# Patient Record
Sex: MALE | Race: BLACK OR AFRICAN AMERICAN | NOT HISPANIC OR LATINO | Employment: UNEMPLOYED | URBAN - METROPOLITAN AREA
[De-identification: names, ages, dates, MRNs, and addresses within clinical notes are randomized per-mention and may not be internally consistent; named-entity substitution may affect disease eponyms.]

---

## 2017-07-05 ENCOUNTER — TELEPHONE (OUTPATIENT)
Dept: BEHAVIORAL HEALTH | Facility: CLINIC | Age: 27
End: 2017-07-05

## 2017-07-05 ENCOUNTER — HOSPITAL ENCOUNTER (INPATIENT)
Facility: CLINIC | Age: 27
LOS: 7 days | Discharge: NURSING, REHAB OR LONG-TERM CARE | DRG: 885 | End: 2017-07-12
Attending: PHYSICIAN ASSISTANT | Admitting: HOSPITALIST
Payer: COMMERCIAL

## 2017-07-05 DIAGNOSIS — Z86.59 HISTORY OF DEPRESSION: ICD-10-CM

## 2017-07-05 DIAGNOSIS — F60.3 BORDERLINE PERSONALITY DISORDER (H): ICD-10-CM

## 2017-07-05 DIAGNOSIS — R45.851 SUICIDAL IDEATION: ICD-10-CM

## 2017-07-05 DIAGNOSIS — F25.1 SCHIZOAFFECTIVE DISORDER, DEPRESSIVE TYPE (H): Primary | ICD-10-CM

## 2017-07-05 PROBLEM — F32.A DEPRESSION: Status: ACTIVE | Noted: 2017-07-05

## 2017-07-05 LAB
ALBUMIN UR-MCNC: NEGATIVE MG/DL
AMPHETAMINES UR QL SCN: NORMAL
ANION GAP SERPL CALCULATED.3IONS-SCNC: 10 MMOL/L (ref 3–14)
APPEARANCE UR: CLEAR
BACTERIA #/AREA URNS HPF: ABNORMAL /HPF
BARBITURATES UR QL: NORMAL
BENZODIAZ UR QL: NORMAL
BILIRUB UR QL STRIP: NEGATIVE
BUN SERPL-MCNC: 6 MG/DL (ref 7–30)
CALCIUM SERPL-MCNC: 9.1 MG/DL (ref 8.5–10.1)
CANNABINOIDS UR QL SCN: NORMAL
CHLORIDE SERPL-SCNC: 114 MMOL/L (ref 94–109)
CO2 SERPL-SCNC: 20 MMOL/L (ref 20–32)
COCAINE UR QL: NORMAL
COLOR UR AUTO: YELLOW
CREAT SERPL-MCNC: 0.79 MG/DL (ref 0.66–1.25)
ERYTHROCYTE [DISTWIDTH] IN BLOOD BY AUTOMATED COUNT: 13.2 % (ref 10–15)
GFR SERPL CREATININE-BSD FRML MDRD: ABNORMAL ML/MIN/1.7M2
GLUCOSE SERPL-MCNC: 85 MG/DL (ref 70–99)
GLUCOSE UR STRIP-MCNC: NEGATIVE MG/DL
HCT VFR BLD AUTO: 40.6 % (ref 40–53)
HGB BLD-MCNC: 14.7 G/DL (ref 13.3–17.7)
HGB UR QL STRIP: NEGATIVE
KETONES UR STRIP-MCNC: NEGATIVE MG/DL
LEUKOCYTE ESTERASE UR QL STRIP: NEGATIVE
MCH RBC QN AUTO: 32.7 PG (ref 26.5–33)
MCHC RBC AUTO-ENTMCNC: 36.2 G/DL (ref 31.5–36.5)
MCV RBC AUTO: 90 FL (ref 78–100)
NITRATE UR QL: NEGATIVE
OPIATES UR QL SCN: NORMAL
PCP UR QL SCN: NORMAL
PH UR STRIP: 7 PH (ref 5–7)
PLATELET # BLD AUTO: 249 10E9/L (ref 150–450)
POTASSIUM SERPL-SCNC: 3.2 MMOL/L (ref 3.4–5.3)
RBC # BLD AUTO: 4.5 10E12/L (ref 4.4–5.9)
RBC #/AREA URNS AUTO: 0 /HPF (ref 0–2)
SODIUM SERPL-SCNC: 144 MMOL/L (ref 133–144)
SP GR UR STRIP: 1.01 (ref 1–1.03)
SQUAMOUS #/AREA URNS AUTO: <1 /HPF (ref 0–1)
URN SPEC COLLECT METH UR: ABNORMAL
UROBILINOGEN UR STRIP-MCNC: NORMAL MG/DL (ref 0–2)
WBC # BLD AUTO: 9.8 10E9/L (ref 4–11)
WBC #/AREA URNS AUTO: <1 /HPF (ref 0–2)

## 2017-07-05 PROCEDURE — 25000125 ZZHC RX 250: Performed by: PSYCHIATRY & NEUROLOGY

## 2017-07-05 PROCEDURE — 25000125 ZZHC RX 250: Performed by: INTERNAL MEDICINE

## 2017-07-05 PROCEDURE — 25000132 ZZH RX MED GY IP 250 OP 250 PS 637: Performed by: INTERNAL MEDICINE

## 2017-07-05 PROCEDURE — 84443 ASSAY THYROID STIM HORMONE: CPT | Performed by: PHYSICIAN ASSISTANT

## 2017-07-05 PROCEDURE — 99207 ZZC CDG-MDM COMPONENT: MEETS LOW - DOWN CODED: CPT | Performed by: PHYSICIAN ASSISTANT

## 2017-07-05 PROCEDURE — 99222 1ST HOSP IP/OBS MODERATE 55: CPT | Performed by: PHYSICIAN ASSISTANT

## 2017-07-05 PROCEDURE — 87591 N.GONORRHOEAE DNA AMP PROB: CPT | Performed by: PHYSICIAN ASSISTANT

## 2017-07-05 PROCEDURE — 90791 PSYCH DIAGNOSTIC EVALUATION: CPT

## 2017-07-05 PROCEDURE — 80307 DRUG TEST PRSMV CHEM ANLYZR: CPT | Performed by: PHYSICIAN ASSISTANT

## 2017-07-05 PROCEDURE — 36415 COLL VENOUS BLD VENIPUNCTURE: CPT | Performed by: PHYSICIAN ASSISTANT

## 2017-07-05 PROCEDURE — 12400006 ZZH R&B MH INTERMEDIATE

## 2017-07-05 PROCEDURE — 99285 EMERGENCY DEPT VISIT HI MDM: CPT | Mod: 25

## 2017-07-05 PROCEDURE — 25000132 ZZH RX MED GY IP 250 OP 250 PS 637: Performed by: PSYCHIATRY & NEUROLOGY

## 2017-07-05 PROCEDURE — 80048 BASIC METABOLIC PNL TOTAL CA: CPT | Performed by: PHYSICIAN ASSISTANT

## 2017-07-05 PROCEDURE — 81001 URINALYSIS AUTO W/SCOPE: CPT | Performed by: PHYSICIAN ASSISTANT

## 2017-07-05 PROCEDURE — 85027 COMPLETE CBC AUTOMATED: CPT | Performed by: PHYSICIAN ASSISTANT

## 2017-07-05 RX ORDER — VENLAFAXINE HYDROCHLORIDE 75 MG/1
225 CAPSULE, EXTENDED RELEASE ORAL
Status: DISCONTINUED | OUTPATIENT
Start: 2017-07-06 | End: 2017-07-12 | Stop reason: HOSPADM

## 2017-07-05 RX ORDER — TRIHEXYPHENIDYL HYDROCHLORIDE 5 MG/1
10 TABLET ORAL
Status: DISCONTINUED | OUTPATIENT
Start: 2017-07-05 | End: 2017-07-12 | Stop reason: HOSPADM

## 2017-07-05 RX ORDER — OLANZAPINE 2.5 MG/1
2.5-5 TABLET, FILM COATED ORAL DAILY PRN
Status: DISCONTINUED | OUTPATIENT
Start: 2017-07-05 | End: 2017-07-12 | Stop reason: HOSPADM

## 2017-07-05 RX ORDER — ACETAMINOPHEN 650 MG/1
650 SUPPOSITORY RECTAL EVERY 4 HOURS PRN
Status: DISCONTINUED | OUTPATIENT
Start: 2017-07-05 | End: 2017-07-12 | Stop reason: HOSPADM

## 2017-07-05 RX ORDER — IBUPROFEN 600 MG/1
600 TABLET, FILM COATED ORAL EVERY 8 HOURS PRN
Status: DISCONTINUED | OUTPATIENT
Start: 2017-07-05 | End: 2017-07-12 | Stop reason: HOSPADM

## 2017-07-05 RX ORDER — HYDROXYZINE HYDROCHLORIDE 25 MG/1
25-50 TABLET, FILM COATED ORAL EVERY 4 HOURS PRN
Status: DISCONTINUED | OUTPATIENT
Start: 2017-07-05 | End: 2017-07-12 | Stop reason: HOSPADM

## 2017-07-05 RX ORDER — QUETIAPINE FUMARATE 25 MG/1
25-50 TABLET, FILM COATED ORAL
Status: DISCONTINUED | OUTPATIENT
Start: 2017-07-05 | End: 2017-07-12 | Stop reason: HOSPADM

## 2017-07-05 RX ORDER — LEVOTHYROXINE SODIUM 112 UG/1
112 TABLET ORAL DAILY
Status: DISCONTINUED | OUTPATIENT
Start: 2017-07-06 | End: 2017-07-12 | Stop reason: HOSPADM

## 2017-07-05 RX ORDER — NICOTINE 21 MG/24HR
1 PATCH, TRANSDERMAL 24 HOURS TRANSDERMAL DAILY PRN
Status: DISCONTINUED | OUTPATIENT
Start: 2017-07-05 | End: 2017-07-06

## 2017-07-05 RX ORDER — VENLAFAXINE HYDROCHLORIDE 75 MG/1
225 TABLET, EXTENDED RELEASE ORAL
Status: ON HOLD | COMMUNITY
End: 2017-10-04

## 2017-07-05 RX ORDER — PRAZOSIN HYDROCHLORIDE 5 MG/1
5 CAPSULE ORAL AT BEDTIME
Status: DISCONTINUED | OUTPATIENT
Start: 2017-07-05 | End: 2017-07-12 | Stop reason: HOSPADM

## 2017-07-05 RX ORDER — TRIHEXYPHENIDYL HYDROCHLORIDE 5 MG/1
5 TABLET ORAL EVERY MORNING
Status: DISCONTINUED | OUTPATIENT
Start: 2017-07-06 | End: 2017-07-12 | Stop reason: HOSPADM

## 2017-07-05 RX ORDER — DOCUSATE SODIUM 100 MG/1
100 CAPSULE, LIQUID FILLED ORAL 2 TIMES DAILY
Status: DISCONTINUED | OUTPATIENT
Start: 2017-07-05 | End: 2017-07-06

## 2017-07-05 RX ORDER — ACETAMINOPHEN 325 MG/1
650 TABLET ORAL EVERY 4 HOURS PRN
Status: DISCONTINUED | OUTPATIENT
Start: 2017-07-05 | End: 2017-07-12 | Stop reason: HOSPADM

## 2017-07-05 RX ADMIN — LIDOCAINE HYDROCHLORIDE 10 ML: 20 JELLY TOPICAL at 23:43

## 2017-07-05 RX ADMIN — IBUPROFEN 600 MG: 600 TABLET ORAL at 23:32

## 2017-07-05 RX ADMIN — LIDOCAINE HYDROCHLORIDE 10 ML: 20 JELLY TOPICAL at 21:22

## 2017-07-05 RX ADMIN — PRAZOSIN HYDROCHLORIDE 5 MG: 5 CAPSULE ORAL at 21:00

## 2017-07-05 RX ADMIN — ACETAMINOPHEN 650 MG: 325 TABLET, FILM COATED ORAL at 23:32

## 2017-07-05 RX ADMIN — HYDROXYZINE HYDROCHLORIDE 50 MG: 25 TABLET ORAL at 23:32

## 2017-07-05 RX ADMIN — Medication 1 MG: at 21:00

## 2017-07-05 RX ADMIN — DOCUSATE SODIUM 100 MG: 100 CAPSULE, LIQUID FILLED ORAL at 21:00

## 2017-07-05 RX ADMIN — HYDROXYZINE HYDROCHLORIDE 50 MG: 25 TABLET ORAL at 19:50

## 2017-07-05 ASSESSMENT — ENCOUNTER SYMPTOMS: COUGH: 1

## 2017-07-05 NOTE — PROGRESS NOTES
"Reported feeling depressed on and off for the past few weeks.   Started having suicidal thoughts last night again, he thought about jumping over a bridge. He feels lonely, and hopeless.  Does not have any support system here, his family [place] the [place] not keep in touch with him.  He said \"  I feel like in death row, like someone can give an injection and just kill me\"  Flat affect, he contract for safety.  Admission completed.  "

## 2017-07-05 NOTE — ED PROVIDER NOTES
History     Chief Complaint:  Suicidal ideation    HPI   Kamini Neal is a 26 year old male with a history of bipolar disorder, schizoaffective disorder, and borderline personality disorder who presents for evaluation of suicidal ideation. The patient reports that he has suicidal thoughts off and on. Last night he started to feel suicidal again and the feeling did no pass through the night. The patient states that there is a bridge near his group home that he would jump off if he were to kill himself. The patient notes that he ran out of Abilify a week ago and has not take it since. He reported this to his psychiatrist who denied him a new prescription because they would prefer him to do an injection. The patient states that he did have some alcohol yesterday, maybe a half can of beer. The patient states that he has no thoughts of harming others, there was no event that started the suicidal thoughts this time, and denies drug use. He has not had any recent hallucinations. The patient states that he has had a cough the last 24 hours and subjective fevers, no other symptoms or concerns.     Per the group home, the patient has a difficult family life. He only keeps in contact with his mother who is still in [facility]. Any family he has in the [place] does not keep in contact with him. They also stated he was talking gibberish, and having flashbacks from [place].     Allergies:  No known drug allergies.      Medications:    Abilify  Zyprexa  Artane  Prazosin  Prilosec  Levothyroxine  Effexor    Past Medical History:    Anxiety  Depression  Bipolar disorder  Schizoaffective disorder  Hypothyroidism  PTSD    Past Surgical History:    History reviewed. No pertinent past surgical history.     Family History:    History reviewed. No pertinent family history.    Social History:  Marital Status: Single  Tobacco Use: Current daily smoker, 1.00 PPD.   Alcohol Use: Yes    Review of Systems   Constitutional: Fever: subjective.  "  Respiratory: Positive for cough. Negative for shortness of breath.    Cardiovascular: Negative for chest pain.   Psychiatric/Behavioral: Positive for dysphoric mood and suicidal ideas. Negative for hallucinations and self-injury.   All other systems reviewed and are negative.    Physical Exam   First Vitals:  BP: (!) 120/91  Pulse: 59  Temp: 99.4  F (37.4  C)  Resp: 16  Height: 179.8 cm (5' 10.8\")  Weight: 104.3 kg (230 lb)  SpO2: 100 %      Physical Exam  General: Sitting in the mental health area, playing with his wrist band.     Head:  The scalp, head and face appear normal.  ENT:  Pupils are equal, round and reactive to light.    Oropharynx is moist.   Resp:  Non-labored breathing. No tachypnea.     Lung fields clear to auscultation without wheezes or rales.   CV:  Regular rate and rhythm. Normal S1 and S2, no S3 or S4.     No pathological murmur detected.   GI:  Abdomen is soft and non-distended.     Non-tender to palpation in all four quadrants.    MS:  Normal muscular tone.     Normal gait.  Neuro:  Awake and alert. Speech is clear.   Skin:  No rash or pallor.  Psych: Flat affect. Minimal eye contact. Talking into his hands.     Normal thought content and speech. No evidence of hallucinations.     Well groomed.       Emergency Department Course     Laboratory:  Drug abuse screen 77 urine: Negative.      Emergency Department Course:  Nursing notes and vitals reviewed.  I performed an exam of the patient as documented above.   Urine sample was obtained and sent for laboratory analysis, findings above.   ([id number]) I consulted with DEC worker, John, after their consultation of the patient.   Findings and plan explained to the patient who consents to admission.   DEC discussed the patient with Dr. Zhu, who will admit the patient to a behavioral health bed for further monitoring, evaluation, and treatment.     Impression & Plan      Medical Decision Making:  Kamini Neal is a 26 year old male with a " history of borderline personality disorder, depression, anxiety, and paranoid schizophrenia who presents to the emergency department for evaluation of suicidal ideation. The patient reports that he has planned to jump off a bridge near his group home. Group home also noted concern that he may be having nicholas. Patient was evaluated by our DEC virtual  here in the emergency department, John, see note for details. We are in agreement that the patient requires inpatient admission for further management of his suicidal ideation. The patient is voluntary. U-tox was negative and patient reports only drinking half a beer yesterday evening. No other signs of other possible organic cause for his increase in suicidal ideation. He reports 24 hours fo cough and subjective fevers. He is afebrile here with clear lungs sounds and no cough through interview/ exam. He may be coming down with a viral illness, but I do not feel he requires CXR or further work-up for cough at this time.  No hallucinations or thoughts of harming others. I do feel that he is safe for admission at this time. He is accepted under the care of Dr. Zhu here at [facility] in station 77. The plan and any additional questions were discussed with the patient.     Diagnosis:    ICD-10-CM    1. Suicidal ideation R45.851    2. History of depression Z86.59    3. Borderline personality disorder F60.3        Disposition:  Admitted to behavior health bed    I, Alberto Arguello, am serving as a scribe on 7/5/2017 at 11:26 AM to personally document services performed by Yaa Fall PA-C based on my observations and the provider's statements to me.    EMERGENCY DEPARTMENT       Yaa Fall PA-C  07/06/17 0047

## 2017-07-05 NOTE — PROGRESS NOTES
.Welcome packet reviewed with patient. Information reviewed includes getting emergency help, preventing infections, understanding your care, using medication safely, reducing falls, preventing pressure ulcers, smoking cessation, powerful choices and Patients Bill of Rights. Pt. given tour of the unit and instruction on use of facility including emergency call light. Program schedule reviewed with patient. Questions regarding the unit addressed. Pt. Search completed and belongings inventoried.    .Nursing assessment complete including patient and medication profiles. Risk assessments completed addressing suicide,fall,skin,nutrition and safety issues. Care plan initiated. Assessments reviewed with physician and admit orders received.

## 2017-07-05 NOTE — TELEPHONE ENCOUNTER
S:  Pt seen in the [facility] ED due to SI.    B:  All info per URI Lopez  :  Pt seen in the ED reporting increased depression and S.I. With plans to jump off a specific bridge.  He reports he has had 3 attempts in his life.  He states he was recently at [facility] after cutting on his wrist.  MDD, PTSD, possible hx of a psychotic d/o.  Pt is a bit paranoid, but denies any hallucinations or delusions.  He states he has not been sleeping well, has appetite disturbance, poor concentration, low energy.  He reports crying a great deal and isolating.  He lives in a group home.  He states he was recently put on Abilify but ran out about 1 week ago.  He denies chemical abuse and his utox is negative. Medically cleared.  On a [place], will be voluntary.  Please see chart for further info.    A:  Needing hospitalization for safety and stabilization.    R:   77     accepts for himself        [place]    / Vol

## 2017-07-05 NOTE — IP AVS SNAPSHOT
[place] [address] [place] [address]    Phone:  [phone]                                       After Visit Summary   7/5/2017    Kamini Neal    MRN: [id number]           After Visit Summary Signature Page     I have received my discharge instructions, and my questions have been answered. I have discussed any challenges I see with this plan with the nurse or doctor.    ..........................................................................................................................................  Patient/Patient Representative Signature      ..........................................................................................................................................  Patient Representative Print Name and Relationship to Patient    ..................................................               ................................................  Date                                            Time    ..........................................................................................................................................  Reviewed by Signature/Title    ...................................................              ..............................................  Date                                                            Time

## 2017-07-05 NOTE — PROGRESS NOTES
07/05/17 1641   Patient Belongings   Did you bring any home meds/supplements to the hospital?  No   Patient Belongings other (see comments)   Disposition of Belongings Put in pt's locker   Belongings Search Yes   Clothing Search Yes   Second Staff Aniya boo w/bert Quinn  Boxers  Button up shirt  Belt  Empty box of cigerettes  Wallet  [place] instruction permit  School ID  $4 cash  Loose change    Security Envelope #[id number]  [employer] gift card   Go to card    Admission:  I am responsible for any personal items that are not sent to the safe or pharmacy. [facility] is not responsible for loss, theft or damage of any property in my possession.        Patient Signature: ___________________________________________      Date/Time:__________________________     Staff Signature: __________________________________      Date/Time:__________________________     [facility] Staff person, if patient is unable/unwilling to sign:      __________________________________________________________      Date/Time: __________________________        Discharge:  [facility] has returned all of my personal belongings:     Patient Signature: ________________________________________     Date/Time: ____________________________________     Staff Signature: ______________________________________     Date/Time:_____________________________________

## 2017-07-05 NOTE — ED NOTES
Bed: [facility]  Expected date:   Expected time:   Means of arrival:   Comments:  433 26M psych eval - on a hold

## 2017-07-05 NOTE — PROGRESS NOTES
Patient brought in on a  Hold by MITZY Bowens bdg # [id number] of the [place] [employer] [phone]. This department will need to be called when a discharge order is placed and before the discharge and then fully documented in Epic.

## 2017-07-05 NOTE — IP AVS SNAPSHOT
MRN:[id number]                      After Visit Summary   7/5/2017    Kamini Neal    MRN: [id number]           Thank you!     Thank you for choosing [facility] for your care. Our goal is always to provide you with excellent care.        Patient Information     Date Of Birth          1990        Designated Caregiver       Most Recent Value    Caregiver    Will someone help with your care after discharge? no      About your hospital stay     You were admitted on:  July 5, 2017 You last received care in the:  [facility]    You were discharged on:  July 12, 2017       Who to Call     For medical emergencies, please call 911.  For non-urgent questions about your medical care, please call your primary care provider or clinic, [phone]          Attending Provider     Provider Specialty    Yaa Fall PA-C Physician Assistant    John Zhu MD Psychiatry       Primary Care Provider Office Phone # Fax #    [facility] [phone]      Further instructions from your care team       Behavioral Discharge Planning and Instructions    Summary:   Admitted with suicidal ideation.     Main Diagnosis:  Schizoaffective Disorder, depressed type; Borderline Personality Disorder, by history.    Major Treatments, Procedures and Findings:  Psychiatric assessment. Medication adjustment.     Symptoms to Report: Feeling more aggressive, Losing more sleep, Mood getting worse or Thoughts of suicide    Lifestyle Adjustment:  Follow all treatment recommendations, including taking your medications as prescribed by the physician. Develop and follow safety plan. Abstain from the use of all mood-altering substances, including alcohol. Utilize positive coping skills to manage depressive symptoms.     Psychiatry Follow-up:     You have a follow up psychiatry appointment with Dr. Marco Campo at [facility] in [place] on  Thursday, July 13, 2017 at 9:40 am. It is important that you keep this appointment so that you can get your medications refilled.  You have another appointment scheduled with Dr. Marco Campo on Tuesday, October 17, 2017 at 10:00 am.     [facility]  [facility] [address], [address]oors  [place], [place] [id number] [phone] / [phone] fax    You have a therapy appointment scheduled with Krystyna PALACIOS at [facility] in [place] on Monday, July 17, 2017 at 9:00 am. It is important that you keep this appointment as you have missed the last couple of scheduled appointments with her.     [facility]  [facility] [address], [address] and [address] Floors  [place], [place] [id number] [phone] / [phone] fax    Please continue working with your mental health  through [facility], Peter Marr. He can be reached at [phone].     You are returning to your group home through [facility]. Your  at the group home is Neela. She can be reached at [phone] or [phone].     Resources:   Crisis Intervention: 393.369.2933 or 164-202-4522 (TTY: 771.135.6273).  Call anytime for help.  [employer] [place] on Mental Illness (www.[place].jorge a.org): 171.624.7283 or 204-139-1871.  Alcoholics Anonymous (www.alcoholics-anonymous.org): Check your phone book for your local chapter.  National Suicide Prevention Line (www.mentalhealthmn.org): 496-144-LYVS (9939)  COPE (Crisis Services for Adults) in [facility]: [phone] (Available 24/7)    General Medication Instructions:   See your medication sheet(s) for instructions.   Take all medicines as directed.  Make no changes unless your doctor suggests them.   Go to all your doctor visits.  Be sure to have all your required lab tests. This way, your medicines can be  "refilled on time.  Do not use any drugs not prescribed by your doctor.  Avoid alcohol.      Pending Results     No orders found from 7/3/2017 to 2017.            Statement of Approval     Ordered          17 1211  I have reviewed and agree with all the recommendations and orders detailed in this document.  EFFECTIVE NOW     Approved and electronically signed by:  John Zhu MD             Admission Information     Date & Time Provider Department Dept. Phone    2017 John Zhu MD [facility] [phone]      Your Vitals Were     Blood Pressure Pulse Temperature Respirations Height Weight    111/71 63 98.2  F (36.8  C) (Oral) 16 1.753 m (5' 9\") 94.8 kg (209 lb 1.6 oz)    Pulse Oximetry BMI (Body Mass Index)                100% 30.88 kg/m2          MyChart Information     [facility] lets you send messages to your doctor, view your test results, renew your prescriptions, schedule appointments and more. To sign up, go to [email or web address] . Click on \"Log in\" on the left side of the screen, which will take you to the Welcome page. Then click on \"Sign up Now\" on the right side of the page.     You will be asked to enter the access code listed below, as well as some personal information. Please follow the directions to create your username and password.     Your access code is: [email or web address]  Expires: 10/10/2017 12:48 PM     Your access code will  in 90 days. If you need help or a new code, please call your [facility] clinic or [phone].        Care EveryWhere ID     This is your Care EveryWhere ID. This could be used by other organizations to access your [facility] medical records  [id number]        Equal Access to Services     FERNANDA WILCOX AH: Michael Cedeño, kalpesh khalil, naeem kaalmada jacki, christiano anna. So [facility] [phone].    ATENCIÓN: Si habla español, tiene a reyes disposición servicios gratuitos de " asistencia lingüística. Sheila al [phone].    We comply with applicable federal civil rights laws and [place] laws. We do not discriminate on the basis of race, color, national origin, age, disability sex, sexual orientation or gender identity.               Review of your medicines      START taking        Dose / Directions    * ARIPiprazole 30 MG tablet   Commonly known as:  ABILIFY   Used for:  Schizoaffective disorder, depressive type (H)        Dose:  30 mg   Take 1 tablet (30 mg) by mouth every morning   Quantity:  30 tablet   Refills:  0       * ARIPiprazole  MG extended release injection   Commonly known as:  ABILIFY MAINTENA   Used for:  Schizoaffective disorder, depressive type (H)        Dose:  400 mg   Inject 2 mLs (400 mg) into the muscle every 30 days   Quantity:  2 mL   Refills:  0       QUEtiapine 50 MG tablet   Commonly known as:  SEROquel   Used for:  Schizoaffective disorder, depressive type (H)        Dose:  50 mg   Take 1 tablet (50 mg) by mouth 2 times daily as needed (agitation)   Quantity:  60 tablet   Refills:  0       * Notice:  This list has 2 medication(s) that are the same as other medications prescribed for you. Read the directions carefully, and ask your doctor or other care provider to review them with you.      CONTINUE these medicines which have NOT CHANGED        Dose / Directions    DOCUSATE SODIUM PO        Dose:  100 mg   Take 100 mg by mouth 2 times daily   Refills:  0       LEVOTHYROXINE SODIUM PO        Dose:  112 mcg   Take 112 mcg by mouth daily noon   Refills:  0       MELATONIN PO        Dose:  1 mg   Take 1 mg by mouth At Bedtime   Refills:  0       OMEPRAZOLE PO        Dose:  20 mg   Take 20 mg by mouth every morning   Refills:  0       PRAZOSIN HCL PO        Dose:  5 mg   Take 5 mg by mouth At Bedtime   Refills:  0       * TRIHEXYPHENIDYL HCL PO        Dose:  5 mg   Take 5 mg by mouth every morning   Refills:  0       * TRIHEXYPHENIDYL HCL PO        Dose:   10 mg   Take 10 mg by mouth daily (with dinner)   Refills:  0       venlafaxine 75 MG Tb24 24 hr tablet   Commonly known as:  EFFEXOR-ER        Dose:  225 mg   Take 225 mg by mouth daily (with breakfast)   Refills:  0       ZYPREXA PO        Dose:  2.5-5 mg   Take 2.5-5 mg by mouth daily as needed for agitation   Refills:  0       * Notice:  This list has 2 medication(s) that are the same as other medications prescribed for you. Read the directions carefully, and ask your doctor or other care provider to review them with you.         Where to get your medicines      [facility] [address] [facility]  [address], [facility] [address]     Phone:  [phone]     ARIPiprazole 30 MG tablet    ARIPiprazole  MG extended release injection    QUEtiapine 50 MG tablet                Protect others around you: Learn how to safely use, store and throw away your medicines at www.disposemymeds.org.             Medication List: This is a list of all your medications and when to take them. Check marks below indicate your daily home schedule. Keep this list as a reference.      Medications           Morning Afternoon Evening Bedtime As Needed    * ARIPiprazole 30 MG tablet   Commonly known as:  ABILIFY   Take 1 tablet (30 mg) by mouth every morning   Last time this was given:  30 mg on 7/12/2017  8:11 AM                                   * ARIPiprazole  MG extended release injection   Commonly known as:  ABILIFY MAINTENA   Inject 2 mLs (400 mg) into the muscle every 30 days   Last time this was given:  400 mg on 7/7/2017 10:48 AM                                DOCUSATE SODIUM PO   Take 100 mg by mouth 2 times daily   Last time this was given:  100 mg on 7/6/2017  8:52 AM                                      LEVOTHYROXINE SODIUM PO   Take 112 mcg by mouth daily noon   Last time this was given:  112 mcg on 7/12/2017  7:31 AM                                    MELATONIN PO   Take 1 mg by mouth At Bedtime   Last time this was given:  1 mg on 7/11/2017  9:22 PM                                   OMEPRAZOLE PO   Take 20 mg by mouth every morning   Last time this was given:  20 mg on 7/12/2017  8:11 AM                                   PRAZOSIN HCL PO   Take 5 mg by mouth At Bedtime   Last time this was given:  5 mg on 7/11/2017  9:22 PM                                   QUEtiapine 50 MG tablet   Commonly known as:  SEROquel   Take 1 tablet (50 mg) by mouth 2 times daily as needed (agitation)   Last time this was given:  50 mg on 7/12/2017  8:32 AM                                   * TRIHEXYPHENIDYL HCL PO   Take 5 mg by mouth every morning   Last time this was given:  5 mg on 7/12/2017  8:11 AM                                   * TRIHEXYPHENIDYL HCL PO   Take 10 mg by mouth daily (with dinner)   Last time this was given:  5 mg on 7/12/2017  8:11 AM                                   venlafaxine 75 MG Tb24 24 hr tablet   Commonly known as:  EFFEXOR-ER   Take 225 mg by mouth daily (with breakfast)                                   ZYPREXA PO   Take 2.5-5 mg by mouth daily as needed for agitation   Last time this was given:  5 mg on 7/12/2017 10:59 AM                                   * Notice:  This list has 4 medication(s) that are the same as other medications prescribed for you. Read the directions carefully, and ask your doctor or other care provider to review them with you.              More Information        Depression: Tips to Help Yourself  As your healthcare providers help treat your depression, you can also help yourself. Keep in mind that your illness affects you emotionally, physically, mentally, and socially. So full recovery will take time. Take care of your body and your soul, and be patient with yourself as you get better.    Self-care    Educate yourself. Read about treatment and medicine options. If you have the energy, attend  local conferences or support groups. Keep a list of useful websites and helpful books and use them as needed. This illness is not your fault. Don t blame yourself for your depression.    Manage early symptoms. If you notice symptoms returning, experience triggers, or identify other factors that may lead to a depressive episode, get help as soon as possible. Ask trusted friends and family to monitor your behavior and let you know if they see anything of concern.    Work with your provider. Find a provider you can trust. Communicate honestly with that person and share information on your treatment for depression and your reaction to medicines.    Be prepared for a crisis. Know what to do if you experience a crisis. Keep the phone number of a crisis hotline and know the location of your community's urgent care centers and the closest emergency department.    Hold off on big decisions. Depression can cloud your judgment. So wait until you feel better before making major life decisions, such as changing jobs, moving, or getting  or .    Be patient. Recovering from depression is a process. Don t be discouraged if it takes some time to feel better.    Keep it simple. Depression saps your energy and concentration. So you won t be able to do all the things you used to do. Set small goals and do what you can.    Be with others. Don t isolate yourself--you ll only feel worse. Try to be with other people. And take part in fun activities when you can. Go to a movie, ballgame, [employer] service, or social event. Talk openly with people you can trust. And accept help when it s offered.  Take care of your body  People with depression often lose the desire to take care of themselves. That only makes their problems worse. During treatment and afterward, make a point to:    Exercise. It s a great way to take care of your body. And studies have shown that exercise helps fight depression.    Avoid drugs and alcohol. These  may ease the pain in the short term. But they ll only make your problems worse in the long run.    Get relief from stress. Ask your healthcare provider for relaxation exercises and techniques to help relieve stress.    Eat right. A balanced and healthy diet helps keep your body healthy.  Date Last Reviewed: 1/1/2017 2000-2017 [employer]. [address] [place] [address], [place] [address]. All rights reserved. This information is not intended as a substitute for professional medical care. Always follow your healthcare professional's instructions.

## 2017-07-05 NOTE — PHARMACY-ADMISSION MEDICATION HISTORY
Admission medication history interview status for the 7/5/2017  admission is complete. See EPIC admission navigator for prior to admission medications     Medication history source reliability:Good    Actions taken by pharmacist (provider contacted, etc):  Med list provided by pt's group home , Neela Pollack ([phone]).  Called Ms. Pollack for last admin times.  Called [facility] in [place] to clarify on formulations and doses.     Additional medication history information not noted on PTA med list :None    Medication reconciliation/reorder completed by provider prior to medication history? No    Time spent in this activity: 20 minutes    Prior to Admission medications    Medication Sig Last Dose Taking? Auth Provider   OMEPRAZOLE PO Take 20 mg by mouth every morning  7/5/2017 at am Yes Reported, Patient   TRIHEXYPHENIDYL HCL PO Take 5 mg by mouth every morning  7/5/2017 at am Yes Reported, Patient   DOCUSATE SODIUM PO Take 100 mg by mouth 2 times daily  7/5/2017 at am Yes Reported, Patient   MELATONIN PO Take 1 mg by mouth At Bedtime  7/4/2017 Yes Reported, Patient   PRAZOSIN HCL PO Take 5 mg by mouth At Bedtime  7/4/2017 Yes Reported, Patient   LEVOTHYROXINE SODIUM PO Take 112 mcg by mouth daily noon 7/4/2017 at noon Yes Unknown, Entered By History   venlafaxine (EFFEXOR-ER) 75 MG TB24 24 hr tablet Take 225 mg by mouth daily (with breakfast) 7/5/2017 at am Yes Unknown, Entered By History   TRIHEXYPHENIDYL HCL PO Take 10 mg by mouth daily (with dinner) 7/4/2017 Yes Unknown, Entered By History   OLANZapine (ZYPREXA PO) Take 2.5-5 mg by mouth daily as needed for agitation prn Yes Unknown, Entered By History

## 2017-07-06 LAB — POTASSIUM SERPL-SCNC: 3.3 MMOL/L (ref 3.4–5.3)

## 2017-07-06 PROCEDURE — 36415 COLL VENOUS BLD VENIPUNCTURE: CPT | Performed by: PSYCHIATRY & NEUROLOGY

## 2017-07-06 PROCEDURE — 99232 SBSQ HOSP IP/OBS MODERATE 35: CPT | Performed by: INTERNAL MEDICINE

## 2017-07-06 PROCEDURE — 25000132 ZZH RX MED GY IP 250 OP 250 PS 637: Performed by: INTERNAL MEDICINE

## 2017-07-06 PROCEDURE — 25000132 ZZH RX MED GY IP 250 OP 250 PS 637: Performed by: PSYCHIATRY & NEUROLOGY

## 2017-07-06 PROCEDURE — 84132 ASSAY OF SERUM POTASSIUM: CPT | Performed by: PSYCHIATRY & NEUROLOGY

## 2017-07-06 PROCEDURE — 25000132 ZZH RX MED GY IP 250 OP 250 PS 637: Performed by: PHYSICIAN ASSISTANT

## 2017-07-06 PROCEDURE — 12400006 ZZH R&B MH INTERMEDIATE

## 2017-07-06 PROCEDURE — 99207 ZZC CDG-CHARGE REQUIRED MANUAL ENTRY: CPT | Performed by: INTERNAL MEDICINE

## 2017-07-06 RX ORDER — POLYETHYLENE GLYCOL 3350 17 G/17G
17 POWDER, FOR SOLUTION ORAL 2 TIMES DAILY
Status: DISCONTINUED | OUTPATIENT
Start: 2017-07-06 | End: 2017-07-12 | Stop reason: HOSPADM

## 2017-07-06 RX ORDER — ARIPIPRAZOLE 10 MG/1
20 TABLET ORAL EVERY MORNING
Status: DISCONTINUED | OUTPATIENT
Start: 2017-07-06 | End: 2017-07-06

## 2017-07-06 RX ORDER — POTASSIUM CHLORIDE 1500 MG/1
40 TABLET, EXTENDED RELEASE ORAL ONCE
Status: COMPLETED | OUTPATIENT
Start: 2017-07-06 | End: 2017-07-06

## 2017-07-06 RX ORDER — AMOXICILLIN 250 MG
1 CAPSULE ORAL 2 TIMES DAILY
Status: DISCONTINUED | OUTPATIENT
Start: 2017-07-06 | End: 2017-07-12 | Stop reason: HOSPADM

## 2017-07-06 RX ORDER — ARIPIPRAZOLE 10 MG/1
20 TABLET ORAL EVERY MORNING
Status: DISCONTINUED | OUTPATIENT
Start: 2017-07-06 | End: 2017-07-11

## 2017-07-06 RX ADMIN — LEVOTHYROXINE SODIUM 112 MCG: 112 TABLET ORAL at 08:51

## 2017-07-06 RX ADMIN — SENNOSIDES AND DOCUSATE SODIUM 1 TABLET: 8.6; 5 TABLET ORAL at 21:49

## 2017-07-06 RX ADMIN — NICOTINE POLACRILEX 2 MG: 2 GUM, CHEWING ORAL at 21:48

## 2017-07-06 RX ADMIN — VENLAFAXINE HYDROCHLORIDE 225 MG: 75 CAPSULE, EXTENDED RELEASE ORAL at 08:51

## 2017-07-06 RX ADMIN — TRIHEXYPHENIDYL HYDROCHLORIDE 10 MG: 5 TABLET ORAL at 16:58

## 2017-07-06 RX ADMIN — OMEPRAZOLE 20 MG: 20 CAPSULE, DELAYED RELEASE ORAL at 08:50

## 2017-07-06 RX ADMIN — PRAZOSIN HYDROCHLORIDE 5 MG: 5 CAPSULE ORAL at 21:48

## 2017-07-06 RX ADMIN — Medication 1 MG: at 21:49

## 2017-07-06 RX ADMIN — ARIPIPRAZOLE 20 MG: 10 TABLET ORAL at 12:41

## 2017-07-06 RX ADMIN — POLYETHYLENE GLYCOL 3350 17 G: 17 POWDER, FOR SOLUTION ORAL at 21:48

## 2017-07-06 RX ADMIN — NICOTINE 1 PATCH: 21 PATCH, EXTENDED RELEASE TRANSDERMAL at 08:54

## 2017-07-06 RX ADMIN — TRIHEXYPHENIDYL HYDROCHLORIDE 5 MG: 5 TABLET ORAL at 08:51

## 2017-07-06 RX ADMIN — DOCUSATE SODIUM 100 MG: 100 CAPSULE, LIQUID FILLED ORAL at 08:52

## 2017-07-06 RX ADMIN — POTASSIUM CHLORIDE 40 MEQ: 1500 TABLET, EXTENDED RELEASE ORAL at 06:49

## 2017-07-06 ASSESSMENT — ENCOUNTER SYMPTOMS
SHORTNESS OF BREATH: 0
HALLUCINATIONS: 0
DYSPHORIC MOOD: 1

## 2017-07-06 NOTE — H&P
"[facility] H&P Note       Initial History   The patient's care was discussed with the treatment team and chart notes were reviewed.     Patient examined for psychiatric admission.     IDENTIFICATION    Patient is a 26 year old single  male currently living in a group home in [place]. Pt sees PCP at [facility]. He has a county  in [place]. He sees Dr. Marco Campo for psychiatric care.    HISTORY OF PRESENT ILLNESS    Abdallah \"Elder\" Val is a 26 year old male with a PMHx significant for depression, bipolar disorder, borderline personality disorder, and PTSD. Pt states he was in his group home yesterday when his manager contacted the paramedics as pt apparently had suicidal ideations that he will jump off a bridge near his group home. He states that he has not completed this for the sake of his mother. Pt denies auditory and visual hallucinations. He endorses that \"people can read me sometimes,\" but otherwise does not have any other paranoid thoughts. Pt has severely depressed mood, motivation poor, concentration poor, low energy, hopeless, helpless, and worthless with SI, no plan, able to contract for safety. He states that his sleep architecture is fair. He noted in the ED that he ran out of his Abilify from his hospitalization at [facility]. He was recently at [facility] from 6/18/17-6/22/17 where Clozaril was discontinued and was started on Abilify with the intent to move to long acting injectable medication. He did not follow up with his provider to complete this.     CHEMICAL DEPENDENCY HISTORY    None reported. Utox negative on admission.    PAST  PSYCHIATRIC HISTORY    He has previously taken Zyprexa, Effexor, Abilify, Clozaril (too sedating on 600mg). He has never taken Seroquel, Latuda, Depakote, Lithium, Lamictal. Multiple hospitalizations at [facility].    FAMILY HISTORY    His mother is congentially blind. His maternal uncle completed suicide. No history of " chemical dependency in his family.     SOCIAL HISTORY    Pt grew up in [place] with one older sister and his mother. His sister suffered from a seizure disorder and has since passed away. Pt moved to the [place] seven years ago. He reports that he has a green card and will be eligible for permanent residency in two years. He completed high school and quit attending community college in 2012. He is presently employed as a .     Medications     Prescriptions Prior to Admission   Medication Sig Dispense Refill Last Dose     OMEPRAZOLE PO Take 20 mg by mouth every morning    7/5/2017 at am     TRIHEXYPHENIDYL HCL PO Take 5 mg by mouth every morning    7/5/2017 at am     DOCUSATE SODIUM PO Take 100 mg by mouth 2 times daily    7/5/2017 at am     MELATONIN PO Take 1 mg by mouth At Bedtime    7/4/2017 at Unknown time     PRAZOSIN HCL PO Take 5 mg by mouth At Bedtime    7/4/2017 at Unknown time     LEVOTHYROXINE SODIUM PO Take 112 mcg by mouth daily noon   7/5/2017 at noon     venlafaxine (EFFEXOR-ER) 75 MG TB24 24 hr tablet Take 225 mg by mouth daily (with breakfast)   7/5/2017 at am     TRIHEXYPHENIDYL HCL PO Take 10 mg by mouth daily (with dinner)   7/4/2017 at Unknown time     OLANZapine (ZYPREXA PO) Take 2.5-5 mg by mouth daily as needed for agitation   7/4/2017 at Unknown time       Scheduled Medications:    docusate sodium (COLACE) capsule 100 mg  100 mg Oral BID     levothyroxine (SYNTHROID/LEVOTHROID) tablet 112 mcg  112 mcg Oral Daily     melatonin tablet 1 mg  1 mg Oral At Bedtime     omeprazole (priLOSEC) CR capsule 20 mg  20 mg Oral QAM     prazosin (MINIPRESS) capsule 5 mg  5 mg Oral At Bedtime     trihexyphenidyl (ARTANE) tablet 5 mg  5 mg Oral QAM     trihexyphenidyl (ARTANE) tablet 10 mg  10 mg Oral Daily with supper     venlafaxine  225 mg Oral Daily with breakfast     nicotine   Transdermal Q8H     nicotine   Transdermal Daily     PRNs:  hydrOXYzine, OLANZapine (zyPREXA) tablet 2.5-5 mg, QUEtiapine,  "nicotine, acetaminophen **OR** acetaminophen, ibuprofen      Allergies    No Known Allergies     Previous Medical History     Past Medical History:   Diagnosis Date     Anxiety      Depressive disorder         Medical Review of Systems   /48  Pulse 50  Temp 97.6  F (36.4  C) (Oral)  Resp 14  Ht 1.753 m (5' 9\")  Wt 94.8 kg (209 lb 1.6 oz)  SpO2 100%  BMI 30.88 kg/m2  Body mass index is 30.88 kg/(m^2).  Previous 10-point ROS completed by Joanna Barthell, PA-C on 7/5/17 reviewed by John Zhu MD on July 6, 2017 and is unchanged except for those problems mentioned within the HPI.     Mental Status Examination     Appearance Sitting in bed, dressed in scrubs. Appears stated age.   Attitude Cooperative   Orientation Oriented to person, place, time   Eye Contact Poor   Speech Regular rate, rhythm, volume and tone   Language Normal   Psychomotor Behavior Normal   Mood Depressed   Affect Flat   Thought Process Goal-Oriented, Intact   Associations Intact   Thought Content Patient is currently negative for suicide ideation, negative for plan or intent, able to contract no self harm and identify barriers to suicide.  Negative for obsessions, compulsions or psychosis.   Positive for paranoia.   Fund of Knowledge Average   Insight Impaired   Judgement Impaired   Attention Span & Concentration Poor   Recent & Remote Memory Poor   Gait Normal      Labs   Labs reviewed.  Recent Results (from the past 24 hour(s))   Drug abuse screen 77 urine ([place],[facility])    Collection Time: 07/05/17 11:57 AM   Result Value Ref Range    Amphetamine Qual Urine  NEG     Negative   Cutoff for a negative amphetamine is 500 ng/mL or less.      Barbiturates Qual Urine  NEG     Negative   Cutoff for a negative barbiturate is 200 ng/mL or less.      Benzodiazepine Qual Urine  NEG     Negative   Cutoff for a negative benzodiazepine is 200 ng/mL or less.      Cannabinoids Qual Urine  NEG     Negative   Cutoff for a negative cannabinoid is " 50 ng/mL or less.      Cocaine Qual Urine  NEG     Negative   Cutoff for a negative cocaine is 300 ng/mL or less.      Opiates Qualitative Urine  NEG     Negative   Cutoff for a negative opiate is 300 ng/mL or less.      PCP Qual Urine  NEG     Negative   Cutoff for a negative PCP is 25 ng/mL or less.     Basic metabolic panel    Collection Time: 07/05/17  9:10 PM   Result Value Ref Range    Sodium 144 133 - 144 mmol/L    Potassium 3.2 (L) 3.4 - 5.3 mmol/L    Chloride 114 (H) 94 - 109 mmol/L    Carbon Dioxide 20 20 - 32 mmol/L    Anion Gap 10 3 - 14 mmol/L    Glucose 85 70 - 99 mg/dL    Urea Nitrogen 6 (L) 7 - 30 mg/dL    Creatinine 0.79 0.66 - 1.25 mg/dL    GFR Estimate >90  Non  GFR Calc   >60 mL/min/1.7m2    GFR Estimate If Black >90   GFR Calc   >60 mL/min/1.7m2    Calcium 9.1 8.5 - 10.1 mg/dL   CBC with platelets    Collection Time: 07/05/17  9:10 PM   Result Value Ref Range    WBC 9.8 4.0 - 11.0 10e9/L    RBC Count 4.50 4.4 - 5.9 10e12/L    Hemoglobin 14.7 13.3 - 17.7 g/dL    Hematocrit 40.6 40.0 - 53.0 %    MCV 90 78 - 100 fl    MCH 32.7 26.5 - 33.0 pg    MCHC 36.2 31.5 - 36.5 g/dL    RDW 13.2 10.0 - 15.0 %    Platelet Count 249 150 - 450 10e9/L   UA with Microscopic reflex to Culture    Collection Time: 07/05/17  9:20 PM   Result Value Ref Range    Color Urine Yellow     Appearance Urine Clear     Glucose Urine Negative NEG mg/dL    Bilirubin Urine Negative NEG    Ketones Urine Negative NEG mg/dL    Specific Gravity Urine 1.006 1.003 - 1.035    Blood Urine Negative NEG    pH Urine 7.0 5.0 - 7.0 pH    Protein Albumin Urine Negative NEG mg/dL    Urobilinogen mg/dL Normal 0.0 - 2.0 mg/dL    Nitrite Urine Negative NEG    Leukocyte Esterase Urine Negative NEG    Source Catheterized Urine     WBC Urine <1 0 - 2 /HPF    RBC Urine 0 0 - 2 /HPF    Bacteria Urine Few (A) NEG /HPF    Squamous Epithelial /HPF Urine <1 0 - 1 /HPF        Impression   This is a 26 year old male with  Schizoaffective Disorder, bipolar type. Plan to restart Abilify at 20mg qam. Agree with plan at previous hospitalization to move to long acting injectable depot medication for medication compliance.    Diagnoses   1. Schizoaffective Disorder, depressed type.  2. Borderline personality disorder, by history.     Plan     1. Explained side effects, benefits, and complications of medications to the patient, Pt gave verbal consent.  2. Medication changes: Begin Abilify 20mg qam with intent to start Abilify Maintena.  3. Discussed treatment plan with patient and team.  4. Projected length of stay: Until pt has been stabilized with aftercare in place.        Attestation:   Patient has been seen and evaluated by me, John Zhu MD.    Patient ID:  Name: Kamini Neal   MRN: [id number]  Admission: 7/5/2017    YOB: 1990
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
## 2017-07-06 NOTE — PROGRESS NOTES
Attempted to place a straight catheter, unable to do so, patient complaining of a lot of pain on abdominal area.  Unable to void.  Will attempt again.

## 2017-07-06 NOTE — PROGRESS NOTES
Cross cover regarding: dysuria and urinary retention.    26-year-old male admitted to inpatient psychiatry with suicidal ideation.  Nursing staff bladder scanned the patient for 440 mL.  He complains of dysuria and suprapubic pressure.    Plan:  -Obtain both clean and dirty catch for GC test seen and urine analysis.  -Bladder scan order with parameters for PRN straight cathetering placed.  -Formal medical consult in the AM.    JoAnna Barthell, PA-C  7/5/2017   7:54 PM

## 2017-07-06 NOTE — PROGRESS NOTES
called and spoke with Neela ([phone] / [phone] cell),  at [facility] group home. She stated that patient has either been cheeking his medications or throwing them back up right after he takes them. She stated that this may have been going on for a couple of months prior to hospitalization. He apparently showed his  a pocketful of pills that he had cheeked. His  then contacted the group home. She stated that prior to hospitalization he had trashed his room. When they went in to clean his room, they found bottles of alcohol in his room. They suspect that he has been drinking alcohol for awhile as well. She stated that this is a problem as no alcohol or drugs are allowed in the group home. She stated that he was hospitalized recently for cutting himself. Prior to this hospitalization he had expressed that he wanted to jump off of a bridge. She has concerns about patient's safety at this time based on his actions. She feels that placing patient on an injectable medication would be a good idea. She stated that the group home will take patient back, but they would like him to remain hospitalized for a few days following administration of the injectable medication before accepting him back to make sure that he is stable.  will contact group [facility] the beginning of next week to plan discharge.      called and spoke with patient's county , [place] ([phone] / [phone] fax) to notify him of patient's current hospitalization.  will fax discharge instructions as well as history and physical when patient discharges.      met briefly with patient this afternoon to have him sign release of information forms for his providers. At that time patient stated that he does not want to return to his group home and wants a new mental health . He was requesting the number  for [facility]. Since patient's county  is through [facility], he would have to contact his current  about any changes to his care plan.

## 2017-07-06 NOTE — PROGRESS NOTES
Patient reported having problems voiding for about a week.  Pain on lower abdominal area, bladder scanned, patient is retaining 440 ml/urine in bladder.Unable to void , denies frequency.  Called Hospitalist service.

## 2017-07-06 NOTE — PROVIDER NOTIFICATION
Brief update:    Paged re: low potassium (3.2)    40 meq Kdur x 1    Jordon Villegas MD  6:09 AM

## 2017-07-06 NOTE — PROGRESS NOTES
[facility]    Hospitalist Progress Note    Date of Service (when I saw the patient): 07/06/2017    Assessment & Plan   Kamini Neal is a 26 year old male who was admitted on 7/5/2017 for suicidal ideation and other medical issues.  Current problems include:    1. Suicidal ideation with history of severe MDD with psychotic features, PTSD, borderline personality disorder; Sx improved today.  Has begun writing down all of his thoughts re. Self-harm, hatred, etc.  Per Admit H & P: (Plans to jump [place] [address] [place].  Recently discharged from [facility] (6/18-6/22/2017) where his cholesterol medication was discontinued and he was started on Abilify with the plan to proceed with Abilify injection on 7/13/2017.  He ran out of his medication approximately one week ago.  Reports he has otherwise been compliant with medications.                         -Lives in a [facility] group home, contact info:  Chema Thomas [phone]                          -Mental health , contact info: [place]: [phone])  -Appreciate eval. By Dr. Zhu of Psychiatry today - note med. Changes made     2. Urinary retention and dysuria; Duarte removed and Sx resolving.  May be due to recent med. Changes, ongoing constipation, etc.   -If Sx recur, may need further evaluation by Urology.  -UA and G/C PCR - no + info. Yet; review results 7/7    3. Constipation; likely chronic.  Needs more regular exercise and improved fluid intake, in general.  - change docusate to senna-docusate, BID; add Miralax BID w/ holding parameters    4. Dry eyes; no evidence for infection on exam.  Due in part to not sleeping adequately/long enough.  - artificial tears PRN    5. Insomnia; multifactorial  - PRN meds per Psychiatry     6. Hypothyroidism; chronic and stable.  TSH 3.65 (6/19/2017).  -Continue prior to admission levothyroxine 112mcg daily.     7. Tobacco dependence.  Current one pack per day  "smoker.  -Nicotine patches available.    8. Weight loss; intentional?  Is at a more healthy weight at present, but will need ongoing encouragement.  - RD to eval. further     DVT Prophylaxis: Low Risk/Ambulatory with no VTE prophylaxis indicated  Code Status: Full Code    Disposition: Expected discharge in next 3-5 days, based on progress with Psychiatric and medical concerns.      ANANYA Farley MD, FACP    Internal Medicine Hospitalist     Interval History   Doing somewhat better today; I had Duarte removed, and since then Kamini has been able to void without difficulty and without discomfort.  Still frustrated - and showed me a long list of the Sx/thoughts he has been having (I urged him to review with his Psychiatrist in a.m.).  No f/c/sob/chest or abdominal pain. + dry eyes - has not been sleeping much or well.  Has lost ? 60#; he attributes this to having a new job as , getting out of his group home more, and eating more regular meals. \"I'm always constipated\" - wants help w/ this.    -Data reviewed today: I reviewed all new labs and imaging results over the last 24 hours. I personally reviewed no images or EKG's today.    Physical Exam   Temp: 98  F (36.7  C) Temp src: Oral BP: 108/82 Pulse: 56   Resp: 16        Vitals:    07/05/17 1058 07/05/17 1632 07/05/17 1730   Weight: 104.3 kg (230 lb) 94.8 kg (209 lb 1.6 oz) 94.8 kg (209 lb 1.6 oz)     Vital Signs with Ranges  Temp:  [97.6  F (36.4  C)-98  F (36.7  C)] 98  F (36.7  C)  Pulse:  [50-56] 56  Resp:  [14-16] 16  BP: (103-108)/(48-82) 108/82  I/O last 3 completed shifts:  In: 500 [P.O.:500]  Out: 850 [Urine:850]    Constitutional: No acute distress  Respiratory: Good air entry bilaterally, no crackles or wheezing  Cardiovascular: S1, S2 present, regular rate and rhythm, without murmur, rubs or gallops  GI: soft, positive bowel sounds, non-tender, non-distended  Skin/Integumen: no edema, no cyanosis, no rashes  Other:      Medications  (PTA)       " ARIPiprazole  20 mg Oral QAM     docusate sodium (COLACE) capsule 100 mg  100 mg Oral BID     levothyroxine (SYNTHROID/LEVOTHROID) tablet 112 mcg  112 mcg Oral Daily     melatonin tablet 1 mg  1 mg Oral At Bedtime     omeprazole (priLOSEC) CR capsule 20 mg  20 mg Oral QAM     prazosin (MINIPRESS) capsule 5 mg  5 mg Oral At Bedtime     trihexyphenidyl (ARTANE) tablet 5 mg  5 mg Oral QAM     trihexyphenidyl (ARTANE) tablet 10 mg  10 mg Oral Daily with supper     venlafaxine  225 mg Oral Daily with breakfast     nicotine   Transdermal Q8H     nicotine   Transdermal Daily       Data     Recent Labs  Lab 07/06/17  1255 07/05/17  2110   WBC  --  9.8   HGB  --  14.7   MCV  --  90   PLT  --  249   NA  --  144   POTASSIUM 3.3* 3.2*   CHLORIDE  --  114*   CO2  --  20   BUN  --  6*   CR  --  0.79   ANIONGAP  --  10   RICKY  --  9.1   GLC  --  85       No results found for this or any previous visit (from the past 24 hour(s)).
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
## 2017-07-07 LAB
N GONORRHOEA DNA SPEC QL NAA+PROBE: NORMAL
SPECIMEN SOURCE: NORMAL

## 2017-07-07 PROCEDURE — 25000132 ZZH RX MED GY IP 250 OP 250 PS 637: Performed by: INTERNAL MEDICINE

## 2017-07-07 PROCEDURE — 25000132 ZZH RX MED GY IP 250 OP 250 PS 637: Performed by: PSYCHIATRY & NEUROLOGY

## 2017-07-07 PROCEDURE — 96125 COGNITIVE TEST BY HC PRO: CPT | Mod: GO

## 2017-07-07 PROCEDURE — 12400006 ZZH R&B MH INTERMEDIATE

## 2017-07-07 PROCEDURE — 99232 SBSQ HOSP IP/OBS MODERATE 35: CPT | Performed by: INTERNAL MEDICINE

## 2017-07-07 PROCEDURE — 25000128 H RX IP 250 OP 636: Performed by: PSYCHIATRY & NEUROLOGY

## 2017-07-07 RX ORDER — MULTIPLE VITAMINS W/ MINERALS TAB 9MG-400MCG
1 TAB ORAL DAILY
Status: DISCONTINUED | OUTPATIENT
Start: 2017-07-07 | End: 2017-07-12 | Stop reason: HOSPADM

## 2017-07-07 RX ADMIN — TRIHEXYPHENIDYL HYDROCHLORIDE 5 MG: 5 TABLET ORAL at 08:17

## 2017-07-07 RX ADMIN — POLYETHYLENE GLYCOL 3350 17 G: 17 POWDER, FOR SOLUTION ORAL at 08:18

## 2017-07-07 RX ADMIN — ARIPIPRAZOLE 400 MG: KIT at 10:48

## 2017-07-07 RX ADMIN — NICOTINE POLACRILEX 2 MG: 2 GUM, CHEWING ORAL at 10:03

## 2017-07-07 RX ADMIN — OMEPRAZOLE 20 MG: 20 CAPSULE, DELAYED RELEASE ORAL at 08:17

## 2017-07-07 RX ADMIN — QUETIAPINE FUMARATE 50 MG: 25 TABLET, FILM COATED ORAL at 17:05

## 2017-07-07 RX ADMIN — MULTIPLE VITAMINS W/ MINERALS TAB 1 TABLET: TAB at 10:02

## 2017-07-07 RX ADMIN — Medication 1 MG: at 21:26

## 2017-07-07 RX ADMIN — NICOTINE POLACRILEX 2 MG: 2 GUM, CHEWING ORAL at 06:02

## 2017-07-07 RX ADMIN — NICOTINE POLACRILEX 2 MG: 2 GUM, CHEWING ORAL at 08:18

## 2017-07-07 RX ADMIN — ARIPIPRAZOLE 20 MG: 10 TABLET ORAL at 08:18

## 2017-07-07 RX ADMIN — VENLAFAXINE HYDROCHLORIDE 225 MG: 75 CAPSULE, EXTENDED RELEASE ORAL at 08:17

## 2017-07-07 RX ADMIN — TRIHEXYPHENIDYL HYDROCHLORIDE 10 MG: 5 TABLET ORAL at 17:05

## 2017-07-07 RX ADMIN — HYDROXYZINE HYDROCHLORIDE 50 MG: 25 TABLET ORAL at 00:34

## 2017-07-07 RX ADMIN — PRAZOSIN HYDROCHLORIDE 5 MG: 5 CAPSULE ORAL at 21:26

## 2017-07-07 RX ADMIN — NICOTINE POLACRILEX 2 MG: 2 GUM, CHEWING ORAL at 17:05

## 2017-07-07 RX ADMIN — LEVOTHYROXINE SODIUM 112 MCG: 112 TABLET ORAL at 08:18

## 2017-07-07 RX ADMIN — NICOTINE POLACRILEX 2 MG: 2 GUM, CHEWING ORAL at 13:54

## 2017-07-07 RX ADMIN — POLYETHYLENE GLYCOL 3350 17 G: 17 POWDER, FOR SOLUTION ORAL at 21:26

## 2017-07-07 RX ADMIN — SENNOSIDES AND DOCUSATE SODIUM 1 TABLET: 8.6; 5 TABLET ORAL at 08:17

## 2017-07-07 RX ADMIN — SENNOSIDES AND DOCUSATE SODIUM 1 TABLET: 8.6; 5 TABLET ORAL at 21:26

## 2017-07-07 NOTE — PROGRESS NOTES
Patient pleasant and blunt/affect. Pt attended OT but not for too long and during 1:1 patient told staff that he can't stand crowd. Pt stated he fills so much better when the wood cath was discontinue and denies nay anxiety nor any hurtful voices. Wood cath discontinued with no discomfort from patient. Pt feels very excited that he was able to void on his own.  Patient told writer that his condition got worse because he wasn't getting his medication as the nursing home kept telling him they have to order.

## 2017-07-07 NOTE — CONSULTS
"CLINICAL NUTRITION SERVICES  -  ASSESSMENT NOTE      Recommendations Ordered by Registered Dietitian (RD):   Ensure Plus BID between meals; daily Thera-Vit-M   Malnutrition:   Severe malnutrition  In Context of:  Chronic illness or disease        REASON FOR ASSESSMENT  Kamini Neal is a 26 year old male seen by Registered Dietitian for Provider Order - Patient reports 60# weight loss over past 6 months; please review, document, provide healthy eating options/weight loss strategy      NUTRITION HISTORY  - Information obtained from the pt.  Pt lives in a group home where all meals are provided. He says the meals are very nice and the cooks does a great job.  However pt's intake has been poor since the beginning of the year, states he's eating 25% of his normal.  He states he gets very hungry but after a couple of bites he's full.   Pt also reports occasional emesis after eating only 1 bite, this happens about once every 2 weeks.   He takes Vit D but no multivits, he has not tried any nutrition drinks.  See wt loss below, pt says he doesn' want to regain his wt, he actually feels better and is able to move around better at work.    As a side note, pt states he \"sweats a lot\" when doing any activity.    CURRENT NUTRITION ORDERS  Diet Order:     Regular     Current Intake/Tolerance:  Poor intake here, says he gave away most of his breakfast, he ate only a few bites of a muffin and a few bites of cereal.      PHYSICAL FINDINGS  Observed  No nutrition-related physical findings observed  Obtained from Chart/Interdisciplinary Team  None noted    ANTHROPOMETRICS  Height: 5' 9\"  Weight: 209 lbs 1.6 oz (94.8 kg)  Body mass index is 30.88 kg/(m^2).  Weight Status:  Obesity Grade I BMI 30-34.9  IBW: 72.7 kg +/- 10%  % IBW: 130%  Weight History:   Pt has lost a total of 60# (22%) over the past 6 months.   Pt states his usual wt is 269#. On admit he reported his wt as 230#, he was very surprised to learn it is really only " 209#.      LABS  Labs reviewed    MEDICATIONS  Medications reviewed    Dosing Weight 78 kg - adjusted for obesity    ASSESSED NUTRITION NEEDS PER APPROVED PRACTICE GUIDELINES:  Estimated Energy Needs: 2869-1713 kcals (25-30 Kcal/Kg)  Justification: overweight  Estimated Protein Needs:  grams protein (1.2-1.5 g pro/Kg)  Justification: Repletion      MALNUTRITION:  % Weight Loss:  > 10% in 6 months (severe malnutrition)  % Intake:  </= 50% for >/= 1 month (severe malnutrition)  Subcutaneous Fat Loss:  None observed  Muscle Loss:  None observed  Fluid Retention:  None noted    Malnutrition Diagnosis: Severe malnutrition  In Context of:  Chronic illness or disease    NUTRITION DIAGNOSIS:  Inadequate oral intake related to early satiety and altered GI function, as evidenced by 22# (60% wt loss x 6 months      NUTRITION INTERVENTIONS  Recommendations / Nutrition Prescription  Continue regular diet  Nutritional supplements.      Implementation  Nutrition education: Provided education on healthy eating, nutritional supplements  Medical Food Supplement - will send Ensure BID between meals  Multivitamin/Mineral - ordered daily Thera-Vit-M.      Nutrition Goals  Pt to consume at least 50% of meals and supplements  No more than 2#/week wt loss - long term goal  .      MONITORING AND EVALUATION:  Progress towards goals will be monitored and evaluated per protocol and Practice Guidelines    Cris Taylor RD  Pager [phone] (M-F)            [phone] (W/E & Hol)
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
## 2017-07-08 LAB — POTASSIUM SERPL-SCNC: 3.7 MMOL/L (ref 3.4–5.3)

## 2017-07-08 PROCEDURE — 12400006 ZZH R&B MH INTERMEDIATE

## 2017-07-08 PROCEDURE — 99207 ZZC CDG-MDM COMPONENT: MEETS MODERATE - UP CODED: CPT | Performed by: INTERNAL MEDICINE

## 2017-07-08 PROCEDURE — 25000132 ZZH RX MED GY IP 250 OP 250 PS 637: Performed by: PSYCHIATRY & NEUROLOGY

## 2017-07-08 PROCEDURE — 25000132 ZZH RX MED GY IP 250 OP 250 PS 637: Performed by: INTERNAL MEDICINE

## 2017-07-08 PROCEDURE — 84132 ASSAY OF SERUM POTASSIUM: CPT | Performed by: INTERNAL MEDICINE

## 2017-07-08 PROCEDURE — 36415 COLL VENOUS BLD VENIPUNCTURE: CPT | Performed by: INTERNAL MEDICINE

## 2017-07-08 PROCEDURE — 99232 SBSQ HOSP IP/OBS MODERATE 35: CPT | Performed by: INTERNAL MEDICINE

## 2017-07-08 RX ORDER — POTASSIUM CL/LIDO/0.9 % NACL 10MEQ/0.1L
10 INTRAVENOUS SOLUTION, PIGGYBACK (ML) INTRAVENOUS
Status: DISCONTINUED | OUTPATIENT
Start: 2017-07-08 | End: 2017-07-12 | Stop reason: HOSPADM

## 2017-07-08 RX ORDER — POTASSIUM CHLORIDE 1500 MG/1
20-40 TABLET, EXTENDED RELEASE ORAL
Status: DISCONTINUED | OUTPATIENT
Start: 2017-07-08 | End: 2017-07-12 | Stop reason: HOSPADM

## 2017-07-08 RX ORDER — POTASSIUM CHLORIDE 7.45 MG/ML
10 INJECTION INTRAVENOUS
Status: DISCONTINUED | OUTPATIENT
Start: 2017-07-08 | End: 2017-07-12 | Stop reason: HOSPADM

## 2017-07-08 RX ORDER — POTASSIUM CHLORIDE 1.5 G/1.58G
20-40 POWDER, FOR SOLUTION ORAL
Status: DISCONTINUED | OUTPATIENT
Start: 2017-07-08 | End: 2017-07-12 | Stop reason: HOSPADM

## 2017-07-08 RX ORDER — POTASSIUM CHLORIDE 29.8 MG/ML
20 INJECTION INTRAVENOUS
Status: DISCONTINUED | OUTPATIENT
Start: 2017-07-08 | End: 2017-07-12 | Stop reason: HOSPADM

## 2017-07-08 RX ADMIN — DEXTRAN 70, AND HYPROMELLOSE 2910 1 DROP: 1; 3 SOLUTION/ DROPS OPHTHALMIC at 22:37

## 2017-07-08 RX ADMIN — POLYETHYLENE GLYCOL 3350 17 G: 17 POWDER, FOR SOLUTION ORAL at 07:37

## 2017-07-08 RX ADMIN — POLYETHYLENE GLYCOL 3350 17 G: 17 POWDER, FOR SOLUTION ORAL at 22:36

## 2017-07-08 RX ADMIN — QUETIAPINE FUMARATE 50 MG: 25 TABLET, FILM COATED ORAL at 10:12

## 2017-07-08 RX ADMIN — TRIHEXYPHENIDYL HYDROCHLORIDE 5 MG: 5 TABLET ORAL at 07:37

## 2017-07-08 RX ADMIN — Medication 1 MG: at 22:35

## 2017-07-08 RX ADMIN — MULTIPLE VITAMINS W/ MINERALS TAB 1 TABLET: TAB at 07:36

## 2017-07-08 RX ADMIN — ARIPIPRAZOLE 20 MG: 10 TABLET ORAL at 07:37

## 2017-07-08 RX ADMIN — SENNOSIDES AND DOCUSATE SODIUM 1 TABLET: 8.6; 5 TABLET ORAL at 22:36

## 2017-07-08 RX ADMIN — PRAZOSIN HYDROCHLORIDE 5 MG: 5 CAPSULE ORAL at 22:36

## 2017-07-08 RX ADMIN — NICOTINE POLACRILEX 2 MG: 2 GUM, CHEWING ORAL at 07:00

## 2017-07-08 RX ADMIN — TRIHEXYPHENIDYL HYDROCHLORIDE 10 MG: 5 TABLET ORAL at 17:32

## 2017-07-08 RX ADMIN — QUETIAPINE FUMARATE 50 MG: 25 TABLET, FILM COATED ORAL at 17:35

## 2017-07-08 RX ADMIN — LEVOTHYROXINE SODIUM 112 MCG: 112 TABLET ORAL at 07:36

## 2017-07-08 RX ADMIN — VENLAFAXINE HYDROCHLORIDE 225 MG: 75 CAPSULE, EXTENDED RELEASE ORAL at 07:36

## 2017-07-08 RX ADMIN — OMEPRAZOLE 20 MG: 20 CAPSULE, DELAYED RELEASE ORAL at 07:36

## 2017-07-08 RX ADMIN — SENNOSIDES AND DOCUSATE SODIUM 1 TABLET: 8.6; 5 TABLET ORAL at 07:36

## 2017-07-08 ASSESSMENT — ACTIVITIES OF DAILY LIVING (ADL)
GROOMING: INDEPENDENT
ORAL_HYGIENE: INDEPENDENT
LAUNDRY: WITH SUPERVISION
DRESS: SCRUBS (BEHAVIORAL HEALTH)

## 2017-07-08 NOTE — PLAN OF CARE
"Problem: Depressive Symptoms  Goal: Depressive Symptoms  Signs and symptoms of listed problems will be absent or manageable.   1. Mood stability   2. Absence of suicidal ideation/contracts for safety   3. Depressive s/sx resolved  4. Safety plan in place  5. Positive coping skills identified/utilized  6. Medication regiment established/compliance  7. Adequate sleep  8. Housing community support  9. F/u plan in place   Outcome: No Change  Blunt/flat affect, tense and irritable most of the morning. Pt stated that \"staff was lying about him\". Very angry after speaking with Dr. Zhu, pt stated \"he's not a fucking psychiatrist, he looks like a hobo. \"Did you see how he is dressed? He looks like a fucking hobo.\" Had trouble swallowing morning meds, spit them out initially, stated that he \"could only take them one at a time\", which was different behavior than yesterday, when he had no issue swallowing pills. Eventually, did take meds and asked for additional anti-pyschiotic later in morning. Was present in the lounge, watching TV but minimally social, except to complain about things      "

## 2017-07-08 NOTE — PROGRESS NOTES
"At 0600 pt was banging his head on the wall in his room. Writer asked him to stop, he ignored writer and he continued to bang his head. After writer kept on asking him to please stop and asking what was going on, he proceeded to say that he was going to kill himself once he leaves the hospital. Writer encouraged pt to make a list of all the good things in his life. Pt said \"you have no idea of what I have been through, my family has disowned me\". He also expressed frustration with his psychiatrist that he doesn't like his approach and the doctor mainly talks about himself. Pt would like to switch psychiatrists. Support and reassurance was given. Will continue to monitor.   "

## 2017-07-08 NOTE — PROGRESS NOTES
[facility]  Hospitalist Progress Note  Louisa Albert MD    Assessment & Plan      Mr. Kamini Neal is a 26 year old male who was admitted on 7/5/2017 for suicidal ideation and other medical issues.       Suicidal ideation with history of severe MDD with psychotic features, PTSD, borderline personality disorder  Stable.  -Cont management per psychiatry service      Urinary retention and dysuria  Nursing staff bladder scanned the patient for 440 mL on 07/05.  Duarte removed and Sx resolving.  May be due to recent med changes, ongoing constipation, etc.   U/A normal. N. gonorrhea -ve by PCR.  -If Sx recur, may need further evaluation by Urology.     Constipation  Likely chronic. Improved.  -Change docusate to senna-docusate, BID; add Miralax BID w/ holding parameters     Dry eyes  No evidence for infection on exam.    -Artificial tears PRN     Insomnia  Multifactorial  - PRN meds per Psychiatry      Hypothyroidism  Chronic and stable.  TSH 3.65 (6/19/2017).  -Continue prior to admission levothyroxine 112mcg daily.      Tobacco dependence  Currently one pack per day smoker.  -Nicotine patches available.      Hypokalemia  Likely d/t chronic EtOh use/poor PO intake.  -Replace per protocol     Dental concerns  Elder says he has never been seen by a dentist in this country.  -Needs new patient evaluation with a dentist after discharge; will ask LSW to help facilitate.     DVT Prophylaxis: Low Risk/Ambulatory with no VTE prophylaxis indicated  Code Status: Full Code     Disposition: Per primary service.    D/W RN.    Hospitalist service will sign off. Please do not hesitate to contact our service for questions or concerns.     Interval History   Patient feels well. Continues to complain of dysuria. Reports BM today. No other complaints.    -Data reviewed today: I reviewed all new labs and imaging over the last 24 hours.     Physical Exam   Temp: 98.4  F (36.9  C) Temp src: Oral BP: 104/66 Pulse: 70    Resp: 16        Vitals:    07/05/17 1058 07/05/17 1632 07/05/17 1730   Weight: 104.3 kg (230 lb) 94.8 kg (209 lb 1.6 oz) 94.8 kg (209 lb 1.6 oz)     Vital Signs with Ranges  Temp:  [97.8  F (36.6  C)-98.4  F (36.9  C)] 98.4  F (36.9  C)  Pulse:  [47-70] 70  Resp:  [16] 16  BP: ()/(52-66) 104/66  I/O's Last 24 hours       Constitutional: Comfortable, no acute distress  HEENT: No conjunctival pallor.  Neurologic: Awake, alert, fully oriented  Neck: Supple, no elevated JVP  Respiratory: Clear to auscultation  Cardiovascular: Normal S1 and S2. Regular rhythm and rate, no murmur  GI: Abdomen soft, not tender, not distended  Extremities: No calf tenderness, no edema  Skin/integument: No acute rash, no cyanosis    Medications   All medications were reviewed.       multivitamin, therapeutic with minerals  1 tablet Oral Daily     ARIPiprazole ER  400 mg Intramuscular Q30 Days     ARIPiprazole  20 mg Oral QAM     senna-docusate  1 tablet Oral BID     polyethylene glycol  17 g Oral BID     levothyroxine (SYNTHROID/LEVOTHROID) tablet 112 mcg  112 mcg Oral Daily     melatonin tablet 1 mg  1 mg Oral At Bedtime     omeprazole (priLOSEC) CR capsule 20 mg  20 mg Oral QAM     prazosin (MINIPRESS) capsule 5 mg  5 mg Oral At Bedtime     trihexyphenidyl (ARTANE) tablet 5 mg  5 mg Oral QAM     trihexyphenidyl (ARTANE) tablet 10 mg  10 mg Oral Daily with supper     venlafaxine  225 mg Oral Daily with breakfast        Data     Recent Labs  Lab 07/06/17  1255 07/05/17  2110   WBC  --  9.8   HGB  --  14.7   MCV  --  90   PLT  --  249   NA  --  144   POTASSIUM 3.3* 3.2*   CHLORIDE  --  114*   CO2  --  20   BUN  --  6*   CR  --  0.79   ANIONGAP  --  10   RICKY  --  9.1   GLC  --  85       No results found for this or any previous visit (from the past 24 hour(s)).

## 2017-07-08 NOTE — PROGRESS NOTES
[facility]    Hospitalist Progress Note    Date of Service (when I saw the patient): 07/07/2017    Assessment & Plan   Kamini Neal is a 26 year old male who was admitted on 7/5/2017 for suicidal ideation and other medical issues.  Current problems include:    1. Suicidal ideation with history of severe MDD with psychotic features, PTSD, borderline personality disorder; Sx improved today.  Has begun writing down all of his thoughts re. Self-harm, hatred, etc.  Per Admit H & P: ([place] [address] [place].  Recently discharged from [facility] (6/18-6/22/2017) where his cholesterol medication was discontinued and he was started on Abilify with the plan to proceed with Abilify injection on 7/13/2017.  He ran out of his medication approximately one week ago.  Reports he has otherwise been compliant with medications.                         -Lives in a [facility] group home, contact info:  Chema Thomas [phone]                          -Mental health , contact info: Peter Marr: [phone])  -Appreciate eval. By Dr. Zhu of Psychiatry today - note med. Changes made     2. Urinary retention and dysuria; Duarte removed and Sx resolving.  May be due to recent med. Changes, ongoing constipation, etc.   -If Sx recur, may need further evaluation by Urology.  -UA and G/C PCR - no + info. Yet; review results 7/7    3. Constipation; likely chronic.  Needs more regular exercise and improved fluid intake, in general.  - change docusate to senna-docusate, BID; add Miralax BID w/ holding parameters; continue these today. If no BM tomorrow, may need to escalate meds further.    4. Dry eyes; no evidence for infection on exam.  Due in part to not sleeping adequately/long enough.  - artificial tears PRN    5. Insomnia; multifactorial  - PRN meds per Psychiatry     6. Hypothyroidism; chronic and stable.  TSH 3.65 (6/19/2017).  -Continue prior to admission  levothyroxine 112mcg daily.     7. Tobacco dependence.  Current one pack per day smoker.  -Nicotine patches available.    8. Weight loss; intentional?  Is at a more healthy weight at present, but will need ongoing encouragement.  - RD to eval. Further    9. Hypokalemia; likely d/t chronic EtOh use/poor PO intake.  - recheck in a.m.    10. Dental concerns; Elder says he has never been seen by a dentist in this country.  - needs new patient eval. W/ a dentist after discharge; will ask LSW to help facilitate.    DVT Prophylaxis: Low Risk/Ambulatory with no VTE prophylaxis indicated  Code Status: Full Code    Disposition: Expected discharge in next 3-5 days, based on progress with Psychiatric and medical concerns.      ANANYA Farley MD, FACP    Internal Medicine Hospitalist     Interval History   Doing OK today; frustrated about his recent treatment at his group home and about other things.  Mild discomfort w/ voiding, but has been able to void without difficulty.   No f/c/sob/chest or abdominal pain.  Eyes less dry today. Slept better last night. No BM yet today.  Asks me to look at his teeth - is concerned about their appearance.    -Data reviewed today: I reviewed all new labs and imaging results over the last 24 hours. I personally reviewed no images or EKG's today.    Physical Exam   Temp: 98.4  F (36.9  C) Temp src: Oral BP: 104/66 Pulse: 70   Resp: 16        Vitals:    07/05/17 1058 07/05/17 1632 07/05/17 1730   Weight: 104.3 kg (230 lb) 94.8 kg (209 lb 1.6 oz) 94.8 kg (209 lb 1.6 oz)     Vital Signs with Ranges  Temp:  [97.8  F (36.6  C)-98.4  F (36.9  C)] 98.4  F (36.9  C)  Pulse:  [47-70] 70  Resp:  [16] 16  BP: ()/(52-66) 104/66       Constitutional: No acute distress  HEENT: sclerae clear; oropharynx moist.  Teeth are discolored; gums appear healthy.  Respiratory: Good air entry bilaterally, no crackles or wheezing  Cardiovascular: S1, S2 present, regular rate and rhythm, without murmur, rubs or  gallops  GI: soft, positive bowel sounds, non-tender, non-distended  Skin/Integumen: no edema, no cyanosis, no rashes  Neurologic: awake, conversant; follows directions well.     Medications  (PTA)       multivitamin, therapeutic with minerals  1 tablet Oral Daily     ARIPiprazole ER  400 mg Intramuscular Q30 Days     ARIPiprazole  20 mg Oral QAM     senna-docusate  1 tablet Oral BID     polyethylene glycol  17 g Oral BID     levothyroxine (SYNTHROID/LEVOTHROID) tablet 112 mcg  112 mcg Oral Daily     melatonin tablet 1 mg  1 mg Oral At Bedtime     omeprazole (priLOSEC) CR capsule 20 mg  20 mg Oral QAM     prazosin (MINIPRESS) capsule 5 mg  5 mg Oral At Bedtime     trihexyphenidyl (ARTANE) tablet 5 mg  5 mg Oral QAM     trihexyphenidyl (ARTANE) tablet 10 mg  10 mg Oral Daily with supper     venlafaxine  225 mg Oral Daily with breakfast       Data     Recent Labs  Lab 07/06/17  1255 07/05/17  2110   WBC  --  9.8   HGB  --  14.7   MCV  --  90   PLT  --  249   NA  --  144   POTASSIUM 3.3* 3.2*   CHLORIDE  --  114*   CO2  --  20   BUN  --  6*   CR  --  0.79   ANIONGAP  --  10   RICKY  --  9.1   GLC  --  85       No results found for this or any previous visit (from the past 24 hour(s)).

## 2017-07-08 NOTE — PROGRESS NOTES
"Pt stated to staff \"Some day I'm going to kill myself; I just don't know when, where, or how. And no one will miss me.\"  "

## 2017-07-08 NOTE — PLAN OF CARE
"Problem: Depressive Symptoms  Goal: Depressive Symptoms  Signs and symptoms of listed problems will be absent or manageable.   1. Mood stability   2. Absence of suicidal ideation/contracts for safety   3. Depressive s/sx resolved  4. Safety plan in place  5. Positive coping skills identified/utilized  6. Medication regiment established/compliance  7. Adequate sleep  8. Housing community support  9. F/u plan in place   Outcome: No Change  Pt presents as withdrawn and has a flat affect. Visible around unit; spent time watching tv in the lounge. After dinner pt spent more time in room bed resting. Pt appears to be delusional; stated to staff \"why would police come and lock me out?\" Staff told pt police were not here and the door was not locked. Pt also stated people pointing fingers and being mean to him, but staff did not see that take place.       "

## 2017-07-09 PROCEDURE — 25000132 ZZH RX MED GY IP 250 OP 250 PS 637: Performed by: PSYCHIATRY & NEUROLOGY

## 2017-07-09 PROCEDURE — 25000128 H RX IP 250 OP 636

## 2017-07-09 PROCEDURE — 12400006 ZZH R&B MH INTERMEDIATE

## 2017-07-09 PROCEDURE — 25000132 ZZH RX MED GY IP 250 OP 250 PS 637: Performed by: INTERNAL MEDICINE

## 2017-07-09 RX ORDER — HALOPERIDOL 5 MG/ML
INJECTION INTRAMUSCULAR
Status: COMPLETED
Start: 2017-07-09 | End: 2017-07-09

## 2017-07-09 RX ORDER — HALOPERIDOL 5 MG/ML
10 INJECTION INTRAMUSCULAR EVERY 4 HOURS PRN
Status: DISCONTINUED | OUTPATIENT
Start: 2017-07-09 | End: 2017-07-12 | Stop reason: HOSPADM

## 2017-07-09 RX ORDER — HALOPERIDOL 2 MG/ML
10 SOLUTION ORAL EVERY 4 HOURS PRN
Status: DISCONTINUED | OUTPATIENT
Start: 2017-07-09 | End: 2017-07-09 | Stop reason: CLARIF

## 2017-07-09 RX ADMIN — OMEPRAZOLE 20 MG: 20 CAPSULE, DELAYED RELEASE ORAL at 09:05

## 2017-07-09 RX ADMIN — HALOPERIDOL LACTATE 10 MG: 5 INJECTION, SOLUTION INTRAMUSCULAR at 17:44

## 2017-07-09 RX ADMIN — POLYETHYLENE GLYCOL 3350 17 G: 17 POWDER, FOR SOLUTION ORAL at 22:16

## 2017-07-09 RX ADMIN — POLYETHYLENE GLYCOL 3350 17 G: 17 POWDER, FOR SOLUTION ORAL at 09:05

## 2017-07-09 RX ADMIN — SENNOSIDES AND DOCUSATE SODIUM 1 TABLET: 8.6; 5 TABLET ORAL at 09:05

## 2017-07-09 RX ADMIN — TRIHEXYPHENIDYL HYDROCHLORIDE 10 MG: 5 TABLET ORAL at 17:49

## 2017-07-09 RX ADMIN — NICOTINE POLACRILEX 2 MG: 2 GUM, CHEWING ORAL at 09:48

## 2017-07-09 RX ADMIN — Medication 1 MG: at 22:16

## 2017-07-09 RX ADMIN — PRAZOSIN HYDROCHLORIDE 5 MG: 5 CAPSULE ORAL at 22:16

## 2017-07-09 RX ADMIN — VENLAFAXINE HYDROCHLORIDE 225 MG: 75 CAPSULE, EXTENDED RELEASE ORAL at 09:05

## 2017-07-09 RX ADMIN — SENNOSIDES AND DOCUSATE SODIUM 1 TABLET: 8.6; 5 TABLET ORAL at 22:16

## 2017-07-09 RX ADMIN — LEVOTHYROXINE SODIUM 112 MCG: 112 TABLET ORAL at 09:05

## 2017-07-09 RX ADMIN — ARIPIPRAZOLE 20 MG: 10 TABLET ORAL at 09:05

## 2017-07-09 RX ADMIN — MULTIPLE VITAMINS W/ MINERALS TAB 1 TABLET: TAB at 09:05

## 2017-07-09 RX ADMIN — NICOTINE POLACRILEX 2 MG: 2 GUM, CHEWING ORAL at 08:09

## 2017-07-09 RX ADMIN — TRIHEXYPHENIDYL HYDROCHLORIDE 5 MG: 5 TABLET ORAL at 09:05

## 2017-07-09 ASSESSMENT — ACTIVITIES OF DAILY LIVING (ADL)
DRESS: STREET CLOTHES;INDEPENDENT;SCRUBS (BEHAVIORAL HEALTH)
LAUNDRY: WITH SUPERVISION
GROOMING: INDEPENDENT;SHOWER
ORAL_HYGIENE: INDEPENDENT
GROOMING: INDEPENDENT
DRESS: STREET CLOTHES;SCRUBS (BEHAVIORAL HEALTH)
ORAL_HYGIENE: INDEPENDENT

## 2017-07-09 NOTE — PLAN OF CARE
Problem: Depressive Symptoms  Goal: Depressive Symptoms  Signs and symptoms of listed problems will be absent or manageable.   1. Mood stability   2. Absence of suicidal ideation/contracts for safety   3. Depressive s/sx resolved  4. Safety plan in place  5. Positive coping skills identified/utilized  6. Medication regiment established/compliance  7. Adequate sleep  8. Housing community support  9. F/u plan in place   Outcome: Improving  Patient presents with a more full range affect this shift. He was visible and social on the unit. Patient was frustrated early in the shift as he will not be able to make his [place] 0900 [place] [facility] Patient has some insight. Patient was understanding about reducing requests while staff had to focus on another patient in crisis situation. While filling out his menu patient wrote in that he would like one hundred bud lights for lunch, staff told patient that this was not appropriate, Patient laughed stating that it was just a joke. Patient has been cooperative and pleasant with staff and peers.

## 2017-07-09 NOTE — PLAN OF CARE
"Problem: Depressive Symptoms  Goal: Depressive Symptoms  Signs and symptoms of listed problems will be absent or manageable.   1. Mood stability   2. Absence of suicidal ideation/contracts for safety   3. Depressive s/sx resolved  4. Safety plan in place  5. Positive coping skills identified/utilized  6. Medication regiment established/compliance  7. Adequate sleep  8. Housing community support  9. F/u plan in place   Outcome: No Change  Pt presents flat and blunted but brightens on approach.  Pt reports a 3 (0-10 scale) for anxiety and 1 for depression while denying SI.  Pt stated that he feels better now that he is being prescribed Abilify and is looking forward to being discharged.  Pt stated that he is also looking forward to returning to his job but not to his group home where \"no one cares about me.\"        "

## 2017-07-09 NOTE — PLAN OF CARE
Problem: Depressive Symptoms  Goal: Depressive Symptoms  Signs and symptoms of listed problems will be absent or manageable.   1. Mood stability   2. Absence of suicidal ideation/contracts for safety   3. Depressive s/sx resolved  4. Safety plan in place  5. Positive coping skills identified/utilized  6. Medication regiment established/compliance  7. Adequate sleep  8. Housing community support  9. F/u plan in place   Outcome: Improving  Patient pacing, swearing at staff. Patient declined PRN for anxiety. Patient was swearing at fellow patients and refused to go to room. MD called and 10 mg Haldol given per prn order. Will continue to monitor. Patient lacks insight, is paranoid that people are talking about him.   Addendum: patient continues to talk negatively under his breath. States he has taken Haldol in the past and that it doesn't do anything for him. Is resting in room.   After dinner, patient spent rest of the evening sleeping in room. At 10 pm, patient awoke and took night time meds and also took a shower.

## 2017-07-09 NOTE — PROGRESS NOTES
"Pt began coming up to the nurse's station door stating \"I am going to report that Dr. Zhu to patient services\" and \"fuck you\" to staff.  "

## 2017-07-10 PROCEDURE — 25000132 ZZH RX MED GY IP 250 OP 250 PS 637: Performed by: INTERNAL MEDICINE

## 2017-07-10 PROCEDURE — 25000132 ZZH RX MED GY IP 250 OP 250 PS 637: Performed by: PSYCHIATRY & NEUROLOGY

## 2017-07-10 PROCEDURE — 25000128 H RX IP 250 OP 636: Performed by: PSYCHIATRY & NEUROLOGY

## 2017-07-10 PROCEDURE — 12400006 ZZH R&B MH INTERMEDIATE

## 2017-07-10 PROCEDURE — 25000128 H RX IP 250 OP 636

## 2017-07-10 RX ORDER — LORAZEPAM 2 MG/ML
INJECTION INTRAMUSCULAR
Status: COMPLETED
Start: 2017-07-10 | End: 2017-07-10

## 2017-07-10 RX ORDER — LORAZEPAM 2 MG/ML
2 INJECTION INTRAMUSCULAR EVERY 4 HOURS PRN
Status: DISCONTINUED | OUTPATIENT
Start: 2017-07-10 | End: 2017-07-10

## 2017-07-10 RX ORDER — LORAZEPAM 2 MG/ML
2 INJECTION INTRAMUSCULAR EVERY 4 HOURS PRN
Status: DISCONTINUED | OUTPATIENT
Start: 2017-07-10 | End: 2017-07-12 | Stop reason: HOSPADM

## 2017-07-10 RX ADMIN — PRAZOSIN HYDROCHLORIDE 5 MG: 5 CAPSULE ORAL at 21:34

## 2017-07-10 RX ADMIN — ARIPIPRAZOLE 20 MG: 10 TABLET ORAL at 08:28

## 2017-07-10 RX ADMIN — NICOTINE POLACRILEX 2 MG: 2 GUM, CHEWING ORAL at 13:21

## 2017-07-10 RX ADMIN — SENNOSIDES AND DOCUSATE SODIUM 1 TABLET: 8.6; 5 TABLET ORAL at 08:29

## 2017-07-10 RX ADMIN — TRIHEXYPHENIDYL HYDROCHLORIDE 5 MG: 5 TABLET ORAL at 08:29

## 2017-07-10 RX ADMIN — NICOTINE POLACRILEX 2 MG: 2 GUM, CHEWING ORAL at 09:08

## 2017-07-10 RX ADMIN — QUETIAPINE FUMARATE 50 MG: 25 TABLET, FILM COATED ORAL at 19:39

## 2017-07-10 RX ADMIN — VENLAFAXINE HYDROCHLORIDE 225 MG: 75 CAPSULE, EXTENDED RELEASE ORAL at 08:29

## 2017-07-10 RX ADMIN — SENNOSIDES AND DOCUSATE SODIUM 1 TABLET: 8.6; 5 TABLET ORAL at 21:34

## 2017-07-10 RX ADMIN — POLYETHYLENE GLYCOL 3350 17 G: 17 POWDER, FOR SOLUTION ORAL at 21:34

## 2017-07-10 RX ADMIN — LEVOTHYROXINE SODIUM 112 MCG: 112 TABLET ORAL at 08:28

## 2017-07-10 RX ADMIN — NICOTINE POLACRILEX 2 MG: 2 GUM, CHEWING ORAL at 17:57

## 2017-07-10 RX ADMIN — NICOTINE POLACRILEX 2 MG: 2 GUM, CHEWING ORAL at 18:58

## 2017-07-10 RX ADMIN — NICOTINE POLACRILEX 2 MG: 2 GUM, CHEWING ORAL at 20:04

## 2017-07-10 RX ADMIN — OMEPRAZOLE 20 MG: 20 CAPSULE, DELAYED RELEASE ORAL at 08:29

## 2017-07-10 RX ADMIN — LORAZEPAM 2 MG: 2 INJECTION INTRAMUSCULAR; INTRAVENOUS at 10:57

## 2017-07-10 RX ADMIN — NICOTINE POLACRILEX 2 MG: 2 GUM, CHEWING ORAL at 16:16

## 2017-07-10 RX ADMIN — OLANZAPINE 5 MG: 2.5 TABLET, FILM COATED ORAL at 08:41

## 2017-07-10 RX ADMIN — HALOPERIDOL LACTATE 10 MG: 5 INJECTION, SOLUTION INTRAMUSCULAR at 10:56

## 2017-07-10 RX ADMIN — LORAZEPAM 2 MG: 2 INJECTION INTRAMUSCULAR at 10:57

## 2017-07-10 RX ADMIN — MULTIPLE VITAMINS W/ MINERALS TAB 1 TABLET: TAB at 08:29

## 2017-07-10 RX ADMIN — TRIHEXYPHENIDYL HYDROCHLORIDE 10 MG: 5 TABLET ORAL at 17:33

## 2017-07-10 RX ADMIN — NICOTINE POLACRILEX 2 MG: 2 GUM, CHEWING ORAL at 08:08

## 2017-07-10 RX ADMIN — Medication 1 MG: at 21:34
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
## 2017-07-10 NOTE — SIGNIFICANT EVENT
I came to evaluate this patient at the bedside as a code 21 was called for aggressive behavior. By the time I came to floor, there were multiple staff in the room holding the patient down to bed. The patient had already received IM ability and haldol.  staff present and will continue to manage medications; however, I verified that the patient's restraints were safely placed. Behavioral restraints ordered. Patient has circulation to all distal extremities.     5 minutes spent with patient    [facility] Physician

## 2017-07-10 NOTE — PROVIDER NOTIFICATION
Prescriber Notification Note    The pharmacist has communicated with this patient's provider regarding a concern or therapy recommendation.    Notified Person: Marko  Date/Time of Notification: 7/10/17 1040  Interaction: phone  Concern/Recommendation:received verification that he wants ativan to be IM, not IV.

## 2017-07-10 NOTE — PROVIDER NOTIFICATION
"   07/10/17 1055   Seclusion or Restraint Order   In Person Face to Face Assessment Conducted Yes-Eval of pt's immediate situation, reaction to intervention, complete review of systems assessment, behavioral assessment & review/assessment of hx, drugs & meds, recent labs, etc, behavioral condition, need to continue/terminate restraint/seclusion   patient had become increasingly agitated throughout morning, verbal deescalation, redirection and prn medication given to help calm him down. Continued to escalate which included telling staff to \"fuck off\", banging on ITC door window, when staff tried to redirect him his gave them the finger and continued to tell everyone to \"fuck off\". When told that he was going to get a shot to help him calm down and to go to his room he refused. Code 21 called, when staff again asked for him to go to room, pt stated \"I'm not going to my fucking room\" and I'm not getting a fucking shot\". Security attempted to redirect him to his room, pt started kicking, punching and swearing at staff, continued to resist staff when restraints where being put on. 10 mg haldol & 2mg of ativan IM given.  "

## 2017-07-10 NOTE — PLAN OF CARE
Problem: Restraint/Seclusion for Violent Self-Destructive Behavior  Goal: Prevent/manage potential problems during restraint/seclusion  Maintain safety of patient and others during period of restraint/seclusion.  Promote psychological and physical wellbeing.  Prevent injury to skin and involved body parts.  Promote nutrition, hydration, and elimination.   Outcome: Completed Date Met:  07/10/17  Pt verbalized understanding of behaviors needed to avoid being in restraints.

## 2017-07-10 NOTE — PROGRESS NOTES
"Patient's restraints where removed at 1245. He verbally contracted for safety. Writer asked patient what he could have done and what he can do in the future to better handle these types of situations. Patient responded with,\"I know what I did was wrong, and I need to be able to cope better next time. Maybe I could request to talk calmly in my room with staff, or request a PRN and go to my room to rest.\" Patient agrees to stay maintain appropriate behaviors while he on the unit and understands that aggressive behaviors will not be tolerated on the unit.  "

## 2017-07-10 NOTE — PLAN OF CARE
Problem: Restraint/Seclusion for Violent Self-Destructive Behavior  Goal: Prevent future episodes of restraint or seclusion  Identify nonphysical alternatives to restraint or seclusion.  Identify additional de-escalation supportive measures to use as alternatives to restraint or seclusion.   Outcome: Completed Date Met:  07/10/17  Pt agreed to work on positive coping skills, when angry or frustrated.

## 2017-07-10 NOTE — PROGRESS NOTES
[facility] Psychiatric Progress Note       Interim History   I was asked to see this patient for second opinion.  He is a 26-year-old man who was admitted via the ED on account of suicidal ideations with plans to jump off a bridge close to his group home.  He reportedly had not been taking his prescribed medications prior to admission.  Patient reports that he had been quite paranoid and did not like taking clozapine which he claims was previously prescribed at 650 mg daily.  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  Patient accuses his attending psychiatrist of disrespecting him and not listening to his concerns.  He states that his psychiatrist does not spend time with him and claims he has already filed a complaint with the state.  He was advised that he needs to take his medications as prescribed.  Patient states that he is only interested in continuing his care as an outpatient and requested to be discharged today.  Review of his records suggest that he didn't take his prescribed medication this morning and was given Abilify Maintena 400 mg on 07/07/2017.  In the context of this evaluation, the patient requested to be discharged by the undersigned but he was again advised that he was being seen for a second opinion but that his psychiatrist remains Dr. Zhu.  He was irritated but agreed to comply with his prescribed medication.  She presented future orientation and did not endorse any self-harm thoughts or plans.  However in the context of the interview he displayed active paranoia as he stated that everyone on the ITC was talking about him.  He then went on to describe how he feels uncomfortable around people.  He claims he drank too much prior to his admission and believes that his alcohol use may have interacted with haloperidol that he took at the group home.  He states he  did not want to come to the hospital was forced to come because of his suicidality.   Medications     Current Facility-Administered Medications Ordered in Epic   Medication Dose Route Frequency Last Rate Last Dose     LORazepam (ATIVAN) injection 2 mg  2 mg Intramuscular Q4H PRN   2 mg at 07/10/17 1057     haloperidol lactate (HALDOL) injection 10 mg  10 mg Intramuscular Q4H PRN   10 mg at 07/10/17 1056     potassium chloride SA (K-DUR/KLOR-CON M) CR tablet 20-40 mEq  20-40 mEq Oral Q2H PRN         potassium chloride (KLOR-CON) Packet 20-40 mEq  20-40 mEq Oral or Feeding Tube Q2H PRN         potassium chloride 10 mEq in 100 mL intermittent infusion  10 mEq Intravenous Q1H PRN         potassium chloride 10 mEq in 100 mL intermittent infusion with 10 mg lidocaine  10 mEq Intravenous Q1H PRN         potassium chloride 20 mEq in 50 mL intermittent infusion  20 mEq Intravenous Q1H PRN         hypromellose-dextran (ARTIFICAL TEARS) ophthalmic solution 1 drop  1 drop Both Eyes Q1H PRN   1 drop at 07/08/17 2237     multivitamin, therapeutic with minerals (THERA-VIT-M) tablet 1 tablet  1 tablet Oral Daily   1 tablet at 07/10/17 0829     ARIPiprazole ER (ABILIFY MAINTENA) extended release injection 400 mg  400 mg Intramuscular Q30 Days   400 mg at 07/07/17 1048     ARIPiprazole (ABILIFY) tablet 20 mg  20 mg Oral QAM   20 mg at 07/10/17 0828     nicotine polacrilex (NICORETTE) gum 2 mg  2 mg Buccal Q1H PRN   2 mg at 07/10/17 0908     senna-docusate (SENOKOT-S;PERICOLACE) 8.6-50 MG per tablet 1 tablet  1 tablet Oral BID   1 tablet at 07/10/17 0829     polyethylene glycol (MIRALAX/GLYCOLAX) Packet 17 g  17 g Oral BID   17 g at 07/09/17 2216     hydrOXYzine (ATARAX) tablet 25-50 mg  25-50 mg Oral Q4H PRN   50 mg at 07/07/17 0034     levothyroxine (SYNTHROID/LEVOTHROID) tablet 112 mcg  112 mcg Oral Daily   112 mcg at 07/10/17 0828     melatonin tablet 1 mg  1 mg Oral At Bedtime   1 mg at 07/09/17 2216     OLANZapine (zyPREXA)  "tablet 2.5-5 mg  2.5-5 mg Oral Daily PRN   5 mg at 07/10/17 0841     omeprazole (priLOSEC) CR capsule 20 mg  20 mg Oral QAM   20 mg at 07/10/17 0829     prazosin (MINIPRESS) capsule 5 mg  5 mg Oral At Bedtime   5 mg at 07/09/17 2216     trihexyphenidyl (ARTANE) tablet 5 mg  5 mg Oral QAM   5 mg at 07/10/17 0829     trihexyphenidyl (ARTANE) tablet 10 mg  10 mg Oral Daily with supper   10 mg at 07/09/17 1749     venlafaxine (EFFEXOR-XR) 24 hr capsule 225 mg  225 mg Oral Daily with breakfast   225 mg at 07/10/17 0829     QUEtiapine (SEROquel) tablet 25-50 mg  25-50 mg Oral Q2H PRN   50 mg at 07/08/17 1735     acetaminophen (TYLENOL) tablet 650 mg  650 mg Oral Q4H PRN   650 mg at 07/05/17 2332    Or     acetaminophen (TYLENOL) Suppository 650 mg  650 mg Rectal Q4H PRN         ibuprofen (ADVIL/MOTRIN) tablet 600 mg  600 mg Oral Q8H PRN   600 mg at 07/05/17 2332     No current [facility]-ordered outpatient prescriptions on file.         Allergies    No Known Allergies     Medical Review of Systems   /64  Pulse 54  Temp 98.2  F (36.8  C) (Oral)  Resp 18  Ht 1.753 m (5' 9\")  Wt 94.8 kg (209 lb 1.6 oz)  SpO2 100%  BMI 30.88 kg/m2  Body mass index is 30.88 kg/(m^2).  A 10-point review of systems was performed by Ole Haddad MD and is negative, no new findings.      Psychiatric Examination     Appearance Sitting on the side of his bed, dressed in scrubs. Appears stated age.   Attitude Cooperative   Orientation Oriented to person, place, time   Eye Contact Intense   Speech Regular rate, rhythm, volume and tone   Language Normal   Psychomotor Behavior Normal   Mood Irritable   Affect Flat   Thought Process Goal-Oriented, Intact   Associations Intact   Thought Content Patient is currently negative for suicide ideation, negative for plan or intent, able to contract no self harm and identify barriers to suicide. Positive for paranoia   Fund of Knowledge Average   Insight Poor   Judgement Poor   Attention Span " & Concentration Fair   Recent & Remote Memory Impaired   Gait Normal        Labs   Labs reviewed.  No results found for this or any previous visit (from the past 24 hour(s)).       Impression   This is a 26 year old male with Schizoaffective Disorder, bipolar type. Plan to restart Abilify at 20mg qam. Agree with plan at previous hospitalization to move to long acting injectable depot medication for medication compliance.    Diagnoses   1. Schizoaffective Disorder, depressed type.  2. Borderline personality disorder, by history.      Plan      1. Explained side effects, benefits, and complications of medications to the patient, Pt gave verbal consent.  2. Medication changes: Recommend compliance with currently prescribed medications. Discussed with Dr. Zhu.  3. Discussed treatment plan with patient and team.  4. Projected length of stay: Until pt has been stabilized with aftercare in place.    Attestation:   Patient has been seen and evaluated by Ole sesay MD.    Patient ID:  Name: Kamini Neal  MRN: [id number]  Admission: 7/5/2017   YOB: 1990

## 2017-07-10 NOTE — PLAN OF CARE
"Problem: Depressive Symptoms  Goal: Depressive Symptoms  Signs and symptoms of listed problems will be absent or manageable.   1. Mood stability   2. Absence of suicidal ideation/contracts for safety   3. Depressive s/sx resolved  4. Safety plan in place  5. Positive coping skills identified/utilized  6. Medication regiment established/compliance  7. Adequate sleep  8. Housing community support  9. F/u plan in place   Outcome: No Change  Patient restless at shift start, wanting to leave and wanting different doctor. Writer sat with patient and talked to him, gave prn Zyprexa. Patient continued to vent and complain under his breath about Dr. Zhu. In [facility], he offered another patient box of Fruit Loops, when peer declined patient became upset stating \"Does that wilbert think there is something wrong with it\" \"Fuck him\"  Patient continued to escalate throughout morning, banging on door, giving the finger to Dr. Zhu when he saw him through the window. When asked to stop behavours, patient told staff to \"fuck off\" and was not redirectable back to room, became increasingly agitated, code 21 called and 10 mg IM haldol plus 2mg IM ativan given and patient put in 5 point restraints. Continued to resist, kick and punch during restraint process. Pt put on 72 hour hold, read rights and when asked why he was in restraints, pt acknowledged that he had been physically and verbally abusive with staff, stated he will not do it again.  Pt out of restraints at 12:45, verbalized understanding of appropriate behaviors going forward. Pt showered and exhibited appropriate behavior for rest of shift.      "

## 2017-07-11 PROCEDURE — 25000132 ZZH RX MED GY IP 250 OP 250 PS 637: Performed by: PSYCHIATRY & NEUROLOGY

## 2017-07-11 PROCEDURE — 90853 GROUP PSYCHOTHERAPY: CPT

## 2017-07-11 PROCEDURE — 12400006 ZZH R&B MH INTERMEDIATE

## 2017-07-11 PROCEDURE — 25000132 ZZH RX MED GY IP 250 OP 250 PS 637: Performed by: INTERNAL MEDICINE

## 2017-07-11 RX ORDER — ARIPIPRAZOLE 10 MG/1
10 TABLET ORAL ONCE
Status: COMPLETED | OUTPATIENT
Start: 2017-07-11 | End: 2017-07-11

## 2017-07-11 RX ORDER — ARIPIPRAZOLE 15 MG/1
30 TABLET ORAL EVERY MORNING
Status: DISCONTINUED | OUTPATIENT
Start: 2017-07-12 | End: 2017-07-12 | Stop reason: HOSPADM

## 2017-07-11 RX ADMIN — NICOTINE POLACRILEX 2 MG: 2 GUM, CHEWING ORAL at 11:27

## 2017-07-11 RX ADMIN — OLANZAPINE 5 MG: 2.5 TABLET, FILM COATED ORAL at 18:27

## 2017-07-11 RX ADMIN — POLYETHYLENE GLYCOL 3350 17 G: 17 POWDER, FOR SOLUTION ORAL at 21:23

## 2017-07-11 RX ADMIN — NICOTINE POLACRILEX 2 MG: 2 GUM, CHEWING ORAL at 13:51

## 2017-07-11 RX ADMIN — SENNOSIDES AND DOCUSATE SODIUM 1 TABLET: 8.6; 5 TABLET ORAL at 21:22

## 2017-07-11 RX ADMIN — PRAZOSIN HYDROCHLORIDE 5 MG: 5 CAPSULE ORAL at 21:22

## 2017-07-11 RX ADMIN — QUETIAPINE FUMARATE 50 MG: 25 TABLET, FILM COATED ORAL at 13:53

## 2017-07-11 RX ADMIN — MULTIPLE VITAMINS W/ MINERALS TAB 1 TABLET: TAB at 07:38

## 2017-07-11 RX ADMIN — Medication 1 MG: at 21:22

## 2017-07-11 RX ADMIN — NICOTINE POLACRILEX 2 MG: 2 GUM, CHEWING ORAL at 06:23

## 2017-07-11 RX ADMIN — NICOTINE POLACRILEX 2 MG: 2 GUM, CHEWING ORAL at 08:06

## 2017-07-11 RX ADMIN — OMEPRAZOLE 20 MG: 20 CAPSULE, DELAYED RELEASE ORAL at 07:39

## 2017-07-11 RX ADMIN — QUETIAPINE FUMARATE 50 MG: 25 TABLET, FILM COATED ORAL at 18:27

## 2017-07-11 RX ADMIN — LEVOTHYROXINE SODIUM 112 MCG: 112 TABLET ORAL at 07:38

## 2017-07-11 RX ADMIN — TRIHEXYPHENIDYL HYDROCHLORIDE 5 MG: 5 TABLET ORAL at 07:39

## 2017-07-11 RX ADMIN — SENNOSIDES AND DOCUSATE SODIUM 1 TABLET: 8.6; 5 TABLET ORAL at 07:38

## 2017-07-11 RX ADMIN — ARIPIPRAZOLE 20 MG: 10 TABLET ORAL at 07:38

## 2017-07-11 RX ADMIN — ARIPIPRAZOLE 10 MG: 10 TABLET ORAL at 11:14

## 2017-07-11 RX ADMIN — TRIHEXYPHENIDYL HYDROCHLORIDE 10 MG: 5 TABLET ORAL at 17:34

## 2017-07-11 RX ADMIN — VENLAFAXINE HYDROCHLORIDE 225 MG: 75 CAPSULE, EXTENDED RELEASE ORAL at 07:39

## 2017-07-11 RX ADMIN — POLYETHYLENE GLYCOL 3350 17 G: 17 POWDER, FOR SOLUTION ORAL at 07:39

## 2017-07-11 RX ADMIN — NICOTINE POLACRILEX 2 MG: 2 GUM, CHEWING ORAL at 09:49

## 2017-07-11 ASSESSMENT — ACTIVITIES OF DAILY LIVING (ADL)
DRESS: SCRUBS (BEHAVIORAL HEALTH)
GROOMING: PROMPTS
LAUNDRY: WITH SUPERVISION

## 2017-07-11 NOTE — PLAN OF CARE
"Problem: Goal Outcome Summary  Goal: Goal Outcome Summary  Outcome: No Change  Pt with a blunt affect, is minimally bright with conversation. Mood was calm this shift. Spent most of shift in the lounge, and kept to himself. He played card games with his peers/staff and engaged appropriately with them. Pt attended wrap up group and behavior was appropriate. Reported to staff, \"the hardest thing to being a [place] is to be told they have a mental illness.\"      "

## 2017-07-11 NOTE — PROGRESS NOTES
"[facility] Psychiatric Progress Note       Interim History   Pt seen on ITC. Tolerating medications without side effects. Side effects, risks, and benefits of medications reviewed with patient. Pt was placed in restraints and Code 21 was called yesterday due paranoid thinking, he believed that \"everyone was talking about me.\" 72 hour hold was initiated as pt demanded to discharge and highly agitated. On interview this morning, he is much more cooperative, he states he will take medications voluntarily. Plan to drop 72 hour hold. He states he has a therapist through [facility], he will need to contact his therapist and inform them that he is presently in the hospital. He states that Abilify has been beneficial and not sedating. Increase Abilify to 30mg qam. He may need Aristada injection in the future. Plan to contact Marco Campo.   Medications     Current Facility-Administered Medications Ordered in Epic   Medication Dose Route Frequency Last Rate Last Dose     LORazepam (ATIVAN) injection 2 mg  2 mg Intramuscular Q4H PRN   2 mg at 07/10/17 1057     haloperidol lactate (HALDOL) injection 10 mg  10 mg Intramuscular Q4H PRN   10 mg at 07/10/17 1056     potassium chloride SA (K-DUR/KLOR-CON M) CR tablet 20-40 mEq  20-40 mEq Oral Q2H PRN         potassium chloride (KLOR-CON) Packet 20-40 mEq  20-40 mEq Oral or Feeding Tube Q2H PRN         potassium chloride 10 mEq in 100 mL intermittent infusion  10 mEq Intravenous Q1H PRN         potassium chloride 10 mEq in 100 mL intermittent infusion with 10 mg lidocaine  10 mEq Intravenous Q1H PRN         potassium chloride 20 mEq in 50 mL intermittent infusion  20 mEq Intravenous Q1H PRN         hypromellose-dextran (ARTIFICAL TEARS) ophthalmic solution 1 drop  1 drop Both Eyes Q1H PRN   1 drop at 07/08/17 1091     multivitamin, therapeutic with minerals (THERA-VIT-M) tablet 1 tablet  1 tablet Oral Daily   1 tablet at 07/11/17 0738     " "ARIPiprazole ER (ABILIFY MAINTENA) extended release injection 400 mg  400 mg Intramuscular Q30 Days   400 mg at 07/07/17 1048     ARIPiprazole (ABILIFY) tablet 20 mg  20 mg Oral QAM   20 mg at 07/11/17 0738     nicotine polacrilex (NICORETTE) gum 2 mg  2 mg Buccal Q1H PRN   2 mg at 07/11/17 0949     senna-docusate (SENOKOT-S;PERICOLACE) 8.6-50 MG per tablet 1 tablet  1 tablet Oral BID   1 tablet at 07/11/17 0738     polyethylene glycol (MIRALAX/GLYCOLAX) Packet 17 g  17 g Oral BID   17 g at 07/11/17 0739     hydrOXYzine (ATARAX) tablet 25-50 mg  25-50 mg Oral Q4H PRN   50 mg at 07/07/17 0034     levothyroxine (SYNTHROID/LEVOTHROID) tablet 112 mcg  112 mcg Oral Daily   112 mcg at 07/11/17 0738     melatonin tablet 1 mg  1 mg Oral At Bedtime   1 mg at 07/10/17 2134     OLANZapine (zyPREXA) tablet 2.5-5 mg  2.5-5 mg Oral Daily PRN   5 mg at 07/10/17 0841     omeprazole (priLOSEC) CR capsule 20 mg  20 mg Oral QAM   20 mg at 07/11/17 0739     prazosin (MINIPRESS) capsule 5 mg  5 mg Oral At Bedtime   5 mg at 07/10/17 2134     trihexyphenidyl (ARTANE) tablet 5 mg  5 mg Oral QAM   5 mg at 07/11/17 0739     trihexyphenidyl (ARTANE) tablet 10 mg  10 mg Oral Daily with supper   10 mg at 07/10/17 1733     venlafaxine (EFFEXOR-XR) 24 hr capsule 225 mg  225 mg Oral Daily with breakfast   225 mg at 07/11/17 0739     QUEtiapine (SEROquel) tablet 25-50 mg  25-50 mg Oral Q2H PRN   50 mg at 07/10/17 1939     acetaminophen (TYLENOL) tablet 650 mg  650 mg Oral Q4H PRN   650 mg at 07/05/17 2332    Or     acetaminophen (TYLENOL) Suppository 650 mg  650 mg Rectal Q4H PRN         ibuprofen (ADVIL/MOTRIN) tablet 600 mg  600 mg Oral Q8H PRN   600 mg at 07/05/17 5642     No current Epic-ordered outpatient prescriptions on file.         Allergies    No Known Allergies     Medical Review of Systems   /82  Pulse 87  Temp 98.3  F (36.8  C) (Oral)  Resp 16  Ht 1.753 m (5' 9\")  Wt 94.8 kg (209 lb 1.6 oz)  SpO2 100%  BMI 30.88 " kg/m2  Body mass index is 30.88 kg/(m^2).  A 10-point review of systems was performed by John Zhu MD and is negative, no new findings.      Psychiatric Examination     Appearance Sitting on the side of his bed, dressed in scrubs. Appears stated age.   Attitude Cooperative   Orientation Oriented to person, place, time   Eye Contact More appropriate   Speech Regular rate, rhythm, volume and tone   Language Normal   Psychomotor Behavior Normal   Mood Less irritable and hostile   Affect Flat   Thought Process Goal-Oriented, Intact   Associations Intact   Thought Content Patient is currently negative for suicide ideation, negative for plan or intent, able to contract no self harm and identify barriers to suicide. Positive for paranoia.   Fund of Knowledge Average   Insight Poor   Judgement Poor   Attention Span & Concentration Fair   Recent & Remote Memory Impaired   Gait Normal        Labs   Labs reviewed.  No results found for this or any previous visit (from the past 24 hour(s)).       Impression   This is a 26 year old male with Schizoaffective Disorder, bipolar type. Plan to restart Abilify at 20mg qam. Agree with plan at previous hospitalization to move to long acting injectable depot medication for medication compliance.    Diagnoses   1. Schizoaffective Disorder, depressed type.  2. Borderline personality disorder, by history.      Plan      1. Explained side effects, benefits, and complications of medications to the patient, Pt gave verbal consent.  2. Medication changes: Increase Abilify to 30mg qam.  3. Discussed treatment plan with patient and team.  4. Projected length of stay: Until pt has been stabilized with aftercare in place.  5. Discontinue 72 hour hold, pt to sign in voluntarily.    Attestation:   Patient has been seen and evaluated by me, John Zhu MD.    Patient ID:  Name: Kamini Neal  MRN: [id number]  Admission: 7/5/2017   YOB: 1990

## 2017-07-11 NOTE — PLAN OF CARE
Problem: Goal Outcome Summary  Goal: Goal Outcome Summary  Outcome: No Change  Patient pacing loud ,many med requests. Signed in voluntary.

## 2017-07-12 VITALS
WEIGHT: 209.1 LBS | HEART RATE: 63 BPM | HEIGHT: 69 IN | BODY MASS INDEX: 30.97 KG/M2 | SYSTOLIC BLOOD PRESSURE: 111 MMHG | RESPIRATION RATE: 16 BRPM | OXYGEN SATURATION: 100 % | DIASTOLIC BLOOD PRESSURE: 71 MMHG | TEMPERATURE: 98.2 F

## 2017-07-12 PROCEDURE — 25000132 ZZH RX MED GY IP 250 OP 250 PS 637: Performed by: PSYCHIATRY & NEUROLOGY

## 2017-07-12 PROCEDURE — 97150 GROUP THERAPEUTIC PROCEDURES: CPT | Mod: GO

## 2017-07-12 PROCEDURE — 25000132 ZZH RX MED GY IP 250 OP 250 PS 637: Performed by: INTERNAL MEDICINE

## 2017-07-12 RX ORDER — ARIPIPRAZOLE 30 MG/1
30 TABLET ORAL EVERY MORNING
Qty: 30 TABLET | Refills: 0 | Status: SHIPPED | OUTPATIENT
Start: 2017-07-12 | End: 2017-09-14

## 2017-07-12 RX ORDER — QUETIAPINE FUMARATE 50 MG/1
50 TABLET, FILM COATED ORAL 2 TIMES DAILY PRN
Qty: 60 TABLET | Refills: 0 | Status: ON HOLD | OUTPATIENT
Start: 2017-07-12 | End: 2017-10-03

## 2017-07-12 RX ADMIN — TRIHEXYPHENIDYL HYDROCHLORIDE 5 MG: 5 TABLET ORAL at 08:11

## 2017-07-12 RX ADMIN — MULTIPLE VITAMINS W/ MINERALS TAB 1 TABLET: TAB at 08:11

## 2017-07-12 RX ADMIN — LEVOTHYROXINE SODIUM 112 MCG: 112 TABLET ORAL at 07:31

## 2017-07-12 RX ADMIN — NICOTINE POLACRILEX 2 MG: 2 GUM, CHEWING ORAL at 06:17

## 2017-07-12 RX ADMIN — VENLAFAXINE HYDROCHLORIDE 225 MG: 75 CAPSULE, EXTENDED RELEASE ORAL at 08:11

## 2017-07-12 RX ADMIN — QUETIAPINE FUMARATE 50 MG: 25 TABLET, FILM COATED ORAL at 08:32

## 2017-07-12 RX ADMIN — NICOTINE POLACRILEX 2 MG: 2 GUM, CHEWING ORAL at 08:48

## 2017-07-12 RX ADMIN — OMEPRAZOLE 20 MG: 20 CAPSULE, DELAYED RELEASE ORAL at 08:11

## 2017-07-12 RX ADMIN — ARIPIPRAZOLE 30 MG: 15 TABLET ORAL at 08:11

## 2017-07-12 RX ADMIN — NICOTINE POLACRILEX 2 MG: 2 GUM, CHEWING ORAL at 15:09

## 2017-07-12 RX ADMIN — NICOTINE POLACRILEX 2 MG: 2 GUM, CHEWING ORAL at 11:12

## 2017-07-12 RX ADMIN — OLANZAPINE 5 MG: 2.5 TABLET, FILM COATED ORAL at 10:59

## 2017-07-12 RX ADMIN — NICOTINE POLACRILEX 2 MG: 2 GUM, CHEWING ORAL at 07:31

## 2017-07-12 RX ADMIN — SENNOSIDES AND DOCUSATE SODIUM 1 TABLET: 8.6; 5 TABLET ORAL at 08:11

## 2017-07-12 RX ADMIN — POLYETHYLENE GLYCOL 3350 17 G: 17 POWDER, FOR SOLUTION ORAL at 08:10

## 2017-07-12 NOTE — PROGRESS NOTES
Medication education complete and AVS reviewed. Patient denies SI. It was explained to him that is was paramount that he be compliant with his medication if he is to continue living in the group home. Patient expressed understanding and states that he will take his medication. Says that he feels better on this medication change and not like a zombie.

## 2017-07-12 NOTE — DISCHARGE INSTRUCTIONS
Behavioral Discharge Planning and Instructions    Summary:   Admitted with suicidal ideation.     Main Diagnosis:  Schizoaffective Disorder, depressed type; Borderline Personality Disorder, by history.    Major Treatments, Procedures and Findings:  Psychiatric assessment. Medication adjustment.     Symptoms to Report: Feeling more aggressive, Losing more sleep, Mood getting worse or Thoughts of suicide    Lifestyle Adjustment:  Follow all treatment recommendations, including taking your medications as prescribed by the physician. Develop and follow safety plan. Abstain from the use of all mood-altering substances, including alcohol. Utilize positive coping skills to manage depressive symptoms.     Psychiatry Follow-up:     You have a follow up psychiatry appointment with Dr. Marco Campo at [facility] in [place] on Thursday, July 13, 2017 at 9:40 am. It is important that you keep this appointment so that you can get your medications refilled.  You have another appointment scheduled with Dr. Marco Campo on Tuesday, October 17, 2017 at 10:00 am.     [facility] ([facility])  [facility] [address], [address]oors  [place], [place] [id number] [phone] / [phone] fax    You have a therapy appointment scheduled with Krystyna PALACIOS at [facility] in [place] on Monday, July 17, 2017 at 9:00 am. It is important that you keep this appointment as you have missed the last couple of scheduled appointments with her.     [facility] ([facility])  [facility] [address], [address] Floors  [place], [place] [id number] [phone] / [phone] fax    Please continue working with your mental health  through [facility], Peter Marr. He can be reached at [phone].     You are returning to your group home through [facility]. Your  at the group home is Neela. She can be reached at [phone] or [phone].     Resources:   Crisis Intervention: 366.747.5366 or 962-000-4085 (TTY: 910.848.7039).  Call anytime for help.  [place] [employer] [place] (www.[place] a.org): 725.232.5008 or 328-418-2014.  Alcoholics Anonymous (www.alcoholics-anonymous.org): Check your phone book for your local chapter.  National Suicide Prevention Line (www.mentalhealthmn.org): 696-616-YCQC (0301)  COPE (Crisis Services for Adults) in [facility]: [phone] (Available 24/7)    General Medication Instructions:   See your medication sheet(s) for instructions.   Take all medicines as directed.  Make no changes unless your doctor suggests them.   Go to all your doctor visits.  Be sure to have all your required lab tests. This way, your medicines can be refilled on time.  Do not use any drugs not prescribed by your doctor.  Avoid alcohol.

## 2017-07-12 NOTE — PLAN OF CARE
"Problem: Depressive Symptoms  Goal: Depressive Symptoms  Signs and symptoms of listed problems will be absent or manageable.   1. Mood stability   2. Absence of suicidal ideation/contracts for safety   3. Depressive s/sx resolved  4. Safety plan in place  5. Positive coping skills identified/utilized  6. Medication regiment established/compliance  7. Adequate sleep  8. Housing community support  9. F/u plan in place   Outcome: No Change  Pt has been very present and irritable throughout the whole shift. Pt got agitated around 0815 AM; delusional about one staff member and another peer; stated that they were talking about the zit on his nose; pt took Seroquel after to calm down. A little bit later, pt flicked off the staff member he thought was talking about him and said, \"fuck you... I am going to beat him up.\" Pt played cards with another peer after that situation and appeared tired and drowsy with his head on the table. Pt ate all of his breakfast. Continued paranoia around 0915 AM. PT signed 12 hour intent to leave at 1045 AM. Around 1100 AM, pt started hitting his head lightly, but repeatedly against the window in his room; was redirected successfully. Pt spoke with  immediately after that and calmed down; eventually went to some groups and participated appropriately. Pt has been calm and cooperative since speaking with . Pt took a shower around 1230 PM. Pt is discharging between 3135-0614 PM.      "

## 2017-07-12 NOTE — PLAN OF CARE
Problem: Depressive Symptoms  Goal: Depressive Symptoms  Signs and symptoms of listed problems will be absent or manageable.   1. Mood stability   2. Absence of suicidal ideation/contracts for safety   3. Depressive s/sx resolved  4. Safety plan in place  5. Positive coping skills identified/utilized  6. Medication regiment established/compliance  7. Adequate sleep  8. Housing community support  9. F/u plan in place   Outcome: No Change  Pt with a flat affect, but brightens with communication. Mood was labile this shift, though was able to be redirected and remain calm thereafter. He attended OT. Per staff pt worked on a craft, then tried taking a nap when using the computer. Staff told pt he couldn't sleep at the computer, pt got upset and left OT. He also got slightly irritable at start of shift due to overhearing at the nursing station staff speaking about another pt's meds; however, since there are two pt's with the same name pt assumed staff was talking about him. He slightly pushed the door shut with an attitude, he calmed down after staff explained they did not talk about him rather they were speaking about another pt. He was able to watch some TV in the [facility]. Otherwise was pleasant and respectful with his peers.

## 2017-07-12 NOTE — PROGRESS NOTES
called and left a message with Neela ([phone]),  at patient's group home through [facility], to discuss discharge plans.  awaiting call back.

## 2017-07-12 NOTE — PROGRESS NOTES
[facility] Psychiatric Progress Note       Interim History   Pt seen on ITC. Tolerating medications without side effects. Side effects, risks, and benefits of medications reviewed with patient. Pt is stabilizing on current medications.  Pt signed a 12 hour intent to leave. He is voluntary. Informed pt that staff is attempting to contact his group home to certify the conditions for pt's discharge back to the group home. Plan to contact Louisa Tee [phone] regarding this. Strongly advised pt to rescind his 12 hour intent otherwise he will be discharged AMA without medications. Pt verbalized understanding. Staff has contacted group home staff and they are willing to take to take pt back. Denies suicidal or homicidal ideation. 30 day supply of medication provided at time of discharge. He is looking forward to discharging today.   Medications     Current Facility-Administered Medications Ordered in Epic   Medication Dose Route Frequency Last Rate Last Dose     ARIPiprazole (ABILIFY) tablet 30 mg  30 mg Oral QAM   30 mg at 07/12/17 0811     LORazepam (ATIVAN) injection 2 mg  2 mg Intramuscular Q4H PRN   2 mg at 07/10/17 1057     haloperidol lactate (HALDOL) injection 10 mg  10 mg Intramuscular Q4H PRN   10 mg at 07/10/17 1056     potassium chloride SA (K-DUR/KLOR-CON M) CR tablet 20-40 mEq  20-40 mEq Oral Q2H PRN         potassium chloride (KLOR-CON) Packet 20-40 mEq  20-40 mEq Oral or Feeding Tube Q2H PRN         potassium chloride 10 mEq in 100 mL intermittent infusion  10 mEq Intravenous Q1H PRN         potassium chloride 10 mEq in 100 mL intermittent infusion with 10 mg lidocaine  10 mEq Intravenous Q1H PRN         potassium chloride 20 mEq in 50 mL intermittent infusion  20 mEq Intravenous Q1H PRN         hypromellose-dextran (ARTIFICAL TEARS) ophthalmic solution 1 drop  1 drop Both Eyes Q1H PRN   1 drop at 07/08/17 9667     multivitamin, therapeutic with minerals (THERA-VIT-M) tablet 1  tablet  1 tablet Oral Daily   1 tablet at 07/12/17 0811     ARIPiprazole ER (ABILIFY MAINTENA) extended release injection 400 mg  400 mg Intramuscular Q30 Days   400 mg at 07/07/17 1048     nicotine polacrilex (NICORETTE) gum 2 mg  2 mg Buccal Q1H PRN   2 mg at 07/12/17 1112     senna-docusate (SENOKOT-S;PERICOLACE) 8.6-50 MG per tablet 1 tablet  1 tablet Oral BID   1 tablet at 07/12/17 0811     polyethylene glycol (MIRALAX/GLYCOLAX) Packet 17 g  17 g Oral BID   17 g at 07/12/17 0810     hydrOXYzine (ATARAX) tablet 25-50 mg  25-50 mg Oral Q4H PRN   50 mg at 07/07/17 0034     levothyroxine (SYNTHROID/LEVOTHROID) tablet 112 mcg  112 mcg Oral Daily   112 mcg at 07/12/17 0731     melatonin tablet 1 mg  1 mg Oral At Bedtime   1 mg at 07/11/17 2122     OLANZapine (zyPREXA) tablet 2.5-5 mg  2.5-5 mg Oral Daily PRN   5 mg at 07/12/17 1059     omeprazole (priLOSEC) CR capsule 20 mg  20 mg Oral QAM   20 mg at 07/12/17 0811     prazosin (MINIPRESS) capsule 5 mg  5 mg Oral At Bedtime   5 mg at 07/11/17 2122     trihexyphenidyl (ARTANE) tablet 5 mg  5 mg Oral QAM   5 mg at 07/12/17 0811     trihexyphenidyl (ARTANE) tablet 10 mg  10 mg Oral Daily with supper   10 mg at 07/11/17 1734     venlafaxine (EFFEXOR-XR) 24 hr capsule 225 mg  225 mg Oral Daily with breakfast   225 mg at 07/12/17 0811     QUEtiapine (SEROquel) tablet 25-50 mg  25-50 mg Oral Q2H PRN   50 mg at 07/12/17 0832     acetaminophen (TYLENOL) tablet 650 mg  650 mg Oral Q4H PRN   650 mg at 07/05/17 2332    Or     acetaminophen (TYLENOL) Suppository 650 mg  650 mg Rectal Q4H PRN         ibuprofen (ADVIL/MOTRIN) tablet 600 mg  600 mg Oral Q8H PRN   600 mg at 07/05/17 4852     Current Outpatient Prescriptions Ordered in Epic   Medication     QUEtiapine (SEROQUEL) 50 MG tablet     ARIPiprazole (ABILIFY) 30 MG tablet     ARIPiprazole ER (ABILIFY MAINTENA) 400 MG extended release injection         Allergies    No Known Allergies     Medical Review of Systems   /71   "Pulse 63  Temp 98.2  F (36.8  C) (Oral)  Resp 16  Ht 1.753 m (5' 9\")  Wt 94.8 kg (209 lb 1.6 oz)  SpO2 100%  BMI 30.88 kg/m2  Body mass index is 30.88 kg/(m^2).  A 10-point review of systems was performed by John Zhu MD and is negative, no new findings.      Psychiatric Examination     Appearance Sitting on the side of his bed, dressed in scrubs. Appears stated age.   Attitude Cooperative   Orientation Oriented to person, place, time   Eye Contact Fair   Speech Regular rate, rhythm, volume and tone   Language Normal   Psychomotor Behavior Normal   Mood Much less irritable and hostile   Affect Flat   Thought Process Goal-Oriented, Intact   Associations Intact   Thought Content Patient is currently negative for suicide ideation, negative for plan or intent, able to contract no self harm and identify barriers to suicide. Positive for paranoia.   Fund of Knowledge Average   Insight Improving   Judgement Improving   Attention Span & Concentration Fair   Recent & Remote Memory Intact   Gait Normal        Labs   Labs reviewed.  No results found for this or any previous visit (from the past 24 hour(s)).       Impression   This is a 26 year old male with Schizoaffective Disorder, bipolar type. Plan to restart Abilify at 20mg qam. Agree with plan at previous hospitalization to move to long acting injectable depot medication for medication compliance.    Diagnoses   1. Schizoaffective Disorder, depressed type.  2. Borderline personality disorder, by history.      Plan      1. Explained side effects, benefits, and complications of medications to the patient, Pt gave verbal consent.  2. Medication changes: None.  3. Discussed treatment plan with patient and team.  4. Projected length of stay: Pt to discharge today.    Attestation:   Patient has been seen and evaluated by me, John Zhu MD.    Patient ID:  Name: Kamini Neal  MRN: [id number]  Admission: 7/5/2017   YOB: 1990   "

## 2017-07-12 NOTE — PROGRESS NOTES
Pt discharged to group home at 1625. DC instructions reviewed with patient, including meds. Questions answered. Denies SI. Coping skills reviewed. Belongings given to pt.

## 2017-07-12 NOTE — PROGRESS NOTES
CLINICAL NUTRITION SERVICES - REASSESSMENT NOTE      EVALUATION OF PROGRESS TOWARD GOALS   Diet:  Regular. Sending Ensure BID between meals  Intake:  Pt and staff both state that he's eating well. Pt states he's taking the Ensure and wants to continue.        NEW FINDINGS:   Admit wt: 209#. No recent wt so staff weighed pt during my visit: 219#.      Previous Goals:   Pt to consume at least 50% of meals and supplements  No more than 2#/week wt loss - long term goal  Evaluation: Met    Previous Nutrition Diagnosis:   Inadequate oral intake related to early satiety and altered GI function, as evidenced by 22# (60% wt loss x 6 months  Evaluation: Resolved      CURRENT NUTRITION DIAGNOSIS  No nutrition diagnosis identified at this time      INTERVENTIONS  Recommendations / Nutrition Prescription  Continue current diet and supplements    Implementation  General/healthful diet - encouraged good intake    Goals  Pt will consume at least 75% of meals and supplements      MONITORING AND EVALUATION:  Will sign off at this time, please reconsult if further services needed.    Cris Taylor RD  Pager [phone] (M-F)            [phone] (W/E & Hol)

## 2017-07-20 NOTE — DISCHARGE SUMMARY
"[facility] Psychiatric Discharge Summary      DATE OF ADMISSION: 7/5/2017     DATE OF DISCHARGE: 7/12/2017    PRIMARY CARE PHYSICIAN: Clinic, [facility]    IDENTIFICATION:  Patient is a 26 year old single  male currently living in a group home in [place]. Pt sees PCP at [facility]. He has a county  in [place]. He sees Dr. Marco Campo for psychiatric care. For history, see dictation by Dr. Zhu on 7/6/17. For physical summary, see dictation by Joanna Barthell, PA-C on 7/5/17.     HOSPITAL COURSE:   Abdallah \"Elder\" Val is a 26 year old male with a PMHx significant for depression, bipolar disorder, borderline personality disorder, and PTSD. Pt states he was in his group home yesterday when his manager contacted the paramedics as pt apparently had suicidal ideations that he will jump off a bridge near his group home. He states that he has not completed this for the sake of his mother. Pt denies auditory and visual hallucinations. He endorses that \"people can read me sometimes,\" but otherwise does not have any other paranoid thoughts. Pt has severely depressed mood, motivation poor, concentration poor, low energy, hopeless, helpless, and worthless with SI, no plan, able to contract for safety. He states that his sleep architecture is fair. He noted in the ED that he ran out of his Abilify from his hospitalization at [facility]. He was recently at [facility] from 6/18/17-6/22/17 where Clozaril was discontinued and was started on Abilify with the intent to move to long acting injectable medication. He did not follow up with his provider to complete this. Tolerating medications without side effects. Side effects, risks, and benefits of medications reviewed with patient. Patient is currently negative for suicide ideation, negative for plan or intent, able to contract no self harm and identify barriers to suicide.  Negative for obsessions, compulsions or psychosis.  " "     DISCHARGE MENTAL STATUS EXAMINATION:     Appearance Sitting on the side of his bed, dressed in scrubs. Appears stated age.   Attitude Cooperative   Orientation Oriented to person, place, time   Eye Contact Fair   Speech Regular rate, rhythm, volume and tone   Language Normal   Psychomotor Behavior Normal   Mood Much less irritable and hostile   Affect Flat   Thought Process Goal-Oriented, Intact   Associations Intact   Thought Content Patient is currently negative for suicide ideation, negative for plan or intent, able to contract no self harm and identify barriers to suicide. Positive for paranoia.   Fund of Knowledge Average   Insight Improving   Judgement Improving   Attention Span & Concentration Fair   Recent & Remote Memory Intact   Gait Normal        LABORATORY DATA:    Refer to hospitalist admission dictation.  No results found for this or any previous visit (from the past 24 hour(s)).     /71  Pulse 63  Temp 98.2  F (36.8  C) (Oral)  Resp 16  Ht 1.753 m (5' 9\")  Wt 94.8 kg (209 lb 1.6 oz)  SpO2 100%  BMI 30.88 kg/m2     DISCHARGE MEDICATIONS:      Review of your medicines      START taking       Dose / Directions    * ARIPiprazole 30 MG tablet   Commonly known as:  ABILIFY   Used for:  Schizoaffective disorder, depressive type (H)        Dose:  30 mg   Take 1 tablet (30 mg) by mouth every morning   Quantity:  30 tablet   Refills:  0       * ARIPiprazole  MG extended release injection   Commonly known as:  ABILIFY MAINTENA   Used for:  Schizoaffective disorder, depressive type (H)        Dose:  400 mg   Inject 2 mLs (400 mg) into the muscle every 30 days   Quantity:  2 mL   Refills:  0       QUEtiapine 50 MG tablet   Commonly known as:  SEROquel   Used for:  Schizoaffective disorder, depressive type (H)        Dose:  50 mg   Take 1 tablet (50 mg) by mouth 2 times daily as needed (agitation)   Quantity:  60 tablet   Refills:  0       * Notice:  This list has 2 medication(s) that are " the same as other medications prescribed for you. Read the directions carefully, and ask your doctor or other care provider to review them with you.      CONTINUE these medicines which have NOT CHANGED       Dose / Directions    DOCUSATE SODIUM PO        Dose:  100 mg   Take 100 mg by mouth 2 times daily   Refills:  0       LEVOTHYROXINE SODIUM PO        Dose:  112 mcg   Take 112 mcg by mouth daily noon   Refills:  0       MELATONIN PO        Dose:  1 mg   Take 1 mg by mouth At Bedtime   Refills:  0       OMEPRAZOLE PO        Dose:  20 mg   Take 20 mg by mouth every morning   Refills:  0       PRAZOSIN HCL PO        Dose:  5 mg   Take 5 mg by mouth At Bedtime   Refills:  0       * TRIHEXYPHENIDYL HCL PO        Dose:  5 mg   Take 5 mg by mouth every morning   Refills:  0       * TRIHEXYPHENIDYL HCL PO        Dose:  10 mg   Take 10 mg by mouth daily (with dinner)   Refills:  0       venlafaxine 75 MG Tb24 24 hr tablet   Commonly known as:  EFFEXOR-ER        Dose:  225 mg   Take 225 mg by mouth daily (with breakfast)   Refills:  0       ZYPREXA PO        Dose:  2.5-5 mg   Take 2.5-5 mg by mouth daily as needed for agitation   Refills:  0       * Notice:  This list has 2 medication(s) that are the same as other medications prescribed for you. Read the directions carefully, and ask your doctor or other care provider to review them with you.         Where to get your medicines      These medications were sent to [address]  [address] [address], [facility] [address]     Phone:  [phone]      ARIPiprazole 30 MG tablet     ARIPiprazole  MG extended release injection     QUEtiapine 50 MG tablet             DISCHARGE DIAGNOSES:    1. Schizoaffective Disorder, depressed type.  2. Borderline personality disorder, by history.    DISCHARGE FOLLOW-UP:    Patient has a follow up psychiatry appointment with Dr. Marco Campo at [facility] in [place] on Thursday, July 13, 2017 at 9:40 am Patient has another appointment scheduled with Dr. Marco Campo on Tuesday, October 17, 2017 at 10:00 am.      [facility]  [facility] [address], [address] [place] [address]  [phone] / [phone] fax     Patient has a therapy appointment scheduled with Krystyna PALACIOS at [facility] in [place] on Monday, July 17, 2017 at 9:00 am. It is important that you keep this appointment as you have missed the last couple of scheduled appointments with her.      [facility] ([facility])  [facility] [address] and [address]rd [place] [address]  [phone] / [phone] fax     Patient encouraged to continue working with his mental health  through [facility], [facility]. He can be reached at [phone].      Patient is returning to his group home through [facility].  Patient's  at the group home is Neela. She can be reached at [phone] or [phone].        Attestation:   Patient has been seen and evaluated by me, John Zhu MD.    Patient ID:  Name: Kamini Neal  MRN: [id number]  Admission: 7/5/2017   YOB: 1990

## 2017-08-17 ENCOUNTER — TELEPHONE (OUTPATIENT)
Dept: BEHAVIORAL HEALTH | Facility: CLINIC | Age: 27
End: 2017-08-17

## 2017-08-25 ENCOUNTER — HOSPITAL ENCOUNTER (EMERGENCY)
Facility: CLINIC | Age: 27
Discharge: HOME | End: 2017-08-25
Attending: EMERGENCY MEDICINE | Admitting: EMERGENCY MEDICINE
Payer: COMMERCIAL

## 2017-08-25 VITALS
HEIGHT: 70 IN | WEIGHT: 210 LBS | DIASTOLIC BLOOD PRESSURE: 94 MMHG | BODY MASS INDEX: 30.06 KG/M2 | SYSTOLIC BLOOD PRESSURE: 123 MMHG | TEMPERATURE: 98.8 F | OXYGEN SATURATION: 98 % | RESPIRATION RATE: 16 BRPM | HEART RATE: 64 BPM

## 2017-08-25 DIAGNOSIS — R45.851 SUICIDAL IDEATION: ICD-10-CM

## 2017-08-25 LAB
AMPHETAMINES UR QL SCN: NEGATIVE
BARBITURATES UR QL: NEGATIVE
BENZODIAZ UR QL: NEGATIVE
CANNABINOIDS UR QL SCN: NEGATIVE
COCAINE UR QL: NEGATIVE
OPIATES UR QL SCN: NEGATIVE
PCP UR QL SCN: NEGATIVE

## 2017-08-25 PROCEDURE — 99285 EMERGENCY DEPT VISIT HI MDM: CPT | Mod: 25

## 2017-08-25 PROCEDURE — 80307 DRUG TEST PRSMV CHEM ANLYZR: CPT | Performed by: EMERGENCY MEDICINE

## 2017-08-25 PROCEDURE — 90791 PSYCH DIAGNOSTIC EVALUATION: CPT

## 2017-08-25 ASSESSMENT — ENCOUNTER SYMPTOMS: AGITATION: 0

## 2017-08-25 NOTE — ED AVS SNAPSHOT
Emergency Department    [address] [place] [address]    Phone:  [phone]    Fax:  [phone]                                       Kamini Neal   MRN: [id number]    Department:   Emergency Department   Date of Visit:  8/25/2017           After Visit Summary Signature Page     I have received my discharge instructions, and my questions have been answered. I have discussed any challenges I see with this plan with the nurse or doctor.    ..........................................................................................................................................  Patient/Patient Representative Signature      ..........................................................................................................................................  Patient Representative Print Name and Relationship to Patient    ..................................................               ................................................  Date                                            Time    ..........................................................................................................................................  Reviewed by Signature/Title    ...................................................              ..............................................  Date                                                            Time

## 2017-08-25 NOTE — ED NOTES
Staff from [facility], N[facility] Pan, has arrived to pick pt up.  Pt still is cooperative and denies any feelings of SI or HI.  MD has ok'd pt for d/c.
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
## 2017-08-25 NOTE — ED AVS SNAPSHOT
Emergency Department    [address] [facility] [address]    Phone:  [phone]    Fax:  [phone]                                       Kamini Neal   MRN: [id number]    Department:   Emergency Department   Date of Visit:  8/25/2017           Patient Information     Date Of Birth          1990        Your diagnoses for this visit were:     Suicidal ideation suicidal threat, but no longer suicidal.       You were seen by Margaret Grey MD.      Follow-up Information     Follow up with Clinic, [facility].    Why:  next week    Contact information:    [address] [facility] [address] [phone]          Follow up with  Emergency Department.    Specialty:  EMERGENCY MEDICINE    Why:  As needed for any thoughts of harming yourself or anyone else.    Contact information:    [address] [facility] [address] [phone]      Discharge References/Attachments     SUICIDE, RECOGNIZING WARNING SIGNS IN YOURSELF (ENGLISH)      24 Hour Appointment Hotline       To make an appointment at any [facility], call [phone] ([phone]). If you don't have a family doctor or clinic, we will help you find one. [place] clinics are conveniently located to serve the needs of you and your family.             Review of your medicines      Our records show that you are taking the medicines listed below. If these are incorrect, please call your family doctor or clinic.        Dose / Directions Last dose taken    * ARIPiprazole 30 MG tablet   Commonly known as:  ABILIFY   Dose:  30 mg   Quantity:  30 tablet        Take 1 tablet (30 mg) by mouth every morning   Refills:  0        * ARIPiprazole  MG extended release injection   Commonly known as:  ABILIFY MAINTENA   Dose:  400 mg   Quantity:  2 mL        Inject 2 mLs (400 mg) into the muscle every 30 days   Refills:  0        DOCUSATE SODIUM PO   Dose:  100 mg        Take 100 mg by mouth 2 times  daily   Refills:  0        LEVOTHYROXINE SODIUM PO   Dose:  112 mcg        Take 112 mcg by mouth daily noon   Refills:  0        MELATONIN PO   Dose:  1 mg        Take 1 mg by mouth At Bedtime   Refills:  0        OMEPRAZOLE PO   Dose:  20 mg        Take 20 mg by mouth every morning   Refills:  0        PRAZOSIN HCL PO   Dose:  5 mg        Take 5 mg by mouth At Bedtime   Refills:  0        QUEtiapine 50 MG tablet   Commonly known as:  SEROquel   Dose:  50 mg   Quantity:  60 tablet        Take 1 tablet (50 mg) by mouth 2 times daily as needed (agitation)   Refills:  0        * TRIHEXYPHENIDYL HCL PO   Dose:  5 mg        Take 5 mg by mouth every morning   Refills:  0        * TRIHEXYPHENIDYL HCL PO   Dose:  10 mg        Take 10 mg by mouth daily (with dinner)   Refills:  0        venlafaxine 75 MG Tb24 24 hr tablet   Commonly known as:  EFFEXOR-ER   Dose:  225 mg        Take 225 mg by mouth daily (with breakfast)   Refills:  0        ZYPREXA PO   Dose:  2.5-5 mg        Take 2.5-5 mg by mouth daily as needed for agitation   Refills:  0        * Notice:  This list has 4 medication(s) that are the same as other medications prescribed for you. Read the directions carefully, and ask your doctor or other care provider to review them with you.            Procedures and tests performed during your visit     Drug abuse screen 77 urine ([place],RH,SH)      Orders Needing Specimen Collection     None      Pending Results     No orders found from 8/23/2017 to 8/26/2017.            Pending Culture Results     No orders found from 8/23/2017 to 8/26/2017.            Pending Results Instructions     If you had any lab results that were not finalized at the time of your Discharge, you can call the ED Lab Result RN at [phone]. You will be contacted by this team for any positive Lab results or changes in treatment. The nurses are available 7 days a week from 10A to 6:30P.  You can leave a message 24 hours per day and they will return  your call.        Test Results From Your Hospital Stay        8/25/2017  4:25 PM      Component Results     Component Value Ref Range & Units Status    Amphetamine Qual Urine Negative NEG^Negative Final    Cutoff for a negative amphetamine is 500 ng/mL or less.    Barbiturates Qual Urine Negative NEG^Negative Final    Cutoff for a negative barbiturate is 200 ng/mL or less.    Benzodiazepine Qual Urine Negative NEG^Negative Final    Cutoff for a negative benzodiazepine is 200 ng/mL or less.    Cannabinoids Qual Urine Negative NEG^Negative Final    Cutoff for a negative cannabinoid is 50 ng/mL or less.    Cocaine Qual Urine Negative NEG^Negative Final    Cutoff for a negative cocaine is 300 ng/mL or less.    Opiates Qualitative Urine Negative NEG^Negative Final    Cutoff for a negative opiate is 300 ng/mL or less.    PCP Qual Urine Negative NEG^Negative Final    Cutoff for a negative PCP is 25 ng/mL or less.                Clinical Quality Measure: Blood Pressure Screening     Your blood pressure was checked while you were in the emergency department today. The last reading we obtained was  BP: (!) 123/94 . Please read the guidelines below about what these numbers mean and what you should do about them.  If your systolic blood pressure (the top number) is less than 120 and your diastolic blood pressure (the bottom number) is less than 80, then your blood pressure is normal. There is nothing more that you need to do about it.  If your systolic blood pressure (the top number) is 120-139 or your diastolic blood pressure (the bottom number) is 80-89, your blood pressure may be higher than it should be. You should have your blood pressure rechecked within a year by a primary care provider.  If your systolic blood pressure (the top number) is 140 or greater or your diastolic blood pressure (the bottom number) is 90 or greater, you may have high blood pressure. High blood pressure is treatable, but if left untreated over  "time it can put you at risk for heart attack, stroke, or kidney failure. You should have your blood pressure rechecked by a primary care provider within the next 4 weeks.  If your provider in the emergency department today gave you specific instructions to follow-up with your doctor or provider even sooner than that, you should follow that instruction and not wait for up to 4 weeks for your follow-up visit.        Thank you for choosing [facility]       Thank you for choosing [facility] for your care. Our goal is always to provide you with excellent care. Hearing back from our patients is one way we can continue to improve our services. Please take a few minutes to complete the written survey that you may receive in the mail after you visit with us. Thank you!        [employer] Private Information     [employer] lets you send messages to your doctor, view your test results, renew your prescriptions, schedule appointments and more. To sign up, go to [email or web address] . Click on \"Log in\" on the left side of the screen, which will take you to the Welcome page. Then click on \"Sign up Now\" on the right side of the page.     You will be asked to enter the access code listed below, as well as some personal information. Please follow the directions to create your username and password.     Your access code is: [email or web address]  Expires: 10/10/2017 12:48 PM     Your access code will  in 90 days. If you need help or a new code, please call your [facility] clinic or [phone].        Care EveryWhere ID     This is your Care EveryWhere ID. This could be used by other organizations to access your [facility] medical records  [id number]        Equal Access to Services     [facility]: Hadii melonie Cedeño, waaxda simran, qaybta christiano rodriguez. So [facility] [phone].    ATENCIÓN: Si habla español, tiene a reyes disposición servicios gratuitos de asistencia lingüística. Llame al " [phone].    We comply with applicable federal civil rights laws and [place] laws. We do not discriminate on the basis of race, color, national origin, age, disability sex, sexual orientation or gender identity.            After Visit Summary       This is your record. Keep this with you and show to your community pharmacist(s) and doctor(s) at your next visit.

## 2017-08-25 NOTE — ED NOTES
Bed: [facility]  Expected date: 8/25/17  Expected time: 3:18 PM  Means of arrival: Ambulance  Comments:  418 27M group home patient suicidal

## 2017-09-14 ENCOUNTER — HOSPITAL ENCOUNTER (INPATIENT)
Facility: CLINIC | Age: 27
LOS: 20 days | Discharge: NURSING, REHAB OR LONG-TERM CARE | DRG: 885 | End: 2017-10-04
Attending: PSYCHIATRY & NEUROLOGY | Admitting: PSYCHIATRY & NEUROLOGY
Payer: COMMERCIAL

## 2017-09-14 DIAGNOSIS — E55.9 VITAMIN D DEFICIENCY: ICD-10-CM

## 2017-09-14 DIAGNOSIS — F51.01 PRIMARY INSOMNIA: ICD-10-CM

## 2017-09-14 DIAGNOSIS — E03.9 ACQUIRED HYPOTHYROIDISM: ICD-10-CM

## 2017-09-14 DIAGNOSIS — K59.03 DRUG-INDUCED CONSTIPATION: ICD-10-CM

## 2017-09-14 DIAGNOSIS — F43.10 PTSD (POST-TRAUMATIC STRESS DISORDER): ICD-10-CM

## 2017-09-14 DIAGNOSIS — F10.10 ALCOHOL ABUSE, EPISODIC DRINKING BEHAVIOR: ICD-10-CM

## 2017-09-14 DIAGNOSIS — K21.9 GASTROESOPHAGEAL REFLUX DISEASE, ESOPHAGITIS PRESENCE NOT SPECIFIED: ICD-10-CM

## 2017-09-14 DIAGNOSIS — R45.851 SUICIDAL IDEATION: ICD-10-CM

## 2017-09-14 DIAGNOSIS — F60.3 BORDERLINE PERSONALITY DISORDER (H): ICD-10-CM

## 2017-09-14 DIAGNOSIS — F25.0 SCHIZOAFFECTIVE DISORDER, BIPOLAR TYPE (H): Primary | ICD-10-CM

## 2017-09-14 DIAGNOSIS — F25.9 SCHIZOAFFECTIVE DISORDER, UNSPECIFIED TYPE (H): ICD-10-CM

## 2017-09-14 PROCEDURE — 99285 EMERGENCY DEPT VISIT HI MDM: CPT | Performed by: PSYCHIATRY & NEUROLOGY

## 2017-09-14 PROCEDURE — 99285 EMERGENCY DEPT VISIT HI MDM: CPT | Mod: Z6 | Performed by: PSYCHIATRY & NEUROLOGY

## 2017-09-14 PROCEDURE — 90791 PSYCH DIAGNOSTIC EVALUATION: CPT

## 2017-09-14 PROCEDURE — 25000132 ZZH RX MED GY IP 250 OP 250 PS 637: Performed by: PSYCHIATRY & NEUROLOGY

## 2017-09-14 PROCEDURE — 12400001 ZZH R&B MH UMMC

## 2017-09-14 PROCEDURE — 25000132 ZZH RX MED GY IP 250 OP 250 PS 637: Performed by: STUDENT IN AN ORGANIZED HEALTH CARE EDUCATION/TRAINING PROGRAM

## 2017-09-14 RX ORDER — LANOLIN ALCOHOL/MO/W.PET/CERES
6 CREAM (GRAM) TOPICAL AT BEDTIME
Status: DISCONTINUED | OUTPATIENT
Start: 2017-09-14 | End: 2017-10-04 | Stop reason: HOSPADM

## 2017-09-14 RX ORDER — DIAZEPAM 5 MG
5-20 TABLET ORAL EVERY 30 MIN PRN
Status: DISCONTINUED | OUTPATIENT
Start: 2017-09-14 | End: 2017-09-18

## 2017-09-14 RX ORDER — IBUPROFEN 600 MG/1
600 TABLET, FILM COATED ORAL ONCE
Status: COMPLETED | OUTPATIENT
Start: 2017-09-14 | End: 2017-09-14

## 2017-09-14 RX ORDER — LEVOTHYROXINE SODIUM 112 UG/1
112 TABLET ORAL DAILY
Status: DISCONTINUED | OUTPATIENT
Start: 2017-09-15 | End: 2017-10-04 | Stop reason: HOSPADM

## 2017-09-14 RX ORDER — OLANZAPINE 10 MG/2ML
10 INJECTION, POWDER, FOR SOLUTION INTRAMUSCULAR
Status: DISCONTINUED | OUTPATIENT
Start: 2017-09-14 | End: 2017-09-15

## 2017-09-14 RX ORDER — VENLAFAXINE HYDROCHLORIDE 225 MG/1
225 TABLET, EXTENDED RELEASE ORAL
Status: DISCONTINUED | OUTPATIENT
Start: 2017-09-15 | End: 2017-10-04 | Stop reason: HOSPADM

## 2017-09-14 RX ORDER — PANTOPRAZOLE SODIUM 20 MG/1
20 TABLET, DELAYED RELEASE ORAL DAILY
Status: DISCONTINUED | OUTPATIENT
Start: 2017-09-15 | End: 2017-10-04 | Stop reason: HOSPADM

## 2017-09-14 RX ORDER — OLANZAPINE 5 MG/1
10 TABLET, ORALLY DISINTEGRATING ORAL ONCE
Status: COMPLETED | OUTPATIENT
Start: 2017-09-14 | End: 2017-09-14

## 2017-09-14 RX ORDER — ACETAMINOPHEN 325 MG/1
650 TABLET ORAL EVERY 4 HOURS PRN
Status: DISCONTINUED | OUTPATIENT
Start: 2017-09-14 | End: 2017-10-04 | Stop reason: HOSPADM

## 2017-09-14 RX ORDER — OLANZAPINE 10 MG/1
10 TABLET ORAL
Status: DISCONTINUED | OUTPATIENT
Start: 2017-09-14 | End: 2017-09-15

## 2017-09-14 RX ORDER — CLONAZEPAM 1 MG/1
1 TABLET ORAL EVERY 6 HOURS PRN
Status: CANCELLED | OUTPATIENT
Start: 2017-09-14

## 2017-09-14 RX ORDER — HYDROXYZINE HYDROCHLORIDE 25 MG/1
25-50 TABLET, FILM COATED ORAL EVERY 4 HOURS PRN
Status: DISCONTINUED | OUTPATIENT
Start: 2017-09-14 | End: 2017-09-29

## 2017-09-14 RX ORDER — PRAZOSIN HYDROCHLORIDE 2 MG/1
2 CAPSULE ORAL AT BEDTIME
Status: DISCONTINUED | OUTPATIENT
Start: 2017-09-14 | End: 2017-09-16 | Stop reason: DRUGHIGH

## 2017-09-14 RX ADMIN — OLANZAPINE 10 MG: 5 TABLET, ORALLY DISINTEGRATING ORAL at 16:49

## 2017-09-14 RX ADMIN — HYDROXYZINE HYDROCHLORIDE 50 MG: 25 TABLET ORAL at 22:52

## 2017-09-14 RX ADMIN — PRAZOSIN HYDROCHLORIDE 2 MG: 2 CAPSULE ORAL at 22:52

## 2017-09-14 RX ADMIN — IBUPROFEN 600 MG: 600 TABLET ORAL at 17:38

## 2017-09-14 RX ADMIN — MELATONIN TAB 3 MG 6 MG: 3 TAB at 22:52

## 2017-09-14 RX ADMIN — OLANZAPINE 10 MG: 10 TABLET, FILM COATED ORAL at 22:53

## 2017-09-14 ASSESSMENT — ENCOUNTER SYMPTOMS
SHORTNESS OF BREATH: 0
HALLUCINATIONS: 1
NERVOUS/ANXIOUS: 0
ABDOMINAL PAIN: 0
FEVER: 0
DYSPHORIC MOOD: 1
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
## 2017-09-14 NOTE — IP AVS SNAPSHOT
MRN:[id number]                      After Visit Summary   9/14/2017    Kamini Neal    MRN: [id number]           Thank you!     Thank you for choosing [facility] for your care. Our goal is always to provide you with excellent care.        Patient Information     Date Of Birth          1990        About your hospital stay     You were admitted on:  September 14, 2017 You last received care in the:  [facility] 20[facility]    You were discharged on:  October 4, 2017       Who to Call     For medical emergencies, please call 911.  For non-urgent questions about your medical care, please call your primary care provider or clinic, [phone]          Attending Provider     Provider Specialty    Cornelius Rodriguez MD Emergency Medicine    Wiggins, Lars Pandya MD Psychiatry    Mendez, Gisell JACKSON MD Psychiatry    Anne, Antoni TABOR MD Psychiatry       Primary Care Provider Office Phone # Fax #    [facility] [phone]      Your next 10 appointments already scheduled     Oct 05, 2017 11:00 AM CDT   Return Visit with [facility]   [facility] ([facility])    [address] [place] [address]   [phone]            Oct 06, 2017 11:00 AM CDT   Return Visit with [facility]   [facility] ([facility])    [address] [place] [address]   [phone]            Oct 09, 2017 11:00 AM CDT   Return Visit with [facility]   [facility] ([facility])    [address] [place] [address]   [phone]            Oct 10, 2017 11:00 AM CDT   Return Visit with [facility]   [facility] ([facility])    [facility] [address] [address]   [phone]            Oct 11, 2017  11:00 AM CDT   Return Visit with [facility]   [facility] ([facility])    [address] [place] [address]   [phone]            Oct 12, 2017 11:00 AM CDT   Return Visit with [facility]   [facility] ([facility])    [address] [place] [address]   [phone]            Oct 13, 2017 11:00 AM CDT   Return Visit with [facility]   [facility] ([facility])    [address] [place] [address]   [phone]            Oct 16, 2017 11:00 AM CDT   Return Visit with [facility]   [facility] ([facility])    [address] [place] [address]   [phone]            Oct 17, 2017 11:00 AM CDT   Return Visit with [facility]   [facility] ([facility])    [address] [place] [address]   [phone]            Oct 18, 2017 11:00 AM CDT   Return Visit with [facility]   [facility] ([facility])    [address] [place] [address]   [phone]              Further instructions from your care team       Behavioral Discharge Planning and Instructions      Summary:  You were admitted to Station 20 on 9-14-17 under the care of Dr Rodríguez.  You were experiencing voices telling you to harm yourself.   You transferred to Station 22 on 9-19-17 and then to Station 12 on 9-21-17, where you were under the care of Dr Wiggins.   On 9-26-17, you were transferred back to Station 20 because you were doing well.   You met with Dr. Rodríguez and his team daily for ongoing psychiatric assessment and medication management.  You had opportunities to  participate in therapeutic groups on the unit.   At this time you report your mood has stabilized and you report you are not having thoughts or intent to harm yourself or others.   You will be discharged home and will resume care with your outpatient providers.    Disposition: [facility].    Diagnoses:   Schizoaffective Disorder, Bipolar Type.     PTSD.       Outpatient Mental Health Follow-Up:   Appointment: [facility] Blood [facility]: They will contact group home with date of next blood draw)  [phone]    Appointment: MI/CD Day Treatment Program: 10/6/17 at 10:45amam ( schedule is Mon- Fri 11:00-2:00pm)  [address] [place] [address]  [phone]    Appointment: Psychiatry: Dr. Marco Campo: 11/2/17 at 1:40pm  [facility] [address], \[facility]\""  Phone:  [phone]      Fax:   [phone]  If you need to be seen prior to this appointment, walk in times are every Wednesday between 8:30am and 10am.          Your [facility]  is Peter Marr at [phone].  [facility] at [phone]     Major Treatments, Procedures and Findings:   Medications were  managed throughout your stay. An internal medicine consult was completed during your stay. You had the opportunity to participate in treatment programming while on the unit including occupational therapy, mental health support and education and spiritual services.     Symptoms to Report:   Please report if you are experiencing increased aggression and/or confusion, problematic loss of sleep, worsening mood, or thoughts of suicide to your treatment team or notify your primary provider.   IF THE SYMPTOMS YOU ARE EXPERIENCING ARE A MEDICAL EMERGENCY, CALL 911 IMMEDIATELY    Early warning signs can include:   Increased depression or anxiety sleep disturbances increased thoughts or behaviors of suicide or self-harm   "increased unusual thinking, such as paranoia or hearing voices    Safety and Wellness:    Take all medicines as directed.  Make no changes unless your doctor suggests them.      Follow treatment recommendations.  Refrain from alcohol and non-prescribed drugs.  If there is a concern for safety, call 911.    Resources:   *[facility] Crisis: COPE: (262.584.1975) 24 hour mobile crisis support for people having a mental health crisis in [facility].   *Acute Psychiatric Services ([phone]). 24-hour walk-in crisis psychiatric support at [facility]; Emergency Medications Clinic available 7:30am - 2:00pm  *Crisis Connection: ([phone])  24-hour confidential telephone counseling   *[facility] Emergency Room: [phone]  *[place] [facility] ([facility]) : [facility] connects people seeking recovery to resources that help foster and sustain long-term recovery. Whether you are seeking resources for treatment, transportation, housing, job training, education, health or other pathways to recovery, [facility] is a great place to start.    [phone]      [email or web address]     The treatment team has appreciated the opportunity to work with you.  We wish you the best in the future.    If you have any questions or concerns our unit number is [phone].               Pending Results     Date and Time Order Name Status Description    10/4/2017 0030 WBC and differential In process             Admission Information     Date & Time Provider Department Dept. Phone    9/14/2017 Antoni Rodríguez MD  20NB [phone]      Your Vitals Were     Blood Pressure Pulse Temperature Respirations Height Weight    127/91 84 98.3  F (36.8  C) (Oral) 16 1.778 m (5' 10\") 99.8 kg (220 lb)    Pulse Oximetry BMI (Body Mass Index)                96% 31.57 kg/m2          MyChart Information     [employer] lets you send messages to your doctor, view your test results, renew your " "prescriptions, schedule appointments and more. To sign up, go to [email or web address] . Click on \"Log in\" on the left side of the screen, which will take you to the Welcome page. Then click on \"Sign up Now\" on the right side of the page.     You will be asked to enter the access code listed below, as well as some personal information. Please follow the directions to create your username and password.     Your access code is: [email or web address]  Expires: 10/10/2017 12:48 PM     Your access code will  in 90 days. If you need help or a new code, please call your [facility] clinic or [phone].        Care EveryWhere ID     This is your Care EveryWhere ID. This could be used by other organizations to access your [facility] medical records  [id number]        Equal Access to Services     [facility] : Michael Cedeño, kalpesh khalil, naeem echeverria, christiano edwards . So [facility] [phone].    ATENCIÓN: Si habla español, tiene a reyes disposición servicios gratuitos de asistencia lingüística. Sheila al [phone].    We comply with applicable federal civil rights laws and [place] laws. We do not discriminate on the basis of race, color, national origin, age, disability, sex, sexual orientation, or gender identity.               Review of your medicines      START taking        Dose / Directions    benztropine 1 MG tablet   Commonly known as:  COGENTIN        Dose:  1 mg   Take 1 tablet (1 mg) by mouth 2 times daily   Quantity:  60 tablet   Refills:  0       cloZAPine 100 MG tablet   Commonly known as:  CLOZARIL   Used for:  Schizoaffective disorder, unspecified type (H)        Dose:  300 mg   Take 3 tablets (300 mg) by mouth At Bedtime   Quantity:  21 tablet   Refills:  0       paliperidone 117 MG/0.75ML Susp   Commonly known as:  INVEGA SUSTENNA   Used for:  Schizoaffective disorder, unspecified type (H)        Dose:  117 mg   Inject 0.75 mLs (117 mg) into the muscle " once for 1 dose   Quantity:  0.75 mL   Refills:  0       * polyethylene glycol Packet   Commonly known as:  MIRALAX/GLYCOLAX   Used for:  Drug-induced constipation        Dose:  34 g   Take 34 g by mouth daily   Quantity:  50 packet   Refills:  0       * polyethylene glycol Packet   Commonly known as:  MIRALAX/GLYCOLAX   Used for:  Drug-induced constipation        Dose:  17 g   Take 17 g by mouth daily as needed for constipation   Quantity:  7 packet   Refills:  0       * Notice:  This list has 2 medication(s) that are the same as other medications prescribed for you. Read the directions carefully, and ask your doctor or other care provider to review them with you.      CONTINUE these medicines which may have CHANGED, or have new prescriptions. If we are uncertain of the size of tablets/capsules you have at home, strength may be listed as something that might have changed.        Dose / Directions    prazosin 1 MG capsule   Commonly known as:  MINIPRESS   This may have changed:    - how much to take  - additional instructions   Used for:  PTSD (post-traumatic stress disorder)        Dose:  3 mg   Take 3 capsules (3 mg) by mouth At Bedtime This product only available from a Compounding Pharmacy.   Quantity:  90 capsule   Refills:  0       * ZYPREXA PO   This may have changed:  Another medication with the same name was added. Make sure you understand how and when to take each.        Dose:  2.5-5 mg   Take 2.5-5 mg by mouth daily as needed for agitation   Refills:  0       * OLANZapine 10 MG tablet   Commonly known as:  zyPREXA   This may have changed:  You were already taking a medication with the same name, and this prescription was added. Make sure you understand how and when to take each.   Used for:  Schizoaffective disorder, unspecified type (H)        Dose:  10 mg   Take 1 tablet (10 mg) by mouth 3 times daily   Quantity:  90 tablet   Refills:  0       * Notice:  This list has 2 medication(s) that are the same  as other medications prescribed for you. Read the directions carefully, and ask your doctor or other care provider to review them with you.      CONTINUE these medicines which have NOT CHANGED        Dose / Directions    LEVOTHYROXINE SODIUM PO        Dose:  112 mcg   Take 112 mcg by mouth daily noon   Refills:  0       MELATONIN PO        Dose:  6 mg   Take 6 mg by mouth At Bedtime   Refills:  0       PANTOPRAZOLE SODIUM PO        Dose:  20 mg   Take 20 mg by mouth   Refills:  0       venlafaxine 75 MG Tb24 24 hr tablet   Commonly known as:  EFFEXOR-ER        Dose:  225 mg   Take 225 mg by mouth daily (with breakfast)   Refills:  0       VITAMIN D (CHOLECALCIFEROL) PO        Dose:  1000 Units   Take 1,000 Units by mouth daily   Refills:  0         STOP taking     ARIPiprazole  MG extended release injection   Commonly known as:  ABILIFY MAINTENA           CLONAZEPAM PO           DOCUSATE SODIUM PO           INVEGA PO           OMEPRAZOLE PO           QUEtiapine 50 MG tablet   Commonly known as:  SEROquel           TRIHEXYPHENIDYL HCL PO                Where to get your medicines      These medications were sent to [address]  [address] [facility] [address], [facility] [address]     Phone:  [phone]     benztropine 1 MG tablet    cloZAPine 100 MG tablet    OLANZapine 10 MG tablet    polyethylene glycol Packet    prazosin 1 MG capsule         Some of these will need a paper prescription and others can be bought over the counter. Ask your nurse if you have questions.     You don't need a prescription for these medications     paliperidone 117 MG/0.75ML Susp    polyethylene glycol Packet                Protect others around you: Learn how to safely use, store and throw away your medicines at www.disposemymeds.org.             Medication List: This is a list of all your medications and when to take them. Check marks below indicate your daily home  schedule. Keep this list as a reference.      Medications           Morning Afternoon Evening Bedtime As Needed    benztropine 1 MG tablet   Commonly known as:  COGENTIN   Take 1 tablet (1 mg) by mouth 2 times daily   Last time this was given:  1 mg on 10/4/2017  8:14 AM                                cloZAPine 100 MG tablet   Commonly known as:  CLOZARIL   Take 3 tablets (300 mg) by mouth At Bedtime   Last time this was given:  300 mg on 10/3/2017  8:26 PM                                LEVOTHYROXINE SODIUM PO   Take 112 mcg by mouth daily noon   Last time this was given:  112 mcg on 10/3/2017  1:19 PM                                MELATONIN PO   Take 6 mg by mouth At Bedtime   Last time this was given:  6 mg on 10/3/2017  8:26 PM                                paliperidone 117 MG/0.75ML Susp   Commonly known as:  INVEGA SUSTENNA   Inject 0.75 mLs (117 mg) into the muscle once for 1 dose                                PANTOPRAZOLE SODIUM PO   Take 20 mg by mouth   Last time this was given:  20 mg on 10/4/2017  8:14 AM                                * polyethylene glycol Packet   Commonly known as:  MIRALAX/GLYCOLAX   Take 34 g by mouth daily   Last time this was given:  34 g on 10/4/2017  8:14 AM                                * polyethylene glycol Packet   Commonly known as:  MIRALAX/GLYCOLAX   Take 17 g by mouth daily as needed for constipation   Last time this was given:  34 g on 10/4/2017  8:14 AM                                prazosin 1 MG capsule   Commonly known as:  MINIPRESS   Take 3 capsules (3 mg) by mouth At Bedtime This product only available from a Compounding Pharmacy.   Last time this was given:  3 mg on 10/3/2017  8:26 PM                                venlafaxine 75 MG Tb24 24 hr tablet   Commonly known as:  EFFEXOR-ER   Take 225 mg by mouth daily (with breakfast)   Last time this was given:  225 mg on 10/4/2017  8:14 AM                                VITAMIN D (CHOLECALCIFEROL) PO   Take 1,000  Units by mouth daily   Last time this was given:  1,000 Units on 10/3/2017  3:52 PM                                * ZYPREXA PO   Take 2.5-5 mg by mouth daily as needed for agitation   Last time this was given:  10 mg on 10/4/2017  8:14 AM                                * OLANZapine 10 MG tablet   Commonly known as:  zyPREXA   Take 1 tablet (10 mg) by mouth 3 times daily   Last time this was given:  10 mg on 10/4/2017  8:14 AM                                * Notice:  This list has 4 medication(s) that are the same as other medications prescribed for you. Read the directions carefully, and ask your doctor or other care provider to review them with you.

## 2017-09-14 NOTE — ED NOTES
Bed: ED16  Expected date:   Expected time:   Means of arrival:   Comments:  Daniel 414    26 yo M  SI

## 2017-09-14 NOTE — ED NOTES
"Pt up in room banging forehead lightly on wall. Asked pt what he needed, he said, \"I need to get out of here.\" Able to redirect pt back to chair. Will continue to monitor.  "

## 2017-09-14 NOTE — IP AVS SNAPSHOT
[address] [address]    Phone:  [phone]                                       After Visit Summary   9/14/2017    Kamini Neal    MRN: [id number]           After Visit Summary Signature Page     I have received my discharge instructions, and my questions have been answered. I have discussed any challenges I see with this plan with the nurse or doctor.    ..........................................................................................................................................  Patient/Patient Representative Signature      ..........................................................................................................................................  Patient Representative Print Name and Relationship to Patient    ..................................................               ................................................  Date                                            Time    ..........................................................................................................................................  Reviewed by Signature/Title    ...................................................              ..............................................  Date                                                            Time

## 2017-09-14 NOTE — ED NOTES
"Patient uses Khat about once per month. Last use last evening. Patient states \"I need it, it helps me. I have no family. I have nothing to live for\". Patient lives in a Group Home.   "

## 2017-09-15 ENCOUNTER — APPOINTMENT (OUTPATIENT)
Dept: GENERAL RADIOLOGY | Facility: CLINIC | Age: 27
DRG: 885 | End: 2017-09-15
Attending: PHYSICIAN ASSISTANT
Payer: COMMERCIAL

## 2017-09-15 LAB
ANION GAP SERPL CALCULATED.3IONS-SCNC: 7 MMOL/L (ref 3–14)
BASOPHILS # BLD AUTO: 0 10E9/L (ref 0–0.2)
BASOPHILS NFR BLD AUTO: 0.1 %
BUN SERPL-MCNC: 14 MG/DL (ref 7–30)
CALCIUM SERPL-MCNC: 8.5 MG/DL (ref 8.5–10.1)
CHLORIDE SERPL-SCNC: 111 MMOL/L (ref 94–109)
CHOLEST SERPL-MCNC: 128 MG/DL
CO2 SERPL-SCNC: 25 MMOL/L (ref 20–32)
CREAT SERPL-MCNC: 0.76 MG/DL (ref 0.66–1.25)
DIFFERENTIAL METHOD BLD: NORMAL
EOSINOPHIL # BLD AUTO: 0 10E9/L (ref 0–0.7)
EOSINOPHIL NFR BLD AUTO: 0.1 %
ERYTHROCYTE [DISTWIDTH] IN BLOOD BY AUTOMATED COUNT: 13 % (ref 10–15)
FOLATE SERPL-MCNC: 10.6 NG/ML
GFR SERPL CREATININE-BSD FRML MDRD: >90 ML/MIN/1.7M2
GLUCOSE SERPL-MCNC: 80 MG/DL (ref 70–99)
HCT VFR BLD AUTO: 41.9 % (ref 40–53)
HDLC SERPL-MCNC: 38 MG/DL
HGB BLD-MCNC: 14.2 G/DL (ref 13.3–17.7)
IMM GRANULOCYTES # BLD: 0.1 10E9/L (ref 0–0.4)
IMM GRANULOCYTES NFR BLD: 0.6 %
LDLC SERPL CALC-MCNC: 69 MG/DL
LYMPHOCYTES # BLD AUTO: 2.2 10E9/L (ref 0.8–5.3)
LYMPHOCYTES NFR BLD AUTO: 26.7 %
MCH RBC QN AUTO: 32.1 PG (ref 26.5–33)
MCHC RBC AUTO-ENTMCNC: 33.9 G/DL (ref 31.5–36.5)
MCV RBC AUTO: 95 FL (ref 78–100)
MONOCYTES # BLD AUTO: 0.7 10E9/L (ref 0–1.3)
MONOCYTES NFR BLD AUTO: 8.4 %
NEUTROPHILS # BLD AUTO: 5.2 10E9/L (ref 1.6–8.3)
NEUTROPHILS NFR BLD AUTO: 64.1 %
NONHDLC SERPL-MCNC: 90 MG/DL
NRBC # BLD AUTO: 0 10*3/UL
NRBC BLD AUTO-RTO: 0 /100
PLATELET # BLD AUTO: 230 10E9/L (ref 150–450)
POTASSIUM SERPL-SCNC: 3.8 MMOL/L (ref 3.4–5.3)
RBC # BLD AUTO: 4.43 10E12/L (ref 4.4–5.9)
SODIUM SERPL-SCNC: 143 MMOL/L (ref 133–144)
TRIGL SERPL-MCNC: 103 MG/DL
TSH SERPL DL<=0.005 MIU/L-ACNC: 1.2 MU/L (ref 0.4–4)
VIT B12 SERPL-MCNC: 273 PG/ML (ref 193–986)
WBC # BLD AUTO: 8.1 10E9/L (ref 4–11)

## 2017-09-15 PROCEDURE — 85025 COMPLETE CBC W/AUTO DIFF WBC: CPT | Performed by: STUDENT IN AN ORGANIZED HEALTH CARE EDUCATION/TRAINING PROGRAM

## 2017-09-15 PROCEDURE — 73030 X-RAY EXAM OF SHOULDER: CPT | Mod: RT

## 2017-09-15 PROCEDURE — 80061 LIPID PANEL: CPT | Performed by: STUDENT IN AN ORGANIZED HEALTH CARE EDUCATION/TRAINING PROGRAM

## 2017-09-15 PROCEDURE — 25000128 H RX IP 250 OP 636: Performed by: STUDENT IN AN ORGANIZED HEALTH CARE EDUCATION/TRAINING PROGRAM

## 2017-09-15 PROCEDURE — 99223 1ST HOSP IP/OBS HIGH 75: CPT | Mod: GC | Performed by: PSYCHIATRY & NEUROLOGY

## 2017-09-15 PROCEDURE — 84443 ASSAY THYROID STIM HORMONE: CPT | Performed by: STUDENT IN AN ORGANIZED HEALTH CARE EDUCATION/TRAINING PROGRAM

## 2017-09-15 PROCEDURE — 25000132 ZZH RX MED GY IP 250 OP 250 PS 637: Performed by: STUDENT IN AN ORGANIZED HEALTH CARE EDUCATION/TRAINING PROGRAM

## 2017-09-15 PROCEDURE — 80048 BASIC METABOLIC PNL TOTAL CA: CPT | Performed by: STUDENT IN AN ORGANIZED HEALTH CARE EDUCATION/TRAINING PROGRAM

## 2017-09-15 PROCEDURE — 12400007 ZZH R&B MH INTERMEDIATE UMMC

## 2017-09-15 PROCEDURE — 36415 COLL VENOUS BLD VENIPUNCTURE: CPT | Performed by: STUDENT IN AN ORGANIZED HEALTH CARE EDUCATION/TRAINING PROGRAM

## 2017-09-15 PROCEDURE — 82746 ASSAY OF FOLIC ACID SERUM: CPT | Performed by: STUDENT IN AN ORGANIZED HEALTH CARE EDUCATION/TRAINING PROGRAM

## 2017-09-15 PROCEDURE — 82607 VITAMIN B-12: CPT | Performed by: STUDENT IN AN ORGANIZED HEALTH CARE EDUCATION/TRAINING PROGRAM

## 2017-09-15 RX ORDER — OLANZAPINE 10 MG/2ML
5 INJECTION, POWDER, FOR SOLUTION INTRAMUSCULAR
Status: DISCONTINUED | OUTPATIENT
Start: 2017-09-15 | End: 2017-09-15

## 2017-09-15 RX ORDER — DIPHENHYDRAMINE HCL 25 MG
25-50 CAPSULE ORAL ONCE
Status: DISCONTINUED | OUTPATIENT
Start: 2017-09-15 | End: 2017-09-15

## 2017-09-15 RX ORDER — HALOPERIDOL 5 MG/1
5 TABLET ORAL
Status: DISPENSED | OUTPATIENT
Start: 2017-09-15 | End: 2017-09-15

## 2017-09-15 RX ORDER — HALOPERIDOL 5 MG/1
5 TABLET ORAL ONCE
Status: DISCONTINUED | OUTPATIENT
Start: 2017-09-15 | End: 2017-09-15

## 2017-09-15 RX ORDER — PALIPERIDONE 3 MG/1
3 TABLET, EXTENDED RELEASE ORAL EVERY 12 HOURS PRN
Status: DISCONTINUED | OUTPATIENT
Start: 2017-09-15 | End: 2017-09-15

## 2017-09-15 RX ORDER — HALOPERIDOL 5 MG/1
5 TABLET ORAL EVERY 4 HOURS PRN
Status: DISCONTINUED | OUTPATIENT
Start: 2017-09-15 | End: 2017-09-16

## 2017-09-15 RX ORDER — HALOPERIDOL 5 MG/ML
5 INJECTION INTRAMUSCULAR
Status: COMPLETED | OUTPATIENT
Start: 2017-09-16 | End: 2017-09-15

## 2017-09-15 RX ORDER — HALOPERIDOL 5 MG/ML
INJECTION INTRAMUSCULAR
Status: DISCONTINUED
Start: 2017-09-15 | End: 2017-09-15 | Stop reason: HOSPADM

## 2017-09-15 RX ORDER — PALIPERIDONE 3 MG/1
3 TABLET, EXTENDED RELEASE ORAL EVERY 6 HOURS PRN
Status: DISCONTINUED | OUTPATIENT
Start: 2017-09-15 | End: 2017-09-15

## 2017-09-15 RX ORDER — DIPHENHYDRAMINE HCL 25 MG
25-50 CAPSULE ORAL
Status: COMPLETED | OUTPATIENT
Start: 2017-09-15 | End: 2017-09-15

## 2017-09-15 RX ORDER — OLANZAPINE 10 MG/2ML
5 INJECTION, POWDER, FOR SOLUTION INTRAMUSCULAR
Status: DISCONTINUED | OUTPATIENT
Start: 2017-09-15 | End: 2017-09-16

## 2017-09-15 RX ORDER — HALOPERIDOL 5 MG/ML
5 INJECTION INTRAMUSCULAR ONCE
Status: DISCONTINUED | OUTPATIENT
Start: 2017-09-15 | End: 2017-09-15

## 2017-09-15 RX ORDER — DIPHENHYDRAMINE HYDROCHLORIDE 50 MG/ML
25-50 INJECTION INTRAMUSCULAR; INTRAVENOUS
Status: COMPLETED | OUTPATIENT
Start: 2017-09-15 | End: 2017-09-15

## 2017-09-15 RX ORDER — LORAZEPAM 2 MG/ML
1-2 INJECTION INTRAMUSCULAR ONCE
Status: DISCONTINUED | OUTPATIENT
Start: 2017-09-15 | End: 2017-09-18

## 2017-09-15 RX ORDER — DIPHENHYDRAMINE HYDROCHLORIDE 50 MG/ML
25-50 INJECTION INTRAMUSCULAR; INTRAVENOUS ONCE
Status: DISCONTINUED | OUTPATIENT
Start: 2017-09-15 | End: 2017-09-15

## 2017-09-15 RX ORDER — HALOPERIDOL 5 MG/1
5 TABLET ORAL 2 TIMES DAILY
Status: DISCONTINUED | OUTPATIENT
Start: 2017-09-15 | End: 2017-09-19

## 2017-09-15 RX ORDER — LORAZEPAM 1 MG/1
1-2 TABLET ORAL ONCE
Status: DISCONTINUED | OUTPATIENT
Start: 2017-09-15 | End: 2017-09-18

## 2017-09-15 RX ADMIN — HALOPERIDOL LACTATE 5 MG: 5 INJECTION, SOLUTION INTRAMUSCULAR at 11:20

## 2017-09-15 RX ADMIN — OLANZAPINE 10 MG: 10 TABLET, FILM COATED ORAL at 10:06

## 2017-09-15 RX ADMIN — VENLAFAXINE HYDROCHLORIDE 225 MG: 225 TABLET, FILM COATED, EXTENDED RELEASE ORAL at 10:06

## 2017-09-15 RX ADMIN — PANTOPRAZOLE SODIUM 20 MG: 20 TABLET, DELAYED RELEASE ORAL at 10:06

## 2017-09-15 RX ADMIN — HALOPERIDOL 5 MG: 5 TABLET ORAL at 11:20

## 2017-09-15 RX ADMIN — HALOPERIDOL 5 MG: 5 TABLET ORAL at 18:00

## 2017-09-15 RX ADMIN — DIPHENHYDRAMINE HYDROCHLORIDE 50 MG: 25 CAPSULE ORAL at 11:25

## 2017-09-15 ASSESSMENT — ACTIVITIES OF DAILY LIVING (ADL)
ORAL_HYGIENE: INDEPENDENT
DRESS: SCRUBS (BEHAVIORAL HEALTH)
LAUNDRY: WITH SUPERVISION
ORAL_HYGIENE: INDEPENDENT;PROMPTS
GROOMING: INDEPENDENT
GROOMING: INDEPENDENT
DRESS: INDEPENDENT
LAUNDRY: WITH SUPERVISION

## 2017-09-15 NOTE — PLAN OF CARE
Problem: Restraint/Seclusion for Violent Self-Destructive Behavior  Goal: Prevent/manage potential problems during restraint/seclusion  Maintain safety of patient and others during period of restraint/seclusion.  Promote psychological and physical wellbeing.  Prevent injury to skin and involved body parts.  Promote nutrition, hydration, and elimination.   PT will inform staff he is hearing voices to harm self and request prn medication before he has urge to bang his head.
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
## 2017-09-15 NOTE — PROGRESS NOTES
"Pt given prn zyprexa for c/o hearing voices at 10am, pt told staff he is feeling like \"killing himself\". Pt redirected out to [facility] and returned to his room and proceeded to bang his head on the wall, pt stated voices told him to do it. MD notified, pt placed on SIO for safety.   "

## 2017-09-15 NOTE — PROGRESS NOTES
In face to face, Kamini says his right shoulder hurts.  Dr. Nuñez was notified and he will ask internal medicine to see.

## 2017-09-15 NOTE — PROGRESS NOTES
Occupational Therapy Non Attendance: Pt has not been seen since admission in scheduled OT sessions. Encourage group attendance as able to identify treatment goals, identify MI sx and their impact on occupational performance, implement positive coping skills.   Pt will be given Self Assessment form, explain the purpose of including them in their treatment plan and offer options for meeting their needs.

## 2017-09-15 NOTE — PROGRESS NOTES
"Pt reports voices telling him to bang his head and \"kill himself\". Pt given prn medicine and redirection became more aggressive with two staff trying to stop him from banging his head.  "

## 2017-09-15 NOTE — PROGRESS NOTES
Pt psychotic reports voices telling him to bang his head became more aggressive toward staff and continued to bang his head on wall. Code 21 called and pt placed in 5 point restraints for his safety. IM medication given at this time. (see medsheet).

## 2017-09-15 NOTE — PROGRESS NOTES
PATIENT BELONGINGS:  Belt, cell phone, shoes, headphones, cigarettes, denia lotion, old spice deodorant and body wash, treseme shampoo, 3 white t-shirts, 1 black t-shirt, 1 gray t-shirt, 1 blue polo shirt, 1 pair black pants, 1 pair tan pants, 1 pair blue stripped socks, 1 green quran.      BELONGINGS SENT TO SECURITY:  None      ADMISSION:  I am responsible for any personal items that are not sent to the safe or pharmacy. [facility] is not responsible for loss, theft or damage of any property in my possession.    Patient Signature _____________________ Date/Time _____________________    Staff Signature _______________________ Date/Time _____________________    2nd Staff person, if patient is unable/unwilling to sign  ___________________________________ Date/Time _____________________  DISCHARGE:  All personal items have been returned to me.    Patient Signature _____________________ Date/Time _____________________    Staff Signature _______________________ Date/Time _____________________        Addendum:  Staff from patients group home dropped off a bag of misc clothes and toiletries for the patient.  These items were searched and put into his locker.

## 2017-09-15 NOTE — PROGRESS NOTES
----------------------------------------------------------------------------------------------------------  [facility], [place]   Psychiatric Progress Note  Hospital Day #1     Assessment    Presentation: Kamini Neal, a 27 year old male, with history of schizoaffective disorder, PTSD, and borderline personality disorder, presented to [facility] ED via EMS due to auditory hallucinations and SI.     Diagnostic Impression: Patient presents following recent discharge from [facility] in [place] on 9/11 with auditory hallucinations and SI. He is experiencing ongoing command auditory hallucinations, telling him to harm himself, leading to SIB (consisting of banging his head against the wall) and active SI with plan. MSE is notable for flat affect, mumbling speech, and low mood. This is most consistent with a historical diagnosis of schizoaffective disorder, depressed type.     Hospital course: Kamini Neal was admitted to station 20 as a voluntary patient. He was continued on 225 mg venlafaxine, 2 mg prazosin qhs, and 6 mg melatonin qhs, and was started on PRN zyprexa 10 mg po/IM and PRN atarax 25-50 mg q4h. On morning of 9/15, 1:1 precautions and restraints were ordered as patient continued to bang head against wall in common area and was non-redirectable. He became more aggressive with staff trying to prevent him from banging his head, and a behavioral code was called. Patient then received 50 mg diphenhydramine, 2 mg lorazepam, and 5 mg haloperidol IM, and was placed in 5 point restraints. 5 mg haloperidol BID was added to augment his LA injectable.     Medical course: He was continued on his PTA home meds including levothyroxine for hypothyroidism, vit. D supplement, and pantoprazole for GERD.    Plan     Principal Diagnosis:   # Schizoaffective disorder, depressed type    Secondary psychiatric diagnoses of concern this admission:   # PTSD  #Insomnia  #Tobacco use  disorder  #r/o Alcohol use disorder    Psychotropic Medications:  Modify:  D/c PRN olanzapine 10 mg IV/PO   Start Haldol 5 mg PO q4hr PRN or Olanzapine 5 mg IM q2h PRN for aggression  Started Haldol 5 mg BID    Continue:  Prazosin 2 mg qhs  Melatonin 6 mg qhs  Venlafaxine 225 mg qday  PRNs- Hydroxyzine 25-50 mg q4h, diazepam 5-20 mg q30min (per [facility] protocol)    One Time:  Haldol 5 mg PO/IM once for agititation/aggression  Lorazepam 1-2 mg PO/IM once for agitation/aggression  Benadryl 25-50 mg PO/IM once for agitation/agression    Laboratory/Imaging:   CMP-wnl  CBC- wnl  TSH- normal  Vit. B12-normal   UTOX- negative  Lipid panel- HDL- 38, LDL-69, Triglycerides-103  Consults: None  Patient will be treated in therapeutic milieu with appropriate individual and group therapies as described.      Medical diagnoses to be addressed this admission:    #Shoulder Pain  Reported shoulder pain s/p behavioral code  - IM consult placed, appreciate recs    # Hypothyroidism  -Levothyroxine 112 mg qday  -Tylenol prn    #Vit. D deficiency  -Vit. D 1000 U tab qday    #GERD  -Pantoprazole 20 mg qday    Consults: IM consult placed     Relevant psychosocial stressors: possible dynamics in group home, history of trauma     Legal Status: 72 hour hold placed 9/15/17 14:00    Safety Assessment:   Checks: Status 15  Precautions: Suicide  Self-harm  Elopement  Substance Withdrawal  Pt has required locked seclusion or restraints in the past 24 hours to maintain safety, please refer to RN documentation for further details.     The risks, benefits, alternatives and side effects have been discussed and are understood by the patient and other caregivers.    Anticipated Disposition/Discharge Date: pending improvement in SI/safety concerns, improvement in auditory hallucinations, and further mood stabilization via medication adjustment.      Attestation:  Blanca Raman, MS3, served as a scribe for Dr. Nuñez, PGY-1.    I have reviewed and edited the  "documentation recorded by the scribe.  This documentation accurately reflects the services I personally performed and treatment decisions made by me in consultation with the attending physician Antoni Rodríguez MD.    Alton Nuñez MD  Psychiatry PGY-1  [phone]    ATTENDING:  I have reviewed the resident admit note and confirmed by my exam today the HPI, past psych, PMH, ROS, meds, allergies, family and social histories.  I, Antoni Rodríguez, saw and evaluated the patient with the resident physician.  I agree with the findings and plan of care as documented in the resident note.  I have reviewed all labs and vital signs.       Interim History:   Sleep- 7 hrs  PRNs- Hydroxyzine 50 mg x1, Olanzapine 10 mg x2, Haloperidol 5 mg IM, Diphenhydramine 50 mg.  Staff report- During initial interview on admission to unit last night, patient started banging his head against wall as staff attempted to redirect; prn olanzapine was given and he was able to sleep. In the morning, he reported to staff hearing voices telling him to bang his head and \"kill himself\"-- soon after interview, he started banging his head in the common area. Pt. given prn olanzapine and became more aggressive with staff who tried to stop him. Behavioral code was called and haldol/lorazepam/benadryl were administered. Patient was placed in 5 point restraints.     The patient's care was discussed with the treatment team and chart notes were reviewed.     Interview-Patient was interviewed in the conference room. He reports that he is feeling \"bad,\" hearing voices, and looking for ways to harm himself. Since his last hospitalization, patient explains that he is \"scared of people\" and \"scared they might hurt me\" to the point that he is afraid to go outside. He trusts his group home and feels safe there, but does not feel safe in the hospital now. Patient reassured that this is a locked unit with staff monitoring the unit. He said that the voices tell him " "\"hurt yourself, kill yourself, you're worthless.\" He says that he hears the voices \"30% of the day\" and they are coming from inside his head. When asked if he has a plan for suicide, he described that there is a gas station near his home where he plans to buy gasoline, pour it over himself, and then light a match. When asked if any medications had helped him in the past, he said that Clozaril was helpful.      Review of systems:     ROS was negative unless noted above.          Allergies:   No Known Allergies         Psychiatric Examination:   BP 99/64  Pulse 78  Temp 97  F (36.1  C) (Oral)  Resp 16  Ht 1.778 m (5' 10\")  Wt 98.9 kg (218 lb)  SpO2 96%  BMI 31.28 kg/m2  Weight is 218 lbs 0 oz  Body mass index is 31.28 kg/(m^2).    Appearance:  awake, alert and dressed in hospital scrubs  Attitude:  cooperative  Eye Contact:  poor   Mood:  anxious and depressed, \"bad\"  Affect:  mood incongruent, intensity is markedly flat  Speech:  normal prosody and mumbling, monotone  Psychomotor Behavior:  no evidence of tardive dyskinesia, dystonia, or tics  Thought Process:  Illogical, linear  Associations:  no loose associations  Thought Content:  active suicidal ideation present, plan for suicide present, thoughts of self-harm, which are increased and auditory hallucinations present commanding him to SI/SIB  Insight:  poor  Judgment:  poor  Oriented to:  time, person, and place  Attention Span and Concentration:  limited  Recent and Remote Memory:  fair  Language: english with appropriate syntax and vocabulary  Fund of Knowledge: appropriate  Muscle Strength and Tone: not assessed  Gait and Station: Normal         Labs:     Recent Results (from the past 24 hour(s))   CBC with platelets differential    Collection Time: 09/15/17  7:27 AM   Result Value Ref Range    WBC 8.1 4.0 - 11.0 10e9/L    RBC Count 4.43 4.4 - 5.9 10e12/L    Hemoglobin 14.2 13.3 - 17.7 g/dL    Hematocrit 41.9 40.0 - 53.0 %    MCV 95 78 - 100 fl    MCH " 32.1 26.5 - 33.0 pg    MCHC 33.9 31.5 - 36.5 g/dL    RDW 13.0 10.0 - 15.0 %    Platelet Count 230 150 - 450 10e9/L    Diff Method Automated Method     % Neutrophils 64.1 %    % Lymphocytes 26.7 %    % Monocytes 8.4 %    % Eosinophils 0.1 %    % Basophils 0.1 %    % Immature Granulocytes 0.6 %    Nucleated RBCs 0 0 /100    Absolute Neutrophil 5.2 1.6 - 8.3 10e9/L    Absolute Lymphocytes 2.2 0.8 - 5.3 10e9/L    Absolute Monocytes 0.7 0.0 - 1.3 10e9/L    Absolute Eosinophils 0.0 0.0 - 0.7 10e9/L    Absolute Basophils 0.0 0.0 - 0.2 10e9/L    Abs Immature Granulocytes 0.1 0 - 0.4 10e9/L    Absolute Nucleated RBC 0.0    Basic metabolic panel    Collection Time: 09/15/17  7:27 AM   Result Value Ref Range    Sodium 143 133 - 144 mmol/L    Potassium 3.8 3.4 - 5.3 mmol/L    Chloride 111 (H) 94 - 109 mmol/L    Carbon Dioxide 25 20 - 32 mmol/L    Anion Gap 7 3 - 14 mmol/L    Glucose 80 70 - 99 mg/dL    Urea Nitrogen 14 7 - 30 mg/dL    Creatinine 0.76 0.66 - 1.25 mg/dL    GFR Estimate >90 >60 mL/min/1.7m2    GFR Estimate If Black >90 >60 mL/min/1.7m2    Calcium 8.5 8.5 - 10.1 mg/dL   Lipid panel    Collection Time: 09/15/17  7:27 AM   Result Value Ref Range    Cholesterol 128 <200 mg/dL    Triglycerides 103 <150 mg/dL    HDL Cholesterol 38 (L) >39 mg/dL    LDL Cholesterol Calculated 69 <100 mg/dL    Non HDL Cholesterol 90 <130 mg/dL   TSH with free T4 reflex and/or T3 as indicated    Collection Time: 09/15/17  7:27 AM   Result Value Ref Range    TSH 1.20 0.40 - 4.00 mU/L   Folate    Collection Time: 09/15/17  7:27 AM   Result Value Ref Range    Folate 10.6 >5.4 ng/mL   Vitamin B12    Collection Time: 09/15/17  7:27 AM   Result Value Ref Range    Vitamin B12 273 193 - 986 pg/mL

## 2017-09-15 NOTE — PROGRESS NOTES
Patient came to the unit from [facility] as a voluntary admission for voices and suicidal ideations. He has a Hx of Schizoaffective disorder. Lives in a  in the last 4 yrs, has CM. Patient said that he does not like his GH.   He is originally from [place] and occasionally uses [place]. Patient said that he has been taking medications as prescribed by his doctor.
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
## 2017-09-16 PROCEDURE — 25000132 ZZH RX MED GY IP 250 OP 250 PS 637: Performed by: STUDENT IN AN ORGANIZED HEALTH CARE EDUCATION/TRAINING PROGRAM

## 2017-09-16 PROCEDURE — 12400003 ZZH R&B MH CRITICAL UMMC

## 2017-09-16 PROCEDURE — 99207 ZZC NON-BILLABLE SERV PER CHARTING: CPT | Performed by: PHYSICIAN ASSISTANT

## 2017-09-16 PROCEDURE — S0166 INJ OLANZAPINE 2.5MG: HCPCS

## 2017-09-16 PROCEDURE — 25000125 ZZHC RX 250

## 2017-09-16 RX ORDER — OLANZAPINE 10 MG/2ML
INJECTION, POWDER, FOR SOLUTION INTRAMUSCULAR
Status: COMPLETED
Start: 2017-09-16 | End: 2017-09-16

## 2017-09-16 RX ORDER — HALOPERIDOL 5 MG/1
5 TABLET ORAL EVERY 4 HOURS PRN
Status: DISCONTINUED | OUTPATIENT
Start: 2017-09-16 | End: 2017-09-19

## 2017-09-16 RX ORDER — OLANZAPINE 10 MG/2ML
10 INJECTION, POWDER, FOR SOLUTION INTRAMUSCULAR
Status: DISCONTINUED | OUTPATIENT
Start: 2017-09-16 | End: 2017-09-19

## 2017-09-16 RX ADMIN — VENLAFAXINE HYDROCHLORIDE 225 MG: 225 TABLET, FILM COATED, EXTENDED RELEASE ORAL at 08:45

## 2017-09-16 RX ADMIN — HYDROXYZINE HYDROCHLORIDE 50 MG: 25 TABLET ORAL at 13:06

## 2017-09-16 RX ADMIN — OLANZAPINE 10 MG: 10 INJECTION, POWDER, LYOPHILIZED, FOR SOLUTION INTRAMUSCULAR at 20:28

## 2017-09-16 RX ADMIN — HALOPERIDOL 5 MG: 5 TABLET ORAL at 16:29

## 2017-09-16 RX ADMIN — HYDROXYZINE HYDROCHLORIDE 50 MG: 25 TABLET ORAL at 03:18

## 2017-09-16 RX ADMIN — NICOTINE POLACRILEX 4 MG: 2 GUM, CHEWING ORAL at 14:19

## 2017-09-16 RX ADMIN — PANTOPRAZOLE SODIUM 20 MG: 20 TABLET, DELAYED RELEASE ORAL at 08:45

## 2017-09-16 RX ADMIN — HALOPERIDOL 5 MG: 5 TABLET ORAL at 03:13

## 2017-09-16 RX ADMIN — HYDROXYZINE HYDROCHLORIDE 50 MG: 25 TABLET ORAL at 08:45

## 2017-09-16 RX ADMIN — LEVOTHYROXINE SODIUM 112 MCG: 112 TABLET ORAL at 11:46

## 2017-09-16 RX ADMIN — HALOPERIDOL 5 MG: 5 TABLET ORAL at 08:45

## 2017-09-16 RX ADMIN — HALOPERIDOL 5 MG: 5 TABLET ORAL at 13:06

## 2017-09-16 RX ADMIN — OLANZAPINE 10 MG: 10 INJECTION, POWDER, FOR SOLUTION INTRAMUSCULAR at 20:28

## 2017-09-16 RX ADMIN — HALOPERIDOL 5 MG: 5 TABLET ORAL at 19:21

## 2017-09-16 RX ADMIN — HALOPERIDOL 5 MG: 5 TABLET ORAL at 09:00

## 2017-09-16 ASSESSMENT — ACTIVITIES OF DAILY LIVING (ADL)
DRESS: SCRUBS (BEHAVIORAL HEALTH);INDEPENDENT
ORAL_HYGIENE: INDEPENDENT
GROOMING: INDEPENDENT

## 2017-09-16 NOTE — PROGRESS NOTES
Pt had anxious mood and flat affect. Pt continued to be hostile and unpredictable. Pt told this writer that he was hearing voice and wanted to be medicated. He wanted to hurt himself and stop the voice by banging his head against the wall. Haldol was given at 6 pm. Pt stated that he was calmer. At 8 pm pt was standing at the nursing station. 8 pm medications were offered to pt. Pt put the medications in his mouth and then spit them out on the floor. Pt then walker back to his room and came out for snack. Pt told his assigned psy associate that the  voice is very bad . Pt refused any medication and went to bed at approximately 9 pm. Pt continued to be on 1:1 for safety. Pt appeared responding to auditory hallucination. Pt was transferred to a private room for safety. Pt s MSSA was 2 and 2.

## 2017-09-16 NOTE — PROGRESS NOTES
Pt continues to be agitated and unpredictable after prn haldol given , needing much redirection, threw water at the TV.

## 2017-09-16 NOTE — CONSULTS
HISTORY OF PRESENT ILLNESS:  Kamini Neal is a 27-year-old male with history of schizoaffective disorder and history of borderline personality disorder admitted to station 20 for suicidal ideation.  An Internal Medicine consultation was ordered by Dr. Rodríguez's team to assess medical problems including right shoulder pain.  Kamini apparently was disruptive yesterday and a Code 21 had to be called and he was subsequently restrained and taken down.  Psychiatry placed a consult for right shoulder pain of which a followup x-ray was obtained prior to assessment which was read as negative.  Today patient denies shoulder pain and states it was actually his left shoulder instead.  Denies other acute physical concerns at this time.      PAST MEDICAL HISTORY:   1.  Psychiatric history per Dr. Rodríguez's team.   2.  Gastroesophageal reflux disease.   3.  Hypothyroidism.   4.  Denies history of other chronic medical problems including cardiopulmonary disease, hypertension and diabetes.      PAST SURGICAL HISTORY:  Tonsillectomy.      ADMISSION MEDICATIONS:  Reviewed and listed in the medication reconciliation list.      ALLERGIES:  No known drug allergies.      SOCIAL HISTORY:  Single.  No children.  Lives in a group home in [place].  Smokes 1 pack of cigarettes daily.  Denies alcohol use.  Admits to using khat.        FAMILY HISTORY:  Reviewed and noncontributory.      REVIEW OF SYSTEMS:  Ten-point review of systems negative except as stated above in history of present illness.      PHYSICAL EXAMINATION:   GENERAL:  Nontoxic appearing young man in no acute distress.   VITAL SIGNS:  Essentially normal.  Temperature afebrile, pulse in the 60s, blood pressure 120s/70s.   HEENT:  Negative.   NECK:  Supple.  No cervical lymphadenopathy or thyromegaly.   LUNGS:  Clear.   CARDIOVASCULAR:  Regular rhythm, no murmurs appreciated.   ABDOMEN:  Soft, nontender.   EXTREMITIES:  Bilateral shoulders show no edema.   SKIN:  No  erythema, warmth, no open lesions.   NEUROLOGIC:  He is awake, alert and oriented x3.  Cranial nerves grossly intact.  Motor strength is symmetric.  He is not tremulous.      LABORATORY DATA:  All labs unremarkable.      IMPRESSION:   1.  Depression and psychosis per Dr. Rodríguez's team.   2.  Acute left shoulder pain, resolved and likely was due to mild muscle strain.   3.  Gastroesophageal reflux disease, stable.   4.  Hypothyroidism, euthyroid on replacement.      PLAN:  No medical intervention indicated at this time.  Medicine will sign off.  Please feel free to call with questions.      Thank you for this consultation.         TRISH GREENE PA-C             D: 2017 11:13   T: 2017 12:24   MT: LEV      Name:     JACKIE OROZCO   MRN:      [id number]        Account:       [id number]   :      1990           Consult Date:  2017      Document: [id number]

## 2017-09-16 NOTE — PROGRESS NOTES
D/W nurse Rosalia. Patient is taking Prazosin 2 mg HS for nightmares. Nightmares continue and patient is afraid to sleep. His BP has been stable, no symptoms of low BP or orthostasis. Will increase prazosin to 3mg HS.    Bull Veras MD
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
## 2017-09-16 NOTE — PROGRESS NOTES
Pt is on elopement precautions. Pt has not tried to leave the unit this shift.  Will continue to monitor.

## 2017-09-16 NOTE — PROGRESS NOTES
"Pt having auditory hallucinations, attempts to distract self from them via head banging, which he did multiple times throughout the shift. Pt was redirectable. Voices got worse as night went on, wasn't having SI until end of shift where he stated \"I want to kill myself.\" SIB in the form of head banging. Very high depression/anxiety, paranoid of others and believes other pt's are talking about him. Pt believes his depression/anxiety etc. Are from life going poorly for him for the past 3 years.     09/15/17 2218   Behavioral Health   Hallucinations appears responding;auditory   Thinking poor concentration;paranoid;distractable   Orientation person: oriented;place: oriented   Memory baseline memory   Insight admits / accepts   Judgement impaired   Eye Contact at examiner   Affect blunted, flat   Mood depressed;anxious   Physical Appearance/Attire attire appropriate to age and situation   Hygiene well groomed   Suicidality thoughts only   Self Injury active;other (see comment)  (head banging )   Elopement (N/A)   Activity withdrawn   Speech clear;coherent   Medication Sensitivity no stated side effects;no observed side effects   Psychomotor / Gait balanced;steady   Psycho Education   Type of Intervention 1:1 intervention   Response participates, initiates socially appropriate   Hours 0.5   Treatment Detail check in   Activities of Daily Living   Hygiene/Grooming independent   Oral Hygiene independent   Dress independent   Laundry with supervision   Room Organization independent   Behavioral Health Interventions   Psychotic Symptoms maintain safety precautions;monitor need to revise level of observation;maintain safe secure environment;reality orientation;simple, clear language;decrease environmental stimulation;redirection of intrusive behaviors;redirection of aggressive behaviors;encourage nutrition and hydration;encourage participation / independence with adls;provide emotional support;establish therapeutic " relationship;build upon strengths;monitor need for prn medication;monitor confusion, memory loss, decision making ability and reorient / intervent as needed   Social and Therapeutic Interventions (Psychotic Symptoms) encourage socialization with peers;encourage effective boundaries with peers;encourage participation in therapeutic groups and milieu activities

## 2017-09-17 PROCEDURE — 25000132 ZZH RX MED GY IP 250 OP 250 PS 637: Performed by: STUDENT IN AN ORGANIZED HEALTH CARE EDUCATION/TRAINING PROGRAM

## 2017-09-17 PROCEDURE — 12400003 ZZH R&B MH CRITICAL UMMC

## 2017-09-17 PROCEDURE — 25000132 ZZH RX MED GY IP 250 OP 250 PS 637: Performed by: PSYCHIATRY & NEUROLOGY

## 2017-09-17 RX ADMIN — PANTOPRAZOLE SODIUM 20 MG: 20 TABLET, DELAYED RELEASE ORAL at 10:08

## 2017-09-17 RX ADMIN — VENLAFAXINE HYDROCHLORIDE 225 MG: 225 TABLET, FILM COATED, EXTENDED RELEASE ORAL at 10:08

## 2017-09-17 RX ADMIN — NICOTINE POLACRILEX 4 MG: 2 GUM, CHEWING ORAL at 11:23

## 2017-09-17 RX ADMIN — VITAMIN D, TAB 1000IU (100/BT) 1000 UNITS: 25 TAB at 20:26

## 2017-09-17 RX ADMIN — NICOTINE POLACRILEX 4 MG: 2 GUM, CHEWING ORAL at 20:41

## 2017-09-17 RX ADMIN — MELATONIN TAB 3 MG 6 MG: 3 TAB at 20:26

## 2017-09-17 RX ADMIN — HALOPERIDOL 5 MG: 5 TABLET ORAL at 20:27

## 2017-09-17 RX ADMIN — HYDROXYZINE HYDROCHLORIDE 50 MG: 25 TABLET ORAL at 10:08

## 2017-09-17 RX ADMIN — HALOPERIDOL 5 MG: 5 TABLET ORAL at 17:55

## 2017-09-17 RX ADMIN — HALOPERIDOL 5 MG: 5 TABLET ORAL at 11:35

## 2017-09-17 RX ADMIN — PRAZOSIN HYDROCHLORIDE 3 MG: 2 CAPSULE ORAL at 20:26

## 2017-09-17 RX ADMIN — HALOPERIDOL 5 MG: 5 TABLET ORAL at 10:08

## 2017-09-17 RX ADMIN — LEVOTHYROXINE SODIUM 112 MCG: 112 TABLET ORAL at 13:00

## 2017-09-17 ASSESSMENT — ACTIVITIES OF DAILY LIVING (ADL)
ORAL_HYGIENE: INDEPENDENT
ORAL_HYGIENE: INDEPENDENT
DRESS: INDEPENDENT
HYGIENE/GROOMING: INDEPENDENT
GROOMING: INDEPENDENT
LAUNDRY: WITH SUPERVISION
DRESS: INDEPENDENT
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
## 2017-09-17 NOTE — PROGRESS NOTES
"Pt asked to see his chart. Pt stated wanted to know what his dx's is.  Pt also stated that he wanted to transfer to [facility].  Pt stated he wanted to go to [facility] people people know him there.  Pt told to ask team tomorrow.  Pt went on to say that he wants 30 day CD treatment.  Pt stated that he uses Khat. Pt states that he like the way it makes him feel, but it also make him paranoid.  He use has been increasing.  Pt stated he sold his video game system and used money for Khat.  Pt states now he has nothing to do.  Pt stated his roommates use khat.   Pt also stated that he does not like his [facility] .  Pt stated he sees him once a year when he has him signs paperwork.  But otherwise he does not hear from him.  Pt stated that he has tried to get  a different  but to no avail.  Pt also stated that at his group home the owner only hires family and 2 staff  don't know how to read english.  Pt states when he has asked for PRN's he has had to look up med on his phone.  Pt states the owner owns other [facility] group homes only hires family.  Asked pt if he has thought about looking into living in a non [facility].  Pt said \"yes.\"  Pt stated this morning he was very frustrated and didn't care re refusing his am medications.   "

## 2017-09-17 NOTE — PLAN OF CARE
Problem: Psychotic Symptoms  Goal: Psychotic Symptoms  Signs and symptoms of listed problems will be absent or manageable.   Outcome: No Change  Please see nursing note for detail.
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
## 2017-09-17 NOTE — PROGRESS NOTES
"   09/16/17 1730   Visit Information   Visit Made By Staff    Type of Visit On-call   Visited Patient   Interventions   Basic Spiritual Interventions    introduction/orientation to Spiritual Health Services;Assessment of spiritual needs/resources;Reflective conversation   Advanced Assessments/Interventions   Presenting Concerns/Issues Spiritual/Mosque/emotional support   SPIRITUAL HEALTH SERVICES  [facility] ([facility]  ON-CALL VISIT     REFERRAL SOURCE: Consult, Pt request for emotional support.  Brought pt prayer rug and prayer book in support of his nadine. Pt wanted to know if I believed in Demons, adding \"I have a demon inside of me,\" and what he should do about it as it was a source of confusion and pain for him.   Affirmed that it was good that he was here at the hospital where he would be cared for through mental health interventions. Supported belief that God's love is most powerful and for pt to focus his nadine practices in God's love in support of his healing.     PLAN: Will refer to Imam Chaplain Khalil for further follow-up.     Xena Ojeda M.Div.  Staff    Pager [phone]    "

## 2017-09-18 PROCEDURE — 25000132 ZZH RX MED GY IP 250 OP 250 PS 637: Performed by: STUDENT IN AN ORGANIZED HEALTH CARE EDUCATION/TRAINING PROGRAM

## 2017-09-18 PROCEDURE — 25000132 ZZH RX MED GY IP 250 OP 250 PS 637: Performed by: PSYCHIATRY & NEUROLOGY

## 2017-09-18 PROCEDURE — 25000128 H RX IP 250 OP 636: Performed by: STUDENT IN AN ORGANIZED HEALTH CARE EDUCATION/TRAINING PROGRAM

## 2017-09-18 PROCEDURE — 12400003 ZZH R&B MH CRITICAL UMMC

## 2017-09-18 PROCEDURE — 99232 SBSQ HOSP IP/OBS MODERATE 35: CPT | Mod: GC | Performed by: PSYCHIATRY & NEUROLOGY

## 2017-09-18 RX ORDER — POLYETHYLENE GLYCOL 3350 17 G/17G
17 POWDER, FOR SOLUTION ORAL DAILY PRN
Status: DISCONTINUED | OUTPATIENT
Start: 2017-09-18 | End: 2017-10-04 | Stop reason: HOSPADM

## 2017-09-18 RX ORDER — BENZTROPINE MESYLATE 1 MG/ML
1 INJECTION, SOLUTION INTRAMUSCULAR; INTRAVENOUS
Status: COMPLETED | OUTPATIENT
Start: 2017-09-18 | End: 2017-09-18

## 2017-09-18 RX ORDER — BENZTROPINE MESYLATE 1 MG/1
1 TABLET ORAL 2 TIMES DAILY
Status: DISCONTINUED | OUTPATIENT
Start: 2017-09-18 | End: 2017-10-04 | Stop reason: HOSPADM

## 2017-09-18 RX ORDER — BENZTROPINE MESYLATE 1 MG/1
1 TABLET ORAL
Status: COMPLETED | OUTPATIENT
Start: 2017-09-18 | End: 2017-09-18

## 2017-09-18 RX ORDER — SENNOSIDES 8.6 MG
8.6 TABLET ORAL 2 TIMES DAILY PRN
Status: DISCONTINUED | OUTPATIENT
Start: 2017-09-18 | End: 2017-10-04 | Stop reason: HOSPADM

## 2017-09-18 RX ORDER — POLYETHYLENE GLYCOL 3350 17 G/17G
17 POWDER, FOR SOLUTION ORAL DAILY
Status: DISCONTINUED | OUTPATIENT
Start: 2017-09-18 | End: 2017-09-18

## 2017-09-18 RX ORDER — BENZTROPINE MESYLATE 1 MG/ML
1 INJECTION, SOLUTION INTRAMUSCULAR; INTRAVENOUS
Status: DISCONTINUED | OUTPATIENT
Start: 2017-09-18 | End: 2017-09-18

## 2017-09-18 RX ORDER — BENZTROPINE MESYLATE 1 MG/ML
INJECTION, SOLUTION INTRAMUSCULAR; INTRAVENOUS
Status: DISCONTINUED
Start: 2017-09-18 | End: 2017-09-19 | Stop reason: HOSPADM

## 2017-09-18 RX ADMIN — HALOPERIDOL 5 MG: 5 TABLET ORAL at 20:49

## 2017-09-18 RX ADMIN — VENLAFAXINE HYDROCHLORIDE 225 MG: 225 TABLET, FILM COATED, EXTENDED RELEASE ORAL at 08:45

## 2017-09-18 RX ADMIN — PRAZOSIN HYDROCHLORIDE 3 MG: 2 CAPSULE ORAL at 20:48

## 2017-09-18 RX ADMIN — HYDROXYZINE HYDROCHLORIDE 50 MG: 25 TABLET ORAL at 03:01

## 2017-09-18 RX ADMIN — PANTOPRAZOLE SODIUM 20 MG: 20 TABLET, DELAYED RELEASE ORAL at 08:45

## 2017-09-18 RX ADMIN — BENZOCAINE, MENTHOL 1 LOZENGE: 15; 3.6 LOZENGE ORAL at 17:43

## 2017-09-18 RX ADMIN — LEVOTHYROXINE SODIUM 112 MCG: 112 TABLET ORAL at 12:06

## 2017-09-18 RX ADMIN — HYDROXYZINE HYDROCHLORIDE 50 MG: 25 TABLET ORAL at 14:52

## 2017-09-18 RX ADMIN — POLYETHYLENE GLYCOL 3350 17 G: 17 POWDER, FOR SOLUTION ORAL at 17:43

## 2017-09-18 RX ADMIN — MELATONIN TAB 3 MG 6 MG: 3 TAB at 20:49

## 2017-09-18 RX ADMIN — BENZTROPINE MESYLATE 1 MG: 1 INJECTION INTRAMUSCULAR; INTRAVENOUS at 11:46

## 2017-09-18 RX ADMIN — ACETAMINOPHEN 650 MG: 325 TABLET, FILM COATED ORAL at 12:05

## 2017-09-18 RX ADMIN — VITAMIN D, TAB 1000IU (100/BT) 1000 UNITS: 25 TAB at 17:43

## 2017-09-18 RX ADMIN — SENNOSIDES 8.6 MG: 8.6 TABLET, FILM COATED ORAL at 20:50

## 2017-09-18 RX ADMIN — HALOPERIDOL 5 MG: 5 TABLET ORAL at 08:45

## 2017-09-18 RX ADMIN — BENZTROPINE MESYLATE 1 MG: 1 TABLET ORAL at 20:49

## 2017-09-18 RX ADMIN — HALOPERIDOL 5 MG: 5 TABLET ORAL at 09:35

## 2017-09-18 RX ADMIN — BENZTROPINE MESYLATE 1 MG: 1 TABLET ORAL at 15:21

## 2017-09-18 ASSESSMENT — ACTIVITIES OF DAILY LIVING (ADL)
DRESS: INDEPENDENT
ORAL_HYGIENE: INDEPENDENT
GROOMING: INDEPENDENT

## 2017-09-18 NOTE — PROGRESS NOTES
Kamini complained about a stiff jaw/ jaw locked into place.  Obtained an order for benztropine IM which was given.

## 2017-09-18 NOTE — PROGRESS NOTES
----------------------------------------------------------------------------------------------------------  [facility], [place]   Psychiatric Progress Note     Assessment    Presentation: Kamini Neal, a 27 year old male, with history of schizoaffective disorder BPT and PTSD, presented to [facility] ED due to SI, command AH to kill self and harm self, and paranoid delusions that people at his group home want to harm him and kill him, in the context of recent discharge from [facility] on 9/11/17.     Diagnostic Impression: Patient presents with worsening command auditory hallucinations to kill himself and to harm himself by banging his head on the wall. He endorses fear that people in his group home want to harm him and kill him. On admission, patient was demonstrating little to no insight into these paranoid delusions. Throughout the first three days of admission, patient repeatedly demonstrated behavioral dysregulation including violent head banging requiring chemical and physical restraints. His examination demonstrates positive symptoms including paranoid delusions and command AH to kill self and others (he has at times had difficulty not listening to commands to SIB) as well as negative symptoms including social withdrawal on milieu, and a dramatically flattened and mood incongruent affect. This presentation is most consistent with an exacerbation of patient's historic diagnosis of schizoaffective disorder, possibly with current depressive episode. Recent medication noncompliance is likely an exacerbating factor. Patient continues to require and benefit from ongoing hospitalization for augmentation of his medications, as well as to ensure patient safety given his ongoing SI and SIB.     Hospital course: Kamini Neal was admitted to station 20 as a voluntary patient. On admission, labwork including CBC, CMP, Lipids, TSH, Folate, and B12 were wnl. A Utox has yet to be  obtained. Per chart review, patient on Invega Sustenna 117 mg qmonth, and received his last dose on 9/13/17. PTA effexor, melatonin, prazosin were continued. Shortly after after arrival, patient began exhibiting head banging behaviors, responsive for a time to prn zyprexa. Shortly after being staffed, patient began violently headbanging and eventually required chemical restraints with combination haldol/ativan/benadryl, and physical restraints. At this point, a 72 hour hold was placed. Scheduled haldol 5 mg BID was started to supplement IM Invega Sustenna. Over the next two days, patient continued to intermittently exhibit head banging/SIB, which on one additional instance, required sedation with IM olanzapine as well as physical restraints.  On 9/18, patient developed torticollis which was responsive to 1 mg IM cogentin with additional 1 mg PO given later in afternoon. BID scheduled cogentin 1 mg was also started at this time. MSSA was discontinued, as patient had not required BZDs after 72h into hospitalization.     Medical course: Patient admitted without issue. Following first episode of physical restraints being applied, patient complained of R shoulder pain. A 3 view XR was obtained which was negative for any pathophysiology. Treated withTylenol PRN.     Plan     Principal Diagnosis:   # Acute exacerbation of chronic Schizoaffective Disorder, Bipolar Type    Secondary psychiatric diagnoses of concern this admission:   # PTSD  # Insomnia  # Tobacco Use Disorder  # r/o EtOH Use Disorder    Medications:   Changes:  Start Benztropine 1 mg BID for EPS  One time Cogentin 1 mg IM for Torticollis  One time Cogentin 1 mg PO for Torticollis  D/c MSSA given scoring, no BZDs for 72h    Continue:  Continue Haldol 5 mg BID  Continue Venlafaxine  mg PO daily with breakfast  Continue Prazosin 3 mg PO Qhs  Continue Melatonin 6 mg PO qhs  Hydroxyzine 25-50 mg PO q4h prn  Haldol 5 mg PO prn / Olanzapine 10 mg IM prn for  agitation  Invega Sustenna 117 mg IM qMonth (last dose 9/13/17)  Nicotine supplementation prn     Hold:  Klonopin 1 mg q6hrs PRN    Laboratory/Imaging:   Utox needs to be drawn  CBC wnl  BMP wnl  Lipids wnl, HDL low at 38  TSH 1.20  Folate 10.6  VB12 273    Consults: IM, see below    Patient will be treated in therapeutic milieu with appropriate individual and group therapies as described.    Medical diagnoses to be addressed this admission:   # Acute Dystonic Reaction to haloperidol--resolved with IM benztropine, started benztropine 1 mg BID x 1 week for prophylaxis     #Acute L shoulder pain: XR negative for injury, likely muscle strain per IM    #GERD: continue PPI per IM    #Hypothyroidism: continue PTA Synthroid 112 mcg per IM    #Vitamin D def: VD 1000 unit tab daily    Consults: Assessed by IM, signed off 9/16/17    Relevant psychosocial stressors: group home dynamics, h/o trauma, SPMI, questionable EtOH abuse    Legal Status: 72-h Hold signed on 9/15 . Patient agrees to sign in voluntarily this evening.     Safety Assessment:   Checks: Status 15  Precautions: Suicide  Self-harm  Assault  Elopement  Substance Withdrawal  Pt has not required locked seclusion or restraints in the past 24 hours to maintain safety, please refer to RN documentation for further details.     The risks, benefits, alternatives and side effects have been discussed and are understood by the patient and other caregivers.    Anticipated Disposition/Discharge Date: Patient requires further inpatient management for treatment and stabilization of paranoid thoughts, AH, SI, like 7-10d      Attestation:  Pt seen and discussed with my attending, MD Alton Campbell MD  PGY-1 Resident  Pager: [phone]    Psychiatry Attending Attestation:  This patient has been seen and evaluated by me, Wolf Miller M.D.  The patient's condition and treatment plan were discussed with the resident, and care coordinated with the CTC and RN. I  "reviewed, edited and agree with the findings and plan in this note.     Wolf Miller M.D.   of Psychiatry     Interim History:   The patient's care was discussed with the treatment team and chart notes were reviewed.     Sleep: 4.75  PRN: 5 mg Haldol PO x2, Hydroxyzine x2    Staff Report: Over weekend, patient continued to experience AH. Behaviorally dysregulated, threw water bottle and coffee at TV. On Saturday karyna, became violent, throwing objects at staff, destroying room. Calmed after 10 mg IM olanzapine, required 5 point restraints. SPitting at staff during incident. The following morning, pt refused meds. Pt frustrated, requested transfer to [facility], made attempts to leave unit. This am, patient with head turned to side, complained of head stuck in position and jaw stiff. Improved with IM Cogentin.     Interview: Patient seen today following 1 mg IM administration of Cogentin for Torticollis this morning. This writer witnessed pt in mild distress with head turned far to left, per patient, head stuck in this position. Denied any stiffness in arms, legs, hands. Much improved following administration of IM congentin. Patient stated he had stiffening sensation again later in afternoon, but did not feel stuck, and this improved after 1 mg PO congentin. Patient reports ongoing AH to bang head. He believes these voices are coming from \"my head, my mental illness, my brain\". He notes a history of frequent Khat use PTA. Patient is open to CD treatment for his Khat use, as he feels that his mental health is related. Patient states he is open and willing to remaining in hospital as voluntary patient to receive treatment and eventually obtain placement in CD facility.     Review of systems:     The Review of Systems is negative other than noted in the HPI         Medications:     Current Facility-Administered Medications   Medication     benztropine (COGENTIN) tablet 1 mg     benzocaine-menthol " "(CEPACOL) 15-3.6 MG lozenge 1 lozenge     sennosides (SENOKOT) tablet 8.6 mg     polyethylene glycol (MIRALAX/GLYCOLAX) Packet 17 g     prazosin (MINIPRESS) capsule 3 mg     OLANZapine (zyPREXA) injection 10 mg    Or     haloperidol (HALDOL) tablet 5 mg     haloperidol (HALDOL) tablet 5 mg     levothyroxine (SYNTHROID/LEVOTHROID) tablet 112 mcg     melatonin tablet 6 mg     pantoprazole (PROTONIX) EC tablet 20 mg     venlafaxine (EFFEXOR-ER) 24 hr tablet 225 mg     cholecalciferol (vitamin D) tablet 1,000 Units     hydrOXYzine (ATARAX) tablet 25-50 mg     acetaminophen (TYLENOL) tablet 650 mg     nicotine polacrilex (NICORETTE) gum 2-4 mg             Allergies:   No Known Allergies         Psychiatric Examination:   /74  Pulse 72  Temp 98.2  F (36.8  C) (Oral)  Resp 16  Ht 1.778 m (5' 10\")  Wt 99.3 kg (219 lb)  SpO2 96%  BMI 31.42 kg/m2  Weight is 219 lbs 0 oz  Body mass index is 31.42 kg/(m^2).    Appearance:  awake, alert and adequately groomed  Attitude:  cooperative  Eye Contact:  fair, somewhat blank stare  Mood:  \"I'm fine\"  Affect:  mood incongruent, intensity is flat, immobile and nonreactive  Speech:  decreased prosody and increased speech latency, speaks slowly in monotone voice  Psychomotor Behavior:  no evidence of tardive dyskinesia, dystonia, or tics, physical retardation and no tremor  Thought Process:  logical, linear and goal oriented  Associations:  no loose associations  Thought Content:  passive suicidal ideation present, thoughts of self-harm, which are to bang head against wall, auditory hallucinations present and patient appears to be responding to internal stimuli  Insight:  fair  Judgment:  limited  Oriented to:  time, person, and place  Attention Span and Concentration:  intact  Recent and Remote Memory:  intact  Language: Uses English language appropriately in conversational context  Fund of Knowledge: appropriate  Muscle Strength and Tone: did not assess  Gait and Station: " Normal         Labs:   No results found for this or any previous visit (from the past 24 hour(s)).  UTox waiting to be obtained

## 2017-09-18 NOTE — PROGRESS NOTES
Pt s mood was mostly clam this shift. Pt was in and out his room. Pt had good appetite. Pt refused his vital signs to be taken. Pt took his medications at 8 pm. Pt only banged his head once, but was redirected by staff immediately Pt stated that he had no SI or SIB. Pt took a shower this evening. Pt s insight had improved but still had paranoid thought (such as being harm if he fell to sleep). Pt told staff that when he is discharged, he is going to look for a job and making plans. Pt stated that he is sad because he does not have any family support. His father is not with him. Pt communicated appropriately most of the time. Pt had only mentioned hearing voice x 1 and easily redirected by staff. Overall, pt had a good shift.

## 2017-09-18 NOTE — PROGRESS NOTES
During my attempted visit, pt was sleeping. I will visit on Wednesday morning if he still in the hospital.

## 2017-09-18 NOTE — PROGRESS NOTES
Pt was withdrawn to own room for much of the day. Pt did not attended any groups. Pt left room to eat meals, to get coffee, and to make a phone call occasionally. This AM pt was very irritable and complained of hearing voices that were saying negative things and asking the pt to kill self. Pt complained of not being able to make these thoughts go away and would bang head on the wall a couple of times before stopping when asked. Pt did this a number of times. Pt was very distractable and seemed to not be able to pay attention to things for more than a few mimnutes. Pt slept for much of the afternoon.        09/18/17 1512   Behavioral Health   Hallucinations auditory   Thinking poor concentration;distractable   Orientation person: oriented;place: oriented;time: oriented   Memory baseline memory   Insight poor   Judgement impaired   Eye Contact at examiner   Affect blunted, flat;tense;irritable   Mood irritable   Physical Appearance/Attire attire appropriate to age and situation   Hygiene well groomed   Suicidality active   Self Injury active   Elopement Statements about wanting to leave   Activity restless;hyperactive (agitated, impulsive)   Speech clear;coherent   Medication Sensitivity no stated side effects   Psychomotor / Gait balanced;steady   Safety   Elopement status 15   Activities of Daily Living   Hygiene/Grooming independent   Oral Hygiene independent   Dress independent   Room Organization independent   Behavioral Health Interventions   Psychotic Symptoms maintain safety precautions;monitor need to revise level of observation;maintain safe secure environment;reality orientation;simple, clear language;decrease environmental stimulation;redirection of intrusive behaviors;redirection of aggressive behaviors;assist patient in developing safety plan;build upon strengths;assist patient in following safety plan;provide emotional support   Social and Therapeutic Interventions (Psychotic Symptoms) encourage  socialization with peers;encourage effective boundaries with peers;encourage participation in therapeutic groups and milieu activities

## 2017-09-18 NOTE — PLAN OF CARE
"Problem: Psychotic Symptoms  Goal: Psychotic Symptoms  Signs and symptoms of listed problems will be absent or manageable.   Outcome: Improving  Patient reported small improvement in his voices, but also said that he has not going well. Patient is monitored with 1:1 sitter for SIB/banging his head on the wall. Patient said that he needs \"social support\", that it has been difficult for him to be alone. He also reported sore throat and constipation. Patient's leisure activity is coloring, so he requested \"coloring markers\" as well.   Patient agreed to have a Amish im visit, so he could talk with him and possibly find the social support he needs. He also agreed to have Cepacol lozenges and medications for constipation. Patient was educated and encouraged to increase his fluid intake, and he was agreeable. MD was updated, see new orders. Will offer PRN Miralax, Senna, Cepacol, and prune juice.   Patient was able to contract for safety. Will continue to monitor with 1:1 sitter for safety.   Addendum:  Patient took PRN Miralax and Cepacol. A Amish  was here, however, patient was sleeping at the time and was not waken up. Patient still wants to see a  and discuss social support and community options for one, as patient does not have a family here.       "

## 2017-09-19 PROCEDURE — 12400007 ZZH R&B MH INTERMEDIATE UMMC

## 2017-09-19 PROCEDURE — 25000132 ZZH RX MED GY IP 250 OP 250 PS 637: Performed by: STUDENT IN AN ORGANIZED HEALTH CARE EDUCATION/TRAINING PROGRAM

## 2017-09-19 PROCEDURE — 25000132 ZZH RX MED GY IP 250 OP 250 PS 637: Performed by: PSYCHIATRY & NEUROLOGY

## 2017-09-19 PROCEDURE — 99232 SBSQ HOSP IP/OBS MODERATE 35: CPT | Mod: GC | Performed by: PSYCHIATRY & NEUROLOGY

## 2017-09-19 RX ORDER — OLANZAPINE 10 MG/2ML
10 INJECTION, POWDER, FOR SOLUTION INTRAMUSCULAR
Status: DISCONTINUED | OUTPATIENT
Start: 2017-09-19 | End: 2017-09-22

## 2017-09-19 RX ORDER — OLANZAPINE 10 MG/1
10 TABLET ORAL EVERY MORNING
Status: DISCONTINUED | OUTPATIENT
Start: 2017-09-20 | End: 2017-09-21

## 2017-09-19 RX ORDER — OLANZAPINE 20 MG/1
20 TABLET ORAL AT BEDTIME
Status: DISCONTINUED | OUTPATIENT
Start: 2017-09-19 | End: 2017-09-21

## 2017-09-19 RX ORDER — OLANZAPINE 10 MG/1
10 TABLET ORAL
Status: DISCONTINUED | OUTPATIENT
Start: 2017-09-19 | End: 2017-09-22

## 2017-09-19 RX ADMIN — SENNOSIDES 8.6 MG: 8.6 TABLET, FILM COATED ORAL at 13:50

## 2017-09-19 RX ADMIN — LEVOTHYROXINE SODIUM 112 MCG: 112 TABLET ORAL at 11:56

## 2017-09-19 RX ADMIN — OLANZAPINE 10 MG: 10 TABLET, FILM COATED ORAL at 14:04

## 2017-09-19 RX ADMIN — VENLAFAXINE HYDROCHLORIDE 225 MG: 225 TABLET, FILM COATED, EXTENDED RELEASE ORAL at 09:04

## 2017-09-19 RX ADMIN — PANTOPRAZOLE SODIUM 20 MG: 20 TABLET, DELAYED RELEASE ORAL at 09:04

## 2017-09-19 RX ADMIN — BENZTROPINE MESYLATE 1 MG: 1 TABLET ORAL at 09:04

## 2017-09-19 RX ADMIN — MELATONIN TAB 3 MG 6 MG: 3 TAB at 21:54

## 2017-09-19 RX ADMIN — VITAMIN D, TAB 1000IU (100/BT) 1000 UNITS: 25 TAB at 16:43

## 2017-09-19 RX ADMIN — OLANZAPINE 20 MG: 20 TABLET, FILM COATED ORAL at 21:54

## 2017-09-19 RX ADMIN — NICOTINE POLACRILEX 4 MG: 2 GUM, CHEWING ORAL at 20:50

## 2017-09-19 RX ADMIN — POLYETHYLENE GLYCOL 3350 17 G: 17 POWDER, FOR SOLUTION ORAL at 13:51

## 2017-09-19 RX ADMIN — BENZTROPINE MESYLATE 1 MG: 1 TABLET ORAL at 21:54

## 2017-09-19 RX ADMIN — PRAZOSIN HYDROCHLORIDE 3 MG: 2 CAPSULE ORAL at 21:54

## 2017-09-19 RX ADMIN — HALOPERIDOL 5 MG: 5 TABLET ORAL at 09:04

## 2017-09-19 ASSESSMENT — ACTIVITIES OF DAILY LIVING (ADL)
LAUNDRY: WITH SUPERVISION
GROOMING: INDEPENDENT
DRESS: SCRUBS (BEHAVIORAL HEALTH)
ORAL_HYGIENE: INDEPENDENT

## 2017-09-19 NOTE — PLAN OF CARE
"Problem: General Plan of Care (Inpatient Behavioral)  Goal: Individualization/Patient Specific Goal (IP Behavioral)  Illness Management Recovery model: Objectives    Patient will identify reason(s) for hospitalization from their perspective.  Patient will identify a minimum of three goals for discharge.  Patient will identify a minimum of three triggers that may increase their symptoms.  Patient will identify a minimum of three coping skills they can do to stay well.   Patient will identify their support system to demonstrate readiness for discharge.   Outcome: Improving  Illness Management Recovery model:  Wellness Strategies.     Patient identified the following actions/activities they can do to stay well.     1. Stay on medications, assure that he will not have side effects of Zyprexa prior to discharge.   2. No use of any kind of drugs.   3. Keep healthy diet, sleep, and exercise, and stay connected.      Patient verbalized his worries that he has had a difficult time socializing with other people, described self as \"intravert\". Patient is encouraged to seek and visit Somalian communities in the [facility].            Problem: Psychotic Symptoms  Goal: Psychotic Symptoms  Signs and symptoms of listed problems will be absent or manageable.   Outcome: Improving  Patient verbalized improvement of voices and paranoia, however, he said that they are not completely gone. Patient no longer engages in banging his head on the wall, or any other SIB. He reported improvement in depression and anxiety, rated depression at 3-4/10, anxiety 2/10. Patient reported improved sleep at night. He wants to follow with CD Tx after discharge. Patient takes medications as prescribed, he had a change of Haldol to Zyprexa today, due to side effects with Haldol. Patient communicates needs well, thoughts are logical and linear, speech is clear and organized. No behavioral issues.      Addendum:  Patient reported improved appetite and " resolved constipations with given PRN last evening and this morning. Denied sore throat today.

## 2017-09-19 NOTE — PROGRESS NOTES
09/19/17 1437   Behavioral Health   Hallucinations denies / not responding to hallucinations   Thinking distractable   Orientation time: oriented;date: oriented;place: oriented;person: oriented   Memory baseline memory   Insight insight appropriate to situation   Judgement impaired   Eye Contact at examiner   Affect full range affect   Mood mood is calm   Physical Appearance/Attire appears stated age   Hygiene well groomed   Suicidality other (see comments)  (Denies)   Self Injury other (see comment)  (Denies)   Activity other (see comment)  (Social with peers and staff.)   Speech coherent;clear   Medication Sensitivity no stated side effects   Psychomotor / Gait balanced;steady   Psycho Education   Type of Intervention 1:1 intervention   Response participates, initiates socially appropriate   Hours 0.5   Activities of Daily Living   Hygiene/Grooming independent   Oral Hygiene independent   Dress scrubs (behavioral health)   Laundry with supervision   Room Organization independent   Activity   Activity Level of Assistance independent   Pt denies hearing any voices or being depressed. He was observed listening to headphone in the room. He had had both meals with no problems. He did not bang his head this shift and denies suicidal ideations.

## 2017-09-19 NOTE — PROGRESS NOTES
Petition for commitment was discussed yesterday as patient has required restraints d/t head banging, agitation and aggressive behavior over the wkend. Team met with patient yesterday and he reported feeling better- wanted to sign in voluntarily again, and expressed interest in CD treatment.   I was able to speak to patient's [facility]  Peter Marr ([phone]) for care coordination  Patient is an [facility] resident (financial responsibility of) and receives case mgmt services through [facility],  I will attempt  to arrange a Rule 25 assessment
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
## 2017-09-20 PROCEDURE — 25000132 ZZH RX MED GY IP 250 OP 250 PS 637: Performed by: PSYCHIATRY & NEUROLOGY

## 2017-09-20 PROCEDURE — 99232 SBSQ HOSP IP/OBS MODERATE 35: CPT | Mod: GC | Performed by: PSYCHIATRY & NEUROLOGY

## 2017-09-20 PROCEDURE — 25000132 ZZH RX MED GY IP 250 OP 250 PS 637: Performed by: STUDENT IN AN ORGANIZED HEALTH CARE EDUCATION/TRAINING PROGRAM

## 2017-09-20 PROCEDURE — 12400007 ZZH R&B MH INTERMEDIATE UMMC

## 2017-09-20 RX ORDER — FLUORIDE TOOTHPASTE
5 TOOTHPASTE DENTAL 4 TIMES DAILY PRN
Status: DISCONTINUED | OUTPATIENT
Start: 2017-09-20 | End: 2017-10-04 | Stop reason: HOSPADM

## 2017-09-20 RX ADMIN — PRAZOSIN HYDROCHLORIDE 3 MG: 2 CAPSULE ORAL at 22:28

## 2017-09-20 RX ADMIN — BENZTROPINE MESYLATE 1 MG: 1 TABLET ORAL at 20:39

## 2017-09-20 RX ADMIN — OLANZAPINE 10 MG: 10 TABLET, FILM COATED ORAL at 09:09

## 2017-09-20 RX ADMIN — BENZTROPINE MESYLATE 1 MG: 1 TABLET ORAL at 09:09

## 2017-09-20 RX ADMIN — VENLAFAXINE HYDROCHLORIDE 225 MG: 225 TABLET, FILM COATED, EXTENDED RELEASE ORAL at 09:08

## 2017-09-20 RX ADMIN — HYDROXYZINE HYDROCHLORIDE 50 MG: 25 TABLET ORAL at 18:46

## 2017-09-20 RX ADMIN — LEVOTHYROXINE SODIUM 112 MCG: 112 TABLET ORAL at 13:21

## 2017-09-20 RX ADMIN — OLANZAPINE 10 MG: 10 TABLET, FILM COATED ORAL at 10:32

## 2017-09-20 RX ADMIN — PANTOPRAZOLE SODIUM 20 MG: 20 TABLET, DELAYED RELEASE ORAL at 09:09

## 2017-09-20 RX ADMIN — NICOTINE POLACRILEX 4 MG: 2 GUM, CHEWING ORAL at 22:28

## 2017-09-20 RX ADMIN — OLANZAPINE 20 MG: 20 TABLET, FILM COATED ORAL at 20:38

## 2017-09-20 RX ADMIN — VITAMIN D, TAB 1000IU (100/BT) 1000 UNITS: 25 TAB at 17:02

## 2017-09-20 RX ADMIN — OLANZAPINE 10 MG: 10 TABLET, FILM COATED ORAL at 18:26

## 2017-09-20 RX ADMIN — MELATONIN TAB 3 MG 6 MG: 3 TAB at 22:28

## 2017-09-20 ASSESSMENT — ACTIVITIES OF DAILY LIVING (ADL)
GROOMING: SHOWER;INDEPENDENT
DRESS: INDEPENDENT
ORAL_HYGIENE: INDEPENDENT
ORAL_HYGIENE: INDEPENDENT
GROOMING: INDEPENDENT
DRESS: INDEPENDENT
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
## 2017-09-20 NOTE — PROGRESS NOTES
"   09/20/17 1429   Behavioral Health   Hallucinations auditory   Thinking distractable;poor concentration   Orientation person: oriented;place: oriented   Insight admits / accepts   Judgement impaired   Eye Contact at examiner   Affect tense   Mood anxious   Physical Appearance/Attire attire appropriate to age and situation   Hygiene well groomed   Suicidality thoughts only   Self Injury thoughts only   Activity (visible at times.)   Speech clear;coherent   Psychomotor / Gait balanced;steady   Activities of Daily Living   Hygiene/Grooming shower;independent   Oral Hygiene independent   Dress independent   Room Organization independent   Pt approached desk this am, \"I want to bang my head.\" \"I want to commit suicide.\"  Staff talked with pt and encouraged him to use distracting techniques including coloring pictures and listening to music. This worked for a while. Pt came to desk again before lunch, \"I have nothing to live for, I want to die.\" Pt says he will \"do it\" when he gets out of the hospital one way or another.\" Pt endorsing aud halls and SI thoughts. Pt able to be redirected thus far. Cont to monitor and assess.    "

## 2017-09-20 NOTE — PROGRESS NOTES
Visited with pt on the basis of hospital  requested for spiritual support for pt.  Reflected with pt around his hospital experience, sources of spiritual and emotional support and current spiritual health needs. Pt talked about his current situation and what it means for him. Pt shared his story and his family story. During my conversation with him, I let him know that I could be support of him. Pt said  I use marijuana, cigarette and drink alcohol . He expressed his worries about his future.    Emotional support. Reflective conversation integrating elements of illness. Encouraged and help pt to focus on present. I encouraged self-care, coping with illness and building resilience. I also, encouraged advocating himself and follow the teams advised. Encouraged to reconnect his community.  I gave Islamic Prayer Booklet.  Pt received spiritual support and reflective conversation in the context of this hospitalization. Pt expressed appreciation for the visit and the encouragement that he felt. Pt said  I need someone who speaks my language and understands my culture .  He requested ongoing [employer]  support.

## 2017-09-20 NOTE — PROGRESS NOTES
Transfer to [address]:  Patient was transferred to [address] as per order. Patient was informed early in the shift and he was agreeable. No behavioral issues.

## 2017-09-21 PROCEDURE — 25000132 ZZH RX MED GY IP 250 OP 250 PS 637: Performed by: STUDENT IN AN ORGANIZED HEALTH CARE EDUCATION/TRAINING PROGRAM

## 2017-09-21 PROCEDURE — 25000132 ZZH RX MED GY IP 250 OP 250 PS 637: Performed by: PSYCHIATRY & NEUROLOGY

## 2017-09-21 PROCEDURE — 99232 SBSQ HOSP IP/OBS MODERATE 35: CPT | Mod: GC | Performed by: PSYCHIATRY & NEUROLOGY

## 2017-09-21 PROCEDURE — 12400003 ZZH R&B MH CRITICAL UMMC

## 2017-09-21 PROCEDURE — 97150 GROUP THERAPEUTIC PROCEDURES: CPT | Mod: GO

## 2017-09-21 PROCEDURE — 90853 GROUP PSYCHOTHERAPY: CPT

## 2017-09-21 RX ORDER — OLANZAPINE 20 MG/1
20 TABLET ORAL 2 TIMES DAILY
Status: DISCONTINUED | OUTPATIENT
Start: 2017-09-21 | End: 2017-09-25

## 2017-09-21 RX ORDER — OLANZAPINE 10 MG/1
10 TABLET ORAL ONCE
Status: COMPLETED | OUTPATIENT
Start: 2017-09-21 | End: 2017-09-21

## 2017-09-21 RX ORDER — OLANZAPINE 10 MG/1
10 TABLET ORAL DAILY
Status: DISCONTINUED | OUTPATIENT
Start: 2017-09-21 | End: 2017-09-25

## 2017-09-21 RX ADMIN — OLANZAPINE 20 MG: 20 TABLET, FILM COATED ORAL at 19:40

## 2017-09-21 RX ADMIN — OLANZAPINE 10 MG: 10 TABLET, FILM COATED ORAL at 16:38

## 2017-09-21 RX ADMIN — MELATONIN TAB 3 MG 6 MG: 3 TAB at 20:15

## 2017-09-21 RX ADMIN — NICOTINE POLACRILEX 4 MG: 2 GUM, CHEWING ORAL at 18:42

## 2017-09-21 RX ADMIN — PANTOPRAZOLE SODIUM 20 MG: 20 TABLET, DELAYED RELEASE ORAL at 09:48

## 2017-09-21 RX ADMIN — VENLAFAXINE HYDROCHLORIDE 225 MG: 225 TABLET, FILM COATED, EXTENDED RELEASE ORAL at 09:48

## 2017-09-21 RX ADMIN — VITAMIN D, TAB 1000IU (100/BT) 1000 UNITS: 25 TAB at 16:38

## 2017-09-21 RX ADMIN — LEVOTHYROXINE SODIUM 112 MCG: 112 TABLET ORAL at 11:55

## 2017-09-21 RX ADMIN — Medication 5 ML: at 09:58

## 2017-09-21 RX ADMIN — OLANZAPINE 10 MG: 10 TABLET, FILM COATED ORAL at 11:55

## 2017-09-21 RX ADMIN — PRAZOSIN HYDROCHLORIDE 3 MG: 2 CAPSULE ORAL at 20:15

## 2017-09-21 RX ADMIN — BENZTROPINE MESYLATE 1 MG: 1 TABLET ORAL at 19:40

## 2017-09-21 RX ADMIN — OLANZAPINE 10 MG: 10 TABLET, FILM COATED ORAL at 09:48

## 2017-09-21 RX ADMIN — BENZTROPINE MESYLATE 1 MG: 1 TABLET ORAL at 09:48

## 2017-09-21 ASSESSMENT — ACTIVITIES OF DAILY LIVING (ADL)
DRESS: INDEPENDENT
ORAL_HYGIENE: INDEPENDENT
GROOMING: INDEPENDENT

## 2017-09-21 NOTE — PROGRESS NOTES
"Pt in milieu for good part of shift, wathching tv or working independently on art. Selectively social with peers. Pt at one point began head banging in room. Pt was redirected and was brought out of room and room door was locked. Pt then began head banging in hallway. Staff again redirected pt, although pt became upset when writer touched him to prevent him from head banging and postured at writer and stated \"I'm going to slit your throat.\" Due to pt saying that they would not stop head banging and their threats to staff a code green was called preemptively. Pt was able to be redirected and took medications and no restraint or seclusion was necessary. Pt came to writer later in evening and told them again, \"I'm going to slit your throat.\"  When asked why, pt said because writer was talking about them. Pt continued to have paranoid thoughts throughout evening and appeared very tense and hypervigilant of staff and other patients at times. Pt also stated to staff that he wanted to break another patients jaw because he thought that pt was talking about them. Pt endorses AH, and suicidal thoughts without plan. Pt later in evening contracted for safety, and said that they did not want to harm anyone else. Pt was placed on SIO for night shift.      09/20/17 2200   Behavioral Health   Hallucinations auditory   Thinking paranoid;delusional   Orientation person: oriented;place: oriented   Insight admits / accepts   Judgement impaired   Eye Contact at examiner   Affect tense   Mood labile   Physical Appearance/Attire attire appropriate to age and situation   Hygiene well groomed   Suicidality thoughts only   Self Injury active   Elopement (asking to leave)   Activity withdrawn   Speech clear;coherent   Medication Sensitivity no stated side effects;no observed side effects   Psychomotor / Gait balanced;steady   Safety   Suicidality status 15;room close to team care station (desk)   Assault private room;minimal furniture in room "   Psycho Education   Type of Intervention 1:1 intervention   Response observes from a distance   Hours 1   Group Therapy Session   Group Attendance refused to attend group session   Activities of Daily Living   Hygiene/Grooming independent   Oral Hygiene independent   Dress independent   Room Organization independent   Activity   Activity Assistance Provided independent

## 2017-09-21 NOTE — PROGRESS NOTES
----------------------------------------------------------------------------------------------------------  [facility], [place]   Psychiatric Progress Note     Assessment    Presentation: Kamini Neal, a 27 year old male, with history of schizoaffective disorder BPT and PTSD, presented to [facility] ED due to SI, command AH to harm/kill self, and paranoid delusions that people at his group home want to harm/kill him, in the context of recent discharge from [facility] on 9/11/17.     Diagnostic Impression: Patient presented with worsening command auditory hallucinations to harm/kill himself, as well as paranoid delusions that people in his group home want to harm/kill him. On admission, he demonstrated little to no insight into these paranoid delusions, though his insight has improved since that time. Throughout admission, patient had continued to demonstrate behavioral dysregulation including violent head banging, which early on, required chemical and physical restraints. His examination is significant for positive symptoms including paranoid delusions and command AH to kill self and others, behavioral dysregulation in the context of these command AH, and negative symptoms including social withdrawal and flattened, mood incongruent affect. Presentation is most consistent with an exacerbation of patient's historic diagnosis of schizoaffective disorder, possibly with current depressive episode. Recent medication noncompliance and unknown recent EtOH/Khat history are also likely contributing. Patient continues to require ongoing hospitalization for augmentation of his medications, as well as to ensure patient safety given his ongoing SI and SIB.     Hospital course: Kamini Neal was admitted to station 20 as a voluntary patient. Admission labs were wnl, although Utox was ordered and never obtained.  Received his most recent dose Invega Sustenna PTA (9/13/17). PTA effexor,  melatonin, prazosin were continued. Shortly after after arrival, patient began exhibiting head banging behaviors, responsive for a time to prn zyprexa. The following am, patient began violently headbanging and eventually required chemical restraints with combination haldol/ativan/benadryl, and physical restraints. At that point, a 72 hour hold was placed. Scheduled haldol 5 mg BID was started to supplement IM Invega Sustenna. Over the next two days, patient continued to intermittently exhibit head banging/SIB, which on one additional instance, required sedation with IM olanzapine as well as physical restraints. Patient subsequently agreed to sign in as voluntary.      On 9/18, patient developed torticollis which was responsive to IM and PO cogentin. BID scheduled cogentin 1 mg was also started. Scheduled haldol was switched to Olanzapine 10 mg AM and 20 mg PM. Agitiation prns were switched to olanzapine as well. Patient transferred to Station 22 9/19 for a more therapeutic milieu. However, he soon resumed head banging and subsequently made threats to patients and staff. Given the acuity of these threats, patient was subsequently accepted by Dr. Wiggins for transfer to Station 12 on 9/21.    Medical course: Patient admitted without issue. Following first episode of physical restraints being applied, patient complained of R shoulder pain. A 3 view XR was obtained which was negative for any pathophysiology. Treated with Tylenol PRN.     Plan     Principal Diagnosis:   # Acute exacerbation of chronic Schizoaffective Disorder, Bipolar Type    Secondary psychiatric diagnoses of concern this admission:   # PTSD  # Insomnia  # Tobacco Use Disorder  # r/o EtOH Use Disorder    Medications:   Changes:   Increase Scheduled Olanzapine to 20 mg qAM, 10 mg midday, and 20 mg Qhs    Continue:  Benztropine 1 mg BID for EPS  Continue Venlafaxine  mg PO daily with breakfast  Continue Prazosin 3 mg PO Qhs  Continue Melatonin 6 mg  PO qhs  Olanzapine 10 mg PO/IM prn for agitation  Hydroxyzine 25-50 mg PO q4h prn  Artificial saliva 5 ml q4h PRN for dry mouth, dysphagia  Invega Sustenna 117 mg IM qMonth (last dose 9/13/17)  Nicotine supplementation prn     Hold:  PTA Klonopin 1 mg q6hrs PRN    Laboratory/Imaging:   Utox needs to be drawn  CBC wnl  BMP wnl  Lipids wnl, HDL low at 38  TSH 1.20  Folate 10.6  VB12 273    Consults: IM, see below    Patient will be treated in therapeutic milieu with appropriate individual and group therapies as described.    Medical diagnoses to be addressed this admission:    # Torticollis: neck and jaw stiffness in setting of scheduled and prn Haldol on 9/18, resolved with IM/PO cogentin. D/c'd haldol, replaced with Olanzapine. On scheduled cogentin. No further episodes.    #Acute L shoulder pain: s/p Code 21 9/15.  XR negative for injury, likely muscle strain per IM    #GERD: continue PPI per IM    #Hypothyroidism: continue PTA Synthroid 112 mcg per IM    #Vitamin D def: VD 1000 unit tab daily    Consults: Assessed by IM, signed off 9/16/17    Relevant psychosocial stressors: group home dynamics, h/o trauma, SPMI, questionable EtOH abuse    Legal Status: Voluntary    Safety Assessment:   Checks: Status 15  Precautions: Suicide  Self-harm  Assault  Elopement  Substance Withdrawal  Pt has not required locked seclusion or restraints in the past 24 hours to maintain safety, please refer to RN documentation for further details.     The risks, benefits, alternatives and side effects have been discussed and are understood by the patient and other caregivers.    Anticipated Disposition/Discharge Date: Patient requires further inpatient management for treatment and stabilization of paranoid thoughts, AH, SI, like 5-7d       Attestation:  Pt seen and discussed with my attending, MD Alton Weaver MD  PGY-1 Resident  Pager: [phone]    Attestation:  This patient has been seen and evaluated by me,  "Gisell Simms.  I have discussed this patient with the psychiatry resident and I agree with the findings and plan in this note.    I have reviewed today's vital signs, medications, labs and imaging. Gisell Simms MD.         Interim History:   The patient's care was discussed with the treatment team and chart notes were reviewed.     Sleep: 7 hours  PRN: Hydroxyzine x1, Nicorette x1, Olanzapine 10 mg PO x2.     Staff Report: Patient endorsing AH and SI to staff during day yesterday. In evening, increased agitation with episodes of head banging in room and rincon. After staff intervened, made threats to staff \"I'm going to slit your throat.\" Code green called. Patient did calm, but repeated threat to slit throat to staff during subsequent check in. He was paranoid and hypervigilant of staff and other patients. Informed staff that he wanted to break another patient's jaw because he thought that patient was talking about him. Placed on SIO for night shift.     Interview: Patient seen today in milieu. He admitted that last night was difficult for him. He endorsed worsening command AH to head bang and kill himself. He reported that in the last 24h, he has become more paranoid that other staff and patients on the unit are talking about him behind his back. He asked writer why a 1:1 was placed, and it was explained to him that staff were concerned about safety in the milieu given his threats to slit throats of staff and break jaw of patient.  He denies having made specific threats to staff or other patients.  He does however report almost getting into a fight with another patient because he thought the patient was talking about him and threatening him. Discussed plan to continue with dose increases of his scheduled olanzapine to target his psychotic symptoms, and patient is agreeable. Also discussed plan to transfer to Station 12 for a safer, more appropriate milieu, and although patient would prefer to remain " "in this unit, he agrees to this plan.       Review of systems:     The Review of Systems is negative other than noted in the HPI         Medications:     Current Facility-Administered Medications   Medication     OLANZapine (zyPREXA) tablet 20 mg     OLANZapine (zyPREXA) tablet 10 mg     OLANZapine (zyPREXA) tablet 10 mg     artificial saliva (BIOTENE DRY MOUTHWASH) liquid 5 mL     OLANZapine (zyPREXA) tablet 10 mg    Or     OLANZapine (zyPREXA) injection 10 mg     benztropine (COGENTIN) tablet 1 mg     benzocaine-menthol (CEPACOL) 15-3.6 MG lozenge 1 lozenge     sennosides (SENOKOT) tablet 8.6 mg     polyethylene glycol (MIRALAX/GLYCOLAX) Packet 17 g     prazosin (MINIPRESS) capsule 3 mg     levothyroxine (SYNTHROID/LEVOTHROID) tablet 112 mcg     melatonin tablet 6 mg     pantoprazole (PROTONIX) EC tablet 20 mg     venlafaxine (EFFEXOR-ER) 24 hr tablet 225 mg     cholecalciferol (vitamin D) tablet 1,000 Units     hydrOXYzine (ATARAX) tablet 25-50 mg     acetaminophen (TYLENOL) tablet 650 mg     nicotine polacrilex (NICORETTE) gum 2-4 mg             Allergies:     Allergies   Allergen Reactions     Haldol [Haloperidol]      Torticollis            Psychiatric Examination:   /72  Pulse 76  Temp 98.3  F (36.8  C) (Oral)  Resp 17  Ht 1.778 m (5' 10\")  Wt 97.1 kg (214 lb)  SpO2 96%  BMI 30.71 kg/m2  Weight is 214 lbs 0 oz  Body mass index is 30.71 kg/(m^2).    Appearance:  awake, alert and adequately groomed  Attitude:  cooperative  Eye Contact:  Good  Mood:  \"I'm okay\"  Affect:  mood incongruent, intensity is flat (somewhat less so than yesterday), immobile and nonreactive  Speech:  more spontaneous speech today, minimal speech latency, speaks slowly in monotone voice  Psychomotor Behavior:  no evidence of tardive dyskinesia, dystonia, or tics, physical retardation and no tremor  Thought Process:  logical, linear and goal oriented  Associations:  no loose associations  Thought Content:  Endorses command AH " to bang head and to kill self, increased paranoid thoughts that staff and patients on unit are talking about him and want to harm him.   Insight:  fair  Judgment:  poor  Oriented to:  time, person, and place  Attention Span and Concentration:  intact  Recent and Remote Memory:  intact  Language: Uses English language appropriately in conversational context  Fund of Knowledge: appropriate  Muscle Strength and Tone: did not assess  Gait and Station: Normal         Labs:   No results found for this or any previous visit (from the past 24 hour(s)).  UTox not obtained

## 2017-09-21 NOTE — PROGRESS NOTES
09/21/17 1419   Behavioral Health   Hallucinations auditory   Thinking poor concentration;distractable   Orientation person: oriented;place: oriented   Memory baseline memory   Insight poor   Judgement impaired   Eye Contact at examiner   Affect blunted, flat;tense  (mixed throughout the day)   Mood depressed   Physical Appearance/Attire attire appropriate to age and situation   Hygiene well groomed   Suicidality thoughts only   Self Injury thoughts only   Elopement (nothing to report)   Activity withdrawn   Speech clear;coherent   Psychomotor / Gait balanced;steady   Psycho Education   Type of Intervention 1:1 intervention   Response participates, initiates socially appropriate   Hours 1   Treatment Detail (check in)   Activities of Daily Living   Hygiene/Grooming independent   Oral Hygiene independent   Dress independent   Room Organization independent   Behavioral Health Interventions   Psychotic Symptoms establish therapeutic relationship;build upon strengths   Social and Therapeutic Interventions (Psychotic Symptoms) encourage participation in therapeutic groups and milieu activities   Patient was out of his room for the majority of the shift, but was also isolated and napping for a few hours also.  The patient was not social with peers while in the milieu.  The patient did take a shower in the morning and that was his stated goal.  The patient reported his mood as being a 5.5 out of 10 and reported feeling better today compared to other days.  The patient was going to all groups this shift and was engaged in the group activities.  The patient met with his doctors in the morning and they informed him that he might be being transferred to a more secure unit in the near future and the client was anxious about this.  Once the patient was told the reasoning and that it was not a punishment, but only to keep him safe, he was more understanding and accepting about this.  The patient did still endorse being  nervious due to having to meet all new people and staff again.

## 2017-09-21 NOTE — PROGRESS NOTES
"Psychiatry Brief Cross Cover Note    S: Notified by staff that Kamini Neal is becoming increasingly agitated, is threatening to assault peers or staff.  Staff is concerned he may act out, feels strongly that pt needs additional supervision, requests SIO at least for overnight.    O: /73  Pulse 72  Temp 98.3  F (36.8  C) (Oral)  Resp 17  Ht 1.778 m (5' 10\")  Wt 97.1 kg (214 lb)  SpO2 96%  BMI 30.71 kg/m2    A/P:      Placed SIO order for night monitoring.    Primary team to re-examine whether SIO need be continued.    Bull Alfaro MD  Psychiatry Resident PGY-2        "

## 2017-09-22 LAB
BASOPHILS # BLD AUTO: 0 10E9/L (ref 0–0.2)
BASOPHILS NFR BLD AUTO: 0.3 %
DIFFERENTIAL METHOD BLD: NORMAL
EOSINOPHIL # BLD AUTO: 0 10E9/L (ref 0–0.7)
EOSINOPHIL NFR BLD AUTO: 0 %
IMM GRANULOCYTES # BLD: 0.1 10E9/L (ref 0–0.4)
IMM GRANULOCYTES NFR BLD: 0.6 %
LYMPHOCYTES # BLD AUTO: 1.9 10E9/L (ref 0.8–5.3)
LYMPHOCYTES NFR BLD AUTO: 21.3 %
MONOCYTES # BLD AUTO: 0.7 10E9/L (ref 0–1.3)
MONOCYTES NFR BLD AUTO: 7.6 %
NEUTROPHILS # BLD AUTO: 6.2 10E9/L (ref 1.6–8.3)
NEUTROPHILS NFR BLD AUTO: 70.2 %
NRBC # BLD AUTO: 0 10*3/UL
NRBC BLD AUTO-RTO: 0 /100
WBC # BLD AUTO: 8.8 10E9/L (ref 4–11)

## 2017-09-22 PROCEDURE — 25000132 ZZH RX MED GY IP 250 OP 250 PS 637: Performed by: PSYCHIATRY & NEUROLOGY

## 2017-09-22 PROCEDURE — 85004 AUTOMATED DIFF WBC COUNT: CPT | Performed by: PSYCHIATRY & NEUROLOGY

## 2017-09-22 PROCEDURE — 36415 COLL VENOUS BLD VENIPUNCTURE: CPT | Performed by: PSYCHIATRY & NEUROLOGY

## 2017-09-22 PROCEDURE — 90853 GROUP PSYCHOTHERAPY: CPT

## 2017-09-22 PROCEDURE — 25000132 ZZH RX MED GY IP 250 OP 250 PS 637: Performed by: STUDENT IN AN ORGANIZED HEALTH CARE EDUCATION/TRAINING PROGRAM

## 2017-09-22 PROCEDURE — 12400003 ZZH R&B MH CRITICAL UMMC

## 2017-09-22 PROCEDURE — 99232 SBSQ HOSP IP/OBS MODERATE 35: CPT | Performed by: PSYCHIATRY & NEUROLOGY

## 2017-09-22 PROCEDURE — 97150 GROUP THERAPEUTIC PROCEDURES: CPT | Mod: GO

## 2017-09-22 PROCEDURE — 85048 AUTOMATED LEUKOCYTE COUNT: CPT | Performed by: PSYCHIATRY & NEUROLOGY

## 2017-09-22 PROCEDURE — S0136 CLOZAPINE, 25 MG: HCPCS | Performed by: PSYCHIATRY & NEUROLOGY

## 2017-09-22 RX ORDER — CLOZAPINE 100 MG/1
100 TABLET ORAL AT BEDTIME
Status: DISCONTINUED | OUTPATIENT
Start: 2017-09-25 | End: 2017-09-26

## 2017-09-22 RX ORDER — OLANZAPINE 10 MG/1
10 TABLET ORAL DAILY PRN
Status: DISCONTINUED | OUTPATIENT
Start: 2017-09-22 | End: 2017-09-29

## 2017-09-22 RX ORDER — OLANZAPINE 10 MG/2ML
10 INJECTION, POWDER, FOR SOLUTION INTRAMUSCULAR DAILY PRN
Status: DISCONTINUED | OUTPATIENT
Start: 2017-09-22 | End: 2017-09-29

## 2017-09-22 RX ORDER — CLOZAPINE 25 MG/1
25 TABLET ORAL AT BEDTIME
Status: COMPLETED | OUTPATIENT
Start: 2017-09-22 | End: 2017-09-22

## 2017-09-22 RX ORDER — CLOZAPINE 25 MG/1
50 TABLET ORAL AT BEDTIME
Status: COMPLETED | OUTPATIENT
Start: 2017-09-23 | End: 2017-09-23

## 2017-09-22 RX ORDER — CLOZAPINE 25 MG/1
75 TABLET ORAL AT BEDTIME
Status: COMPLETED | OUTPATIENT
Start: 2017-09-24 | End: 2017-09-24

## 2017-09-22 RX ADMIN — NICOTINE POLACRILEX 4 MG: 2 GUM, CHEWING ORAL at 18:10

## 2017-09-22 RX ADMIN — LEVOTHYROXINE SODIUM 112 MCG: 112 TABLET ORAL at 13:30

## 2017-09-22 RX ADMIN — PRAZOSIN HYDROCHLORIDE 3 MG: 2 CAPSULE ORAL at 21:12

## 2017-09-22 RX ADMIN — OLANZAPINE 20 MG: 20 TABLET, FILM COATED ORAL at 08:09

## 2017-09-22 RX ADMIN — PANTOPRAZOLE SODIUM 20 MG: 20 TABLET, DELAYED RELEASE ORAL at 08:09

## 2017-09-22 RX ADMIN — BENZTROPINE MESYLATE 1 MG: 1 TABLET ORAL at 08:09

## 2017-09-22 RX ADMIN — VENLAFAXINE HYDROCHLORIDE 225 MG: 225 TABLET, FILM COATED, EXTENDED RELEASE ORAL at 08:09

## 2017-09-22 RX ADMIN — CLOZAPINE 25 MG: 25 TABLET ORAL at 21:12

## 2017-09-22 RX ADMIN — OLANZAPINE 20 MG: 20 TABLET, FILM COATED ORAL at 21:12

## 2017-09-22 RX ADMIN — OLANZAPINE 10 MG: 10 TABLET, FILM COATED ORAL at 13:30

## 2017-09-22 RX ADMIN — VITAMIN D, TAB 1000IU (100/BT) 1000 UNITS: 25 TAB at 17:39

## 2017-09-22 RX ADMIN — HYDROXYZINE HYDROCHLORIDE 50 MG: 25 TABLET ORAL at 10:15

## 2017-09-22 RX ADMIN — BENZTROPINE MESYLATE 1 MG: 1 TABLET ORAL at 21:12

## 2017-09-22 RX ADMIN — MELATONIN TAB 3 MG 6 MG: 3 TAB at 21:12

## 2017-09-22 ASSESSMENT — ACTIVITIES OF DAILY LIVING (ADL)
GROOMING: INDEPENDENT
ORAL_HYGIENE: INDEPENDENT
DRESS: SCRUBS (BEHAVIORAL HEALTH)
LAUNDRY: UNABLE TO COMPLETE

## 2017-09-22 NOTE — PLAN OF CARE
"Problem: Psychotic Symptoms  Goal: Psychotic Symptoms  Signs and symptoms of listed problems will be absent or manageable.   Outcome: Improving  Pt has been calm and cooperative this evening , remains on sio , he has not made any threatening statements to anyone tonight. He  states he is not hearing voices, \" but is afraid that the spirits will return tonight,\"  Pt med compliant. Aware of transfer  to station 12, agreeable , feels he may feel safer there . Report given to Rn      "

## 2017-09-22 NOTE — PROGRESS NOTES
Kamini is a 28 y/o male with a history of schizoaffective disorder, and PTSD admitted on 9/14 due to auditory hallucinations and suicidal ideation.  He hears voices telling him to kill himself, and he had been engaging in head banging behaviors to hurt himself. Due to his intentional head banging, he has been in and out of 5 points restraints and on status individual observation. Yesterday Kamini had been threatening to kill staff at station 22, and this warrants his transfer to this unit.   He was calm and pleasant on arrival to this unit. No agitating behaviors observed. He was able to follow staff redirection. He took his scheduled medication. He denies any discomfort at this time. This patient will continue on status individual observation at this time due to his head banging behaviors.

## 2017-09-22 NOTE — PROGRESS NOTES
Pt had a good shift. While briefly in the lounge, he kept to himself, but was respectful to staff and peers. He had a meeting with his , which went well. He reported to staff that if he felt like hurting himself, he would let us know. There are no behavioral issues to report.

## 2017-09-22 NOTE — PLAN OF CARE
"Problem: Psychotic Symptoms  Intervention: Social and Therapeutic Interv (Psychotic Symptoms)     Pt attended 2 of 2 OT groups today.  Didn't share a lot, but had appropriate participation in discussion group.  States he is working to better himself, \"I'm taking time to find myself\" and wants to make sure he is in a positive environment.  Chose to paint a wood box during OT clinic.  Worked slowly to do a good job, minimal interaction with writer or peer - other than stating he likes to play video games.        "

## 2017-09-23 PROCEDURE — 12400003 ZZH R&B MH CRITICAL UMMC

## 2017-09-23 PROCEDURE — S0136 CLOZAPINE, 25 MG: HCPCS | Performed by: PSYCHIATRY & NEUROLOGY

## 2017-09-23 PROCEDURE — 25000132 ZZH RX MED GY IP 250 OP 250 PS 637: Performed by: PSYCHIATRY & NEUROLOGY

## 2017-09-23 PROCEDURE — 25000132 ZZH RX MED GY IP 250 OP 250 PS 637: Performed by: STUDENT IN AN ORGANIZED HEALTH CARE EDUCATION/TRAINING PROGRAM

## 2017-09-23 RX ADMIN — BENZTROPINE MESYLATE 1 MG: 1 TABLET ORAL at 20:31

## 2017-09-23 RX ADMIN — OLANZAPINE 10 MG: 10 TABLET, FILM COATED ORAL at 13:10

## 2017-09-23 RX ADMIN — VENLAFAXINE HYDROCHLORIDE 225 MG: 225 TABLET, FILM COATED, EXTENDED RELEASE ORAL at 09:49

## 2017-09-23 RX ADMIN — MELATONIN TAB 3 MG 6 MG: 3 TAB at 20:31

## 2017-09-23 RX ADMIN — LEVOTHYROXINE SODIUM 112 MCG: 112 TABLET ORAL at 13:10

## 2017-09-23 RX ADMIN — OLANZAPINE 20 MG: 20 TABLET, FILM COATED ORAL at 09:49

## 2017-09-23 RX ADMIN — VITAMIN D, TAB 1000IU (100/BT) 1000 UNITS: 25 TAB at 17:12

## 2017-09-23 RX ADMIN — PANTOPRAZOLE SODIUM 20 MG: 20 TABLET, DELAYED RELEASE ORAL at 09:50

## 2017-09-23 RX ADMIN — OLANZAPINE 20 MG: 20 TABLET, FILM COATED ORAL at 20:31

## 2017-09-23 RX ADMIN — CLOZAPINE 50 MG: 25 TABLET ORAL at 20:35

## 2017-09-23 RX ADMIN — NICOTINE POLACRILEX 4 MG: 2 GUM, CHEWING ORAL at 18:02

## 2017-09-23 RX ADMIN — PRAZOSIN HYDROCHLORIDE 3 MG: 2 CAPSULE ORAL at 20:31

## 2017-09-23 RX ADMIN — BENZTROPINE MESYLATE 1 MG: 1 TABLET ORAL at 09:49

## 2017-09-23 ASSESSMENT — ACTIVITIES OF DAILY LIVING (ADL)
DRESS: SCRUBS (BEHAVIORAL HEALTH)
LAUNDRY: UNABLE TO COMPLETE
ORAL_HYGIENE: INDEPENDENT
HYGIENE/GROOMING: INDEPENDENT

## 2017-09-23 NOTE — PROGRESS NOTES
Pt started the shift sleeping in his room. He awoke for dinner and was pleasant with staff and peers. He was withdrawn, but social upon approach. There are no behavioral issues to report.

## 2017-09-23 NOTE — PROGRESS NOTES
"Pt requested if it would be possible for him to see a doctor regarding getting a new pair of glasses as his were broken \"several days ago.\" States he is near sighted, and has difficulty reading signs about 6 feet away.   "

## 2017-09-23 NOTE — PROGRESS NOTES
called unit to inform team that they do not have an extra pair of glasses for pt, nor does he wear contacts. Pt agreed to call his  this evening to ask if he has access to pt's prescription and could help him get a pair of glasses sent to the hospital.

## 2017-09-23 NOTE — PROGRESS NOTES
"[facility], [place]   Psychiatric Progress Note        Interim History:   The patient's care was discussed with the treatment team during the daily team meeting and/or staff's chart notes were reviewed.  Staff report patient has not engaged in any head banging or threatening behavior since transfer to station 12.  He slept well last night and has been med compliant.  He asked for a prn this AM and took Vistaril.  He says today his mood is \"not good\" and he related this to a phone conversation with his mother.  He says he hadn't talked to her since admission and she was mad about the fact that he hadn't called earlier.  He says he doesn't see her much but they talk on the phone fairly frequently.  He says he has lived at his current group home for 3 or 4 years and he likes it there.  He says they manage his meds and he says he has been compliant recently.  He says he got his last Invega shot about 1 week ago.  He denies having any SE's.  He says he slept well last night.  He continues to have AH with derogatory content.  He thinks it's his mind playing tricks on him but it is bothersome and at times he thinks it may be something else that causes the voices.  He says his mood today is \"depressed\" and he has been thinking about banging his head, but says he feels he can control these thoughts and not do it.  He asks to be taken off of 1:1, says \"there's plenty of staff here and I'll tell someone if I'm feeling bad.\"  I ask about past meds and he says Clozaril was the most effective.  He took it for years but stopped d/t oversedation that interfered with his job.  He no longer has a job so he is less worried about this now.  He would like to start it again since it was the only med that took away his AH completely.  After discussion of the indications, risks, benefits, alternatives and consequences of no treatment, the patient elects to start Clozaril for psychosis and mood.  The patient had " "no further complaints or requests.          Medications:       cloZAPine  25 mg Oral At Bedtime     [START ON 9/23/2017] cloZAPine  50 mg Oral At Bedtime     [START ON 9/24/2017] cloZAPine  75 mg Oral At Bedtime     [START ON 9/25/2017] cloZAPine  100 mg Oral At Bedtime     OLANZapine  20 mg Oral BID     OLANZapine  10 mg Oral Daily     benztropine  1 mg Oral BID     prazosin  3 mg Oral At Bedtime     levothyroxine (SYNTHROID/LEVOTHROID) tablet 112 mcg  112 mcg Oral Daily     melatonin tablet 6 mg  6 mg Oral At Bedtime     pantoprazole (PROTONIX) EC tablet 20 mg  20 mg Oral Daily     venlafaxine  225 mg Oral Daily with breakfast     cholecalciferol  1,000 Units Oral Daily          Allergies:     Allergies   Allergen Reactions     Haldol [Haloperidol]      Torticollis          Labs:     Recent Results (from the past 24 hour(s))   WBC and differential    Collection Time: 09/22/17  1:27 PM   Result Value Ref Range    WBC 8.8 4.0 - 11.0 10e9/L    Diff Method Automated Method     % Neutrophils 70.2 %    % Lymphocytes 21.3 %    % Monocytes 7.6 %    % Eosinophils 0.0 %    % Basophils 0.3 %    % Immature Granulocytes 0.6 %    Nucleated RBCs 0 0 /100    Absolute Neutrophil 6.2 1.6 - 8.3 10e9/L    Absolute Lymphocytes 1.9 0.8 - 5.3 10e9/L    Absolute Monocytes 0.7 0.0 - 1.3 10e9/L    Absolute Eosinophils 0.0 0.0 - 0.7 10e9/L    Absolute Basophils 0.0 0.0 - 0.2 10e9/L    Abs Immature Granulocytes 0.1 0 - 0.4 10e9/L    Absolute Nucleated RBC 0.0           Psychiatric Examination:     /74  Pulse 81  Temp 96  F (35.6  C) (Oral)  Resp 17  Ht 1.778 m (5' 10\")  Wt 97.1 kg (214 lb)  SpO2 96%  BMI 30.71 kg/m2  Weight is 214 lbs 0 oz  Body mass index is 30.71 kg/(m^2).  Orthostatic Vitals       Most Recent      Sitting Orthostatic /74 09/22 1900    Sitting Orthostatic Pulse (bpm) 81 09/22 1900          Appearance: alert  Attitude:  cooperative  Eye Contact:  fair  Mood:  Depressed, anxious, distressed by derogatory " AH  Affect:  constricted  Speech:  Somewhat delayed and slow  Psychomotor Behavior:  no evidence of tardive dyskinesia, dystonia, or tics and intact station, gait and muscle tone  Throught Process:  Logical, a bit tangential at times  Associations:  no loose associations  Thought Content:  Bothersome AH with derogatory content, has thoughts of banging his head when mood is especially low, no SI/HI  Insight:  Good, especially considering the severity of his symptoms  Judgement:  Intact  Language: Appropriate  Fund of Knowledge: Average  Oriented to:  time, person, and place  Attention Span and Concentration:  intact  Recent and Remote Memory:  intact                 Precautions:     Behavioral Orders   Procedures     Assault precautions     Code 1 - Restrict to Unit     Routine Programming     As clinically indicated     Self Injury Precaution     Status 15     Every 15 minutes.     Suicide precautions     Withdrawal precautions          DIagnoses:     Schizoaffective Disorder, Bipolar Type  # PTSD  # Insomnia  # Tobacco Use Disorder  # r/o EtOH Use Disorder         Plan:     Start Clozaril and titrate per recommended schedule  Will continue Zyprexa for now but hope to switch to oral monotherapy with Clozaril  Will need to get further collateral hx before deciding what to do about Malika Carter, but pt is not due for this for another 3 weeks roughly      Pt will need to show reduction in AH, improvement in mood and reduction in urges to bang his head before discharge can safely occur.  He is here voluntarily and he is his own guardian.  He will return to his group home when ready for discharge, and he is in agreement with this disposition.

## 2017-09-24 PROCEDURE — 90853 GROUP PSYCHOTHERAPY: CPT

## 2017-09-24 PROCEDURE — S0136 CLOZAPINE, 25 MG: HCPCS | Performed by: PSYCHIATRY & NEUROLOGY

## 2017-09-24 PROCEDURE — 25000132 ZZH RX MED GY IP 250 OP 250 PS 637: Performed by: STUDENT IN AN ORGANIZED HEALTH CARE EDUCATION/TRAINING PROGRAM

## 2017-09-24 PROCEDURE — 25000132 ZZH RX MED GY IP 250 OP 250 PS 637: Performed by: PSYCHIATRY & NEUROLOGY

## 2017-09-24 PROCEDURE — 12400003 ZZH R&B MH CRITICAL UMMC

## 2017-09-24 RX ADMIN — VENLAFAXINE HYDROCHLORIDE 225 MG: 225 TABLET, FILM COATED, EXTENDED RELEASE ORAL at 09:03

## 2017-09-24 RX ADMIN — PANTOPRAZOLE SODIUM 20 MG: 20 TABLET, DELAYED RELEASE ORAL at 09:03

## 2017-09-24 RX ADMIN — CLOZAPINE 75 MG: 25 TABLET ORAL at 21:02

## 2017-09-24 RX ADMIN — NICOTINE POLACRILEX 4 MG: 2 GUM, CHEWING ORAL at 19:43

## 2017-09-24 RX ADMIN — OLANZAPINE 20 MG: 20 TABLET, FILM COATED ORAL at 21:02

## 2017-09-24 RX ADMIN — VITAMIN D, TAB 1000IU (100/BT) 1000 UNITS: 25 TAB at 17:16

## 2017-09-24 RX ADMIN — BENZTROPINE MESYLATE 1 MG: 1 TABLET ORAL at 21:01

## 2017-09-24 RX ADMIN — Medication 5 ML: at 21:58

## 2017-09-24 RX ADMIN — BENZTROPINE MESYLATE 1 MG: 1 TABLET ORAL at 09:03

## 2017-09-24 RX ADMIN — NICOTINE POLACRILEX 4 MG: 2 GUM, CHEWING ORAL at 16:29

## 2017-09-24 RX ADMIN — OLANZAPINE 20 MG: 20 TABLET, FILM COATED ORAL at 09:03

## 2017-09-24 RX ADMIN — PRAZOSIN HYDROCHLORIDE 3 MG: 2 CAPSULE ORAL at 21:01

## 2017-09-24 RX ADMIN — OLANZAPINE 10 MG: 10 TABLET, FILM COATED ORAL at 13:24

## 2017-09-24 RX ADMIN — ACETAMINOPHEN 650 MG: 325 TABLET, FILM COATED ORAL at 09:17

## 2017-09-24 RX ADMIN — LEVOTHYROXINE SODIUM 112 MCG: 112 TABLET ORAL at 13:24

## 2017-09-24 RX ADMIN — MELATONIN TAB 3 MG 6 MG: 3 TAB at 21:01

## 2017-09-24 RX ADMIN — Medication 5 ML: at 13:24

## 2017-09-24 RX ADMIN — OLANZAPINE 10 MG: 10 TABLET, FILM COATED ORAL at 17:26

## 2017-09-24 ASSESSMENT — ACTIVITIES OF DAILY LIVING (ADL)
ORAL_HYGIENE: INDEPENDENT
GROOMING: INDEPENDENT
DRESS: SCRUBS (BEHAVIORAL HEALTH)
LAUNDRY: UNABLE TO COMPLETE

## 2017-09-24 NOTE — PLAN OF CARE
Problem: Psychotic Symptoms  Goal: Psychotic Symptoms  Signs and symptoms of listed problems will be absent or manageable.      Attended OT group. He learned activity requiring problem solving using visouspatial concepts. He was successful in learning steps with directions explained nly 1X. He took time to plan more complex moves in activity. He appeared calm, needed no reminders it was his turn, appearing alert and involved. He was pleasant and supportive with peer, offered encouragement in a kind manner. He initiated conversation and occasional gentle jokes. Offered direct eye contact.

## 2017-09-24 NOTE — PROGRESS NOTES
"1730 Pt requested prn Zyprexa \"The voices are getting really bad. They're telling me I'm worthless, I'm a piece of shit. I keep have images, like flashbacks. It's making me have urges to hurt myself.\" Pt's expression was flat, but pt was more talkative than typical and tone seemed anxious. Pt given 10 mg Zyprexa, asked if there was any kind of distraction he wanted to try, stated, \"I want to try watching the game in my room, and then after dinner I think I should try a shower, when the meds kick in.\" Pt contracted for safety and agreed to keep room door open and to check in frequently with writer.    1750 Pt states voices are still present, but less intense. Was able to eat dinner, states, \"I don't think I'm going to hurt myself.\" Agrees to continue to check in.    1815 Pt states \"I'm good. All of the voices are gone, 100%.\"  "

## 2017-09-25 PROCEDURE — 99232 SBSQ HOSP IP/OBS MODERATE 35: CPT | Performed by: PSYCHIATRY & NEUROLOGY

## 2017-09-25 PROCEDURE — 25000132 ZZH RX MED GY IP 250 OP 250 PS 637: Performed by: PSYCHIATRY & NEUROLOGY

## 2017-09-25 PROCEDURE — 25000132 ZZH RX MED GY IP 250 OP 250 PS 637: Performed by: STUDENT IN AN ORGANIZED HEALTH CARE EDUCATION/TRAINING PROGRAM

## 2017-09-25 PROCEDURE — S0136 CLOZAPINE, 25 MG: HCPCS | Performed by: PSYCHIATRY & NEUROLOGY

## 2017-09-25 PROCEDURE — 12400003 ZZH R&B MH CRITICAL UMMC

## 2017-09-25 RX ORDER — OLANZAPINE 10 MG/1
10 TABLET ORAL 3 TIMES DAILY
Status: DISCONTINUED | OUTPATIENT
Start: 2017-09-25 | End: 2017-09-28

## 2017-09-25 RX ADMIN — PANTOPRAZOLE SODIUM 20 MG: 20 TABLET, DELAYED RELEASE ORAL at 08:55

## 2017-09-25 RX ADMIN — PRAZOSIN HYDROCHLORIDE 3 MG: 2 CAPSULE ORAL at 20:45

## 2017-09-25 RX ADMIN — OLANZAPINE 10 MG: 10 TABLET, FILM COATED ORAL at 14:26

## 2017-09-25 RX ADMIN — VENLAFAXINE HYDROCHLORIDE 225 MG: 225 TABLET, FILM COATED, EXTENDED RELEASE ORAL at 08:55

## 2017-09-25 RX ADMIN — LEVOTHYROXINE SODIUM 112 MCG: 112 TABLET ORAL at 14:26

## 2017-09-25 RX ADMIN — CLOZAPINE 100 MG: 100 TABLET ORAL at 20:45

## 2017-09-25 RX ADMIN — BENZTROPINE MESYLATE 1 MG: 1 TABLET ORAL at 08:55

## 2017-09-25 RX ADMIN — MELATONIN TAB 3 MG 6 MG: 3 TAB at 20:45

## 2017-09-25 RX ADMIN — VITAMIN D, TAB 1000IU (100/BT) 1000 UNITS: 25 TAB at 17:53

## 2017-09-25 RX ADMIN — OLANZAPINE 10 MG: 10 TABLET, FILM COATED ORAL at 20:45

## 2017-09-25 RX ADMIN — BENZTROPINE MESYLATE 1 MG: 1 TABLET ORAL at 20:45

## 2017-09-25 RX ADMIN — OLANZAPINE 20 MG: 20 TABLET, FILM COATED ORAL at 08:55

## 2017-09-25 ASSESSMENT — ACTIVITIES OF DAILY LIVING (ADL)
DRESS: INDEPENDENT
LAUNDRY: WITH SUPERVISION
HYGIENE/GROOMING: INDEPENDENT
ORAL_HYGIENE: INDEPENDENT

## 2017-09-25 NOTE — PLAN OF CARE
Problem: General Plan of Care (Inpatient Behavioral)  Goal: Team Discussion  Team Plan:   BEHAVIORAL TEAM DISCUSSION     Participants: AL Carolina, Lars Wiggins MD, Ba Acuña RN, Cecilio (Resident), MD  Progress: Minimal, continues on precautions  Continued Stay Criteria/Rationale: Is not stable  Medical/Physical: Monitored by internal medicine  Precautions:   Behavioral Orders   Procedures     Assault precautions     Code 1 - Restrict to Unit     Routine Programming       As clinically indicated     Self Injury Precaution     Status 15       Every 15 minutes.     Suicide precautions     Withdrawal precautions     Plan: continue plan, monitor behavioral precautions  Rationale for change in precautions or plan: no changes.

## 2017-09-25 NOTE — PROGRESS NOTES
"[facility], [place]   Psychiatric Progress Note        Interim History:   The patient's care was discussed with the treatment team during the daily team meeting and/or staff's chart notes were reviewed.  Staff report patient had a good weekend, had no SIB or agitation, denied having command hallucinations but still reported having some bothersome AH with derogatory content.  He took a prn of Zyrpexa for this and got relief.  He has been calm and pleasant.  He says today his mood is \"pretty good\" and he says he has slept well.  He denies having any SE's.  He says his AH and depression are improved compared to last week.  He is willing to continue increasing the Clozaril and begin decreasing the Zyprexa.  He says his group home wants him on a long-acting injectable medication so he is okay with staying on the Invega Sustenna and combining it with a low-moderate dose of Clozaril.  He says he was on 600 mg of Clozaril in the past and found this to be too sedating.  The patient had no further complaints or requests.          Medications:       OLANZapine  10 mg Oral TID     cloZAPine  100 mg Oral At Bedtime     benztropine  1 mg Oral BID     prazosin  3 mg Oral At Bedtime     levothyroxine (SYNTHROID/LEVOTHROID) tablet 112 mcg  112 mcg Oral Daily     melatonin tablet 6 mg  6 mg Oral At Bedtime     pantoprazole (PROTONIX) EC tablet 20 mg  20 mg Oral Daily     venlafaxine  225 mg Oral Daily with breakfast     cholecalciferol  1,000 Units Oral Daily          Allergies:     Allergies   Allergen Reactions     Haldol [Haloperidol]      Torticollis          Labs:     No results found for this or any previous visit (from the past 24 hour(s)).       Psychiatric Examination:     /84  Pulse 85  Temp 98.3  F (36.8  C) (Tympanic)  Resp 16  Ht 1.778 m (5' 10\")  Wt 97.1 kg (214 lb)  SpO2 96%  BMI 30.71 kg/m2  Weight is 214 lbs 0 oz  Body mass index is 30.71 kg/(m^2).  Orthostatic Vitals       " Most Recent      Sitting Orthostatic /74 09/22 1900    Sitting Orthostatic Pulse (bpm) 81 09/22 1900          Appearance: alert  Attitude:  cooperative  Eye Contact:  fair  Mood:  Depressed, anxious, distressed by derogatory AH  Affect:  constricted  Speech:  Somewhat delayed and slow  Psychomotor Behavior:  no evidence of tardive dyskinesia, dystonia, or tics and intact station, gait and muscle tone  Throught Process:  Logical, a bit tangential at times  Associations:  no loose associations  Thought Content:  Bothersome AH with derogatory content, has thoughts of banging his head when mood is especially low, no SI/HI  Insight:  Good, especially considering the severity of his symptoms  Judgement:  Intact  Language: Appropriate  Fund of Knowledge: Average  Oriented to:  time, person, and place  Attention Span and Concentration:  intact  Recent and Remote Memory:  intact                 Precautions:     Behavioral Orders   Procedures     Assault precautions     Code 1 - Restrict to Unit     Routine Programming     As clinically indicated     Self Injury Precaution     Status 15     Every 15 minutes.     Suicide precautions     Withdrawal precautions          DIagnoses:     Schizoaffective Disorder, Bipolar Type  # PTSD  # Insomnia  # Tobacco Use Disorder  # r/o EtOH Use Disorder         Plan:     Increase Clozaril per recommended schedule; pt prefers to have the full dose at HS  Decrease Zyprexa with plan to switch to oral monotherapy with Clozaril  Will continue Invega Sustenna as this is preferred by pt's group home and pt is agreeable; he is next due in 3 weeks roughly    Pt will need to show stable reduction in AH, improved mood and reduced urges to bang his head before discharge can safely occur.  He is here voluntarily and he is his own guardian.  He will return to his group home when ready for discharge, and he is in agreement with this disposition.
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
## 2017-09-25 NOTE — PROGRESS NOTES
P/C with , [facility]- [phone].  They are willing to take him back, but they have some concerns with his compliance- he refuses his medicine and refuses to go to the  Appointments.  He has not been to see his therapist for along time, before his current  has been there.

## 2017-09-25 NOTE — PROGRESS NOTES
09/25/17 1427   Behavioral Health   Hallucinations denies / not responding to hallucinations;other (see comment)  (Pt states no hallucinations this shift.)   Thinking intact   Orientation person: oriented;place: oriented;date: oriented;time: oriented   Memory baseline memory   Insight admits / accepts   Judgement intact   Eye Contact at examiner   Affect blunted, flat;full range affect   Mood mood is calm   Physical Appearance/Attire appears stated age;attire appropriate to age and situation   Hygiene well groomed   Suicidality other (see comments)  (Pt denies.)   Self Injury other (see comment)  (Pt denies.)   Elopement (Pt didn't exhibit these behaviors this shift.)   Activity isolative;withdrawn;other (see comment)  (sleeping most of the shift)   Speech coherent;clear   Psychomotor / Gait steady;balanced   Coping/Psychosocial   Verbalized Emotional State other (see comments)  (pt states he's feeling fine)   Activities of Daily Living   Hygiene/Grooming independent   Oral Hygiene independent   Dress independent   Laundry with supervision   Room Organization independent   Significant Event   Significant Event Other (see comments)  (Shift Summary)   Behavioral Health Interventions   Psychotic Symptoms maintain safety precautions;maintain safe secure environment;simple, clear language;decrease environmental stimulation;provide emotional support;establish therapeutic relationship;assist with developing & utilizing healthy coping strategies;build upon strengths   Social and Therapeutic Interventions (Psychotic Symptoms) encourage socialization with peers;encourage effective boundaries with peers;encourage participation in therapeutic groups and milieu activities   Pt denies SI and SIB thoughts. Pt states that he's not feeling depressed or anxious. Pt states that he hasn't had any hallucinations this shift. Pt was isolative as he rested and slept most of the shift. Pt was calm, cooperative and pleasant.

## 2017-09-26 PROCEDURE — 25000132 ZZH RX MED GY IP 250 OP 250 PS 637: Performed by: STUDENT IN AN ORGANIZED HEALTH CARE EDUCATION/TRAINING PROGRAM

## 2017-09-26 PROCEDURE — 90853 GROUP PSYCHOTHERAPY: CPT

## 2017-09-26 PROCEDURE — S0136 CLOZAPINE, 25 MG: HCPCS | Performed by: STUDENT IN AN ORGANIZED HEALTH CARE EDUCATION/TRAINING PROGRAM

## 2017-09-26 PROCEDURE — 25000128 H RX IP 250 OP 636: Performed by: PSYCHIATRY & NEUROLOGY

## 2017-09-26 PROCEDURE — 25000132 ZZH RX MED GY IP 250 OP 250 PS 637: Performed by: PSYCHIATRY & NEUROLOGY

## 2017-09-26 PROCEDURE — 90686 IIV4 VACC NO PRSV 0.5 ML IM: CPT | Performed by: PSYCHIATRY & NEUROLOGY

## 2017-09-26 PROCEDURE — 12400007 ZZH R&B MH INTERMEDIATE UMMC

## 2017-09-26 PROCEDURE — 99232 SBSQ HOSP IP/OBS MODERATE 35: CPT | Mod: GC | Performed by: PSYCHIATRY & NEUROLOGY

## 2017-09-26 RX ADMIN — OLANZAPINE 10 MG: 10 TABLET, FILM COATED ORAL at 19:25

## 2017-09-26 RX ADMIN — PANTOPRAZOLE SODIUM 20 MG: 20 TABLET, DELAYED RELEASE ORAL at 08:44

## 2017-09-26 RX ADMIN — OLANZAPINE 10 MG: 10 TABLET, FILM COATED ORAL at 08:44

## 2017-09-26 RX ADMIN — CLOZAPINE 150 MG: 100 TABLET ORAL at 20:02

## 2017-09-26 RX ADMIN — VENLAFAXINE HYDROCHLORIDE 225 MG: 225 TABLET, FILM COATED, EXTENDED RELEASE ORAL at 08:43

## 2017-09-26 RX ADMIN — MELATONIN TAB 3 MG 6 MG: 3 TAB at 20:02

## 2017-09-26 RX ADMIN — BENZTROPINE MESYLATE 1 MG: 1 TABLET ORAL at 08:44

## 2017-09-26 RX ADMIN — LEVOTHYROXINE SODIUM 112 MCG: 112 TABLET ORAL at 13:08

## 2017-09-26 RX ADMIN — VITAMIN D, TAB 1000IU (100/BT) 1000 UNITS: 25 TAB at 17:09

## 2017-09-26 RX ADMIN — OLANZAPINE 10 MG: 10 TABLET, FILM COATED ORAL at 13:08

## 2017-09-26 RX ADMIN — BENZTROPINE MESYLATE 1 MG: 1 TABLET ORAL at 19:25

## 2017-09-26 RX ADMIN — INFLUENZA A VIRUS A/MICHIGAN/45/2015 X-275 (H1N1) ANTIGEN (FORMALDEHYDE INACTIVATED), INFLUENZA A VIRUS A/HONG KONG/4801/2014 X-263B (H3N2) ANTIGEN (FORMALDEHYDE INACTIVATED), INFLUENZA B VIRUS B/PHUKET/3073/2013 ANTIGEN (FORMALDEHYDE INACTIVATED), AND INFLUENZA B VIRUS B/BRISBANE/60/2008 ANTIGEN (FORMALDEHYDE INACTIVATED) 0.5 ML: 15; 15; 15; 15 INJECTION, SUSPENSION INTRAMUSCULAR at 14:53

## 2017-09-26 RX ADMIN — PRAZOSIN HYDROCHLORIDE 3 MG: 2 CAPSULE ORAL at 20:02

## 2017-09-26 ASSESSMENT — ACTIVITIES OF DAILY LIVING (ADL)
ORAL_HYGIENE: INDEPENDENT
GROOMING: INDEPENDENT
LAUNDRY: WITH SUPERVISION
HYGIENE/GROOMING: INDEPENDENT
DRESS: SCRUBS (BEHAVIORAL HEALTH)
LAUNDRY: UNABLE TO COMPLETE
ORAL_HYGIENE: INDEPENDENT
DRESS: INDEPENDENT

## 2017-09-26 NOTE — PLAN OF CARE
Problem: Psychotic Symptoms  Goal: Psychotic Symptoms  Signs and symptoms of listed problems will be absent or manageable.   Outcome: Improving  Patient has been calm and cooperative. He has been using PRN medications appropriately. Denies any SI/SIB. He is looking forward to transferring to [facility]. He has has no behavioral issues on this unit.
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
## 2017-09-26 NOTE — PROGRESS NOTES
09/25/17 2241   Behavioral Health   Hallucinations denies / not responding to hallucinations   Thinking poor concentration   Orientation person: oriented;place: oriented   Memory baseline memory   Insight admits / accepts     Pt was isolated most of the shift except for dinner and snacks. Pt seem depressed and withdrawn with poor social  Interaction with peers. Pt denies SI and SIB. Pt overall had a smooth shift without events demanding redirections.

## 2017-09-26 NOTE — PLAN OF CARE
Problem: Psychotic Symptoms  Intervention: Social and Therapeutic Interv (Psychotic Symptoms)     Pt initially declined group thinking a particular peer would be there, though when told the peer was on room restriction, he happily attended.  Pt was interested in playing 'Panaya', and demonstrated high level thinking with his problem solving and problem solving ability.     Friendly, engaging discussion.  Insightful to his needs, states he's here until early next week as medications are increased.  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  Pt reports he was hospitalized too many times, and lost his job, but that he is a good worker, and likes working independently in such a job, as it's generally the same routine and he clearly knows what's expected of him.

## 2017-09-27 PROCEDURE — 25000132 ZZH RX MED GY IP 250 OP 250 PS 637: Performed by: PSYCHIATRY & NEUROLOGY

## 2017-09-27 PROCEDURE — S0136 CLOZAPINE, 25 MG: HCPCS | Performed by: STUDENT IN AN ORGANIZED HEALTH CARE EDUCATION/TRAINING PROGRAM

## 2017-09-27 PROCEDURE — 25000132 ZZH RX MED GY IP 250 OP 250 PS 637: Performed by: STUDENT IN AN ORGANIZED HEALTH CARE EDUCATION/TRAINING PROGRAM

## 2017-09-27 PROCEDURE — 99231 SBSQ HOSP IP/OBS SF/LOW 25: CPT | Mod: GC | Performed by: PSYCHIATRY & NEUROLOGY

## 2017-09-27 PROCEDURE — 12400007 ZZH R&B MH INTERMEDIATE UMMC

## 2017-09-27 RX ORDER — POLYETHYLENE GLYCOL 3350 17 G/17G
17 POWDER, FOR SOLUTION ORAL DAILY
Status: DISCONTINUED | OUTPATIENT
Start: 2017-09-27 | End: 2017-10-02

## 2017-09-27 RX ADMIN — PANTOPRAZOLE SODIUM 20 MG: 20 TABLET, DELAYED RELEASE ORAL at 08:55

## 2017-09-27 RX ADMIN — NICOTINE POLACRILEX 4 MG: 2 GUM, CHEWING ORAL at 14:34

## 2017-09-27 RX ADMIN — MELATONIN TAB 3 MG 6 MG: 3 TAB at 20:42

## 2017-09-27 RX ADMIN — VENLAFAXINE HYDROCHLORIDE 225 MG: 225 TABLET, FILM COATED, EXTENDED RELEASE ORAL at 08:55

## 2017-09-27 RX ADMIN — OLANZAPINE 10 MG: 10 TABLET, FILM COATED ORAL at 08:55

## 2017-09-27 RX ADMIN — BENZTROPINE MESYLATE 1 MG: 1 TABLET ORAL at 08:55

## 2017-09-27 RX ADMIN — NICOTINE POLACRILEX 4 MG: 2 GUM, CHEWING ORAL at 16:25

## 2017-09-27 RX ADMIN — CLOZAPINE 150 MG: 100 TABLET ORAL at 20:42

## 2017-09-27 RX ADMIN — NICOTINE POLACRILEX 4 MG: 2 GUM, CHEWING ORAL at 18:18

## 2017-09-27 RX ADMIN — OLANZAPINE 10 MG: 10 TABLET, FILM COATED ORAL at 20:42

## 2017-09-27 RX ADMIN — PRAZOSIN HYDROCHLORIDE 3 MG: 2 CAPSULE ORAL at 20:42

## 2017-09-27 RX ADMIN — LEVOTHYROXINE SODIUM 112 MCG: 112 TABLET ORAL at 15:12

## 2017-09-27 RX ADMIN — OLANZAPINE 10 MG: 10 TABLET, FILM COATED ORAL at 14:34

## 2017-09-27 RX ADMIN — VITAMIN D, TAB 1000IU (100/BT) 1000 UNITS: 25 TAB at 16:25

## 2017-09-27 RX ADMIN — BENZTROPINE MESYLATE 1 MG: 1 TABLET ORAL at 20:42

## 2017-09-27 ASSESSMENT — ACTIVITIES OF DAILY LIVING (ADL)
GROOMING: INDEPENDENT
ORAL_HYGIENE: INDEPENDENT
LAUNDRY: WITH SUPERVISION
LAUNDRY: WITH SUPERVISION
ORAL_HYGIENE: INDEPENDENT
DRESS: INDEPENDENT
GROOMING: INDEPENDENT
DRESS: SCRUBS (BEHAVIORAL HEALTH)
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
## 2017-09-27 NOTE — PROGRESS NOTES
09/27/17 1417   Behavioral Health   Hallucinations denies / not responding to hallucinations   Thinking distractable   Orientation place: oriented;person: oriented;date: oriented;time: oriented   Memory baseline memory   Insight poor   Judgement impaired   Eye Contact at examiner   Affect full range affect   Mood mood is calm   Physical Appearance/Attire appears stated age   Hygiene well groomed   Suicidality other (see comments)  (Denies)   Self Injury other (see comment)  (Denies)   Activity isolative   Speech clear;coherent   Medication Sensitivity no stated side effects   Psychomotor / Gait balanced;steady   Psycho Education   Type of Intervention 1:1 intervention   Response participates, initiates socially appropriate   Hours 0.5   Activities of Daily Living   Hygiene/Grooming independent   Oral Hygiene independent   Dress scrubs (behavioral health)   Laundry with supervision   Room Organization independent   Behavioral Health Interventions   Psychotic Symptoms maintain safety precautions;assist patient in developing safety plan;assist patient in following safety plan;build upon strengths;establish therapeutic relationship   Social and Therapeutic Interventions (Psychotic Symptoms) encourage socialization with peers;encourage participation in therapeutic groups and milieu activities   Pt spent time in room and out for meals or making requests. He has isolative but calm with listening headphone. He appears cooperative and calm on approach.

## 2017-09-27 NOTE — PROGRESS NOTES
Transferred from [facility]. Pt told staff immediately that he  wants to go to [facility] .  I want a private room . Staff reminded pt to talk to his attending physician tomorrow morning. Pt agreed. Pt had anxious mood and flat affect. Pt denied having SI or SIB. Pt kept to himself but talk to another pt briefly.

## 2017-09-28 PROCEDURE — 25000132 ZZH RX MED GY IP 250 OP 250 PS 637: Performed by: STUDENT IN AN ORGANIZED HEALTH CARE EDUCATION/TRAINING PROGRAM

## 2017-09-28 PROCEDURE — 12400007 ZZH R&B MH INTERMEDIATE UMMC

## 2017-09-28 PROCEDURE — 25000132 ZZH RX MED GY IP 250 OP 250 PS 637

## 2017-09-28 PROCEDURE — 97150 GROUP THERAPEUTIC PROCEDURES: CPT | Mod: GO

## 2017-09-28 PROCEDURE — S0136 CLOZAPINE, 25 MG: HCPCS

## 2017-09-28 PROCEDURE — 25000132 ZZH RX MED GY IP 250 OP 250 PS 637: Performed by: PSYCHIATRY & NEUROLOGY

## 2017-09-28 PROCEDURE — 99231 SBSQ HOSP IP/OBS SF/LOW 25: CPT | Mod: GC | Performed by: PSYCHIATRY & NEUROLOGY

## 2017-09-28 RX ORDER — CLOZAPINE 100 MG/1
200 TABLET ORAL AT BEDTIME
Status: COMPLETED | OUTPATIENT
Start: 2017-09-28 | End: 2017-09-29

## 2017-09-28 RX ORDER — OLANZAPINE 5 MG/1
5 TABLET ORAL DAILY
Status: DISCONTINUED | OUTPATIENT
Start: 2017-09-28 | End: 2017-09-29

## 2017-09-28 RX ORDER — OLANZAPINE 10 MG/1
10 TABLET ORAL 2 TIMES DAILY
Status: DISCONTINUED | OUTPATIENT
Start: 2017-09-28 | End: 2017-09-29

## 2017-09-28 RX ADMIN — MELATONIN TAB 3 MG 6 MG: 3 TAB at 20:24

## 2017-09-28 RX ADMIN — PRAZOSIN HYDROCHLORIDE 3 MG: 2 CAPSULE ORAL at 20:24

## 2017-09-28 RX ADMIN — OLANZAPINE 10 MG: 10 TABLET, FILM COATED ORAL at 09:01

## 2017-09-28 RX ADMIN — VENLAFAXINE HYDROCHLORIDE 225 MG: 225 TABLET, FILM COATED, EXTENDED RELEASE ORAL at 09:01

## 2017-09-28 RX ADMIN — BENZTROPINE MESYLATE 1 MG: 1 TABLET ORAL at 20:22

## 2017-09-28 RX ADMIN — OLANZAPINE 10 MG: 10 TABLET, FILM COATED ORAL at 16:58

## 2017-09-28 RX ADMIN — NICOTINE POLACRILEX 4 MG: 2 GUM, CHEWING ORAL at 15:01

## 2017-09-28 RX ADMIN — OLANZAPINE 5 MG: 5 TABLET, FILM COATED ORAL at 13:19

## 2017-09-28 RX ADMIN — CLOZAPINE 200 MG: 100 TABLET ORAL at 20:23

## 2017-09-28 RX ADMIN — NICOTINE POLACRILEX 4 MG: 2 GUM, CHEWING ORAL at 20:44

## 2017-09-28 RX ADMIN — PANTOPRAZOLE SODIUM 20 MG: 20 TABLET, DELAYED RELEASE ORAL at 09:01

## 2017-09-28 RX ADMIN — OLANZAPINE 10 MG: 10 TABLET, FILM COATED ORAL at 20:24

## 2017-09-28 RX ADMIN — LEVOTHYROXINE SODIUM 112 MCG: 112 TABLET ORAL at 11:53

## 2017-09-28 RX ADMIN — NICOTINE POLACRILEX 4 MG: 2 GUM, CHEWING ORAL at 13:19

## 2017-09-28 RX ADMIN — VITAMIN D, TAB 1000IU (100/BT) 1000 UNITS: 25 TAB at 16:58

## 2017-09-28 RX ADMIN — HYDROXYZINE HYDROCHLORIDE 50 MG: 25 TABLET ORAL at 20:18

## 2017-09-28 RX ADMIN — NICOTINE POLACRILEX 4 MG: 2 GUM, CHEWING ORAL at 11:53

## 2017-09-28 RX ADMIN — POLYETHYLENE GLYCOL 3350 17 G: 17 POWDER, FOR SOLUTION ORAL at 09:01

## 2017-09-28 RX ADMIN — BENZTROPINE MESYLATE 1 MG: 1 TABLET ORAL at 09:01

## 2017-09-28 ASSESSMENT — ACTIVITIES OF DAILY LIVING (ADL)
GROOMING: INDEPENDENT
LAUNDRY: WITH SUPERVISION
ORAL_HYGIENE: INDEPENDENT
DRESS: INDEPENDENT

## 2017-09-28 NOTE — PROGRESS NOTES
----------------------------------------------------------------------------------------------------------  [facility], [place]   Psychiatric Progress Note  Hospital Day #14     Assessment    Presentation: Kamini Neal, a 27 year old male, with history of schizoaffective disorder BPT and PTSD, presented to [facility] ED due to SI, command AH to harm/kill self, and paranoid delusions that people at his group home want to harm/kill him, in the context of recent discharge from [facility] on 9/11/17.      Diagnostic Impression: Patient presented with worsening command auditory hallucinations to harm/kill himself, as well as paranoid delusions that people in his group home want to harm/kill him. On admission, he demonstrated little to no insight into these paranoid delusions, though his insight has improved since that time. Throughout admission, patient had continued to demonstrate behavioral dysregulation including violent head banging, which early on, required chemical and physical restraints. His examination is significant for positive symptoms including paranoid delusions and command AH to kill self and others, behavioral dysregulation in the context of these command AH, and negative symptoms including social withdrawal and flattened, mood incongruent affect. Presentation is most consistent with an exacerbation of patient's historic diagnosis of schizoaffective disorder, possibly with current depressive episode. Recent medication noncompliance and unknown recent EtOH/Khat history are also likely contributing. He could benefit from MICD treatment upon discharge. Patient continues to require ongoing hospitalization for augmentation of his medications, as well as to ensure patient safety given his ongoing SI and SIB, although this is improving.      Hospital course: Kamini Neal was admitted to station 20 as a voluntary patient. Admission labs were wnl, although Utox was  ordered and never obtained.  Received his most recent dose Invega Sustenna PTA (9/13/17). PTA effexor, melatonin, prazosin were continued. Klonopin was held. Shortly after after arrival, patient began exhibiting head banging behaviors, responsive for a time to prn zyprexa. The following am, patient began violently headbanging and eventually required chemical restraints with combination haldol/ativan/benadryl, and physical restraints. At that point, a 72 hour hold was placed. Scheduled haldol 5 mg BID was started to supplement IM Invega Sustenna. Over the next two days, patient continued to intermittently exhibit head banging/SIB, which on one additional instance, required sedation with IM olanzapine as well as physical restraints. Patient subsequently agreed to sign in as voluntary.       On 9/18, patient developed torticollis which was responsive to IM and PO cogentin. BID scheduled cogentin 1 mg was also started. Scheduled haldol was switched to Olanzapine 10 mg AM and 20 mg PM. Agitiation prns were switched to olanzapine as well. Patient transferred to Station 22 9/19 for a more therapeutic milieu. However, he soon resumed head banging and subsequently made threats to patients and staff. Given the acuity of these threats, patient was subsequently accepted by Dr. Wiggins for transfer to Station 12 on 9/21. After arriving on Station 12, the patient did not have any more head banging behavior. He was started on Clozapine, and his command AH resolved. Zyprexa was tapered down, with the goal of discharging on Invega Sustenna and Clozapine. On 9/26, he was transferred back to station 20.      Medical course: Patient admitted without issue. Following first episode of physical restraints being applied, patient complained of R shoulder pain. A 3 view XR was obtained which was negative for any pathophysiology. Treated with Tylenol PRN.     Plan     Principal Diagnosis:   # Schizoaffective disorder, bipolar type    Secondary  psychiatric diagnoses of concern this admission:   # PTSD  # Insomnia  # Tobacco use disorder  # r/o EtOH use disorder    Psychotropic Medications:  - Increase Clozapine to 200 mg HS  - Decrease Zyprexa to 25 mg daily total (10mg am, 5mg afternoon, 10mg HS)  - Invega Sustenna 117 mg q30 days (last dose given 9/13/2017)  - Prazosin 3 mg HS  - Venlafaxine 225 mg daily  - Melatonin 6 mg HS  - Benztropine 1 mg bid  - Hydroxyzine 25-50 mg q4hrs prn for anxiety  - Zyprexa 10 mg prn for psychiatric emergencies    Laboratory/Imaging: None  Consults: None  Patient will be treated in therapeutic milieu with appropriate individual and group therapies as described.    Medical diagnoses to be addressed this admission:    # Torticollis: neck and jaw stiffness in setting of scheduled and prn Haldol on 9/18, resolved with IM/PO cogentin. D/c'd haldol, replaced with Olanzapine. On scheduled cogentin. No further episodes.     #Acute L shoulder pain: s/p Code 21 9/15.  XR negative for injury, likely muscle strain per IM     #GERD: continue pantoprazole 20 mg daily     #Hypothyroidism: continue PTA Synthroid 112 mcg per IM     #Vitamin D def: VD 1000 unit tab daily    Consults: None    Relevant psychosocial stressors: group home dynamics, h/o trauma, SPMI, questionable EtOH abuse    Legal Status: Voluntary    Safety Assessment:   Checks: Status 15  Precautions: Suicide  Self-harm  Substance Withdrawal  Pt has not required locked seclusion or restraints in the past 24 hours to maintain safety, please refer to RN documentation for further details.     The risks, benefits, alternatives and side effects have been discussed and are understood by the patient and other caregivers.    Anticipated Disposition/Discharge Date: Pending titration of Clozapine. Dispo group home with MICD.     -------------------------------------------------------------------  Scribed by Coco Castro, MS3, for Dr Alton Barrios, Resident  I have reviewed and  "edited the documentation recorded by the scribe.  This documentation accurately reflects the services I personally performed and treatment decisions made by me in consultation with the attending physician Gisell Simms MD.    Alton Barrios MD  Psychiatry PGY-1  [phone]      Attestation:  This patient has been seen and evaluated by me, Gisell Simms.  I have discussed this patient with the psychiatry resident and I agree with the findings and plan in this note.    I have reviewed today's vital signs, medications, labs and imaging. Gisell Simms MD.                 Interim History:   The patient's care was discussed with the treatment team and chart notes were reviewed. VSS. Slept 7 hours.  PRNs: None     Staff report: Patient isolative, but calm and spent much of the day listening to his headphones. Cooperative and calm when interacting with staff and others. Spent much of the day in his room, but out in the milieu for meals. Denied SI and SIB. Told staff he felt \"little bit of sadness nothing major\". Goal after discharge is to \"change where I live so I can stop doing drugs\".      Patient interview: The patient relates that things are going well. He is tolerating Clozapine, and he notes that he no longer has AH or depression. He continues to experience period anxiety. He was bothered when other patients on the unit were overheard talking about him. He shared that this behavior was discussed in community meeting, and we discussed that he could work with staff if it occurs again. He is anxious to finish his Clozapine titration, and the only side effect he has noticed is nighttime drooling. He had no other concerns for the team today. Patient will receive MICD evaluation this upcoming Tuesday.       Review of systems:     ROS was negative unless noted above.          Allergies:     Allergies   Allergen Reactions     Haldol [Haloperidol]      Torticollis            Psychiatric Examination:   BP " "(!) 134/91  Pulse 103  Temp 98.1  F (36.7  C) (Oral)  Resp 16  Ht 1.778 m (5' 10\")  Wt 97.1 kg (214 lb)  SpO2 96%  BMI 30.71 kg/m2  Weight is 214 lbs 0 oz  Body mass index is 30.71 kg/(m^2).    Appearance:  awake, alert and adequately groomed  Attitude:  cooperative  Eye Contact:  fair  Mood:  good  Affect:  mood congruent, constricted  Speech:  clear, coherent  Psychomotor Behavior:  no evidence of tardive dyskinesia, dystonia, or tics  Thought Process:  logical and linear  Associations:  no loose associations  Thought Content:  Denies current SI, but recent command AH with SI.   Insight:  limited  Judgment:  limited  Oriented to:  time, person, and place  Attention Span and Concentration:  fair  Recent and Remote Memory:  intact  Language: fluent in english  Fund of Knowledge: appropriate  Muscle Strength and Tone: Grossly normal, did not formally assess  Gait and Station: Normal         Labs:   Utox needs to be drawn  CBC wnl  BMP wnl  Lipids wnl, HDL low at 38  TSH 1.20  Folate 10.6  VB12 273  9/22: WBC 8.8, ANC 6.2    "

## 2017-09-28 NOTE — PROGRESS NOTES
"   09/27/17 1900   Behavioral Health   Hallucinations denies / not responding to hallucinations   Thinking intact   Orientation person: oriented;place: oriented   Memory baseline memory   Insight insight appropriate to situation   Judgement impaired   Eye Contact at examiner   Affect full range affect   Mood mood is calm   Physical Appearance/Attire neat   Hygiene well groomed   Suicidality other (see comments)  (denies )   Self Injury other (see comment)  (denies )   Activity other (see comment)  (visible in the milieu and socialized with others )   Speech clear;coherent   Activities of Daily Living   Hygiene/Grooming independent   Oral Hygiene independent   Dress independent   Laundry with supervision   Room Organization independent       Pt denied SI and SIB.  Pt reported feeling sad (3) \" little bit of sadness nothing major.\"  Pt reported overall feeling \" ok.\"  Pt seems calm, visible in the milieu, ate supper and socialized with others.  Pt daily goal \" just make it through the day.\"  Pt goal after discharge \" change where I live so I can stop doing drugs.\"  Pt reported no nutritional concerns.  Pt is independent with ADL's.  Pt wants visitors.   "

## 2017-09-28 NOTE — PLAN OF CARE
Problem: Psychotic Symptoms  Goal: Psychotic Symptoms  Signs and symptoms of listed problems will be absent or manageable.   Outcome: Improving  48 hour assessment- Pt denies any hallucinations. Pt states he has done a 180.  Pt states has some hope for the future now. Pt has visitors who brought him clothes, pop, and book about history.  Pt was reading the book.  States he likes reading a book about history.  Pt has not done any self injurious behavior.

## 2017-09-28 NOTE — PROGRESS NOTES
"Attended 1.0  Of 3 OT groups today. Pt quickly self selected creative project and was clear with requests from OTR regarding what he wanted to work on during session.  Pt has been given a Self Assessment form, explained the purpose of including them in their treatment plan and offered options for meeting their needs. Pt identified reason for hospitalization (\"drug use\") & minimal coping skills (talking to someone),         Pt identified experiencing the following symptoms, selected from checklist provided:     Feelings -  anxiety / fear,   abandoned,    Thoughts -   hearing voices,   Behaviors - no issues identified     Pt identifies as need area:    Improve self-esteem/confidence  Improve skills to prevent relapse       Over the last 2 weeks, pt identified severity of insomnia as follows:     MODERATE   difficulty falling asleep,  staying asleep  & waking up too early     Pt considers sleep problems as   SOMEWHAT INTERFERING  with daily function.      OT PLAN:  Encourage ongoing attendance to support pt self-stated goals. Provide opportunities for education, reinforcement and practice of positive coping skills, engage in graded opportunities for success, and provide ongoing assessment as needed.       "

## 2017-09-28 NOTE — PLAN OF CARE
"Problem: Psychotic Symptoms  Goal: Psychotic Symptoms  Signs and symptoms of listed problems will be absent or manageable.   Outcome: Improving  Patient denied voices. However, he requested PRN Zyprexa for \"bad thoughts\". On clarification, he said that he is having thoughts of hurting self as he was thinking about the bad things that happened to him in the past. Patient also reported depression 3/10 and no anxiety, hallucinations, or paranoia. Patient took PRN Zyprexa 10 mg PO and later reported it as effective. Minimal social interaction. Denied pain and all other physical issues. Patient takes medications as prescribed, denied SE/none assessed.   Patient is pleasant on approach, no behavioral issues.       "

## 2017-09-29 LAB
BASOPHILS # BLD AUTO: 0 10E9/L (ref 0–0.2)
BASOPHILS NFR BLD AUTO: 0.5 %
DIFFERENTIAL METHOD BLD: NORMAL
EOSINOPHIL # BLD AUTO: 0 10E9/L (ref 0–0.7)
EOSINOPHIL NFR BLD AUTO: 0 %
IMM GRANULOCYTES # BLD: 0 10E9/L (ref 0–0.4)
IMM GRANULOCYTES NFR BLD: 0.3 %
LYMPHOCYTES # BLD AUTO: 2 10E9/L (ref 0.8–5.3)
LYMPHOCYTES NFR BLD AUTO: 26.4 %
MONOCYTES # BLD AUTO: 0.7 10E9/L (ref 0–1.3)
MONOCYTES NFR BLD AUTO: 8.8 %
NEUTROPHILS # BLD AUTO: 4.8 10E9/L (ref 1.6–8.3)
NEUTROPHILS NFR BLD AUTO: 64 %
NRBC # BLD AUTO: 0 10*3/UL
NRBC BLD AUTO-RTO: 0 /100
WBC # BLD AUTO: 7.5 10E9/L (ref 4–11)

## 2017-09-29 PROCEDURE — 85004 AUTOMATED DIFF WBC COUNT: CPT | Performed by: STUDENT IN AN ORGANIZED HEALTH CARE EDUCATION/TRAINING PROGRAM

## 2017-09-29 PROCEDURE — 25000132 ZZH RX MED GY IP 250 OP 250 PS 637: Performed by: STUDENT IN AN ORGANIZED HEALTH CARE EDUCATION/TRAINING PROGRAM

## 2017-09-29 PROCEDURE — 99232 SBSQ HOSP IP/OBS MODERATE 35: CPT | Mod: GC | Performed by: PSYCHIATRY & NEUROLOGY

## 2017-09-29 PROCEDURE — S0136 CLOZAPINE, 25 MG: HCPCS

## 2017-09-29 PROCEDURE — 25000132 ZZH RX MED GY IP 250 OP 250 PS 637

## 2017-09-29 PROCEDURE — 25000132 ZZH RX MED GY IP 250 OP 250 PS 637: Performed by: PSYCHIATRY & NEUROLOGY

## 2017-09-29 PROCEDURE — 85048 AUTOMATED LEUKOCYTE COUNT: CPT | Performed by: STUDENT IN AN ORGANIZED HEALTH CARE EDUCATION/TRAINING PROGRAM

## 2017-09-29 PROCEDURE — 36415 COLL VENOUS BLD VENIPUNCTURE: CPT | Performed by: STUDENT IN AN ORGANIZED HEALTH CARE EDUCATION/TRAINING PROGRAM

## 2017-09-29 PROCEDURE — 12400007 ZZH R&B MH INTERMEDIATE UMMC

## 2017-09-29 PROCEDURE — 97150 GROUP THERAPEUTIC PROCEDURES: CPT | Mod: GO

## 2017-09-29 RX ORDER — OLANZAPINE 10 MG/2ML
10 INJECTION, POWDER, FOR SOLUTION INTRAMUSCULAR DAILY PRN
Status: DISCONTINUED | OUTPATIENT
Start: 2017-09-29 | End: 2017-10-04 | Stop reason: HOSPADM

## 2017-09-29 RX ORDER — OLANZAPINE 10 MG/1
10 TABLET ORAL 3 TIMES DAILY
Status: DISCONTINUED | OUTPATIENT
Start: 2017-09-29 | End: 2017-10-04 | Stop reason: HOSPADM

## 2017-09-29 RX ORDER — OLANZAPINE 5 MG/1
5-10 TABLET ORAL DAILY PRN
Status: DISCONTINUED | OUTPATIENT
Start: 2017-09-29 | End: 2017-10-04 | Stop reason: HOSPADM

## 2017-09-29 RX ADMIN — PRAZOSIN HYDROCHLORIDE 3 MG: 2 CAPSULE ORAL at 21:16

## 2017-09-29 RX ADMIN — CLOZAPINE 200 MG: 100 TABLET ORAL at 21:16

## 2017-09-29 RX ADMIN — PANTOPRAZOLE SODIUM 20 MG: 20 TABLET, DELAYED RELEASE ORAL at 08:59

## 2017-09-29 RX ADMIN — OLANZAPINE 10 MG: 10 TABLET, FILM COATED ORAL at 13:08

## 2017-09-29 RX ADMIN — NICOTINE POLACRILEX 4 MG: 2 GUM, CHEWING ORAL at 13:09

## 2017-09-29 RX ADMIN — BENZTROPINE MESYLATE 1 MG: 1 TABLET ORAL at 08:59

## 2017-09-29 RX ADMIN — Medication 5 ML: at 16:59

## 2017-09-29 RX ADMIN — LEVOTHYROXINE SODIUM 112 MCG: 112 TABLET ORAL at 13:08

## 2017-09-29 RX ADMIN — NICOTINE POLACRILEX 4 MG: 2 GUM, CHEWING ORAL at 20:45

## 2017-09-29 RX ADMIN — MELATONIN TAB 3 MG 6 MG: 3 TAB at 21:15

## 2017-09-29 RX ADMIN — VITAMIN D, TAB 1000IU (100/BT) 1000 UNITS: 25 TAB at 16:59

## 2017-09-29 RX ADMIN — VENLAFAXINE HYDROCHLORIDE 225 MG: 225 TABLET, FILM COATED, EXTENDED RELEASE ORAL at 08:58

## 2017-09-29 RX ADMIN — NICOTINE POLACRILEX 4 MG: 2 GUM, CHEWING ORAL at 16:06

## 2017-09-29 RX ADMIN — NICOTINE POLACRILEX 4 MG: 2 GUM, CHEWING ORAL at 18:23

## 2017-09-29 RX ADMIN — BENZTROPINE MESYLATE 1 MG: 1 TABLET ORAL at 21:16

## 2017-09-29 RX ADMIN — OLANZAPINE 10 MG: 10 TABLET, FILM COATED ORAL at 08:59

## 2017-09-29 RX ADMIN — OLANZAPINE 10 MG: 10 TABLET, FILM COATED ORAL at 21:16

## 2017-09-29 RX ADMIN — POLYETHYLENE GLYCOL 3350 17 G: 17 POWDER, FOR SOLUTION ORAL at 08:59

## 2017-09-29 ASSESSMENT — ACTIVITIES OF DAILY LIVING (ADL)
ORAL_HYGIENE: INDEPENDENT
LAUNDRY: WITH SUPERVISION
GROOMING: INDEPENDENT
DRESS: SCRUBS (BEHAVIORAL HEALTH)

## 2017-09-29 NOTE — PROGRESS NOTES
09/29/17 1700   Behavioral Health   Hallucinations denies / not responding to hallucinations   Thinking intact   Orientation person: oriented;place: oriented;date: oriented;time: oriented   Memory baseline memory   Insight insight appropriate to situation   Judgement intact   Eye Contact at examiner   Affect full range affect   Mood mood is calm   Physical Appearance/Attire appears stated age   Hygiene well groomed   Suicidality other (see comments)  (denies)   Self Injury other (see comment)  (denies)   Activity other (see comment)  (In and out of room)   Speech clear;coherent   Medication Sensitivity no stated side effects   Psychomotor / Gait balanced;steady       Pt stated he had a misunderstanding earlier in shift with RN which he stated he managed to apologize for and clear up with a conversation.  Pt stated he felt bad that it occurred, but better now that he talked to the RN.  Denies SI/SIB, denies AH/VH.  Pt stated mood at 6 of 10.

## 2017-09-29 NOTE — PROGRESS NOTES
----------------------------------------------------------------------------------------------------------  [facility], [place]   Psychiatric Progress Note  Hospital Day #15     Assessment    Presentation: Kamini Neal, a 27 year old male, with history of schizoaffective disorder BPT and PTSD, presented to [facility] ED due to SI, command AH to harm/kill self, and paranoid delusions that people at his group home want to harm/kill him, in the context of recent discharge from [facility] on 9/11/17.      Diagnostic Impression: Patient presented with worsening command auditory hallucinations to harm/kill himself, as well as paranoid delusions that people in his group home want to harm/kill him. On admission, he demonstrated little to no insight into these paranoid delusions, though his insight has improved since that time. Throughout admission, patient had continued to demonstrate behavioral dysregulation including violent head banging, which early on, required chemical and physical restraints. His examination is significant for positive symptoms including paranoid delusions and command AH to kill self and others, behavioral dysregulation in the context of these command AH, and negative symptoms including social withdrawal and flattened, mood incongruent affect. Presentation is most consistent with an exacerbation of patient's historic diagnosis of schizoaffective disorder, possibly with current depressive episode. Recent medication noncompliance and unknown recent EtOH/Khat history are also likely contributing. He could benefit from MICD treatment upon discharge. Patient continues to require ongoing hospitalization for augmentation of his medications, as well as to ensure patient safety given his ongoing SI and SIB, although this is improving.      Hospital course: Kamini Neal was admitted to station 20 as a voluntary patient. Admission labs were wnl, although Utox was  ordered and never obtained.  Received his most recent dose Invega Sustenna PTA (9/13/17). PTA effexor, melatonin, prazosin were continued. Klonopin was held. Shortly after after arrival, patient began exhibiting head banging behaviors, responsive for a time to prn zyprexa. The following am, patient began violently headbanging and eventually required chemical restraints with combination haldol/ativan/benadryl, and physical restraints. At that point, a 72 hour hold was placed. Scheduled haldol 5 mg BID was started to supplement IM Invega Sustenna. Over the next two days, patient continued to intermittently exhibit head banging/SIB, which on one additional instance, required sedation with IM olanzapine as well as physical restraints. Patient subsequently agreed to sign in as voluntary.       On 9/18, patient developed torticollis which was responsive to IM and PO cogentin. BID scheduled cogentin 1 mg was also started. Scheduled haldol was switched to Olanzapine 10 mg AM and 20 mg PM. Agitiation prns were switched to olanzapine as well. Patient transferred to Station 22 9/19 for a more therapeutic milieu. However, he soon resumed head banging and subsequently made threats to patients and staff. Given the acuity of these threats, patient was subsequently accepted by Dr. Wiggins for transfer to Station 12 on 9/21. After arriving on Station 12, the patient did not have any more head banging behavior. He was started on Clozapine, and his command AH resolved. Zyprexa was tapered down, with the goal of discharging on Invega Sustenna and Clozapine. On 9/26, he was transferred back to station 20. Patient noted no recurrence of AH, but has had intermittent SI.      Medical course: Patient admitted without issue. Following first episode of physical restraints being applied, patient complained of R shoulder pain. A 3 view XR was obtained which was negative for any pathophysiology. Treated with Tylenol PRN.     Plan     Principal  Diagnosis:   # Schizoaffective disorder, bipolar type    Secondary psychiatric diagnoses of concern this admission:   # PTSD  # Insomnia  # Tobacco use disorder  # r/o EtOH use disorder    Psychotropic Medications:  - Clozapine 200 mg HS   -Will plan on increasing Clozapine on 9/30 to 250 mg HS  - Zyprexa 10 mg TID  - Invega Sustenna 117 mg q30 days (last dose given 9/13/2017)  - Prazosin 3 mg HS  - Venlafaxine 225 mg daily  - Melatonin 6 mg HS  - Benztropine 1 mg bid  - Discontinue PRN Hydroxyzine  - Zyprexa 5-10 mg prn for psychiatric emergencies    Laboratory/Imaging: Weekly WBC with dif. (On Fridays)  Consults: None  Patient will be treated in therapeutic milieu with appropriate individual and group therapies as described.    Medical diagnoses to be addressed this admission:    # Torticollis: neck and jaw stiffness in setting of scheduled and prn Haldol on 9/18, resolved with IM/PO cogentin. D/c'd haldol, replaced with Olanzapine. On scheduled cogentin. No further episodes.     #Acute L shoulder pain: s/p Code 21 9/15.  XR negative for injury, likely muscle strain per IM     #GERD: continue pantoprazole 20 mg daily     #Hypothyroidism: continue PTA Synthroid 112 mcg per IM     #Vitamin D def: VD 1000 unit tab daily    Consults: None    Relevant psychosocial stressors: group home dynamics, h/o trauma, SPMI, questionable EtOH abuse    Legal Status: Voluntary    Safety Assessment:   Checks: Status 15  Precautions: Suicide  Self-harm  Substance Withdrawal  Pt has not required locked seclusion or restraints in the past 24 hours to maintain safety, please refer to RN documentation for further details.     The risks, benefits, alternatives and side effects have been discussed and are understood by the patient and other caregivers.    Anticipated Disposition/Discharge Date: Pending titration of Clozapine. Dispo group home with MICD.      -------------------------------------------------------------------  Patient seen  "and evaluated with attending, Dr. Gisell Simms.  Alton Barrios MD  Psychiatry PGY-1  [phone]      Attestation:  This patient has been seen and evaluated by me, Gisell Simms.  I have discussed this patient with the psychiatry resident and I agree with the findings and plan in this note.    I have reviewed today's vital signs, medications, labs and imaging. Gisell Simms MD.                 Interim History:   The patient's care was discussed with the treatment team and chart notes were reviewed. VSS. Slept 7 hours.  PRNs: Zyprexa 10 mg, Hydroxyzine 50 mg     Staff report: Reports he has done a 180 since admission. Denies AH. Had intrusive thoughts related to previous trauma, which caused him to have SIB. Resolved with prn Zyprexa.     Patient interview:  The patient relates that he started having suicidal thoughts yesterday \"out of nowhere\". This was highly distressing for him, but the thoughts relieved with prn Zyprexa. We emphasized using TIPPs skills (ice packs, paced breathing) if these thoughts recur. He was curious about his Clozapine titration and anticipated discharge date.       Review of systems:     ROS was negative unless noted above.          Allergies:     Allergies   Allergen Reactions     Haldol [Haloperidol]      Torticollis            Psychiatric Examination:   /65  Pulse 87  Temp 98.2  F (36.8  C) (Oral)  Resp 16  Ht 1.778 m (5' 10\")  Wt 95.7 kg (211 lb)  SpO2 96%  BMI 30.28 kg/m2  Weight is 211 lbs 0 oz  Body mass index is 30.28 kg/(m^2).    Appearance:  awake, alert and adequately groomed, wearing hospital scrubs  Attitude:  cooperative  Eye Contact:  fair  Mood:  anxious  Affect:  mood congruent, constricted  Speech:  clear, coherent  Psychomotor Behavior:  no evidence of tardive dyskinesia, dystonia, or tics  Thought Process:  logical and linear  Associations:  no loose associations  Thought Content:  Reports SI and thoughts of SIB  Insight:  " limited  Judgment:  limited  Oriented to:  time, person, and place  Attention Span and Concentration:  fair  Recent and Remote Memory:  intact  Language: fluent in english  Fund of Knowledge: appropriate  Muscle Strength and Tone: Grossly normal, did not formally assess  Gait and Station: Normal         Labs:   Utox needs to be drawn  CBC wnl  BMP wnl  Lipids wnl, HDL low at 38  TSH 1.20  Folate 10.6  VB12 273  9/22: WBC 8.8, ANC 6.2  9/29: WBC 7.5, ANC 4.8

## 2017-09-29 NOTE — PROGRESS NOTES
"Pleasant, cooperative and social during participation in OT clinic group today.  He initiated conversation with staff and peers.   He discussed wanting to learn to cook and brainstormed possibilities with OTR for early next week.    He said cooking is a \"useful lifeskill\" that he needs to learn to increase independence.  He continued work on coloring project he began yesterday.   "

## 2017-09-29 NOTE — PROGRESS NOTES
09/29/17 1347   Behavioral Health   Hallucinations denies / not responding to hallucinations   Thinking intact   Orientation time: oriented;date: oriented;place: oriented;person: oriented   Memory baseline memory   Insight insight appropriate to situation   Judgement intact   Eye Contact at examiner   Affect full range affect   Mood mood is calm   Physical Appearance/Attire appears stated age   Hygiene well groomed   Suicidality other (see comments)  (Denies)   Self Injury other (see comment)  (Denies)   Activity other (see comment)  (Social with others)   Speech coherent;clear   Medication Sensitivity no stated side effects   Psychomotor / Gait balanced;steady   Psycho Education   Type of Intervention 1:1 intervention   Response participates, initiates socially appropriate   Hours 0.5   Activities of Daily Living   Hygiene/Grooming independent   Oral Hygiene independent   Dress scrubs (behavioral health)   Laundry with supervision   Room Organization independent   Activity   Activity Assistance Provided independent   Pt attended groups this shift and he was observed pacing the rincon. He was listening to headphone. He denies suicidal ideations, hearing voices or depressed.

## 2017-09-29 NOTE — PROGRESS NOTES
09/29/17 1300   General Information   Date Initially Attended OT 09/22/17   Clinical Impression   Affect Restricted   Orientation Oriented to person, place and time   Appearance and ADLs General cleanliness observed in most areas   Attention to Internal Stimuli No observed signs;Needs further assessment   Interaction Skills Interacts appropriately with staff;Initiates appropriately with staff;Interacts appropriately with peers;Initiates appropriately with peers;Interacts with prompts, minimal response   Ability to Communicate Needs Independent;Does so with prompts   Verbal Content Appropriate to topic;[place]   Ability to Maintain Boundaries Maintains appropriate physical boundaries;Maintains appropriate verbal boundaries;Accepts and maintains boundaries with one cue   Participation Initiates participation;Independently participates   Concentration Concentrates 20-30 minutes   Ability to Concentrate With structure   Follows and Comprehends Directions Independently follows 2 step verbal directions   Memory Delayed and immediate recall intact   Organization Independently organizes simple tasks;Needs occasional assistance    Decision Making Independent;Needs further assessment   Planning and Problem Solving Independently plans ahead;Occasionally needs assist/feedback;Indentifies problems but not alternatives   Ability to Apply and Learn Concepts Applies within group structure;Needs further assessment   Frustrations / Stress Tolerance Independently identifies sources of frustration/stress;Needs further assessment   Level of Insight Identifies needs with structure/support;Needs further assessment   Self Esteem Accepts positive feedback   Social Supports Has knowledge of support systems

## 2017-09-30 PROCEDURE — 25000132 ZZH RX MED GY IP 250 OP 250 PS 637: Performed by: STUDENT IN AN ORGANIZED HEALTH CARE EDUCATION/TRAINING PROGRAM

## 2017-09-30 PROCEDURE — S0136 CLOZAPINE, 25 MG: HCPCS

## 2017-09-30 PROCEDURE — 25000132 ZZH RX MED GY IP 250 OP 250 PS 637: Performed by: PSYCHIATRY & NEUROLOGY

## 2017-09-30 PROCEDURE — 12400007 ZZH R&B MH INTERMEDIATE UMMC

## 2017-09-30 PROCEDURE — 25000132 ZZH RX MED GY IP 250 OP 250 PS 637

## 2017-09-30 RX ADMIN — CLOZAPINE 250 MG: 100 TABLET ORAL at 21:42

## 2017-09-30 RX ADMIN — NICOTINE POLACRILEX 4 MG: 2 GUM, CHEWING ORAL at 16:40

## 2017-09-30 RX ADMIN — PRAZOSIN HYDROCHLORIDE 3 MG: 2 CAPSULE ORAL at 21:42

## 2017-09-30 RX ADMIN — OLANZAPINE 10 MG: 10 TABLET, FILM COATED ORAL at 09:46

## 2017-09-30 RX ADMIN — VITAMIN D, TAB 1000IU (100/BT) 1000 UNITS: 25 TAB at 16:41

## 2017-09-30 RX ADMIN — NICOTINE POLACRILEX 4 MG: 2 GUM, CHEWING ORAL at 09:46

## 2017-09-30 RX ADMIN — OLANZAPINE 10 MG: 10 TABLET, FILM COATED ORAL at 21:43

## 2017-09-30 RX ADMIN — BENZTROPINE MESYLATE 1 MG: 1 TABLET ORAL at 21:42

## 2017-09-30 RX ADMIN — VENLAFAXINE HYDROCHLORIDE 225 MG: 225 TABLET, FILM COATED, EXTENDED RELEASE ORAL at 09:46

## 2017-09-30 RX ADMIN — PANTOPRAZOLE SODIUM 20 MG: 20 TABLET, DELAYED RELEASE ORAL at 09:46

## 2017-09-30 RX ADMIN — MELATONIN TAB 3 MG 6 MG: 3 TAB at 21:42

## 2017-09-30 RX ADMIN — NICOTINE POLACRILEX 4 MG: 2 GUM, CHEWING ORAL at 14:24

## 2017-09-30 RX ADMIN — NICOTINE POLACRILEX 4 MG: 2 GUM, CHEWING ORAL at 18:43

## 2017-09-30 RX ADMIN — OLANZAPINE 10 MG: 10 TABLET, FILM COATED ORAL at 14:24

## 2017-09-30 RX ADMIN — POLYETHYLENE GLYCOL 3350 17 G: 17 POWDER, FOR SOLUTION ORAL at 09:46

## 2017-09-30 RX ADMIN — NICOTINE POLACRILEX 4 MG: 2 GUM, CHEWING ORAL at 21:03

## 2017-09-30 RX ADMIN — BENZTROPINE MESYLATE 1 MG: 1 TABLET ORAL at 09:46

## 2017-09-30 RX ADMIN — LEVOTHYROXINE SODIUM 112 MCG: 112 TABLET ORAL at 14:24

## 2017-09-30 ASSESSMENT — ACTIVITIES OF DAILY LIVING (ADL)
ORAL_HYGIENE: INDEPENDENT
GROOMING: INDEPENDENT
DRESS: INDEPENDENT

## 2017-09-30 NOTE — PROGRESS NOTES
Pt s mood mostly calm and social with others. At change of shift, pt requested Nicotine gum. This writer told him that  I have to log into my computer, I bring it to you . Pt was not happy and gave a middle finger. Later pt apologized and stated he did not know why he acted out. This writer assured him that staff did not hold and ill feeling against him. He was happy. No further incident. Pt was pleasant and stated that he had no SI or SIB.

## 2017-09-30 NOTE — PROGRESS NOTES
09/30/17 1300   Behavioral Health   Hallucinations denies / not responding to hallucinations   Thinking intact   Orientation person: oriented;place: oriented   Memory baseline memory   Insight insight appropriate to situation   Judgement intact   Eye Contact at examiner   Affect full range affect   Mood mood is calm   Physical Appearance/Attire attire appropriate to age and situation   Hygiene well groomed   Suicidality other (see comments)  (denies)   Self Injury other (see comment)  (denies)   Activity other (see comment)  (visible in he milieu, social with peers.)   Speech clear;coherent   Medication Sensitivity no stated side effects;no observed side effects   Psychomotor / Gait balanced;steady   Psycho Education   Type of Intervention 1:1 intervention   Response participates, initiates socially appropriate   Hours 0.5   Treatment Detail checkin   Activities of Daily Living   Hygiene/Grooming independent   Oral Hygiene independent   Dress independent   Room Organization independent   Behavioral Health Interventions   Psychotic Symptoms maintain safety precautions;monitor need to revise level of observation;maintain safe secure environment;reality orientation;simple, clear language;decrease environmental stimulation;redirection of intrusive behaviors;redirection of aggressive behaviors;assist patient in developing safety plan;assist patient in following safety plan;encourage nutrition and hydration;encourage participation / independence with adls;provide emotional support;establish therapeutic relationship;assist with developing & utilizing healthy coping strategies;build upon strengths   Social and Therapeutic Interventions (Psychotic Symptoms) encourage socialization with peers;encourage participation in therapeutic groups and milieu activities   Pt was visible in the milieu, social with staff and peers. Pt mood was calm today and reports feeling better. Pt watched soccer most of the day shift. Pt reports  feeling anxious when around people; he also reports that positive self-talk has helped him cope with his anxiety. Pt denies SI & SIB.

## 2017-10-01 PROCEDURE — 12400007 ZZH R&B MH INTERMEDIATE UMMC

## 2017-10-01 PROCEDURE — 25000132 ZZH RX MED GY IP 250 OP 250 PS 637: Performed by: PSYCHIATRY & NEUROLOGY

## 2017-10-01 PROCEDURE — S0136 CLOZAPINE, 25 MG: HCPCS

## 2017-10-01 PROCEDURE — 25000132 ZZH RX MED GY IP 250 OP 250 PS 637: Performed by: STUDENT IN AN ORGANIZED HEALTH CARE EDUCATION/TRAINING PROGRAM

## 2017-10-01 PROCEDURE — 25000132 ZZH RX MED GY IP 250 OP 250 PS 637

## 2017-10-01 RX ADMIN — BENZTROPINE MESYLATE 1 MG: 1 TABLET ORAL at 09:59

## 2017-10-01 RX ADMIN — LEVOTHYROXINE SODIUM 112 MCG: 112 TABLET ORAL at 11:48

## 2017-10-01 RX ADMIN — POLYETHYLENE GLYCOL 3350 17 G: 17 POWDER, FOR SOLUTION ORAL at 09:59

## 2017-10-01 RX ADMIN — VITAMIN D, TAB 1000IU (100/BT) 1000 UNITS: 25 TAB at 16:00

## 2017-10-01 RX ADMIN — BENZTROPINE MESYLATE 1 MG: 1 TABLET ORAL at 19:53

## 2017-10-01 RX ADMIN — PANTOPRAZOLE SODIUM 20 MG: 20 TABLET, DELAYED RELEASE ORAL at 09:59

## 2017-10-01 RX ADMIN — NICOTINE POLACRILEX 4 MG: 2 GUM, CHEWING ORAL at 09:59

## 2017-10-01 RX ADMIN — OLANZAPINE 10 MG: 10 TABLET, FILM COATED ORAL at 13:59

## 2017-10-01 RX ADMIN — NICOTINE POLACRILEX 4 MG: 2 GUM, CHEWING ORAL at 11:47

## 2017-10-01 RX ADMIN — NICOTINE POLACRILEX 4 MG: 2 GUM, CHEWING ORAL at 18:19

## 2017-10-01 RX ADMIN — NICOTINE POLACRILEX 4 MG: 2 GUM, CHEWING ORAL at 13:59

## 2017-10-01 RX ADMIN — NICOTINE POLACRILEX 4 MG: 2 GUM, CHEWING ORAL at 15:58

## 2017-10-01 RX ADMIN — OLANZAPINE 10 MG: 10 TABLET, FILM COATED ORAL at 19:54

## 2017-10-01 RX ADMIN — CLOZAPINE 250 MG: 100 TABLET ORAL at 20:00

## 2017-10-01 RX ADMIN — VENLAFAXINE HYDROCHLORIDE 225 MG: 225 TABLET, FILM COATED, EXTENDED RELEASE ORAL at 09:59

## 2017-10-01 RX ADMIN — PRAZOSIN HYDROCHLORIDE 3 MG: 2 CAPSULE ORAL at 20:00

## 2017-10-01 RX ADMIN — OLANZAPINE 10 MG: 10 TABLET, FILM COATED ORAL at 09:58

## 2017-10-01 ASSESSMENT — ACTIVITIES OF DAILY LIVING (ADL)
GROOMING: INDEPENDENT
GROOMING: INDEPENDENT
ORAL_HYGIENE: INDEPENDENT
LAUNDRY: WITH SUPERVISION
ORAL_HYGIENE: INDEPENDENT
DRESS: STREET CLOTHES
DRESS: INDEPENDENT

## 2017-10-01 NOTE — PLAN OF CARE
Problem: Psychotic Symptoms  Goal: Psychotic Symptoms  Signs and symptoms of listed problems will be absent or manageable.   Outcome: Improving  Pt s mood was calm and had full range of affect. Pt was social with others. Pt stated that he had no SI or SIB. Pt's mood had improved and no out burst lately. Pt also spent time coloring.

## 2017-10-01 NOTE — PROGRESS NOTES
10/01/17 1425   Behavioral Health   Hallucinations denies / not responding to hallucinations   Thinking intact   Orientation time: oriented;date: oriented;place: oriented;person: oriented   Memory baseline memory   Insight insight appropriate to situation   Judgement impaired   Eye Contact at examiner   Affect full range affect   Mood mood is calm   Hygiene well groomed   Suicidality other (see comments)  (Denies)   Self Injury other (see comment)  (Denies)   Speech clear;coherent   Psychomotor / Gait balanced;steady   Psycho Education   Type of Intervention 1:1 intervention   Response participates, initiates socially appropriate   Hours 0.5   Activities of Daily Living   Hygiene/Grooming independent   Oral Hygiene independent   Dress street clothes   Laundry with supervision   Room Organization independent   Activity   Activity Assistance Provided independent   Pt spent time in the lounge watching sports and reports feeling much better. He denies suicidal ideation or hearing voices. He appears cooperative and calm on approach. He was observed on the phone talking. He was observed sleeping in this morning.

## 2017-10-02 PROCEDURE — H2032 ACTIVITY THERAPY, PER 15 MIN: HCPCS

## 2017-10-02 PROCEDURE — 25000132 ZZH RX MED GY IP 250 OP 250 PS 637: Performed by: STUDENT IN AN ORGANIZED HEALTH CARE EDUCATION/TRAINING PROGRAM

## 2017-10-02 PROCEDURE — 99232 SBSQ HOSP IP/OBS MODERATE 35: CPT | Mod: GC | Performed by: PSYCHIATRY & NEUROLOGY

## 2017-10-02 PROCEDURE — 25000132 ZZH RX MED GY IP 250 OP 250 PS 637: Performed by: PSYCHIATRY & NEUROLOGY

## 2017-10-02 PROCEDURE — 25000132 ZZH RX MED GY IP 250 OP 250 PS 637

## 2017-10-02 PROCEDURE — 12400007 ZZH R&B MH INTERMEDIATE UMMC

## 2017-10-02 PROCEDURE — S0136 CLOZAPINE, 25 MG: HCPCS

## 2017-10-02 RX ORDER — POLYETHYLENE GLYCOL 3350 17 G/17G
17 POWDER, FOR SOLUTION ORAL ONCE
Status: COMPLETED | OUTPATIENT
Start: 2017-10-02 | End: 2017-10-02

## 2017-10-02 RX ORDER — CLOZAPINE 100 MG/1
300 TABLET ORAL AT BEDTIME
Status: DISCONTINUED | OUTPATIENT
Start: 2017-10-02 | End: 2017-10-04 | Stop reason: HOSPADM

## 2017-10-02 RX ORDER — POLYETHYLENE GLYCOL 3350 17 G/17G
34 POWDER, FOR SOLUTION ORAL DAILY
Status: DISCONTINUED | OUTPATIENT
Start: 2017-10-03 | End: 2017-10-04 | Stop reason: HOSPADM

## 2017-10-02 RX ADMIN — NICOTINE POLACRILEX 4 MG: 2 GUM, CHEWING ORAL at 15:32

## 2017-10-02 RX ADMIN — OLANZAPINE 10 MG: 10 TABLET, FILM COATED ORAL at 09:49

## 2017-10-02 RX ADMIN — NICOTINE POLACRILEX 4 MG: 2 GUM, CHEWING ORAL at 20:55

## 2017-10-02 RX ADMIN — PANTOPRAZOLE SODIUM 20 MG: 20 TABLET, DELAYED RELEASE ORAL at 09:49

## 2017-10-02 RX ADMIN — VENLAFAXINE HYDROCHLORIDE 225 MG: 225 TABLET, FILM COATED, EXTENDED RELEASE ORAL at 09:51

## 2017-10-02 RX ADMIN — BENZTROPINE MESYLATE 1 MG: 1 TABLET ORAL at 09:49

## 2017-10-02 RX ADMIN — MELATONIN TAB 3 MG 6 MG: 3 TAB at 20:55

## 2017-10-02 RX ADMIN — OLANZAPINE 10 MG: 10 TABLET, FILM COATED ORAL at 13:06

## 2017-10-02 RX ADMIN — PRAZOSIN HYDROCHLORIDE 3 MG: 2 CAPSULE ORAL at 20:55

## 2017-10-02 RX ADMIN — VITAMIN D, TAB 1000IU (100/BT) 1000 UNITS: 25 TAB at 16:02

## 2017-10-02 RX ADMIN — CLOZAPINE 300 MG: 100 TABLET ORAL at 20:55

## 2017-10-02 RX ADMIN — POLYETHYLENE GLYCOL 3350 17 G: 17 POWDER, FOR SOLUTION ORAL at 13:06

## 2017-10-02 RX ADMIN — NICOTINE POLACRILEX 4 MG: 2 GUM, CHEWING ORAL at 18:32

## 2017-10-02 RX ADMIN — LEVOTHYROXINE SODIUM 112 MCG: 112 TABLET ORAL at 13:06

## 2017-10-02 RX ADMIN — BENZTROPINE MESYLATE 1 MG: 1 TABLET ORAL at 20:55

## 2017-10-02 RX ADMIN — NICOTINE POLACRILEX 4 MG: 2 GUM, CHEWING ORAL at 19:20

## 2017-10-02 RX ADMIN — POLYETHYLENE GLYCOL 3350 17 G: 17 POWDER, FOR SOLUTION ORAL at 09:49

## 2017-10-02 RX ADMIN — OLANZAPINE 10 MG: 10 TABLET, FILM COATED ORAL at 20:55

## 2017-10-02 ASSESSMENT — ACTIVITIES OF DAILY LIVING (ADL)
DRESS: STREET CLOTHES;SCRUBS (BEHAVIORAL HEALTH);INDEPENDENT
HYGIENE/GROOMING: INDEPENDENT
ORAL_HYGIENE: INDEPENDENT
DRESS: SCRUBS (BEHAVIORAL HEALTH)
LAUNDRY: WITH SUPERVISION
ORAL_HYGIENE: INDEPENDENT
GROOMING: INDEPENDENT

## 2017-10-02 NOTE — PROGRESS NOTES
10/02/17 1341   Behavioral Health   Hallucinations denies / not responding to hallucinations   Thinking intact   Orientation time: oriented;date: oriented;place: oriented;person: oriented   Memory baseline memory   Insight insight appropriate to situation   Judgement impaired   Eye Contact at examiner   Affect full range affect   Mood mood is calm   Physical Appearance/Attire appears stated age   Hygiene well groomed   Suicidality other (see comments)  (Denies)   Self Injury other (see comment)  (Denies)   Activity other (see comment)  (Social with others)   Speech clear;coherent   Medication Sensitivity no stated side effects   Psychomotor / Gait balanced;steady   Psycho Education   Type of Intervention 1:1 intervention   Response participates, initiates socially appropriate   Hours 0.5   Activities of Daily Living   Hygiene/Grooming independent   Oral Hygiene independent   Dress scrubs (behavioral health)   Laundry with supervision   Room Organization independent   Behavioral Health Interventions   Psychotic Symptoms maintain safety precautions;assist patient in developing safety plan;assist patient in following safety plan;encourage participation / independence with adls;provide emotional support   Social and Therapeutic Interventions (Psychotic Symptoms) encourage socialization with peers;encourage participation in therapeutic groups and milieu activities   Pt attended group this shift and he denies suicidal ideation. He was observed listening headphone.

## 2017-10-02 NOTE — PROGRESS NOTES
Pt s mood was calm most of the shift. Pt denied having SI or SIB. No auditory or visual hallucination. This evening pt wanted to call his friend in [facility] from the group home. Nursing staffs from [facility] did not think it was appropriate for him to talk to his friend. It was not deem to be therapeutic. Then pt requested to move to a private room just vacated. Pt was informed that the room already taken for an incoming pt. Pt became upset at 8 pm and verbally aggressive at staffs.  F--k you . Pt was redirected to his room. No further incident.

## 2017-10-02 NOTE — PROGRESS NOTES
Jeffrey participated in the last half of MT groups this afternoon.      Goals were: validation and grounding.      Interventions used were therapeutic music listening.      He remained in good spirits throughout his time in group.

## 2017-10-03 ENCOUNTER — BEH TREATMENT PLAN (OUTPATIENT)
Dept: BEHAVIORAL HEALTH | Facility: CLINIC | Age: 27
End: 2017-10-03
Attending: PSYCHIATRY & NEUROLOGY

## 2017-10-03 PROCEDURE — 25000132 ZZH RX MED GY IP 250 OP 250 PS 637: Performed by: STUDENT IN AN ORGANIZED HEALTH CARE EDUCATION/TRAINING PROGRAM

## 2017-10-03 PROCEDURE — 25000132 ZZH RX MED GY IP 250 OP 250 PS 637: Performed by: PSYCHIATRY & NEUROLOGY

## 2017-10-03 PROCEDURE — 12400007 ZZH R&B MH INTERMEDIATE UMMC

## 2017-10-03 PROCEDURE — 99231 SBSQ HOSP IP/OBS SF/LOW 25: CPT | Mod: GC | Performed by: PSYCHIATRY & NEUROLOGY

## 2017-10-03 PROCEDURE — H2032 ACTIVITY THERAPY, PER 15 MIN: HCPCS

## 2017-10-03 PROCEDURE — 25000132 ZZH RX MED GY IP 250 OP 250 PS 637

## 2017-10-03 PROCEDURE — S0136 CLOZAPINE, 25 MG: HCPCS

## 2017-10-03 RX ORDER — PRAZOSIN HYDROCHLORIDE 1 MG/1
3 CAPSULE ORAL AT BEDTIME
Qty: 90 CAPSULE | Refills: 0 | Status: ON HOLD | OUTPATIENT
Start: 2017-10-03 | End: 2021-08-16

## 2017-10-03 RX ORDER — OLANZAPINE 10 MG/1
10 TABLET ORAL 3 TIMES DAILY
Qty: 90 TABLET | Refills: 0 | Status: ON HOLD | OUTPATIENT
Start: 2017-10-03 | End: 2018-04-17

## 2017-10-03 RX ORDER — POLYETHYLENE GLYCOL 3350 17 G/17G
17 POWDER, FOR SOLUTION ORAL DAILY PRN
Qty: 7 PACKET | Refills: 0 | Status: ON HOLD
Start: 2017-10-03 | End: 2018-05-01

## 2017-10-03 RX ORDER — BENZTROPINE MESYLATE 1 MG/1
1 TABLET ORAL 2 TIMES DAILY
Qty: 60 TABLET | Refills: 0 | Status: ON HOLD | OUTPATIENT
Start: 2017-10-03 | End: 2021-12-09

## 2017-10-03 RX ORDER — CLOZAPINE 100 MG/1
300 TABLET ORAL AT BEDTIME
Qty: 21 TABLET | Refills: 0 | Status: SHIPPED | OUTPATIENT
Start: 2017-10-03 | End: 2017-10-04

## 2017-10-03 RX ORDER — POLYETHYLENE GLYCOL 3350 17 G/17G
34 POWDER, FOR SOLUTION ORAL DAILY
Qty: 50 PACKET | Refills: 0 | Status: ON HOLD | OUTPATIENT
Start: 2017-10-03 | End: 2019-08-29

## 2017-10-03 RX ADMIN — VITAMIN D, TAB 1000IU (100/BT) 1000 UNITS: 25 TAB at 15:52

## 2017-10-03 RX ADMIN — NICOTINE POLACRILEX 4 MG: 2 GUM, CHEWING ORAL at 18:18

## 2017-10-03 RX ADMIN — NICOTINE POLACRILEX 4 MG: 2 GUM, CHEWING ORAL at 15:52

## 2017-10-03 RX ADMIN — PRAZOSIN HYDROCHLORIDE 3 MG: 2 CAPSULE ORAL at 20:26

## 2017-10-03 RX ADMIN — CLOZAPINE 300 MG: 100 TABLET ORAL at 20:26

## 2017-10-03 RX ADMIN — LEVOTHYROXINE SODIUM 112 MCG: 112 TABLET ORAL at 13:19

## 2017-10-03 RX ADMIN — BENZTROPINE MESYLATE 1 MG: 1 TABLET ORAL at 09:16

## 2017-10-03 RX ADMIN — NICOTINE POLACRILEX 4 MG: 2 GUM, CHEWING ORAL at 20:26

## 2017-10-03 RX ADMIN — OLANZAPINE 10 MG: 10 TABLET, FILM COATED ORAL at 13:19

## 2017-10-03 RX ADMIN — NICOTINE POLACRILEX 4 MG: 2 GUM, CHEWING ORAL at 11:15

## 2017-10-03 RX ADMIN — OLANZAPINE 10 MG: 10 TABLET, FILM COATED ORAL at 09:15

## 2017-10-03 RX ADMIN — NICOTINE POLACRILEX 4 MG: 2 GUM, CHEWING ORAL at 13:18

## 2017-10-03 RX ADMIN — VENLAFAXINE HYDROCHLORIDE 225 MG: 225 TABLET, FILM COATED, EXTENDED RELEASE ORAL at 09:15

## 2017-10-03 RX ADMIN — NICOTINE POLACRILEX 4 MG: 2 GUM, CHEWING ORAL at 19:07

## 2017-10-03 RX ADMIN — NICOTINE POLACRILEX 4 MG: 2 GUM, CHEWING ORAL at 16:52

## 2017-10-03 RX ADMIN — NICOTINE POLACRILEX 4 MG: 2 GUM, CHEWING ORAL at 09:15

## 2017-10-03 RX ADMIN — BENZTROPINE MESYLATE 1 MG: 1 TABLET ORAL at 20:26

## 2017-10-03 RX ADMIN — OLANZAPINE 10 MG: 10 TABLET, FILM COATED ORAL at 20:26

## 2017-10-03 RX ADMIN — PANTOPRAZOLE SODIUM 20 MG: 20 TABLET, DELAYED RELEASE ORAL at 09:16

## 2017-10-03 RX ADMIN — MELATONIN TAB 3 MG 6 MG: 3 TAB at 20:26

## 2017-10-03 RX ADMIN — POLYETHYLENE GLYCOL 3350 34 G: 17 POWDER, FOR SOLUTION ORAL at 09:15

## 2017-10-03 ASSESSMENT — ACTIVITIES OF DAILY LIVING (ADL)
DRESS: SCRUBS (BEHAVIORAL HEALTH);INDEPENDENT
GROOMING: INDEPENDENT
ORAL_HYGIENE: INDEPENDENT

## 2017-10-03 NOTE — PROGRESS NOTES
Pt mostly kept to self in room. Isolative and withdrawn to room reading but coming out intermittently just to pace hallways. Otherwise, says he's felt great these last few couple of days. Says yesterday's evening frustration was only because he wasn't cared for right away. Otherwise, apologized to writer for his irritated and angry behavior and cussing. Otherwise, none of those behaviors this evening. Still appearing a bit blunted and flat. Denies SI/SIB/AH. Says at earliest, his d/c might be later this week or next week. Calm and pleasant this shift.        10/02/17 2232   Behavioral Health   Hallucinations denies / not responding to hallucinations   Thinking distractable   Orientation person: oriented;place: oriented;date: oriented;time: oriented   Memory baseline memory   Insight insight appropriate to situation;insight appropriate to events   Judgement impaired   Eye Contact at examiner   Affect blunted, flat   Mood mood is calm   Physical Appearance/Attire appears stated age;attire appropriate to age and situation;neat   Hygiene well groomed   Suicidality other (see comments)  (denies)   Self Injury other (see comment)  (denies)   Elopement (none)   Activity withdrawn;isolative   Speech coherent;clear   Medication Sensitivity no observed side effects;no stated side effects   Psychomotor / Gait balanced;steady   Activities of Daily Living   Hygiene/Grooming independent   Oral Hygiene independent   Dress street clothes;scrubs (behavioral health);independent   Room Organization independent

## 2017-10-03 NOTE — PLAN OF CARE
Problem: Psychotic Symptoms  Goal: Psychotic Symptoms  Signs and symptoms of listed problems will be absent or manageable.   Outcome: Improving  Pt denies hallucinations, denies SI. Pt affect bright.  Pt stated looking forward to going back to group home tomorrow. Pt denies any SIB.  Pt had intake with day treatment. Pt states he is not going to use drugs after discharge. Pt plans on buying a X box,.  His last X box he sold for drugs.

## 2017-10-03 NOTE — PROGRESS NOTES
"Initial Individual Treatment Plan     Patient: Kamini Neal   MRN: [id number]  : 1990  Age: 27 year old  Sex: male    Diagnostic Assessment Date / Date of Initial Individual Treatment Plan: 10/03/17      Immediate Health Concerns:  No     Immediate Safety Concerns:  No. Per history, see safety plan.    Identify the issues to be addressed in treatment:  Symptom Management, Personal Safety, Community Resources/Discharge Planning, Abstinence/Relapse Prevention, Develop / Improve Independent Living Skills and Develop Socialization / Interpersonal Relationship Skills    Client Initial Individualized Goals for Treatment:  \"skills for me not to have a relapse,  or come back to the hospital.\"    Initial Treatment suggestions for the client during the time between Diagnostic Assessment and completion of the Individualized Treatment Plan:  Follow Safety Plan  Abstain from Substance Use   Ask for more information, support and/or assistance as needed.  Follow up with providers/community supports as needed:   Report increases or changes in symptoms to staff.  Report any personal safety concerns to staff.   Take medications as prescribed.  Report medication changes and/or side effects to staff.  Attend and participate in groups as scheduled or notify staff if unable to do so.  Report any use of substances to staff as this may impact your symptoms and/or  personal safety.  Notify staff if you have any other issues that need to be addressed. This may include  any current abuse / neglect / exploitation or other vulnerability.  Follow recommendations of your treatment team and discuss concerns if not in  agreement.     Treatment Team Responsible: MI/CD Treatment (MICD)      Therapeutic Interventions/Treatment Strategies may include:  Support, Redirection, Feedback, Limit/Boundaries, Safety Assessments, Structured Activity, Problem Solving, Clarification, Education, Motivational Enhancement and Relapse Prevention as " "needed.    Eber Edward              Admission Date: 10/9/2017    The Initial Individual Treatment Plan was reviewed with Kamini Neal upon admission.      Kamini reported his last use of substances as: \"one month ago\" and last use was \"weed.\"     Identify any current concerns with potential to impact admission:     withdrawal/intoxication: none      medication/medical concerns: none of current concern, takes medication for thyroid     immediate safety concerns: none     Other: none    DIMENSION  ADMIT RATING   1 Acute Intoxication / Withdrawal Potential 0   2 Biomedical Conditions & Complications 0   3 Emotional / Behavioral / Cognitive Conditions & Complications 3   4 Treatment Acceptance / Resistance: 1   5 Continued Use / Relapse Prevention 3   6 Recovery Environment 2         Completed by: Radha Perdue MA, LPCC, LADC        "

## 2017-10-03 NOTE — PROGRESS NOTES
Acknowledgement of Current Treatment Plan       I have reviewed my treatment plan with my therapist / counselor on 10/3/2017. I agree with the plan as it is written in the electronic health record.    Name Signature   Kamini Neal    Name of Therapist / Counselor    Eber Edward MA [facility]

## 2017-10-03 NOTE — PLAN OF CARE
Problem: General Plan of Care (Inpatient Behavioral)  Goal: Individualization/Patient Specific Goal (IP Behavioral)  Illness Management Recovery model: Objectives    Patient will identify reason(s) for hospitalization from their perspective.  Patient will identify a minimum of three goals for discharge.  Patient will identify a minimum of three triggers that may increase their symptoms.  Patient will identify a minimum of three coping skills they can do to stay well.   Patient will identify their support system to demonstrate readiness for discharge.   Illness Management Recovery model:  Wellness Strategies.     Patient identified the following actions/activities they can do to stay well.     1. Don't use drugs  2. Go to day treatment  3. Take my meds     10/3/17  Rosalia koch

## 2017-10-03 NOTE — PROGRESS NOTES
----------------------------------------------------------------------------------------------------------  [facility], [place]   Psychiatric Progress Note  Hospital Day #19     Assessment    Presentation: Kamini Neal, a 27 year old male, with history of schizoaffective disorder BPT and PTSD, presented to [facility] ED due to SI, command AH to harm/kill self, and paranoid delusions that people at his group home want to harm/kill him, in the context of recent discharge from [facility] on 9/11/17.      Diagnostic Impression: Patient presented with worsening command auditory hallucinations to harm/kill himself, as well as paranoid delusions that people in his group home want to harm/kill him. On admission, he demonstrated little to no insight into these paranoid delusions, though his insight has improved since that time. Throughout admission, patient had continued to demonstrate behavioral dysregulation including violent head banging, which early on, required chemical and physical restraints. His examination is significant for positive symptoms including paranoid delusions and command AH to kill self and others, behavioral dysregulation in the context of these command AH, and negative symptoms including social withdrawal and flattened, mood incongruent affect. Presentation is most consistent with an exacerbation of patient's historic diagnosis of schizoaffective disorder, possibly with current depressive episode. Recent medication noncompliance and unknown recent EtOH/Khat history are also likely contributing. He could benefit from MICD treatment upon discharge.      Hospital course: Kamini Neal was admitted to [facility] as a voluntary patient. Admission labs were wnl, although Utox was ordered and never obtained.  Received his most recent dose Invega Sustenna PTA (9/13/17). PTA effexor, melatonin, prazosin were continued. Klonopin was held. Shortly after after arrival,  patient began exhibiting head banging behaviors, responsive for a time to prn zyprexa. The following am, patient began violently headbanging and eventually required chemical restraints with combination haldol/ativan/benadryl, and physical restraints. At that point, a 72 hour hold was placed. Scheduled haldol 5 mg BID was started to supplement IM Invega Sustenna. Over the next two days, patient continued to intermittently exhibit head banging/SIB, which on one additional instance, required sedation with IM olanzapine as well as physical restraints. Patient subsequently agreed to sign in as voluntary.       On 9/18, patient developed torticollis which was responsive to IM and PO cogentin. BID scheduled cogentin 1 mg was also started. Scheduled haldol was switched to Olanzapine 10 mg AM and 20 mg PM. Agitiation prns were switched to olanzapine as well. Patient transferred to [facility] 9/19 for a more therapeutic milieu. However, he soon resumed head banging and subsequently made threats to patients and staff. Given the acuity of these threats, patient was subsequently accepted by Dr. Wiggins for transfer to [facility] on 9/21. After arriving on [facility], the patient did not have any more head banging behavior. He was started on Clozapine, and his command AH resolved. Zyprexa was tapered down, with the goal of discharging on Invega Sustenna and Clozapine. On 9/26, he was transferred back to [facility]. Patient noted no recurrence of AH, but has had intermittent SI. Also noted some intermittent impulsive behaviors (cursing at staff), although markedly improved from admission.      Medical course: Patient admitted without issue. Following first episode of physical restraints being applied, patient complained of R shoulder pain. A 3 view XR was obtained which was negative for any pathophysiology. Treated with Tylenol PRN.     Plan     Principal Diagnosis:   # Schizoaffective disorder, bipolar type    Secondary  psychiatric diagnoses of concern this admission:   # PTSD  # Insomnia  # Tobacco use disorder  # r/o EtOH use disorder    Psychotropic Medications:  - Clozapine 300 mg HS  - Zyprexa 10 mg TID  - Invega Sustenna 117 mg q30 days (last dose given 9/13/2017)  - Prazosin 3 mg HS  - Venlafaxine 225 mg daily  - Melatonin 6 mg HS  - Benztropine 1 mg bid  - Zyprexa 5-10 mg prn for psychiatric emergencies    Laboratory/Imaging: Weekly WBC with dif. (On Fridays). WBC and Diff. Tomorrow in anticipation of discharge.   Consults: None  Patient will be treated in therapeutic milieu with appropriate individual and group therapies as described.    Medical diagnoses to be addressed this admission:    # Torticollis: neck and jaw stiffness in setting of scheduled and prn Haldol on 9/18, resolved with IM/PO cogentin. D/c'd haldol, replaced with Olanzapine. On scheduled cogentin. No further episodes.     #Acute L shoulder pain: s/p Code 21 9/15.  XR negative for injury, likely muscle strain per IM     #GERD: continue pantoprazole 20 mg daily     #Hypothyroidism: continue PTA Synthroid 112 mcg per IM     #Vitamin D def: VD 1000 unit tab daily    Consults: None    Relevant psychosocial stressors: group home dynamics, h/o trauma, SPMI, questionable EtOH abuse    Legal Status: Voluntary    Safety Assessment:   Checks: Status 15  Precautions: Suicide  Self-harm  Substance Withdrawal  Pt has not required locked seclusion or restraints in the past 24 hours to maintain safety, please refer to RN documentation for further details.     The risks, benefits, alternatives and side effects have been discussed and are understood by the patient and other caregivers.    Anticipated Disposition/Discharge Date: Likely tomorrow. Dispo group home with MICD.      -------------------------------------------------------------------  Scribed by Yvette Lamar, MS3, for Dr. Alton Barrios, Resident.    I have reviewed and edited the documentation recorded by the  "scribe.  This documentation accurately reflects the services I personally performed and treatment decisions made by me in consultation with the attending physician Antoin Rodríguez MD.     Alton Barrios MD  Psychiatry PGY-1  [phone]    Attestation:  I, Antoni Rodríguez, saw and evaluated the patient with the resident physician.  I agree with the findings and plan of care as documented in the resident note.  I have reviewed all labs and vital signs.           Interim History:   The patient's care was discussed with the treatment team and chart notes were reviewed. VSS. Slept 7 hours.  PRNs: Zyprexa 10 mg     Staff report: Calm and pleasant to staff in the evening. Remained mostly in his room reading, but occasionally came out to walk the hallways. Apologized about angry behavior the evening prior. Appeared blunted and flat.    Patient interview:  Patient states that his intake interview with MICD went well. He says that he feels a little anxious still, but has had not more feelings of depression and no more hallucinations. He also reports that his constipation has resolved. He denies increased fatigue, but does endorse an increased appetite saying that he ate two lunches yesterday. We discussed discharge, and the patient is hoping to be discharged as soon as possible.e       Review of systems:     ROS was negative unless noted above.          Allergies:     Allergies   Allergen Reactions     Haldol [Haloperidol]      Torticollis            Psychiatric Examination:   /82  Pulse 115  Temp 98.4  F (36.9  C) (Oral)  Resp 16  Ht 1.778 m (5' 10\")  Wt 99.8 kg (220 lb)  SpO2 96%  BMI 31.57 kg/m2  Weight is 220 lbs 0 oz  Body mass index is 31.57 kg/(m^2).    Appearance:  awake, alert and adequately groomed, wearing hospital scrubs  Attitude:  cooperative  Eye Contact:  fair  Mood:  anxious  Affect:  mood congruent, constricted.  Speech:  clear, coherent  Psychomotor Behavior:  no evidence of tardive dyskinesia, " dystonia, or tics  Thought Process:  logical and linear  Associations:  no loose associations  Thought Content:  Denies current SI and SIB  Insight:  limited  Judgment:  limited  Oriented to:  time, person, and place  Attention Span and Concentration:  fair  Recent and Remote Memory:  intact  Language: fluent in english  Fund of Knowledge: appropriate  Muscle Strength and Tone: Grossly normal, did not formally assess  Gait and Station: Normal         Labs:   Utox needs to be drawn  CBC wnl  BMP wnl  Lipids wnl, HDL low at 38  TSH 1.20  Folate 10.6  VB12 273  9/22: WBC 8.8, ANC 6.2  9/29: WBC 7.5, ANC 4.8

## 2017-10-03 NOTE — PROGRESS NOTES
MY COPING PLAN FOR SAFETY          Things that are most important to me & reasons for living: Mom              My relapse Warning Signs: isolate, do not talk much, staring down, crying.   I will make my environment safer by: nothing identified  When in crisis, I will use the following coping skills: (relaxation/self-soothing/distraction/activity): Keeping busy, distraction, music helps, video games,   I will use My Support System:   Personal Supports: I can ask for reminders, support, or for them to stay with me  Trusted Friend/s:     Family Member/s :                   Professional Supports: I can ask for med changes, emergency appointments, help  Psychiatrist:    Therapist:      Other: group home staff.     Crisis Lines I can call to discuss options and to access support:  By [facility]:    [place] ([facility] Crisis Services) [phone]   [place] (Mental Health Crisis Program) [phone]   [place]  [phone]   [place] (COPE) [phone]   [place] [phone]   [place] ([facility]  Health Crisis Line) [phone]   [place] ([place], [place], [place], [place], [facility]) [phone]   Other Crisis Lines:   Crisis Connection (Counseling) [phone]   National Suicide Prevention [phone]   Suicide Prevention [phone]        yes   I will attend my Treatment Program and talk to my one of my therapists:       yes   I agree that I will report any current / recent thoughts, impulses or plans of suicide,           homicide, self injurious behavior or substance use .   yes   I agree that I will not act on the above symptoms, but will follow the above plan         instead.  I can also go to the Emergency Department at [facility]: [facility] [phone] or call: [phone]

## 2017-10-04 VITALS
HEART RATE: 84 BPM | BODY MASS INDEX: 31.5 KG/M2 | SYSTOLIC BLOOD PRESSURE: 127 MMHG | WEIGHT: 220 LBS | HEIGHT: 70 IN | DIASTOLIC BLOOD PRESSURE: 91 MMHG | OXYGEN SATURATION: 96 % | TEMPERATURE: 98.3 F | RESPIRATION RATE: 16 BRPM

## 2017-10-04 LAB
BASOPHILS # BLD AUTO: 0 10E9/L (ref 0–0.2)
BASOPHILS NFR BLD AUTO: 0 %
DIFFERENTIAL METHOD BLD: NORMAL
EOSINOPHIL # BLD AUTO: 0 10E9/L (ref 0–0.7)
EOSINOPHIL NFR BLD AUTO: 0 %
LYMPHOCYTES # BLD AUTO: 2 10E9/L (ref 0.8–5.3)
LYMPHOCYTES NFR BLD AUTO: 27 %
MONOCYTES # BLD AUTO: 1 10E9/L (ref 0–1.3)
MONOCYTES NFR BLD AUTO: 13 %
NEUTROPHILS # BLD AUTO: 4.4 10E9/L (ref 1.6–8.3)
NEUTROPHILS NFR BLD AUTO: 60 %
PLATELET # BLD EST: NORMAL 10*3/UL
RBC MORPH BLD: NORMAL
WBC # BLD AUTO: 7.4 10E9/L (ref 4–11)

## 2017-10-04 PROCEDURE — 25000132 ZZH RX MED GY IP 250 OP 250 PS 637: Performed by: STUDENT IN AN ORGANIZED HEALTH CARE EDUCATION/TRAINING PROGRAM

## 2017-10-04 PROCEDURE — 36415 COLL VENOUS BLD VENIPUNCTURE: CPT

## 2017-10-04 PROCEDURE — 85004 AUTOMATED DIFF WBC COUNT: CPT

## 2017-10-04 PROCEDURE — 85048 AUTOMATED LEUKOCYTE COUNT: CPT

## 2017-10-04 PROCEDURE — 25000132 ZZH RX MED GY IP 250 OP 250 PS 637

## 2017-10-04 RX ORDER — PANTOPRAZOLE SODIUM 20 MG/1
20 TABLET, DELAYED RELEASE ORAL DAILY
Qty: 30 TABLET | Refills: 0 | Status: ON HOLD | OUTPATIENT
Start: 2017-10-04 | End: 2019-08-29

## 2017-10-04 RX ORDER — VENLAFAXINE HYDROCHLORIDE 75 MG/1
225 TABLET, EXTENDED RELEASE ORAL
Qty: 90 EACH | Refills: 0 | Status: ON HOLD | OUTPATIENT
Start: 2017-10-04 | End: 2021-08-16

## 2017-10-04 RX ORDER — LEVOTHYROXINE SODIUM 25 UG/1
112 TABLET ORAL DAILY
Qty: 135 TABLET | Refills: 0 | Status: ON HOLD | OUTPATIENT
Start: 2017-10-04 | End: 2019-08-29

## 2017-10-04 RX ORDER — CLOZAPINE 100 MG/1
300 TABLET ORAL AT BEDTIME
Qty: 21 TABLET | Refills: 0 | Status: ON HOLD | OUTPATIENT
Start: 2017-10-04 | End: 2017-11-20

## 2017-10-04 RX ORDER — MULTIVIT-MIN/IRON/FOLIC ACID/K 18-600-40
1000 CAPSULE ORAL DAILY
Qty: 30 TABLET | Refills: 0 | Status: ON HOLD | OUTPATIENT
Start: 2017-10-04 | End: 2020-12-06

## 2017-10-04 RX ORDER — CLOZAPINE 100 MG/1
300 TABLET ORAL AT BEDTIME
Qty: 21 TABLET | Refills: 0 | Status: SHIPPED | OUTPATIENT
Start: 2017-10-04 | End: 2017-10-04

## 2017-10-04 RX ADMIN — NICOTINE POLACRILEX 4 MG: 2 GUM, CHEWING ORAL at 13:02

## 2017-10-04 RX ADMIN — BENZTROPINE MESYLATE 1 MG: 1 TABLET ORAL at 08:14

## 2017-10-04 RX ADMIN — OLANZAPINE 10 MG: 10 TABLET, FILM COATED ORAL at 13:02

## 2017-10-04 RX ADMIN — PANTOPRAZOLE SODIUM 20 MG: 20 TABLET, DELAYED RELEASE ORAL at 08:14

## 2017-10-04 RX ADMIN — POLYETHYLENE GLYCOL 3350 34 G: 17 POWDER, FOR SOLUTION ORAL at 08:14

## 2017-10-04 RX ADMIN — NICOTINE POLACRILEX 4 MG: 2 GUM, CHEWING ORAL at 09:33

## 2017-10-04 RX ADMIN — OLANZAPINE 10 MG: 10 TABLET, FILM COATED ORAL at 08:14

## 2017-10-04 RX ADMIN — VENLAFAXINE HYDROCHLORIDE 225 MG: 225 TABLET, FILM COATED, EXTENDED RELEASE ORAL at 08:14

## 2017-10-04 RX ADMIN — LEVOTHYROXINE SODIUM 112 MCG: 112 TABLET ORAL at 11:51

## 2017-10-04 RX ADMIN — NICOTINE POLACRILEX 4 MG: 2 GUM, CHEWING ORAL at 08:14

## 2017-10-04 RX ADMIN — NICOTINE POLACRILEX 4 MG: 2 GUM, CHEWING ORAL at 10:40

## 2017-10-04 ASSESSMENT — ACTIVITIES OF DAILY LIVING (ADL)
ORAL_HYGIENE: INDEPENDENT
GROOMING: INDEPENDENT
DRESS: STREET CLOTHES;INDEPENDENT

## 2017-10-04 NOTE — DISCHARGE SUMMARY
----------------------------------------------------------------------------------------------------------  [facility], [place]   Discharge Summary  Hospital Day #20      Kamini Neal MRN# [id number]   Age: 27 year old YOB: 1990     Date of Admission:  9/14/2017  Date of Discharge:  10/5/2017  Admitting Physician:  Antoni Rodríguez MD  Discharge Physician:  Antoni Rodríguez MD         Event Leading to Hospitalization:     Presentation: Kamini Neal, a 27 year old male, with history of schizoaffective disorder BPT and PTSD, presented to [facility] ED due to SI, command AH to harm/kill self, and paranoid delusions that people at his group home want to harm/kill him, in the context of recent discharge from [facility] on 9/11/17.       Diagnostic Impression: Patient presented with worsening command auditory hallucinations to harm/kill himself, as well as paranoid delusions that people in his group home want to harm/kill him. On admission, he demonstrated little to no insight into these paranoid delusions, though his insight has improved since that time. Throughout admission, patient had continued to demonstrate behavioral dysregulation including violent head banging, which early on, required chemical and physical restraints. His examination is significant for positive symptoms including paranoid delusions and command AH to kill self and others, behavioral dysregulation in the context of these command AH, and negative symptoms including social withdrawal and flattened, mood incongruent affect. Presentation is most consistent with an exacerbation of patient's historic diagnosis of schizoaffective disorder, possibly with current depressive episode. Recent medication noncompliance and unknown recent EtOH/Khat history are also likely contributing. He could benefit from MICD treatment upon discharge.        See Admission note by Antoni Rodríguez MD, found on 9/14/2017  for additional details.          Diagnoses:     # Schizoaffective disorder, bipolar type  # PTSD  # Insomnia  # Tobacco use disorder  # r/o EtOH use disorder         Labs:     CBC wnl  BMP wnl  Lipids wnl, HDL low at 38  TSH 1.20  Folate 10.6  VB12 273  9/22: WBC 8.8, ANC 6.2  9/29: WBC 7.5, ANC 4.8  10/4: WBC 7.4, ANC 4.4         Consults:     Internal Medicine         Hospital Course:   Psychiatric Course: Kamini Neal was admitted to station 20 as a voluntary patient. Admission labs were wnl, although Utox was ordered and never obtained.  PTA effexor, melatonin, prazosin were continued. Klonopin was discontinued. Shortly after after arrival, patient began exhibiting head banging behaviors, which required chemical and physical restraints. Due to the frequent head banging and command AH to hurt others, he was transferred to Station 12 under the care of Dr. Wiggins for a more secure environment. His head-banging behaviors shortly after resolved, and he was transferred back to Station 20. He was placed on a 72 hour hold during this period, but he later agreed to sign in voluntary.      He was started on haldol 5 mg BID was started to supplement IM Invega Sustenna. Unfortunately, he developed torticollis while on haldol which was responsive to IM and PO cogentin. BID scheduled cogentin 1 mg was also started. Scheduled haldol was switched to Olanzapine. He was started on Clozapine and titrated to a final dose of 300 mg HS. Shortly after starting Clozapine, his command AH resolved. Zyprexa was tapered down,with the goal of discharging on Invega Sustenna and Clozapine. He was discharged on a final dose of Olanzapine 10 mg TID, with the plan to continue tapering down as an outpatient. After the above medication changes, the patient noted his AH and SI had completely resolved, and his impulsive behaviors had markedly improved.       Medical course: Patient admitted without issue. Following first episode of physical  restraints being applied, patient complained of R shoulder pain. A 3 view XR was obtained which was negative for any pathophysiology. Treated with Tylenol PRN.     Risk Assessment:  Kamini Neal has notable risk factors for self-harm, including age, psychosis and previous suicide attempts. However, risk is mitigated by history of seeking help when needed.Additional steps taken to minimize risk include: Close follow up with outpatient providers and providing crisis resources. Therefore, based on all available evidence including the factors cited above, Kamini Neal does not appear to be at imminent risk for self-harm, and is appropriate for outpatient level of care.     This document serves as a transfer of care to Kamini Neal's outpatient providers.         Discharge Medications:     Clozapine 300 mg HS  Venlafaxine 225 mg daily  Paliperidone (invega sustenna) 117 mg monthly (last given 9/13/2017)  Zyprexa 10 mg tid  Levothyroxine 112 mcg daily  Pantoprazole 20 mg daily  Vitamin D 1,000 units daily  Prazosin 3 mg HS  Cogentin 1 mg bid  Miralax 34 g daily           Psychiatric Examination:   Appearance:  awake, alert and adequately groomed  Attitude:  cooperative  Eye Contact:  good  Mood:  good  Affect:  mood congruent  Speech:  clear, coherent  Psychomotor Behavior:  no evidence of tardive dyskinesia, dystonia, or tics  Thought Process:  logical, linear and goal oriented  Associations:  no loose associations  Thought Content:  Denies SI and AH  Insight:  fair  Judgment:  fair  Oriented to:  time, person, and place  Attention Span and Concentration:  intact  Recent and Remote Memory:  intact  Language: Conversant in english  Fund of Knowledge: appropriate  Muscle Strength and Tone: grossly normal, did not formally assess   Gait and Station: Normal         Discharge Plan:   Appointment: [facility] Blood Draw: They will contact group home with date of next blood draw)  [phone]     Appointment:  MI/CD Day Treatment Program: 10/6/17 at 10:45amam ( schedule is Mon- Fri 11:00-2:00pm)  [address] [address]  [phone]     Appointment: Psychiatry: Dr. Marco Campo: 11/2/17 at 1:40pm  [facility] [address], [facility]  Phone:  [phone]      Fax:   [phone]  If you need to be seen prior to this appointment, walk in times are every Wednesday between 8:30am and 10am.            Your [facility]  is Peter Marr at [phone].  Transportation [facility] Member Services at [phone]     Pt seen and discussed with my attending, MD Alotn Murphy MD  PGY-1 Resident      Attestation:  I, Antoni Rodríguez, saw and evaluated the patient with the resident physician.  I agree with the findings and plan of care as documented in the resident note.  I have reviewed all labs and vital signs.  I, Antoni Rodríguez, have reviewed this summary and agree with the findings and discharge plan as written.

## 2017-10-04 NOTE — PROGRESS NOTES
Pt discharged as per order.  Pt denies SI, denies hallucinations. Reviewed discharge paperwork with pt.  Pt affect bright. Pt reported excited to go back to group home. Pt stated the roommate who used Chiat got kicked out. Pt states is not going to use drugs and will be medications complainant.  Pt discharged back to group home via cab. Pt discharged with all belongings, discharge paperwork,and ordered dc supply of Clozaril.

## 2017-10-04 NOTE — PLAN OF CARE
"Problem: Discharge Planning  Goal: Discharge Planning (Adult, OB, Behavioral, Peds)  Outcome: Improving  Patient expects to be discharged back to his  tomorrow at about 10:30, as he wants to be there for lunch. Patient verbalized feeling safe and liking the  and the staff. He is excited that he will start Day Tx on Thursday. He is planning to get a job after the Day Tx is over. Patient is motivated to stay on his prescribed medications and not use drugs.     Problem: General Plan of Care (Inpatient Behavioral)  Goal: Individualization/Patient Specific Goal (IP Behavioral)  Illness Management Recovery model: Objectives    Patient will identify reason(s) for hospitalization from their perspective.  Patient will identify a minimum of three goals for discharge.  Patient will identify a minimum of three triggers that may increase their symptoms.  Patient will identify a minimum of three coping skills they can do to stay well.   Patient will identify their support system to demonstrate readiness for discharge.   Outcome: Improving  Illness Management Recovery model:  Wellness Strategies.     Patient identified the following actions/activities they can do to stay well.     1. Get good sleep  2. Take medications and follow with scheduled appointments  3. Keep positive attitude, participate in Day program and employment after that.         Problem: Psychotic Symptoms  Goal: Psychotic Symptoms  Signs and symptoms of listed problems will be absent or manageable.   Outcome: Improving  Patient denied having \"bad thoughts\", denied hallucinations, delusions, paranoia, or SI/SIB. Patient reported feeling well, and somewhat excited and anxious about his discharge tomorrow. Patient said that the medications he is taking were working, denied side effects, other than drooling on the pillow at night only. Patient recognized that his use of PÉREZ and stopping medications was a bad idea and that he has learned his lesson and will not do " it again. No behavioral issues.

## 2017-10-04 NOTE — DISCHARGE INSTRUCTIONS
Behavioral Discharge Planning and Instructions      Summary:  You were admitted to Station 20 on 9-14-17 under the care of Dr Rodríguez.  You were experiencing voices telling you to harm yourself.   You transferred to Station 22 on 9-19-17 and then to Station 12 on 9-21-17, where you were under the care of Dr Wiggins.   On 9-26-17, you were transferred back to Station 20 because you were doing well.   You met with Dr. Rodríguez and his team daily for ongoing psychiatric assessment and medication management.  You had opportunities to participate in therapeutic groups on the unit.   At this time you report your mood has stabilized and you report you are not having thoughts or intent to harm yourself or others.   You will be discharged home and will resume care with your outpatient providers.    Disposition: [facility] [place].    Diagnoses:   Schizoaffective Disorder, Bipolar Type.     PTSD.       Outpatient Mental Health Follow-Up:   Appointment: [facility] Blood Draw: They will contact group home with date of next blood draw)  [phone]    Appointment: MI/CD Day Treatment Program: 10/6/17 at 10:45amam ( schedule is Mon- Fri 11:00-2:00pm)  [address] [address]  [phone]    Appointment: Psychiatry: Dr. Marco Campo: 11/2/17 at 1:40pm  [facility]  Phone:  [phone]      Fax:   [phone]  If you need to be seen prior to this appointment, walk in times are every Wednesday between 8:30am and 10am.          Your [facility]  is Peter Marr at [phone].  Transportation [facility] Member Services at [phone]     Major Treatments, Procedures and Findings:   Medications were  managed throughout your stay. An internal medicine consult was completed during your stay. You had the opportunity to participate in treatment programming while on the unit including  occupational therapy, mental health support and education and spiritual services.     Symptoms to Report:   Please report if you are experiencing increased aggression and/or confusion, problematic loss of sleep, worsening mood, or thoughts of suicide to your treatment team or notify your primary provider.   IF THE SYMPTOMS YOU ARE EXPERIENCING ARE A MEDICAL EMERGENCY, CALL 911 IMMEDIATELY    Early warning signs can include:   Increased depression or anxiety sleep disturbances increased thoughts or behaviors of suicide or self-harm  increased unusual thinking, such as paranoia or hearing voices    Safety and Wellness:    Take all medicines as directed.  Make no changes unless your doctor suggests them.      Follow treatment recommendations.  Refrain from alcohol and non-prescribed drugs.  If there is a concern for safety, call 911.    Resources:   *[facility] Crisis: [facility]: ([phone]) 24 hour mobile crisis support for people having a mental health crisis in [facility].   *Acute Psychiatric Services ([phone]). 24-hour walk-in crisis psychiatric support at [facility]; Emergency Medications Clinic available 7:30am - 2:00pm  *Crisis Connection: ([phone])  24-hour confidential telephone counseling   *[facility] Emergency Room: [phone]  *[place] Recovery [facility] ([facility]) : [facility] connects people seeking recovery to resources that help foster and sustain long-term recovery. Whether you are seeking resources for treatment, transportation, housing, job training, education, health or other pathways to recovery, [facility] is a great place to start.    [phone]      [email or web address]     The treatment team has appreciated the opportunity to work with you.  We wish you the best in the future.    If you have any questions or concerns our unit number is [phone].

## 2017-10-06 ENCOUNTER — TELEPHONE (OUTPATIENT)
Dept: BEHAVIORAL HEALTH | Facility: CLINIC | Age: 27
End: 2017-10-06

## 2017-10-06 ENCOUNTER — TELEPHONE (OUTPATIENT)
Dept: PSYCHIATRY | Facility: CLINIC | Age: 27
End: 2017-10-06

## 2017-10-06 NOTE — TELEPHONE ENCOUNTER
Phoned pts group home. Group home staff report no one told them pt was going to be admitted to [facility]. The staff reports they will have him here Monday 10/9/17.

## 2017-10-09 ENCOUNTER — HOSPITAL ENCOUNTER (OUTPATIENT)
Dept: BEHAVIORAL HEALTH | Facility: CLINIC | Age: 27
End: 2017-10-09
Attending: PSYCHIATRY & NEUROLOGY
Payer: COMMERCIAL

## 2017-10-09 LAB
AMPHETAMINES UR QL SCN: NEGATIVE
BARBITURATES UR QL: NEGATIVE
BENZODIAZ UR QL: NEGATIVE
CANNABINOIDS UR QL SCN: NEGATIVE
COCAINE UR QL: NEGATIVE
ETHANOL UR QL SCN: NEGATIVE
OPIATES UR QL SCN: NEGATIVE

## 2017-10-09 PROCEDURE — H2012 BEHAV HLTH DAY TREAT, PER HR: HCPCS

## 2017-10-09 ASSESSMENT — ANXIETY QUESTIONNAIRES
7. FEELING AFRAID AS IF SOMETHING AWFUL MIGHT HAPPEN: SEVERAL DAYS
2. NOT BEING ABLE TO STOP OR CONTROL WORRYING: SEVERAL DAYS
6. BECOMING EASILY ANNOYED OR IRRITABLE: SEVERAL DAYS
3. WORRYING TOO MUCH ABOUT DIFFERENT THINGS: MORE THAN HALF THE DAYS
1. FEELING NERVOUS, ANXIOUS, OR ON EDGE: MORE THAN HALF THE DAYS
IF YOU CHECKED OFF ANY PROBLEMS ON THIS QUESTIONNAIRE, HOW DIFFICULT HAVE THESE PROBLEMS MADE IT FOR YOU TO DO YOUR WORK, TAKE CARE OF THINGS AT HOME, OR GET ALONG WITH OTHER PEOPLE: SOMEWHAT DIFFICULT
GAD7 TOTAL SCORE: 7
5. BEING SO RESTLESS THAT IT IS HARD TO SIT STILL: NOT AT ALL

## 2017-10-09 ASSESSMENT — PATIENT HEALTH QUESTIONNAIRE - PHQ9
SUM OF ALL RESPONSES TO PHQ QUESTIONS 1-9: 14
5. POOR APPETITE OR OVEREATING: NOT AT ALL

## 2017-10-09 NOTE — PROGRESS NOTES
"  Adult Mental Health Outpatient Group Therapy Progress Note     Client Initial Individualized Goals for Treatment: \"skills for me not to have a relapse,  or come back to the hospital.\"      See Initial Treatment suggestions for the client during the time between Diagnostic Assessment and completion of the Master Individualized Treatment Plan.    Treatment Goals:     see above     Area of Treatment Focus:  Symptom Management, Community Resources/Discharge Planning and Abstinence/Relapse Prevention    Therapeutic Interventions/Treatment Strategies:  Support, Feedback, Structured Activity, Problem Solving, Clarification, Education and Motivational Enhancement Therapy    Response to Treatment Strategies:  Accepted Feedback, Listened, Focused on Goals, Accepted Support and Alert    Name of Group:  Goal Setting     Description and Outcome:  Kamini participated in group that focused on learning about and practicing setting realistic goals for treatment and mental and chemical health management, along with self reflection on accomplishments and problem solving obstacles for follow through. He was quiet, but listened to others during his first group in the program. He shared when asked and noted that his accomplishment from the previous week was getting out of the hospital. He set a few goals of getting to groups daily, taking his medications, and staying away from people who use chemicals.        Is this a Weekly Review of the Progress on the Treatment Plan?  No          "

## 2017-10-09 NOTE — PROGRESS NOTES
"MICD Progress Note / Treatment Plan Review       Patient: Kamini Neal   MRN: [id number]  : 1990  Age: 27 year old  Sex: male    Week of: 10/9/2017-10/13/2017    Client Initial Individualized Goals for Treatment:  \"skills for me not to have a relapse,  or come back to the hospital.    See Initial Treatment suggestions for the client during the time between Diagnostic Assessment and completion of the Master Individualized Treatment Plan.    Treatment Goals:     See Above    Active Psychiatric Symptoms Impairing:   Thought, Mood, Behavior and Perception    Area of Treatment Focus:  Symptom Management, Personal Safety, Community Resources/Discharge Planning, Abstinence/Relapse Prevention, Develop / Improve Independent Living Skills and Develop Socialization / Interpersonal Relationship Skills    Treatment Strategies:   Support, Redirection, Limit/Boundaries, Safety Assessments, Structured Activity and Education    Patient Response:  Listened and Disengaged    Dimension Risk Description and Outcome:    Dimension One: Acute Intoxication/Withdrawal Potential   Daily Note: 10/9/17: Kamini reported last use as one month ago. He endorsed no withdrawal symptoms. (DE)    Dimension Two: Biomedical Conditions and Complications  Daily Note: 10/9/17: Kamini reported no physical health concerns. (DE)    Dimension Three: Emotional/Behavioral / Cognitive Conditions & Complications  Daily Note: 10/9/17: Kamini endorsed absence of suicidal ideation and self-injurious behavior urges. He described feeling a \"little depressed\" and shared that he distracts himself with video games to cope. (DE)    Dimension Four: Treatment Acceptance / Resistance  Daily Note: 10/9/17: Kamini declined to take time in group. He quietly observed others and offered no verbal feedback. (DE)    Dimension Five: Continued Use/Relapse Prevention  Daily Note: 10/9/17: Kamini reported no thoughts or urges to use. Risk remains moderate to high " "due to severity of mental health symptoms. (DE) 10/09/17 - 8075-1116 - Urge [employer]fing. Kamini introduced self to writer on his first day, with prompting. He sat quietly without interacting with any of his peers. When prompted to share his knowledge of how an argument might escalate, he shared that he argues with his father at times. He was able to engage in the example. He did not verbalize understanding of the skill and he did not ask any questions. He will likely need more time to adjust to the group. Kamini did not offer any self-disclosure. (DD)    Dimension Six: Recovery Environment  Daily Note: 10/9/17: Kamini reported that he does not have \"much\" social support. (DE)      Weekly Progress / Treatment Plan Review      Are there additional progress notes for this week? Yes (see additional progress notes)    Are Treatment Plan Goals being addressed? Client discharged    Are treatment plan strategies to address goals effective? Client discharged    Are there any current contracts in place? No    Client: Client discharged     Client: Client discharged    Was client case reviewed with Dr Ferreira and/or Dr Park and the treatment team this week? Yes, discussed decision to withdraw from program and coordination with .     Staff: Radha Perdue MA, [facility], LADC (DE); Tami Reddy [facility], [facility] (DD)        "

## 2017-10-10 ENCOUNTER — TELEPHONE (OUTPATIENT)
Dept: BEHAVIORAL HEALTH | Facility: CLINIC | Age: 27
End: 2017-10-10

## 2017-10-10 ENCOUNTER — TELEPHONE (OUTPATIENT)
Dept: PSYCHIATRY | Facility: CLINIC | Age: 27
End: 2017-10-10

## 2017-10-10 ASSESSMENT — ANXIETY QUESTIONNAIRES: GAD7 TOTAL SCORE: 7

## 2017-10-10 NOTE — TELEPHONE ENCOUNTER
Contacted patient's housing staff, Neela Pollack, to inform him that patient has withdrawn from program.    Radha Perdue, [facility], LADC  10/10/2017

## 2017-10-10 NOTE — TELEPHONE ENCOUNTER
"Contacted patient to inquire about absence from group. He stated that he is unable to continue with the group due to it being \"too far\" away from him. He stated that he would like to withdraw from program.     Radha Perdue, [facility], [facility]  10/10/2017    "

## 2017-10-10 NOTE — TELEPHONE ENCOUNTER
Contacted patient's , Peter Marr, to inform him that patient has chosen to withdraw. He requested that we wait to discharge patient and that he would attempt to convince patient to stay in program. Requested that Peter inform writer of decision by 10/11/17 at 10:00AM.       Radha Perdue, [facility], [facility]  10/10/2017

## 2017-10-11 ENCOUNTER — TELEPHONE (OUTPATIENT)
Dept: BEHAVIORAL HEALTH | Facility: CLINIC | Age: 27
End: 2017-10-11

## 2017-10-11 NOTE — TELEPHONE ENCOUNTER
Contacted patient's , Peter Marr, to inquire about patient's participation in program. Peter stated that patient is not motivated for treatment at this time. He informed writer to discharge patient.     Radha Perdue, [facility], [facility]  10/11/2017

## 2017-10-11 NOTE — DISCHARGE SUMMARY
Adult MICD Discharge Summary / Instructions Adult MICD Discharge Summary / Instruction     Patient: Kamini Neal MRN: [id number]  : 1990 Age:  27 year old Sex:  male    Admission Date: 10/9/2017  Discharge Date: 10/11/2017    Reason for Discharge:       Patient/Client decision          Prognosis: Guarded      Client Progress Toward AchievingTreatment Plan Goals / Dimensions Risk Scale       Kamini attended 3 of 6 scheduled MICD groups. Absences were due to no call/no show. Kamini then decided to withdraw from program.       Diagnosis:   295.70  (F25.1) Schizoaffective Disorder Depressive Type  309.81 (F43.10) Posttraumatic Stress Disorder (includes Posttraumatic Stress Disorder for Children 6 Years and Younger)  Without dissociative symptoms  303.90 (F10.20) Alcohol Use Disorder, Severe  304.3 (F12.20) Cannabis Use Disorder, Severe  304.9 (F19.20) Other (Qhat) Substance Use Disorder, Severe    Individualized Treatment Plan Goals/Progress:   Individualized treatment plan was not yet developed due to Kamini only attending program for one day. Progress towards initial treatment plan goals is unclear as patient did not continue in program after one day.      Admit Discharge   PHQ-9 14 N/A   ISACC-7 7 N/A       DIMENSION  ADMIT RATING DISCHARGE RATING   1 Acute Intoxication / Withdrawal Potential 0 0   2 Biomedical Conditions & Complications 0 0   3 Emotional / Behavioral / Cognitive Conditions & Complications 3 3   4 Treatment Acceptance / Resistance: 1 1   5 Continued Use / Relapse Prevention 3 3   6 Recovery Environment 2 2       Additional Comments: It is recommended that Kamini participate in a dual disorder treatment program. If Kamini would like recommendations he may call Radha Perdue at [phone]. If he would like to reapply for the program he may call [phone].      Completed By: Radha Perdue MA, [facility], [facility]

## 2017-10-11 NOTE — DISCHARGE SUMMARY
"                     MICD Discharge Summary/Instructions     Patient: Kamini Neal  MRN: [id number]   : 1990 Age: 27 year old Sex: male   -  Focus of Treatment / Discharge Recommendations    Personal Safety/ Management of Symptoms    * Follow your safety plan.  Report increased symptoms to your care team  Marco Campo MD and /or go to the nearest Emergency Department.    * Call crisis lines as needed  [facility] [phone]                [facility] [phone]  [facility] [phone]              [facility] [phone]  [facility] [phone]              [facility]E [phone]  [facility] [phone]          National Suicide Prevention [phone]  [facility] [phone]            Suicide Prevention [phone]  [facility] [phone]    Abstinence/Relapse Prevention  * Take all medicines as directed.  Carry a current list of medicines with you.  * Use coping skills: structure/routine, mindfulness   * Do not use illicit (street) drugs, controlled substances (narcotics) or alcohol.    Develop/Improve Independent Living/Socialization Skills: Attend all scheduled appointments with treatment team    Community Resources/Supports and Discharge Planning:    Follow up with psychiatrist / main caregiver: Marco Campo MD    Next visit: Contact to schedule     Follow up with your therapist: \"[facility]   Next visit: Contact to schedule    Go to group therapy and / or support groups at: It is recommended that you participate in a dual disorder treatment program. If you would like recommendations you may call Radha Perdue at [phone]. To reapply for the program he may contact [phone].      See your medical doctor about: As recommended by your physician     Other:  It is recommended that you continue to attend scheduled appointments with , psychiatrist, and treatment team.     Client Signature:_unable to sign " ______________________   Date / Time:__________  Staff Signature:___Radha Perdue MA, LPCC, LADC _____________________   Date / Time:10/11/17 at 1:55PM____________

## 2017-11-11 ENCOUNTER — HOSPITAL ENCOUNTER (EMERGENCY)
Facility: CLINIC | Age: 27
Discharge: PSYCHIATRIC OR SUBSTANCE USE FACILITY | End: 2017-11-12
Attending: EMERGENCY MEDICINE | Admitting: EMERGENCY MEDICINE
Payer: COMMERCIAL

## 2017-11-11 DIAGNOSIS — F25.9 SCHIZOAFFECTIVE DISORDER, UNSPECIFIED TYPE (H): ICD-10-CM

## 2017-11-11 LAB
ALCOHOL BREATH TEST: 0 (ref 0–0.01)
AMPHETAMINES UR QL SCN: NEGATIVE
BARBITURATES UR QL: NEGATIVE
BENZODIAZ UR QL: NEGATIVE
CANNABINOIDS UR QL SCN: POSITIVE
COCAINE UR QL: NEGATIVE
OPIATES UR QL SCN: NEGATIVE
PCP UR QL SCN: NEGATIVE

## 2017-11-11 PROCEDURE — 82075 ASSAY OF BREATH ETHANOL: CPT

## 2017-11-11 PROCEDURE — 80307 DRUG TEST PRSMV CHEM ANLYZR: CPT | Performed by: EMERGENCY MEDICINE

## 2017-11-11 PROCEDURE — 99285 EMERGENCY DEPT VISIT HI MDM: CPT | Mod: 25

## 2017-11-11 PROCEDURE — 25000132 ZZH RX MED GY IP 250 OP 250 PS 637: Performed by: EMERGENCY MEDICINE

## 2017-11-11 PROCEDURE — 90791 PSYCH DIAGNOSTIC EVALUATION: CPT

## 2017-11-11 RX ORDER — BENZTROPINE MESYLATE 1 MG/1
1 TABLET ORAL ONCE
Status: COMPLETED | OUTPATIENT
Start: 2017-11-11 | End: 2017-11-11

## 2017-11-11 RX ORDER — OLANZAPINE 5 MG/1
10 TABLET ORAL ONCE
Status: COMPLETED | OUTPATIENT
Start: 2017-11-11 | End: 2017-11-11

## 2017-11-11 RX ADMIN — BENZTROPINE MESYLATE 1 MG: 1 TABLET ORAL at 22:54

## 2017-11-11 RX ADMIN — OLANZAPINE 10 MG: 5 TABLET, FILM COATED ORAL at 22:54

## 2017-11-11 ASSESSMENT — ENCOUNTER SYMPTOMS: HALLUCINATIONS: 1

## 2017-11-12 ENCOUNTER — HOSPITAL ENCOUNTER (INPATIENT)
Facility: CLINIC | Age: 27
LOS: 8 days | Discharge: NURSING, REHAB OR LONG-TERM CARE | DRG: 885 | End: 2017-11-20
Attending: PSYCHIATRY & NEUROLOGY | Admitting: PSYCHIATRY & NEUROLOGY
Payer: COMMERCIAL

## 2017-11-12 VITALS
DIASTOLIC BLOOD PRESSURE: 69 MMHG | OXYGEN SATURATION: 99 % | HEIGHT: 70 IN | SYSTOLIC BLOOD PRESSURE: 113 MMHG | TEMPERATURE: 98.8 F | RESPIRATION RATE: 18 BRPM | HEART RATE: 77 BPM

## 2017-11-12 DIAGNOSIS — F25.0 SCHIZOAFFECTIVE DISORDER, BIPOLAR TYPE (H): Primary | ICD-10-CM

## 2017-11-12 PROCEDURE — 99207 ZZC CDG-MDM COMPONENT: MEETS MODERATE - UP CODED: CPT | Performed by: CLINICAL NURSE SPECIALIST

## 2017-11-12 PROCEDURE — 25000132 ZZH RX MED GY IP 250 OP 250 PS 637: Performed by: CLINICAL NURSE SPECIALIST

## 2017-11-12 PROCEDURE — 12400003 ZZH R&B MH CRITICAL UMMC

## 2017-11-12 PROCEDURE — 99223 1ST HOSP IP/OBS HIGH 75: CPT | Mod: AI | Performed by: CLINICAL NURSE SPECIALIST

## 2017-11-12 PROCEDURE — 25000132 ZZH RX MED GY IP 250 OP 250 PS 637: Performed by: PSYCHIATRY & NEUROLOGY

## 2017-11-12 RX ORDER — BISACODYL 10 MG
10 SUPPOSITORY, RECTAL RECTAL DAILY PRN
Status: DISCONTINUED | OUTPATIENT
Start: 2017-11-12 | End: 2017-11-20 | Stop reason: HOSPADM

## 2017-11-12 RX ORDER — TRAZODONE HYDROCHLORIDE 50 MG/1
50 TABLET, FILM COATED ORAL
Status: DISCONTINUED | OUTPATIENT
Start: 2017-11-12 | End: 2017-11-20 | Stop reason: HOSPADM

## 2017-11-12 RX ORDER — VENLAFAXINE HYDROCHLORIDE 225 MG/1
225 TABLET, EXTENDED RELEASE ORAL
Status: DISCONTINUED | OUTPATIENT
Start: 2017-11-13 | End: 2017-11-20 | Stop reason: HOSPADM

## 2017-11-12 RX ORDER — OLANZAPINE 10 MG/2ML
10 INJECTION, POWDER, FOR SOLUTION INTRAMUSCULAR
Status: DISCONTINUED | OUTPATIENT
Start: 2017-11-12 | End: 2017-11-20 | Stop reason: HOSPADM

## 2017-11-12 RX ORDER — BENZTROPINE MESYLATE 1 MG/1
1 TABLET ORAL 2 TIMES DAILY
Status: DISCONTINUED | OUTPATIENT
Start: 2017-11-12 | End: 2017-11-12

## 2017-11-12 RX ORDER — PANTOPRAZOLE SODIUM 20 MG/1
20 TABLET, DELAYED RELEASE ORAL DAILY
Status: DISCONTINUED | OUTPATIENT
Start: 2017-11-12 | End: 2017-11-20 | Stop reason: HOSPADM

## 2017-11-12 RX ORDER — ACETAMINOPHEN 325 MG/1
650 TABLET ORAL EVERY 4 HOURS PRN
Status: DISCONTINUED | OUTPATIENT
Start: 2017-11-12 | End: 2017-11-20 | Stop reason: HOSPADM

## 2017-11-12 RX ORDER — LANOLIN ALCOHOL/MO/W.PET/CERES
6 CREAM (GRAM) TOPICAL AT BEDTIME
Status: DISCONTINUED | OUTPATIENT
Start: 2017-11-12 | End: 2017-11-20 | Stop reason: HOSPADM

## 2017-11-12 RX ORDER — CLOZAPINE 100 MG/1
300 TABLET ORAL AT BEDTIME
Status: DISCONTINUED | OUTPATIENT
Start: 2017-11-12 | End: 2017-11-13

## 2017-11-12 RX ORDER — BENZTROPINE MESYLATE 1 MG/1
1 TABLET ORAL 2 TIMES DAILY PRN
Status: DISCONTINUED | OUTPATIENT
Start: 2017-11-12 | End: 2017-11-20 | Stop reason: HOSPADM

## 2017-11-12 RX ORDER — OLANZAPINE 10 MG/1
10 TABLET ORAL 3 TIMES DAILY
Status: DISCONTINUED | OUTPATIENT
Start: 2017-11-12 | End: 2017-11-20 | Stop reason: HOSPADM

## 2017-11-12 RX ORDER — ALUMINA, MAGNESIA, AND SIMETHICONE 2400; 2400; 240 MG/30ML; MG/30ML; MG/30ML
30 SUSPENSION ORAL EVERY 4 HOURS PRN
Status: DISCONTINUED | OUTPATIENT
Start: 2017-11-12 | End: 2017-11-20 | Stop reason: HOSPADM

## 2017-11-12 RX ORDER — POLYETHYLENE GLYCOL 3350 17 G/17G
17 POWDER, FOR SOLUTION ORAL DAILY PRN
Status: DISCONTINUED | OUTPATIENT
Start: 2017-11-12 | End: 2017-11-20 | Stop reason: HOSPADM

## 2017-11-12 RX ORDER — LEVOTHYROXINE SODIUM 112 UG/1
112 TABLET ORAL DAILY
Status: DISCONTINUED | OUTPATIENT
Start: 2017-11-12 | End: 2017-11-20 | Stop reason: HOSPADM

## 2017-11-12 RX ORDER — OLANZAPINE 10 MG/1
10 TABLET ORAL
Status: DISCONTINUED | OUTPATIENT
Start: 2017-11-12 | End: 2017-11-20 | Stop reason: HOSPADM

## 2017-11-12 RX ORDER — HYDROXYZINE HYDROCHLORIDE 25 MG/1
25-50 TABLET, FILM COATED ORAL EVERY 4 HOURS PRN
Status: DISCONTINUED | OUTPATIENT
Start: 2017-11-12 | End: 2017-11-20 | Stop reason: HOSPADM

## 2017-11-12 RX ADMIN — NICOTINE POLACRILEX 4 MG: 2 GUM, CHEWING ORAL at 17:08

## 2017-11-12 RX ADMIN — HYDROXYZINE HYDROCHLORIDE 50 MG: 25 TABLET ORAL at 15:44

## 2017-11-12 RX ADMIN — OLANZAPINE 10 MG: 10 TABLET, FILM COATED ORAL at 14:09

## 2017-11-12 RX ADMIN — HYDROXYZINE HYDROCHLORIDE 50 MG: 25 TABLET ORAL at 10:53

## 2017-11-12 RX ADMIN — NICOTINE POLACRILEX 4 MG: 2 GUM, CHEWING ORAL at 15:45

## 2017-11-12 RX ADMIN — PRAZOSIN HYDROCHLORIDE 3 MG: 2 CAPSULE ORAL at 20:27

## 2017-11-12 RX ADMIN — OLANZAPINE 10 MG: 10 TABLET, FILM COATED ORAL at 20:28

## 2017-11-12 RX ADMIN — OLANZAPINE 10 MG: 10 TABLET, FILM COATED ORAL at 10:54

## 2017-11-12 RX ADMIN — PANTOPRAZOLE SODIUM 20 MG: 20 TABLET, DELAYED RELEASE ORAL at 12:28

## 2017-11-12 RX ADMIN — NICOTINE POLACRILEX 4 MG: 2 GUM, CHEWING ORAL at 14:09

## 2017-11-12 RX ADMIN — LEVOTHYROXINE SODIUM 112 MCG: 112 TABLET ORAL at 12:28

## 2017-11-12 RX ADMIN — VITAMIN D, TAB 1000IU (100/BT) 1000 UNITS: 25 TAB at 12:29

## 2017-11-12 RX ADMIN — MELATONIN TAB 3 MG 6 MG: 3 TAB at 20:28

## 2017-11-12 RX ADMIN — NICOTINE POLACRILEX 4 MG: 2 GUM, CHEWING ORAL at 18:05

## 2017-11-12 RX ADMIN — OLANZAPINE 10 MG: 10 TABLET, FILM COATED ORAL at 16:25

## 2017-11-12 ASSESSMENT — ACTIVITIES OF DAILY LIVING (ADL)
GROOMING: INDEPENDENT;PROMPTS
DRESS: SCRUBS (BEHAVIORAL HEALTH)
DRESS: INDEPENDENT;PROMPTS
ORAL_HYGIENE: INDEPENDENT
LAUNDRY: WITH SUPERVISION
ORAL_HYGIENE: INDEPENDENT;PROMPTS
GROOMING: INDEPENDENT
LAUNDRY: WITH SUPERVISION

## 2017-11-12 NOTE — PROGRESS NOTES
"1. What PRN did patient receive? Zyprexa 10mg    2. What was the patient doing that led to the PRN medication? Pt was hitting his head on the door to his room.     3. Did they require R/S? No.    4. Side effects to PRN medication? None observed.    5. After 1 Hour, patient appeared: Pt continues to reports hearing a voice commanding him to hit his head, Pt also said \"I want to kill myself, its not going to get better for me\".     Writer discussed with on-call provider, provider met with and evaluated patient. SIO staffing ordered for safety (command hallucinations to hit his head and Pt actually hitting his head)        "
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
## 2017-11-12 NOTE — IP AVS SNAPSHOT
MRN:[id number]                      After Visit Summary   11/12/2017    Kamini Neal    MRN: [id number]           Thank you!     Thank you for choosing [facility] for your care. Our goal is always to provide you with excellent care.        Patient Information     Date Of Birth          1990        About your hospital stay     You were admitted on:  November 12, 2017 You last received care in the:   [facility]    You were discharged on:  November 20, 2017        Reason for your hospital stay       Schizoaffective DO, Cannabis use                  Who to Call     For medical emergencies, please call 911.  For non-urgent questions about your medical care, please call your primary care provider or clinic, [phone]          Attending Provider     Provider Specialty    Constantino Lugo MD Psychiatry       Primary Care Provider Office Phone # Fax #    [facility] [phone]      After Care Instructions     Activity       Your activity upon discharge: activity as tolerated            Diet       Follow this diet upon discharge: Orders Placed This Encounter      Regular Diet Adult No Pork            Discharge Instructions       1. Please do not harm yourself or others.  2. Please continue to take your medications.  3. Please follow up with your outpatient care team.  4. Please do not take drugs or alcohol as this will worsen your condition.  5. Please do not take more than the prescribed doses of medications as this may make them dangerous.   6. Please follow your safety plan of action.  7. Please call crisis if having trouble.  8. If having thoughts of harming self or others please come in to the emergency department as soon as possible.                  Your next 10 appointments already scheduled     Nov 21, 2017  9:00 AM CST   Return Visit with [facility] ONE   [facility] ([facility])     [facility] [address] [address]   [phone]            Nov 22, 2017  9:00 AM CST   Return Visit with DDP GROUP ONE   [facility] ([facility])    [facility] [address] [address]   [phone]            Nov 24, 2017  9:00 AM CST   Return Visit with DDP GROUP ONE   [facility] ([facility])    [facility] [address] [address]   [phone]            Nov 27, 2017  9:00 AM CST   Return Visit with DDP GROUP ONE   [facility] ([facility])    [facility] [address] [address]   [phone]            Nov 28, 2017  9:00 AM CST   Return Visit with DDP GROUP ONE   [facility] ([facility])    [facility] [address] [address]   [phone]            Nov 29, 2017  9:00 AM CST   Return Visit with DDP GROUP ONE   [facility] ([facility])    [facility] [address] [address]   [phone]            Nov 30, 2017  9:00 AM CST   Return Visit with DDP GROUP ONE   [facility] ([facility])    [facility] [address] [address]   [phone]            Dec 01, 2017  9:00 AM CST   Return Visit with DDP GROUP ONE   [facility] ([facility])    [facility] [address] [address]   [phone]            Dec 04, 2017  9:00 AM CST   Return Visit with DDP GROUP ONE   [facility] ([facility])    [facility] [address] [address]   [phone]            Dec 05, 2017  9:00 AM CST   Return Visit with DDP GROUP  ONE   [facility] ([facility])    [address] [id number] [phone]              Further instructions from your care team        Behavioral Discharge Planning and Instructions      Summary:  You were admitted on 11/12/2017  due to Schizoaffective disorder.  You were treated by Dr. Constantino Lugo MD and discharged on 11/[facility]/17 [facility].    Principal Diagnosis: Schizoaffective disorder, bipolar type    Health Care Follow-up Appointments:   [facility]   [address] [address]  Phone: [phone]  *You will be starting the [facility] on Tuesday, November 21st at 9:00 am-noon.     [facility] [address] [place], [place]   Phone: [phone]  Fax: [phone] HUC TO FAX DISCHARGE INSTRUCTIONS   *If no appointments scheduled, explain: your group home will be making an earlier psychiatry appointment with Dr. Campo for you. Please work with your group home staff to schedule a psychiatry appointment   Attend all scheduled appointments with your outpatient providers. Call at least 24 hours in advance if you need to reschedule an appointment to ensure continued access to your outpatient providers.   Major Treatments, Procedures and Findings:  You were provided with: a psychiatric assessment, assessed for medical stability, medication evaluation and/or management, group therapy and milieu management    Symptoms to Report: feeling more aggressive, increased confusion, losing more sleep, mood getting worse or thoughts of suicide    Early warning signs can include: increased depression or anxiety sleep disturbances increased thoughts or behaviors of suicide or self-harm  increased unusual thinking, such as paranoia or hearing voices    Safety and  "Wellness:  Take all medicines as directed.  Make no changes unless your doctor suggests them.      Follow treatment recommendations.  Refrain from alcohol and non-prescribed drugs.  Ask your support system to help you reduce your access to items that could harm yourself or others. If there is a concern for safety, call 911.    Resources:   Crisis Intervention: 622.663.8714 or 048-513-1850 (TTY: 206.138.4756).  Call anytime for help.  National [place] on Mental Illness (www.[place].jorge a.org): 877.370.1976 or 125-373-9565.  Alcoholics Anonymous (www.alcoholics-anonymous.org): Check your phone book for your local chapter.  Suicide Awareness Voices of Education (SAVE) (www.save.org): 632-228-FRHG (8719)  National Suicide Prevention Line (www.mentalhealthmn.org): 162-382-NVVI (6835)  Mental Health Consumer/Survivor Network of [place] (www.mhcsn.net): 429.575.2825 or 820-741-3224  Mental Health Association of [place] (www.mentalhealth.org): 245.193.8283 or 380-833-5926  Self- Management and Recovery Training., [employer]-- Toll free: 560.726.8224  www.[employer].Weeleo  [facility] [place] Crisis (COPE) Response - Adult 489 769-9547  Text 4 Life: txt \"LIFE\" to 13483 for immediate support and crisis intervention  Crisis text line: Text \"START\" to 844-664. Free, confidential, 24/7.  Crisis Intervention: 584.718.9979 or 572-682-2326. Call anytime for help.     The treatment team has appreciated the opportunity to work with you.     Please take care and make your recovery a daily recovery.   If you have any questions or concerns our unit number is [phone].  Thank you.       Pending Results     No orders found from 11/10/2017 to 11/13/2017.            Statement of Approval     Ordered          11/20/17 1104  I have reviewed and agree with all the recommendations and orders detailed in this document.  EFFECTIVE NOW     Approved and electronically signed by:  Constantino Lugo MD             Admission Information     Date & Time " "Department Dept. Phone    2017 [facility] [phone]      Your Vitals Were     Blood Pressure Pulse Temperature Respirations Height Weight    105/69 82 98.5  F (36.9  C) (Oral) 16 1.778 m (5' 10\") 98.9 kg (218 lb)    Pulse Oximetry BMI (Body Mass Index)                98% 31.28 kg/m2          [employer] Information     [employer] lets you send messages to your doctor, view your test results, renew your prescriptions, schedule appointments and more. To sign up, go to [email or web address] . Click on \"Log in\" on the left side of the screen, which will take you to the Welcome page. Then click on \"Sign up Now\" on the right side of the page.     You will be asked to enter the access code listed below, as well as some personal information. Please follow the directions to create your username and password.     Your access code is: [email or web address]  Expires: 2018  2:45 PM     Your access code will  in 90 days. If you need help or a new code, please call your [facility] clinic or [phone].        Care EveryWhere ID     This is your Care EveryWhere ID. This could be used by other organizations to access your [facility] medical records  [id number]        Equal Access to Services     FERNANDA WILCOX AH: Michael Cedeño, waaxda luqadaha, qaybta kaalmada jacki, christiano edwards . So [facility] [phone].    ATENCIÓN: Si habla español, tiene a reyes disposición servicios gratuitos de asistencia lingüística. Llame al [phone].    We comply with applicable federal civil rights laws and [place] laws. We do not discriminate on the basis of race, color, national origin, age, disability, sex, sexual orientation, or gender identity.               Review of your medicines      START taking        Dose / Directions    hydrOXYzine 50 MG tablet   Commonly known as:  ATARAX   Used for:  Schizoaffective disorder, bipolar type (H)        Dose:  50 mg   Take 1 tablet (50 mg) by mouth every 4 hours " as needed for anxiety   Quantity:  60 tablet   Refills:  0       QUEtiapine 400 MG tablet   Commonly known as:  SEROquel   Used for:  Schizoaffective disorder, bipolar type (H)        Dose:  400 mg   Take 1 tablet (400 mg) by mouth At Bedtime   Quantity:  60 tablet   Refills:  0         CONTINUE these medicines which have NOT CHANGED        Dose / Directions    benztropine 1 MG tablet   Commonly known as:  COGENTIN   Used for:  Schizoaffective disorder, bipolar type (H)        Dose:  1 mg   Take 1 tablet (1 mg) by mouth 2 times daily   Quantity:  60 tablet   Refills:  0       levothyroxine 25 MCG tablet   Commonly known as:  SYNTHROID/LEVOTHROID   Used for:  Acquired hypothyroidism        Dose:  112 mcg   Take 4.5 tablets (112 mcg) by mouth daily noon   Quantity:  135 tablet   Refills:  0       melatonin 3 MG Caps   Used for:  Primary insomnia        Dose:  6 mg   Take 6 mg by mouth At Bedtime   Quantity:  60 capsule   Refills:  0       paliperidone 117 MG/0.75ML Susp   Commonly known as:  INVEGA SUSTENNA   Used for:  Schizoaffective disorder, unspecified type (H)        Dose:  117 mg   Inject 0.75 mLs (117 mg) into the muscle once for 1 dose   Quantity:  0.75 mL   Refills:  0       pantoprazole 20 MG EC tablet   Commonly known as:  PROTONIX   Used for:  Gastroesophageal reflux disease, esophagitis presence not specified        Dose:  20 mg   Take 1 tablet (20 mg) by mouth daily   Quantity:  30 tablet   Refills:  0       * polyethylene glycol Packet   Commonly known as:  MIRALAX/GLYCOLAX   Used for:  Drug-induced constipation        Dose:  34 g   Take 34 g by mouth daily   Quantity:  50 packet   Refills:  0       * polyethylene glycol Packet   Commonly known as:  MIRALAX/GLYCOLAX   Used for:  Drug-induced constipation        Dose:  17 g   Take 17 g by mouth daily as needed for constipation   Quantity:  7 packet   Refills:  0       prazosin 1 MG capsule   Commonly known as:  MINIPRESS   Used for:  PTSD  (post-traumatic stress disorder)        Dose:  3 mg   Take 3 capsules (3 mg) by mouth At Bedtime This product only available from a Compounding Pharmacy.   Quantity:  90 capsule   Refills:  0       venlafaxine 75 MG Tb24 24 hr tablet   Commonly known as:  EFFEXOR-ER   Used for:  Schizoaffective disorder, bipolar type (H)        Dose:  225 mg   Take 3 tablets (225 mg) by mouth daily (with breakfast)   Quantity:  90 each   Refills:  0       Vitamin D (Cholecalciferol) 1000 UNITS Tabs   Used for:  Vitamin D deficiency        Dose:  1000 Units   Take 1,000 Units by mouth daily   Quantity:  30 tablet   Refills:  0       * ZYPREXA PO        Dose:  2.5-5 mg   Take 2.5-5 mg by mouth daily as needed for agitation   Refills:  0       * OLANZapine 10 MG tablet   Commonly known as:  zyPREXA   Used for:  Schizoaffective disorder, unspecified type (H), Schizoaffective disorder, bipolar type (H)        Dose:  10 mg   Take 1 tablet (10 mg) by mouth 3 times daily   Quantity:  90 tablet   Refills:  0       * Notice:  This list has 4 medication(s) that are the same as other medications prescribed for you. Read the directions carefully, and ask your doctor or other care provider to review them with you.      STOP taking     cloZAPine 100 MG tablet   Commonly known as:  CLOZARIL                Where to get your medicines      These medications were sent to [address]  [address] [place] [address]     Phone:  [phone]     hydrOXYzine 50 MG tablet    QUEtiapine 400 MG tablet                Protect others around you: Learn how to safely use, store and throw away your medicines at www.disposemymeds.org.             Medication List: This is a list of all your medications and when to take them. Check marks below indicate your daily home schedule. Keep this list as a reference.      Medications           Morning Afternoon Evening Bedtime As Needed     benztropine 1 MG tablet   Commonly known as:  COGENTIN   Take 1 tablet (1 mg) by mouth 2 times daily   Last time this was given:  1 mg on 11/13/2017  4:38 PM                                      hydrOXYzine 50 MG tablet   Commonly known as:  ATARAX   Take 1 tablet (50 mg) by mouth every 4 hours as needed for anxiety   Last time this was given:  50 mg on 11/20/2017  2:58 PM                                   levothyroxine 25 MCG tablet   Commonly known as:  SYNTHROID/LEVOTHROID   Take 4.5 tablets (112 mcg) by mouth daily noon   Last time this was given:  112 mcg on 11/20/2017  8:23 AM                                   melatonin 3 MG Caps   Take 6 mg by mouth At Bedtime                                   paliperidone 117 MG/0.75ML Susp   Commonly known as:  INVEGA SUSTENNA   Inject 0.75 mLs (117 mg) into the muscle once for 1 dose                                pantoprazole 20 MG EC tablet   Commonly known as:  PROTONIX   Take 1 tablet (20 mg) by mouth daily   Last time this was given:  20 mg on 11/20/2017  8:23 AM                                   * polyethylene glycol Packet   Commonly known as:  MIRALAX/GLYCOLAX   Take 34 g by mouth daily                                   * polyethylene glycol Packet   Commonly known as:  MIRALAX/GLYCOLAX   Take 17 g by mouth daily as needed for constipation                                prazosin 1 MG capsule   Commonly known as:  MINIPRESS   Take 3 capsules (3 mg) by mouth At Bedtime This product only available from a Compounding Pharmacy.   Last time this was given:  3 mg on 11/19/2017  7:15 PM                                   QUEtiapine 400 MG tablet   Commonly known as:  SEROquel   Take 1 tablet (400 mg) by mouth At Bedtime   Last time this was given:  400 mg on 11/19/2017  7:15 PM                                   venlafaxine 75 MG Tb24 24 hr tablet   Commonly known as:  EFFEXOR-ER   Take 3 tablets (225 mg) by mouth daily (with breakfast)   Last time this was given:  225 mg on  11/20/2017  8:23 AM                                   Vitamin D (Cholecalciferol) 1000 UNITS Tabs   Take 1,000 Units by mouth daily   Last time this was given:  1,000 Units on 11/20/2017  8:23 AM                                   * ZYPREXA PO   Take 2.5-5 mg by mouth daily as needed for agitation   Last time this was given:  10 mg on 11/20/2017  2:58 PM                                   * OLANZapine 10 MG tablet   Commonly known as:  zyPREXA   Take 1 tablet (10 mg) by mouth 3 times daily   Last time this was given:  10 mg on 11/20/2017  2:58 PM                                         * Notice:  This list has 4 medication(s) that are the same as other medications prescribed for you. Read the directions carefully, and ask your doctor or other care provider to review them with you.
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
## 2017-11-12 NOTE — ED PROVIDER NOTES
"  History     Chief Complaint:  Suicidal ideation    HPI   Kamini Neal is a 27 year old male with a history of depression and self harm who presents from his group home with suicidal ideation. The patient reports that he has had chronic thoughts of killing himself his entire life. He also notes a history of hearing voices telling him that he is worthless and that he should harm himself. He has smoked marijuana earlier this evening which worsens these hallucinations, and states that he is currently experiencing this. He denies any other substance use tonight aside from marijuana. The patient has a history of phychiatric admission in the past, and is currently taking multiple medications for these concerns. He reports that he has not taken any of his medications yet this evening. While here in the ED he states that he is continuing to have these thoughts of harming himself, but denies a specific plan to do this or any thoughts of harming others. He believes that he will be able to go back to his group home tonight after taking his medications.     Allergies:  Haldol     Medications:    Venlaflaxine  Pantoprazole  Benztropine  Olanzapine  Levothyroxine  Prazosin  Melatonin  Invega sustenna    Past Medical History:    Anxiety  Depressive disorder  Schizo affective disorder    Past Surgical History:    Tonsillectomy    Family History:    The patient denies any relevant family medical history.    Social History:  Smoking Status: Yes  Smokeless Tobacco: No  Alcohol Use: No  Marital Status:  Single     Review of Systems   Psychiatric/Behavioral: Positive for hallucinations and suicidal ideas. Negative for self-injury.   All other systems reviewed and are negative.    Physical Exam   Vitals:  Patient Vitals for the past 24 hrs:   BP Temp Temp src Heart Rate Resp SpO2 Height   11/11/17 2147 - - - - - - 1.778 m (5' 10\")   11/11/17 2139 (!) 134/91 98.8  F (37.1  C) Oral 92 16 98 % 1.778 m (5' 10\")     Physical " Exam  Constitutional: Black male, supine.  HENT: No signs of trauma.   Eyes: EOM are normal. Pupils are equal, round, and reactive to light.   Neck: Normal range of motion. No JVD present. No cervical adenopathy.  Cardiovascular: Regular rhythm.  Exam reveals no gallop and no friction rub.    No murmur heard.  Pulmonary/Chest: Bilateral breath sounds normal. No wheezes, rhonchi or rales.  Abdominal: Soft. No tenderness. No rebound or guarding.   Musculoskeletal: No edema. No tenderness.   Lymphadenopathy: No lymphadenopathy.   Neurological: Alert and oriented to person, place, and time. Normal strength. Coordination normal.   Skin: Skin is warm and dry. No rash noted. No erythema. Old scars to the left volar wrist.  Psych: Admits to suicidal thought without a plan. Denies homicidal thinking. Admits to hearing voices. Calm affect    Emergency Department Course     Laboratory:  Laboratory findings were communicated with the patient who voiced understanding of the findings.  Drug abuse screen: Positive for cannabinoids  Alcohol breath test (2235): 000    Interventions:  2254 Zyprexa, 10 mg, PO  2254 Cogentin, 1 mg, PO     Emergency Department Course:  Nursing notes and vitals reviewed.  2207 I had my initial encounter with the patient.  I performed an exam of the patient as documented above.   I spoke with DEC who will see the patient here in the ED.  2250 The patient was placed on a 72 hour hold.  The patient provided a urine sample here in the emergency department. This was sent for laboratory testing, findings above.    The patient was transferred to [facility].    Impression & Plan      Medical Decision Making:  Kamini Neal is a 27 year old male with schizoaffective disorder from a group home who smoked some marijuana today. This caused a worsening of his psychosis where he was hearing voices telling him bad things. He states he feels suicidal, but has no plan. In addition, he has used alcohol in the  past and cot, but he denies using this today. When I talk to the patient he does have some insight into what is going on and why he is here, and has a calm affect. Utox was positive for cannabinoids. Alcohol level was zero. He was seen and evaluated by DEC, and the concern is that with the psychosis being worsened by his marijuana he could still be a danger to himself and others at the group home, and therefore I placed him on a 72 hour hold and we are obtaining him a bed at [facility] and will transfer him there on this hold.     Diagnosis:    ICD-10-CM    1. Schizoaffective disorder, unspecified type (H) F25.9 Drug abuse screen urine   2.      Psychosis exacerbated by marijuana    Disposition:   Transfer to [facility]  awaiting accepting MD    Scribe Disclosure:  I, Abundio Seay, am serving as a scribe at 9:47 PM on 11/11/2017 to document services personally performed by Rasheed Collazo MD, based on my observations and the provider's statements to me.    11/11/2017    EMERGENCY DEPARTMENT       Rasheed Collazo MD  11/12/17 0049

## 2017-11-12 NOTE — ED NOTES
"Patient ambulates to nursing station, states \"I have a question, if I kill myself, what does it matter anyways?  Who is it going to affect?  Why would you care?  Why would anyone care?\"  Patient repeats this a couple of times.  Emotional support given.  Patient cooperative & is back in room resting.  Warm blanket given.   "

## 2017-11-12 NOTE — PROGRESS NOTES
Pts med list from his  was included with paperwork received from ED last evening and data was entered into PTA med list.

## 2017-11-12 NOTE — PLAN OF CARE
Problem: Patient Care Overview  Goal: Team Discussion  Team Plan:   BEHAVIORAL TEAM DISCUSSION     Participants: AL Carolina Bill, RN  Progress: Minimal, just admitted  Continued Stay Criteria/Rationale: Pt needs stabilization  Medical/Physical: Monitored by Internal medicine if needed  Precautions:   Behavioral Orders   Procedures     Code 1 - Restrict to Unit     Elopement precautions     Routine Programming       As clinically indicated     Status 15       Every 15 minutes.     Status Individual Observation       Patient hits his head. Patient is reporting command hallucinations to hit head.       Order Specific Question:   WHILE AWAKE       Answer:   Distance and Exceptions       Order Specific Question:   Distance       Answer:   5 feet       Order Specific Question:   Exceptions       Answer:   none       Order Specific Question:   AT NIGHT       Answer:   Distance and Exceptions       Order Specific Question:   Distance       Answer:   at the door of the patient's room       Order Specific Question:   Exceptions       Answer:   none     Suicide precautions     Plan: Psychiatric evaluation, medication management, and milieu management  Rationale for change in precautions or plan: No changes.
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
## 2017-11-12 NOTE — IP AVS SNAPSHOT
[address] [address]    Phone:  [phone]                                       After Visit Summary   11/12/2017    Kamini Neal    MRN: [id number]           After Visit Summary Signature Page     I have received my discharge instructions, and my questions have been answered. I have discussed any challenges I see with this plan with the nurse or doctor.    ..........................................................................................................................................  Patient/Patient Representative Signature      ..........................................................................................................................................  Patient Representative Print Name and Relationship to Patient    ..................................................               ................................................  Date                                            Time    ..........................................................................................................................................  Reviewed by Signature/Title    ...................................................              ..............................................  Date                                                            Time

## 2017-11-12 NOTE — PROGRESS NOTES
11/12/17 0227   Patient Belongings   Did you bring any home meds/supplements to the hospital?  No   Patient Belongings clothing;shoes;wallet   Disposition of Belongings Other (see comment)  (kept in patient's locker#8)   Belongings Search Yes   Clothing Search Yes   Second Staff Laz HOPPER   General Info Comment Patient came in with a brown wallet, grey pant, grey shirt, brown belt, one cigarette lighter, grey winter coat, and blue boxer underwear.   A               Admission:  I am responsible for any personal items that are not sent to the safe or pharmacy.  [facility] is not responsible for loss, theft or damage of any property in my possession.    Signature:  _________________________________ Date: _______  Time: _____                                              Staff Signature:  ____________________________ Date: ________  Time: _____      2nd Staff person, if patient is unable/unwilling to sign:    Signature: ________________________________ Date: ________  Time: _____     Discharge:  [facility] has returned all of my personal belongings:    Signature: _________________________________ Date: ________  Time: _____                                          Staff Signature:  ____________________________ Date: ________  Time: _____

## 2017-11-13 LAB
ALBUMIN SERPL-MCNC: 3.5 G/DL (ref 3.4–5)
ALP SERPL-CCNC: 77 U/L (ref 40–150)
ALT SERPL W P-5'-P-CCNC: 14 U/L (ref 0–70)
ANION GAP SERPL CALCULATED.3IONS-SCNC: 6 MMOL/L (ref 3–14)
AST SERPL W P-5'-P-CCNC: 14 U/L (ref 0–45)
BASOPHILS # BLD AUTO: 0 10E9/L (ref 0–0.2)
BASOPHILS NFR BLD AUTO: 0.4 %
BILIRUB SERPL-MCNC: 0.4 MG/DL (ref 0.2–1.3)
BUN SERPL-MCNC: 13 MG/DL (ref 7–30)
CALCIUM SERPL-MCNC: 8.5 MG/DL (ref 8.5–10.1)
CHLORIDE SERPL-SCNC: 111 MMOL/L (ref 94–109)
CO2 SERPL-SCNC: 24 MMOL/L (ref 20–32)
CREAT SERPL-MCNC: 0.93 MG/DL (ref 0.66–1.25)
DIFFERENTIAL METHOD BLD: NORMAL
EOSINOPHIL # BLD AUTO: 0 10E9/L (ref 0–0.7)
EOSINOPHIL NFR BLD AUTO: 0 %
ERYTHROCYTE [DISTWIDTH] IN BLOOD BY AUTOMATED COUNT: 12.5 % (ref 10–15)
GFR SERPL CREATININE-BSD FRML MDRD: >90 ML/MIN/1.7M2
GLUCOSE SERPL-MCNC: 78 MG/DL (ref 70–99)
HCT VFR BLD AUTO: 43.9 % (ref 40–53)
HGB BLD-MCNC: 14.8 G/DL (ref 13.3–17.7)
IMM GRANULOCYTES # BLD: 0 10E9/L (ref 0–0.4)
IMM GRANULOCYTES NFR BLD: 0.4 %
LYMPHOCYTES # BLD AUTO: 2.1 10E9/L (ref 0.8–5.3)
LYMPHOCYTES NFR BLD AUTO: 29.3 %
MCH RBC QN AUTO: 32 PG (ref 26.5–33)
MCHC RBC AUTO-ENTMCNC: 33.7 G/DL (ref 31.5–36.5)
MCV RBC AUTO: 95 FL (ref 78–100)
MONOCYTES # BLD AUTO: 0.6 10E9/L (ref 0–1.3)
MONOCYTES NFR BLD AUTO: 8.4 %
NEUTROPHILS # BLD AUTO: 4.4 10E9/L (ref 1.6–8.3)
NEUTROPHILS NFR BLD AUTO: 61.5 %
NRBC # BLD AUTO: 0 10*3/UL
NRBC BLD AUTO-RTO: 0 /100
PLATELET # BLD AUTO: 195 10E9/L (ref 150–450)
POTASSIUM SERPL-SCNC: 3.8 MMOL/L (ref 3.4–5.3)
PROT SERPL-MCNC: 6.5 G/DL (ref 6.8–8.8)
RBC # BLD AUTO: 4.62 10E12/L (ref 4.4–5.9)
SODIUM SERPL-SCNC: 141 MMOL/L (ref 133–144)
TSH SERPL DL<=0.005 MIU/L-ACNC: 1.81 MU/L (ref 0.4–4)
WBC # BLD AUTO: 7.2 10E9/L (ref 4–11)

## 2017-11-13 PROCEDURE — 25000132 ZZH RX MED GY IP 250 OP 250 PS 637: Performed by: PSYCHIATRY & NEUROLOGY

## 2017-11-13 PROCEDURE — 84443 ASSAY THYROID STIM HORMONE: CPT | Performed by: PSYCHIATRY & NEUROLOGY

## 2017-11-13 PROCEDURE — 25000132 ZZH RX MED GY IP 250 OP 250 PS 637: Performed by: CLINICAL NURSE SPECIALIST

## 2017-11-13 PROCEDURE — 12400003 ZZH R&B MH CRITICAL UMMC

## 2017-11-13 PROCEDURE — 99233 SBSQ HOSP IP/OBS HIGH 50: CPT | Performed by: PSYCHIATRY & NEUROLOGY

## 2017-11-13 PROCEDURE — 80053 COMPREHEN METABOLIC PANEL: CPT | Performed by: PSYCHIATRY & NEUROLOGY

## 2017-11-13 PROCEDURE — 36415 COLL VENOUS BLD VENIPUNCTURE: CPT | Performed by: PSYCHIATRY & NEUROLOGY

## 2017-11-13 PROCEDURE — 85025 COMPLETE CBC W/AUTO DIFF WBC: CPT | Performed by: PSYCHIATRY & NEUROLOGY

## 2017-11-13 PROCEDURE — 80159 DRUG ASSAY CLOZAPINE: CPT | Performed by: PSYCHIATRY & NEUROLOGY

## 2017-11-13 RX ORDER — QUETIAPINE FUMARATE 100 MG/1
100 TABLET, FILM COATED ORAL AT BEDTIME
Status: DISCONTINUED | OUTPATIENT
Start: 2017-11-13 | End: 2017-11-14

## 2017-11-13 RX ADMIN — BENZTROPINE MESYLATE 1 MG: 1 TABLET ORAL at 16:38

## 2017-11-13 RX ADMIN — NICOTINE POLACRILEX 4 MG: 2 GUM, CHEWING ORAL at 13:56

## 2017-11-13 RX ADMIN — PRAZOSIN HYDROCHLORIDE 3 MG: 2 CAPSULE ORAL at 19:37

## 2017-11-13 RX ADMIN — NICOTINE POLACRILEX 4 MG: 2 GUM, CHEWING ORAL at 16:46

## 2017-11-13 RX ADMIN — OLANZAPINE 10 MG: 10 TABLET, FILM COATED ORAL at 11:15

## 2017-11-13 RX ADMIN — NICOTINE POLACRILEX 4 MG: 2 GUM, CHEWING ORAL at 17:45

## 2017-11-13 RX ADMIN — NICOTINE POLACRILEX 4 MG: 2 GUM, CHEWING ORAL at 11:40

## 2017-11-13 RX ADMIN — PANTOPRAZOLE SODIUM 20 MG: 20 TABLET, DELAYED RELEASE ORAL at 11:13

## 2017-11-13 RX ADMIN — VITAMIN D, TAB 1000IU (100/BT) 1000 UNITS: 25 TAB at 11:15

## 2017-11-13 RX ADMIN — NICOTINE POLACRILEX 4 MG: 2 GUM, CHEWING ORAL at 13:05

## 2017-11-13 RX ADMIN — HYDROXYZINE HYDROCHLORIDE 50 MG: 25 TABLET ORAL at 11:56

## 2017-11-13 RX ADMIN — VENLAFAXINE HYDROCHLORIDE 225 MG: 225 TABLET, FILM COATED, EXTENDED RELEASE ORAL at 11:15

## 2017-11-13 RX ADMIN — QUETIAPINE FUMARATE 100 MG: 100 TABLET ORAL at 19:38

## 2017-11-13 RX ADMIN — NICOTINE POLACRILEX 4 MG: 2 GUM, CHEWING ORAL at 19:37

## 2017-11-13 RX ADMIN — MELATONIN TAB 3 MG 6 MG: 3 TAB at 19:37

## 2017-11-13 RX ADMIN — OLANZAPINE 10 MG: 10 TABLET, FILM COATED ORAL at 19:37

## 2017-11-13 RX ADMIN — LEVOTHYROXINE SODIUM 112 MCG: 112 TABLET ORAL at 11:14

## 2017-11-13 RX ADMIN — HYDROXYZINE HYDROCHLORIDE 50 MG: 25 TABLET ORAL at 16:38

## 2017-11-13 RX ADMIN — OLANZAPINE 10 MG: 10 TABLET, FILM COATED ORAL at 15:00

## 2017-11-13 ASSESSMENT — ACTIVITIES OF DAILY LIVING (ADL)
ORAL_HYGIENE: INDEPENDENT;PROMPTS
LAUNDRY: WITH SUPERVISION
HYGIENE/GROOMING: INDEPENDENT;PROMPTS
DRESS: INDEPENDENT

## 2017-11-13 NOTE — PLAN OF CARE
"Problem: Depressive Symptoms  Goal: Depressive Symptoms  Signs and symptoms of listed problems will be absent or manageable.   Patient will participate in 50% of groups offered in Milieu  Patient will remain free from SIB on unit  Patient will verbalize absence of suicidal thoughts and self harm thoughts  Outcome: No Change  Patient has been making comments of wanting to leave and also cutting his throat and ending his life. He tells me that his voices are telling him to cut his throat. Patient looked at the mirror and said \" I hate the person I see in the mirror\". Patient walked down the hallway, came back to the mirror and started banging his head. Patient was redirected and requested to go to his room and was able to follow redirections. Writer had a discussion with patient about his coping skills and discharge plan and patient reported \" I don't have any coping skills, the medications helps me\". Patient is currently on SIO and is responding well to PRN medications.       "

## 2017-11-13 NOTE — PROGRESS NOTES
P/C with Pt's - Neela Pollack [phone]  [facility] [phone] .  She confirms Pt has his place at [facility] [place].  They had moved him to a home in [place] to get him away from the drugs downtown.

## 2017-11-13 NOTE — PROGRESS NOTES
11/13/17 1515   Justification   Clinical Justification Self   Patient increase voices, telling him to bang head.  Given dose of zyprexa but unable to keep self safe. Placed in 5 points due to not being able to keep self safe.

## 2017-11-13 NOTE — PROGRESS NOTES
11/13/17 1645   Restraint Type   Seclusion (BH) Discontinued   Wrist - R (BH) Discontinued   Wrist - L (BH) Discontinued   Ankle - R (BH) Discontinued   Ankle - L (BH) Discontinued   Chest (BH) Discontinued   Debriefing   Debriefing DO   Does patient understand why the event happened? Yes   Does patient agree to safe behaviors? Yes   What can we do differently so this doesn't happen again? Other (comment)  (medication )   Plan of care reviewed and modified Yes   Pt has been calm. Requested PRN medication and asked to come out of restraints. States he is not hearing voices and he will not hurt himself.

## 2017-11-13 NOTE — PROGRESS NOTES
11/13/17 1515   Seclusion or Restraint Order   In Person Face to Face Assessment Conducted Yes-Eval of pt's immediate situation, reaction to intervention, complete review of systems assessment, behavioral assessment & review/assessment of hx, drugs & meds, recent labs, etc, behavioral condition, need to continue/terminate restraint/seclusion   Completed face to face following initiation of restraints. Pt denies any adverse effects of being restraint, ROM intact to all extremities. Verbalizes understanding of  behavior leading up to restraints, and understand criteria for discontinuation.
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
## 2017-11-13 NOTE — PROGRESS NOTES
"   11/13/17 1525   Behavioral Health   Hallucinations auditory   Thinking paranoid;poor concentration;delusional   Orientation person: oriented;place: oriented   Memory baseline memory   Insight poor   Judgement impaired   Eye Contact at examiner   Affect blunted, flat;incongruent   Mood mood is calm   Physical Appearance/Attire untidy   Hygiene neglected grooming - unclean body, hair, teeth   Suicidality thoughts only   Self Injury urges   Elopement Statements about wanting to leave   Activity withdrawn   Speech coherent;clear   Medication Sensitivity no stated side effects   Psychomotor / Gait balanced;steady     Pt slept through the morning. In the early afternoon, Pt reported he was still experiencing command hallucinations telling him to hurt himself. Pt reported that the voices were less intense than yesterday and that he can ignore the thoughts. Pt also endorsed experiencing suicidal thoughts with no specific plan or intent to act on those thoughts. Pt noted that medications do help him manage these symptoms but he stops taking them because he does not like the adverse side effects (sedation). Pt also stated that he has engaged in illicit drug use that exacerbates his symptoms.     Around 1500, Pt began walking towards the wall in the hallway and deliberately hitting his head against the rincon. Two staff members intervened and physically restrained him from going towards walls. Pt then began hitting himself in the head with his own fists. Pt reported that the voices are telling him to do so and that he has to. Pt reported that the voices are \"spirits,\" and that \"the spirits are telling me to end my life. This life is temporary, everyone dies.\" Staff attempted de-escalation through conversation, reality orientation, and physical stimuli (ice pack to the head). All attempts were of no avail and staff agreed that 5 point restraints were necessary to stop SIB behaviors.  See RN charting for further details.   "
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
## 2017-11-13 NOTE — PROGRESS NOTES
Referred to Jaswinder OROPEZA for Rule-25.  Told her to have them check in before coming to assessment to make sure he is not in restraints.

## 2017-11-14 LAB
CLOZAPINE AND METABOLITES TOTAL: 312 NG/ML
CLOZAPINE SERPL-MCNC: 132 NG/ML
CLOZAPINE-N-OXIDE QUANT: NOT DETECTED NG/ML
NORCLOZAPINE SERPL-MCNC: 180 NG/ML

## 2017-11-14 PROCEDURE — 25000132 ZZH RX MED GY IP 250 OP 250 PS 637: Performed by: PSYCHIATRY & NEUROLOGY

## 2017-11-14 PROCEDURE — 25000132 ZZH RX MED GY IP 250 OP 250 PS 637: Performed by: CLINICAL NURSE SPECIALIST

## 2017-11-14 PROCEDURE — 99233 SBSQ HOSP IP/OBS HIGH 50: CPT | Performed by: PSYCHIATRY & NEUROLOGY

## 2017-11-14 PROCEDURE — 12400003 ZZH R&B MH CRITICAL UMMC

## 2017-11-14 PROCEDURE — 99207 ZZC CDG-MDM COMPONENT: MEETS MODERATE - UP CODED: CPT | Performed by: PSYCHIATRY & NEUROLOGY

## 2017-11-14 PROCEDURE — 90853 GROUP PSYCHOTHERAPY: CPT

## 2017-11-14 RX ORDER — QUETIAPINE FUMARATE 200 MG/1
200 TABLET, FILM COATED ORAL AT BEDTIME
Status: DISCONTINUED | OUTPATIENT
Start: 2017-11-14 | End: 2017-11-15

## 2017-11-14 RX ADMIN — OLANZAPINE 10 MG: 10 TABLET, FILM COATED ORAL at 14:35

## 2017-11-14 RX ADMIN — NICOTINE POLACRILEX 4 MG: 2 GUM, CHEWING ORAL at 18:16

## 2017-11-14 RX ADMIN — VITAMIN D, TAB 1000IU (100/BT) 1000 UNITS: 25 TAB at 11:14

## 2017-11-14 RX ADMIN — QUETIAPINE FUMARATE 200 MG: 200 TABLET, FILM COATED ORAL at 19:12

## 2017-11-14 RX ADMIN — OLANZAPINE 10 MG: 10 TABLET, FILM COATED ORAL at 19:12

## 2017-11-14 RX ADMIN — LEVOTHYROXINE SODIUM 112 MCG: 112 TABLET ORAL at 11:14

## 2017-11-14 RX ADMIN — PANTOPRAZOLE SODIUM 20 MG: 20 TABLET, DELAYED RELEASE ORAL at 11:14

## 2017-11-14 RX ADMIN — NICOTINE POLACRILEX 4 MG: 2 GUM, CHEWING ORAL at 12:04

## 2017-11-14 RX ADMIN — VENLAFAXINE HYDROCHLORIDE 225 MG: 225 TABLET, FILM COATED, EXTENDED RELEASE ORAL at 11:14

## 2017-11-14 RX ADMIN — NICOTINE POLACRILEX 4 MG: 2 GUM, CHEWING ORAL at 19:42

## 2017-11-14 RX ADMIN — PRAZOSIN HYDROCHLORIDE 3 MG: 2 CAPSULE ORAL at 19:12

## 2017-11-14 RX ADMIN — NICOTINE POLACRILEX 4 MG: 2 GUM, CHEWING ORAL at 17:09

## 2017-11-14 RX ADMIN — NICOTINE POLACRILEX 4 MG: 2 GUM, CHEWING ORAL at 12:59

## 2017-11-14 RX ADMIN — HYDROXYZINE HYDROCHLORIDE 50 MG: 25 TABLET ORAL at 16:10

## 2017-11-14 RX ADMIN — NICOTINE POLACRILEX 4 MG: 2 GUM, CHEWING ORAL at 14:35

## 2017-11-14 RX ADMIN — MELATONIN TAB 3 MG 6 MG: 3 TAB at 19:12

## 2017-11-14 RX ADMIN — OLANZAPINE 10 MG: 10 TABLET, FILM COATED ORAL at 11:14

## 2017-11-14 ASSESSMENT — ACTIVITIES OF DAILY LIVING (ADL)
GROOMING: INDEPENDENT
ORAL_HYGIENE: INDEPENDENT
DRESS: INDEPENDENT
LAUNDRY: WITH SUPERVISION

## 2017-11-14 NOTE — PROGRESS NOTES
"Pt has had a good day. Pt said that he has heard \"just a couple voices but more quiet\". Pt denies SIB urges; pt also denies SI. Pt has been visible and active, usually doing artsy activities. Pt took a shower.     11/14/17 1324   Behavioral Health   Hallucinations auditory  (\"just a few soft ones\")   Thinking distractable   Orientation time: oriented;date: oriented;place: oriented;person: oriented   Memory baseline memory   Insight insight appropriate to situation   Judgement impaired   Eye Contact at examiner   Affect (neutral)   Mood mood is calm   Physical Appearance/Attire appears stated age;attire appropriate to age and situation;neat   Hygiene well groomed   Suicidality (denies)   Self Injury (denies)   Elopement Statements about wanting to leave   Activity restless  (attended some groups)   Speech coherent;clear   Medication Sensitivity no stated side effects;no observed side effects   Psychomotor / Gait balanced;steady   Overt Aggression Scale   Verbal Aggression 0   Aggression against Property 0   Auto-Aggression 0   Physical Aggression 0   Overt Aggression Total Score 0   Psycho Education   Type of Intervention 1:1 intervention   Response (declined a discussion)   Hours 0.5   Treatment Detail (wellness strategies)   Behavioral Health Interventions   Depression maintain safety precautions;monitor need to revise level of observation;maintain safe secure environment;encourage nutrition and hydration;encourage participation / independence with adls;provide emotional support;establish therapeutic relationship;assess patient response to medication;assess medication adherance;monitor need for prn medication   Social and Therapeutic Interventions (Depression) encourage effective boundaries with peers;encourage participation in therapeutic groups and milieu activities   Psychotic Symptoms maintain safety precautions;monitor need to revise level of observation;maintain safe secure environment;simple, clear " language;assist with developing & utilizing healthy coping strategies;establish therapeutic relationship   Social and Therapeutic Interventions (Psychotic Symptoms) encourage socialization with peers;encourage effective boundaries with peers;encourage participation in therapeutic groups and milieu activities

## 2017-11-14 NOTE — PLAN OF CARE
Problem: Psychotic Symptoms  Intervention: Social and Therapeutic Interv (Psychotic Symptoms)     Pt participated in the OT leisure group.  Able to learn new game, demonstrated understanding and was able to use strategy and problem solving skills.  Appropriate social interactions with others.

## 2017-11-14 NOTE — PROGRESS NOTES
"[facility], [place]   Psychiatric Progress Note  Kamini Neal  [id number]  11/14/17    Chief Complaint: Continued medical care          Interim History:   The patient's care was discussed with the treatment team during the daily team meeting and/or staff's chart notes were reviewed.  Staff report patient was head banging and placed in restraints and slept about 6 hours overnight.    Kamini reports that he is feeling okay and that the auditory hallucinations have continued. They appear to be the same in terms of their strength and he has not noticed any side effects from the 100 mg of quetiapine. He does find it bothersome that someone is following him around the unit and describes as creepy. He does have suicidal thinking and again says that if his mom were to die that he would definitely kill himself. He denies any thoughts of harming others currently and does not have any paranoia about others. We discussed that if he is able to keep himself safe we could potentially discharge him once he has improvement in his auditory hallucinations. Also discussed increasing the quetiapine to 200 mg tonight. He would like to get rid of the one-to-one staffing and he will need to maintain his safety for at least 24 hours before we would even consider doing that.         Medications:       QUEtiapine  200 mg Oral At Bedtime     levothyroxine  112 mcg Oral Daily     melatonin  6 mg Oral At Bedtime     OLANZapine  10 mg Oral TID     pantoprazole  20 mg Oral Daily     prazosin  3 mg Oral At Bedtime     venlafaxine  225 mg Oral Daily with breakfast     cholecalciferol  1,000 Units Oral Daily          Allergies:     Allergies   Allergen Reactions     Haldol [Haloperidol]      Torticollis          Labs:   No results found for this or any previous visit (from the past 24 hour(s)).       Psychiatric Examination:     /83  Pulse 72  Temp 97.7  F (36.5  C) (Oral)  Resp 16  Ht 1.778 m (5' 10\")  " Wt 97.6 kg (215 lb 3.2 oz)  SpO2 98%  BMI 30.88 kg/m2  Weight is 215 lbs 3.2 oz  Body mass index is 30.88 kg/(m^2).                                             Weight over time:  Vitals:    11/12/17 0223   Weight: 97.6 kg (215 lb 3.2 oz)       Orthostatic Vitals     None            Kamini is a 27-year-old male wearing hospital scrubs. His speech is of an appropriate rate and tone in his language is intact. His behavior is appropriate and he does not have any abnormal movements and his gait is intact. His affect is subdued. His mood he describes as okay. His thought content consists of the above with having thoughts of harming himself but no thoughts of harming others and possible delusions. His thought process is ruminative and negative without looseness of association. He does have abnormal perceptions. He is alert and aware of his current location and circumstances. His attention and concentration appears limited. His cognition and fund of knowledge appears normal. His long-term/short-term/from memory appears intact. His insight and judgment are both limited to impaired.         Precautions:     Behavioral Orders   Procedures     Code 1 - Restrict to Unit     Elopement precautions     Routine Programming     As clinically indicated     Status 15     Every 15 minutes.     Status Individual Observation     Patient hits his head. Patient is reporting command hallucinations to hit head.     Order Specific Question:   WHILE AWAKE     Answer:   Distance and Exceptions     Order Specific Question:   Distance     Answer:   5 feet     Order Specific Question:   Exceptions     Answer:   none     Order Specific Question:   AT NIGHT     Answer:   Distance and Exceptions     Order Specific Question:   Distance     Answer:   at the door of the patient's room     Order Specific Question:   Exceptions     Answer:   none     Suicide precautions          DIagnoses:     Schizoaffective disorder bipolar type  Posttraumatic stress  disorder  Tobacco use  Cannabis use         Assessment & Plan:     Kamini had a recent incident where he was harming himself most likely related to command hallucinations. He has not yet had any improvement with the quetiapine medication and we will continue the titration of increasing this medication for him. He was in agreement with this plan and understands that he will need to maintain safety and improvement so that he could potentially be discharged back to his group home.    Continue voluntary hospitalization  Increase quetiapine 200 mg  Continue all other medications  Future medications: Include fluphenazine, perphenazine, trifluoperazine  Day Treatment    The risks, benefits, alternatives and side effects have been discussed and are understood by the patient and other caregivers.      Constantino Hoskins  [facility]      The following document has been created with voice recognition software and may contain unintentional word substitutions.

## 2017-11-14 NOTE — PROGRESS NOTES
11/14/17 1200   General Information   Date Initially Attended OT 11/14/17   Clinical Impression   Affect Appropriate to situation;Flat   Orientation Needs further assessment   Appearance and ADLs General cleanliness observed in most areas   Attention to Internal Stimuli No observed signs   Interaction Skills Interacts appropriately with staff;Interacts appropriately with peers   Ability to Communicate Needs Independent   Verbal Content Clear;Appropriate to topic   Ability to Maintain Boundaries Maintains appropriate physical boundaries;Maintains appropriate verbal boundaries   Participation Independently participates   Concentration Concentrates 30+ minutes   Ability to Concentrate With structure;Needs further assessment   Follows and Comprehends Directions Independently follows multi-step directions   Memory Needs further assessment   Organization Independently organizes all tasks;Needs further assessment   Decision Making Needs further assessment   Planning and Problem Solving Occasionally needs assist/feedback   Ability to Apply and Learn Concepts Applies within group structure   Frustrations / Stress Tolerance Needs further assessment   Level of Insight Needs further assessment   Self Esteem Needs further assessment   Social Supports Needs further assessment   General Observation/Plan   General Observations/Plan See Comments   Attended OT group for the first am session. He was quick to learn procedures of activity, problem solved and utilized visuospatial skills successfully. He was pleasant with others and on approach. He supported peers with assistance in a subtle kind manner. Pt will be given to complete a written self assessment. OT purpose was explained with the value of having involvement in treatment plan, and provided options to meet self identified goals.  Plan: Will Provide structure, support, and encouragement. Offer education on coping strategies and life management skills. Assist pt to increase  self awareness regarding mental health issues and expand network of stress management options.

## 2017-11-15 ENCOUNTER — TELEPHONE (OUTPATIENT)
Dept: BEHAVIORAL HEALTH | Facility: CLINIC | Age: 27
End: 2017-11-15

## 2017-11-15 PROCEDURE — 99232 SBSQ HOSP IP/OBS MODERATE 35: CPT | Performed by: PSYCHIATRY & NEUROLOGY

## 2017-11-15 PROCEDURE — 25000132 ZZH RX MED GY IP 250 OP 250 PS 637: Performed by: CLINICAL NURSE SPECIALIST

## 2017-11-15 PROCEDURE — 97150 GROUP THERAPEUTIC PROCEDURES: CPT | Mod: GO

## 2017-11-15 PROCEDURE — 12400001 ZZH R&B MH UMMC

## 2017-11-15 PROCEDURE — 25000132 ZZH RX MED GY IP 250 OP 250 PS 637: Performed by: PSYCHIATRY & NEUROLOGY

## 2017-11-15 PROCEDURE — 90853 GROUP PSYCHOTHERAPY: CPT

## 2017-11-15 RX ORDER — QUETIAPINE FUMARATE 300 MG/1
300 TABLET, FILM COATED ORAL AT BEDTIME
Status: DISCONTINUED | OUTPATIENT
Start: 2017-11-15 | End: 2017-11-17

## 2017-11-15 RX ADMIN — NICOTINE POLACRILEX 4 MG: 2 GUM, CHEWING ORAL at 16:02

## 2017-11-15 RX ADMIN — OLANZAPINE 10 MG: 10 TABLET, FILM COATED ORAL at 17:52

## 2017-11-15 RX ADMIN — PANTOPRAZOLE SODIUM 20 MG: 20 TABLET, DELAYED RELEASE ORAL at 09:05

## 2017-11-15 RX ADMIN — NICOTINE POLACRILEX 4 MG: 2 GUM, CHEWING ORAL at 09:07

## 2017-11-15 RX ADMIN — MELATONIN TAB 3 MG 6 MG: 3 TAB at 19:38

## 2017-11-15 RX ADMIN — OLANZAPINE 10 MG: 10 TABLET, FILM COATED ORAL at 09:05

## 2017-11-15 RX ADMIN — VENLAFAXINE HYDROCHLORIDE 225 MG: 225 TABLET, FILM COATED, EXTENDED RELEASE ORAL at 09:05

## 2017-11-15 RX ADMIN — OLANZAPINE 10 MG: 10 TABLET, FILM COATED ORAL at 13:05

## 2017-11-15 RX ADMIN — NICOTINE POLACRILEX 4 MG: 2 GUM, CHEWING ORAL at 11:15

## 2017-11-15 RX ADMIN — NICOTINE POLACRILEX 4 MG: 2 GUM, CHEWING ORAL at 18:09

## 2017-11-15 RX ADMIN — PRAZOSIN HYDROCHLORIDE 3 MG: 2 CAPSULE ORAL at 19:38

## 2017-11-15 RX ADMIN — NICOTINE POLACRILEX 4 MG: 2 GUM, CHEWING ORAL at 17:07

## 2017-11-15 RX ADMIN — NICOTINE POLACRILEX 4 MG: 2 GUM, CHEWING ORAL at 13:05

## 2017-11-15 RX ADMIN — OLANZAPINE 10 MG: 10 TABLET, FILM COATED ORAL at 19:45

## 2017-11-15 RX ADMIN — LEVOTHYROXINE SODIUM 112 MCG: 112 TABLET ORAL at 09:05

## 2017-11-15 RX ADMIN — QUETIAPINE FUMARATE 300 MG: 300 TABLET, FILM COATED ORAL at 19:38

## 2017-11-15 RX ADMIN — NICOTINE POLACRILEX 4 MG: 2 GUM, CHEWING ORAL at 19:43

## 2017-11-15 RX ADMIN — NICOTINE POLACRILEX 4 MG: 2 GUM, CHEWING ORAL at 14:31

## 2017-11-15 RX ADMIN — VITAMIN D, TAB 1000IU (100/BT) 1000 UNITS: 25 TAB at 09:05

## 2017-11-15 ASSESSMENT — ACTIVITIES OF DAILY LIVING (ADL)
ORAL_HYGIENE: INDEPENDENT
GROOMING: INDEPENDENT
DRESS: SCRUBS (BEHAVIORAL HEALTH);INDEPENDENT

## 2017-11-15 NOTE — PROGRESS NOTES
Writer met with Pt to discuss referral for ADT/MICD ADT, transportation may be an issue, but Pt reported he would be willing to take the city bus from his group home to ADT. Writer put internal referral for ADT programming for Pt with outpatient BEH intake.

## 2017-11-15 NOTE — PLAN OF CARE
"Problem: Psychotic Symptoms  Goal: Psychotic Symptoms  Signs and symptoms of listed problems will be absent or manageable.   Outcome: Improving  48 hour nursing assessment:  Patient evaluation continues.  Patient was present in milieu and attended some groups.  Calm and pleasant in interactions.  Patient reports that his mood is, \"5/10 and on the good side, I am feeling better.\"  He denies depression, anxiety, and suicide ideation.  He does endorse intermittent paranoia due to auditory hallucinations \"telling me that other people can read my thoughts.\"  Patient denies any command hallucinations.  He did request a PRN earlier in the evening for auditory hallucinations, and Atarax was the only medication available at the time.  He accepted the Atarax and reported that it \"was very helpful at quieting the voices.\"  Patient showed good insight into his CD issues and reported that he has an assessment tomorrow and is hopeful that it goes well.  Cooperative with medications.        "

## 2017-11-15 NOTE — PROGRESS NOTES
11/15/17 1346   Behavioral Health   Hallucinations auditory   Thinking distractable   Orientation place: oriented;person: oriented;date: oriented;time: oriented   Memory baseline memory   Insight insight appropriate to situation   Judgement impaired   Eye Contact at examiner   Affect blunted, flat   Mood mood is calm   Physical Appearance/Attire attire appropriate to age and situation   Hygiene well groomed   Suicidality thoughts only   Self Injury other (see comment)  (denies currently)   Elopement (no value)   Activity isolative;withdrawn   Speech clear;coherent     Pt had an unremarkable shift. Mood was calm with a blunted affect. No SI/SIB. Pt did not report any intrusive auditory hallucinations. Pt denied paranoia. No self-injurious behaviors today. Pt reported feeling relieved that his SIO was discontinued. No behavioral issues this shift.

## 2017-11-16 ENCOUNTER — TELEPHONE (OUTPATIENT)
Dept: BEHAVIORAL HEALTH | Facility: CLINIC | Age: 27
End: 2017-11-16

## 2017-11-16 PROCEDURE — 25000132 ZZH RX MED GY IP 250 OP 250 PS 637: Performed by: PSYCHIATRY & NEUROLOGY

## 2017-11-16 PROCEDURE — 25000132 ZZH RX MED GY IP 250 OP 250 PS 637: Performed by: CLINICAL NURSE SPECIALIST

## 2017-11-16 PROCEDURE — 12400001 ZZH R&B MH UMMC

## 2017-11-16 PROCEDURE — 97150 GROUP THERAPEUTIC PROCEDURES: CPT | Mod: GO

## 2017-11-16 PROCEDURE — 90853 GROUP PSYCHOTHERAPY: CPT

## 2017-11-16 RX ADMIN — NICOTINE POLACRILEX 4 MG: 2 GUM, CHEWING ORAL at 11:49

## 2017-11-16 RX ADMIN — NICOTINE POLACRILEX 4 MG: 2 GUM, CHEWING ORAL at 10:05

## 2017-11-16 RX ADMIN — NICOTINE POLACRILEX 4 MG: 2 GUM, CHEWING ORAL at 16:21

## 2017-11-16 RX ADMIN — NICOTINE POLACRILEX 4 MG: 2 GUM, CHEWING ORAL at 15:08

## 2017-11-16 RX ADMIN — OLANZAPINE 10 MG: 10 TABLET, FILM COATED ORAL at 17:25

## 2017-11-16 RX ADMIN — MELATONIN TAB 3 MG 6 MG: 3 TAB at 19:21

## 2017-11-16 RX ADMIN — HYDROXYZINE HYDROCHLORIDE 50 MG: 25 TABLET ORAL at 17:25

## 2017-11-16 RX ADMIN — QUETIAPINE FUMARATE 300 MG: 300 TABLET, FILM COATED ORAL at 19:21

## 2017-11-16 RX ADMIN — NICOTINE POLACRILEX 4 MG: 2 GUM, CHEWING ORAL at 17:25

## 2017-11-16 RX ADMIN — PANTOPRAZOLE SODIUM 20 MG: 20 TABLET, DELAYED RELEASE ORAL at 10:05

## 2017-11-16 RX ADMIN — OLANZAPINE 10 MG: 10 TABLET, FILM COATED ORAL at 10:05

## 2017-11-16 RX ADMIN — VENLAFAXINE HYDROCHLORIDE 225 MG: 225 TABLET, FILM COATED, EXTENDED RELEASE ORAL at 10:05

## 2017-11-16 RX ADMIN — OLANZAPINE 10 MG: 10 TABLET, FILM COATED ORAL at 18:39

## 2017-11-16 RX ADMIN — NICOTINE POLACRILEX 4 MG: 2 GUM, CHEWING ORAL at 14:07

## 2017-11-16 RX ADMIN — OLANZAPINE 10 MG: 10 TABLET, FILM COATED ORAL at 14:07

## 2017-11-16 RX ADMIN — LEVOTHYROXINE SODIUM 112 MCG: 112 TABLET ORAL at 10:05

## 2017-11-16 RX ADMIN — PRAZOSIN HYDROCHLORIDE 3 MG: 2 CAPSULE ORAL at 19:21

## 2017-11-16 RX ADMIN — VITAMIN D, TAB 1000IU (100/BT) 1000 UNITS: 25 TAB at 10:05

## 2017-11-16 ASSESSMENT — ACTIVITIES OF DAILY LIVING (ADL)
ORAL_HYGIENE: INDEPENDENT
DRESS: INDEPENDENT
GROOMING: INDEPENDENT

## 2017-11-16 NOTE — PROGRESS NOTES
Pt denies any SI or SIB. Pt affect was very flat and most responses were one worded or yes/no. Pt denied hearing any voices today. Pt's medication was increased and pt stated that it was making him sleepier than usual. Pt reported he attended groups earlier today and they seemed to benefit him and keep him busy. Pt rated depression as 2/10 and anxiety as 1/10. Pt was pacing the rincon listening to music for some of the evening shift. Pt stated that he had no daily goal.     11/15/17 1800   Behavioral Health   Hallucinations denies / not responding to hallucinations   Thinking distractable   Orientation person: oriented;place: oriented;time: oriented;date: oriented   Memory baseline memory   Insight poor   Judgement impaired   Eye Contact at examiner   Affect blunted, flat   Mood mood is calm   Physical Appearance/Attire attire appropriate to age and situation   Hygiene well groomed   1. Wish to be Dead Yes   Wish to be Dead Description (Has had thoughts in the past)   2. Non-Specific Active Suicidal Thoughts  No   Self Injury other (see comment)  (Denies SIB)   Elopement Statements about wanting to leave   Activity isolative;withdrawn   Speech clear;coherent;pressured  (one word response)   Medication Sensitivity sedation   Activities of Daily Living   Hygiene/Grooming independent   Oral Hygiene independent   Dress scrubs (behavioral health);independent   Room Organization independent

## 2017-11-16 NOTE — TELEPHONE ENCOUNTER
"D: Writer met with pt on stn 10. He was alert and verbalized motivation to follow through with Tx this time. We discussed barriers to his follow through last time and this included: difficulty opening up in a group that he does not know, transportation - specifically waiting for the bus in the cold, and ultimately he didn't know that he wanted to commit to a program at that time. Pt tells writer that his participation Tx is now a condition for his group home. Pt reports the groups home has specifically hired/assigned a staff to him each day to provide him transportation to and from [place]. Pt also reports that he prefers a group with \"crafts\" and does better with hands on vs verbal groups. Explained that OT clinic is offered in the AM and pt reports he would prefer this schedule and he believes the group home will be able to accommodate his rides. Program is 9-12, Mon-Fri.    I: Met with pt on stn 10. Consulted with AL Matias.    A: Pleasant, cooperative. Pt demonstrated similar motivation to attend program last month and didn't return after one day.    P: Polly will confirm transportation with the group home. Pt was scheduled to update his DA on Tuesday, 11/21/17 at 9 AM with Porfirio Edward. Pt reports he will likely be d/c'd tomorrow afternoon. If DA schedule changes, Porfirio was asked to meet with pt on the unit before he leaves.     SUZANNE Sigala, [facility]  11/16/2017        "

## 2017-11-16 NOTE — PLAN OF CARE
Problem: Psychotic Symptoms  Goal: Social and Therapeutic (Psychotic Symptoms)  Signs and symptoms of listed problems will be absent or manageable.   Attended 0.5 of 2.0 OT groups offered. Initiated group and completed a written self assessment. OT purpose was explained with a value of having involvement in tx plan and provided options to meet self identified goals. Will assess further in the areas of organization, problem solving, and concentration. Identified Sx's he has been experiencing and 2 goals he is hoping to achieve before d/c. Goals selected: make a safety plan and learn skills toprevent relapse. Decided to work on a simple coloring task but, was only able to focus x 15 minutes. Good attention to the details and planning of task. Minimal social interaction. Noted he was unable to focus and left group early. Will encourage consistent group attendance and participation.

## 2017-11-16 NOTE — PROGRESS NOTES
Writer called Neela at Pt's group home ( [phone]) informed her about [facility] that meets Monday-Friday from 9:00 am to noon. Neela reported that staff will accompany Pt to and from [facility], and participation in this program will be mandatory if he wants to continue living at the group home. Neela reported the importance of participating in [facility] ADT has been explained to Pt and he understands what is expected from him. Neela went on to state she does not feel Pt is ready for discharge tomorrow, and hopes we are able to stabilize him further before discharge.

## 2017-11-16 NOTE — PLAN OF CARE
"Problem: Overarching Goals (Adult)  Goal: Adheres to Safety Considerations for Self and Others  Outcome: Improving  Patient has not acted on command hallucinations to hurt self since 11/14. Denies suicidal, self injurious, homicidal thoughts.  Intervention: Develop/Maintain Individualized Safety Plan  Patient currently denies any suicidal, self injurious or homicidal thoughts.     Goal: Optimized Coping Skills in Response to Life Stressors  Patient has been actively participating in groups, listens to music, talks to staff about feelings, requests medication.   Goal: Develops/Participates in Therapeutic Oriental to Support Successful Transition  Establishing therapeutic relationships with staff.  Intervention: Mutually Develop Transition Plan  Discussed resources following discharge      Problem: Depressive Symptoms  Goal: Depressive Symptoms  Signs and symptoms of listed problems will be absent or manageable.   Patient will participate in 50% of groups offered in Milieu  Patient will remain free from SIB on unit  Patient will verbalize absence of suicidal thoughts and self harm thoughts   Outcome: Improving  Patient's affect is flat and he endorses anxiety. Patient still hears voices at times but they are subsiding. States his mood is good and he is hoping to discharge to his group home tomorrow. Plans to keep taking his medication regularly following discharge so his auditory hallucinations don't increase. Patient denies suicidal thoughts but when asked if he wished he was dead he replied \"not really.\" Patient elaborates that he regrets some decisions he's made in the past. States he does not have any intent to act on these thoughts and is comfortable speaking with staff regarding self harm urges while hospitalized and while outpatient. Patient is progressing toward goals.     Problem: Seclusion/Restraint, Behavioral (BH)  Goal: Absence of Injury/Harm  Outcome: Improving  Patient has not acted on any self harm " thoughts since 11/14

## 2017-11-16 NOTE — PROGRESS NOTES
Received VM from Eber Edward who reported he would be available to meet with Pt tomorrow (11/17/17) for an intake at noon on the unit for [facility]. Asked for a call back to confirm time.     Writer MATILDA with Eber Edward (*[id number]) confirmed Pt would be available to meet tomorrow at noon.     Writer shared information with Pt and put on unit brain board.

## 2017-11-16 NOTE — PROGRESS NOTES
"[facility], [place]   Psychiatric Progress Note  Kamini Neal  [id number]  11/15/17    Chief Complaint: Continued medical care          Interim History:   The patient's care was discussed with the treatment team during the daily team meeting and/or staff's chart notes were reviewed.  Staff report patient had some auditory hallucinations played game activities and slept about 6 hours. He has not had any violence towards himself.    Kamini reports that he is feeling good and the voices have improved and the longer bothering him as much he does still have some suicidal thinking but is not as severe. As previous and the auditory hallucinations are not as strong. He does not have any thoughts of harming other people and has not had any feelings of zombie from medication. He has also not had any other side effects and has been sleeping well and does not have anhedonia or hopelessness or helplessness. He is hopeful that we can discontinue the one-to-one.         Medications:       QUEtiapine  300 mg Oral At Bedtime     levothyroxine  112 mcg Oral Daily     melatonin  6 mg Oral At Bedtime     OLANZapine  10 mg Oral TID     pantoprazole  20 mg Oral Daily     prazosin  3 mg Oral At Bedtime     venlafaxine  225 mg Oral Daily with breakfast     cholecalciferol  1,000 Units Oral Daily          Allergies:     Allergies   Allergen Reactions     Haldol [Haloperidol]      Torticollis          Labs:   No results found for this or any previous visit (from the past 24 hour(s)).       Psychiatric Examination:     /70  Pulse 75  Temp 97.7  F (36.5  C) (Oral)  Resp 16  Ht 1.778 m (5' 10\")  Wt 97.6 kg (215 lb 3.2 oz)  SpO2 98%  BMI 30.88 kg/m2  Weight is 215 lbs 3.2 oz  Body mass index is 30.88 kg/(m^2).                                             Weight over time:  Vitals:    11/12/17 0223   Weight: 97.6 kg (215 lb 3.2 oz)       Orthostatic Vitals     None            Kamini is a " 27-year-old male wearing hospital scrubs. His speech is of an appropriate rate and tone in his language is intact. His behavior is appropriate and he does not have any abnormal movements and his gait is intact. His affect is subdued. His mood he describes as okay. His thought content consists of the above with having thoughts of harming himself but no thoughts of harming others and possible delusions. His thought process is ruminative and negative without looseness of association. He does have abnormal perceptions. He is alert and aware of his current location and circumstances. His attention and concentration appears limited. His cognition and fund of knowledge appears normal. His long-term/short-term/from memory appears intact. His insight and judgment are both limited to impaired.         Precautions:     Behavioral Orders   Procedures     Code 1 - Restrict to Unit     Elopement precautions     Routine Programming     As clinically indicated     Status 15     Every 15 minutes.     Suicide precautions          DIagnoses:     Schizoaffective disorder bipolar type  Posttraumatic stress disorder  Tobacco use  Cannabis use         Assessment & Plan:     Kamini has been doing well on the unit and has not had any other instances where he has harming himself related to command hallucinations. He believes the quetiapine has been helpful and would like to further increased to 300 mg. He is looking forward to potentially discharging on Friday and is hopeful for his improvement. He continues to have some auditory hallucinations and is not having any significant side effects from the quetiapine.    Continue voluntary hospitalization  Increase quetiapine 300 mg  Continue all other medications  Future medications: Include fluphenazine, perphenazine, trifluoperazine  Day Treatment    The risks, benefits, alternatives and side effects have been discussed and are understood by the patient and other caregivers.      Constantino LEW  Aidee  [facility]      The following document has been created with voice recognition software and may contain unintentional word substitutions.

## 2017-11-17 ENCOUNTER — BEH TREATMENT PLAN (OUTPATIENT)
Dept: BEHAVIORAL HEALTH | Facility: CLINIC | Age: 27
End: 2017-11-17
Attending: PSYCHIATRY & NEUROLOGY

## 2017-11-17 PROCEDURE — 97150 GROUP THERAPEUTIC PROCEDURES: CPT | Mod: GO

## 2017-11-17 PROCEDURE — 25000132 ZZH RX MED GY IP 250 OP 250 PS 637: Performed by: CLINICAL NURSE SPECIALIST

## 2017-11-17 PROCEDURE — 25000132 ZZH RX MED GY IP 250 OP 250 PS 637: Performed by: PSYCHIATRY & NEUROLOGY

## 2017-11-17 PROCEDURE — 99233 SBSQ HOSP IP/OBS HIGH 50: CPT | Performed by: PSYCHIATRY & NEUROLOGY

## 2017-11-17 PROCEDURE — 12400001 ZZH R&B MH UMMC

## 2017-11-17 PROCEDURE — 99207 ZZC CDG-MDM COMPONENT: MEETS MODERATE - UP CODED: CPT | Performed by: PSYCHIATRY & NEUROLOGY

## 2017-11-17 PROCEDURE — 90791 PSYCH DIAGNOSTIC EVALUATION: CPT | Performed by: PSYCHOLOGIST

## 2017-11-17 RX ORDER — QUETIAPINE FUMARATE 200 MG/1
400 TABLET, FILM COATED ORAL AT BEDTIME
Status: DISCONTINUED | OUTPATIENT
Start: 2017-11-17 | End: 2017-11-20 | Stop reason: HOSPADM

## 2017-11-17 RX ADMIN — QUETIAPINE FUMARATE 400 MG: 200 TABLET, FILM COATED ORAL at 19:06

## 2017-11-17 RX ADMIN — VITAMIN D, TAB 1000IU (100/BT) 1000 UNITS: 25 TAB at 09:41

## 2017-11-17 RX ADMIN — OLANZAPINE 10 MG: 10 TABLET, FILM COATED ORAL at 14:47

## 2017-11-17 RX ADMIN — MELATONIN TAB 3 MG 6 MG: 3 TAB at 19:06

## 2017-11-17 RX ADMIN — NICOTINE POLACRILEX 4 MG: 2 GUM, CHEWING ORAL at 17:09

## 2017-11-17 RX ADMIN — NICOTINE POLACRILEX 4 MG: 2 GUM, CHEWING ORAL at 11:16

## 2017-11-17 RX ADMIN — NICOTINE POLACRILEX 2 MG: 2 GUM, CHEWING ORAL at 18:22

## 2017-11-17 RX ADMIN — PRAZOSIN HYDROCHLORIDE 3 MG: 2 CAPSULE ORAL at 19:06

## 2017-11-17 RX ADMIN — NICOTINE POLACRILEX 2 MG: 2 GUM, CHEWING ORAL at 12:56

## 2017-11-17 RX ADMIN — OLANZAPINE 10 MG: 10 TABLET, FILM COATED ORAL at 19:06

## 2017-11-17 RX ADMIN — LEVOTHYROXINE SODIUM 112 MCG: 112 TABLET ORAL at 09:41

## 2017-11-17 RX ADMIN — VENLAFAXINE HYDROCHLORIDE 225 MG: 225 TABLET, FILM COATED, EXTENDED RELEASE ORAL at 09:41

## 2017-11-17 RX ADMIN — PANTOPRAZOLE SODIUM 20 MG: 20 TABLET, DELAYED RELEASE ORAL at 09:41

## 2017-11-17 RX ADMIN — HYDROXYZINE HYDROCHLORIDE 50 MG: 25 TABLET ORAL at 10:14

## 2017-11-17 RX ADMIN — NICOTINE POLACRILEX 4 MG: 2 GUM, CHEWING ORAL at 15:05

## 2017-11-17 RX ADMIN — OLANZAPINE 10 MG: 10 TABLET, FILM COATED ORAL at 15:11

## 2017-11-17 RX ADMIN — OLANZAPINE 10 MG: 10 TABLET, FILM COATED ORAL at 11:15

## 2017-11-17 RX ADMIN — OLANZAPINE 10 MG: 10 TABLET, FILM COATED ORAL at 09:41

## 2017-11-17 ASSESSMENT — ACTIVITIES OF DAILY LIVING (ADL)
GROOMING: HANDWASHING
ORAL_HYGIENE: INDEPENDENT
DRESS: SCRUBS (BEHAVIORAL HEALTH)
LAUNDRY: WITH SUPERVISION

## 2017-11-17 NOTE — PROGRESS NOTES
"Patient stated he wanted his 2000 medications because he felt like \"banging his head\". When this writer gave them to him he threw them at the wall and stated he was not going to take them.  "

## 2017-11-17 NOTE — PROGRESS NOTES
Acknowledgement of Current Treatment Plan       I have reviewed my treatment plan with my therapist / counselor on 11/17/2017. I agree with the plan as it is written in the electronic health record.    Name Signature   Kamini Neal    Name of Therapist / Counselor    Eber Edward MA [facility]

## 2017-11-17 NOTE — PROGRESS NOTES
Writer spoke with Eber Edward from the [facility] ADT who met with Pt earlier today, Porfirio reported Pt can start the day after he discharges, and Writer should call him once we know of a discharge date.     Writer MATILDA for Neela, Pt's  ([phone]), and informed her about news above. Asked for a call back if there are any questions or concerns.

## 2017-11-17 NOTE — PROGRESS NOTES
MY COPING PLAN FOR SAFETY      Things that are most important to me & reasons for living: Mom      My relapse Warning Signs: isolate, do not talk much, staring down, crying.   I will make my environment safer by: nothing identified  When in crisis, I will use the following coping skills: (relaxation/self-soothing/distraction/activity): Keeping busy, distraction, music helps, video games,   I will use My Support System:   Personal Supports: I can ask for reminders, support, or for them to stay with me  Trusted Friend/s:     Family Member/s :                   Professional Supports: I can ask for med changes, emergency appointments, help  Psychiatrist:    Therapist:      Other: group home staff.      Crisis Lines I can call to discuss options and to access support:  [facility]:     [place] ([facility]) [phone]   [place] (Mental Health Crisis Program) [phone]   [place]  [phone]   [place] ([facility]) [phone]   [place] [phone]   [place] ([facility]) [phone]   [place] ([place], [place], [place], [place], [facility]) [phone]   Other Crisis Lines:   Crisis Connection (Counseling) [phone]   National Suicide Prevention [phone]   Suicide Prevention [phone]          yes   I will attend my Treatment Program and talk to my one of my therapists:        yes   I agree that I will report any current / recent thoughts, impulses or plans of suicide,           homicide, self injurious behavior or substance use .   yes   I agree that I will not act on the above symptoms, but will follow the above plan         instead.  I can also go to the Emergency Department at [facility]: [facility] [phone] or call: [phone]

## 2017-11-17 NOTE — PLAN OF CARE
Problem: Psychotic Symptoms  Goal: Psychotic Symptoms  Signs and symptoms of listed problems will be absent or manageable.   48 hour nursing assessment:  Pt evaluation continues. Assessed mood, anxiety, thoughts, and behavior. Is progressing towards goals. Refused participation in groups and pacing in halls,  healthy coping skills. Pt acknowledges auditory  Hallucinations states he wants to hurt self and demonstrates this by softly banging head into the wall. Will stop with requests. Will then make repeated derogatory remarks about staff. Requests prn;s then engages in positive behavior for intermittent period until staff changes then will decide he might like challenge boundaries. Appears to appreciate engaging staff with negative behaviors.Refer to daily team meeting notes for individualized plan of care. Will continue to assess.

## 2017-11-17 NOTE — PROGRESS NOTES
1. What PRN did patient receive? Atarax 50 mg   2. What was the patient doing that led to the PRN medication?  Racing thoughts/ self harm. Had just received regular dose of zyprexa 10mg     3. Did they require R/S?no    4. Side effects to PRN medication?none    5. After 1 Hour, patient appeared: tbd

## 2017-11-17 NOTE — PLAN OF CARE
"Problem: Psychotic Symptoms  Goal: Psychotic Symptoms  Signs and symptoms of listed problems will be absent or manageable.   Outcome: Improving    Pt presents with subdued mood/blunted affect. Appears to be responding to internal stimuli. Reports voices that tell him to hurt himself. Requested and received prn Zyprexa 10 mg. Pt was observed head banging in the lounge briefly but stooped when writer asked him to. Pt contracts for safety saying, \" I'll come and talk to you when the voices start bothering me.\" No medication s/e reported or observed.   Plan: Status 15s; Build trust with pt. Continue to build on strengths. Encourage adequate hygiene and healthy coping.         "

## 2017-11-17 NOTE — PLAN OF CARE
Problem: Depressive Symptoms  Goal: Social and Therapeutic (Depression)  Signs and symptoms of listed problems will be absent or manageable.   Attended 1 of 2 OT groups offered. Required encouragement and support to remain focused and engaged in conversations and tasks. Noted low energy and motivation. Minimal social interaction. Initiated task but, appeared distracted and left group early.

## 2017-11-17 NOTE — PROGRESS NOTES
Initial Individual Treatment Plan     Patient: Kamini Neal   MRN: [id number]  : 1990  Age: 27 year old  Sex: male    Diagnostic Assessment Date / Date of Initial Individual Treatment Plan: 17      Immediate Health Concerns:  No     Immediate Safety Concerns:  No. Per history, see safety plan.    Identify the issues to be addressed in treatment:  Symptom Management, Personal Safety, Community Resources/Discharge Planning, Abstinence/Relapse Prevention and Develop / Improve Independent Living Skills    Client Initial Individualized Goals for Treatment: to avoid relapse    Initial Treatment suggestions for the client during the time between Diagnostic Assessment and completion of the Individualized Treatment Plan:  Follow Safety Plan  Abstain from Substance Use   Ask for more information, support and/or assistance as needed.  Follow up with providers/community supports as needed:   Report increases or changes in symptoms to staff.  Report any personal safety concerns to staff.   Take medications as prescribed.  Report medication changes and/or side effects to staff.  Attend and participate in groups as scheduled or notify staff if unable to do so.  Report any use of substances to staff as this may impact your symptoms and/or  personal safety.  Notify staff if you have any other issues that need to be addressed. This may include  any current abuse / neglect / exploitation or other vulnerability.  Follow recommendations of your treatment team and discuss concerns if not in  agreement.     Treatment Team Responsible: MI/CD Treatment (MICD)      Therapeutic Interventions/Treatment Strategies may include:  Support, Redirection, Feedback, Limit/Boundaries, Safety Assessments, Structured Activity, Problem Solving, Clarification, Education, Motivational Enhancement and Relapse Prevention as needed.    Eber Edward    Admission Date: 2017    The Initial Individual Treatment Plan was reviewed  with Kamini Neal upon admission.      Kamini reported his last use of substances as: 11/11/2017    Identify any current concerns with potential to impact admission:     withdrawal/intoxication: No concerns     medication/medical concerns: No concerns     immediate safety concerns: No concerns     Other: No other concerns    DIMENSION  ADMIT RATING   1 Acute Intoxication / Withdrawal Potential 0   2 Biomedical Conditions & Complications 0   3 Emotional / Behavioral / Cognitive Conditions & Complications 2   4 Treatment Acceptance / Resistance: 2   5 Continued Use / Relapse Prevention 3   6 Recovery Environment 1         Completed by: Eber Edward MA [facility]

## 2017-11-17 NOTE — PROGRESS NOTES
"[facility], [place]   Psychiatric Progress Note  Kamini Neal  [id number]  11/17/17    Chief Complaint: Continued medical care          Interim History:   The patient's care was discussed with the treatment team during the daily team meeting and/or staff's chart notes were reviewed.  Staff report patient has had low energy and low motivation and possibly auditory hallucinations. He is also having suicidal thoughts and attempted to hit his head a few times until staff told him to stop and slept about 7 hours.    Kamini reports to me that the voices are worse today and yesterday and that he is not feeling very good. He had a bad night last night and is still thinking about harming himself and attempting suicide. He has an urge and the voices are telling him to attempt suicide. He denies any issue sleeping or any side effects from his current medication including his zombie type feeling as previously complained about. No energy troubles and no paranoia and drinking about 8 cups of coffee per day. He is also hopeless and helpless and having suicidal thoughts as above.  His attention and concentration is fine and he does not have any abnormal issues with his body.            Medications:       QUEtiapine  400 mg Oral At Bedtime     levothyroxine  112 mcg Oral Daily     melatonin  6 mg Oral At Bedtime     OLANZapine  10 mg Oral TID     pantoprazole  20 mg Oral Daily     prazosin  3 mg Oral At Bedtime     venlafaxine  225 mg Oral Daily with breakfast     cholecalciferol  1,000 Units Oral Daily          Allergies:     Allergies   Allergen Reactions     Haldol [Haloperidol]      Torticollis          Labs:   No results found for this or any previous visit (from the past 24 hour(s)).       Psychiatric Examination:     /70  Pulse 77  Temp 98.1  F (36.7  C)  Resp 16  Ht 1.778 m (5' 10\")  Wt 97.6 kg (215 lb 3.2 oz)  SpO2 98%  BMI 30.88 kg/m2  Weight is 215 lbs 3.2 oz  Body mass " index is 30.88 kg/(m^2).                Sitting Orthostatic BP: 112/61      Sitting Orthostatic Pulse: 80 bpm                     Weight over time:  Vitals:    11/12/17 0223   Weight: 97.6 kg (215 lb 3.2 oz)       Orthostatic Vitals       Most Recent      Sitting Orthostatic /61 11/17 0900    Sitting Orthostatic Pulse (bpm) 80 11/17 0900            Kamini is a 27-year-old male wearing hospital scrubs. His speech is of an appropriate rate and tone in his language is intact. His behavior is appropriate and he does not have any abnormal movements and his gait is intact. His affect is subdued. His mood he describes as bad. His thought content consists of the above with having thoughts of harming himself but no thoughts of harming others and possible delusions. His thought process is ruminative and negative without looseness of association. He does have abnormal perceptions. He is alert and aware of his current location and circumstances. His attention and concentration appears limited. His cognition and fund of knowledge appears normal. His long-term/short-term/from memory appears intact. His insight and judgment are both limited to impaired.         Precautions:     Behavioral Orders   Procedures     Code 1 - Restrict to Unit     Elopement precautions     Routine Programming     As clinically indicated     Status 15     Every 15 minutes.     Status Individual Observation     Order Specific Question:   CONTINUOUS 24 hours / day     Answer:   Distance and Exceptions     Order Specific Question:   Distance     Answer:   5 feet     Order Specific Question:   Exceptions     Answer:   while in the milieu     Order Specific Question:   WHILE AWAKE     Answer:   Distance and Exceptions     Order Specific Question:   Distance     Answer:   5 feet     Order Specific Question:   Exceptions     Answer:   Other     Order Specific Question:   Other exception     Answer:   sleeping     Suicide precautions          DIagnoses:      Schizoaffective disorder bipolar type  Posttraumatic stress disorder  Tobacco use  Cannabis use         Assessment & Plan:     Kamini was able to communicate that he was not doing well and needed further assistance. We placed him on a one-to-one and also discussed increasing his quetiapine to 400 mg. He will need further monitoring and evaluation to assess improvement with quetiapine medication and possible further increases up to 1400 mg if needed.    Continue voluntary hospitalization  Increase quetiapine 400 mg  Continue all other medications  Future medications: Include fluphenazine, perphenazine, trifluoperazine  Day Treatment    The risks, benefits, alternatives and side effects have been discussed and are understood by the patient and other caregivers.      Constantino Hoskins  [facility]      The following document has been created with voice recognition software and may contain unintentional word substitutions.

## 2017-11-18 PROCEDURE — 25000132 ZZH RX MED GY IP 250 OP 250 PS 637: Performed by: PSYCHIATRY & NEUROLOGY

## 2017-11-18 PROCEDURE — 25000132 ZZH RX MED GY IP 250 OP 250 PS 637: Performed by: CLINICAL NURSE SPECIALIST

## 2017-11-18 PROCEDURE — 12400007 ZZH R&B MH INTERMEDIATE UMMC

## 2017-11-18 PROCEDURE — 90853 GROUP PSYCHOTHERAPY: CPT

## 2017-11-18 RX ADMIN — LEVOTHYROXINE SODIUM 112 MCG: 112 TABLET ORAL at 08:03

## 2017-11-18 RX ADMIN — OLANZAPINE 10 MG: 10 TABLET, FILM COATED ORAL at 08:03

## 2017-11-18 RX ADMIN — OLANZAPINE 10 MG: 10 TABLET, FILM COATED ORAL at 19:14

## 2017-11-18 RX ADMIN — NICOTINE POLACRILEX 4 MG: 2 GUM, CHEWING ORAL at 14:48

## 2017-11-18 RX ADMIN — PANTOPRAZOLE SODIUM 20 MG: 20 TABLET, DELAYED RELEASE ORAL at 08:03

## 2017-11-18 RX ADMIN — HYDROXYZINE HYDROCHLORIDE 50 MG: 25 TABLET ORAL at 10:31

## 2017-11-18 RX ADMIN — NICOTINE POLACRILEX 4 MG: 2 GUM, CHEWING ORAL at 17:11

## 2017-11-18 RX ADMIN — NICOTINE POLACRILEX 4 MG: 2 GUM, CHEWING ORAL at 18:31

## 2017-11-18 RX ADMIN — PRAZOSIN HYDROCHLORIDE 3 MG: 2 CAPSULE ORAL at 19:14

## 2017-11-18 RX ADMIN — NICOTINE POLACRILEX 4 MG: 2 GUM, CHEWING ORAL at 09:09

## 2017-11-18 RX ADMIN — VITAMIN D, TAB 1000IU (100/BT) 1000 UNITS: 25 TAB at 08:03

## 2017-11-18 RX ADMIN — MELATONIN TAB 3 MG 6 MG: 3 TAB at 19:14

## 2017-11-18 RX ADMIN — QUETIAPINE FUMARATE 400 MG: 200 TABLET, FILM COATED ORAL at 19:14

## 2017-11-18 RX ADMIN — VENLAFAXINE HYDROCHLORIDE 225 MG: 225 TABLET, FILM COATED, EXTENDED RELEASE ORAL at 08:03

## 2017-11-18 RX ADMIN — NICOTINE POLACRILEX 4 MG: 2 GUM, CHEWING ORAL at 11:11

## 2017-11-18 RX ADMIN — OLANZAPINE 10 MG: 10 TABLET, FILM COATED ORAL at 14:48

## 2017-11-18 NOTE — PROGRESS NOTES
"While sitting on SIO, pt told writer in reference to another staff that \"people like her, who got a rude ass mouth, need to get their jaw broken.  I don't like people like that, they piss me off\".    Writer explained to pt that this was unacceptable behavior/talk, and that it would not be tolerated on the milieu.  He was given the phone number to patient relations, and agreed to refrain from any derogatory or threatening behaviors.  No further incidences on evening shift.  "

## 2017-11-18 NOTE — PROGRESS NOTES
11/18/17 1429   Behavioral Health   Hallucinations auditory   Thinking poor concentration;distractable   Orientation person: oriented;place: oriented   Memory baseline memory   Insight poor   Judgement impaired   Eye Contact at examiner   Affect blunted, flat   Mood mood is calm   Physical Appearance/Attire attire appropriate to age and situation   Hygiene neglected grooming - unclean body, hair, teeth   Suicidality thoughts only   Self Injury thoughts only   Elopement (no value)   Activity other (see comment)  (visible in milieu, social with others)   Speech clear;coherent   Medication Sensitivity no stated side effects   Psychomotor / Gait balanced;steady     Pt was visible in milieu. Mood was calm with a blunted affect. Pt continues to endorse auditory hallucinations, however Pt noted that these have been minimal today and overall feeling better compared to past couple days. Passive thoughts of SI/SIB are still present. No plans or urges to act on these thoughts. No behavioral issues this shift. Pt was pleasant and social with staff and peers.

## 2017-11-18 NOTE — PROGRESS NOTES
"Pt has been polite, calm and cooperative this morning. Pt asked writer for a prn because \"I am feeling sad today.\" Pt denies SI/HI and says \"my AH are very minimal this morning, almost none.\" pt contracts for safety and denies urges for SIB.   "

## 2017-11-18 NOTE — PROGRESS NOTES
"   11/17/17 2220   Behavioral Health   Hallucinations auditory   Thinking paranoid;poor concentration;delusional   Orientation date: oriented;time: oriented;place: oriented   Memory baseline memory   Insight poor   Judgement impaired   Eye Contact at examiner   Affect angry   Mood irritable   Physical Appearance/Attire attire appropriate to age and situation   Hygiene neglected grooming - unclean body, hair, teeth   Suicidality (WDL) suicidality   Suicidality thoughts only   1. Wish to be Dead No   2. Non-Specific Active Suicidal Thoughts  No   3. Active Sucidal Ideation with any Methods (Not Plan) Without Intent to Act  No   4. Active Suicidal Ideation with Some Intent to Act, Without Specific Plan  No   5. Active Suicidal Ideation with Specific Plan and Intent  No   Duration (Lifetime) 1   Change in Protective Factors? No   Self Injury thoughts only   Elopement (denied)   Activity refusal   Speech clear   Medication Sensitivity no observed side effects   Psychomotor / Gait balanced   Psycho Education   Type of Intervention 1:1 intervention   Response participates, initiates socially appropriate   Hours 0.5   Activities of Daily Living   Hygiene/Grooming handwashing   Oral Hygiene independent   Dress scrubs (behavioral health)   Laundry with supervision   Room Organization independent   Activity   Activity Assistance Provided independent   Toward the beginning of the shift patient was aggressive and threatened one of the staff member. Pt. Said \"this individual disrespected me and yelled at me when asking for help\". However, as the shift progressed patient seems to be calm and more redirectable and respectful to others. Pt's concentrations was poor and  Pt. said he have being hearing voices.  Pt. Did not had daily goal, but he is hopping to discharge as soon as possible. Pt's appetite wa good and sleeping well throughout the night.    "

## 2017-11-19 PROCEDURE — 25000132 ZZH RX MED GY IP 250 OP 250 PS 637: Performed by: CLINICAL NURSE SPECIALIST

## 2017-11-19 PROCEDURE — 25000132 ZZH RX MED GY IP 250 OP 250 PS 637: Performed by: PSYCHIATRY & NEUROLOGY

## 2017-11-19 PROCEDURE — 90853 GROUP PSYCHOTHERAPY: CPT

## 2017-11-19 PROCEDURE — 12400007 ZZH R&B MH INTERMEDIATE UMMC

## 2017-11-19 RX ADMIN — HYDROXYZINE HYDROCHLORIDE 50 MG: 25 TABLET ORAL at 14:27

## 2017-11-19 RX ADMIN — OLANZAPINE 10 MG: 10 TABLET, FILM COATED ORAL at 09:00

## 2017-11-19 RX ADMIN — OLANZAPINE 10 MG: 10 TABLET, FILM COATED ORAL at 14:27

## 2017-11-19 RX ADMIN — OLANZAPINE 10 MG: 10 TABLET, FILM COATED ORAL at 19:15

## 2017-11-19 RX ADMIN — NICOTINE POLACRILEX 4 MG: 2 GUM, CHEWING ORAL at 12:47

## 2017-11-19 RX ADMIN — MELATONIN TAB 3 MG 6 MG: 3 TAB at 19:15

## 2017-11-19 RX ADMIN — PANTOPRAZOLE SODIUM 20 MG: 20 TABLET, DELAYED RELEASE ORAL at 09:00

## 2017-11-19 RX ADMIN — VITAMIN D, TAB 1000IU (100/BT) 1000 UNITS: 25 TAB at 09:00

## 2017-11-19 RX ADMIN — NICOTINE POLACRILEX 4 MG: 2 GUM, CHEWING ORAL at 16:17

## 2017-11-19 RX ADMIN — NICOTINE POLACRILEX 4 MG: 2 GUM, CHEWING ORAL at 18:25

## 2017-11-19 RX ADMIN — NICOTINE POLACRILEX 2 MG: 2 GUM, CHEWING ORAL at 09:01

## 2017-11-19 RX ADMIN — NICOTINE POLACRILEX 4 MG: 2 GUM, CHEWING ORAL at 10:09

## 2017-11-19 RX ADMIN — NICOTINE POLACRILEX 4 MG: 2 GUM, CHEWING ORAL at 17:20

## 2017-11-19 RX ADMIN — NICOTINE POLACRILEX 4 MG: 2 GUM, CHEWING ORAL at 11:08

## 2017-11-19 RX ADMIN — QUETIAPINE FUMARATE 400 MG: 200 TABLET, FILM COATED ORAL at 19:15

## 2017-11-19 RX ADMIN — VENLAFAXINE HYDROCHLORIDE 225 MG: 225 TABLET, FILM COATED, EXTENDED RELEASE ORAL at 09:00

## 2017-11-19 RX ADMIN — PRAZOSIN HYDROCHLORIDE 3 MG: 2 CAPSULE ORAL at 19:15

## 2017-11-19 RX ADMIN — LEVOTHYROXINE SODIUM 112 MCG: 112 TABLET ORAL at 09:00

## 2017-11-19 RX ADMIN — NICOTINE POLACRILEX 4 MG: 2 GUM, CHEWING ORAL at 14:15

## 2017-11-19 ASSESSMENT — ACTIVITIES OF DAILY LIVING (ADL)
LAUNDRY: WITH SUPERVISION
DRESS: INDEPENDENT
GROOMING: INDEPENDENT
ORAL_HYGIENE: INDEPENDENT

## 2017-11-19 NOTE — PROGRESS NOTES
11/18/17 2154   Behavioral Health   Hallucinations denies / not responding to hallucinations   Thinking intact   Orientation time: oriented;date: oriented;place: oriented   Memory baseline memory   Insight insight appropriate to events   Judgement impaired   Eye Contact at examiner   Affect blunted, flat   Mood mood is calm   Physical Appearance/Attire attire appropriate to age and situation   Hygiene neglected grooming - unclean body, hair, teeth   Suicidality (denied )   1. Wish to be Dead No   2. Non-Specific Active Suicidal Thoughts  No   3. Active Sucidal Ideation with any Methods (Not Plan) Without Intent to Act  No   4. Active Suicidal Ideation with Some Intent to Act, Without Specific Plan  No   5. Active Suicidal Ideation with Specific Plan and Intent  No   Change in Protective Factors? No   Enviromental Risk Factors None   Self Injury (denied )   Elopement (denied )   Speech coherent;clear   Medication Sensitivity no observed side effects   Psychomotor / Gait balanced   Psycho Education   Type of Intervention 1:1 intervention   Response participates, initiates socially appropriate   Hours 0.5     Pt. Had a great day. Pt. Denied SI/SIB. Pt. Was calm and cooperative throughout the shift. Pt denied any paranoid thoughts and psychotic symptoms. Pt's appetite was good and sleeping well at night. Pt. 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 Pt's daily goal was to be off of SIO and he met his goal. Pt. Is hopping to discharge next week.

## 2017-11-19 NOTE — PLAN OF CARE
Problem: Depressive Symptoms  Goal: Depressive Symptoms  Signs and symptoms of listed problems will be absent or manageable.   Patient will participate in 50% of groups offered in Milieu  Patient will remain free from SIB on unit  Patient will verbalize absence of suicidal thoughts and self harm thoughts       Pt attended 1/1 OT group. During the activity focused on creative, abstract thought, he offered several insightful comments. He was social, asking insightful questions and offering encouraging comments to peers. During the Albino-Ji activity focused on relaxation and concentration, he successfully followed multi step directions and appeared engaged. He expressed his appreciation of the activity. Affect was brightened with interactions.

## 2017-11-19 NOTE — PROGRESS NOTES
11/19/17 1500   Behavioral Health   Hallucinations denies / not responding to hallucinations   Thinking intact   Orientation person: oriented;date: oriented;place: oriented   Memory baseline memory   Insight other (see comment)  (insight improving)   Judgement impaired   Eye Contact at examiner   Affect blunted, flat   Mood mood is calm   Physical Appearance/Attire attire appropriate to age and situation   Hygiene well groomed   Suicidality other (see comments)  (denies currently)   Self Injury other (see comment)  (denies currently)   Elopement (no value)   Activity withdrawn;isolative   Speech coherent;clear   Medication Sensitivity no stated side effects   Psychomotor / Gait balanced;steady   Psycho Education   Type of Intervention structured groups   Response participates, initiates socially appropriate   Hours 0.5   Treatment Detail (self-reflection and planning)   Group Therapy Session   Group Attendance attended group session   Group Type community;other (see comments)  (focus group)     Pt had unremarkable shift. Mood was calm with a blunted affect. No SI/SIB indicated. AH present but minimal. Pt reported having a good mood today. No behavioral issues. No psychotic symptoms observed. Pt was visible in milieu for all of morning but rested in afternoon.

## 2017-11-20 VITALS
HEART RATE: 82 BPM | HEIGHT: 70 IN | OXYGEN SATURATION: 98 % | TEMPERATURE: 98.5 F | WEIGHT: 218 LBS | SYSTOLIC BLOOD PRESSURE: 105 MMHG | DIASTOLIC BLOOD PRESSURE: 69 MMHG | RESPIRATION RATE: 16 BRPM | BODY MASS INDEX: 31.21 KG/M2

## 2017-11-20 PROCEDURE — 25000132 ZZH RX MED GY IP 250 OP 250 PS 637: Performed by: PSYCHIATRY & NEUROLOGY

## 2017-11-20 PROCEDURE — 25000132 ZZH RX MED GY IP 250 OP 250 PS 637: Performed by: CLINICAL NURSE SPECIALIST

## 2017-11-20 PROCEDURE — 99238 HOSP IP/OBS DSCHRG MGMT 30/<: CPT | Performed by: PSYCHIATRY & NEUROLOGY

## 2017-11-20 RX ORDER — HYDROXYZINE HYDROCHLORIDE 50 MG/1
50 TABLET, FILM COATED ORAL EVERY 4 HOURS PRN
Qty: 60 TABLET | Refills: 0 | Status: ON HOLD | OUTPATIENT
Start: 2017-11-20 | End: 2019-08-29

## 2017-11-20 RX ORDER — QUETIAPINE FUMARATE 400 MG/1
400 TABLET, FILM COATED ORAL AT BEDTIME
Qty: 60 TABLET | Refills: 0 | Status: SHIPPED | OUTPATIENT
Start: 2017-11-20 | End: 2018-10-09

## 2017-11-20 RX ADMIN — LEVOTHYROXINE SODIUM 112 MCG: 112 TABLET ORAL at 08:23

## 2017-11-20 RX ADMIN — OLANZAPINE 10 MG: 10 TABLET, FILM COATED ORAL at 08:23

## 2017-11-20 RX ADMIN — OLANZAPINE 10 MG: 10 TABLET, FILM COATED ORAL at 14:58

## 2017-11-20 RX ADMIN — HYDROXYZINE HYDROCHLORIDE 50 MG: 25 TABLET ORAL at 14:58

## 2017-11-20 RX ADMIN — NICOTINE POLACRILEX 4 MG: 2 GUM, CHEWING ORAL at 14:58

## 2017-11-20 RX ADMIN — VITAMIN D, TAB 1000IU (100/BT) 1000 UNITS: 25 TAB at 08:23

## 2017-11-20 RX ADMIN — NICOTINE POLACRILEX 4 MG: 2 GUM, CHEWING ORAL at 13:15

## 2017-11-20 RX ADMIN — VENLAFAXINE HYDROCHLORIDE 225 MG: 225 TABLET, FILM COATED, EXTENDED RELEASE ORAL at 08:23

## 2017-11-20 RX ADMIN — PANTOPRAZOLE SODIUM 20 MG: 20 TABLET, DELAYED RELEASE ORAL at 08:23

## 2017-11-20 RX ADMIN — NICOTINE POLACRILEX 4 MG: 2 GUM, CHEWING ORAL at 10:35

## 2017-11-20 RX ADMIN — NICOTINE POLACRILEX 4 MG: 2 GUM, CHEWING ORAL at 09:31

## 2017-11-20 RX ADMIN — NICOTINE POLACRILEX 4 MG: 2 GUM, CHEWING ORAL at 11:36

## 2017-11-20 RX ADMIN — NICOTINE POLACRILEX 4 MG: 2 GUM, CHEWING ORAL at 08:23

## 2017-11-20 ASSESSMENT — ACTIVITIES OF DAILY LIVING (ADL)
LAUNDRY: WITH SUPERVISION
ORAL_HYGIENE: INDEPENDENT
GROOMING: INDEPENDENT
DRESS: INDEPENDENT

## 2017-11-20 NOTE — PROGRESS NOTES
Writer [facility] [place] who verified the following information:  [facility]   [address] [address]  Phone: [phone]  *You will be starting the [facility] on Tuesday, November 21st at 9:00 am-noon.     Writer spoke with Neela from Pt's group home ([phone]) discussed discharge instructions and how Pt has done over the weekend. Andrewr reported that Pt can start [facility] DT tomorrow, which Neela was pleased to hear. Medications are to be sent to [facility] in [place], and staff from group [place] will be able to pick Pt up at 4:00 pm today for discharge. Neela reported she will schedule an earlier appointment with Dr. Campo at [facility], because it would be another two months until an appointment with them. Neela verified she would prefer to do this, because then they can figure out transportation to and from psychiatric appointment.

## 2017-11-20 NOTE — DISCHARGE SUMMARY
[facility], [place]   Psychiatric Discharge Summary  Time: 28 minutes on encounter.    Kamini Neal MRN# [id number]   Age: 27 year old YOB: 1990     Date of Admission:  11/12/2017  Date of Discharge:  11/20/17  Admitting Physician:  Constantino Lugo  Discharge Physician:  Constantino Lugo         Event Leading to Hospitalization and Hospital Course:   Kamini Neal was admitted for worsening auditory hallucinations and thoughts of suicide.        See Admission note by Naegele on 11/12 for additional details.     Kamini Neal was initially hospitalized for worsening hallucinations and thoughts of suicide. He was initially placed on a 72 hr hold and then decided to stay voluntarily. He is not happy with his clozapine medication. He feels as though this medication with him to feel like a zombie and it did not have any benefit with the hallucinations. He was accepting of titrating quetiapine up to 400 mg quite quickly and had reduction in hallucinations and improvement in his suicidal thinking. We did discuss potentially going higher than 400 mg however he could potentially do that an outpatient setting. He was continued on his other outpatient medications including the olanzapine 3 times a day at 10 mg. On day of discharge she did not have any thoughts of harming himself or others or any side effects from the medication changes or any self-injurious behavior within the past 72 hours. He denies any hopelessness or helplessness and the auditory hallucinations are definitely tolerable now. Denies any paranoia and describes a future plan of talking with staff members when he is having difficulties. He is aware of global emergency services and what he can do to manage his symptoms.           DIagnoses:   Schizoaffective disorder bipolar type  Posttraumatic stress disorder  Tobacco use  Cannabis use         Labs:   No results found for this or any previous visit  (from the past 24 hour(s)).         Consults:   No consultations were requested during this admission           Discharge Medications:        Review of your medicines      START taking       Dose / Directions    hydrOXYzine 50 MG tablet   Commonly known as:  ATARAX   Used for:  Schizoaffective disorder, bipolar type (H)        Dose:  50 mg   Take 1 tablet (50 mg) by mouth every 4 hours as needed for anxiety   Quantity:  60 tablet   Refills:  0       QUEtiapine 400 MG tablet   Commonly known as:  SEROquel   Used for:  Schizoaffective disorder, bipolar type (H)        Dose:  400 mg   Take 1 tablet (400 mg) by mouth At Bedtime   Quantity:  60 tablet   Refills:  0         CONTINUE these medicines which have NOT CHANGED       Dose / Directions    benztropine 1 MG tablet   Commonly known as:  COGENTIN   Used for:  Schizoaffective disorder, bipolar type (H)        Dose:  1 mg   Take 1 tablet (1 mg) by mouth 2 times daily   Quantity:  60 tablet   Refills:  0       levothyroxine 25 MCG tablet   Commonly known as:  SYNTHROID/LEVOTHROID   Used for:  Acquired hypothyroidism        Dose:  112 mcg   Take 4.5 tablets (112 mcg) by mouth daily noon   Quantity:  135 tablet   Refills:  0       melatonin 3 MG Caps   Used for:  Primary insomnia        Dose:  6 mg   Take 6 mg by mouth At Bedtime   Quantity:  60 capsule   Refills:  0       paliperidone 117 MG/0.75ML Susp   Commonly known as:  INVEGA SUSTENNA   Used for:  Schizoaffective disorder, unspecified type (H)        Dose:  117 mg   Inject 0.75 mLs (117 mg) into the muscle once for 1 dose   Quantity:  0.75 mL   Refills:  0       pantoprazole 20 MG EC tablet   Commonly known as:  PROTONIX   Used for:  Gastroesophageal reflux disease, esophagitis presence not specified        Dose:  20 mg   Take 1 tablet (20 mg) by mouth daily   Quantity:  30 tablet   Refills:  0       * polyethylene glycol Packet   Commonly known as:  MIRALAX/GLYCOLAX   Used for:  Drug-induced constipation         Dose:  34 g   Take 34 g by mouth daily   Quantity:  50 packet   Refills:  0       * polyethylene glycol Packet   Commonly known as:  MIRALAX/GLYCOLAX   Used for:  Drug-induced constipation        Dose:  17 g   Take 17 g by mouth daily as needed for constipation   Quantity:  7 packet   Refills:  0       prazosin 1 MG capsule   Commonly known as:  MINIPRESS   Used for:  PTSD (post-traumatic stress disorder)        Dose:  3 mg   Take 3 capsules (3 mg) by mouth At Bedtime This product only available from a Compounding Pharmacy.   Quantity:  90 capsule   Refills:  0       venlafaxine 75 MG Tb24 24 hr tablet   Commonly known as:  EFFEXOR-ER   Used for:  Schizoaffective disorder, bipolar type (H)        Dose:  225 mg   Take 3 tablets (225 mg) by mouth daily (with breakfast)   Quantity:  90 each   Refills:  0       Vitamin D (Cholecalciferol) 1000 UNITS Tabs   Used for:  Vitamin D deficiency        Dose:  1000 Units   Take 1,000 Units by mouth daily   Quantity:  30 tablet   Refills:  0       * ZYPREXA PO        Dose:  2.5-5 mg   Take 2.5-5 mg by mouth daily as needed for agitation   Refills:  0       * OLANZapine 10 MG tablet   Commonly known as:  zyPREXA   Used for:  Schizoaffective disorder, unspecified type (H), Schizoaffective disorder, bipolar type (H)        Dose:  10 mg   Take 1 tablet (10 mg) by mouth 3 times daily   Quantity:  90 tablet   Refills:  0       * Notice:  This list has 4 medication(s) that are the same as other medications prescribed for you. Read the directions carefully, and ask your doctor or other care provider to review them with you.      STOP taking          cloZAPine 100 MG tablet   Commonly known as:  CLOZARIL                Where to get your medicines      These medications were sent to [facility] [address]  [address], [facility] [address]     Phone:  [phone]      hydrOXYzine 50 MG tablet     QUEtiapine 400 MG  tablet                  Mental Status Examination:   Kamini is a 27-year-old male wearing hospital scrubs. His speech is of an appropriate rate and tone in his language is intact. His behavior is appropriate and he does not have any abnormal movements and his gait is intact. His affect is smiling. His mood he describes as good. His thought content consists of the above without having thoughts of harming himself or others and no delusions. His thought process is more logical without looseness of association. He does have abnormal perceptions. He is alert and aware of his current location and circumstances. His attention and concentration appears limited. His cognition and fund of knowledge appears normal. His long-term/short-term/from memory appears intact. His insight and judgment are both limited.         Discharge Plan:     Reason for your hospital stay   Schizoaffective DO, Cannabis use     Activity   Your activity upon discharge: activity as tolerated     Discharge Instructions   1. Please do not harm yourself or others.  2. Please continue to take your medications.  3. Please follow up with your outpatient care team.  4. Please do not take drugs or alcohol as this will worsen your condition.  5. Please do not take more than the prescribed doses of medications as this may make them dangerous.   6. Please follow your safety plan of action.  7. Please call crisis if having trouble.  8. If having thoughts of harming self or others please come in to the emergency department as soon as possible.     Full Code     Diet   Follow this diet upon discharge: Orders Placed This Encounter     Regular Diet Adult No Pork             Further instructions from your care team        Behavioral Discharge Planning and Instructions      Summary:  You were admitted on 11/12/2017  due to Schizoaffective disorder.  You were treated by Dr. Constantino Lugo MD and discharged on 11/20/17 from [facility].    Principal Diagnosis: Schizoaffective disorder, bipolar type    Health Care Follow-up Appointments:   [facility]   [address] [address], [place] [address]  Phone: [phone]  *You will be starting the [facility] on Tuesday, November 21st at 9:00 am-noon.     [facility] [address]  [place], [place]   Phone: [phone]  Fax: [phone] HUC TO FAX DISCHARGE INSTRUCTIONS   *If no appointments scheduled, explain: your group home will be making an earlier psychiatry appointment with Dr. Campo for you. Please work with your group home staff to schedule a psychiatry appointment   Attend all scheduled appointments with your outpatient providers. Call at least 24 hours in advance if you need to reschedule an appointment to ensure continued access to your outpatient providers.   Major Treatments, Procedures and Findings:  You were provided with: a psychiatric assessment, assessed for medical stability, medication evaluation and/or management, group therapy and milieu management    Symptoms to Report: feeling more aggressive, increased confusion, losing more sleep, mood getting worse or thoughts of suicide    Early warning signs can include: increased depression or anxiety sleep disturbances increased thoughts or behaviors of suicide or self-harm  increased unusual thinking, such as paranoia or hearing voices    Safety and Wellness:  Take all medicines as directed.  Make no changes unless your doctor suggests them.      Follow treatment recommendations.  Refrain from alcohol and non-prescribed drugs.  Ask your support system to help you reduce your access to items that could harm yourself or others. If there is a concern for safety, call 911.    Resources:   Crisis Intervention: 670.161.4166 or 436-386-6945 (TTY: 695.652.7134).  Call anytime for help.  National [place] on  "Mental Illness (www.[place] a.org): 415.339.6053 or 466-611-1189.  Alcoholics Anonymous (www.alcoholics-anonymous.org): Check your phone book for your local chapter.  Suicide Awareness Voices of Education (SAVE) (www.save.org): 806-212-OACH (3658)  National Suicide Prevention Line (www.mentalhealthmn.org): 146-570-JFLL (6754)  Mental Health Consumer/Survivor Network of [place] (www.mhcsn.net): 423.662.8175 or 886-455-0947  Mental Health Association of [place] (www.mentalhealth.org): 697.712.6038 or 210-948-2171  Self- Management and Recovery Training., [employer]-- Toll free: 591.521.8226  www.[employer]  [facility] Crisis (COPE) Response - Adult 297 089-9108  Text 4 Life: txt \"LIFE\" to 22863 for immediate support and crisis intervention  Crisis text line: Text \"START\" to 029-459. Free, confidential, 24/7.  Crisis Intervention: 279.433.8291 or 475-100-8186. Call anytime for help.     The treatment team has appreciated the opportunity to work with you.     Please take care and make your recovery a daily recovery.   If you have any questions or concerns our unit number is [phone].  Thank you.             Please send copy to Dr. Campo    During hospitalizations patients have perpetuating, complicating, situational, acute, and chronic conditions that lead to risk for suicidality or homicidality. During hospitalizations we mitigate any modifiable risk factors that may have been identified in the history and physical examination and throughout the hospitalization. Chronic non-modifiable risk factors are not able to be changed with acute hospitalization. As a patient that is discharging these risk factors have been modified as much as possible and a supportive network and safety plan has been put in place. Further modifications and assistance will have to be obtained in the outpatient setting and the inpatient hospitalization team is no longer responsible for outcomes.    Constantino Hoskins  [facility] " Psychiatry      The following document has been created with voice recognition software and may contain unintentional word substitutions.

## 2017-11-20 NOTE — PLAN OF CARE
Problem: Psychotic Symptoms  Goal: Psychotic Symptoms  Signs and symptoms of listed problems will be absent or manageable.   Outcome: Improving  48 Hour Assessment    Social with peers. In milieu. Affect full range, mood is calm. States he feels good. Pt denies SI/SIB/AH. Medication compliant. States he is hopeful to be discharged soon.

## 2017-11-20 NOTE — DISCHARGE INSTRUCTIONS
Behavioral Discharge Planning and Instructions      Summary:  You were admitted on 11/12/2017  due to Schizoaffective disorder.  You were treated by Dr. Constantino Lugo MD and discharged on 11/20/17 from [facility].    Principal Diagnosis: Schizoaffective disorder, bipolar type    Health Care Follow-up Appointments:   [facility]   [address] [facility]  [address], [place] [address]  Phone: [phone]  *You will be starting the [facility] Program on Tuesday, November 21st at 9:00 am-noon.     [facility] [address] [place], [place]   Phone: [phone]  Fax: [phone] HUC TO FAX DISCHARGE INSTRUCTIONS   *If no appointments scheduled, explain: your group home will be making an earlier psychiatry appointment with Dr. Campo for you. Please work with your group home staff to schedule a psychiatry appointment   Attend all scheduled appointments with your outpatient providers. Call at least 24 hours in advance if you need to reschedule an appointment to ensure continued access to your outpatient providers.   Major Treatments, Procedures and Findings:  You were provided with: a psychiatric assessment, assessed for medical stability, medication evaluation and/or management, group therapy and milieu management    Symptoms to Report: feeling more aggressive, increased confusion, losing more sleep, mood getting worse or thoughts of suicide    Early warning signs can include: increased depression or anxiety sleep disturbances increased thoughts or behaviors of suicide or self-harm  increased unusual thinking, such as paranoia or hearing voices    Safety and Wellness:  Take all medicines as directed.  Make no changes unless your doctor suggests them.      Follow treatment recommendations.  Refrain from alcohol and non-prescribed drugs.  Ask your support system to help you reduce  "your access to items that could harm yourself or others. If there is a concern for safety, call 911.    Resources:   Crisis Intervention: 337.142.2760 or 040-035-7638 (TTY: 562.784.7984).  Call anytime for help.  National [place] on Mental Illness (www.[place].jorge a.org): 596.812.7622 or 099-220-4384.  Alcoholics Anonymous (www.alcoholics-anonymous.org): Check your phone book for your local chapter.  Suicide Awareness Voices of Education (SAVE) (www.save.org): 198-835-MYJK (0819)  National Suicide Prevention Line (www.mentalhealthmn.org): 601-583-XAPR (2967)  Mental Health Consumer/Survivor Network of [place] (www.mhcsn.net): 655.167.7315 or 220-948-6124  Mental Health Association of [place] (www.mentalhealth.org): 211.994.7064 or 490-559-7942  Self- Management and Recovery Training., [employer]-- Toll free: 202.811.7288  www.[employer]  [facility] Crisis (COPE) Response - Adult 306 180-3198  Text 4 Life: txt \"LIFE\" to 00679 for immediate support and crisis intervention  Crisis text line: Text \"START\" to 782-323. Free, confidential, 24/7.  Crisis Intervention: 506.347.8028 or 926-262-5362. Call anytime for help.     The treatment team has appreciated the opportunity to work with you.     Please take care and make your recovery a daily recovery.   If you have any questions or concerns our unit number is [phone].  Thank you.     "

## 2017-11-20 NOTE — PLAN OF CARE
Problem: Psychotic Symptoms  Goal: Psychotic Symptoms  Signs and symptoms of listed problems will be absent or manageable.   Outcome: No Change  Patient requesting discharge, discharge order placed by provider. Reviewed AVS with patient including discharge instructions and medications. Patient denies any homicidal or suicidal thoughts, plan or intent at this time. Has plan to contact group home staff, 911 or crisis line if thoughts arise. Patient will be picked up by staff from his group home. Medications sent to outpatient pharmacy. Patient reports no further questions at this time. Patient is discharged at this time.

## 2017-11-20 NOTE — PROGRESS NOTES
Pt verbalized satisfaction with plan to discharge this shift. Discharge teaching and medication education provided by day nurse.  Pt verbalizes understanding. AVS signed. Patient denies SI and SIB. Safety plan in place.  Plans to follow-up with scheduled outpatient providers after discharge.       Pt left unit with discharge medications and belongings. Transported by his mother

## 2017-11-21 ENCOUNTER — HOSPITAL ENCOUNTER (OUTPATIENT)
Dept: BEHAVIORAL HEALTH | Facility: CLINIC | Age: 27
End: 2017-11-21
Attending: PSYCHIATRY & NEUROLOGY
Payer: COMMERCIAL

## 2017-11-21 PROCEDURE — 97150 GROUP THERAPEUTIC PROCEDURES: CPT | Mod: GO

## 2017-11-21 PROCEDURE — H2012 BEHAV HLTH DAY TREAT, PER HR: HCPCS

## 2017-11-21 NOTE — PROGRESS NOTES
"  Adult Mental Health Outpatient Group Therapy Progress Note     Client Initial Individualized Goals for Treatment:  \"do not relapse\"      See Initial Treatment suggestions for the client during the time between Diagnostic Assessment and completion of the Master Individualized Treatment Plan.    Treatment Goals:     see above     Area of Treatment Focus:  Symptom Management, Community Resources/Discharge Planning, Abstinence/Relapse Prevention, Develop / Improve Independent Living Skills, Develop Socialization / Interpersonal Relationship Skills and Cultural / Spirituality    Therapeutic Interventions/Treatment Strategies:  Support, Feedback, Structured Activity, Problem Solving, Clarification, Education, Motivational Enhancement Therapy and orientation    Response to Treatment Strategies:  Accepted Feedback, Listened, Accepted Support and Alert    Name of Group:  OT Clinic 9:00 - 9:50 and Self Support Skills 11:00 - 11:50     Description and Outcome:  Kamini, prefers to be called \"Jesus Manuel\", was initially seen and oriented to OT. He noted understanding purpose as explained re: use of a variety of structured activities to build daily living skills. He requested to color pictures and noted this is what he engaged in OT while inpatient. He did agree to begin to complete the questionnaire re: daily living skills and completed the first page, noting problems with motivation, low self esteem, negative self talk, and difficulty in meeting people, trusting others, and asserting himself. He then sat quietly and worked on the structured creative distraction activity for limited periods. He was open to try new materials and noted that it took too long of a time to complete a section and returned to using familiar ones for him. He briefly responded to a peer's questions. In Self Support Skills, he engaged in practicing planning his unstructured time, especially with the lone holiday weekend without treatment fays. He briefly " completed a schedule, but did not answer questions related to challenges for himself for the holiday. He noted there would be none for him and briefly shared he would engage in some laundry and other activities that the group home might have. Will continue to assess,    Is this a Weekly Review of the Progress on the Treatment Plan?  No

## 2017-11-21 NOTE — PROGRESS NOTES
"MICD Progress Note / Treatment Plan Review       Patient: Kamini Neal   MRN: [id number]  : 1990  Age: 27 year old  Sex: male    Week of: 2017-2017    Client Initial Individualized Goals for Treatment: \"do not relapse\"    See Initial Treatment suggestions for the client during the time between Diagnostic Assessment and completion of the Master Individualized Treatment Plan.    Treatment Goals:     See above    Active Psychiatric Symptoms Impairing:   Thought, Mood, Behavior and Perception    Area of Treatment Focus:  Symptom Management, Personal Safety, Community Resources/Discharge Planning, Abstinence/Relapse Prevention, Develop / Improve Independent Living Skills and Develop Socialization / Interpersonal Relationship Skills    Treatment Strategies:   Support, Redirection, Feedback, Limit/Boundaries, Safety Assessments, Structured Activity, Problem Solving, Clarification and Education    Patient Response:  Accepted Feedback, Gave Feedback, Listened, Focused on Goals, Attentive and Accepted Support    Dimension Risk Description and Outcome:    Dimension One: Acute Intoxication/Withdrawal Potential   Daily Note: 17: Kamiin reported last use of substances on 2017. He shared that he is prescribed Ativan and takes it as a PRN. (DE) 17: Kamini reported no substance use. (DE)    Dimension Two: Biomedical Conditions and Complications  Daily Note: 17: Kamini reported no physical health concerns. (DE) 17: Kamini reported no physical health concerns. (DE)    Dimension Three: Emotional/Behavioral / Cognitive Conditions & Complications  Daily Note: 17 (10:00-10:50AM): Kamini endorsed absence of suicidal ideation and self-injurious behavior urges. He described feeling \"kind of nervous,anxious.\" When asked about coping skills, Kamini stated that he takes PRN medications of Ativan and listens to music. (DE) 17 (10:00-10:50AM): Kamini endorsed " "absence of suicidal ideation and self-injurious behavior urges. He described feeling \"good\" and \"hopeful.\" Writer assisted him in identifying what coping skills he uses, which he identified as listening to music and playing video games. (DE)    Dimension Four: Treatment Acceptance / Resistance  Daily Note: 11/21/17: Kamini participated minimally. He did not want time in group and appeared inattentive. (DE) 11/22/17: Kamini participated minimally in group. He did not offer feedback or eye contact to other group members. (DE)    Dimension Five: Continued Use/Relapse Prevention  Daily Note: 11/21/17: Kamini reported no urges to use. Risk remains moderate to high due to severity of mental health symptoms. (DE) 11/22/17: Kamini reported no urges to use. He shared that playing video games is helpful. (DE) 11/22/[id number] - Relapse Prevention Group: Kamini denies any recent substance use or concerns. However, with further prompting, he admits to having some urges to use and he identifies some risk of use over the long weekend. He did not elaborate or engage in further discussion about this stating he believes he will be able to stay sober. Kamini listened to two peers process relapse through use of behavior chains. He presented as attentive to process and group discussion, however, he did not share any feedback. Kamini would benefit from additional education regarding risks of relapse and developing prevention skills. (DD)    Dimension Six: Recovery Environment  Daily Note: 11/21/17: Kamini lives in a group home and has support of a . (DE) 11/22/17: Kamini reported no changes. (DE)      Weekly Progress / Treatment Plan Review      Are there additional progress notes for this week? Yes (see additional progress notes)    Are Treatment Plan Goals being addressed? Yes, continue treatment goals    Are treatment plan strategies to address goals effective? Yes, continue treatment " strategies    Are there any current contracts in place? No    Client: No changes     Client: No changes    Was client case reviewed with Dr Ferreira and/or Dr Park and the treatment team this week? Yes, informed team that Kamini states he is prescribed and takes Ativan medication.     Staff: Radha Perdue MA, [facility], [facility] (DE); Tami Reddy [facility], [facility] (DD)

## 2017-11-22 ENCOUNTER — HOSPITAL ENCOUNTER (OUTPATIENT)
Dept: BEHAVIORAL HEALTH | Facility: CLINIC | Age: 27
End: 2017-11-22
Attending: PSYCHIATRY & NEUROLOGY
Payer: COMMERCIAL

## 2017-11-22 VITALS — HEIGHT: 70 IN | BODY MASS INDEX: 31.64 KG/M2 | WEIGHT: 221 LBS

## 2017-11-22 PROCEDURE — H2012 BEHAV HLTH DAY TREAT, PER HR: HCPCS

## 2017-11-22 PROCEDURE — 80320 DRUG SCREEN QUANTALCOHOLS: CPT | Performed by: PSYCHIATRY & NEUROLOGY

## 2017-11-22 PROCEDURE — 80307 DRUG TEST PRSMV CHEM ANLYZR: CPT | Performed by: PSYCHIATRY & NEUROLOGY

## 2017-11-22 NOTE — PROGRESS NOTES
RN Review of Medical History / Physical Health Screen  Outpatient Behavioral Programs      CLIENT'S NAME: Kamini Neal  MRN:   [id number]  :   1990 AGE:27 year old SEX: male    DATE OF DIAGNOSTIC ASSESSMENT: 17  DATE OF ADMISSION: 17   PROGRAM: MI/CD Treatment (MICD)      Following admission, the RN reviewed the following:    - Medical History / Physical Health Screen completed during the DA noted above.  - Immediate Health Concerns as noted on the Initial Individual Treatment Plan.    Client height and weight recorded by RN in epic: yes    BMI Review:  Was the patient informed of BMI? yes      Findings Above,  General nutrition education       RN Recommendations include: Continue to educate on nutrition and exercise. Educate on the importance to monitor regularly and if increase continues follow up with primary provider.        Gregory Peñaloza  2017

## 2017-11-22 NOTE — PROGRESS NOTES
"Adult Mental Health   Mental Health Program   Progress Note     Group Time: 9:00-9:50    Client Initial Individualized Goals for Treatment:    \"do not relapse      See Initial Treatment suggestions for the client during the time between Diagnostic Assessment and completion of the Master Individualized Treatment Plan.    Treatment Goals:     Follow Safety Plan  Abstain from Substance Use   Ask for more information, support and/or assistance as needed.  Follow up with providers/community supports as needed:   Report increases or changes in symptoms to staff.  Report any personal safety concerns to staff.   Take medications as prescribed.  Report medication changes and/or side effects to staff.  Attend and participate in groups as scheduled or notify staff if unable to do so.  Report any use of substances to staff as this may impact your symptoms and/or                      personal safety.  Notify staff if you have any other issues that need to be addressed. This may include              any current abuse / neglect / exploitation or other vulnerability.  Follow recommendations of your treatment team and discuss concerns if not in            agreement.       Area of Treatment Focus:  Symptom Management, Personal Safety, Community Resources/Discharge Planning, Physical Health  and Other: Medications    Therapeutic Interventions/Treatment Strategies:  Feedback, Structured Activity and Education    Response to Treatment Strategies:  Accepted Feedback, Listened, Attentive and Alert    Name of Group:  Medication Education/Signs, Symptoms & Solutions    Description and Outcome:  Worksheet were then given out and teaching was done related to signs and symptoms related to illness (depression, anxiety and bipolar). General progression of illness was sampled on a worksheet, patients were asked to fill out their own blank worksheet that identified their progression of symptoms. Patients were instructed what to do at each level of " their illness.    Assessment  Appearance/ Mood: Calm behavior, range in affect, stable mood throughout group. Affect was consistent with mood.  Appropriate dress, well-groomed and was cooperative within group.   Thought Process: Did not contribute much to the conversation but did state some symptoms he experiences such as isolation.   Behavior: Cooperative, interacted within the group, listened and was respectful to others  Areas for growth: Patient would benefit from the application of skills to daily life and reporting to group after completion.      Treatment goal acknowledgement: Patient continues to work towards treatment plan goals and the skills learned today will benefit on recovery.         Is this a Weekly Review of the Progress on the Treatment Plan?  No

## 2017-11-27 ENCOUNTER — HOSPITAL ENCOUNTER (OUTPATIENT)
Dept: BEHAVIORAL HEALTH | Facility: CLINIC | Age: 27
End: 2017-11-27
Attending: PSYCHIATRY & NEUROLOGY
Payer: COMMERCIAL

## 2017-11-27 PROCEDURE — H2012 BEHAV HLTH DAY TREAT, PER HR: HCPCS

## 2017-11-27 NOTE — PROGRESS NOTES
"TYRESED Progress Note / Treatment Plan Review       Patient: Kamini Neal   MRN: [id number]  : 1990  Age: 27 year old  Sex: male    Week of: 17-17    Client Initial Individualized Goals for Treatment: \"Do not relapse.\"    See Initial Treatment suggestions for the client during the time between Diagnostic Assessment and completion of the Master Individualized Treatment Plan.    Treatment Goals:  Client will notify staff when needing assistance to develop or implement a coping plan to manage suicidal or self injurious urges.  Client will use coping plan for safety, as needed.    Goal 1: While in OT groups, Kamini will report on a positive after engagement in an activity of his choice, focusing on follow through and management of negative self talk, once each week.    Goal 2:  Kamini will take his medications at % adherence, reporting outcomes to the group weekly.      Goal 3:  Kamini will attend treatment for 3 hours per day, five days per week, achieving 80+% attendance.      Kamini will have 0 instances of chemical use, reporting outcomes to the group daily.       Kamini will create a budget plan with his group home staff in order to help him have less money on hand for drugs by the target date.      Kamini will attend groups at [facility] at least 1-2/week.     Kamini will see his individual therapist weekly reporting follow through to the group.      Active Psychiatric Symptoms Impairing:   Thought, Mood, Behavior and Perception    Area of Treatment Focus:  Symptom Management, Personal Safety, Community Resources/Discharge Planning and Abstinence/Relapse Prevention    Treatment Strategies:   Support, Feedback, Limit/Boundaries, Safety Assessments, Structured Activity, Problem Solving, Clarification, Education, Motivational Enhancement Therapy and Cognitive Behavioral Therapy    Patient Response:  Accepted Feedback, Gave Feedback, Listened, Focused on Goals, Attentive and " "Alert    Dimension Risk Description and Outcome:    Dimension One: Acute Intoxication/Withdrawal Potential   Daily Note:  11/27/2017:  Jeffrey reports that he was able to stay sober over the extended weekend.  (ANDREW)  11/28/2017:  Reports continued sobriety.  (ANDREW)  11/29/2017:  Sobriety maintained per self report.  (ANDREW)  11/30/2017:  Sobriety maintained.  (ANDREW)  12/1/2017:  Kamini reports continued sobriety.  (ANDREW)    Dimension Two: Biomedical Conditions and Complications  Daily Note:  11/27/2017:  No physical health concerns reported.  (ANDREW)  11/28/2017:  No changes.  (ANDREW)  11/30/2017:  Denies physical health concerns at this time.  (ANDREW)    Dimension Three: Emotional/Behavioral / Cognitive Conditions & Complications  Daily Note:  11/27/2017:  Jeffrey states that he has been struggling with depression and increased auditory hallucinations.  He states that he has been requesting PRNs of Zyprexa when needed and that he plans to ask his psychiatrist for a medication increase.  Jeffrey reports that his voices tell him to harm himself however he has not engaged in any self harm activity.  (ANDREW)  11/28/2017:  Kamini states that his mood is \"ok\" today and denies any suicidal thoughts.  He reports that he is struggling with negative thoughts but is asking for help when he needs it.  Kamini denies thoughts to self harm.  (ANDREW)  11/29/2017:  Kamini reports that he is feeling \"good\" and denies any physical pain or psychotic symptoms.  Kamini reports that he is not having any thoughts to self harm.  (ANDREW)  11/30/2017:  Kamini reports that he is feeling \"good\" today and reports minimal symptoms.  He denies thoughts to self harm and states that he does not need time in group.  (ANDREW)  12/1/2017:  Kamini reports that he is feeling \"motivated\" and states that he is proud of himself for attending the program all five days this week.  He states that he is working on positive self talk to help him get to his daily " "obligations.  Kamini currently denies thoughts to self harm.  (ANDREW)    Dimension Four: Treatment Acceptance / Resistance  Daily Note:  11/27/2017:  Interpersonal Relationships:  11-11:50am:  Jeffrey participated minimally in the group on \"Identifying Types of Boundaries\".  He did not participate in the group discussion and did not ask any questions about his understanding of the topic even when prompted.  He would benefit from increased teaching on the topic of boundaries.  (ANDREW)  11/28/2017:  Kamini did not participated in the group discussion during psychotherapy.  (ANDREW)  11/29/2017:  Autobiography:  10-10:50am:  Kamini sat quietly during his peer's autobiography.  He did not offer any feedback during the discussion that followed.  (ANDREW)  11/30/17 Emotions Management (10:00-10:50AM): Writer taught and facilitated discussion on cognitive distortions/expanding interpretations of events. Jeffrey participated actively in discussion by offering to read out loud and share examples. He expressed understanding by sharing that he typically \"discounts the positives\" in his life and would like to challenge that by focusing on \"what he has.\" (DE)  12/1/2017:  Kamini expresses difficulty with talking in psychotherapy.  (ANDREW) 12/1/17 MICD Education Kamini was engaged and animated during mindfulness experiential. (SS)    Dimension Five: Continued Use/Relapse Prevention  Daily Note:  11/27/2017:  Remains at high risk for continued use of chemicals.  (ANDREW)  11/29/2017:  Kamini admits that he struggles with cravings to use chemicals.  (ANDREW)  11/30/2017:  Kamini reports that he remains at high risk for continued use of chemicals.  (ANDREW)  12/1/2017:  No change.  (ANDREW)    Dimension Six: Recovery Environment  Daily Note:  11/27/2017:  Lives in a group home.  Jeffrey reports that the staff are supportive.  (ANDREW)  11/29/2017:  Requested information regarding [facility].  Would like to start attending groups there.  (ANDREW) "  12/1/2017:  Kamini has been encouraged to attend groups at [facility]/.  (ANDREW)      Weekly Progress / Treatment Plan Review      Are there additional progress notes for this week? No    Are Treatment Plan Goals being addressed? Yes, continue treatment goals    Are treatment plan strategies to address goals effective? Yes, continue treatment strategies    Are there any current contracts in place? No    Client: Agrees with treatment plan changes     Client: Received copy of revised treatment plan    Was client case reviewed with Dr Ferreira and/or Dr Park and the treatment team this week? yes    Staff[facility]); Rad[facility] Perdue MA, [facility], [facility] (DE), Vanda Rincon [facility] (SS)

## 2017-11-27 NOTE — PROGRESS NOTES
"  Adult Mental Health Outpatient Group Therapy Progress Note     Client Initial Individualized Goals for Treatment:  \"do not relapse\"      See Initial Treatment suggestions for the client during the time between Diagnostic Assessment and completion of the Master Individualized Treatment Plan.    Treatment Goals:     see above     Area of Treatment Focus:  Symptom Management, Community Resources/Discharge Planning and Abstinence/Relapse Prevention    Therapeutic Interventions/Treatment Strategies:  Support, Feedback, Structured Activity, Problem Solving, Clarification, Education and Motivational Enhancement Therapy    Response to Treatment Strategies:  Accepted Feedback, Listened, Focused on Goals, Accepted Support and Alert    Name of Group:  Goal Setting 9:00 - 9:50     Description and Outcome:  Kamini participated in group that focused on learning about benefits and practicing setting realistic goals for treatment and mental and chemical health management, along with self reflection on accomplishments and practicing problem solving obstacles. He began to work on the handout briefly and when staff approached, noted that his \"mind was blank\". Staff offered some general suggestions re: what he hoped to get out being in the program and the reason he was coming to treatment. He did note and share when prompted that he hoped to open up more and share in groups this week. His sobriety goal was to stay away from people who used chemicals. He also note goal to set up an appointment with his therapist.    Is this a Weekly Review of the Progress on the Treatment Plan?  No          "

## 2017-11-28 ENCOUNTER — HOSPITAL ENCOUNTER (OUTPATIENT)
Dept: BEHAVIORAL HEALTH | Facility: CLINIC | Age: 27
End: 2017-11-28
Attending: PSYCHIATRY & NEUROLOGY
Payer: COMMERCIAL

## 2017-11-28 PROCEDURE — H2012 BEHAV HLTH DAY TREAT, PER HR: HCPCS

## 2017-11-28 PROCEDURE — 97150 GROUP THERAPEUTIC PROCEDURES: CPT | Mod: GO

## 2017-11-28 NOTE — PROGRESS NOTES
"Adult Mental Health   Mental Health Program  Progress Note   Group Time: 10:00-10:50      Client Initial Individualized Goals for Treatment:  \"do not relapse      See Initial Treatment suggestions for the client during the time between Diagnostic Assessment and completion of the Master Individualized Treatment Plan.    Treatment Goals:     Follow Safety Plan  Abstain from Substance Use   Ask for more information, support and/or assistance as needed.  Follow up with providers/community supports as needed:   Report increases or changes in symptoms to staff.  Report any personal safety concerns to staff.   Take medications as prescribed.  Report medication changes and/or side effects to staff.  Attend and participate in groups as scheduled or notify staff if unable to do so.  Report any use of substances to staff as this may impact your symptoms and/or                      personal safety.  Notify staff if you have any other issues that need to be addressed. This may include              any current abuse / neglect / exploitation or other vulnerability.  Follow recommendations of your treatment team and discuss concerns if not in            agreement.       Area of Treatment Focus:  Symptom Management, Develop / Improve Independent Living Skills, Develop Socialization / Interpersonal Relationship Skills and Physical Health     Therapeutic Interventions/Treatment Strategies:  Support, Feedback, Structured Activity, Problem Solving, Education and Motivational Enhancement Therapy    Response to Treatment Strategies:  Accepted Feedback, Listened, Attentive, Alert and Quiet    Name of Group:  Nutrition     Description and Outcome:  Group discussed the barriers to eating healthy. Then we discussed ways to reduce the barriers: planning, meal prep, coupons, crock-pot meals etc. Myplate and BMI was discussed. Foods , beverage and meals that are important to concentration, energy level and mood levels were discussed.  Patients " were to identify healthy foods and suggest meals that could be used in their future. Education on food labels was discussed. Patient was given worksheet to assess learning and identify their own barriers to eating well. The worksheet asked the patient about their goals for nutrition.   Assessment  Appearance/ Mood: Calm behavior, minimal range in affect, stable mood throughout group. Affect was consistent with mood.  Appropriate dress, well-groomed and was cooperative within group.   Thought Process: Linear and logical, productive and goal directed. Contributed to group conversation minimally.   Behavior: Cooperative,  listened and was respectful to others  Areas for growth: Patient did not contribute much to conversation.    Understanding of the lesson: Patient did add that GMOs are not good and organic is better.       Is this a Weekly Review of the Progress on the Treatment Plan?  No

## 2017-11-28 NOTE — PROGRESS NOTES
"  Adult Mental Health Outpatient Group Therapy Progress Note     Client Initial Individualized Goals for Treatment:  \"do not relapse\"      See Initial Treatment suggestions for the client during the time between Diagnostic Assessment and completion of the Master Individualized Treatment Plan.    Treatment Goals:     see above     Area of Treatment Focus:  Symptom Management, Community Resources/Discharge Planning, Abstinence/Relapse Prevention, Develop / Improve Independent Living Skills, Develop Socialization / Interpersonal Relationship Skills and Cultural / Spirituality    Therapeutic Interventions/Treatment Strategies:  Support, Feedback, Structured Activity, Problem Solving, Clarification, Education, Motivational Enhancement Therapy and orientation    Response to Treatment Strategies:  Accepted Feedback, Listened, Accepted Support and Alert    Name of Group:  OT Clinic 9:00 - 9:50     Description and Outcome:  Jesus Manuel participated in group that focused on the use of a variety of structured activities to build daily living skills and practice effective coping skills. He needed staff assistance to initiate any activity. He did note that he had taken his unfinished activity from the previous session home, but had not completed it there. He did then choose to engage in a new, unfamiliar activity that he had \"practiced\" with the technique the previous session. He was able to choose a design and engaged in fine detail work of this structured creative activity for majority of session. Results were good and he accepted compliments re: his work. He noted that he did enjoy the activity. When a new peer chose to engage in the same type of activity and staff requested that Jesus Manuel teach the simple instructions to the peer, he did so quickly, but accurately. Will continue to assess and set specific treatment goals with him.        Is this a Weekly Review of the Progress on the Treatment Plan?  No          "

## 2017-11-30 ENCOUNTER — HOSPITAL ENCOUNTER (OUTPATIENT)
Dept: BEHAVIORAL HEALTH | Facility: CLINIC | Age: 27
End: 2017-11-30
Attending: PSYCHIATRY & NEUROLOGY
Payer: COMMERCIAL

## 2017-11-30 PROCEDURE — H2012 BEHAV HLTH DAY TREAT, PER HR: HCPCS

## 2017-11-30 NOTE — PROGRESS NOTES
Acknowledgement of Current Treatment Plan       I have reviewed my treatment plan with my therapist / counselor on 11/28/17. I agree with the plan as it is written in the electronic health record.    Name Signature   Kamini Neal    Name of Therapist / Counselor    [facility] Connors, [facility]

## 2017-11-30 NOTE — PROGRESS NOTES
Individualized Treatment Plan     Date of Plan: 17    Name: Kamini Neal MRN: [id number]    : 1990    Programs:  MI/CD Treatment (MICD)     Clinical Track (if applicable):  9am    DSM5 Diagnosis  295.70  (F25.1) Schizoaffective Disorder Depressive Type  309.81 (F43.10) Posttraumatic Stress Disorder (includes Posttraumatic Stress Disorder for Children 6 Years and Younger)  Without dissociative symptoms  303.90 (F10.20) Alcohol Use Disorder, Severe  304.3 (F12.20) Cannabis Use Disorder, Severe  304.9 (F19.20) Other ([place]) Substance Use Disorder, Severe    Team Members Contributing to Plan:  Tami Reddy, Radha Perdue, Uli Rivera, Porfirio Edward, Selena Connors and Gregory Peñaloza    Client Strengths:  caring and understanding    Client Participation in Plan:  Contributed to goals and plan   Attended individual treatment plan meeting on 17  Agrees with plan   Received copy of treatment plan   Discussed with staff   Family members attended. No. Were they invited? No. Kamini does not wish to invite family in for a meeting at this time.     Areas of Vulnerability:  Suicidal Ideation   Poor impulse control   Psychotic symptoms/behavior   Anxiety  Depressive symptoms   Physical/medical: Thyroid issue   Trauma/Abuse/Neglect    Long-Term Goals:  Knowledge about illness and management of symptoms   Maintenance of personal safety   Maintenance of sobriety   Effective management of impulsivity   Get a job/apartment     Abuse Prevention Plan:  Safe, therapeutic environment   Safety coping plan as needed   Education regarding illness and skill development   Coordination with care providers   Impluse control education and intervention   Medication adjustment/management (MI/CD)   Monitor for use of substances    Discharge Criteria:  Satisfactory progress toward treatment goals   Improvement re: identified problems and symptoms   Ability to continue recovery at next level of service   Has a discharge plan in  place   Has safety/coping plan in place   Ability to maintain sobriety  Share autobiography   Complete goodbye letter assignment   Complete coping cards   Regular attendance as scheduled     Areas of Treatment Focus            Area of Treatment Focus:   Personal Safety  Start Date:    11/28/17    Dimension:   III. Emotional / Behavioral Condition    Description:    Kamini has frequent thoughts to self harm, he agrees to check in daily with staff with regard to suicidal ideation and self injurious urges.      Goal:  Target Date: 12/26/17, 1/23/18 Status: Completed  Client will notify staff when needing assistance to develop or implement a coping plan to manage suicidal or self injurious urges.  Client will use coping plan for safety, as needed.      Progress:   12/26/17:  Kamini denies any recent thoughts to self harm.  Goal CONTINUED.    1/5/2018:  Kamini has successfully completed the program.  He has not reported any recent thoughts to self harm.  Goal COMPLETED.      Treatment Strategies:   Assist clients in establishing / strengthening support network  Assist to identify treatment goals  Assess / reassess for appropriate therapy program involvement, encourage participation in therapies  Assess / reassess level of potential for harm to self or others  Engage in safety planning when indicated  Facilitate increased self awareness  Teach adaptive coping skills and communication skills  Use reality based supportive approach        Area of Treatment Focus:   Symptom Stabilization and Management  Start Date:    11/28/17    Dimension:   II. Biomedical Conditions and III. Emotional / Behavioral Condition    Description:    Audis symptoms include anxiety, low mood, thoughts to self harm, negative thinking patterns, and audio hallucinations.      Goal:  Target Date: 12/26/17, 1/23/18 Status: Completed    Goal 1: While in OT groups, Kamini will report on a positive after engagement in an activity of his choice,  focusing on follow through and management of negative self talk, once each week.    Goal 2:  Kamini will take his medications at % adherence, reporting outcomes to the group weekly.      Goal 3:  Kamini will attend treatment for 3 hours per day, five days per week, achieving 80+% attendance.  (COMPLETED)      Progress:   12/26/17:    Goal #1: Kamini is making progress with this goal, but would benefit from CONTINUED work in this area to be more consistent and engage in more variety of activities, which he is beginning to work on at this time.    Goal #2: Kamini reports that he is taking his medications as prescribed.  Goal CONTINUED until he completes the program.      Goal #3:  Kamini has shown significant progress with his attendance.  He is approaching completion to the program and has satisfied this goal.  COMPLETED.      1/5/2018  Goal #1: Kamini engaged in a couple of different activities and worked to focus on positive thinking. Goal COMPLETED.    Goal #2:  Kamini has been taking his medications as prescribed however he ran out of his Seroquel about a week ago.  Kamini took action and contacted Dr. Campo for an appointment to refill his medications.  Goal COMPLETED.        Treatment Strategies:   Assist clients in establishing / strengthening support network  Assist to identify treatment goals  Assess / reassess for appropriate therapy program involvement, encourage participation in therapies  Assess / reassess level of potential for harm to self or others  Engage in safety planning when indicated  Facilitate increased self awareness  Teach adaptive coping skills and communication skills  Use reality based supportive approach   Provide a variety of structured activities to build daily living skills  Provide education and opportunity to practice use of sensory modulation techniques for self regulation                 Area of Treatment Focus:   Abstinence / Relapse Prevention  Start  Date:    11/28/17    Dimension:   I. Acute Intoxication / Withdrawal Potential, IV. Treatment Acceptance / Resistance and V. Relapse    Description:    Kamini's use of chemicals greatly exacerbates his mental health symptoms and often results in hospitalization.      Goal:  Target Date: 12/26/17, 1/23/18 Status: COMPLETED  Kamini will have 0 instances of chemical use, reporting outcomes to the group daily.       Kamini will create a budget plan with his group home staff in order to help him have less money on hand for drugs by the target date.        Progress:   12/26/17:  Goal #1:  Kamini has had one instance of chemical use since his late treatment plan update.  Progress is being made.  CONTINUE until completion of the program.      Goal #2:  Kamini maintains that money remains a trigger for his use of chemicals.  Goal CONTINUED.      1/5/2018  Goal #1:  Kamini has achieved 3+weeks of sobriety.  Goal COMPLETED.    Goal #2:  Kamini states that he is working with staff at his group home to help him with his budget.  Goal COMPLETED.      Treatment Strategies:   Assist clients in establishing / strengthening support network  Assist to identify treatment goals  Assess / reassess for appropriate therapy program involvement, encourage participation in therapies  Assess / reassess level of potential for harm to self or others  Facilitate increased self awareness  Provide education regarding relapse prevention  Teach adaptive coping skills and communication skills  Use reality based supportive approach      Area of Treatment Focus:   Community Resources / Support and Discharge Planning  Start Date:    11/28/17    Dimension:   VI. Recovery Environment    Description:    Kamini would like to expand his sober support network in the community.      Goal:  Target Date: 12/26/17, 1/23/18 Status: COMPLETED  Kamini will attend groups at [facility] at least 1-2/week.     Kamini will see his individual therapist  weekly reporting follow through to the group. (COMPLETED)       Progress:   12/26/17:  Goal #1:  Kamini has attended [facility] approximately 3-4 times in the past month.  He is encouraged to maintain his participation in their programming for additional support.  Goal CONTINUED.      Goal #2:  Kamini reports that he is seeing his individual therapist weekly and will continue to do so after discharge.  Goal COMPLETED.      1/5/2018  Goal #1:  Kamini continues to attend [facility] and will do so after completion of this program.  He finds their groups and programming to be helpful.  Goal COMPLETED.      Treatment Strategies:   Assist clients in establishing / strengthening support network  Assist to identify treatment goals  Assist with discharge planning  Facilitate increased self awareness  Teach adaptive coping skills and communication skills

## 2017-11-30 NOTE — PROGRESS NOTES
"Adult Mental Health Outpatient Group Therapy Progress Note     Client Initial Individualized Goals for Treatment:  \"do not relapse\"      See Initial Treatment suggestions for the client during the time between Diagnostic Assessment and completion of the Master Individualized Treatment Plan.    Treatment Goals:     see above     Area of Treatment Focus:  Symptom Management and Abstinence/Relapse Prevention    Therapeutic Interventions/Treatment Strategies:  Support, Feedback, Structured Activity, Problem Solving, Clarification, Education and Motivational Enhancement Therapy    Response to Treatment Strategies:  Accepted Feedback, Listened, Focused on Goals, Attentive and Accepted Support    Name of Group:  Self Support Skills 11:00 - 11:50     Description and Outcome:  Jesus Manuel participated in group that focused on learning about the benefits of leisure activities for mental and chemical health management, along with identifying a variety of activities that he could engage in for himself. He was generally quiet, but listened to the discussion of the definition and focus on skills building and self discovery. He was able to choose a few benefits that he hopes to get from engaging in leisure activities and noted his challenge was not being social enough and engaging in too much passive leisure with TV watching. He was able to note goal to go to a [facility] with benefits of being around more people and how that would help both his mental and chemical health.  Client verbalized understanding of session content by sharing his specific challenges and goals for health management through leisure activities. Will help focus on follow through.    Is this a Weekly Review of the Progress on the Treatment Plan?  No    "

## 2017-12-01 ENCOUNTER — HOSPITAL ENCOUNTER (OUTPATIENT)
Dept: BEHAVIORAL HEALTH | Facility: CLINIC | Age: 27
End: 2017-12-01
Attending: PSYCHIATRY & NEUROLOGY
Payer: COMMERCIAL

## 2017-12-01 PROCEDURE — H2012 BEHAV HLTH DAY TREAT, PER HR: HCPCS

## 2017-12-01 PROCEDURE — 97150 GROUP THERAPEUTIC PROCEDURES: CPT | Mod: GO

## 2017-12-01 NOTE — PROGRESS NOTES
"                                   Occupational Therapy Assessment     Patient: Kamini Neal    MRN: [id number]     :1990    Age: 27 year old    Sex:male     Assessment     Mood: Depressed  Anxious    Affect: Blunted    Thought Content:  Paranoia    Verbal Content: shares minimally, but has begun to share more     Concentration: Inconsistent    Energy Level:  Low  Needs encouragement    Memory:  Delayed & immediate recall intact    Following Directions: Independently follows multi-step directions    Decision Making:  Follows leads of peers  Tending to engage in simple, familiar activity; has been open to staff suggestion to begin to expand his repertoire    Motivation/Procrastination:  Needs external cues to initiate activities  Needs prompts to initiate activities  Procrastinates engaging in activties    Planning & Problem Solving:  Needs assistance  Needs structure    Judgment:  Corrects errors  Recognizes errors    Frustration/Stress Management:  Did note report this as a problem, need to further assess due to limited sharing    Self Awareness:  Demonstrates/espressess negative view of self  Notes struggle with negative self talk    Interpersonal Skills: Difficulty/ineffective in expressing feelings  Demonstrates/reports difficulty with trust /personal boundaries  Notes his paranoia and \"being afraid of people\"  Notes difficulty in asserting himself  Shared minimally at this time, but with prompts, is doing more    Social Supports:  Notes difficulty with meeting people, but has a goal to try to be more social in spite of his paranoia    Time Management:  Did not identify this as a problem; lives in a group home, but has reported that he does watch a lot of TV, but does try to engage in some of the activities there    Leisure:  Did not initially identify this as a problem areas; in group on leisure benefits, he did note goals to be more social and engaged in more variety of activities at this " time    Self-Care:  Needs assistance to establish plan for adequate nutrition, sleep, exercise, ADL's  Notes hygiene and grooming skills as problematic for him - does currently live in group home and has appeared adequate with his grooming    Home Management:  Dependent on others for managing meal preparation, cleaning, laundry, organization, personal, financial paperowrk  Living in group home    Community Resources:  Difficulty leaving the house or being in public places  Due to paranoia    Employment & Education:  Unemployed  Notes he lost his last job    Additional Comments/Plan of Treatment Functional Goals:  Kamini initially chose a simple, familiar creative structured activity for relaxation, distraction and to stop negative self talk. He has now been open to try a couple of new activities and notes that he is working on focusing on positives, sharing a technique from another group this week that he found helpful to use. He has become more social and sharing as the time progresses while he is in the program.             OTR/L Signature: PILO Abarca, BRIAN    Date/Time: 12/1/2017

## 2017-12-01 NOTE — PROGRESS NOTES
"[facility] Progress Note / Treatment Plan Review       Patient: Kamini Neal   MRN: [id number]  : 1990  Age: 27 year old  Sex: male    Week of: 17-17     Client Initial Individualized Goals for Treatment: \"Do not relapse.\"    See Initial Treatment suggestions for the client during the time between Diagnostic Assessment and completion of the Master Individualized Treatment Plan.    Treatment Goals:  Client will notify staff when needing assistance to develop or implement a coping plan to manage suicidal or self injurious urges.  Client will use coping plan for safety, as needed.    Goal 1: While in OT groups, Kamini will report on a positive after engagement in an activity of his choice, focusing on follow through and management of negative self talk, once each week.    Goal 2:  Kamini will take his medications at % adherence, reporting outcomes to the group weekly.      Goal 3:  Kamini will attend treatment for 3 hours per day, five days per week, achieving 80+% attendance.      Kamini will have 0 instances of chemical use, reporting outcomes to the group daily.       Kamini will create a budget plan with his group home staff in order to help him have less money on hand for drugs by the target date.      Kamini will attend groups at [facility] at least 1-2/week.     Kamini will see his individual therapist weekly reporting follow through to the group.      Active Psychiatric Symptoms Impairing:   Thought, Mood, Behavior and Perception    Area of Treatment Focus:  Symptom Management, Personal Safety, Community Resources/Discharge Planning and Abstinence/Relapse Prevention    Treatment Strategies:   Support, Feedback, Limit/Boundaries, Safety Assessments, Structured Activity, Problem Solving, Clarification, Education, Motivational Enhancement Therapy and Cognitive Behavioral Therapy    Patient Response:  Accepted Feedback, Gave Feedback, Listened, Focused on Goals, Attentive, " "Accepted Support and Alert    Dimension Risk Description and Outcome:    Dimension One: Acute Intoxication/Withdrawal Potential   Daily Note:  12/4/2017:  Kamini reports that he was able to maintain his sobriety over the weekend.  (ANDREW)   12/6/2017:  Kamini reports continued sobriety.  (ANDREW) 12/7/17: Kamini reported continued sobriety. (DE) 12/8/17: Kamini reported continued sobriety. (DE)    Dimension Two: Biomedical Conditions and Complications  Daily Note:  12/4/2017:  Kamini denies any physical pain or illness at this time.  (ANDREW) 12/7/17: Kamini reported no physical health concerns or changes. (DE) 12/8/17: Kmaini reported no changes to his physical health. (DE)    Dimension Three: Emotional/Behavioral / Cognitive Conditions & Complications  Daily Note:  12/4/2017:  Kamini states that he is \"feeling good\" after having a pleasant weekend.  He states that he is trying to \"be positive\" while distracting himself from his negative thoughts.  Kamini denies thoughts to self harm.  (ANDREW)  12/6/2017:  Kamini states that he is feeling \"good\" and that he is finding it helpful being in the program.  He reports that he got signed up as a member at [employer] and is looking forward to spending more time there in his free time.  He currently denies thoughts to self harm. (ANDREW) 12/7/17 (9:00-9:50AM): Kamini endorsed absence of suicidal ideation and self-injurious behavior urges. He described feeling \"good, motivated and pretty happy with life.\" he shared that he uses handouts he receives from program, and recently has specifically been challenging his negative thoughts. He noted that attending [employer] has also been a helped skill. He shared that he is his own \"cheerleader\" right now. (DE) 12/8/17 (9:00-9:50AM): Kamini endorsed absence of suicidal ideation and self-injurious behavior urges. He described feeling \"okay\" and glad that it is Friday. He noted that he is engaging in positive thinking and is " "focused on finding employment with help from his . He shared his plans for the weekend to spend time playing video games. (DE)    Dimension Four: Treatment Acceptance / Resistance  Daily Note:  12/4/2017:  (Interpersonal Relationships: 11-11:50am):  Kamini participated in the group on \"Tips for Setting Boundaries\".  He asked many questions related to the topic material and expressed concern that his past experiences with boundary setting has often resulted in conflict.  Description and Outcome: Client demonstrated understanding of session content by using personal examples to show that he was following along with the group content.  Client would benefit from additional opportunities to practice and implement content from this session as he expresses a desire to continue to work on his boundary setting.  (ANDREW)  12/6/2017:  Kamini is more actively engaged in the psychotherapy process.  (ANDREW) 12/7/17: Kamini declined need for time in group. (DE) 12/8/17: Kamini declined need for time in group. (DE)    Dimension Five: Continued Use/Relapse Prevention  Daily Note:  12/4/2017:  Kamini states that he still struggles with cravings to use chemicals on a daily basis.  (ANDREW)  12/6/2017:  Remains at high risk for relapse.  (ANDREW) 12/7/17: Kamini reported no urges to use at this time. He shared that attending [facility] and offering himself encouragement has been helpful. (DE); 12/6/[id number] - Relapse Prevention Group: Kamini denied any recent substance use. He reports having strong cravings over the weekend and wanting to go get some weed. He used distraction effectively. Kamini had left group early last week so he did not get the assignment to complete a goodbye letter. He listened to peers share and reported being able to relate to a peer struggling with the pros and cons of cannabis use. Kamini reports he feels relief when he smokes cannabis and he find this helpful, however, over time it " leads to increased symptoms including hallucinations. Reinforced Kamini's self disclosure and peers validated his experience. Kamini demonstrated understanding of the purpose of the assignment and agreed to complete a goodbye letter. (DD) 12/8/17: Kamini reported no urges to use at this time. (DE)    Dimension Six: Recovery Environment  Daily Note:  12/4/2017:  Lives in a group home.  Has an intake for [facility] [place].  (ANDREW)  12/6/2017:  Kamini went to [facility] [place] yesterday and spent 3 hours there.  (ANDREW) 12/7/17: Kamini reported no changes. (DE) 12/8/17: Kamini shared he is working towards finding employment with help from his . (DE)      Weekly Progress / Treatment Plan Review      Are there additional progress notes for this week? No    Are Treatment Plan Goals being addressed? Yes, continue treatment goals    Are treatment plan strategies to address goals effective? Yes, continue treatment strategies    Are there any current contracts in place? No    Client: No changes at this time.      Client: N/A    Was client case reviewed with Dr Ferreira and/or Dr Park and the treatment team this week? yes    Staff: Selena Connors [facility](ANDREW); Radha Perdue MA, [facility], LAD (DE); [facility], [facility] (DD)

## 2017-12-01 NOTE — PROGRESS NOTES
"Adult Mental Health Outpatient Group Therapy Progress Note     Client Initial Individualized Goals for Treatment:  \"do not relapse\"      See Initial Treatment suggestions for the client during the time between Diagnostic Assessment and completion of the Master Individualized Treatment Plan.    Treatment Goals:     While in OT groups, Kamini will report on a positive after engagement in an activity of his choice, focusing on follow through and management of negative self talk, once each week.          Area of Treatment Focus:  Symptom Management    Therapeutic Interventions/Treatment Strategies:  Support, Feedback, Structured Activity, Problem Solving, Clarification, Education and Motivational Enhancement Therapy    Response to Treatment Strategies:  Accepted Feedback, Listened, Focused on Goals, Attentive and Accepted Support    Name of Group:  OT Clinic 11:00 - 11:50     Description and Outcome:  Kamini participated in group that focused on the use of a variety of structured activities to build daily living skills and practice effective coping skills. He noted to staff that he had practiced a skill learned in another group to help him manage negative thoughts and how beneficial it had been to him. He initiated his activity after spending time reading his copy of his treatment plan and clarifying goals with staff. He was more spontaneous with sharing comments and even using some humor. OT Assessment is completed. See it for further details.  Client demonstrated understanding of session content by noting the benefits of practicing skills and his engagement in the structured activities, focusing on trying to new activities.    Is this a Weekly Review of the Progress on the Treatment Plan?  Yes.      Are Treatment Plan Goals being addressed?  Yes, continue treatment goals      Are Treatment Plan Strategies to Address Goals Effective?  Yes, continue treatment strategies      Are there any current contracts in " place?  No

## 2017-12-04 ENCOUNTER — HOSPITAL ENCOUNTER (OUTPATIENT)
Dept: BEHAVIORAL HEALTH | Facility: CLINIC | Age: 27
End: 2017-12-04
Attending: PSYCHIATRY & NEUROLOGY
Payer: COMMERCIAL

## 2017-12-04 PROCEDURE — H2012 BEHAV HLTH DAY TREAT, PER HR: HCPCS

## 2017-12-04 NOTE — PROGRESS NOTES
"Adult Mental Health Outpatient Group Therapy Progress Note   Client Initial Individualized Goals for Treatment:  \"do not relapse\"        See Initial Treatment suggestions for the client during the time between Diagnostic Assessment and completion of the Master Individualized Treatment Plan.     Treatment Goals:      While in OT groups, Kamini will report on a positive after engagement in an activity of his choice, focusing on follow through and management of negative self talk, once each week.     Area of Treatment Focus:  Symptom Management and Develop / Improve Independent Living Skills    Therapeutic Interventions/Treatment Strategies:  Support, Feedback, Problem Solving and Clarification    Response to Treatment Strategies:  Accepted Feedback, Gave Feedback, Listened, Focused on Goals, Attentive, Accepted Support and Alert    Name of Group:  Goal setting [id number]     Description and Outcome:  Client participated in a goal setting activity.  The client problem solved to create a plan for increasing positive thinking in the next week using skills he learned in group.  He made plans to join [employer] and continue to increase sober support.  Group members provided support.  Client demonstrated understanding of session content by planning goals for the next week.    Is this a Weekly Review of the Progress on the Treatment Plan?  No    "

## 2017-12-06 ENCOUNTER — HOSPITAL ENCOUNTER (OUTPATIENT)
Dept: BEHAVIORAL HEALTH | Facility: CLINIC | Age: 27
End: 2017-12-06
Attending: PSYCHIATRY & NEUROLOGY
Payer: COMMERCIAL

## 2017-12-06 PROCEDURE — H2012 BEHAV HLTH DAY TREAT, PER HR: HCPCS

## 2017-12-06 NOTE — PROGRESS NOTES
"Adult Mental Health   Mental Health Program    Progress Note     Group Time: 9:00-9:50    Client Initial Individualized Goals for Treatment:  \"do not relapse      See Initial Treatment suggestions for the client during the time between Diagnostic Assessment and completion of the Master Individualized Treatment Plan.    Treatment Goals:     Follow Safety Plan  Abstain from Substance Use   Ask for more information, support and/or assistance as needed.  Follow up with providers/community supports as needed:   Report increases or changes in symptoms to staff.  Report any personal safety concerns to staff.   Take medications as prescribed.  Report medication changes and/or side effects to staff.  Attend and participate in groups as scheduled or notify staff if unable to do so.  Report any use of substances to staff as this may impact your symptoms and/or                      personal safety.  Notify staff if you have any other issues that need to be addressed. This may include              any current abuse / neglect / exploitation or other vulnerability.  Follow recommendations of your treatment team and discuss concerns if not in            agreement.       Area of Treatment Focus:  Symptom Management, Develop / Improve Independent Living Skills and Physical Health     Therapeutic Interventions/Treatment Strategies:  Support, Feedback, Structured Activity and Education    Response to Treatment Strategies:  Accepted Feedback, Gave Feedback, Listened and Attentive      Name of Group:  Sleep     Description and Outcome:   Case Studies were presented to patients. These case studies described characters who struggled with sleep. Patients discussed signs and symptoms of sleep deprivation present within each case study. Patients made suggestions for improved sleep within each story.  A hand-out on Sleep Hygiene was given and patients were encouraged to adapt healthy changes to their routine .   Assessment  Appearance/ Mood: " Calm behavior, range in affect, stable mood throughout group. Affect was consistent with mood.  Appropriate dress, well-groomed and was cooperative within group.   Thought Process: Linear and logical, productive and goal directed. Contributed to group conversation.   Behavior: Cooperative, interacted within the group, listened and was respectful to others  Areas for growth: Patient would benefit from the application of skills to daily life and reporting to group after completion.      Treatment goal acknowledgement: Patient continues to work towards treatment plan goals and the skills learned today will benefit on recovery. Patient denies any sleeping problems.     Is this a Weekly Review of the Progress on the Treatment Plan?  No

## 2017-12-06 NOTE — PROGRESS NOTES
"[facility] Progress Note / Treatment Plan Review       Patient: Kamini Neal   MRN: [id number]  : 1990  Age: 27 year old  Sex: male    Week of: 17-12/15/17    Client Initial Individualized Goals for Treatment: \"Do not relapse\"    See Initial Treatment suggestions for the client during the time between Diagnostic Assessment and completion of the Master Individualized Treatment Plan.    Treatment Goals:  Client will notify staff when needing assistance to develop or implement a coping plan to manage suicidal or self injurious urges.  Client will use coping plan for safety, as needed.    Goal 1: While in OT groups, Kamini will report on a positive after engagement in an activity of his choice, focusing on follow through and management of negative self talk, once each week.    Goal 2:  Kamini will take his medications at % adherence, reporting outcomes to the group weekly.      Goal 3:  Kamini will attend treatment for 3 hours per day, five days per week, achieving 80+% attendance.      Kamini will have 0 instances of chemical use, reporting outcomes to the group daily.       Kamini will create a budget plan with his group home staff in order to help him have less money on hand for drugs by the target date.      Kamini will attend groups at [facility] at least 1-2/week.     Kamini will see his individual therapist weekly reporting follow through to the group.      Active Psychiatric Symptoms Impairing:   Thought, Mood, Behavior and Perception    Area of Treatment Focus:  Symptom Management, Personal Safety, Community Resources/Discharge Planning and Abstinence/Relapse Prevention    Treatment Strategies:   Support, Feedback, Limit/Boundaries, Safety Assessments, Structured Activity, Problem Solving, Clarification, Education, Motivational Enhancement Therapy and Cognitive Behavioral Therapy    Patient Response:  Accepted Feedback, Listened, Attentive, Disengaged and Alert    Dimension Risk " "Description and Outcome:    Dimension One: Acute Intoxication/Withdrawal Potential   Daily Note: 12/11/17: Kamini reported continued sobriety and no withdrawal symptoms. (DE)  12/12/2017:  Kamini states that his last use of chemicals was over a week ago.  (ANDREW) 12/13/2017:  Kamini reports continued sobriety.  (ANDREW)  12/15/2017:  Kamini reports continued sobriety.  (ANDREW)    Dimension Two: Biomedical Conditions and Complications  Daily Note: 12/11/17: Kamini reported no physical health concerns. (DE)  12/12/2017: No change.  (ANDREW) 12/15/2017:  Denies physical health concerns.  (ANDREW)    Dimension Three: Emotional/Behavioral / Cognitive Conditions & Complications  Daily Note: 12/11/17 (9:00-9:50AM): Kamini endorsed absence of suicidal ideation and self-injurious behavior urges. He described feeling \"pretty good and relaxed.\" He noted that he had a \"good\" weekend and his mind was in a \"good\" place. Facilitator inquired about what helps Kamini keep his mind in a \"good\" place and Kamini shared that he meditates daily and focuses on what he is grateful for. He noted that he tries to be positive. He did not speak to progess on budget goal.  (DE)  12/12/2017:  Kamini reports that he is \"feeling good and is happy with life\".  He denies any suicidal ideation at this time.  Kamini was encouraged to talk about his last use of chemicals as he did not mention it to the group the week prior.  He states that he \"felt good\" about his drinking (1 beer) as it made him \"happy in the moment\".  When asked how he felt afterwards, Kamini stated that he felt depressed.  He states that he has not had a drink since and that talking about it \"gives him negative thoughts\". (ANDREW)  12/13/2017:  Kamini states that he is \"feeling good\" and denies any psychotic symptoms or thoughts to self harm.  He reports that he is going to [facility] [place] after [facility] today for support.  Kamini states that he does not wish to take time in " "group.  (ANDREW) 12/15/2017:  Kamini reports that he is feeling \"ok\" and denies any thoughts of suicide or self harm.  Kamini reports that he went to [facility] yesterday and does not have any outstanding concerns.  (ANDREW)    Dimension Four: Treatment Acceptance / Resistance  Daily Note: 12/11/17: Kamini declined need for time in group. He was attentive. (DE)  12/12/2017:  Autobiography:  11-11:50am:  Kamini was quiet during his peer's autobiography.  He did not participate in the group discussion that followed.  (ANDREW) 12/13/17 Emotions Management (11:00-11:50AM): Writer reviewed results of activity scheduling assignment and taught on radical acceptance skill.  Kamini did not complete activity scheduling assignment and did not share why. He was instructed to complete assignment for next week's emotions management group. He did not participate in discussion on radical acceptance and when asked questions refused to answer. Writer is unable to assess understanding of concept. Kamini would benefit from additional opportunities to practice and implement content from this session. (DE)  12/15/2017:  Kamini did not participate in the psychotherapy group on this date.  (ANDREW)    Dimension Five: Continued Use/Relapse Prevention  Daily Note: 12/11/17: Kamini noted no urges to use. Risk remains moderate due to severity of mental health symptoms. (DE) 12/11/[id number] - Relapse Prevention Group: At group's request, writer provided education about marijuana. Content included information about medicinal use, brain development and addiction as well as the long and short-term effects of recreational use. Kamini participated in group and verbalized agreement with education provided. He did not ask any questions yet demonstrated interest in the topic. Kamini denied substance use over the past weekend although he again admitted to having some urges. He then added that he had the same situation the weekend before and " "that time he had a beer. Writer pointed out that Christin did not share this information with the group when asked. Writer emphasized the importance of letting staff know so we can process it further through a behavior chain. Writer said we could not address it in current session due to lack of time, however, writer passed information to team for follow up. (DD)  12/13/2017:   Remains at high risk for relapse.  (ANDREW)    Dimension Six: Recovery Environment  Daily Note: 12/11/17: Kamini reported that he attended [employer] 1x last week. He shared that he has an orientation meeting on December 20th. (DE)  12/12/2017:  Kamini is encouraged to find community support groups that meet in the evening as this is his \"toughest time\".  (ANDREW)  12/15/2017:  Kamini has been attending [employer] for additional support.  (ANDREW)      Weekly Progress / Treatment Plan Review      Are there additional progress notes for this week? Yes (see additional progress notes)    Are Treatment Plan Goals being addressed? Yes, continue treatment goals    Are treatment plan strategies to address goals effective? Yes, continue treatment strategies    Are there any current contracts in place? No    Client: No changes at this time.       Client: N/A    Was client case reviewed with Dr Ferreira and/or Dr Park and the treatment team this week? yes    Staff[facility]); Radah Perdue MA, [facility], [facility] (DE); Tami Reddy [facility], [facility] (DD)    "

## 2017-12-07 ENCOUNTER — HOSPITAL ENCOUNTER (OUTPATIENT)
Dept: BEHAVIORAL HEALTH | Facility: CLINIC | Age: 27
End: 2017-12-07
Attending: PSYCHIATRY & NEUROLOGY
Payer: COMMERCIAL

## 2017-12-07 PROCEDURE — H2012 BEHAV HLTH DAY TREAT, PER HR: HCPCS

## 2017-12-07 NOTE — PROGRESS NOTES
"Adult Mental Health Outpatient Group Therapy Progress Note     Client Initial Individualized Goals for Treatment:  \"do not relapse\"          See Initial Treatment suggestions for the client during the time between Diagnostic Assessment and completion of the Master Individualized Treatment Plan.      Treatment Goals:       While in  groups, Kamini will report on a positive after engagement in an activity of his choice, focusing on follow through and management of negative self talk, once each week.       Area of Treatment Focus:  Symptom Management and Develop / Improve Independent Living Skills    Therapeutic Interventions/Treatment Strategies:  Support, Feedback, Structured Activity, Problem Solving, Clarification and Education    Response to Treatment Strategies:  Accepted Feedback, Listened, Focused on Goals, Attentive, Accepted Support, Alert and Demonstrates Behavior Change    Name of Group:  Self-support skills     Description and Outcome:  Client participated in a structured group activity focused on developing coping skills to manage symptoms and stressors.  Client actively engaged in responding to structured questions related to issues of mental illness and chemical use, applying coping strategies to daily living and recovery.  Client talked about needing to set boundaries with his roommate and is learning to say no the requests for money and cigarettes.  Client reported he continues to have difficulty daily with memories of childhood trauma and wants to learn techniques to manage these thoughts.  Client demonstrated understanding of session content by engaging in structured activity and responding thoughtfully and with insight.      Is this a Weekly Review of the Progress on the Treatment Plan?  No   "

## 2017-12-07 NOTE — PROGRESS NOTES
"Adult Mental Health Outpatient Group Therapy Progress Note     Client Initial Individualized Goals for Treatment:  \"do not relapse\"          See Initial Treatment suggestions for the client during the time between Diagnostic Assessment and completion of the Master Individualized Treatment Plan.      Treatment Goals:       While in  groups, Kamini will report on a positive after engagement in an activity of his choice, focusing on follow through and management of negative self talk, once each week.     Area of Treatment Focus:  Symptom Management and Develop Socialization / Interpersonal Relationship Skills    Therapeutic Interventions/Treatment Strategies:  Support, Structured Activity, Problem Solving, Clarification and Education    Response to Treatment Strategies:  Accepted Feedback, Gave Feedback, Listened, Focused on Goals, Attentive, Accepted Support and Alert    Name of Group:  Emotions management [id number]     Description and Outcome:  Client participated in a structured activity on symptom monitoring.  Writer presented the benefits of mood tracking, the process of identifying thoughts and feelings, noticing environment s impact on mood, and changing mood through activity. Client created a plan to monitor depression and anxiety levels throughout the day.  Client verbalized understanding of session content by asking questions, problem solving, and providing feedback to others.     Is this a Weekly Review of the Progress on the Treatment Plan?  No    "

## 2017-12-11 ENCOUNTER — HOSPITAL ENCOUNTER (OUTPATIENT)
Dept: BEHAVIORAL HEALTH | Facility: CLINIC | Age: 27
End: 2017-12-11
Attending: PSYCHIATRY & NEUROLOGY
Payer: COMMERCIAL

## 2017-12-11 PROCEDURE — H2012 BEHAV HLTH DAY TREAT, PER HR: HCPCS

## 2017-12-11 NOTE — PROGRESS NOTES
Adult Mental Health Outpatient Group Therapy Progress Note           See Initial Treatment suggestions for the client during the time between Diagnostic Assessment and completion of the Master Individualized Treatment Plan.    Treatment Goals:    Kamini will see his individual therapist weekly reporting follow through to the group.   Kamini will attend groups at [facility] at least 1-2/week.   While in OT groups, Kamini will report on a positive after engagement in an activity of his choice, focusing on follow through and management of negative self talk, once each week.  Kamini will attend treatment for 3 hours per day, five days per week, achieving 80+% attendance.        Area of Treatment Focus:  Symptom Management, Community Resources/Discharge Planning and Abstinence/Relapse Prevention    Therapeutic Interventions/Treatment Strategies:  Support, Feedback, Structured Activity, Problem Solving, Clarification, Education and Motivational Enhancement Therapy    Response to Treatment Strategies:  Accepted Feedback, Listened, Focused on Goals, Accepted Support and Alert    Name of Group:  Goal Setting 10:00 - 10:50     Description and Outcome:  Kamini participated in group that focused on learning about benefits and practicing setting realistic goals for treatment and mental and chemical health management, along with self reflection on accomplishments and practicing problem solving obstacles. He noted that he was able to follow through with some of his goals, focusing on practicing to focus on positives for himself. He initially noted uncertainty re: new goals and with assistance noted that similar goals for this week would be helpful for him to continue to work on to establish practicing skills learned and continuing to engage in positive activities as being helpful for him to work on routine and consistency. He did note plans to continue to work on job opportunities with his  and to talk with  his therapist re: urges to use chemicals and how to manage these Client verbalized understanding of session content by report on follow through with weekly goals set and benefits for himself to continue to work on practicing skills and engagement in positive activities for his well being.     Is this a Weekly Review of the Progress on the Treatment Plan?  No

## 2017-12-12 ENCOUNTER — HOSPITAL ENCOUNTER (OUTPATIENT)
Dept: BEHAVIORAL HEALTH | Facility: CLINIC | Age: 27
End: 2017-12-12
Attending: PSYCHIATRY & NEUROLOGY
Payer: COMMERCIAL

## 2017-12-12 PROCEDURE — H2012 BEHAV HLTH DAY TREAT, PER HR: HCPCS

## 2017-12-12 PROCEDURE — 97150 GROUP THERAPEUTIC PROCEDURES: CPT | Mod: GO

## 2017-12-12 NOTE — PROGRESS NOTES
Adult Mental Health Outpatient Group Therapy Progress Note         See Initial Treatment suggestions for the client during the time between Diagnostic Assessment and completion of the Master Individualized Treatment Plan.    Treatment Goals:     Kamini will attend treatment for 3 hours per day, five days per week, achieving 80+% attendance.   While in OT groups, Kamini will report on a positive after engagement in an activity of his choice, focusing on follow through and management of negative self talk, once each week.          Area of Treatment Focus:  Symptom Management, Community Resources/Discharge Planning, Abstinence/Relapse Prevention        Therapeutic Interventions/Treatment Strategies:  Support, Feedback, Structured Activity, Problem Solving, Clarification, Education, Motivational Enhancement Therapy and orientation    Response to Treatment Strategies:  Accepted Feedback, Listened, Accepted Support and Alert    Name of Group:  OT Clinic 9:00 - 9:50     Description and Outcome:  Jesus Manuel participated in group that focused on the use of a variety of structured activities to build daily living skills and practice effective coping skills. He initiated his structured creative distraction activity and completed it. He did initiate showing the completed results to staff and that he had added some designs to it, also. He did socialize with a few peers when asked direct questions, but tended to sit quietly when completed with work and chose to not begin another activity at this time. Client demonstrated understanding of session content by engagement in his activity and completion.       Is this a Weekly Review of the Progress on the Treatment Plan?  No

## 2017-12-13 ENCOUNTER — HOSPITAL ENCOUNTER (OUTPATIENT)
Dept: BEHAVIORAL HEALTH | Facility: CLINIC | Age: 27
End: 2017-12-13
Attending: PSYCHIATRY & NEUROLOGY
Payer: COMMERCIAL

## 2017-12-13 PROCEDURE — H2012 BEHAV HLTH DAY TREAT, PER HR: HCPCS

## 2017-12-13 NOTE — PROGRESS NOTES
"Adult Mental Health   Mental Health Outpatient Program Progress Note     Group Time: 10:00-10:50    Client Initial Individualized Goals for Treatment:  \"do not relapse      See Initial Treatment suggestions for the client during the time between Diagnostic Assessment and completion of the Master Individualized Treatment Plan.    Treatment Goals:     Follow Safety Plan  Abstain from Substance Use   Ask for more information, support and/or assistance as needed.  Follow up with providers/community supports as needed:   Report increases or changes in symptoms to staff.  Report any personal safety concerns to staff.   Take medications as prescribed.  Report medication changes and/or side effects to staff.  Attend and participate in groups as scheduled or notify staff if unable to do so.  Report any use of substances to staff as this may impact your symptoms and/or                      personal safety.  Notify staff if you have any other issues that need to be addressed. This may include              any current abuse / neglect / exploitation or other vulnerability.  Follow recommendations of your treatment team and discuss concerns if not in            agreement.       Area of Treatment Focus:  Symptom Management, Personal Safety, Develop / Improve Independent Living Skills and Physical Health     Therapeutic Interventions/Treatment Strategies:  Support, Feedback, Problem Solving, Education and Cognitive Behavioral Therapy    Response to Treatment Strategies:  Accepted Feedback, Gave Feedback, Listened, Attentive and Alert      Name of Group: Stress Managment    Description and Outcome:  Patient filled out worksheet that asked for their thoughts, feelings and behaviors related to stress. We looked at the relationship between them.  We then discussed the importance of stress and why we are created to experience stress. We recognize why stress has become a problem and talked about ways to build a firm foundation so that we " may take on stress that comes our way. Patients are encouraged to write down things they can do for their mental & physical routines that will build the foundation for  stress. We then discussed the role of our thoughts and how they affect our behavioral outcomes.  We discussed strategies to reduce stress such as exercise, creative hobbies and sleep.   Assessment  Appearance/ Mood: Calm behavior, range in affect, stable mood throughout group. Affect was consistent with mood.  Appropriate dress, well-groomed and was cooperative within group.   Thought Process: Linear and logical, productive and goal directed. Contributed to group conversation.   Behavior: Cooperative, interacted within the group, listened and was respectful to others    Understanding of the lesson:  Patient states he uses reflection to decrease stress and manage MH symptoms.         Is this a Weekly Review of the Progress on the Treatment Plan?  No

## 2017-12-14 ENCOUNTER — TELEPHONE (OUTPATIENT)
Dept: BEHAVIORAL HEALTH | Facility: CLINIC | Age: 27
End: 2017-12-14

## 2017-12-14 NOTE — TELEPHONE ENCOUNTER
ANANYA Suárez did not attend treatment on this date and has not called to report his absence.  I.  Writer called Kamini who states that he was too tired to come to group today.  He reports that he will be back to the program tomorrow.  A.  Unable to assess.  P.  Monitor attendance.  Selena Connors, [facility]

## 2017-12-15 ENCOUNTER — HOSPITAL ENCOUNTER (OUTPATIENT)
Dept: BEHAVIORAL HEALTH | Facility: CLINIC | Age: 27
End: 2017-12-15
Attending: PSYCHIATRY & NEUROLOGY
Payer: COMMERCIAL

## 2017-12-15 PROCEDURE — H2012 BEHAV HLTH DAY TREAT, PER HR: HCPCS

## 2017-12-15 PROCEDURE — 97150 GROUP THERAPEUTIC PROCEDURES: CPT | Mod: GO

## 2017-12-15 NOTE — PROGRESS NOTES
Adult Mental Health Outpatient Group Therapy Progress Note           See Initial Treatment suggestions for the client during the time between Diagnostic Assessment and completion of the Master Individualized Treatment Plan.    Treatment Goals:     While in OT groups, Kamini will report on a positive after engagement in an activity of his choice, focusing on follow through and management of negative self talk, once each week.          Area of Treatment Focus:  Symptom Management, Abstinence/Relapse Prevention    Therapeutic Interventions/Treatment Strategies:  Support, Feedback, Structured Activity, Problem Solving, Clarification, Education and Motivational Enhancement Therapy and sensory modulation techniques    Response to Treatment Strategies:  Accepted Feedback, Listened, Focused on Goals, Attentive and Accepted Support    Name of Group:  MICD Education 10:00 - 10:50 and OT Clinic 11:00 - 11:50     Description and Outcome:  MICD Education: Kamini participated in group that focused on learning about the process and benefits of the evidenced based practice of sensory enhanced yoga, along with practicing a few of the techniques. He listened to the discussion, nodding his head in response to questions and information re: the benefits of managing anxiety, intrusive thoughts and increasing sleep and energy. He was active in trying many of the techniques and noted feeling more relaxed at the end of the session. OT Clinic: Kamini participated in group that focused on the use of a variety of structured activities to build daily living skills and practice effective coping skills. He did being to engage in some weekend planning, asking others for ideas of things to do. He then was more active and social in discussing various movies and sports events that were occurring. He also shared various sports stats that he knows. He then asked staff to assist in choosing new structured activity to engage in and then chose a  simple, familiar creative activity. He engaged in more fine detail work and shared his progress with others at the end of the session, noting positives re: his work. Client demonstrated understanding of sessions content by noting the benefits of practicing skills and his engagement in the structured activities. He does appear to struggle at times with trying new activities.    Is this a Weekly Review of the Progress on the Treatment Plan?  Yes.      Are Treatment Plan Goals being addressed?  Yes, continue treatment goals      Are Treatment Plan Strategies to Address Goals Effective?  Yes, continue treatment strategies      Are there any current contracts in place?  No

## 2017-12-15 NOTE — PROGRESS NOTES
"[facility] Progress Note / Treatment Plan Review       Patient: Kamini Neal   MRN: [id number]  : 1990  Age: 27 year old  Sex: male    Week of: 17-17    Client Initial Individualized Goals for Treatment: \"do not relapse\"    See Initial Treatment suggestions for the client during the time between Diagnostic Assessment and completion of the Master Individualized Treatment Plan.    Treatment Goals:  Treatment Goals:  Client will notify staff when needing assistance to develop or implement a coping plan to manage suicidal or self injurious urges.  Client will use coping plan for safety, as needed.    Goal 1: While in OT groups, Kamini will report on a positive after engagement in an activity of his choice, focusing on follow through and management of negative self talk, once each week.    Goal 2:  Kamini will take his medications at % adherence, reporting outcomes to the group weekly.      Goal 3:  Kamini will attend treatment for 3 hours per day, five days per week, achieving 80+% attendance.      Kamini will have 0 instances of chemical use, reporting outcomes to the group daily.       Kamini will create a budget plan with his group home staff in order to help him have less money on hand for drugs by the target date.      Kamini will attend groups at [facility] at least 1-2/week.     Kamini will see his individual therapist weekly reporting follow through to the group.    Active Psychiatric Symptoms Impairing:   Thought, Mood, Behavior and Perception    Area of Treatment Focus:  Symptom Management, Personal Safety, Community Resources/Discharge Planning and Abstinence/Relapse Prevention    Treatment Strategies:   Support, Feedback, Limit/Boundaries, Safety Assessments, Structured Activity, Problem Solving, Clarification, Education and Motivational Enhancement Therapy    Patient Response:  Accepted Feedback, Gave Feedback, Listened, Focused on Goals, Attentive, Accepted Support " "and Alert    Dimension Risk Description and Outcome:    Dimension One: Acute Intoxication/Withdrawal Potential   Daily Note:  12/18/2017:  Kamini denies any current chemical use or withdrawal symptoms.  (ANDREW)  12/19/2017:  Denies recent chemical use.  (ANDREW)  12/20/2017:  Kamini reports continued sobriety.  (ANDREW) 12/21/2017:  Kamini reports continued sobriety.  (ANDREW)  12/22/2017:  Sobriety maintained.  (ANDREW)    Dimension Two: Biomedical Conditions and Complications  Daily Note:  12/18/2017:  Denies physical health concerns at this time.  (ANDREW)  12/20/2017: No change.  (ANDREW)  12/21/2017:  Kamini states that he is feeling \"rested\".  He denies any physical symptoms at this time.  (ANDREW)  12/22/2017: No changes.  (ANDREW)    Dimension Three: Emotional/Behavioral / Cognitive Conditions & Complications  Daily Note:  12/18/2017:  Kamini states that he is working on \"mindfulness\" and denies any bothersome psychiatric symptoms.  Kamini denies thoughts to self harm and states that he does not need to take time in group today.  (ANDREW)  12/19/2017: Kamini states that he is currently feeling \"positive and well rested\".  He denies thoughts to self harm and does not wish to take time in group today.  (ANDREW)  12/20/2017:  Kamini states that he is \"feeling good\" and is getting ready to \"move on to the next chapter of my life\".  He suggests that he is getting eager to complete the program although he has two more weeks to go.  Kamini currently denies thoughts to self harm.  (ANDREW)  12/21/2017:  Kamini states that he is \"having a good day\" which he attributes to \"getting lots of sleep\".  He reports that he is feeling \"happy with his life\" and is looking forward to completing the program.  He currently denies thoughts to self harm.  (ANDREW) 12/22/2017:  Kamini reports that he is \"having a good day\".  He states that he does not have any concerns about the weekend as he plans to \"sit at home and play video games\".  Kamini " "denies thoughts to self harm.  (ANDREW)    Dimension Four: Treatment Acceptance / Resistance  Daily Note:  12/18/2017:  Interpersonal Relationships:  11-11:50am:  Kamini did not participate in the group on \"Assertiveness Vs. Other Forms of Communication\".  He did not participate in the group discussion even when encouraged to do so.  Kamini could benefit from continued teaching of assertiveness skills.  (ANDREW)  12/19/2017:  Autobiography:  11-11:50am:  Kamini listened attentively to his peer's autobiography.  He did not offer any feedback in the discussion that followed.  (ANDREW) 12/21/17 Emotions Management (9:00-9:50AM): Writer taught and facilitated discussion on values and how living out values impacts emotion regulation. Kamini did not participate in discussion on importance of values. He did share he values time and using his time wisely. Due to limited participation it is unclear if Kamini understood concepts of teaching. Kamini would benefit from additional opportunities to practice and implement content from this session.   (DE)    Dimension Five: Continued Use/Relapse Prevention  Daily Note:  12/18/2017:  Kamini reports ambivalence regarding long term sobriety.  (ANDREW)  12/20/2017: Pt attended Relapse Prevention Group.  Pt reports last use as 11/28/17 and reports no urges to use. He reports he is very motivated to not use. We also discussed the topic of \"Stages of Change\" and client observed the discussion (TAYE).   12/21/2017:  Kamini currently denies cravings to use chemicals.  (ANDREW)    Dimension Six: Recovery Environment  Daily Note:  12/18/2017:  No changes at this time.  (ANDREW)  12/19/2017:  Encouraged to attend groups at [facility] this week.  (ANDREW)  12/20/2017:  Kamini states that has not attended any support groups in the community so far this week.  (ANDREW)  12/22/2017:  Staff continue to encourage Kamini to attend groups at [facility].  (ANDREW)      Weekly Progress / Treatment Plan Review "      Are there additional progress notes for this week? No    Are Treatment Plan Goals being addressed? Yes, continue treatment goals    Are treatment plan strategies to address goals effective? Yes, continue treatment strategies    Are there any current contracts in place? No    Client: No changes at this time.      Client: N/A    Was client case reviewed with Dr Ferreira and/or Dr Park and the treatment team this week? yes    Staff: Selena Connors [facility](ANDREW) ; Eber Edward MA [facility] (TAYE); Radha Perdue MA, [facility], [facility] (DE)

## 2017-12-18 ENCOUNTER — HOSPITAL ENCOUNTER (OUTPATIENT)
Dept: BEHAVIORAL HEALTH | Facility: CLINIC | Age: 27
End: 2017-12-18
Attending: PSYCHIATRY & NEUROLOGY
Payer: COMMERCIAL

## 2017-12-18 PROCEDURE — H2012 BEHAV HLTH DAY TREAT, PER HR: HCPCS

## 2017-12-18 NOTE — PROGRESS NOTES
Adult Mental Health Outpatient Group Therapy Progress Note           See Initial Treatment suggestions for the client during the time between Diagnostic Assessment and completion of the Master Individualized Treatment Plan.    Treatment Goals:    Kamini will see his individual therapist weekly reporting follow through to the group.   Kamini will attend groups at [facility] at least 1-2/week.   While in OT groups, Kamini will report on a positive after engagement in an activity of his choice, focusing on follow through and management of negative self talk, once each week.  Kamini will attend treatment for 3 hours per day, five days per week, achieving 80+% attendance.        Area of Treatment Focus:  Symptom Management, Community Resources/Discharge Planning and Abstinence/Relapse Prevention    Therapeutic Interventions/Treatment Strategies:  Support, Feedback, Structured Activity, Problem Solving, Clarification, Education and Motivational Enhancement Therapy    Response to Treatment Strategies:  Accepted Feedback, Listened, Focused on Goals, Accepted Support and Alert    Name of Group:  Goal Setting 9:00 - 9:50     Description and Outcome:  Kamini participated in group that focused on learning about benefits and practicing setting realistic goals for treatment and mental and chemical health management, along with self reflection on accomplishments and practicing problem solving obstacles. He noted that he wanted to keep his goals the same for this week to work on more consistent practice for himself. He did report that he had been working to follow through with activities, staying sober, and focusing on positive thinking. Client demonstrated understanding of session content by report on follow through with weekly goals set and benefits for himself to continue to work on practicing skills and engagement in positive activities for his well being.     Is this a Weekly Review of the Progress on the Treatment  Plan?  No

## 2017-12-19 ENCOUNTER — HOSPITAL ENCOUNTER (OUTPATIENT)
Dept: BEHAVIORAL HEALTH | Facility: CLINIC | Age: 27
End: 2017-12-19
Attending: PSYCHIATRY & NEUROLOGY
Payer: COMMERCIAL

## 2017-12-19 PROCEDURE — H2012 BEHAV HLTH DAY TREAT, PER HR: HCPCS

## 2017-12-19 PROCEDURE — 97150 GROUP THERAPEUTIC PROCEDURES: CPT | Mod: GO

## 2017-12-19 NOTE — PROGRESS NOTES
Adult Mental Health Outpatient Group Therapy Progress Note         See Initial Treatment suggestions for the client during the time between Diagnostic Assessment and completion of the Master Individualized Treatment Plan.    Treatment Goals:     Kamini will attend treatment for 3 hours per day, five days per week, achieving 80+% attendance.   While in OT groups, Kamini will report on a positive after engagement in an activity of his choice, focusing on follow through and management of negative self talk, once each week.          Area of Treatment Focus:  Symptom Management,  Abstinence/Relapse Prevention        Therapeutic Interventions/Treatment Strategies:  Support, Feedback, Structured Activity, Problem Solving, Clarification, Education, Motivational Enhancement Therapy and orientation    Response to Treatment Strategies:  Accepted Feedback, Listened, Accepted Support and Alert    Name of Group:  OT Clinic 9:00 - 9:50     Description and Outcome:  Jesus Manuel participated in group that focused on the use of a variety of structured activities to build daily living skills and practice effective coping skills. He initiated his structured creative distraction activity and completed it. He sat until staff was near to report that he had completed it and staff to initiate another activity. He chose to continue to engage in the same, but did take initiative to try to get the materials for himself. He was slightly more quiet this session with peers and staff. He did note his progress with his activity and that it helps him relax, be positive and provide attention to something to manage when intrusive thoughts occur. He did also report that he had thought the sensory enhanced yoga that he had practiced last week was beneficial and took information in order to try to practice some of the poses on his own. Client demonstrated understanding of session content by engagement in his activity and completion. He does appear to  have some difficulty with risk taking and trying new activities.      Is this a Weekly Review of the Progress on the Treatment Plan?  No

## 2017-12-20 ENCOUNTER — HOSPITAL ENCOUNTER (OUTPATIENT)
Dept: BEHAVIORAL HEALTH | Facility: CLINIC | Age: 27
End: 2017-12-20
Attending: PSYCHIATRY & NEUROLOGY
Payer: COMMERCIAL

## 2017-12-20 PROCEDURE — H2012 BEHAV HLTH DAY TREAT, PER HR: HCPCS

## 2017-12-20 NOTE — PROGRESS NOTES
"Adult Mental Health   Dual Diagnosis Program   Progress Note   Group: 10:00-10:50      Client Initial Individualized Goals for Treatment:  \"do not relapse      See Initial Treatment suggestions for the client during the time between Diagnostic Assessment and completion of the Master Individualized Treatment Plan.    Treatment Goals:     Follow Safety Plan  Abstain from Substance Use   Ask for more information, support and/or assistance as needed.  Follow up with providers/community supports as needed:   Report increases or changes in symptoms to staff.  Report any personal safety concerns to staff.   Take medications as prescribed.  Report medication changes and/or side effects to staff.  Attend and participate in groups as scheduled or notify staff if unable to do so.  Report any use of substances to staff as this may impact your symptoms and/or                      personal safety.  Notify staff if you have any other issues that need to be addressed. This may include              any current abuse / neglect / exploitation or other vulnerability.  Follow recommendations of your treatment team and discuss concerns if not in            agreement.       Area of Treatment Focus:  Symptom Management, Develop / Improve Independent Living Skills and Physical Health     Therapeutic Interventions/Treatment Strategies:  Support, Feedback, Problem Solving and Education    Response to Treatment Strategies:  Accepted Feedback, Listened, Attentive and Alert    Name of Group:  Drugs on the Brain     Description and Outcome:  Powerpoint was displayed educating patients about the effects of drugs on our brains. Discussion on why people use drugs, how they interfere with our dopamine circuits and what treatment is helpful. Information on HIV and smoking was also relayed to patients and a handout was given out.   Assessment  Mood: Calm behavior, range in affect, stable mood throughout group  Thought Process: Linear and logical, " productive and goal directed  Behavior: Cooperative, interacted within the group, listened and was respectful of others  Verbalized understanding and answered questions    Is this a Weekly Review of the Progress on the Treatment Plan?  No

## 2017-12-21 ENCOUNTER — HOSPITAL ENCOUNTER (OUTPATIENT)
Dept: BEHAVIORAL HEALTH | Facility: CLINIC | Age: 27
End: 2017-12-21
Attending: PSYCHIATRY & NEUROLOGY
Payer: COMMERCIAL

## 2017-12-21 PROCEDURE — H2012 BEHAV HLTH DAY TREAT, PER HR: HCPCS

## 2017-12-21 NOTE — PROGRESS NOTES
Adult Mental Health Outpatient Group Therapy Progress Note         See Initial Treatment suggestions for the client during the time between Diagnostic Assessment and completion of the Master Individualized Treatment Plan.    Treatment Goals:     While in OT groups, Kamini will report on a positive after engagement in an activity of his choice, focusing on follow through and management of negative self talk, once each week.          Area of Treatment Focus:  Symptom Management and Abstinence/Relapse Prevention    Therapeutic Interventions/Treatment Strategies:  Support, Feedback, Structured Activity, Problem Solving, Clarification, Education and Motivational Enhancement Therapy    Response to Treatment Strategies:  Accepted Feedback, Listened, Focused on Goals, Accepted Support and Alert    Name of Group:  Self Support Skills 11:00 - 11:50     Description and Outcome:  Kamini  participated in group that focused on learning about the functioning of the prefrontal cortex of the brain and non-medication techniques for improving concentration and attention. He added to the discussion initially and then was active in listening, again adding when asked a direct question, later in the session. He did note that he does like to do things that keeps his brain active at times, along with practicing some meditation techniques. Client demonstrated understanding of session content by sharing and noting skill that help him manage.    Is this a Weekly Review of the Progress on the Treatment Plan?  No

## 2017-12-22 ENCOUNTER — HOSPITAL ENCOUNTER (OUTPATIENT)
Dept: BEHAVIORAL HEALTH | Facility: CLINIC | Age: 27
End: 2017-12-22
Attending: PSYCHIATRY & NEUROLOGY
Payer: COMMERCIAL

## 2017-12-22 PROCEDURE — H2012 BEHAV HLTH DAY TREAT, PER HR: HCPCS

## 2017-12-22 PROCEDURE — 97150 GROUP THERAPEUTIC PROCEDURES: CPT | Mod: GO

## 2017-12-22 NOTE — PROGRESS NOTES
Adult Mental Health Outpatient Group Therapy Progress Note           See Initial Treatment suggestions for the client during the time between Diagnostic Assessment and completion of the Master Individualized Treatment Plan.    Treatment Goals:     While in OT groups, Kamini will report on a positive after engagement in an activity of his choice, focusing on follow through and management of negative self talk, once each week.          Area of Treatment Focus:  Symptom Management, Abstinence/Relapse Prevention    Therapeutic Interventions/Treatment Strategies:  Support, Feedback, Structured Activity, Problem Solving, Clarification, Education and Motivational Enhancement Therapy  Response to Treatment Strategies:  Accepted Feedback, Listened, Focused on Goals, Attentive and Accepted Support    Name of Group: OT Clinic 11:00 - 11:50     Description and Outcome:   Kamini participated in group that focused on the use of a variety of structured activities to build daily living skills and practice effective coping skills. He chose not to engage in the structured weekend planning exercise as requested. He did note a few activities and tasks that he would engage in for the long weekend, but was limited. He completed his familiar activity and asked staff re: what to engage in next. He did note that he might like to engage in an unfamiliar and more long term project that he could keep. He initially began to look and then chose to do the first project that staff showed him. He then was able to make simple choices for himself. He did talk to staff about possibility of transitioning to Day Treatment when he had completed DDP as he will not work with [employer] until February. Client demonstrated understanding of sessions content by noting the benefits of practicing skills and his engagement in the structured activities. Able to engage in risk taking with task selection this date with encouragement and support.    Is this  a Weekly Review of the Progress on the Treatment Plan?  Yes.      Are Treatment Plan Goals being addressed?  Yes, continue treatment goals      Are Treatment Plan Strategies to Address Goals Effective?  Yes, continue treatment strategies      Are there any current contracts in place?  No

## 2017-12-22 NOTE — PROGRESS NOTES
"[facility] Progress Note / Treatment Plan Review       Patient: Kamini Neal   MRN: [id number]  : 1990  Age: 27 year old  Sex: male    Week of: 17-17    Client Initial Individualized Goals for Treatment: \"do not relapse\"    See Initial Treatment suggestions for the client during the time between Diagnostic Assessment and completion of the Master Individualized Treatment Plan.    Treatment Goals:  Client will notify staff when needing assistance to develop or implement a coping plan to manage suicidal or self injurious urges.  Client will use coping plan for safety, as needed.    Goal 1: While in OT groups, Kamini will report on a positive after engagement in an activity of his choice, focusing on follow through and management of negative self talk, once each week.    Goal 2:  Kamini will take his medications at % adherence, reporting outcomes to the group weekly.      Goal 3:  Kamini will attend treatment for 3 hours per day, five days per week, achieving 80+% attendance.      Kamini will have 0 instances of chemical use, reporting outcomes to the group daily.       Kamini will create a budget plan with his group home staff in order to help him have less money on hand for drugs by the target date.      Kamini will attend groups at [facility] [place] at least 1-2/week.     Kamini will see his individual therapist weekly reporting follow through to the group.    Active Psychiatric Symptoms Impairing:   Thought, Mood, Behavior and Perception    Area of Treatment Focus:  Symptom Management, Personal Safety, Community Resources/Discharge Planning and Abstinence/Relapse Prevention    Treatment Strategies:   Support, Feedback, Limit/Boundaries, Safety Assessments, Structured Activity, Problem Solving, Clarification, Education, Motivational Enhancement Therapy and Cognitive Behavioral Therapy    Patient Response:  Accepted Feedback, Gave Feedback, Listened, Focused on Goals, Attentive, " "Accepted Support and Alert    Dimension Risk Description and Outcome:    Dimension One: Acute Intoxication/Withdrawal Potential   Daily Note:  12/27/2017:  Kamini denies recent use of chemicals.  (ANDREW) 12/29/2017:  Kamini reports continued sobriety.  (ANDREW)    Dimension Two: Biomedical Conditions and Complications  Daily Note:  12/27/2017: No acute or chronic concerns reported.  (ANDREW)  12/29/2017:  Kamini states that he missed group yesterday due to being sick. (ANDREW)    Dimension Three: Emotional/Behavioral / Cognitive Conditions & Complications  Daily Note:  12/27/2017:  Kamini reports that he is \"feeling ok\" today as he had a relaxing holiday weekend.  Kamini states that he plans to see his therapist later on today and does not feel that he needs to take time in group. He currently denies thoughts to self harm.  (ANDREW)  12/29/2017: Kamini reports that he is feeling \"good and well rested\" today.  He states that he is getting ready to complete the program so that he can go back to work.  He denies thoughts to self harm at this time.  (ANDREW)    Dimension Four: Treatment Acceptance / Resistance  Daily Note:  12/27/2017:  Sat quietly in psychotherapy.  Did not offer feedback to others.  (ANDREW)  12/29/2017:  Minimally participated in psychotherapy.  (ANDREW) 12/29/17 MICD Education:  Client was engaged and participated in mindfulness experiential.  Accepted feedback about appearing brighter when actively moving. ()    Dimension Five: Continued Use/Relapse Prevention  Daily Note:  12/27/2017: Kamini states that he is at higher risk for relapse when he has money. (ANDREW) 12/27/[id number] - Relapse Prevention Group: Kamini denies recent substance use, close calls or cravings. He emphasizes that he will never use cannabis again because he knows it negatively impacts his mental health resulting in increased hallucinations. However, he is not as convinced about his need to abstain from alcohol. Kamini was " present during a peer behavior chain, however, he initially sat while resting his head on the table. He later put his head up, but did not offer any feedback. Kamini was taken out of group by staff and was not there to fully assess his understanding of session content. (HIRAM)  12/29/2017:  Kamini remains at high risk for relapse.  (ANDREW)    Dimension Six: Recovery Environment  Daily Note:  12/27/2017:  No change.  Lives in a group home which Kamini describes as supportive.  (ANDREW)  12/29/2017:  Encouraged to attend groups at [facility].  (ANDREW)      Weekly Progress / Treatment Plan Review      Are there additional progress notes for this week? No    Are Treatment Plan Goals being addressed? Yes, continue treatment goals    Are treatment plan strategies to address goals effective? Yes, continue treatment strategies    Are there any current contracts in place? No    Client: Agrees with treatment plan changes     Client: Received copy of revised treatment plan    Was client case reviewed with Dr Ferreira and/or Dr Park and the treatment team this week? yes    Staff: SUZANNE Bell(ANDREW); SUZANNE Sigala, [facility] (DD), SUZANNE Hurd (SS)

## 2017-12-22 NOTE — PROGRESS NOTES
"Adult Mental Health   Partial Hospitalization Program Progress Note   Group Time: 10:00-10:50    Client Initial Individualized Goals for Treatment:  \"do not relapse      See Initial Treatment suggestions for the client during the time between Diagnostic Assessment and completion of the Master Individualized Treatment Plan.    Treatment Goals:     Follow Safety Plan  Abstain from Substance Use   Ask for more information, support and/or assistance as needed.  Follow up with providers/community supports as needed:   Report increases or changes in symptoms to staff.  Report any personal safety concerns to staff.   Take medications as prescribed.  Report medication changes and/or side effects to staff.  Attend and participate in groups as scheduled or notify staff if unable to do so.  Report any use of substances to staff as this may impact your symptoms and/or                      personal safety.  Notify staff if you have any other issues that need to be addressed. This may include              any current abuse / neglect / exploitation or other vulnerability.  Follow recommendations of your treatment team and discuss concerns if not in            agreement.       Area of Treatment Focus:  Symptom Management and Develop / Improve Independent Living Skills    Therapeutic Interventions/Treatment Strategies:  Support, Redirection, Feedback, Structured Activity and Education    Response to Treatment Strategies:  Accepted Feedback, Gave Feedback, Listened, Attentive and Alert    Name of Group:  Visual imagery    Description and Outcome:  Group is designed to explore mindfulness activities. This particular group looks at Visual  Imagery. Patients are given a task to draw and color a scene that brings them appeasement. Patients then complete a worksheet that walks though the 5 senses they see, taste, hear, touch and smell. Discussion was encouraged to discover more about mindfulness and strategies that remove symptoms of " anxiety and depression.  Assessment  Mood: Calm behavior, range in affect, stable mood throughout group  Thought Process: Linear and logical, productive and goal directed  Behavior: Cooperative, interacted within the group, listened and was respectful of others  Understanding: Patient cristina a setting of a summer night in his back yard. He stated that it was a calming scene for him. Stated the exercise was helpful.        Is this a Weekly Review of the Progress on the Treatment Plan?  No

## 2017-12-26 ENCOUNTER — TELEPHONE (OUTPATIENT)
Dept: BEHAVIORAL HEALTH | Facility: CLINIC | Age: 27
End: 2017-12-26

## 2017-12-26 NOTE — TELEPHONE ENCOUNTER
ANANYA Suárez did not attend treatment on this date and has not called to report his absence.  I.  Writer called Kamini who states that he was unable to get transportation to the program today but plans to return to the program by tomorrow.  A. Unable to assess.  P.  Monitor attendance. Selena Connosr, [facility]

## 2017-12-27 ENCOUNTER — HOSPITAL ENCOUNTER (OUTPATIENT)
Dept: BEHAVIORAL HEALTH | Facility: CLINIC | Age: 27
End: 2017-12-27
Attending: PSYCHIATRY & NEUROLOGY
Payer: COMMERCIAL

## 2017-12-27 PROCEDURE — H2012 BEHAV HLTH DAY TREAT, PER HR: HCPCS

## 2017-12-27 NOTE — PROGRESS NOTES
"Adult Mental Health   Mental Health Program   Progress Note   Group Time: 10:00-10:50      Client Initial Individualized Goals for Treatment:  \"do not relapse      See Initial Treatment suggestions for the client during the time between Diagnostic Assessment and completion of the Master Individualized Treatment Plan.    Treatment Goals:     Follow Safety Plan  Abstain from Substance Use   Ask for more information, support and/or assistance as needed.  Follow up with providers/community supports as needed:   Report increases or changes in symptoms to staff.  Report any personal safety concerns to staff.   Take medications as prescribed.  Report medication changes and/or side effects to staff.  Attend and participate in groups as scheduled or notify staff if unable to do so.  Report any use of substances to staff as this may impact your symptoms and/or                      personal safety.  Notify staff if you have any other issues that need to be addressed. This may include              any current abuse / neglect / exploitation or other vulnerability.  Follow recommendations of your treatment team and discuss concerns if not in            agreement.       Area of Treatment Focus:  Symptom Management, Develop / Improve Independent Living Skills, Develop Socialization / Interpersonal Relationship Skills and Physical Health     Therapeutic Interventions/Treatment Strategies:  Support, Redirection, Feedback, Structured Activity, Problem Solving, Education and Motivational Enhancement Therapy    Response to Treatment Strategies:  Accepted Feedback, Gave Feedback, Listened, Attentive and Alert    Name of Group:  Excercise    Description and Outcome:  Group discussed the importance of Exercise. The 3 different types of exercise were discussed, cardio-vascular, strength and flexibility.Group talked about the benefits to exercise and discussed was to incorporate exercise into their weekly routines. Group discussed mental " barriers to exercise, safety tips while exercising, motivation techniques to exercise and group worked through case study to problem solve ways to incorporate exercise into daily life. Mindfulness chair yoga was practiced for 15 minutes.   Assessment  Appearance/ Mood: Calm behavior, range in affect, stable mood throughout group. Affect was consistent with mood.  Appropriate dress, well-groomed and was cooperative within group.   Thought Process: Linear and logical, productive and goal directed. Contributed to group conversation.   Behavior: Cooperative, interacted within the group, listened and was respectful to others      Understanding of the lesson: verbalized understanding of the lesson and participated in activity      Is this a Weekly Review of the Progress on the Treatment Plan?  No

## 2017-12-27 NOTE — PROGRESS NOTES
Acknowledgement of Current Treatment Plan       I have reviewed my treatment plan with my therapist / counselor on 12/26/17. I agree with the plan as it is written in the electronic health record.    Name Signature   Kamini Neal    Name of Therapist / Counselor    Selena Connors, [facility]

## 2017-12-28 ENCOUNTER — TELEPHONE (OUTPATIENT)
Dept: BEHAVIORAL HEALTH | Facility: CLINIC | Age: 27
End: 2017-12-28

## 2017-12-28 NOTE — TELEPHONE ENCOUNTER
GIUSEPPETriston Suárez did not attend treatment on this date and has not called to report his absence. I.  Writer called Kamini and left a message requesting a return call to check in with staff.  A.  Unable to assess.  P.  Monitor Attendance.  Selena Connors, [facility]

## 2017-12-29 NOTE — PROGRESS NOTES
"TYRES[facility] Progress Note / Treatment Plan Review       Patient: Kamini Neal   MRN: [id number]  : 1990  Age: 27 year old  Sex: male    Week of: 18-18     Client Initial Individualized Goals for Treatment: \"do not relapse\"    See Initial Treatment suggestions for the client during the time between Diagnostic Assessment and completion of the Master Individualized Treatment Plan.    Treatment Goals:  Client will notify staff when needing assistance to develop or implement a coping plan to manage suicidal or self injurious urges.  Client will use coping plan for safety, as needed.    Goal 1: While in OT groups, Kamini will report on a positive after engagement in an activity of his choice, focusing on follow through and management of negative self talk, once each week.    Goal 2:  Kamini will take his medications at % adherence, reporting outcomes to the group weekly.      Goal 3:  Kamini will attend treatment for 3 hours per day, five days per week, achieving 80+% attendance.      Kamini will have 0 instances of chemical use, reporting outcomes to the group daily.       Kamini will create a budget plan with his group home staff in order to help him have less money on hand for drugs by the target date.      Kamini will attend groups at [facility] at least 1-2/week.     Kamini will see his individual therapist weekly reporting follow through to the group.    Active Psychiatric Symptoms Impairing:   Thought, Mood, Behavior and Perception    Area of Treatment Focus:  Symptom Management, Personal Safety, Community Resources/Discharge Planning and Abstinence/Relapse Prevention    Treatment Strategies:   Support, Feedback, Limit/Boundaries, Safety Assessments, Structured Activity, Problem Solving, Clarification, Education, Motivational Enhancement Therapy and Cognitive Behavioral Therapy    Patient Response:  Accepted Feedback, Listened, Focused on Goals, Attentive, Accepted Support and " "Alert    Dimension Risk Description and Outcome:    Dimension One: Acute Intoxication/Withdrawal Potential   Daily Note:  1/2/2018:  Denies recent use of chemicals.  (ANDREW)  1/4/2018:  Kamini reports continued sobriety.  (ANDREW) 1/5/18:  Kamini reports that he continues to maintain his sobriety. (ANDREW)    Dimension Two: Biomedical Conditions and Complications  Daily Note:  1/2/2018:  No new physical health concerns reported.  (ANDREW)  1/5/18:  No change.  (ANDREW)    Dimension Three: Emotional/Behavioral / Cognitive Conditions & Complications  Daily Note:  1/2/2018:  Kamini states he is feeling \"good\" and is proud of himself for approaching completion of the program.  Kamini admits that he is out of his Seroquel and has asked staff for the next steps that he needs to take in order to get his medications filled.  Writer gave Kamini the number for Dr. Campo and advised him to call Dr. Campo's office directly after programming.  Kamini denies thoughts to self harm.  (ANDREW)  1/4/2018:  Kamini states that he is feeling \"positive\" about his life and that his mood is elevated.  He was able to get his medications refilled from Dr. Campo's office yesterday and denies thoughts to self harm.  (ANDREW) 1/8/2018:  Kamini reports that he is feeling \"good and accomplished\" as today he is officially completing the program.  Kamini thanked his peers for all of their help and support and explained that he will now pursue going back to work at [employer].  Kamini denies any recent thoughts to self harm.  (ANDREW)    Dimension Four: Treatment Acceptance / Resistance  Daily Note:  1/2/2018: GOAL SETTING: (10-10:50am):  Kamini completed his Weekly Goal Setting worksheet and noted that he would like to change his aftercare plan to working for [employer] instead of [employer].  Kamini notes that he needs to have his prescriptions filled so that he does not experience a mental health relapse.  He continues to attend " programming at [facility] for extra support.  (ANDREW) 1/3/18 Emotions Management (11:00-11:50AM): Writer facilitated discussion and taught on purpose of emotions and myths about emotions. Kamini participated actively in discussion and appeared attentive. He asked questions about when it is important to reduce anger or express it. He shared information about how challenging myths about emotions is important for better understanding emotions. He can continue to benefit from continued teaching on topic. (DE) 1/5/18:  Kamini has successfully completed the program.  (ANDREW)    Dimension Five: Continued Use/Relapse Prevention  Daily Note:  1/2/2018:  Kamini states that he is at greater risk for relapse when he has money. (ANDREW)  1/4/2018:  Kamini states that he is not having any cravings to use chemicals today.  (ANDREW) 1/4/[id number] - Relapse Prevention Group: Kamini denies any recent substance use, close calls or cravings. He reports he feels ready for d/c from the program tomorrow. Kamini refused to elaborate and did not really participate in the discussion about coping skills. Kamini could benefit from further education regarding relapse prevention, however, he continues to be guarded in the group setting. (DD)    Dimension Six: Recovery Environment  Daily Note:  1/2/2018:  Lives in a group home.  Currently attends [facility] 1-2 per week.  (ANDREW)  1/4/2018: No changes.  (ANDREW)  1/5/18:  Kamini is encouraged to follow up with [facility], , and [employer].  (ANDREW)      Weekly Progress / Treatment Plan Review      Are there additional progress notes for this week? No    Are Treatment Plan Goals being addressed? Kamini has completed the Adult Dual Diagnosis Program and has completed his treatment plan goals.      Are treatment plan strategies to address goals effective? Client discharged    Are there any current contracts in place? No    Client: Agrees with discharge plan.       Client: Received copy of  Discharge Instruction Sheet.    Was client case reviewed with Dr Ferreira and/or Dr Park and the treatment team this week? yes    Staff: Selena Connors, [facility](ANDREW); Radha Perdue MA, [facility], [facility] (DE); Tami Reddy [facility], [facility] (DD)

## 2018-01-02 ENCOUNTER — HOSPITAL ENCOUNTER (OUTPATIENT)
Dept: BEHAVIORAL HEALTH | Facility: CLINIC | Age: 28
End: 2018-01-02
Attending: PSYCHIATRY & NEUROLOGY
Payer: COMMERCIAL

## 2018-01-02 PROCEDURE — 97150 GROUP THERAPEUTIC PROCEDURES: CPT | Mod: GO

## 2018-01-02 PROCEDURE — H2012 BEHAV HLTH DAY TREAT, PER HR: HCPCS

## 2018-01-02 NOTE — PROGRESS NOTES
Adult Mental Health Outpatient Group Therapy Progress Note         See Initial Treatment suggestions for the client during the time between Diagnostic Assessment and completion of the Master Individualized Treatment Plan.    Treatment Goals:     Kamini will attend treatment for 3 hours per day, five days per week, achieving 80+% attendance.   While in OT groups, Kamini will report on a positive after engagement in an activity of his choice, focusing on follow through and management of negative self talk, once each week.          Area of Treatment Focus:  Symptom Management,  Abstinence/Relapse Prevention        Therapeutic Interventions/Treatment Strategies:  Support, Feedback, Structured Activity, Problem Solving, Clarification, Education, Motivational Enhancement Therapy and orientation    Response to Treatment Strategies:  Accepted Feedback, Listened, Accepted Support and Alert    Name of Group:  OT Clinic 9:00 - 9:50     Description and Outcome:  Jesus Manuel participated in group that focused on the use of a variety of structured activities to build daily living skills and practice effective coping skills. He initiated his structured creative distraction activity, but sat for most of the session not engaged in it, but listening to music on his ear buds. He was quiet with only brief responses, noting that he was planing to complete the program this week. Client verbalized understanding content of session re: engagement in an activity, but struggles to do so. He appeared less engaged in the group process. Continue to assess.    Is this a Weekly Review of the Progress on the Treatment Plan?  No

## 2018-01-03 ENCOUNTER — HOSPITAL ENCOUNTER (OUTPATIENT)
Dept: BEHAVIORAL HEALTH | Facility: CLINIC | Age: 28
End: 2018-01-03
Attending: PSYCHIATRY & NEUROLOGY
Payer: COMMERCIAL

## 2018-01-03 PROCEDURE — H2012 BEHAV HLTH DAY TREAT, PER HR: HCPCS

## 2018-01-03 NOTE — PROGRESS NOTES
"Adult Mental Health   Mental Health Outpatient Program Progress Note   Group Time: 10:00-10:50    Client Initial Individualized Goals for Treatment:  \"do not relapse      See Initial Treatment suggestions for the client during the time between Diagnostic Assessment and completion of the Master Individualized Treatment Plan.    Treatment Goals:     Follow Safety Plan  Abstain from Substance Use   Ask for more information, support and/or assistance as needed.  Follow up with providers/community supports as needed:   Report increases or changes in symptoms to staff.  Report any personal safety concerns to staff.   Take medications as prescribed.  Report medication changes and/or side effects to staff.  Attend and participate in groups as scheduled or notify staff if unable to do so.  Report any use of substances to staff as this may impact your symptoms and/or                      personal safety.  Notify staff if you have any other issues that need to be addressed. This may include              any current abuse / neglect / exploitation or other vulnerability.  Follow recommendations of your treatment team and discuss concerns if not in            agreement.       Area of Treatment Focus:  Symptom Management, Community Resources/Discharge Planning, Develop / Improve Independent Living Skills and Physical Health     Therapeutic Interventions/Treatment Strategies:  Structured Activity, Problem Solving and Education    Response to Treatment Strategies:  Accepted Feedback, Gave Feedback, Listened, Attentive and Alert    Name of Group:  Medication Education    Description and Outcome:  The game BIScience was used to facilitate discussion about medication safety. Topics on antidepressants, medication safety, side effects, pharmacy visits and neuroleptics were discussed.    Assessment  Appearance/ Mood: Calm behavior, range in affect, stable mood throughout group. Affect was consistent with mood.  Appropriate dress, " well-groomed and was cooperative within group.   Thought Process: Linear and logical, productive and goal directed. Contributed to group conversation.   Behavior: Cooperative, interacted within the group, listened and was respectful to others      Understanding of the lesson: verbalized understanding and answered many questions      Is this a Weekly Review of the Progress on the Treatment Plan?  No

## 2018-01-04 ENCOUNTER — HOSPITAL ENCOUNTER (OUTPATIENT)
Dept: BEHAVIORAL HEALTH | Facility: CLINIC | Age: 28
End: 2018-01-04
Attending: PSYCHIATRY & NEUROLOGY
Payer: COMMERCIAL

## 2018-01-04 PROCEDURE — H2012 BEHAV HLTH DAY TREAT, PER HR: HCPCS

## 2018-01-04 NOTE — PROGRESS NOTES
Adult Mental Health Outpatient Group Therapy Progress Note         See Initial Treatment suggestions for the client during the time between Diagnostic Assessment and completion of the Master Individualized Treatment Plan.    Treatment Goals:     While in OT groups, Kamini will report on a positive after engagement in an activity of his choice, focusing on follow through and management of negative self talk, once each week.          Area of Treatment Focus:  Symptom Management and Abstinence/Relapse Prevention    Therapeutic Interventions/Treatment Strategies:  Support, Feedback, Structured Activity, Problem Solving, Clarification, Education and Motivational Enhancement Therapy    Response to Treatment Strategies:  Accepted Feedback, Listened, Focused on Goals, Accepted Support and Alert    Name of Group:  Self Support Skills 11:00 - 11:50     Description and Outcome:  Kamini  participated in group that focused on learning about time management techniques and ways to manage health and stressors more effectively. He noted that he does have too much time currently and tends to not engage in some activities that he might be doing. He noted that he does procrastinate at times, using humor appropriately as he shared this. He also reported that he tends to engage in the same activities and then be doing them for long periods of time. He was able to choose 3 techniques of those offered, noting to work on setting goals and rewarding himself when he follows through with things, practicing to focus on the benefits for himself by doing things, and scheduling some of his activities for more effective use of time for himself. Client demonstrated understanding of session content by sharing and noting techniques that may help him manage.    Is this a Weekly Review of the Progress on the Treatment Plan?  No

## 2018-01-05 ENCOUNTER — HOSPITAL ENCOUNTER (OUTPATIENT)
Dept: BEHAVIORAL HEALTH | Facility: CLINIC | Age: 28
End: 2018-01-05
Attending: PSYCHIATRY & NEUROLOGY
Payer: COMMERCIAL

## 2018-01-05 PROCEDURE — 97150 GROUP THERAPEUTIC PROCEDURES: CPT | Mod: GO

## 2018-01-05 PROCEDURE — H2012 BEHAV HLTH DAY TREAT, PER HR: HCPCS

## 2018-01-05 ASSESSMENT — PATIENT HEALTH QUESTIONNAIRE - PHQ9
SUM OF ALL RESPONSES TO PHQ QUESTIONS 1-9: 0
5. POOR APPETITE OR OVEREATING: NOT AT ALL

## 2018-01-05 ASSESSMENT — ANXIETY QUESTIONNAIRES
IF YOU CHECKED OFF ANY PROBLEMS ON THIS QUESTIONNAIRE, HOW DIFFICULT HAVE THESE PROBLEMS MADE IT FOR YOU TO DO YOUR WORK, TAKE CARE OF THINGS AT HOME, OR GET ALONG WITH OTHER PEOPLE: NOT DIFFICULT AT ALL
7. FEELING AFRAID AS IF SOMETHING AWFUL MIGHT HAPPEN: NOT AT ALL
1. FEELING NERVOUS, ANXIOUS, OR ON EDGE: NOT AT ALL
GAD7 TOTAL SCORE: 0
2. NOT BEING ABLE TO STOP OR CONTROL WORRYING: NOT AT ALL
5. BEING SO RESTLESS THAT IT IS HARD TO SIT STILL: NOT AT ALL
3. WORRYING TOO MUCH ABOUT DIFFERENT THINGS: NOT AT ALL
6. BECOMING EASILY ANNOYED OR IRRITABLE: NOT AT ALL

## 2018-01-05 NOTE — DISCHARGE SUMMARY
"  Adult MICD Discharge Summary / Instructions Adult MICD Discharge Summary / Instruction     Patient: Kamini Neal MRN: [id number]  : 1990 Age:  27 year old Sex:  male    Admission Date: 2017  Discharge Date: 2018    Reason for Discharge: Completed treatment             Prognosis: Fair      Client Progress Toward AchievingTreatment Plan Goals / Dimensions Risk Scale       Kamini attended 76 of 90 scheduled MICD groups. Absences were due to appointments, increased symptoms, and \"no call, no show\".   Kamini was admitted to the program for a second time and decided that he was going to make a full commitment to attending the program on a regular basis and be open to the education/skills groups that were offered in the program.  Kamini also made it a personal treatment goal to consistently take his medication so that he could reduce his symptoms which would allow him to be fully present for the program.  Kamini exhibited good attendance and gradually opened up to the group.  Kamini was mostly interested in learning new mindfulness techniques as he found them to be most helpful with his symptom management and cravings to use chemicals.  Kamini had one instance of alcohol use while in the program and noted that the ETOH aggravated his psychiatric symptoms.  Kamini also pursued a membership at [employer] so that he could expand his sober and mental health support in the community.  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        Diagnosis:   295.70  (F25.1) Schizoaffective Disorder Depressive Type  309.81 (F43.10) Posttraumatic Stress Disorder (includes Posttraumatic Stress Disorder for Children 6 Years and Younger)  Without dissociative symptoms  303.90 (F10.20) Alcohol Use Disorder, Severe  304.3 (F12.20) Cannabis Use Disorder, Severe  304.9 (F19.20) Other (Q[place]) Substance Use Disorder, " Severe    Individualized Treatment Plan Goals/Progress:          Area of Treatment Focus:   Personal Safety  Start Date:    11/28/17    Dimension:   III. Emotional / Behavioral Condition    Description:    Kamini has frequent thoughts to self harm, he agrees to check in daily with staff with regard to suicidal ideation and self injurious urges.      Goal:  Target Date: 12/26/17, 1/23/18 Status: Completed  Client will notify staff when needing assistance to develop or implement a coping plan to manage suicidal or self injurious urges.  Client will use coping plan for safety, as needed.      Progress:   12/26/17:  Kamini denies any recent thoughts to self harm.  Goal CONTINUED.    1/5/2018:  Kamini has successfully completed the program.  He has not reported any recent thoughts to self harm.  Goal COMPLETED.      Treatment Strategies:   Assist clients in establishing / strengthening support network  Assist to identify treatment goals  Assess / reassess for appropriate therapy program involvement, encourage participation in therapies  Assess / reassess level of potential for harm to self or others  Engage in safety planning when indicated  Facilitate increased self awareness  Teach adaptive coping skills and communication skills  Use reality based supportive approach        Area of Treatment Focus:   Symptom Stabilization and Management  Start Date:    11/28/17    Dimension:   II. Biomedical Conditions and III. Emotional / Behavioral Condition    Description:    Kamini's symptoms include anxiety, low mood, thoughts to self harm, negative thinking patterns, and audio hallucinations.      Goal:  Target Date: 12/26/17, 1/23/18 Status: Completed    Goal 1: While in OT groups, Kamini will report on a positive after engagement in an activity of his choice, focusing on follow through and management of negative self talk, once each week.    Goal 2:  Kamini will take his medications at % adherence, reporting  outcomes to the group weekly.      Goal 3:  Kamini will attend treatment for 3 hours per day, five days per week, achieving 80+% attendance.  (COMPLETED)      Progress:   12/26/17:    Goal #1: Kamini is making progress with this goal, but would benefit from CONTINUED work in this area to be more consistent and engage in more variety of activities, which he is beginning to work on at this time.    Goal #2: Kamini reports that he is taking his medications as prescribed.  Goal CONTINUED until he completes the program.      Goal #3:  Kamini has shown significant progress with his attendance.  He is approaching completion to the program and has satisfied this goal.  COMPLETED.      1/5/2018  Goal #1: Kamini engaged in a couple of different activities and worked to focus on positive thinking. Goal COMPLETED.    Goal #2:  Kamini has been taking his medications as prescribed however he ran out of his Seroquel about a week ago.  Kamini took action and contacted Dr. Campo for an appointment to refill his medications.  Goal COMPLETED.        Treatment Strategies:   Assist clients in establishing / strengthening support network  Assist to identify treatment goals  Assess / reassess for appropriate therapy program involvement, encourage participation in therapies  Assess / reassess level of potential for harm to self or others  Engage in safety planning when indicated  Facilitate increased self awareness  Teach adaptive coping skills and communication skills  Use reality based supportive approach   Provide a variety of structured activities to build daily living skills  Provide education and opportunity to practice use of sensory modulation techniques for self regulation                 Area of Treatment Focus:   Abstinence / Relapse Prevention  Start Date:    11/28/17    Dimension:   I. Acute Intoxication / Withdrawal Potential, IV. Treatment Acceptance / Resistance and V. Relapse    Description:    [place]  use of chemicals greatly exacerbates his mental health symptoms and often results in hospitalization.      Goal:  Target Date: 12/26/17, 1/23/18 Status: COMPLETED  Kamini will have 0 instances of chemical use, reporting outcomes to the group daily.       Kamini will create a budget plan with his group home staff in order to help him have less money on hand for drugs by the target date.        Progress:   12/26/17:  Goal #1:  Kamini has had one instance of chemical use since his late treatment plan update.  Progress is being made.  CONTINUE until completion of the program.      Goal #2:  Kamini maintains that money remains a trigger for his use of chemicals.  Goal CONTINUED.      1/5/2018  Goal #1:  Kamini has achieved 3+weeks of sobriety.  Goal COMPLETED.    Goal #2:  Kamini states that he is working with staff at his group home to help him with his budget.  Goal COMPLETED.      Treatment Strategies:   Assist clients in establishing / strengthening support network  Assist to identify treatment goals  Assess / reassess for appropriate therapy program involvement, encourage participation in therapies  Assess / reassess level of potential for harm to self or others  Facilitate increased self awareness  Provide education regarding relapse prevention  Teach adaptive coping skills and communication skills  Use reality based supportive approach      Area of Treatment Focus:   Community Resources / Support and Discharge Planning  Start Date:    11/28/17    Dimension:   VI. Recovery Environment    Description:    Kamini would like to expand his sober support network in the community.      Goal:  Target Date: 12/26/17, 1/23/18 Status: COMPLETED  Kamini will attend groups at [employer] at least 1-2/week.     Kamini will see his individual therapist weekly reporting follow through to the group. (COMPLETED)       Progress:   12/26/17:  Goal #1:  Kamini has attended [employer] approximately 3-4 times in the  past month.  He is encouraged to maintain his participation in their programming for additional support.  Goal CONTINUED.      Goal #2:  Kamini reports that he is seeing his individual therapist weekly and will continue to do so after discharge.  Goal COMPLETED.      1/5/2018  Goal #1:  Kamini continues to attend [facility] and will do so after completion of this program.  He finds their groups and programming to be helpful.  Goal COMPLETED.      Treatment Strategies:   Assist clients in establishing / strengthening support network  Assist to identify treatment goals  Assist with discharge planning  Facilitate increased self awareness  Teach adaptive coping skills and communication skills          Admit Discharge   PHQ-9 9 0   ISACC-7 7 0       DIMENSION  ADMIT RATING DISCHARGE RATING   1 Acute Intoxication / Withdrawal Potential 0 0   2 Biomedical Conditions & Complications 0 0   3 Emotional / Behavioral / Cognitive Conditions & Complications 2 2   4 Treatment Acceptance / Resistance: 2 1   5 Continued Use / Relapse Prevention 3 2   6 Recovery Environment 1 1       Additional Comments:   Kamini will continue to see Dr. Marco Campo for medication management.  He is encouraged to follow up with his therapist weekly and to attend groups at [facility].      Completed By: SUZANNE Bell

## 2018-01-05 NOTE — PROGRESS NOTES
"Adult Mental Health   Mental Health Program   Progress Note     Group Time: 10:00-10:50  Client Initial Individualized Goals for Treatment:  \"do not relapse      See Initial Treatment suggestions for the client during the time between Diagnostic Assessment and completion of the Master Individualized Treatment Plan.    Treatment Goals:     Follow Safety Plan  Abstain from Substance Use   Ask for more information, support and/or assistance as needed.  Follow up with providers/community supports as needed:   Report increases or changes in symptoms to staff.  Report any personal safety concerns to staff.   Take medications as prescribed.  Report medication changes and/or side effects to staff.  Attend and participate in groups as scheduled or notify staff if unable to do so.  Report any use of substances to staff as this may impact your symptoms and/or                      personal safety.  Notify staff if you have any other issues that need to be addressed. This may include              any current abuse / neglect / exploitation or other vulnerability.  Follow recommendations of your treatment team and discuss concerns if not in            agreement.       Area of Treatment Focus:  Symptom Management and Develop / Improve Independent Living Skills    Therapeutic Interventions/Treatment Strategies:  Support, Redirection, Feedback, Structured Activity and Education    Response to Treatment Strategies:  Accepted Feedback, Gave Feedback, Listened, Attentive and Alert    Name of Group:  Grounding     Description and Outcome:    Mindfulness group focused on the task of  grounding . Introduction of grounding was done and various types of grounding were explained. Mental, physical and soothing grounding were the three grounding techniques covered. Examples were given throughout the session and members were encouraged to discuss their insights concerning grounding and how they may use grounding in their future.  Towards the end " of the group, members were invited to participate in a memory game that could be used as a grounding exercise.  Assessment  Mood: Calm behavior, range in affect, stable mood throughout group  Thought Process: Linear and logical, productive and goal directed  Behavior: Cooperative, interacted within the group, listened and was respectful of others  Understanding: Verbalized understanding by demonstration. Stated he wants to try breathing as a grounding technique.     Is this a Weekly Review of the Progress on the Treatment Plan?  No

## 2018-01-05 NOTE — PROGRESS NOTES
Adult Mental Health Outpatient Group Therapy Progress Note         See Initial Treatment suggestions for the client during the time between Diagnostic Assessment and completion of the Master Individualized Treatment Plan.    Treatment Goals:     Kamini will see his individual therapist weekly reporting follow through to the group.   Kamini will attend groups at [facility] at least 1-2/week.   Kamini will attend treatment for 3 hours per day, five days per week, achieving 80+% attendance.   Kamini will take his medications at % adherence, reporting outcomes to the group weekly.   While in OT groups, Kamini will report on a positive after engagement in an activity of his choice, focusing on follow through and management of negative self talk, once each week.          Area of Treatment Focus:  Symptom Management, Community Resources/Discharge Planning and Abstinence/Relapse Prevention    Therapeutic Interventions/Treatment Strategies:  Support, Feedback, Structured Activity, Problem Solving, Clarification, Education and Motivational Enhancement Therapy    Response to Treatment Strategies:  Accepted Feedback, Listened, Focused on Goals, Accepted Support, Alert and Demonstrates Behavior Change    Name of Group:  OT Clinic 9:00 - 9:50     Description and Outcome:  Kamini participated in his last OT group that focused on the use of structured activities for building daily living skills. He initiated requesting to complete discharge paperwork and did report on progress he thought he had made. He noted some leisure plans for the weekend and was more social this session. He did initiate his structured creative relaxation activity and planned to take it home to complete. Client demonstrated understanding of session content by engagement in activities and report on progress made, focusing on positives for himself. Treatment goal met.  Is this a Weekly Review of the Progress on the Treatment Plan?  Yes.       Are Treatment Plan Goals being addressed?  Client discharged      Are Treatment Plan Strategies to Address Goals Effective?  Client discharged      Are there any current contracts in place?  No

## 2018-01-05 NOTE — DISCHARGE SUMMARY
MICD Discharge Summary/Instructions     Patient: Kamini Neal  MRN: [id number]   : 1990 Age: 27 year old Sex: male   -  Focus of Treatment / Discharge Recommendations    Personal Safety/ Management of Symptoms    * Follow your safety plan.  Report increased symptoms to your care team at [facility] and /or go to the nearest Emergency Department.    * Call crisis lines as needed    [facility] [phone]                [facility] [phone]  [facility] [phone]              [facility] [phone]  [facility] [phone]              [facility] [phone]  [facility] [phone]          National Suicide Prevention [phone]  [facility] [phone]            Suicide Prevention [phone]  [facility] [phone]    Abstinence/Relapse Prevention  * Take all medicines as directed.  Carry a current list of medicines with you.  * Use coping skills: Meditation, Urge Surfing, Grounding, Mindfulness, Exercise, Attend [facility]  * Do not use illicit (street) drugs, controlled substances (narcotics) or alcohol.    Develop/Improve Independent Living/Socialization Skills:   Expand Sober Support Network    Community Resources/Supports and Discharge Planning:    Follow up with psychiatrist / main caregiver: Dr. Marco Campo    Next visit: Call to schedule at [phone]    [facility]   Next visit: Call to schedule    Go to group therapy and / or support groups at: AA, NA, [facility], [facility]    See your medical doctor about:  Annual Physicals    Other:  Kamini has successfully completed the Adult Dual Diagnosis Program.   He plans to follow up with Dr. Marco Campo for medication management and attend support groups at [facility] in [place].  Kamini is also interested in getting involved at [employer] for employment.  If you have questions or need additional resources, please contact Selena  Waylon at [phone].    Your treatment team appreciates having the opportunity to work with you and wishes you the best.      Client Signature:_______________________   Date / Time:___________  SUZANNE Bell

## 2018-01-06 ASSESSMENT — ANXIETY QUESTIONNAIRES: GAD7 TOTAL SCORE: 0

## 2018-04-13 ENCOUNTER — HOSPITAL ENCOUNTER (INPATIENT)
Facility: CLINIC | Age: 28
LOS: 5 days | Discharge: NURSING, REHAB OR LONG-TERM CARE | DRG: 885 | End: 2018-04-18
Attending: EMERGENCY MEDICINE | Admitting: PSYCHIATRY & NEUROLOGY
Payer: COMMERCIAL

## 2018-04-13 DIAGNOSIS — F25.1 SCHIZOAFFECTIVE DISORDER, DEPRESSIVE TYPE (H): ICD-10-CM

## 2018-04-13 DIAGNOSIS — R45.851 SUICIDE IDEATION: ICD-10-CM

## 2018-04-13 PROCEDURE — 12400007 ZZH R&B MH INTERMEDIATE UMMC

## 2018-04-13 PROCEDURE — 99285 EMERGENCY DEPT VISIT HI MDM: CPT | Mod: Z6 | Performed by: EMERGENCY MEDICINE

## 2018-04-13 PROCEDURE — 25000132 ZZH RX MED GY IP 250 OP 250 PS 637: Performed by: PSYCHIATRY & NEUROLOGY

## 2018-04-13 PROCEDURE — 25000132 ZZH RX MED GY IP 250 OP 250 PS 637: Performed by: EMERGENCY MEDICINE

## 2018-04-13 PROCEDURE — 99285 EMERGENCY DEPT VISIT HI MDM: CPT | Mod: 25 | Performed by: EMERGENCY MEDICINE

## 2018-04-13 PROCEDURE — 80307 DRUG TEST PRSMV CHEM ANLYZR: CPT | Performed by: EMERGENCY MEDICINE

## 2018-04-13 PROCEDURE — 80320 DRUG SCREEN QUANTALCOHOLS: CPT | Performed by: EMERGENCY MEDICINE

## 2018-04-13 PROCEDURE — 90791 PSYCH DIAGNOSTIC EVALUATION: CPT

## 2018-04-13 RX ORDER — OLANZAPINE 5 MG/1
5 TABLET, ORALLY DISINTEGRATING ORAL ONCE
Status: COMPLETED | OUTPATIENT
Start: 2018-04-13 | End: 2018-04-13

## 2018-04-13 RX ORDER — QUETIAPINE FUMARATE 400 MG/1
400 TABLET, FILM COATED ORAL AT BEDTIME
Status: DISCONTINUED | OUTPATIENT
Start: 2018-04-13 | End: 2018-04-18 | Stop reason: HOSPADM

## 2018-04-13 RX ORDER — LANOLIN ALCOHOL/MO/W.PET/CERES
6 CREAM (GRAM) TOPICAL AT BEDTIME
Status: DISCONTINUED | OUTPATIENT
Start: 2018-04-13 | End: 2018-04-18 | Stop reason: HOSPADM

## 2018-04-13 RX ORDER — OLANZAPINE 10 MG/1
10 TABLET ORAL
Status: DISCONTINUED | OUTPATIENT
Start: 2018-04-13 | End: 2018-04-13

## 2018-04-13 RX ORDER — LEVOTHYROXINE SODIUM 112 UG/1
112 TABLET ORAL DAILY
Status: DISCONTINUED | OUTPATIENT
Start: 2018-04-13 | End: 2018-04-18 | Stop reason: HOSPADM

## 2018-04-13 RX ORDER — HYDROXYZINE HYDROCHLORIDE 25 MG/1
25 TABLET, FILM COATED ORAL EVERY 4 HOURS PRN
Status: DISCONTINUED | OUTPATIENT
Start: 2018-04-13 | End: 2018-04-13

## 2018-04-13 RX ORDER — QUETIAPINE FUMARATE 400 MG/1
400 TABLET, FILM COATED ORAL AT BEDTIME
Status: DISCONTINUED | OUTPATIENT
Start: 2018-04-13 | End: 2018-04-13

## 2018-04-13 RX ORDER — PRAZOSIN HYDROCHLORIDE 1 MG/1
3 CAPSULE ORAL AT BEDTIME
Status: DISCONTINUED | OUTPATIENT
Start: 2018-04-13 | End: 2018-04-13

## 2018-04-13 RX ORDER — POLYETHYLENE GLYCOL 3350 17 G/17G
17 POWDER, FOR SOLUTION ORAL DAILY PRN
Status: DISCONTINUED | OUTPATIENT
Start: 2018-04-13 | End: 2018-04-18 | Stop reason: HOSPADM

## 2018-04-13 RX ORDER — PANTOPRAZOLE SODIUM 20 MG/1
20 TABLET, DELAYED RELEASE ORAL DAILY
Status: DISCONTINUED | OUTPATIENT
Start: 2018-04-13 | End: 2018-04-18 | Stop reason: HOSPADM

## 2018-04-13 RX ORDER — ACETAMINOPHEN 325 MG/1
650 TABLET ORAL EVERY 4 HOURS PRN
Status: DISCONTINUED | OUTPATIENT
Start: 2018-04-13 | End: 2018-04-18 | Stop reason: HOSPADM

## 2018-04-13 RX ORDER — HYDROXYZINE HYDROCHLORIDE 25 MG/1
25-50 TABLET, FILM COATED ORAL EVERY 4 HOURS PRN
Status: DISCONTINUED | OUTPATIENT
Start: 2018-04-13 | End: 2018-04-13

## 2018-04-13 RX ORDER — HYDROXYZINE HYDROCHLORIDE 50 MG/1
50 TABLET, FILM COATED ORAL EVERY 4 HOURS PRN
Status: DISCONTINUED | OUTPATIENT
Start: 2018-04-13 | End: 2018-04-13

## 2018-04-13 RX ORDER — OLANZAPINE 10 MG/1
10 TABLET ORAL 3 TIMES DAILY
Status: DISCONTINUED | OUTPATIENT
Start: 2018-04-13 | End: 2018-04-16

## 2018-04-13 RX ORDER — POLYETHYLENE GLYCOL 3350 17 G/17G
34 POWDER, FOR SOLUTION ORAL DAILY
Status: DISCONTINUED | OUTPATIENT
Start: 2018-04-13 | End: 2018-04-18 | Stop reason: HOSPADM

## 2018-04-13 RX ORDER — BENZTROPINE MESYLATE 1 MG/1
1 TABLET ORAL 2 TIMES DAILY
Status: DISCONTINUED | OUTPATIENT
Start: 2018-04-13 | End: 2018-04-18 | Stop reason: HOSPADM

## 2018-04-13 RX ORDER — PRAZOSIN HYDROCHLORIDE 1 MG/1
3 CAPSULE ORAL AT BEDTIME
Status: DISCONTINUED | OUTPATIENT
Start: 2018-04-13 | End: 2018-04-18 | Stop reason: HOSPADM

## 2018-04-13 RX ORDER — LANOLIN ALCOHOL/MO/W.PET/CERES
6 CREAM (GRAM) TOPICAL AT BEDTIME
Status: DISCONTINUED | OUTPATIENT
Start: 2018-04-13 | End: 2018-04-13

## 2018-04-13 RX ORDER — ALUMINA, MAGNESIA, AND SIMETHICONE 2400; 2400; 240 MG/30ML; MG/30ML; MG/30ML
30 SUSPENSION ORAL EVERY 4 HOURS PRN
Status: DISCONTINUED | OUTPATIENT
Start: 2018-04-13 | End: 2018-04-18 | Stop reason: HOSPADM

## 2018-04-13 RX ORDER — VENLAFAXINE HYDROCHLORIDE 225 MG/1
225 TABLET, EXTENDED RELEASE ORAL
Status: DISCONTINUED | OUTPATIENT
Start: 2018-04-13 | End: 2018-04-18 | Stop reason: HOSPADM

## 2018-04-13 RX ORDER — BISACODYL 10 MG
10 SUPPOSITORY, RECTAL RECTAL DAILY PRN
Status: DISCONTINUED | OUTPATIENT
Start: 2018-04-13 | End: 2018-04-18 | Stop reason: HOSPADM

## 2018-04-13 RX ADMIN — LEVOTHYROXINE SODIUM 112 MCG: 112 TABLET ORAL at 10:08

## 2018-04-13 RX ADMIN — PRAZOSIN HYDROCHLORIDE 3 MG: 1 CAPSULE ORAL at 19:30

## 2018-04-13 RX ADMIN — VENLAFAXINE HYDROCHLORIDE 225 MG: 225 TABLET, FILM COATED, EXTENDED RELEASE ORAL at 10:09

## 2018-04-13 RX ADMIN — OLANZAPINE 10 MG: 10 TABLET, FILM COATED ORAL at 17:03

## 2018-04-13 RX ADMIN — BENZTROPINE MESYLATE 1 MG: 1 TABLET ORAL at 19:30

## 2018-04-13 RX ADMIN — ALUMINUM HYDROXIDE, MAGNESIUM HYDROXIDE, AND DIMETHICONE 30 ML: 400; 400; 40 SUSPENSION ORAL at 23:22

## 2018-04-13 RX ADMIN — OLANZAPINE 5 MG: 5 TABLET, ORALLY DISINTEGRATING ORAL at 01:52

## 2018-04-13 RX ADMIN — BENZTROPINE MESYLATE 1 MG: 1 TABLET ORAL at 10:06

## 2018-04-13 RX ADMIN — OLANZAPINE 10 MG: 10 TABLET, FILM COATED ORAL at 10:07

## 2018-04-13 RX ADMIN — OLANZAPINE 10 MG: 10 TABLET, FILM COATED ORAL at 19:30

## 2018-04-13 RX ADMIN — VITAMIN D, TAB 1000IU (100/BT) 1000 UNITS: 25 TAB at 17:03

## 2018-04-13 RX ADMIN — QUETIAPINE FUMARATE 400 MG: 400 TABLET ORAL at 19:30

## 2018-04-13 RX ADMIN — PANTOPRAZOLE SODIUM 20 MG: 20 TABLET, DELAYED RELEASE ORAL at 10:09

## 2018-04-13 RX ADMIN — MELATONIN TAB 3 MG 6 MG: 3 TAB at 19:30

## 2018-04-13 ASSESSMENT — ACTIVITIES OF DAILY LIVING (ADL)
TOILETING: 0-->INDEPENDENT
GROOMING: INDEPENDENT
RETIRED_EATING: 0-->INDEPENDENT
LAUNDRY: WITH SUPERVISION
TRANSFERRING: 0-->INDEPENDENT
DRESS: 0-->INDEPENDENT
ORAL_HYGIENE: INDEPENDENT
COGNITION: 0 - NO COGNITION ISSUES REPORTED
RETIRED_COMMUNICATION: 0-->UNDERSTANDS/COMMUNICATES WITHOUT DIFFICULTY
BATHING: 0-->INDEPENDENT
AMBULATION: 0-->INDEPENDENT
DRESS: INDEPENDENT
SWALLOWING: 0-->SWALLOWS FOODS/LIQUIDS WITHOUT DIFFICULTY
FALL_HISTORY_WITHIN_LAST_SIX_MONTHS: NO

## 2018-04-13 ASSESSMENT — ENCOUNTER SYMPTOMS
FEVER: 0
HEADACHES: 0
ABDOMINAL PAIN: 0
CONFUSION: 0
DIFFICULTY URINATING: 0
ARTHRALGIAS: 0
SHORTNESS OF BREATH: 0
NECK STIFFNESS: 0
EYE REDNESS: 0
COLOR CHANGE: 0

## 2018-04-13 NOTE — IP AVS SNAPSHOT
[address]    [address]    [facility] [address]    Phone:  [phone]                                       After Visit Summary   4/13/2018    Kamini Neal    MRN: [id number]           After Visit Summary Signature Page     I have received my discharge instructions, and my questions have been answered. I have discussed any challenges I see with this plan with the nurse or doctor.    ..........................................................................................................................................  Patient/Patient Representative Signature      ..........................................................................................................................................  Patient Representative Print Name and Relationship to Patient    ..................................................               ................................................  Date                                            Time    ..........................................................................................................................................  Reviewed by Signature/Title    ...................................................              ..............................................  Date                                                            Time

## 2018-04-13 NOTE — ED NOTES
Bed: HW03  Expected date: 4/13/18  Expected time: 12:07 AM  Means of arrival: Ambulance  Comments:  Jada 439  27 M  BALTA ferguson

## 2018-04-13 NOTE — PROGRESS NOTES
At approximately 12:30/12:45AM, the pt stood up out of the chair and walked over to the supply room door in the ED.  Pt was ignoring commands from staff to go back to his chair and sit down.  Pt then began to bang his head against the supply room door 3x. Staff then stopped him and escorted him by his arms and directed him back to his seat to sit down.  Right after he sat down, the pt stood back up and walked forward to the wall in front of him to bang his head once more.  Staff prevented him from hitting his head for the second time against the wall.
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
## 2018-04-13 NOTE — PHARMACY-ADMISSION MEDICATION HISTORY
Admission medication history interview status for the 4/13/2018 admission is complete. See Epic admission navigator for allergy information, pharmacy and prior to admission medications.     Medication history interview sources:  [facility] ([phone]), [facility] electronic medical records ([facility] Everywhere)    Changes made to PTA medication list (reason)  Added: none  Deleted: none  Changed: none    Additional medication history information (including reliability of information, actions taken by pharmacist): This writer called the patient's group home at [phone] and [phone] but there was no answer at either phone number. A message was left at the first number with Neela, the . This writer spoke with the outpatient [facility] pharmacist for the patient's recent prescription filling history. The outpatient pharmacist had record on the following prescriptions filled: benztropine, hydroxyzine, levothyroxine, melatonin, Zyprexa, pantoprazole, prazosin, quetiapine,venalfaxine, and cholecalciferol. Per [facility] Everywhere, the patient was seen on 04/05/18 for a nurse only visit where received his Invega Sustenna 117 mg IM in the right deltoid. It is unclear if the patient has a current bowel regimen as he as discharged on Miralax but it was not filled at [facility]. Per [facility] Everywhere, senna 8.6 mg BID PRN and docusate 100 mg BID PRN was ordered but unclear if he is taking either medication. The group home was not able to be reached at the time of this note to investigate his bowel regimen.      Prior to Admission medications    Medication Sig Last Dose Taking? Auth Provider   QUEtiapine (SEROQUEL) 400 MG tablet Take 1 tablet (400 mg) by mouth At Bedtime unknown Yes Constantino Lugo MD   hydrOXYzine (ATARAX) 50 MG tablet Take 1 tablet (50 mg) by mouth every 4 hours as needed for anxiety unknown Yes Constantino Lugo MD   melatonin 3 MG CAPS Take 6 mg by mouth At Bedtime  unknown Yes Antoni Rodríguez MD   venlafaxine (EFFEXOR-ER) 75 MG TB24 24 hr tablet Take 3 tablets (225 mg) by mouth daily (with breakfast) unknown Yes Antoni Rodríguez MD   paliperidone (INVEGA SUSTENNA) 117 MG/0.75ML SUSP Inject 117 mg into the muscle every 28 days  4/5/2018 at Unknown time Yes Antoni Rodríguez MD   levothyroxine (SYNTHROID/LEVOTHROID) 25 MCG tablet Take 4.5 tablets (112 mcg) by mouth daily noon unknown Yes Antoni Rodríguez MD   pantoprazole (PROTONIX) 20 MG EC tablet Take 1 tablet (20 mg) by mouth daily unknown Yes Antoni Rodríguez MD   Vitamin D, Cholecalciferol, 1000 UNITS TABS Take 1,000 Units by mouth daily unknown Yes Antoni Rodríguez MD   prazosin (MINIPRESS) 1 MG capsule Take 3 capsules (3 mg) by mouth At Bedtime unknown Yes Antoni Rodríguez MD   benztropine (COGENTIN) 1 MG tablet Take 1 tablet (1 mg) by mouth 2 times daily unknown Yes Antoni Rodríguez MD   OLANZapine (ZYPREXA) 10 MG tablet Take 1 tablet (10 mg) by mouth 3 times daily unknown Yes Antoni Rodríguez MD   OLANZapine (ZYPREXA PO) Take 2.5-5 mg by mouth daily as needed for agitation unknown Yes Unknown, Entered By History   polyethylene glycol (MIRALAX/GLYCOLAX) Packet Take 34 g by mouth daily   Antoni Rodríguez MD   polyethylene glycol (MIRALAX/GLYCOLAX) Packet Take 17 g by mouth daily as needed for constipation   Antoni Rodríguez MD         Medication history completed by:  Helen Theodore, PharmD, [facility]  Behavioral ER Pharmacist  [phone]
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
## 2018-04-13 NOTE — IP AVS SNAPSHOT
MRN:[id number]                      After Visit Summary   4/13/2018    Kamini Neal    MRN: [id number]           Thank you!     Thank you for choosing [facility] for your care. Our goal is always to provide you with excellent care.        Patient Information     Date Of Birth          1990        About your hospital stay     You were admitted on:  April 13, 2018 You last received care in the:  [facility]    You were discharged on:  April 18, 2018       Who to Call     For medical emergencies, please call 911.  For non-urgent questions about your medical care, please call your primary care provider or clinic, [phone]          Attending Provider     Provider Specialty    German Cope MD Emergency Medicine    Carlita, Eze Crockett MD Psychiatry       Primary Care Provider Office Phone # Fax #    [facility] [phone]      Further instructions from your care team        Behavioral Discharge Planning and Instructions      Summary:  You were admitted on 4/13/2018  due to Psychotic Symptomology.  You were treated by Dr Eze Zazueta MD and discharged on 4/18/2018 from [facility]    Neela is the  at your [facility], Critical access hospital Systems: [phone] (cell) or [phone]    Principal Diagnosis:   1.  Schizoaffective disorder -- bipolar type.   2.  Hypothyroidism.   3.  History of posttraumatic stress disorder.     Health Care Follow-up Appointments:   Psychiatry Appointment Date/Time: Thursday 5/10 at 10:20 am      Psychiatrist: Marco Campo   Therapy Appointment Date/time: Friday 4/20 at 10 am   Therapist: Krystyna James  [facility]   [facility] [address]   [address]rd [place] [address]   Phone: [phone]  Fax: [phone]    There is a walk-in connections group on Tuesdays and Fridays at 10:00am.   You may attend at these times to be seen for medication  "management and therapy without an appointment.     Your Invega Sustena shot is due the first week of May. Your medications have been sent to [facility].     Attend all scheduled appointments with your outpatient providers. Call at least 24 hours in advance if you need to reschedule an appointment to ensure continued access to your outpatient providers.   Major Treatments, Procedures and Findings:  You were provided with: a psychiatric assessment, assessed for medical stability, medication evaluation and/or management, group therapy and milieu management    Symptoms to Report: feeling more aggressive, increased confusion, losing more sleep, mood getting worse or thoughts of suicide    Early warning signs can include: increased depression or anxiety sleep disturbances increased thoughts or behaviors of suicide or self-harm  increased unusual thinking, such as paranoia or hearing voices    Safety and Wellness:  Take all medicines as directed.  Make no changes unless your doctor suggests them.      Follow treatment recommendations.  Refrain from alcohol and non-prescribed drugs.  If there is a concern for safety, call 911.    Resources:   COPE:[phone]  Ntai Ng: [phone]     National [place] on Mental Illness (www.[place].jorge a.org): 781.499.9081 or 710-071-1279.  Alcoholics Anonymous (www.alcoholics-anonymous.org): Check your phone book for your local chapter.  Suicide Awareness Voices of Education (SAVE) (www.save.org): 187-611-XSQA (7434)  National Suicide Prevention Line (www.mentalhealthmn.org): 016-945-YLOH (9995)  Mental Health Consumer/Survivor Network of [place] (www.mhcsn.net): 649.260.2147 or 752-373-7465  Mental Health Association of [place] (www.mentalhealth.org): 335.484.5785 or 537-884-6944  Self- Management and Recovery Training., SMART-- Toll free: 570.794.8232  www.SiteBrand.thesweetlink  Text 4 Life: txt \"LIFE\" to 54572 for immediate support and crisis intervention  Crisis text line: Text \"[place]\" to 606523. " "Free, confidential, .    The treatment team has appreciated the opportunity to work with you.     If you have any questions or concerns our unit number is [phone]          Pending Results     No orders found from 2018 to 2018.            Admission Information     Date & Time Provider Department Dept. Phone    2018 Eze Zazueta MD [facility] [phone]      Your Vitals Were     Blood Pressure Pulse Temperature Respirations Height Weight    113/78 87 99  F (37.2  C) (Oral) 16 1.778 m (5' 10\") 102.1 kg (225 lb 1.6 oz)    Pulse Oximetry BMI (Body Mass Index)                100% 32.3 kg/m2          MyChart Information     [employer] lets you send messages to your doctor, view your test results, renew your prescriptions, schedule appointments and more. To sign up, go to [email or web address] . Click on \"Log in\" on the left side of the screen, which will take you to the Welcome page. Then click on \"Sign up Now\" on the right side of the page.     You will be asked to enter the access code listed below, as well as some personal information. Please follow the directions to create your username and password.     Your access code is: [email or web address]  Expires: 2018  6:33 PM     Your access code will  in 90 days. If you need help or a new code, please call your [facility] clinic or [phone].        Care EveryWhere ID     This is your Care EveryWhere ID. This could be used by other organizations to access your [facility] medical records  [id number]        Equal Access to Services     [facility] : Hadzay Cedeño, wasoniada lusugar, qaybmonique baezalchristiano joel. So [facility] [phone].    ATENCIÓN: Si habla español, tiene a reyes disposición servicios gratuitos de asistencia lingüística. Llame al [phone].    We comply with applicable federal civil rights laws and [place] laws. We do not discriminate on the basis of race, color, national " origin, age, disability, sex, sexual orientation, or gender identity.               Review of your medicines      CONTINUE these medicines which may have CHANGED, or have new prescriptions. If we are uncertain of the size of tablets/capsules you have at home, strength may be listed as something that might have changed.        Dose / Directions    paliperidone 156 MG/ML Susp injection   Commonly known as:  INVEGA SUSTENNA   This may have changed:    - medication strength  - how much to take   Used for:  Schizoaffective disorder, depressive type (H)        Dose:  156 mg   Start taking on:  5/10/2018   Inject 1 mL (156 mg) into the muscle once for 1 dose   Quantity:  1 mL   Refills:  0       ZYPREXA PO   This may have changed:  Another medication with the same name was removed. Continue taking this medication, and follow the directions you see here.        Dose:  2.5-5 mg   Take 2.5-5 mg by mouth daily as needed for agitation   Refills:  0         CONTINUE these medicines which have NOT CHANGED        Dose / Directions    benztropine 1 MG tablet   Commonly known as:  COGENTIN   Used for:  Schizoaffective disorder, bipolar type (H)        Dose:  1 mg   Take 1 tablet (1 mg) by mouth 2 times daily   Quantity:  60 tablet   Refills:  0       hydrOXYzine 50 MG tablet   Commonly known as:  ATARAX   Used for:  Schizoaffective disorder, bipolar type (H)        Dose:  50 mg   Take 1 tablet (50 mg) by mouth every 4 hours as needed for anxiety   Quantity:  60 tablet   Refills:  0       levothyroxine 25 MCG tablet   Commonly known as:  SYNTHROID/LEVOTHROID   Used for:  Acquired hypothyroidism        Dose:  112 mcg   Take 4.5 tablets (112 mcg) by mouth daily noon   Quantity:  135 tablet   Refills:  0       melatonin 3 MG Caps   Used for:  Primary insomnia        Dose:  6 mg   Take 6 mg by mouth At Bedtime   Quantity:  60 capsule   Refills:  0       pantoprazole 20 MG EC tablet   Commonly known as:  PROTONIX   Used for:   Gastroesophageal reflux disease, esophagitis presence not specified        Dose:  20 mg   Take 1 tablet (20 mg) by mouth daily   Quantity:  30 tablet   Refills:  0       * polyethylene glycol Packet   Commonly known as:  MIRALAX/GLYCOLAX   Used for:  Drug-induced constipation        Dose:  34 g   Take 34 g by mouth daily   Quantity:  50 packet   Refills:  0       * polyethylene glycol Packet   Commonly known as:  MIRALAX/GLYCOLAX   Used for:  Drug-induced constipation        Dose:  17 g   Take 17 g by mouth daily as needed for constipation   Quantity:  7 packet   Refills:  0       prazosin 1 MG capsule   Commonly known as:  MINIPRESS   Used for:  PTSD (post-traumatic stress disorder)        Dose:  3 mg   Take 3 capsules (3 mg) by mouth At Bedtime This product only available from a Compounding Pharmacy.   Quantity:  90 capsule   Refills:  0       QUEtiapine 400 MG tablet   Commonly known as:  SEROquel   Used for:  Schizoaffective disorder, bipolar type (H)        Dose:  400 mg   Take 1 tablet (400 mg) by mouth At Bedtime   Quantity:  60 tablet   Refills:  0       venlafaxine 75 MG Tb24 24 hr tablet   Commonly known as:  EFFEXOR-ER   Used for:  Schizoaffective disorder, bipolar type (H)        Dose:  225 mg   Take 3 tablets (225 mg) by mouth daily (with breakfast)   Quantity:  90 each   Refills:  0       Vitamin D (Cholecalciferol) 1000 units Tabs   Used for:  Vitamin D deficiency        Dose:  1000 Units   Take 1,000 Units by mouth daily   Quantity:  30 tablet   Refills:  0       * Notice:  This list has 2 medication(s) that are the same as other medications prescribed for you. Read the directions carefully, and ask your doctor or other care provider to review them with you.         Where to get your medicines      [place] [facility] [address] [place] [address]  [address], [facility] [address]     Phone:  [phone]     paliperidone  156 MG/ML Susp injection                Protect others around you: Learn how to safely use, store and throw away your medicines at www.disposemymeds.org.             Medication List: This is a list of all your medications and when to take them. Check marks below indicate your daily home schedule. Keep this list as a reference.      Medications           Morning Afternoon Evening Bedtime As Needed    benztropine 1 MG tablet   Commonly known as:  COGENTIN   Take 1 tablet (1 mg) by mouth 2 times daily   Last time this was given:  1 mg on 4/18/2018  7:58 AM                                      hydrOXYzine 50 MG tablet   Commonly known as:  ATARAX   Take 1 tablet (50 mg) by mouth every 4 hours as needed for anxiety   Last time this was given:  50 mg on 4/18/2018  8:45 AM                                   levothyroxine 25 MCG tablet   Commonly known as:  SYNTHROID/LEVOTHROID   Take 4.5 tablets (112 mcg) by mouth daily noon   Last time this was given:  112 mcg on 4/18/2018  7:57 AM                                   melatonin 3 MG Caps   Take 6 mg by mouth At Bedtime                                   paliperidone 156 MG/ML Susp injection   Commonly known as:  INVEGA SUSTENNA   Inject 1 mL (156 mg) into the muscle once for 1 dose   Start taking on:  5/10/2018                                pantoprazole 20 MG EC tablet   Commonly known as:  PROTONIX   Take 1 tablet (20 mg) by mouth daily   Last time this was given:  20 mg on 4/18/2018  7:57 AM                                   * polyethylene glycol Packet   Commonly known as:  MIRALAX/GLYCOLAX   Take 34 g by mouth daily                                   * polyethylene glycol Packet   Commonly known as:  MIRALAX/GLYCOLAX   Take 17 g by mouth daily as needed for constipation                                   prazosin 1 MG capsule   Commonly known as:  MINIPRESS   Take 3 capsules (3 mg) by mouth At Bedtime This product only available from a Compounding Pharmacy.   Last time this  was given:  3 mg on 4/17/2018  7:30 PM                                   QUEtiapine 400 MG tablet   Commonly known as:  SEROquel   Take 1 tablet (400 mg) by mouth At Bedtime   Last time this was given:  400 mg on 4/17/2018  7:30 PM                                   venlafaxine 75 MG Tb24 24 hr tablet   Commonly known as:  EFFEXOR-ER   Take 3 tablets (225 mg) by mouth daily (with breakfast)   Last time this was given:  225 mg on 4/18/2018  7:57 AM                                   Vitamin D (Cholecalciferol) 1000 units Tabs   Take 1,000 Units by mouth daily   Last time this was given:  1,000 Units on 4/18/2018  7:57 AM                                   ZYPREXA PO   Take 2.5-5 mg by mouth daily as needed for agitation   Last time this was given:  10 mg on 4/17/2018  9:34 AM                                   * Notice:  This list has 2 medication(s) that are the same as other medications prescribed for you. Read the directions carefully, and ask your doctor or other care provider to review them with you.

## 2018-04-13 NOTE — ED NOTES
[facility] [place]   ED Nurse to Floor Handoff     Kamini Neal is a 27 year old male who speaks English and lives with others,  in a group home  They arrived in the ED by ambulance from home    ED Chief Complaint: Suicidal (PT c/o feeling suicidal with a plan to burn himself)    ED Dx;   Final diagnoses:   Schizoaffective disorder, depressive type (H)   Suicide ideation         Needed?: No    Allergies:   Allergies   Allergen Reactions     Haldol [Haloperidol]      Torticollis   .  Past Medical Hx:   Past Medical History:   Diagnosis Date     Anxiety      Depressive disorder      Schizo affective schizophrenia (H)       Baseline Mental status: disability from metal health issues  Current Mental Status changes: impulsive    Infection present or suspected this encounter: no  Sepsis suspected: No  Isolation type: No active isolations     Activity level - Baseline/Home:  Independent  Activity Level - Current:   Independent    Bariatric equipment needed?: No    In the ED these meds were given:   Medications   benztropine (COGENTIN) tablet 1 mg (1 mg Oral Given 4/13/18 1006)   hydrOXYzine (ATARAX) tablet 50 mg (not administered)   levothyroxine (SYNTHROID/LEVOTHROID) tablet 112 mcg (112 mcg Oral Given 4/13/18 1008)   OLANZapine (zyPREXA) half-tab 2.5-5 mg (not administered)   OLANZapine (zyPREXA) tablet 10 mg (10 mg Oral Given 4/13/18 1007)   pantoprazole (PROTONIX) EC tablet 20 mg (20 mg Oral Given 4/13/18 1009)   venlafaxine (EFFEXOR-ER) 24 hr tablet 225 mg (225 mg Oral Given 4/13/18 1009)   OLANZapine zydis (zyPREXA) ODT tab 5 mg (5 mg Oral Given 4/13/18 0152)       Drips running?  No    Home pump  No    Current LDAs       Labs results:   Labs Ordered and Resulted from Time of ED Arrival Up to the Time of Departure from the ED   DRUG ABUSE SCREEN 6 CHEM DEP URINE ([facility])       Imaging Studies: No results found for this or any previous visit (from the past 24  hour(s)).    Recent vital signs:   /71  Pulse 89  Temp 96.4  F (35.8  C) (Axillary)  Resp 16  SpO2 98%    Cardiac Rhythm: Other  Pt needs tele? No  Skin/wound Issues: None    Code Status: Full Code    Pain control: pt had none    Nausea control: pt had none    Abnormal labs/tests/findings requiring intervention: NA    Family present during ED course? No   Family Comments/Social Situation comments: NA    Tasks needing completion: None    Aaron Omer RN  [facility]-- [phone] [place] ED  [phone] [facility]

## 2018-04-13 NOTE — PROGRESS NOTES
04/13/18 1421   Patient Belongings   Did you bring any home meds/supplements to the hospital?  No   Patient Belongings other (see comments)   Disposition of Belongings Locker   Belongings Search Yes   Clothing Search Yes   Second Staff Kin [address]       The following belongings are in Locker # 1:  Blue athletic shoes; Blue winter jacket; Blue backpack; Blue headphones; Brown wallet with [place] [employer] permit; Cell phone; Cigarettes and lighter.    NOTHING WAS SENT TO SECURITY AT THIS TIME.    A               Admission:  I am responsible for any personal items that are not sent to the safe or pharmacy.  [facility] is not responsible for loss, theft or damage of any property in my possession.    Signature:  _________________________________ Date: _______  Time: _____                                              Staff Signature:  ____________________________ Date: ________  Time: _____      2nd Staff person, if patient is unable/unwilling to sign:    Signature: ________________________________ Date: ________  Time: _____     Discharge:  [facility] has returned all of my personal belongings:    Signature: _________________________________ Date: ________  Time: _____                                          Staff Signature:  ____________________________ Date: ________  Time: _____

## 2018-04-13 NOTE — ED NOTES
Pt pacing in room, and all of sudden started banging his forehead on the door. Writer talked to pt and provided emotional support. Pt sat in the bed and stated he would like a sandwich when asked what we could do for him. Pt went back to bed after eating his sandwich. Resting calmly, will continue to monitor pt.

## 2018-04-14 PROCEDURE — 99222 1ST HOSP IP/OBS MODERATE 55: CPT | Mod: AI | Performed by: PSYCHIATRY & NEUROLOGY

## 2018-04-14 PROCEDURE — 99207 ZZC CDG-MDM COMPONENT: MEETS LOW - DOWN CODED: CPT | Performed by: PSYCHIATRY & NEUROLOGY

## 2018-04-14 PROCEDURE — 25000132 ZZH RX MED GY IP 250 OP 250 PS 637: Performed by: PSYCHIATRY & NEUROLOGY

## 2018-04-14 PROCEDURE — 25000132 ZZH RX MED GY IP 250 OP 250 PS 637: Performed by: EMERGENCY MEDICINE

## 2018-04-14 PROCEDURE — 12400007 ZZH R&B MH INTERMEDIATE UMMC

## 2018-04-14 RX ADMIN — PRAZOSIN HYDROCHLORIDE 3 MG: 1 CAPSULE ORAL at 20:00

## 2018-04-14 RX ADMIN — BENZTROPINE MESYLATE 1 MG: 1 TABLET ORAL at 09:33

## 2018-04-14 RX ADMIN — OLANZAPINE 10 MG: 10 TABLET, FILM COATED ORAL at 09:33

## 2018-04-14 RX ADMIN — LEVOTHYROXINE SODIUM 112 MCG: 112 TABLET ORAL at 09:33

## 2018-04-14 RX ADMIN — PANTOPRAZOLE SODIUM 20 MG: 20 TABLET, DELAYED RELEASE ORAL at 09:33

## 2018-04-14 RX ADMIN — MELATONIN TAB 3 MG 6 MG: 3 TAB at 20:00

## 2018-04-14 RX ADMIN — BENZTROPINE MESYLATE 1 MG: 1 TABLET ORAL at 20:00

## 2018-04-14 RX ADMIN — OLANZAPINE 10 MG: 10 TABLET, FILM COATED ORAL at 15:15

## 2018-04-14 RX ADMIN — NICOTINE POLACRILEX 8 MG: 4 GUM, CHEWING ORAL at 18:06

## 2018-04-14 RX ADMIN — QUETIAPINE FUMARATE 400 MG: 400 TABLET ORAL at 20:00

## 2018-04-14 RX ADMIN — VITAMIN D, TAB 1000IU (100/BT) 1000 UNITS: 25 TAB at 09:33

## 2018-04-14 RX ADMIN — NICOTINE POLACRILEX 8 MG: 4 GUM, CHEWING ORAL at 16:27

## 2018-04-14 RX ADMIN — VENLAFAXINE HYDROCHLORIDE 225 MG: 225 TABLET, FILM COATED, EXTENDED RELEASE ORAL at 09:33

## 2018-04-14 RX ADMIN — NICOTINE POLACRILEX 8 MG: 4 GUM, CHEWING ORAL at 20:00

## 2018-04-14 RX ADMIN — OLANZAPINE 10 MG: 10 TABLET, FILM COATED ORAL at 20:00

## 2018-04-14 ASSESSMENT — ACTIVITIES OF DAILY LIVING (ADL)
GROOMING: INDEPENDENT
ORAL_HYGIENE: INDEPENDENT
LAUNDRY: WITH SUPERVISION
ORAL_HYGIENE: INDEPENDENT
GROOMING: INDEPENDENT
DRESS: INDEPENDENT
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
## 2018-04-14 NOTE — PROGRESS NOTES
Pt.was isolative and withdrawn in his room sleeping/napping. He came out for dinner, snacks, and bedtime medications. He seemed restless. Affect blunted and flat. Denied SI/SIB. Will continue to monitor.

## 2018-04-14 NOTE — PROGRESS NOTES
Pt visible & pleasant in milieu. His interactions were positive & respectful. Pt denies si/sib & states he is not as depressed today.     04/14/18 1500   Behavioral Health   Hallucinations denies / not responding to hallucinations   Thinking poor concentration   Orientation person: oriented   Memory baseline memory   Insight poor   Judgement impaired   Eye Contact at examiner   Affect blunted, flat   Mood mood is calm   Hygiene well groomed   Suicidality other (see comments)  (none)   1. Wish to be Dead No   2. Non-Specific Active Suicidal Thoughts  No   Self Injury other (see comment)  (none)   Activity isolative;withdrawn   Speech coherent   Psychomotor / Gait balanced;steady   Safety   Suicidality Status 15   Coping/Psychosocial   Verbalized Emotional State acceptance   Plan Of Care Reviewed With patient   Psycho Education   Type of Intervention 1:1 intervention   Response participates with encouragement   Hours 0.5   Treatment Detail chk in   Activities of Daily Living   Hygiene/Grooming independent   Oral Hygiene independent   Dress independent   Laundry with supervision   Room Organization independent   Behavioral Health Interventions   Depression maintain safety precautions;provide emotional support   Social and Therapeutic Interventions (Depression) encourage participation in therapeutic groups and milieu activities
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
## 2018-04-15 PROCEDURE — 25000132 ZZH RX MED GY IP 250 OP 250 PS 637: Performed by: EMERGENCY MEDICINE

## 2018-04-15 PROCEDURE — 12400007 ZZH R&B MH INTERMEDIATE UMMC

## 2018-04-15 PROCEDURE — 25000132 ZZH RX MED GY IP 250 OP 250 PS 637: Performed by: PSYCHIATRY & NEUROLOGY

## 2018-04-15 RX ADMIN — OLANZAPINE 10 MG: 10 TABLET, FILM COATED ORAL at 14:06

## 2018-04-15 RX ADMIN — VENLAFAXINE HYDROCHLORIDE 225 MG: 225 TABLET, FILM COATED, EXTENDED RELEASE ORAL at 10:02

## 2018-04-15 RX ADMIN — QUETIAPINE FUMARATE 400 MG: 400 TABLET ORAL at 19:46

## 2018-04-15 RX ADMIN — LEVOTHYROXINE SODIUM 112 MCG: 112 TABLET ORAL at 10:02

## 2018-04-15 RX ADMIN — VITAMIN D, TAB 1000IU (100/BT) 1000 UNITS: 25 TAB at 10:02

## 2018-04-15 RX ADMIN — OLANZAPINE 10 MG: 10 TABLET, FILM COATED ORAL at 10:02

## 2018-04-15 RX ADMIN — NICOTINE POLACRILEX 8 MG: 4 GUM, CHEWING ORAL at 13:00

## 2018-04-15 RX ADMIN — PRAZOSIN HYDROCHLORIDE 3 MG: 1 CAPSULE ORAL at 19:46

## 2018-04-15 RX ADMIN — NICOTINE POLACRILEX 8 MG: 4 GUM, CHEWING ORAL at 19:47

## 2018-04-15 RX ADMIN — BENZTROPINE MESYLATE 1 MG: 1 TABLET ORAL at 19:47

## 2018-04-15 RX ADMIN — BENZTROPINE MESYLATE 1 MG: 1 TABLET ORAL at 10:02

## 2018-04-15 RX ADMIN — MELATONIN TAB 3 MG 6 MG: 3 TAB at 19:46

## 2018-04-15 RX ADMIN — OLANZAPINE 10 MG: 10 TABLET, FILM COATED ORAL at 19:47

## 2018-04-15 RX ADMIN — NICOTINE POLACRILEX 8 MG: 4 GUM, CHEWING ORAL at 15:47

## 2018-04-15 RX ADMIN — NICOTINE POLACRILEX 8 MG: 4 GUM, CHEWING ORAL at 18:28

## 2018-04-15 RX ADMIN — NICOTINE POLACRILEX 8 MG: 4 GUM, CHEWING ORAL at 10:02

## 2018-04-15 RX ADMIN — NICOTINE POLACRILEX 8 MG: 4 GUM, CHEWING ORAL at 16:51

## 2018-04-15 RX ADMIN — NICOTINE POLACRILEX 8 MG: 4 GUM, CHEWING ORAL at 11:15

## 2018-04-15 RX ADMIN — NICOTINE POLACRILEX 8 MG: 4 GUM, CHEWING ORAL at 14:06

## 2018-04-15 RX ADMIN — PANTOPRAZOLE SODIUM 20 MG: 20 TABLET, DELAYED RELEASE ORAL at 10:02

## 2018-04-15 RX ADMIN — Medication 2.5 MG: at 17:07

## 2018-04-15 ASSESSMENT — ACTIVITIES OF DAILY LIVING (ADL)
ORAL_HYGIENE: INDEPENDENT
GROOMING: INDEPENDENT
DRESS: INDEPENDENT
DRESS: INDEPENDENT
GROOMING: INDEPENDENT
LAUNDRY: WITH SUPERVISION
LAUNDRY: WITH SUPERVISION
ORAL_HYGIENE: INDEPENDENT

## 2018-04-16 LAB — INTERPRETATION ECG - MUSE: NORMAL

## 2018-04-16 PROCEDURE — 99232 SBSQ HOSP IP/OBS MODERATE 35: CPT | Performed by: PSYCHIATRY & NEUROLOGY

## 2018-04-16 PROCEDURE — 25000132 ZZH RX MED GY IP 250 OP 250 PS 637: Performed by: PSYCHIATRY & NEUROLOGY

## 2018-04-16 PROCEDURE — 25000132 ZZH RX MED GY IP 250 OP 250 PS 637: Performed by: EMERGENCY MEDICINE

## 2018-04-16 PROCEDURE — 12400007 ZZH R&B MH INTERMEDIATE UMMC

## 2018-04-16 PROCEDURE — 93005 ELECTROCARDIOGRAM TRACING: CPT

## 2018-04-16 PROCEDURE — H2032 ACTIVITY THERAPY, PER 15 MIN: HCPCS

## 2018-04-16 PROCEDURE — 99207 ZZC CDG-MDM COMPONENT: MEETS MODERATE - UP CODED: CPT | Performed by: PSYCHIATRY & NEUROLOGY

## 2018-04-16 PROCEDURE — 90853 GROUP PSYCHOTHERAPY: CPT

## 2018-04-16 RX ORDER — OLANZAPINE 10 MG/1
10 TABLET ORAL AT BEDTIME
Status: COMPLETED | OUTPATIENT
Start: 2018-04-16 | End: 2018-04-16

## 2018-04-16 RX ORDER — OLANZAPINE 10 MG/1
10 TABLET ORAL DAILY
Status: DISCONTINUED | OUTPATIENT
Start: 2018-04-17 | End: 2018-04-17

## 2018-04-16 RX ORDER — OLANZAPINE 10 MG/1
10 TABLET ORAL 2 TIMES DAILY
Status: DISCONTINUED | OUTPATIENT
Start: 2018-04-16 | End: 2018-04-16

## 2018-04-16 RX ADMIN — Medication 5 MG: at 10:05

## 2018-04-16 RX ADMIN — NICOTINE POLACRILEX 8 MG: 4 GUM, CHEWING ORAL at 11:46

## 2018-04-16 RX ADMIN — OLANZAPINE 10 MG: 10 TABLET, FILM COATED ORAL at 07:56

## 2018-04-16 RX ADMIN — CARBAMIDE PEROXIDE 6.5% 3 DROP: 6.5 LIQUID AURICULAR (OTIC) at 17:03

## 2018-04-16 RX ADMIN — VITAMIN D, TAB 1000IU (100/BT) 1000 UNITS: 25 TAB at 07:56

## 2018-04-16 RX ADMIN — VENLAFAXINE HYDROCHLORIDE 225 MG: 225 TABLET, FILM COATED, EXTENDED RELEASE ORAL at 07:56

## 2018-04-16 RX ADMIN — NICOTINE POLACRILEX 8 MG: 4 GUM, CHEWING ORAL at 10:07

## 2018-04-16 RX ADMIN — PRAZOSIN HYDROCHLORIDE 3 MG: 1 CAPSULE ORAL at 19:20

## 2018-04-16 RX ADMIN — BENZTROPINE MESYLATE 1 MG: 1 TABLET ORAL at 19:19

## 2018-04-16 RX ADMIN — LEVOTHYROXINE SODIUM 112 MCG: 112 TABLET ORAL at 07:56

## 2018-04-16 RX ADMIN — OLANZAPINE 10 MG: 10 TABLET, FILM COATED ORAL at 21:32

## 2018-04-16 RX ADMIN — QUETIAPINE FUMARATE 400 MG: 400 TABLET ORAL at 19:20

## 2018-04-16 RX ADMIN — NICOTINE POLACRILEX 8 MG: 4 GUM, CHEWING ORAL at 13:31

## 2018-04-16 RX ADMIN — NICOTINE POLACRILEX 8 MG: 4 GUM, CHEWING ORAL at 19:20

## 2018-04-16 RX ADMIN — NICOTINE POLACRILEX 8 MG: 4 GUM, CHEWING ORAL at 16:21

## 2018-04-16 RX ADMIN — Medication 5 MG: at 17:01

## 2018-04-16 RX ADMIN — NICOTINE POLACRILEX 8 MG: 4 GUM, CHEWING ORAL at 07:56

## 2018-04-16 RX ADMIN — BENZTROPINE MESYLATE 1 MG: 1 TABLET ORAL at 07:56

## 2018-04-16 RX ADMIN — NICOTINE POLACRILEX 8 MG: 4 GUM, CHEWING ORAL at 17:56

## 2018-04-16 RX ADMIN — PANTOPRAZOLE SODIUM 20 MG: 20 TABLET, DELAYED RELEASE ORAL at 07:56

## 2018-04-16 RX ADMIN — MELATONIN TAB 3 MG 6 MG: 3 TAB at 19:20

## 2018-04-16 RX ADMIN — NICOTINE POLACRILEX 8 MG: 4 GUM, CHEWING ORAL at 09:14

## 2018-04-16 ASSESSMENT — ACTIVITIES OF DAILY LIVING (ADL)
ORAL_HYGIENE: INDEPENDENT
GROOMING: INDEPENDENT
LAUNDRY: WITH SUPERVISION
DRESS: STREET CLOTHES
GROOMING: INDEPENDENT
DRESS: INDEPENDENT
LAUNDRY: WITH SUPERVISION
ORAL_HYGIENE: INDEPENDENT

## 2018-04-16 NOTE — PHARMACY-CONSULT NOTE
Pharmacy Consult ordered by Dr. Zazueta:   Evaluate the need for Invega Susstenna during current hospitalization. Also please evaluate the need for three different neuroleptics (Seroquel, Invega and Zyprexa).      D/I:  Mr. Neal is a 26 y/o with a history of schizoaffective disorder, bipolar type, post-traumatic stress disorder and cannabis use disorder whom EMS brought to the Emergency department from his group home with increased symptoms of psychosis and depression.  Mr. Neal endorsed increased symptoms of depression and paranoia, believing that others are watching him and plotting against him.  He did not go to work on the day he presented to the ED as a result of his paranoia.   He also endorsed suicidal ideation, with a plan for self-immolation.  Mr. Neal was admitted for stabilization.    Mr. Neal s psychiatric history is significant for prior diagnoses of major depressive disorder, schizoaffective disorder, bipolar affective disorder, borderline personality disorder, nicotine use disorder and cannabis use disorder.  Mr. Neal has been hospitalized on multiple occasions previously, at [facility], [facility], [place] and [place].  Mr. Neal has a history of self-injurious behavior, cutting and head banging.  He has attempted suicide at least once, per the record by self-immolation in 2017.  He has a history of civil commitment and completed MICD day treatment in January 2018.  Mr. Neal has a county , a psychiatrist and lives in a group home through [facility].  Per record review, Mr. Neal has tried multiple medications including, but not limited to olanzapine (plan was to discontinue this following initiation of clozapine), aripiprazole (did not follow up for [facility]), clozapine (up to 650 mg which caused sedation), paliperidone, haloperidol (caused torticollis), quetiapine, venlafaxine, hydroxyzine, prazosin, trazodone and melatonin.      Mr. Neal s medical  history is significant for hypothyroidism and gastroesophageal reflux disease.    Diagnoses (per Dr. Hartley on admission):  Schizoaffective disorder, bipolar type  Hypothyroidism,  History of post-traumatic stress disorder    Medications:  Current Facility-Administered Medications   Medication     [START ON 5/10/2018] paliperidone (INVEGA SUSTENNA) SUSP 117 mg     OLANZapine (zyPREXA) tablet 10 mg     nicotine polacrilex (NICORETTE) gum 4-8 mg     benztropine (COGENTIN) tablet 1 mg     levothyroxine (SYNTHROID/LEVOTHROID) tablet 112 mcg     OLANZapine (zyPREXA) half-tab 2.5-5 mg     pantoprazole (PROTONIX) EC tablet 20 mg     venlafaxine (EFFEXOR-ER) 24 hr tablet 225 mg     polyethylene glycol (MIRALAX/GLYCOLAX) Packet 34 g     polyethylene glycol (MIRALAX/GLYCOLAX) Packet 17 g     cholecalciferol (vitamin D3) tablet 1,000 Units     QUEtiapine (SEROquel) tablet 400 mg     prazosin (MINIPRESS) capsule 3 mg     melatonin tablet 6 mg     magnesium hydroxide (MILK OF MAGNESIA) suspension 30 mL     acetaminophen (TYLENOL) tablet 650 mg     bisacodyl (DULCOLAX) Suppository 10 mg     alum & mag hydroxide-simethicone (MYLANTA ES/MAALOX  ES) suspension 30 mL     Allergies:  Haloperidol caused torticollis    Labs and vital signs:  Urine drug screen negative.  Blood pressure has been in the 100s   110s/50s   80s mmHg range and heart rate in the 60s   90s beats per minute range.     Patient Interview:  This writer met with Mr. Neal to determine his paliperidone palmitate dose.  He reported that he gets  200 .   When this writer stated [facility] documented that they gave him 117 mg on 4/5, he stated,  that s probably right .       Assessment:  Mr. Neal is taking three antipsychotic medications:  paliperidone palmitate 117 mg IM Q28 days, olanzapine 10 mg PO TID and quetiapine 400 mg PO QHS.   Per record review, the plan in 10/2017 was to initiate clozapine and taper off olanzapine.  The olanzapine was never tapered off, though  the patient was reportedly on 650 mg PO clozapine.  Polypharmacy persists with intermittent efficacy/relapses of paranoia.    Data suggest that using multiple antipsychotic medications does not lend additional efficacy and usually results in more side effects.  Mr. Neal s quetiapine and paliperidone doses are not optimized.    The current dose of paliperidone palmitate is essentially equivalent to 6 mg oral paliperidone daily.  The dosing equivalence, per the prescribing information is as follows:  Paliperidone   extended-release tablet Paliperidone palmitate   1-month extended-release injection   12 mg once daily 234 mg IM every 4 weeks   6 mg once daily 117 mg IM every 4 weeks   3 mg once daily 39 to 78 mg IM every 4 weeks*   *The 39 mg strength of paliperidone palmitate IM injection has not been studied in the treatment of schizoaffective disorder.      Timing of next paliperidone palmitate dose  Per policy, paliperidone palmitate may be continued, and pharmacy will supply the medication if the following conditions are all met:  1. Patient has been on paliperidone palmitate for at least 3 weeks  2. Patient has received both initiation doses  3. Patient will be hospitalized for at least one week beyond the next scheduled dose is due  The policy also states that if six or more months have passed since the last maintenance dose was given, do not restart paliperidone palmitate in the hospital, defer to outpatient treatment.    Mr. Neal last received his paliperidone palmitate 117 mg IM injection on 4/5/18.  His next dose is due in 28 days, on 5/3/18.  Per policy outlined above, if Mr. Neal remains hospitalized, he may receive the next paliperidone palmitate injection on 5/10/18.      Recommendations:  1. Consider optimizing antipsychotic medications.  Paliperidone and quetiapine are not at optimal doses.   Taper up oral paliperidone while tapering down quetiapine with plan to discontinue quetiapine:  a. Add  oral paliperidone, 3 mg/day for three days, and assess the need to increase further.  Since Mr. Neal is currently getting 117 mg of paliperidone palmitate (which is equivalent to 6 mg PO), the maximum oral paliperidone to add is 6 mg PO daily.  b. Upon initiation of oral paliperidone, cross taper with quetiapine discontinuation.  May decrease quetiapine by 50 mg daily until off.  2. After initiating paliperidone oral medication, consider increasing paliperidone palmitate to appropriate equivalent dosing for the next injection, as per the chart above.  If Mr. Neal is on 3 mg of oral paliperidone, the next dose of palmitate would be 156 mg IM; if Mr. Neal is on 6 mg of oral paliperidone, the next dose of palmitate would be 234 mg IM.  Discontinue oral paliperidone with the next injection.  The next injection is due 5/3/18, which may be given on 5/10 if he remains in the hospital.      Thank you for the consult.  Huong Alvarado, Pharm.D., BCPS, BCPP  Pager:  [phone]

## 2018-04-16 NOTE — PLAN OF CARE
Problem: Mood Impairment (Depressive Signs/Symptoms) (Adult)  Intervention: Promote Mood Improvement  48 hour nursing assessment:    Pt evaluation continues.  Assessed mood, anxiety, thoughts and behavior.  Is progressing towards goals.  Encourage participation in groups and developing healthy coping skills. Refer to daily team meeting notes for individualized plan of care.    Pt.was out and visible in the milieu. Was appropriately sociable with staff. Affect labile. Mood calm. Appeared to be preoccupied internally. Denied SI/SIB. Endorsed anxiety and racing thoughts. Very fixated to know about his diagnosis and wanted to read his EHR notes. Will continue to monitor.
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
## 2018-04-16 NOTE — PROGRESS NOTES
04/16/18 1450   Behavioral Health   Hallucinations denies / not responding to hallucinations   Thinking poor concentration   Orientation person: oriented;place: oriented   Memory baseline memory   Insight poor   Judgement intact   Eye Contact at examiner   Affect full range affect   Mood mood is calm   Physical Appearance/Attire appears stated age   Hygiene well groomed   Suicidality other (see comments)  (pt. denies)   1. Wish to be Dead No   2. Non-Specific Active Suicidal Thoughts  No   Activities of Daily Living   Hygiene/Grooming independent   Oral Hygiene independent   Dress street clothes   Laundry with supervision   Room Organization independent       Pt. was present in the milieu and participated in groups activities. He mentioned that he is ready to be discharged. He was social and cooperative with others.

## 2018-04-17 PROCEDURE — 12400007 ZZH R&B MH INTERMEDIATE UMMC

## 2018-04-17 PROCEDURE — 25000132 ZZH RX MED GY IP 250 OP 250 PS 637: Performed by: EMERGENCY MEDICINE

## 2018-04-17 PROCEDURE — 25000132 ZZH RX MED GY IP 250 OP 250 PS 637: Performed by: PSYCHIATRY & NEUROLOGY

## 2018-04-17 PROCEDURE — 99232 SBSQ HOSP IP/OBS MODERATE 35: CPT | Performed by: PSYCHIATRY & NEUROLOGY

## 2018-04-17 PROCEDURE — 99207 ZZC CDG-MDM COMPONENT: MEETS MODERATE - UP CODED: CPT | Performed by: PSYCHIATRY & NEUROLOGY

## 2018-04-17 RX ORDER — HYDROXYZINE HYDROCHLORIDE 25 MG/1
25-50 TABLET, FILM COATED ORAL 3 TIMES DAILY PRN
Status: DISCONTINUED | OUTPATIENT
Start: 2018-04-17 | End: 2018-04-18 | Stop reason: HOSPADM

## 2018-04-17 RX ADMIN — NICOTINE POLACRILEX 8 MG: 4 GUM, CHEWING ORAL at 17:11

## 2018-04-17 RX ADMIN — NICOTINE POLACRILEX 8 MG: 4 GUM, CHEWING ORAL at 15:47

## 2018-04-17 RX ADMIN — PRAZOSIN HYDROCHLORIDE 3 MG: 1 CAPSULE ORAL at 19:30

## 2018-04-17 RX ADMIN — CARBAMIDE PEROXIDE 6.5% 3 DROP: 6.5 LIQUID AURICULAR (OTIC) at 19:32

## 2018-04-17 RX ADMIN — NICOTINE POLACRILEX 8 MG: 4 GUM, CHEWING ORAL at 11:44

## 2018-04-17 RX ADMIN — NICOTINE POLACRILEX 8 MG: 4 GUM, CHEWING ORAL at 09:33

## 2018-04-17 RX ADMIN — BENZTROPINE MESYLATE 1 MG: 1 TABLET ORAL at 19:30

## 2018-04-17 RX ADMIN — NICOTINE POLACRILEX 8 MG: 4 GUM, CHEWING ORAL at 10:42

## 2018-04-17 RX ADMIN — LEVOTHYROXINE SODIUM 112 MCG: 112 TABLET ORAL at 09:34

## 2018-04-17 RX ADMIN — QUETIAPINE FUMARATE 400 MG: 400 TABLET ORAL at 19:30

## 2018-04-17 RX ADMIN — OLANZAPINE 10 MG: 10 TABLET, FILM COATED ORAL at 09:34

## 2018-04-17 RX ADMIN — PANTOPRAZOLE SODIUM 20 MG: 20 TABLET, DELAYED RELEASE ORAL at 09:34

## 2018-04-17 RX ADMIN — HYDROXYZINE HYDROCHLORIDE 50 MG: 25 TABLET ORAL at 19:30

## 2018-04-17 RX ADMIN — CARBAMIDE PEROXIDE 6.5% 3 DROP: 6.5 LIQUID AURICULAR (OTIC) at 09:37

## 2018-04-17 RX ADMIN — BENZTROPINE MESYLATE 1 MG: 1 TABLET ORAL at 09:34

## 2018-04-17 RX ADMIN — VENLAFAXINE HYDROCHLORIDE 225 MG: 225 TABLET, FILM COATED, EXTENDED RELEASE ORAL at 09:34

## 2018-04-17 RX ADMIN — MELATONIN TAB 3 MG 6 MG: 3 TAB at 19:30

## 2018-04-17 RX ADMIN — VITAMIN D, TAB 1000IU (100/BT) 1000 UNITS: 25 TAB at 09:34

## 2018-04-17 RX ADMIN — NICOTINE POLACRILEX 8 MG: 4 GUM, CHEWING ORAL at 13:36

## 2018-04-17 ASSESSMENT — ACTIVITIES OF DAILY LIVING (ADL)
GROOMING: INDEPENDENT
ORAL_HYGIENE: INDEPENDENT

## 2018-04-17 NOTE — PROGRESS NOTES
Call from the , Neela. Tomorrow is good for discharge. Can pick pt up at 9 am. CTC did fax the MAR and recent progress note to her so she could be aware of medication changes.

## 2018-04-17 NOTE — DISCHARGE INSTRUCTIONS
Behavioral Discharge Planning and Instructions      Summary:  You were admitted on 4/13/2018  due to Psychotic Symptomology.  You were treated by Dr Eze Zazueta MD and discharged on 4/18/2018 from Station [facility] to [facility]    Neela is the  at your [facility], [facility]: [phone] (cell) or [phone]    Principal Diagnosis:   1.  Schizoaffective disorder -- bipolar type.   2.  Hypothyroidism.   3.  History of posttraumatic stress disorder.     Health Care Follow-up Appointments:   Psychiatry Appointment Date/Time: Thursday 5/10 at 10:20 am      Psychiatrist: Marco Campo   Therapy Appointment Date/time: Friday 4/20 at 10 am   Therapist: Krystyna James  [facility]   [facility] [address]   [address] [place] [address]   Phone: [phone]  Fax: [phone]    There is a walk-in connections group on Tuesdays and Fridays at 10:00am.   You may attend at these times to be seen for medication management and therapy without an appointment.     Your Invega Sustena shot is due the first week of May. Your medications have been sent to [facility].     Attend all scheduled appointments with your outpatient providers. Call at least 24 hours in advance if you need to reschedule an appointment to ensure continued access to your outpatient providers.   Major Treatments, Procedures and Findings:  You were provided with: a psychiatric assessment, assessed for medical stability, medication evaluation and/or management, group therapy and milieu management    Symptoms to Report: feeling more aggressive, increased confusion, losing more sleep, mood getting worse or thoughts of suicide    Early warning signs can include: increased depression or anxiety sleep disturbances increased thoughts or behaviors of suicide or self-harm  increased unusual thinking, such as paranoia or hearing voices    Safety and Wellness:  Take all  "medicines as directed.  Make no changes unless your doctor suggests them.      Follow treatment recommendations.  Refrain from alcohol and non-prescribed drugs.  If there is a concern for safety, call 911.    Resources:   LAM:[phone]  Nati Ng: [phone]     National Shaw on Mental Illness (www.[place].jorge a.org): 192.159.3603 or 139-173-2887.  Alcoholics Anonymous (www.alcoholics-anonymous.org): Check your phone book for your local chapter.  Suicide Awareness Voices of Education (SAVE) (www.save.org): 299-908-ZGWC (6163)  National Suicide Prevention Line (www.mentalhealthmn.org): 260-392-KNMG (3666)  Mental Health Consumer/Survivor Network of [place] (www.mhcsn.net): 686.275.4673 or 399-836-3528  Mental Health Association of [place] (www.mentalhealth.org): 323.288.2825 or 400-646-1549  Self- Management and Recovery Training., SMART-- Toll free: 294.523.1343  www.[employer]  Text 4 Life: txt \"LIFE\" to 74304 for immediate support and crisis intervention  Crisis text line: Text \"[place]\" to 724475. Free, confidential, 24/7.    The treatment team has appreciated the opportunity to work with you.     If you have any questions or concerns our unit number is [phone]        "
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
## 2018-04-17 NOTE — PROGRESS NOTES
Pt was up and visible out in milieu. Presents with full range affect, on approach calm and irritable at times. Pt reports feeling much better denies hallucination. States he is looking forward to d/c tomorrow and go back home. Pt denies SI, SIB, HI and cooperates.        04/16/18 2025   Behavioral Health   Hallucinations denies / not responding to hallucinations   Thinking poor concentration   Orientation time: oriented;date: oriented;place: oriented;person: oriented   Memory baseline memory   Insight poor   Judgement impaired   Eye Contact at examiner   Affect full range affect   Mood mood is calm   Physical Appearance/Attire attire appropriate to age and situation;appears stated age   Hygiene well groomed   Suicidality other (see comments)  (denies)   1. Wish to be Dead No   Wish to be Dead Description none    2. Non-Specific Active Suicidal Thoughts  No   Non-Specific Active Suicidal Thought Description none   Self Injury other (see comment)  (denies)   Elopement (none observed this shift)   Activity withdrawn;other (see comment)  (visible out in milieu, kept to self)   Speech clear;coherent   Medication Sensitivity no observed side effects;no stated side effects   Psychomotor / Gait balanced;steady   Safety   Suicidality Status 15   Psycho Education   Type of Intervention 1:1 intervention   Response participates, initiates socially appropriate   Hours 0.5   Treatment Detail check in   Activities of Daily Living   Hygiene/Grooming independent   Oral Hygiene independent   Dress independent   Laundry with supervision   Room Organization independent   Activity   Activity Assistance Provided independent   Behavioral Health Interventions   Depression maintain safety precautions;monitor need to revise level of observation;maintain safe secure environment;assist patient in developing safety plan;assist patient in following safety plan;encourage participation / independence with adls;provide emotional support;establish  therapeutic relationship   Social and Therapeutic Interventions (Depression) encourage participation in therapeutic groups and milieu activities;encourage effective boundaries with peers;encourage socialization with peers

## 2018-04-17 NOTE — PROGRESS NOTES
[facility] called the [facility] to check on pt's PCP and fax number. Was advised pt is not a patient at their clinic.

## 2018-04-17 NOTE — PROGRESS NOTES
Pt was withdrawn (intermittently social with peers) and did not attend groups today. Pt appears restless (changing activities often and pacing the hallways). Pt was redirected by staff from talking to other patients about drugs more than once. Pt denied SI and SIB.     04/17/18 1500   Behavioral Health   Hallucinations denies / not responding to hallucinations   Thinking poor concentration;distractable   Orientation time: oriented;date: oriented;place: oriented;person: oriented   Memory baseline memory   Insight poor   Judgement impaired   Eye Contact at examiner   Affect blunted, flat   Mood anxious   Physical Appearance/Attire attire appropriate to age and situation   Hygiene well groomed   Suicidality other (see comments)  (Denied)   1. Wish to be Dead No   2. Non-Specific Active Suicidal Thoughts  No   Self Injury other (see comment)  (Denied)   Elopement (NA)   Activity withdrawn   Speech clear;coherent   Medication Sensitivity no observed side effects;no stated side effects   Psychomotor / Gait balanced;steady   Psycho Education   Type of Intervention 1:1 intervention   Response participates, initiates socially appropriate   Hours 0.5   Treatment Detail Check in

## 2018-04-18 VITALS
HEART RATE: 87 BPM | BODY MASS INDEX: 32.22 KG/M2 | HEIGHT: 70 IN | SYSTOLIC BLOOD PRESSURE: 113 MMHG | DIASTOLIC BLOOD PRESSURE: 78 MMHG | TEMPERATURE: 99 F | RESPIRATION RATE: 16 BRPM | OXYGEN SATURATION: 100 % | WEIGHT: 225.1 LBS

## 2018-04-18 PROCEDURE — 25000132 ZZH RX MED GY IP 250 OP 250 PS 637: Performed by: PSYCHIATRY & NEUROLOGY

## 2018-04-18 PROCEDURE — 25000132 ZZH RX MED GY IP 250 OP 250 PS 637: Performed by: EMERGENCY MEDICINE

## 2018-04-18 PROCEDURE — 99238 HOSP IP/OBS DSCHRG MGMT 30/<: CPT | Performed by: PSYCHIATRY & NEUROLOGY

## 2018-04-18 RX ADMIN — HYDROXYZINE HYDROCHLORIDE 50 MG: 25 TABLET ORAL at 08:45

## 2018-04-18 RX ADMIN — CARBAMIDE PEROXIDE 6.5% 3 DROP: 6.5 LIQUID AURICULAR (OTIC) at 07:55

## 2018-04-18 RX ADMIN — BENZTROPINE MESYLATE 1 MG: 1 TABLET ORAL at 07:58

## 2018-04-18 RX ADMIN — LEVOTHYROXINE SODIUM 112 MCG: 112 TABLET ORAL at 07:57

## 2018-04-18 RX ADMIN — VENLAFAXINE HYDROCHLORIDE 225 MG: 225 TABLET, FILM COATED, EXTENDED RELEASE ORAL at 07:57

## 2018-04-18 RX ADMIN — PANTOPRAZOLE SODIUM 20 MG: 20 TABLET, DELAYED RELEASE ORAL at 07:57

## 2018-04-18 RX ADMIN — NICOTINE POLACRILEX 8 MG: 4 GUM, CHEWING ORAL at 07:58

## 2018-04-18 RX ADMIN — VITAMIN D, TAB 1000IU (100/BT) 1000 UNITS: 25 TAB at 07:57

## 2018-04-18 RX ADMIN — NICOTINE POLACRILEX 8 MG: 4 GUM, CHEWING ORAL at 08:46

## 2018-04-18 ASSESSMENT — ACTIVITIES OF DAILY LIVING (ADL)
GROOMING: INDEPENDENT
ORAL_HYGIENE: INDEPENDENT
LAUNDRY: WITH SUPERVISION
DRESS: INDEPENDENT

## 2018-04-18 NOTE — PROGRESS NOTES
Patient discharging 4/18/2018 accompanied by Group Home and destination is home.    Discharge paperwork and medications reviewed with patient who verbalizes understanding. Group Home was faxed infor.    Copies provided: AVS X      Med Rec X MedsX  Security X   Locker X     DISCHARGE FLOW SHEET: X    CARE PLAN COMPLETE: x    EDUCATION COMPLETE: X    Illness Management Recovery model: Personal Plan of Care    Patient completed Personal Plan of Care, identifying reasons for hospitalization and goals for discharge.     Survey provided.

## 2018-04-24 ENCOUNTER — HOSPITAL ENCOUNTER (INPATIENT)
Facility: CLINIC | Age: 28
LOS: 6 days | Discharge: NURSING, REHAB OR LONG-TERM CARE | DRG: 883 | End: 2018-05-01
Attending: EMERGENCY MEDICINE | Admitting: PSYCHIATRY & NEUROLOGY
Payer: COMMERCIAL

## 2018-04-24 DIAGNOSIS — R45.851 SUICIDAL IDEATION: ICD-10-CM

## 2018-04-24 DIAGNOSIS — F25.9 SCHIZOAFFECTIVE DISORDER, UNSPECIFIED TYPE (H): ICD-10-CM

## 2018-04-24 PROCEDURE — 99285 EMERGENCY DEPT VISIT HI MDM: CPT | Mod: 25 | Performed by: EMERGENCY MEDICINE

## 2018-04-24 PROCEDURE — 99285 EMERGENCY DEPT VISIT HI MDM: CPT | Mod: Z6 | Performed by: EMERGENCY MEDICINE

## 2018-04-24 PROCEDURE — 90791 PSYCH DIAGNOSTIC EVALUATION: CPT

## 2018-04-24 NOTE — IP AVS SNAPSHOT
MRN:[id number]                      After Visit Summary   4/24/2018    Kamini Neal    MRN: [id number]           Thank you!     Thank you for choosing [facility] for your care. Our goal is always to provide you with excellent care.        Patient Information     Date Of Birth          1990        About your hospital stay     You were admitted on:  April 25, 2018 You last received care in the:   [facility]    You were discharged on:  May 1, 2018        Reason for your hospital stay       Self injury thoughts                  Who to Call     For medical emergencies, please call 911.  For non-urgent questions about your medical care, please call your primary care provider or clinic, None          Attending Provider     Provider Specialty    Jayson Gracia MD Emergency Medicine    [facility], Constantino Webb MD Psychiatry       Primary Care Provider Fax #    Physician No Ref-Primary [phone]      After Care Instructions     Activity       Your activity upon discharge: activity as tolerated            Diet       Follow this diet upon discharge: Orders Placed This Encounter      Regular Diet Adult            Discharge Instructions       Please see Primary for ear drainage  Please see psych for starting clozapine in future  Therapy of DBT and psychodynamic may help  Can do Day Treatment in future if needed    1. Please do not harm yourself or others.  2. Please continue to take your medications.  3. Please follow up with your outpatient care team.  4. Please do not take drugs or alcohol as this will worsen your condition.  5. Please do not take more than the prescribed doses of medications as this may make them dangerous.   6. Please follow your safety plan of action.  7. Please call crisis if having trouble.  8. If having thoughts of harming self or others please come in to the emergency department as soon as possible.                  Further instructions from your care team        Behavioral  Discharge Planning and Instructions      Summary:  You were admitted on 4/24/2018 due to Schizoaffective disorder and Suicidal Ideations.  You were treated by Dr. Constantino Lugo MD and discharged on 5/1/18 from [facility].     Principal Diagnosis:   1.  Schizoaffective disorder -- bipolar type.   2.  Hypothyroidism.   3.  History of posttraumatic stress disorder.     Health Care Follow-up Appointments:   [facility]: [place]   Phone: [phone] Fax: [phone] [facility] PLEASE FAX DISCHARGE INSTRUCTIONS   Your medications have been sent to [facility].     Psychiatry Appointment Date/Time: Thursday 5/10 at 10:20 am      Psychiatrist: Marco Campo   Therapy Appointment Date/time: Please make follow-up therapy appointment with your Therapist: Krystyna James  [facility]   [address] [address]   [address]rd [place] [address]   Phone: [phone]  Fax: [phone] [facility] PLEASE FAX DISCHARGE INSTRUCTIONS     There is a walk-in connections group on Tuesdays and Fridays at 10:00am.   You may attend at these times to be seen for medication management and therapy without an appointment.     At your request a referral was made to [facility] for psychiatry and therapy services, however, we never heard back in time to schedule. *Please contact them to schedule appointments: [phone]    Attend all scheduled appointments with your outpatient providers. Call at least 24 hours in advance if you need to reschedule an appointment to ensure continued access to your outpatient providers.   Major Treatments, Procedures and Findings:  You were provided with: a psychiatric assessment, assessed for medical stability, medication evaluation and/or management, group therapy and milieu management    Symptoms to Report: feeling more aggressive, increased confusion, losing more sleep, mood  "getting worse or thoughts of suicide    Early warning signs can include: increased depression or anxiety sleep disturbances increased thoughts or behaviors of suicide or self-harm  increased unusual thinking, such as paranoia or hearing voices    Safety and Wellness:  Take all medicines as directed.  Make no changes unless your doctor suggests them.      Follow treatment recommendations.  Refrain from alcohol and non-prescribed drugs.  Ask your support system to help you reduce your access to items that could harm yourself or others. If there is a concern for safety, call 911.    Resources:   Crisis Intervention: 451.171.4760 or 240-283-7129 (TTY: 567.781.3644).  Call anytime for help.  National [place] on Mental Illness (www.[place].jorge a.org): 817.303.6945 or 262-733-9983.  Alcoholics Anonymous (www.alcoholics-anonymous.org): Check your phone book for your local chapter.  Suicide Awareness Voices of Education (SAVE) (www.save.org): 894-228-EUMT (4894)  National Suicide Prevention Line (www.mentalhealthmn.org): 155-737-DZNM (5177)  Mental Health Consumer/Survivor Network of [place] (www.mhcsn.net): 982.447.6964 or 578-844-6553  Mental Health Association of [place] (www.mentalhealth.org): 626.503.1897 or 112-508-4484  Self- Management and Recovery Training., SMART-- Toll free: 985.411.5213  www.ResourceKraft.org  [facility] Crisis (COPE) Response - Adult [phone]  Text 4 Life: txt \"LIFE\" to 34411 for immediate support and crisis intervention  Crisis text line: Text \"[place]\" to 070759. Free, confidential, 24/7.  Crisis Intervention: 808.641.2662 or 625-416-6919. Call anytime for help.     The treatment team has appreciated the opportunity to work with you.     Please take care and make your recovery a daily recovery.   If you have any questions or concerns our unit number is [phone].  Thank you.         Pending Results     No orders found from 4/22/2018 to 4/25/2018.            Statement of Approval     Ordered          " "18 0936  I have reviewed and agree with all the recommendations and orders detailed in this document.  EFFECTIVE NOW     Approved and electronically signed by:  Constantino Lugo MD             Admission Information     Date & Time Provider Department Dept. Phone    2018 Constantino Lugo MD  [facility] [phone]      Your Vitals Were     Blood Pressure Pulse Temperature Respirations Height Weight    114/74 64 99.9  F (37.7  C) (Oral) 18 1.753 m (5' 9\") 101.2 kg (223 lb)    Pulse Oximetry BMI (Body Mass Index)                97% 32.93 kg/m2          MyChart Information     [employer] lets you send messages to your doctor, view your test results, renew your prescriptions, schedule appointments and more. To sign up, go to [email or web address] . Click on \"Log in\" on the left side of the screen, which will take you to the Welcome page. Then click on \"Sign up Now\" on the right side of the page.     You will be asked to enter the access code listed below, as well as some personal information. Please follow the directions to create your username and password.     Your access code is: [email or web address]  Expires: 2018  6:33 PM     Your access code will  in 90 days. If you need help or a new code, please call your [facility] clinic or [phone].        Care EveryWhere ID     This is your Care EveryWhere ID. This could be used by other organizations to access your [facility] medical records  [id number]        Equal Access to Services     [facility] JERI : Hadii melonie Cedeño, waaxda lumjadaha, qaybta kachristiano oconnell . So [facility] [phone].    ATENCIÓN: Si habla español, tiene a reyes disposición servicios gratuitos de asistencia lingüística. Llame al [phone].    We comply with applicable federal civil rights laws and [place] laws. We do not discriminate on the basis of race, color, national origin, age, disability, sex, sexual " orientation, or gender identity.               Review of your medicines      START taking        Dose / Directions    traZODone 50 MG tablet   Commonly known as:  DESYREL   Used for:  Schizoaffective disorder, unspecified type (H)        Dose:  50 mg   Take 1 tablet (50 mg) by mouth nightly as needed for sleep   Quantity:  90 tablet   Refills:  0         CONTINUE these medicines which may have CHANGED, or have new prescriptions. If we are uncertain of the size of tablets/capsules you have at home, strength may be listed as something that might have changed.        Dose / Directions    OLANZapine 10 MG tablet   Commonly known as:  zyPREXA   This may have changed:    - medication strength  - how much to take  - when to take this  - reasons to take this   Used for:  Schizoaffective disorder, unspecified type (H), Suicidal ideation        Dose:  10 mg   Take 1 tablet (10 mg) by mouth 3 times daily as needed (self harm/ aggitation)   Quantity:  90 tablet   Refills:  0       polyethylene glycol Packet   Commonly known as:  MIRALAX/GLYCOLAX   This may have changed:  Another medication with the same name was removed. Continue taking this medication, and follow the directions you see here.   Used for:  Drug-induced constipation        Dose:  34 g   Take 34 g by mouth daily   Quantity:  50 packet   Refills:  0         CONTINUE these medicines which have NOT CHANGED        Dose / Directions    benztropine 1 MG tablet   Commonly known as:  COGENTIN   Used for:  Schizoaffective disorder, bipolar type (H)        Dose:  1 mg   Take 1 tablet (1 mg) by mouth 2 times daily   Quantity:  60 tablet   Refills:  0       hydrOXYzine 50 MG tablet   Commonly known as:  ATARAX   Used for:  Schizoaffective disorder, bipolar type (H)        Dose:  50 mg   Take 1 tablet (50 mg) by mouth every 4 hours as needed for anxiety   Quantity:  60 tablet   Refills:  0       levothyroxine 25 MCG tablet   Commonly known as:  SYNTHROID/LEVOTHROID   Used for:   Acquired hypothyroidism        Dose:  112 mcg   Take 4.5 tablets (112 mcg) by mouth daily noon   Quantity:  135 tablet   Refills:  0       melatonin 3 MG Caps   Used for:  Primary insomnia        Dose:  6 mg   Take 6 mg by mouth At Bedtime   Quantity:  60 capsule   Refills:  0       paliperidone 156 MG/ML Susp injection   Commonly known as:  INVEGA SUSTENNA   Used for:  Schizoaffective disorder, depressive type (H)        Dose:  156 mg   Start taking on:  5/10/2018   Inject 1 mL (156 mg) into the muscle once for 1 dose   Quantity:  1 mL   Refills:  0       pantoprazole 20 MG EC tablet   Commonly known as:  PROTONIX   Used for:  Gastroesophageal reflux disease, esophagitis presence not specified        Dose:  20 mg   Take 1 tablet (20 mg) by mouth daily   Quantity:  30 tablet   Refills:  0       prazosin 1 MG capsule   Commonly known as:  MINIPRESS   Used for:  PTSD (post-traumatic stress disorder)        Dose:  3 mg   Take 3 capsules (3 mg) by mouth At Bedtime This product only available from a Compounding Pharmacy.   Quantity:  90 capsule   Refills:  0       QUEtiapine 400 MG tablet   Commonly known as:  SEROquel   Used for:  Schizoaffective disorder, bipolar type (H)        Dose:  400 mg   Take 1 tablet (400 mg) by mouth At Bedtime   Quantity:  60 tablet   Refills:  0       venlafaxine 75 MG Tb24 24 hr tablet   Commonly known as:  EFFEXOR-ER   Used for:  Schizoaffective disorder, bipolar type (H)        Dose:  225 mg   Take 3 tablets (225 mg) by mouth daily (with breakfast)   Quantity:  90 each   Refills:  0       Vitamin D (Cholecalciferol) 1000 units Tabs   Used for:  Vitamin D deficiency        Dose:  1000 Units   Take 1,000 Units by mouth daily   Quantity:  30 tablet   Refills:  0            Where to get your medicines      These medications were sent to [facility] [address]  [address], [facility] [address]     Phone:  [phone]      OLANZapine 10 MG tablet    traZODone 50 MG tablet                Protect others around you: Learn how to safely use, store and throw away your medicines at www.disposemymeds.org.             Medication List: This is a list of all your medications and when to take them. Check marks below indicate your daily home schedule. Keep this list as a reference.      Medications           Morning Afternoon Evening Bedtime As Needed    benztropine 1 MG tablet   Commonly known as:  COGENTIN   Take 1 tablet (1 mg) by mouth 2 times daily   Last time this was given:  1 mg on 5/1/2018  8:54 AM                                      hydrOXYzine 50 MG tablet   Commonly known as:  ATARAX   Take 1 tablet (50 mg) by mouth every 4 hours as needed for anxiety   Last time this was given:  50 mg on 4/30/2018 10:12 PM                                   levothyroxine 25 MCG tablet   Commonly known as:  SYNTHROID/LEVOTHROID   Take 4.5 tablets (112 mcg) by mouth daily noon   Last time this was given:  112 mcg on 5/1/2018  8:54 AM                                   melatonin 3 MG Caps   Take 6 mg by mouth At Bedtime                                   OLANZapine 10 MG tablet   Commonly known as:  zyPREXA   Take 1 tablet (10 mg) by mouth 3 times daily as needed (self harm/ aggitation)   Last time this was given:  10 mg on 4/30/2018  7:11 PM                                   paliperidone 156 MG/ML Susp injection   Commonly known as:  INVEGA SUSTENNA   Inject 1 mL (156 mg) into the muscle once for 1 dose   Start taking on:  5/10/2018                                pantoprazole 20 MG EC tablet   Commonly known as:  PROTONIX   Take 1 tablet (20 mg) by mouth daily   Last time this was given:  20 mg on 5/1/2018  8:54 AM                                   polyethylene glycol Packet   Commonly known as:  MIRALAX/GLYCOLAX   Take 34 g by mouth daily                                   prazosin 1 MG capsule   Commonly known as:  MINIPRESS   Take 3 capsules (3 mg)  by mouth At Bedtime This product only available from a Compounding Pharmacy.   Last time this was given:  3 mg on 4/30/2018  7:10 PM                                   QUEtiapine 400 MG tablet   Commonly known as:  SEROquel   Take 1 tablet (400 mg) by mouth At Bedtime   Last time this was given:  400 mg on 4/30/2018  7:10 PM                                   traZODone 50 MG tablet   Commonly known as:  DESYREL   Take 1 tablet (50 mg) by mouth nightly as needed for sleep   Last time this was given:  50 mg on 4/30/2018 10:12 PM                                   venlafaxine 75 MG Tb24 24 hr tablet   Commonly known as:  EFFEXOR-ER   Take 3 tablets (225 mg) by mouth daily (with breakfast)   Last time this was given:  225 mg on 5/1/2018  8:54 AM                                   Vitamin D (Cholecalciferol) 1000 units Tabs   Take 1,000 Units by mouth daily                                             More Information        Paliperidone extended-release injection  Brand Names: Invega Sustenna, INVEGA TRINZA  What is this medicine?  PALIPERIDONE (pal ee PER i done) injection is either given as a once-monthly injection (Invega Sustenna) for the treatment of schizophrenia and schizoaffective disorder or as an injection every 3 months (Invega Trinza) for the treatment of schizophrenia.  How should I use this medicine?  This medicine is for injection into a muscle. It is given by a health care professional in a hospital or clinic setting.  Talk to your pediatrician regarding the use of this medicine in children. Special care may be needed.  What side effects may I notice from receiving this medicine?  Side effects that you should report to your doctor or health care professional as soon as possible:    allergic reactions like skin rash, itching or hives, swelling of the face, lips, or tongue    change in blood sugar    changes in vision    confusion    dark urine    fast or irregular heartbeat    feeling faint or lightheaded,  falls    fever or chills, sore throat    increased thirst or hunger    inner restlessness, unable to keep still    men: prolonged or painful erection    muscle pain, stiffness    redness or swelling at the injection site    trouble passing urine or change in the amount of urine    unusual decrease in sweating    unusual movements, spasms, tremor  Side effects that usually do not require medical attention (report to your doctor or health care professional if they continue or are bothersome):    change in sex drive or performance    cough    drowsiness    dry mouth    headache  What may interact with this medicine?  Do not take this medicine with any of the following medications:    bepridil    certain medicines for fungal infections like fluconazole, itraconazole, ketoconazole, posaconazole, voriconazole    cisapride    dofetilide    dronedarone    droperidol    grepafloxacin    halofantrine    levomethadyl    phenothiazines like chlorpromazine, mesoridazine, thioridazine    pimozide    sertindole    sparfloxacin    ziprasidone  This medicine may also interact with the following medications:    abarelix    alcohol    alfuzosin    certain antibiotics like clarithromycin, erythromycin, gemifloxacin, levofloxacin, pentamidine, rifampin    certain medicines for anxiety, depression, or psychotic disturbances    certain medicines for blood pressure    certain medicines for cancer like daunorubicin, doxorubicin, vorinostat    certain medicines for irregular heart beat    certain medications for Parkinson's disease like levodopa    certain medicines for seizures like carbamazepine    certain medicines for sleep    chloroquine    cyclobenzaprine    dolasetron    lithium    local anesthetics    narcotic pain medicines    octreotide    ondansetron    other medicines for schizophrenia    other medicines that prolong the QT interval (cause an abnormal heart rhythm)    Yenny's Wort    tramadol  What if I miss a dose?  Try to  keep all appointments for your injections. This medicine is given either once every 4 weeks or once every 3 months depending on which type of injection your doctor has prescribed for you. Contact your health care provider for instructions if you miss an appointment.  Where should I keep my medicine?  This drug is given in a hospital or clinic and will not be stored at home.  What should I tell my health care provider before I take this medicine?  They need to know if you have any of these conditions:    chronic constipation or diarrhea    dementia    diabetes or family history of diabetes    history of stroke    irregular heartbeat or low blood pressure    kidney disease    liver disease    stomach problems like adhesions, bowel disease, short gut, trouble swallowing    an unusual or allergic reaction to paliperidone, risperidone, other medicines, foods, dyes, or preservatives    pregnant or trying to get pregnant    breast-feeding  What should I watch for while using this medicine?  Your condition will be monitored carefully while you are receiving this medicine. It may be several weeks before you see the full effects. Do not stop this medicine except on the advice of your doctor or health care professional.  You may get drowsy or dizzy. Do not drive, use machinery, or do anything that needs mental alertness until you know how this medicine affects you. Do not stand or sit up quickly, especially if you are an older patient. This reduces the risk of dizzy or fainting spells. Alcohol may interfere with the effect of this medicine. Avoid alcoholic drinks.  This medicine can reduce the response of your body to heat or cold. Dress warm in cold weather and stay hydrated in hot weather. If possible, avoid extreme temperatures like saunas, hot tubs, very hot or cold showers, or activities that can cause dehydration such as vigorous exercise.  NOTE:This sheet is a summary. It may not cover all possible information. If you  have questions about this medicine, talk to your doctor, pharmacist, or health care provider. Copyright  2018 [employer]

## 2018-04-24 NOTE — IP AVS SNAPSHOT
[address] [address]    Phone:  [phone]                                       After Visit Summary   4/24/2018    Kamini Neal    MRN: [id number]           After Visit Summary Signature Page     I have received my discharge instructions, and my questions have been answered. I have discussed any challenges I see with this plan with the nurse or doctor.    ..........................................................................................................................................  Patient/Patient Representative Signature      ..........................................................................................................................................  Patient Representative Print Name and Relationship to Patient    ..................................................               ................................................  Date                                            Time    ..........................................................................................................................................  Reviewed by Signature/Title    ...................................................              ..............................................  Date                                                            Time

## 2018-04-25 PROBLEM — R45.851 SUICIDAL IDEATION: Status: ACTIVE | Noted: 2017-09-14

## 2018-04-25 PROCEDURE — 99223 1ST HOSP IP/OBS HIGH 75: CPT | Mod: AI | Performed by: PSYCHIATRY & NEUROLOGY

## 2018-04-25 PROCEDURE — 80320 DRUG SCREEN QUANTALCOHOLS: CPT | Performed by: FAMILY MEDICINE

## 2018-04-25 PROCEDURE — 25000132 ZZH RX MED GY IP 250 OP 250 PS 637: Performed by: PSYCHIATRY & NEUROLOGY

## 2018-04-25 PROCEDURE — 25000125 ZZHC RX 250: Performed by: CLINICAL NURSE SPECIALIST

## 2018-04-25 PROCEDURE — 12400007 ZZH R&B MH INTERMEDIATE UMMC

## 2018-04-25 PROCEDURE — 25000132 ZZH RX MED GY IP 250 OP 250 PS 637: Performed by: EMERGENCY MEDICINE

## 2018-04-25 PROCEDURE — 80307 DRUG TEST PRSMV CHEM ANLYZR: CPT | Performed by: FAMILY MEDICINE

## 2018-04-25 RX ORDER — OLANZAPINE 10 MG/1
10 TABLET ORAL EVERY 4 HOURS PRN
Status: DISCONTINUED | OUTPATIENT
Start: 2018-04-25 | End: 2018-04-25

## 2018-04-25 RX ORDER — LANOLIN ALCOHOL/MO/W.PET/CERES
6 CREAM (GRAM) TOPICAL AT BEDTIME
Status: DISCONTINUED | OUTPATIENT
Start: 2018-04-25 | End: 2018-05-01 | Stop reason: HOSPADM

## 2018-04-25 RX ORDER — OLANZAPINE 10 MG/2ML
10 INJECTION, POWDER, FOR SOLUTION INTRAMUSCULAR
Status: DISCONTINUED | OUTPATIENT
Start: 2018-04-25 | End: 2018-05-01 | Stop reason: HOSPADM

## 2018-04-25 RX ORDER — BENZTROPINE MESYLATE 1 MG/1
1 TABLET ORAL 2 TIMES DAILY
Status: DISCONTINUED | OUTPATIENT
Start: 2018-04-25 | End: 2018-05-01 | Stop reason: HOSPADM

## 2018-04-25 RX ORDER — HYDROXYZINE HYDROCHLORIDE 50 MG/1
50 TABLET, FILM COATED ORAL EVERY 4 HOURS PRN
Status: DISCONTINUED | OUTPATIENT
Start: 2018-04-25 | End: 2018-05-01 | Stop reason: HOSPADM

## 2018-04-25 RX ORDER — LEVOTHYROXINE SODIUM 112 UG/1
112 TABLET ORAL
Status: DISCONTINUED | OUTPATIENT
Start: 2018-04-25 | End: 2018-05-01 | Stop reason: HOSPADM

## 2018-04-25 RX ORDER — OLANZAPINE 10 MG/1
10 TABLET, ORALLY DISINTEGRATING ORAL
Status: COMPLETED | OUTPATIENT
Start: 2018-04-25 | End: 2018-04-25

## 2018-04-25 RX ORDER — OLANZAPINE 10 MG/1
10 TABLET ORAL
Status: DISCONTINUED | OUTPATIENT
Start: 2018-04-25 | End: 2018-05-01 | Stop reason: HOSPADM

## 2018-04-25 RX ORDER — VENLAFAXINE HYDROCHLORIDE 225 MG/1
225 TABLET, EXTENDED RELEASE ORAL
Status: DISCONTINUED | OUTPATIENT
Start: 2018-04-25 | End: 2018-05-01 | Stop reason: HOSPADM

## 2018-04-25 RX ORDER — ACETAMINOPHEN 325 MG/1
650 TABLET ORAL EVERY 4 HOURS PRN
Status: DISCONTINUED | OUTPATIENT
Start: 2018-04-25 | End: 2018-05-01 | Stop reason: HOSPADM

## 2018-04-25 RX ORDER — QUETIAPINE FUMARATE 200 MG/1
400 TABLET, FILM COATED ORAL AT BEDTIME
Status: DISCONTINUED | OUTPATIENT
Start: 2018-04-25 | End: 2018-05-01 | Stop reason: HOSPADM

## 2018-04-25 RX ORDER — OLANZAPINE 10 MG/2ML
10 INJECTION, POWDER, FOR SOLUTION INTRAMUSCULAR EVERY 4 HOURS PRN
Status: DISCONTINUED | OUTPATIENT
Start: 2018-04-25 | End: 2018-04-25

## 2018-04-25 RX ORDER — PANTOPRAZOLE SODIUM 20 MG/1
20 TABLET, DELAYED RELEASE ORAL EVERY MORNING
Status: DISCONTINUED | OUTPATIENT
Start: 2018-04-25 | End: 2018-05-01 | Stop reason: HOSPADM

## 2018-04-25 RX ADMIN — OLANZAPINE 10 MG: 10 TABLET, ORALLY DISINTEGRATING ORAL at 10:46

## 2018-04-25 RX ADMIN — PRAZOSIN HYDROCHLORIDE 3 MG: 2 CAPSULE ORAL at 19:45

## 2018-04-25 RX ADMIN — MELATONIN 6 MG: 3 TAB ORAL at 19:45

## 2018-04-25 RX ADMIN — HYDROXYZINE HYDROCHLORIDE 50 MG: 50 TABLET, FILM COATED ORAL at 13:25

## 2018-04-25 RX ADMIN — OLANZAPINE 10 MG: 10 INJECTION, POWDER, LYOPHILIZED, FOR SOLUTION INTRAMUSCULAR at 18:15

## 2018-04-25 RX ADMIN — BENZTROPINE MESYLATE 1 MG: 1 TABLET ORAL at 19:45

## 2018-04-25 RX ADMIN — QUETIAPINE FUMARATE 400 MG: 200 TABLET ORAL at 19:45

## 2018-04-25 RX ADMIN — ACETAMINOPHEN 650 MG: 325 TABLET ORAL at 13:27

## 2018-04-25 RX ADMIN — OLANZAPINE 10 MG: 10 TABLET, FILM COATED ORAL at 14:57

## 2018-04-25 ASSESSMENT — ENCOUNTER SYMPTOMS
SEIZURES: 0
NECK PAIN: 0
CONFUSION: 0
VOMITING: 0
MYALGIAS: 0
CHILLS: 0
FEVER: 0
DYSPHORIC MOOD: 1
LIGHT-HEADEDNESS: 0
FATIGUE: 1
COUGH: 0
NUMBNESS: 0
RHINORRHEA: 0
NAUSEA: 0
BACK PAIN: 0
HALLUCINATIONS: 1
DIARRHEA: 0
SORE THROAT: 0
DIZZINESS: 0
ABDOMINAL PAIN: 0
HEADACHES: 0
BRUISES/BLEEDS EASILY: 0
APPETITE CHANGE: 0
SHORTNESS OF BREATH: 0
CHEST TIGHTNESS: 0
NECK STIFFNESS: 0
PALPITATIONS: 0
ARTHRALGIAS: 0
SLEEP DISTURBANCE: 1
NERVOUS/ANXIOUS: 1

## 2018-04-25 ASSESSMENT — ACTIVITIES OF DAILY LIVING (ADL)
TRANSFERRING: 0-->INDEPENDENT
BATHING: 0-->INDEPENDENT
RETIRED_EATING: 0-->INDEPENDENT
SWALLOWING: 0-->SWALLOWS FOODS/LIQUIDS WITHOUT DIFFICULTY
FALL_HISTORY_WITHIN_LAST_SIX_MONTHS: NO
DRESS: 0-->INDEPENDENT
COGNITION: 0 - NO COGNITION ISSUES REPORTED
AMBULATION: 0-->INDEPENDENT
RETIRED_COMMUNICATION: 0-->UNDERSTANDS/COMMUNICATES WITHOUT DIFFICULTY
TOILETING: 0-->INDEPENDENT
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
## 2018-04-25 NOTE — PLAN OF CARE
Problem: Patient Care Overview  Goal: Individualization & Mutuality  Illness Management Recovery model: Objectives    Patient will identify reason(s) for hospitalization from their perspective.  Patient will identify a minimum of three goals for discharge.  Patient will identify a minimum of three triggers that may increase their symptoms.  Patient will identify a minimum of three coping skills they can do to stay well.  Patient will identify their support system to demonstrate readiness for discharge.

## 2018-04-25 NOTE — PLAN OF CARE
Problem: Patient Care Overview  Goal: Team Discussion  Team Plan:   BEHAVIORAL TEAM DISCUSSION    Participants: Dr. Constantino Lugo MD, Polly PALACIOS and Annie SHIRLEY RN   Progress: Initial behavioral team discussion.   Continued Stay Criteria/Rationale: Pt admitted voluntary due to increased suicidal ideations.   Medical/Physical: See medical consult, if applicable.   Precautions:   Behavioral Orders   Procedures     Code 1 - Restrict to Unit     Elopement precautions     Routine Programming     As clinically indicated     Status 15     Every 15 minutes.     Suicide precautions     Patients on Suicide Precautions should have a Combination Diet ordered that includes a Diet selection(s) AND a Behavioral Tray selection for Safe Tray - with utensils, or Safe Tray - NO utensils       Plan: The plan is to assess the patient for mental health and medication needs.  The patient will be prescribed medications to treat the identified symptoms.  Upon discharge the patient will be referred to services as appropriate based on the assessment.  Rationale for change in precautions or plan: No change initial plan.

## 2018-04-25 NOTE — ED NOTES
Bed: ED11  Expected date: 4/24/18  Expected time: 9:51 PM  Means of arrival: Ambulance  Comments:  [facility]---27 female hx schizopherina, hearing voices

## 2018-04-25 NOTE — ED TRIAGE NOTES
Pt. hearing voices that are telling him to hurt himself.  Pt.  took broken CD tonite and tried to cut wrist.  Pt. has small  scratch on wrist.   Pt.  calm and cooperative at this time.   Pt. is from  group home.

## 2018-04-25 NOTE — PLAN OF CARE
Problem: Patient Care Overview  Goal: Individualization & Mutuality  Personal Plan of Care    Reasons you are in the Hospital  1. Suicidal ideation   2.  3.  4.    Goals for Discharge   1. Not have suicidal ideation   2. Be honest with treatment team   3.

## 2018-04-25 NOTE — ED NOTES
ED to Behavioral Floor Handoff    SITUATION  Kamini Neal is a 27 year old male who speaks English and lives in a group home with others The patient arrived in the ED by ambulance from group home with a complaint of Suicidal and Hallucinations (auditory)  .The patient's current symptoms started/worsened 2 day(s) ago and during this time the symptoms have remained the same.   In the ED, pt was diagnosed with   Final diagnoses:   Suicidal ideation   Schizoaffective disorder, unspecified type (H)        Initial vitals were: BP: (!) 136/91  Heart Rate: 84  Temp: 98.2  F (36.8  C)  Resp: 16  SpO2: 97 %   --------  Is the patient diabetic? No   If yes, last blood glucose? --     If yes, was this treated in the ED? --  --------  Is the patient inebriated (ETOH) No or Impaired on other substances? No  MSSA done? N/A  Last MSSA score: --    Were withdrawal symptoms treated? N/A  Does the patient have a seizure history? No. If yes, date of most recent seizure--  --------  Is the patient patient experiencing suicidal ideation? Hearing voices that tell him to hurt himself    Homicidal ideation? denies current or recent homicidal ideation or behaviors.    Self-injurious behavior/urges? reports current or recent self injurious behavior or ideation including Cut from broken CD on wrist.  ------  Was pt aggressive in the ED No  Was a code called No  Is the pt now cooperative? Yes  -------  Meds given in ED:   Medications   nicotine polacrilex (NICORETTE) gum 4 mg (not administered)      Family present during ED course? No  Family currently present? No    BACKGROUND  Does the patient have a cognitive impairment or developmental disability? No  Allergies:   Allergies   Allergen Reactions     Haldol [Haloperidol]      Torticollis   .   Social demographics are   Social History     Social History     Marital status: Single     Spouse name: N/A     Number of children: N/A     Years of education: N/A     Social History Main Topics      Smoking status: Current Every Day Smoker     Packs/day: 1.00     Smokeless tobacco: Never Used     Alcohol use No     Drug use: No     Sexual activity: Not Asked     Other Topics Concern     None     Social History Narrative        ASSESSMENT  Labs results Labs Ordered and Resulted from Time of ED Arrival Up to the Time of Departure from the ED - No data to display   Imaging Studies: No results found for this or any previous visit (from the past 24 hour(s)).   Most recent vital signs BP (!) 136/91  Temp 98.2  F (36.8  C) (Oral)  Resp 16  SpO2 97%   Abnormal labs/tests/findings requiring intervention:---   Pain control: good  Nausea control: good    RECOMMENDATION  Are any infection precautions needed (MRSA, VRE, etc.)? No If yes, what infection? --  ---  Does the patient have mobility issues? independently. If yes, what device does the pt use? ---  ---  Is patient on 72 hour hold or commitment? No If on 72 hour hold, have hold and rights been given to patient? N/A  Are admitting orders written if after 10 p.m. ?Yes  Tasks needing to be completed:---     Corrine Renner   [facility]-- [phone] [place] [facility]   [phone] [facility] ED
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
## 2018-04-25 NOTE — ED NOTES
To [facility] via w/c  with psych tech and security.  Pt. calm and cooperative at time of admit.  No acute issues noted.

## 2018-04-25 NOTE — ED PROVIDER NOTES
History     Chief Complaint   Patient presents with     Suicidal     Hallucinations     auditory     HPI  Kamini Neal is a 27 year old male with schizoaffective disorder who presents with suicidal ideation and auditory hallucinations. He reports plan of killing himself by cutting his wrist. He lives in a group home. No recent illness or injury. No drug or alcohol use. No homicidal ideation. Denies thoughts of harming others. He cut his wrist superficially with a CD.    I have reviewed the Medications, Allergies, Past Medical and Surgical History, and Social History in the [facility] system.  Past Medical History:   Diagnosis Date     Anxiety      Depressive disorder      Schizo affective schizophrenia (H)        Review of Systems   Constitutional: Positive for fatigue. Negative for appetite change, chills and fever.   HENT: Negative for congestion, rhinorrhea and sore throat.    Eyes: Negative for visual disturbance.   Respiratory: Negative for cough, chest tightness and shortness of breath.    Cardiovascular: Negative for chest pain, palpitations and leg swelling.   Gastrointestinal: Negative for abdominal pain, diarrhea, nausea and vomiting.   Musculoskeletal: Negative for arthralgias, back pain, gait problem, myalgias, neck pain and neck stiffness.   Skin: Negative for rash.   Allergic/Immunologic: Negative for immunocompromised state.   Neurological: Negative for dizziness, seizures, syncope, light-headedness, numbness and headaches.   Hematological: Does not bruise/bleed easily.   Psychiatric/Behavioral: Positive for dysphoric mood, hallucinations, self-injury, sleep disturbance and suicidal ideas. Negative for confusion. The patient is nervous/anxious.        Physical Exam   BP: (!) 136/91  Heart Rate: 84  Temp: 98.2  F (36.8  C)  Resp: 16  SpO2: 97 %      Physical Exam   Constitutional: He is oriented to person, place, and time. He appears well-developed and well-nourished. No distress.   HENT:   Head:  Normocephalic and atraumatic.   Mouth/Throat: Oropharynx is clear and moist.   Eyes: Conjunctivae and EOM are normal.   Neck: Normal range of motion. Neck supple.   Cardiovascular: Normal rate, regular rhythm, normal heart sounds and intact distal pulses.    Pulmonary/Chest: Effort normal and breath sounds normal. No respiratory distress.   Abdominal: Soft. There is no tenderness.   Musculoskeletal: Normal range of motion. He exhibits no edema or tenderness.   Neurological: He is alert and oriented to person, place, and time. He has normal strength and normal reflexes. No cranial nerve deficit or sensory deficit. Coordination normal.   Skin: Skin is warm and dry. No rash noted.   Superficial wrist abrasion.   Psychiatric: His affect is blunt. He is withdrawn and actively hallucinating. He is not agitated and not combative. Thought content is not paranoid. He expresses inappropriate judgment. He expresses suicidal ideation. He expresses no homicidal ideation. He expresses suicidal plans. He is noncommunicative.   Nursing note and vitals reviewed.      ED Course     ED Course     Procedures             Critical Care time:  none             Labs Ordered and Resulted from Time of ED Arrival Up to the Time of Departure from the ED - No data to display         Assessments & Plan (with Medical Decision Making)   See also mental health  note. Emergency admission to psychiatry with active suicidal ideation and plan.    I have reviewed the nursing notes.    I have reviewed the findings, diagnosis, plan and need for follow up with the patient.    New Prescriptions    No medications on file       Final diagnoses:   Suicidal ideation   Schizoaffective disorder, unspecified type (H)       4/24/2018   [facility], [place], EMERGENCY DEPARTMENT       Jayson Gracia MD  04/25/18 0056

## 2018-04-25 NOTE — PROGRESS NOTES
"   04/25/18 1215   Seclusion or Restraint Order   In Person Face to Face Assessment Conducted Yes-Eval of pt's immediate situation, reaction to intervention, complete review of systems assessment, behavioral assessment & review/assessment of hx, drugs & meds, recent labs, etc, behavioral condition, need to continue/terminate restraint/seclusion   Pt reports very mild pain on left side of anterior forehead-\"about a 1-2\".  Area examined-no edema, redness, etc noted.  Pt denies headache or any other discomfort. Pt is alert and oriented to self, place and situation.  \"I am feeling a little better, I took that Zyprexa 10 mg\".  Pt is laying calmly on the bed, 5 Point Restraints intact.  Dr Lugo on the unit and updated re: pt's condition.  Continue to observe and assess closely.  "

## 2018-04-25 NOTE — PLAN OF CARE
Problem: General Plan of Care (Inpatient Behavioral)  Goal: Individualization/Patient Specific Goal (IP Behavioral)  The patient and/or their representative will achieve their patient-specific goals related to the plan of care.    The patient-specific goals include:  Outcome: No Change  S:  He was sent by his group home staff to the ED because of suicidal Ideation. He is here voluntary  He heard AH telling himself to kill himself today after he quit his job.. Today he broke a CD attempting to cut his wrist. He has a superficial cut on left forearm.      B:  Pt was on sta 32 4/14/18 to 4/18/18.  Per pt this is his 3rd hospitalization.  HX of shizoaffective disorder, PTSD, and cannabis use.  Pt states at another hospital he tried to elope by hiding behind staff and sneaking out.    A:  Pt denies AH.  He states he has SI but will be safe on the unit. Refused to answer all of the suicide questions.  He refused his HS medications.  Pt bill of rights given to pt.  Orientated to the unit.  He said he was suicidal after being discharged.  Pt appears depressed.    R:  Complete [place] Suicidal Assessment.  Offer support and reassurance.

## 2018-04-25 NOTE — PROGRESS NOTES
04/25/18 0150   Patient Belongings   Did you bring any home meds/supplements to the hospital?  No   Patient Belongings clothing;money (see comment);cell phone/electronics;glasses;shoes;wallet;other (see comments)   Disposition of Belongings Sent to security per site process;Locker   Belongings Search Yes   Clothing Search Yes   Second Staff Esau FORRESTER   General Info Comment Patient came in with a cell phone, tennis shoe, green pant, blue headphone, white shirt, sock, cigarette box, glasses, big grey winter jacket, and [employer] visa....[id number].  Money sent to safe in security envelope# [id number].   A               Admission:  I am responsible for any personal items that are not sent to the safe or pharmacy.  [facility] is not responsible for loss, theft or damage of any property in my possession.    Signature:  _________________________________ Date: _______  Time: _____                                              Staff Signature:  ____________________________ Date: ________  Time: _____      2nd Staff person, if patient is unable/unwilling to sign:    Signature: ________________________________ Date: ________  Time: _____     Discharge:  [facility] has returned all of my personal belongings:    Signature: _________________________________ Date: ________  Time: _____                                          Staff Signature:  ____________________________ Date: ________  Time: _____

## 2018-04-26 PROCEDURE — 12400003 ZZH R&B MH CRITICAL UMMC

## 2018-04-26 PROCEDURE — 25000132 ZZH RX MED GY IP 250 OP 250 PS 637: Performed by: PSYCHIATRY & NEUROLOGY

## 2018-04-26 PROCEDURE — 25000132 ZZH RX MED GY IP 250 OP 250 PS 637: Performed by: EMERGENCY MEDICINE

## 2018-04-26 PROCEDURE — 25000132 ZZH RX MED GY IP 250 OP 250 PS 637: Performed by: CLINICAL NURSE SPECIALIST

## 2018-04-26 RX ORDER — LORAZEPAM 1 MG/1
1 TABLET ORAL 2 TIMES DAILY PRN
Status: DISCONTINUED | OUTPATIENT
Start: 2018-04-26 | End: 2018-04-27

## 2018-04-26 RX ORDER — BENZONATATE 100 MG/1
100 CAPSULE ORAL 3 TIMES DAILY PRN
Status: DISCONTINUED | OUTPATIENT
Start: 2018-04-26 | End: 2018-05-01 | Stop reason: HOSPADM

## 2018-04-26 RX ORDER — LORAZEPAM 2 MG/ML
1 INJECTION INTRAMUSCULAR 2 TIMES DAILY PRN
Status: DISCONTINUED | OUTPATIENT
Start: 2018-04-26 | End: 2018-04-27

## 2018-04-26 RX ADMIN — PRAZOSIN HYDROCHLORIDE 3 MG: 2 CAPSULE ORAL at 20:09

## 2018-04-26 RX ADMIN — VENLAFAXINE HYDROCHLORIDE 225 MG: 225 TABLET, FILM COATED, EXTENDED RELEASE ORAL at 08:56

## 2018-04-26 RX ADMIN — LEVOTHYROXINE SODIUM 112 MCG: 112 TABLET ORAL at 08:56

## 2018-04-26 RX ADMIN — QUETIAPINE FUMARATE 400 MG: 200 TABLET ORAL at 20:09

## 2018-04-26 RX ADMIN — BENZONATATE 100 MG: 100 CAPSULE ORAL at 22:04

## 2018-04-26 RX ADMIN — NICOTINE POLACRILEX 8 MG: 4 GUM, CHEWING ORAL at 19:16

## 2018-04-26 RX ADMIN — PANTOPRAZOLE SODIUM 20 MG: 20 TABLET, DELAYED RELEASE ORAL at 08:56

## 2018-04-26 RX ADMIN — LORAZEPAM 1 MG: 1 TABLET ORAL at 20:09

## 2018-04-26 RX ADMIN — LORAZEPAM 1 MG: 1 TABLET ORAL at 11:24

## 2018-04-26 RX ADMIN — NICOTINE POLACRILEX 8 MG: 4 GUM, CHEWING ORAL at 18:11

## 2018-04-26 RX ADMIN — MELATONIN 6 MG: 3 TAB ORAL at 20:10

## 2018-04-26 RX ADMIN — HYDROXYZINE HYDROCHLORIDE 50 MG: 50 TABLET, FILM COATED ORAL at 10:11

## 2018-04-26 RX ADMIN — OLANZAPINE 10 MG: 10 TABLET, FILM COATED ORAL at 17:45

## 2018-04-26 RX ADMIN — BENZTROPINE MESYLATE 1 MG: 1 TABLET ORAL at 20:09

## 2018-04-26 RX ADMIN — BENZTROPINE MESYLATE 1 MG: 1 TABLET ORAL at 08:56

## 2018-04-26 RX ADMIN — NICOTINE POLACRILEX 8 MG: 4 GUM, CHEWING ORAL at 20:11

## 2018-04-26 RX ADMIN — OLANZAPINE 10 MG: 10 TABLET, FILM COATED ORAL at 09:04

## 2018-04-26 ASSESSMENT — ACTIVITIES OF DAILY LIVING (ADL)
DRESS: SCRUBS (BEHAVIORAL HEALTH)
HYGIENE/GROOMING: INDEPENDENT
ORAL_HYGIENE: INDEPENDENT
LAUNDRY: UNABLE TO COMPLETE

## 2018-04-26 NOTE — PROGRESS NOTES
"Writer spoke with Neela from Pt's group home ([phone]) provided contact information and update on how Pt has been doing thus far. Discussed some different discharge planning options for Pt. Neela reported Pt had not been doing well since discharge last week, and they are \"very worried about him\". Writer will provide Neela with updates on how Pt is doing.   "
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
## 2018-04-26 NOTE — PROGRESS NOTES
Late Entry for 4/25/18 1500.  Patient declined offers of cereal or toast due to not receiving a breakfast tray.  Unit HUC sent a nutrition order form to dietary and spoke to them on the phone about sending a lunch tray specifying that the patient did not eat pork at about 1305 when did not receive lunch tray.  At that time declined offers of 2 other trays that were available on the unit from patients refusing lunch.  Neither meal contained pork.  Did accept gram crackers and 16 ounces of juice.
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
## 2018-04-26 NOTE — PROGRESS NOTES
04/25/18 1905   Seclusion or Restraint Order   In Person Face to Face Assessment Conducted Yes-Eval of pt's immediate situation, reaction to intervention, complete review of systems assessment, behavioral assessment & review/assessment of hx, drugs & meds, recent labs, etc, behavioral condition, need to continue/terminate restraint/seclusion   Patient Experienced No adverse physical outcome from seclusion/restraint initiation   Continuation of Seclus/Restraint indicated at this time Yes   Describe actions taken checked placement of restraints, CMS, etc; asked pt if he was OK     Pt denies pain or injury. No visible injuries or wounds. Restraints checked for proper placement. CMS intact. States he is hungry and did not get a dinner tray. Reassured pt he will get some food as soon as he is released.    Addendum 4/26/18  Diet order placed for pt at approximately 1858 on 4/25/18, soon after conversation noted above. Per Nutrition Services, kitchen shut down but they were able to provide pt with cold tray including turkey sandwich, fresh fruit, juice, milk, ice cream. Pt given this meal when released from seclusion. Prior to that, staff reported pt had drunk a cup of coffee at approximately 1530 and then had access to a large array of snacks place in dining room including jadon crackers, soda crackers, cheese, apples & oranges, 3 kinds of fruit juice, milk, peanut butter, jelly, and whole wheat bread. It is unclear whether pt partook of any of these snacks but they were available to him and he had been informed of their availability by overhead, unit-wide announcement at approximately 1600.

## 2018-04-26 NOTE — PROGRESS NOTES
04/25/18 2155   Behavioral Health   Hallucinations (unable to assess)   Thinking intact   Orientation person: oriented;date: oriented;place: oriented;time: oriented   Memory baseline memory   Insight poor   Judgement impaired   Eye Contact at examiner   Affect blunted, flat   Mood depressed   Physical Appearance/Attire attire appropriate to age and situation   Hygiene well groomed   Suicidality (unable to assess)   1. Wish to be Dead (unable to assess)   2. Non-Specific Active Suicidal Thoughts  (unable to assess)   Self Injury other (see comment)  (none stated or observed)   Elopement (pt ran before a code (see note))   Activity isolative;withdrawn   Speech coherent;clear   Medication Sensitivity no observed side effects;no stated side effects   Psychomotor / Gait balanced;steady     Pt required code after frustration of failing to receive tray on more than one occasion. Pt punched hole into plexiglas near fire exit stairs, then agreed to walk to [facility]. Pt was brought back to their room, which is when he became aggressive and attempted to run. Pt was then put into 5 points on bed.

## 2018-04-26 NOTE — PROGRESS NOTES
Late entry for 4/25 1500.  Per psych associate who was working on 4/25 patient was offered and ate a bowl of cereal after it was discovered did not have a breakfast tray.  Another psych associate called dietary indicating patient did not receive a meal tray.

## 2018-04-26 NOTE — PLAN OF CARE
"Problem: Depressive Symptoms  Goal: Social and Therapeutic (Depression)  Signs and symptoms of listed problems will be absent or manageable.     INITIAL OT NOTE  Pt attended x10 minutes of occupational therapy clinic (no charge). Pt filled out self-assessment form briefly (see below), and was oriented to group project options. Pt requested a \"coloring\" task, looked through project materials, then left group. Will continue to assess. Initial assessment to be completed upon additional group participation.    OT Self-Assessment  Pt was given and completed a written self-assessment form. OT staff reviewed with pt and explained the value of having them involved in their treatment plan, and provided options to meet current needs/self-identified goals.     Pt identified the following symptoms that they are currently dealing with:   Emotions: sadness, anxiety/fear, feeling abandoned, feeling depressed, and despair  Thoughts: negative thoughts  Behaviors: self-harming actions    Self-identified coping skills: Pt left this section blank.  Self-identified social supports: Pt left this section blank.  Self-identified personal strengths: Pt left this section blank.    Goals: Make a safety plan, look at how my self-destructive behavior affects me or others, and problem solving skills        "

## 2018-04-26 NOTE — PROGRESS NOTES
Pt kept isolated to himself for most of the shift. Pt paced in hallway quite a bit. Pt stated that pacing helps distract him from SIB thoughts. Pt stated that he is happy to be in treatment and he is hopeful for the future. Pt stated that he feels safe on the unit. Pt thinks that the groups are silly and does not want to attend them. Pt stated he was feeling depressed (6/10) and a little anxious today. Pt had no concerns about sleep or appetite. Pt reported that his medications are making him drowsy. Pt denied hallucinations and paranoid thoughts. Pt denied SI. Pt stated he was having thoughts of head banging earlier in the day but was able to ignore them. Pt stated that he was willing to come to staff if he was having thoughts of SIB that he could not control.       04/26/18 1400   Behavioral Health   Hallucinations other (see comment)  (denies)   Thinking intact   Orientation time: oriented;date: oriented;person: oriented;place: oriented   Memory baseline memory   Insight insight appropriate to situation   Judgement impaired   Eye Contact at examiner   Affect blunted, flat   Mood depressed;mood is calm;anxious   Physical Appearance/Attire attire appropriate to age and situation   Hygiene other (see comment)  (adequate)   Suicidality other (see comments)  (denies)   1. Wish to be Dead No   2. Non-Specific Active Suicidal Thoughts  No   Self Injury urges   Elopement (none observed)   Activity isolative;withdrawn;other (see comment)  (pacing)   Speech coherent;clear   Medication Sensitivity other (see comment)  (drowsy)   Psychomotor / Gait balanced;steady   Activities of Daily Living   Hygiene/Grooming independent   Oral Hygiene independent   Dress scrubs (behavioral health)   Laundry unable to complete   Room Organization independent

## 2018-04-27 PROCEDURE — 25000132 ZZH RX MED GY IP 250 OP 250 PS 637: Performed by: EMERGENCY MEDICINE

## 2018-04-27 PROCEDURE — 12400003 ZZH R&B MH CRITICAL UMMC

## 2018-04-27 PROCEDURE — 25000132 ZZH RX MED GY IP 250 OP 250 PS 637: Performed by: CLINICAL NURSE SPECIALIST

## 2018-04-27 PROCEDURE — 97150 GROUP THERAPEUTIC PROCEDURES: CPT | Mod: GO

## 2018-04-27 PROCEDURE — 25000132 ZZH RX MED GY IP 250 OP 250 PS 637: Performed by: PSYCHIATRY & NEUROLOGY

## 2018-04-27 PROCEDURE — 99232 SBSQ HOSP IP/OBS MODERATE 35: CPT | Performed by: PSYCHIATRY & NEUROLOGY

## 2018-04-27 RX ORDER — TRAZODONE HYDROCHLORIDE 50 MG/1
50 TABLET, FILM COATED ORAL
Status: DISCONTINUED | OUTPATIENT
Start: 2018-04-27 | End: 2018-05-01 | Stop reason: HOSPADM

## 2018-04-27 RX ADMIN — BENZONATATE 100 MG: 100 CAPSULE ORAL at 15:06

## 2018-04-27 RX ADMIN — HYDROXYZINE HYDROCHLORIDE 50 MG: 50 TABLET, FILM COATED ORAL at 20:55

## 2018-04-27 RX ADMIN — BENZTROPINE MESYLATE 1 MG: 1 TABLET ORAL at 09:19

## 2018-04-27 RX ADMIN — NICOTINE POLACRILEX 8 MG: 4 GUM, CHEWING ORAL at 13:35

## 2018-04-27 RX ADMIN — NICOTINE POLACRILEX 8 MG: 4 GUM, CHEWING ORAL at 16:33

## 2018-04-27 RX ADMIN — QUETIAPINE FUMARATE 400 MG: 200 TABLET ORAL at 20:02

## 2018-04-27 RX ADMIN — NICOTINE POLACRILEX 8 MG: 4 GUM, CHEWING ORAL at 18:04

## 2018-04-27 RX ADMIN — PANTOPRAZOLE SODIUM 20 MG: 20 TABLET, DELAYED RELEASE ORAL at 09:19

## 2018-04-27 RX ADMIN — ACETAMINOPHEN 650 MG: 325 TABLET ORAL at 20:55

## 2018-04-27 RX ADMIN — NICOTINE POLACRILEX 8 MG: 4 GUM, CHEWING ORAL at 21:05

## 2018-04-27 RX ADMIN — LORAZEPAM 1 MG: 1 TABLET ORAL at 09:19

## 2018-04-27 RX ADMIN — BENZONATATE 100 MG: 100 CAPSULE ORAL at 09:19

## 2018-04-27 RX ADMIN — BENZTROPINE MESYLATE 1 MG: 1 TABLET ORAL at 20:02

## 2018-04-27 RX ADMIN — NICOTINE POLACRILEX 8 MG: 4 GUM, CHEWING ORAL at 11:08

## 2018-04-27 RX ADMIN — PRAZOSIN HYDROCHLORIDE 3 MG: 2 CAPSULE ORAL at 20:02

## 2018-04-27 RX ADMIN — LEVOTHYROXINE SODIUM 112 MCG: 112 TABLET ORAL at 09:19

## 2018-04-27 RX ADMIN — VENLAFAXINE HYDROCHLORIDE 225 MG: 225 TABLET, FILM COATED, EXTENDED RELEASE ORAL at 09:19

## 2018-04-27 RX ADMIN — NICOTINE POLACRILEX 8 MG: 4 GUM, CHEWING ORAL at 20:03

## 2018-04-27 RX ADMIN — OLANZAPINE 10 MG: 10 TABLET, FILM COATED ORAL at 20:48

## 2018-04-27 RX ADMIN — MELATONIN 6 MG: 3 TAB ORAL at 20:02

## 2018-04-27 ASSESSMENT — ACTIVITIES OF DAILY LIVING (ADL)
DRESS: INDEPENDENT
ORAL_HYGIENE: INDEPENDENT
GROOMING: INDEPENDENT

## 2018-04-27 NOTE — DISCHARGE SUMMARY
[facility], [place]   Psychiatric Discharge Summary      Kamini Neal MRN# [id number]   Age: 27 year old YOB: 1990     Date of Admission:  4/13/2018  Date of Discharge:  04/18/18  Admitting Physician:  Alejandro Hartley MD  Discharge Physician:  Eze Zazueta MD         Summary/Hospital Course/Disposition:   Reason for Hospitalization: The patient has a history of schizoaffective disorder who presented to the emergency room seeking help for recent worsening of psychosis.  He had reported to his group home staff that he was feeling quite paranoid and experiencing suicidal thoughts secondary to the distress he was experiencing.  He reported that he was contemplating setting himself on fire as a means of suicide.  He was agreeable for voluntary hospitalization.        Hospital Course:   (Initial Assessment 4/14):  The patient has been admitted to our Behavioral Health Unit on station 32 under voluntary status for depressed mood and suicidal thoughts in the setting of distress from recent worsening paranoia/psychosis.  Treatment will be continued voluntarily and his care will be resumed by his primary treatment team on Monday. His outpatient medications have been resumed, including Zyprexa 10 mg 3 times a day and Seroquel 400 mg at bedtime targeting psychosis.  He also receives Invega Sustenna once a month, with his last dose reported to have been on April 5th.  I do see some discrepancy in the dosing in Epic versus what the patient is reporting, with Epic showing 117 mg and the patient reporting 256 mg.  It would be beneficial to determine the appropriate dose and consider maximizing the dose to minimize future occurrences of breakthrough psychosis.  Polypharmacy seems indicated, given that the patient has tried and failed treatment with Clozaril in the past.  Effexor with prazosin will be continued for management of PTSD symptoms which currently appear to be stable.       Follow Up (4/16): Patient mentioned that he felt overall subjective benefit from being in the hospital. He liked being at group home, and is open to returning there post discharge. He looked back on his suicidal thoughts, and was able to admit that it was an irrational thought, and denies any current on going suicidal thoughts. He likes his outpatient psychiatrist and therapist (both at [facility][1]). I discussed with him the potential harm incurred (through added side effect burden) of having multiple neuroleptics. Discussed reducing dose of Zyprexa. Due to issue with polypharmacy along with intermittent efficay of being on multiple medications with relapses of paranoia, I will possibly increase his Invega Sustenna dosing. Recent EKG showed QTc interval 426.     Follow Up (4/17): Patient is quite visible in the milieu today. Intermittently social with peers, paces hallways, did not attend groups, but overall was calm and pleasant upon approach and during conversation. He felt that he was back at his baseline, denied any SI, intent or plan. He was agreeable to medications changes and tolerated his change in Zyprexa dose. He was hoping to discharge sometime soon. CTC called group home to confirm patient's return tomorrow. Of note, Zyprexa was discontinued and his Invega Susstenna dose was increased to 156 mg [id number], next dose due on 5/10.      Day of Discharge (4/18): Patient exhibited relative psychiatric stability. Tolerated medications adjustments. Remained future oriented, and denied current SI/HI/AH/VH. Desired to be discharged back to group home. I did not have any reason to hold him against his will, thus was discharged. Discussed change in STEVENSON dose.          DIagnoses:   1.  Schizoaffective disorder -- bipolar type.   2.  Hypothyroidism.   3.  History of posttraumatic stress disorder.          Labs:   No results found for this or any previous visit (from the past 24 hour(s)).          Consults:     None          Discharge Medications:        Review of your medicines      CONTINUE these medicines which may have CHANGED, or have new prescriptions. If we are uncertain of the size of tablets/capsules you have at home, strength may be listed as something that might have changed.       Dose / Directions    paliperidone 156 MG/ML Susp injection   Commonly known as:  INVEGA SUSTENNA   This may have changed:    - medication strength  - how much to take   Used for:  Schizoaffective disorder, depressive type (H)        Dose:  156 mg   Start taking on:  5/10/2018   Inject 1 mL (156 mg) into the muscle once for 1 dose   Quantity:  1 mL   Refills:  0       ZYPREXA PO   This may have changed:  Another medication with the same name was removed. Continue taking this medication, and follow the directions you see here.        Dose:  2.5-5 mg   Take 2.5-5 mg by mouth daily as needed for agitation   Refills:  0         CONTINUE these medicines which have NOT CHANGED       Dose / Directions    benztropine 1 MG tablet   Commonly known as:  COGENTIN   Used for:  Schizoaffective disorder, bipolar type (H)        Dose:  1 mg   Take 1 tablet (1 mg) by mouth 2 times daily   Quantity:  60 tablet   Refills:  0       hydrOXYzine 50 MG tablet   Commonly known as:  ATARAX   Used for:  Schizoaffective disorder, bipolar type (H)        Dose:  50 mg   Take 1 tablet (50 mg) by mouth every 4 hours as needed for anxiety   Quantity:  60 tablet   Refills:  0       levothyroxine 25 MCG tablet   Commonly known as:  SYNTHROID/LEVOTHROID   Used for:  Acquired hypothyroidism        Dose:  112 mcg   Take 4.5 tablets (112 mcg) by mouth daily noon   Quantity:  135 tablet   Refills:  0       melatonin 3 MG Caps   Used for:  Primary insomnia        Dose:  6 mg   Take 6 mg by mouth At Bedtime   Quantity:  60 capsule   Refills:  0       pantoprazole 20 MG EC tablet   Commonly known as:  PROTONIX   Used for:  Gastroesophageal reflux disease,  esophagitis presence not specified        Dose:  20 mg   Take 1 tablet (20 mg) by mouth daily   Quantity:  30 tablet   Refills:  0       * polyethylene glycol Packet   Commonly known as:  MIRALAX/GLYCOLAX   Used for:  Drug-induced constipation        Dose:  34 g   Take 34 g by mouth daily   Quantity:  50 packet   Refills:  0       * polyethylene glycol Packet   Commonly known as:  MIRALAX/GLYCOLAX   Used for:  Drug-induced constipation        Dose:  17 g   Take 17 g by mouth daily as needed for constipation   Quantity:  7 packet   Refills:  0       prazosin 1 MG capsule   Commonly known as:  MINIPRESS   Used for:  PTSD (post-traumatic stress disorder)        Dose:  3 mg   Take 3 capsules (3 mg) by mouth At Bedtime This product only available from a Compounding Pharmacy.   Quantity:  90 capsule   Refills:  0       QUEtiapine 400 MG tablet   Commonly known as:  SEROquel   Used for:  Schizoaffective disorder, bipolar type (H)        Dose:  400 mg   Take 1 tablet (400 mg) by mouth At Bedtime   Quantity:  60 tablet   Refills:  0       venlafaxine 75 MG Tb24 24 hr tablet   Commonly known as:  EFFEXOR-ER   Used for:  Schizoaffective disorder, bipolar type (H)        Dose:  225 mg   Take 3 tablets (225 mg) by mouth daily (with breakfast)   Quantity:  90 each   Refills:  0       Vitamin D (Cholecalciferol) 1000 units Tabs   Used for:  Vitamin D deficiency        Dose:  1000 Units   Take 1,000 Units by mouth daily   Quantity:  30 tablet   Refills:  0       * Notice:  This list has 2 medication(s) that are the same as other medications prescribed for you. Read the directions carefully, and ask your doctor or other care provider to review them with you.         Where to get your medicines      These medications were sent to [address]  [address] [facility] [address], [facility] [address]     Phone:  [phone]      paliperidone 156 MG/ML Susp injection        "           Mental Status Examination:   Appearance: [place] male. Wears glasses. Moderately well groomed. No acute distress.   Attitude:  Overall pleasant and cooperative.   Eye Contact:  Adequate   Mood:  \"Ok\"   Affect:  Somewhat flat  Speech:  Monotonous, regular in rate and tone   Language: Spoke in adequate English in an [place] accent   Psychomotor Behavior:  Unremarkable   Thought Process:  Linear, logical   Associations:  Intact   Thought Content:  No current SI, intent or plan. No HI, AH/VH.   Insight:  Poor   Judgement:  Poor - Fair   Oriented to:  Person, place, time   Attention Span and Concentration:  Adequate for interview   Recent and Remote Memory:  Fair   Fund of knowledge: Average  Gait and Station: Normal         Discharge Plan:   No discharge procedures on file.    - Patient discharged back to group home     Eze Zazueta MD  [facility]    "

## 2018-04-27 NOTE — PROGRESS NOTES
"[facility], [place]   Psychiatric Progress Note  Kamini Neal  [id number]  04/27/18    Chief Complaint: Continued medical care          Interim History:   The patient's care was discussed with the treatment team during the daily team meeting and/or staff's chart notes were reviewed.  Staff report patient has been having self-injurious behavior thoughts but not acting upon them slept about 6 hours.    Beau reports that he has been doing better on the unit it sounds as if he was frustrated by not receiving food after he was hospitalized and this is now resolved.  He still has thoughts of self injury but is not acting upon it and would like his clothing.  He is informing staff about his thoughts and being more open and understands that he needs to go back into therapy.  Denies any hallucinations or paranoia or hopelessness or helplessness and is going to group activities.  Still has negative thoughts about himself and worries about other people having negative thoughts about him.  He is sleeping well and not having any side effects related to his medications.         Medications:       benztropine  1 mg Oral BID     levothyroxine  112 mcg Oral QAM AC     melatonin  6 mg Oral At Bedtime     nicotine polacrilex  4 mg Buccal Once     pantoprazole  20 mg Oral QAM     prazosin  3 mg Oral At Bedtime     QUEtiapine  400 mg Oral At Bedtime     venlafaxine  225 mg Oral Daily with breakfast          Allergies:     Allergies   Allergen Reactions     Haldol [Haloperidol]      Torticollis          Labs:   No results found for this or any previous visit (from the past 24 hour(s)).       Psychiatric Examination:     /79  Pulse 102  Temp 97.6  F (36.4  C) (Oral)  Resp 16  Ht 1.753 m (5' 9\")  Wt 102.3 kg (225 lb 8 oz)  SpO2 97%  BMI 33.3 kg/m2  Weight is 225 lbs 8 oz  Body mass index is 33.3 kg/(m^2).                Sitting Orthostatic BP: 122/79      Sitting Orthostatic Pulse: 102 bpm    "   Standing Orthostatic BP: 114/76      Standing Orthostatic Pulse: 85 bpm       Weight over time:  Vitals:    04/25/18 0200   Weight: 102.3 kg (225 lb 8 oz)       Orthostatic Vitals       Most Recent      Sitting Orthostatic /79 04/27 0700    Sitting Orthostatic Pulse (bpm) 102 04/27 0700    Standing Orthostatic /76 04/27 0700    Standing Orthostatic Pulse (bpm) 85 04/27 0700            Beau is a 27-year-old male that is overweight wearing hospital scrubs.  His speech is of an appropriate rate and tone in his language is intact.  His behavior is appropriate and he does not have any abnormal movements.  His mood he describes as okay.  His affect appears neutral to smiling.  His thought content consists of the above with thoughts of self-harm at times and no thoughts of harming others or any current delusions.  He does have negative thoughts about himself.  His thought process is negative slightly concrete without looseness of association.  He does have slight abnormal perceptions however those are not currently a problem.  His attention and concentration appears adequate.  His cognition and fund of knowledge appears normal.  His long-term/short-term/remote memory appears intact.  His insight and judgment are both limited.         Precautions:     Behavioral Orders   Procedures     Code 1 - Restrict to Unit     Routine Programming     As clinically indicated     Status 15     Every 15 minutes.     Status Individual Observation     Order Specific Question:   CONTINUOUS 24 hours / day     Answer:   Distance and Exceptions     Order Specific Question:   Distance     Answer:   5 feet     Order Specific Question:   Exceptions     Answer:   Other     Order Specific Question:   Other exception     Answer:   When sleeping can be in doorway to room.     Status Individual Observation     Order Specific Question:   CONTINUOUS 24 hours / day     Answer:   Distance and Exceptions     Order Specific Question:   Distance      Answer:   5 feet     Order Specific Question:   Exceptions     Answer:   Other     Order Specific Question:   Other exception     Answer:   When sleeping can be in doorway to room.     Suicide precautions     Patients on Suicide Precautions should have a Combination Diet ordered that includes a Diet selection(s) AND a Behavioral Tray selection for Safe Tray - with utensils, or Safe Tray - NO utensils            DIagnoses:     Borderline personality disorder  Schizoaffective disorder bipolar type  Cannabis use disorder currently in sustained remission  Alcohol use disorder currently in sustained remission  Posttraumatic stress disorder  Generalized anxiety disorder  History of pornography addiction         Assessment & Plan:     Beau is doing well even though he had some recent difficulty regulating himself after not receiving food for an extended period of time on the unit.  He does not have any worsening psychosis and is mainly here for his mood dysregulation and needs to get back into therapy resources.  He is to show us that he can use his coping skills effectively and then we can discharge him.    Discontinuing benzodiazepines and elopement precautions  Continue voluntary hospitalization  Continue current medications unchanged discussed behavioral changes and coping skill usage    The risks, benefits, alternatives and side effects have been discussed and are understood by the patient and other caregivers.      Constantino Hoskins  [facility] Psychiat[facility]      The following document has been created with voice recognition software and may contain unintentional word substitutions.    Non clinically relevant CMS requirements:  Clinical Global Impressions  First:  Considering your total clinical experience with this particular patient population, how severe are the patient's symptoms at this time?: 5 (04/25/18 8564)  Compared to the patient's condition at the START of treatment, this patient's  condition is:: 4 (04/25/18 1547)  Most recent:  Considering your total clinical experience with this particular patient population, how severe are the patient's symptoms at this time?: 5 (04/25/18 1547)  Compared to the patient's condition at the START of treatment, this patient's condition is:: 4 (04/25/18 1547)    # Pain Assessment:  Current Pain Score 4/27/2018   Patient currently in pain? denies   Pain score (0-10) -   Pain location -       Any incidence of pain both chronic or acute reported will be documented in above documentation though further documentation can also be found in the internal medicine documentation or pain specialist documentation.

## 2018-04-27 NOTE — PROGRESS NOTES
04/26/18 2144   Behavioral Health   Hallucinations denies / not responding to hallucinations   Thinking intact   Orientation place: oriented;person: oriented;time: oriented;date: oriented   Memory baseline memory   Insight poor   Judgement impaired   Eye Contact at examiner   Affect blunted, flat   Mood mood is calm;depressed   Physical Appearance/Attire appears stated age;attire appropriate to age and situation   Hygiene other (see comment)  (adequate)   Suicidality other (see comments)  (denies currently)   1. Wish to be Dead No   2. Non-Specific Active Suicidal Thoughts  No   Self Injury thoughts only   Elopement (nothing observed )   Activity withdrawn   Speech clear;coherent   Medication Sensitivity no stated side effects   Psychomotor / Gait balanced;steady     Pt had an unremarkable shift. Mood was calm/depressed with blunted affect. No SI/SIB. No hallucinations or paranoia. Pt did admit to having thoughts of SIB in the form of hitting or banging his head against an hard object, but Pt did not act on these thoughts. No behavioral issues.

## 2018-04-27 NOTE — PLAN OF CARE
"Problem: Depressive Symptoms  Goal: Social and Therapeutic (Depression)  Signs and symptoms of listed problems will be absent or manageable.     Pt attended 0.5 out of 3.0 OT groups offered. Pt actively participated in occupational therapy clinic, however required redirection from writer on one occasion when he made comment to writer \"that's the wilbert that took me down, I don't like that wilbert\", regarding a psych associate walking past group room. Pt observed to point at staff member and appeared agitated, however was easily redirected. Pt requested a \"coloring sheet with dragons\", which is not available in clinic materials. Writer provided other options for pt to work on and pt was agreeable to a novel, creative expression task. Pt demonstrated good focus for approximately x20min prior to leaving group. Pt generally kept to himself during group.         "

## 2018-04-28 PROCEDURE — 12400007 ZZH R&B MH INTERMEDIATE UMMC

## 2018-04-28 PROCEDURE — 25000132 ZZH RX MED GY IP 250 OP 250 PS 637: Performed by: PSYCHIATRY & NEUROLOGY

## 2018-04-28 PROCEDURE — 25000132 ZZH RX MED GY IP 250 OP 250 PS 637: Performed by: EMERGENCY MEDICINE

## 2018-04-28 PROCEDURE — 25000132 ZZH RX MED GY IP 250 OP 250 PS 637: Performed by: CLINICAL NURSE SPECIALIST

## 2018-04-28 RX ADMIN — PRAZOSIN HYDROCHLORIDE 3 MG: 2 CAPSULE ORAL at 19:48

## 2018-04-28 RX ADMIN — NICOTINE POLACRILEX 8 MG: 4 GUM, CHEWING ORAL at 16:24

## 2018-04-28 RX ADMIN — VENLAFAXINE HYDROCHLORIDE 225 MG: 225 TABLET, FILM COATED, EXTENDED RELEASE ORAL at 08:04

## 2018-04-28 RX ADMIN — BENZTROPINE MESYLATE 1 MG: 1 TABLET ORAL at 19:49

## 2018-04-28 RX ADMIN — BENZTROPINE MESYLATE 1 MG: 1 TABLET ORAL at 08:04

## 2018-04-28 RX ADMIN — OLANZAPINE 10 MG: 10 TABLET, FILM COATED ORAL at 20:29

## 2018-04-28 RX ADMIN — NICOTINE POLACRILEX 8 MG: 4 GUM, CHEWING ORAL at 19:32

## 2018-04-28 RX ADMIN — QUETIAPINE FUMARATE 400 MG: 200 TABLET ORAL at 19:48

## 2018-04-28 RX ADMIN — NICOTINE POLACRILEX 8 MG: 4 GUM, CHEWING ORAL at 11:22

## 2018-04-28 RX ADMIN — OLANZAPINE 10 MG: 10 TABLET, FILM COATED ORAL at 10:58

## 2018-04-28 RX ADMIN — NICOTINE POLACRILEX 8 MG: 4 GUM, CHEWING ORAL at 08:04

## 2018-04-28 RX ADMIN — NICOTINE POLACRILEX 8 MG: 4 GUM, CHEWING ORAL at 14:38

## 2018-04-28 RX ADMIN — NICOTINE POLACRILEX 8 MG: 4 GUM, CHEWING ORAL at 13:12

## 2018-04-28 RX ADMIN — ACETAMINOPHEN 650 MG: 325 TABLET ORAL at 20:29

## 2018-04-28 RX ADMIN — HYDROXYZINE HYDROCHLORIDE 50 MG: 50 TABLET, FILM COATED ORAL at 20:29

## 2018-04-28 RX ADMIN — NICOTINE POLACRILEX 8 MG: 4 GUM, CHEWING ORAL at 17:27

## 2018-04-28 RX ADMIN — MELATONIN 6 MG: 3 TAB ORAL at 19:49

## 2018-04-28 RX ADMIN — PANTOPRAZOLE SODIUM 20 MG: 20 TABLET, DELAYED RELEASE ORAL at 08:04

## 2018-04-28 RX ADMIN — TRAZODONE HYDROCHLORIDE 50 MG: 50 TABLET ORAL at 19:49

## 2018-04-28 RX ADMIN — LEVOTHYROXINE SODIUM 112 MCG: 112 TABLET ORAL at 08:04

## 2018-04-28 RX ADMIN — OLANZAPINE 10 MG: 10 TABLET, FILM COATED ORAL at 14:56

## 2018-04-28 RX ADMIN — NICOTINE POLACRILEX 8 MG: 4 GUM, CHEWING ORAL at 09:34

## 2018-04-28 ASSESSMENT — ACTIVITIES OF DAILY LIVING (ADL)
DRESS: SCRUBS (BEHAVIORAL HEALTH)
HYGIENE/GROOMING: INDEPENDENT
LAUNDRY: WITH SUPERVISION
ORAL_HYGIENE: INDEPENDENT

## 2018-04-28 NOTE — PROGRESS NOTES
"Pt was visible in the milieu during this shift, though did not engage in group activities. Pt denies SI/SIB and reports feeling safe on the unit today. Pt denies hallucinations though reports, \"I had an episode last night, I was hearing voices.\" Pt stated that medications helped manage the hallucinations. \"I'm doing good now.\" Pt was removed from status SIO during this shift, and has no new concerns to report at this time.       04/28/18 1407   Behavioral Health   Hallucinations denies / not responding to hallucinations   Thinking distractable   Orientation person: oriented;place: oriented;date: oriented   Memory baseline memory   Insight other (see comment)  (Fair)   Judgement impaired   Eye Contact at examiner   Affect blunted, flat   Mood mood is calm   Physical Appearance/Attire appears stated age   Hygiene well groomed   Suicidality other (see comments)  (Denies)   1. Wish to be Dead No   2. Non-Specific Active Suicidal Thoughts  No   Self Injury other (see comment)  (Denies)   Elopement (None stated, none observed)   Activity other (see comment)  (Visible in milieu, social with peers)   Speech clear;coherent   Medication Sensitivity no stated side effects   Psychomotor / Gait balanced;steady   Safety   Elopement other (see comment)  (None stated, none observed)   Psycho Education   Type of Intervention 1:1 intervention   Response participates, initiates socially appropriate   Hours 0.5   Treatment Detail ( Check-in)   Activities of Daily Living   Hygiene/Grooming independent   Oral Hygiene independent   Dress scrubs (behavioral health)   Laundry with supervision   Room Organization independent   Behavioral Health Interventions   Depression maintain safety precautions;monitor need to revise level of observation;maintain safe secure environment   Social and Therapeutic Interventions (Depression) encourage socialization with peers;encourage effective boundaries with peers;encourage participation in therapeutic " groups and milieu activities

## 2018-04-28 NOTE — PLAN OF CARE
"Problem: Depressive Symptoms  Goal: Depressive Symptoms  Signs and symptoms of listed problems will be absent or manageable.   Will deny suicidal ideation and self harm thoughts.  Will rate depression no higher than a 5 on a scale with 10 being the worst.  Will communicate to staff when has thoughts of self harm.  Will be medication compliant with scheduled medications and prns.  Will use distraction such as board games, listening to music.   Outcome: Improving   04/27/18 2036   Depressive Symptoms   Depressive Symptoms Assessed all   Depressive Symptoms Present none     Patient on status individual observation 1:1 staffing, and space restriction to prevent/manage self injurious behavior. Pt was visible in the lounge watching TV with his peers for most of the shift. Attended and participated in social activites on the unit. Pt had a calm mood and brightened upon approach. Was cooperative and polite. He rated his depression 4/10 and anxiety 5/10.  Pt denied SI/ SIB but stated \" I hear voices that are telling me that the medications won't work.\". Reported feeling safe. Reported his appetite and sleep as good. No physical concerns reported or observed. No medication side effects. Reported feeling hopeful.   Plan: Status Individual Observation, 1:1; Build trust with pt. Continue to build on strengths. Encourage healthy coping.         "

## 2018-04-28 NOTE — PROGRESS NOTES
"Suicide assessments completed following discontinuation of SIO. Patient denies suicidal ideation, plan, intent, denies wish to be dead. Patient endorses \"negative thoughts\" of wanting to head bang. Patient states he can resist these urges and will be safe on the unit. Has been appropriately requesting/ receiving olanzapine this shift.   "

## 2018-04-29 PROCEDURE — 25000132 ZZH RX MED GY IP 250 OP 250 PS 637: Performed by: PSYCHIATRY & NEUROLOGY

## 2018-04-29 PROCEDURE — 25000132 ZZH RX MED GY IP 250 OP 250 PS 637: Performed by: CLINICAL NURSE SPECIALIST

## 2018-04-29 PROCEDURE — 12400007 ZZH R&B MH INTERMEDIATE UMMC

## 2018-04-29 PROCEDURE — 25000132 ZZH RX MED GY IP 250 OP 250 PS 637: Performed by: EMERGENCY MEDICINE

## 2018-04-29 RX ADMIN — VENLAFAXINE HYDROCHLORIDE 225 MG: 225 TABLET, FILM COATED, EXTENDED RELEASE ORAL at 09:11

## 2018-04-29 RX ADMIN — OLANZAPINE 10 MG: 10 TABLET, FILM COATED ORAL at 19:26

## 2018-04-29 RX ADMIN — MELATONIN 6 MG: 3 TAB ORAL at 19:25

## 2018-04-29 RX ADMIN — TRAZODONE HYDROCHLORIDE 50 MG: 50 TABLET ORAL at 21:13

## 2018-04-29 RX ADMIN — NICOTINE POLACRILEX 8 MG: 4 GUM, CHEWING ORAL at 19:27

## 2018-04-29 RX ADMIN — LEVOTHYROXINE SODIUM 112 MCG: 112 TABLET ORAL at 09:11

## 2018-04-29 RX ADMIN — TRAZODONE HYDROCHLORIDE 50 MG: 50 TABLET ORAL at 19:25

## 2018-04-29 RX ADMIN — PRAZOSIN HYDROCHLORIDE 3 MG: 2 CAPSULE ORAL at 19:25

## 2018-04-29 RX ADMIN — BENZTROPINE MESYLATE 1 MG: 1 TABLET ORAL at 19:26

## 2018-04-29 RX ADMIN — PANTOPRAZOLE SODIUM 20 MG: 20 TABLET, DELAYED RELEASE ORAL at 09:11

## 2018-04-29 RX ADMIN — NICOTINE POLACRILEX 8 MG: 4 GUM, CHEWING ORAL at 14:12

## 2018-04-29 RX ADMIN — NICOTINE POLACRILEX 8 MG: 4 GUM, CHEWING ORAL at 09:12

## 2018-04-29 RX ADMIN — NICOTINE POLACRILEX 8 MG: 4 GUM, CHEWING ORAL at 13:01

## 2018-04-29 RX ADMIN — HYDROXYZINE HYDROCHLORIDE 50 MG: 50 TABLET, FILM COATED ORAL at 19:26

## 2018-04-29 RX ADMIN — NICOTINE POLACRILEX 8 MG: 4 GUM, CHEWING ORAL at 10:13

## 2018-04-29 RX ADMIN — NICOTINE POLACRILEX 8 MG: 4 GUM, CHEWING ORAL at 16:22

## 2018-04-29 RX ADMIN — NICOTINE POLACRILEX 8 MG: 4 GUM, CHEWING ORAL at 11:28

## 2018-04-29 RX ADMIN — QUETIAPINE FUMARATE 400 MG: 200 TABLET ORAL at 19:24

## 2018-04-29 RX ADMIN — BENZTROPINE MESYLATE 1 MG: 1 TABLET ORAL at 09:11

## 2018-04-29 ASSESSMENT — ACTIVITIES OF DAILY LIVING (ADL)
GROOMING: INDEPENDENT
DRESS: INDEPENDENT
ORAL_HYGIENE: INDEPENDENT

## 2018-04-29 NOTE — PLAN OF CARE
"Problem: Psychotic Symptoms  Goal: Psychotic Symptoms  Signs and symptoms of listed problems will be absent or manageable.    Pt was visible in the lounge off and on, mainly keeping to himself. He had a depressed mood and blunted affect.   Pt denied SI/ SIB but was positive for auditory hallucinations. Stated \" The voices keep telling me that I'm going to hell, and that I'm not a Religious.\"  Pt looked fearful, anxious and paranoid as he talked of his intrusive voices. Reassurance provided. Sleep and appetite adequate. No physical concerns reported or observed. No medication side effects.  Plan: Status 15s; Build trust with pt. Continue to build on strengths. Encourage healthy coping.         "

## 2018-04-29 NOTE — PLAN OF CARE
Problem: Patient Care Overview  Goal: Plan of Care/Patient Progress Review  Outcome: Improving  Patient has had a good shift, has not required any PRN medications for anxiety, agitation, hallucinations. Has been reading calmly in his room most of the shift, ate both meals, napping in bed briefly after lunch. Patient denies suicidal ideation, self injurious thoughts. Denies hallucinations but states he did hear voices while he was attempting to sleep last night. Patient states he had taken clozapine in the past and may be interested in taking it again. He noted side effects of sedation and drooling but states it was more effective than his current medications. No behavioral concerns this shift.

## 2018-04-30 VITALS
RESPIRATION RATE: 18 BRPM | SYSTOLIC BLOOD PRESSURE: 114 MMHG | WEIGHT: 223 LBS | DIASTOLIC BLOOD PRESSURE: 74 MMHG | HEIGHT: 69 IN | TEMPERATURE: 99.9 F | HEART RATE: 64 BPM | OXYGEN SATURATION: 97 % | BODY MASS INDEX: 33.03 KG/M2

## 2018-04-30 PROCEDURE — 99232 SBSQ HOSP IP/OBS MODERATE 35: CPT | Performed by: PSYCHIATRY & NEUROLOGY

## 2018-04-30 PROCEDURE — 97150 GROUP THERAPEUTIC PROCEDURES: CPT | Mod: GO

## 2018-04-30 PROCEDURE — 12400007 ZZH R&B MH INTERMEDIATE UMMC

## 2018-04-30 PROCEDURE — 25000132 ZZH RX MED GY IP 250 OP 250 PS 637: Performed by: EMERGENCY MEDICINE

## 2018-04-30 PROCEDURE — 25000132 ZZH RX MED GY IP 250 OP 250 PS 637: Performed by: PSYCHIATRY & NEUROLOGY

## 2018-04-30 PROCEDURE — 25000132 ZZH RX MED GY IP 250 OP 250 PS 637: Performed by: CLINICAL NURSE SPECIALIST

## 2018-04-30 RX ADMIN — NICOTINE POLACRILEX 8 MG: 4 GUM, CHEWING ORAL at 12:11

## 2018-04-30 RX ADMIN — BENZTROPINE MESYLATE 1 MG: 1 TABLET ORAL at 19:10

## 2018-04-30 RX ADMIN — NICOTINE POLACRILEX 8 MG: 4 GUM, CHEWING ORAL at 09:31

## 2018-04-30 RX ADMIN — OLANZAPINE 10 MG: 10 TABLET, FILM COATED ORAL at 19:11

## 2018-04-30 RX ADMIN — BENZTROPINE MESYLATE 1 MG: 1 TABLET ORAL at 08:07

## 2018-04-30 RX ADMIN — NICOTINE POLACRILEX 8 MG: 4 GUM, CHEWING ORAL at 11:01

## 2018-04-30 RX ADMIN — NICOTINE POLACRILEX 8 MG: 4 GUM, CHEWING ORAL at 16:04

## 2018-04-30 RX ADMIN — PANTOPRAZOLE SODIUM 20 MG: 20 TABLET, DELAYED RELEASE ORAL at 08:07

## 2018-04-30 RX ADMIN — LEVOTHYROXINE SODIUM 112 MCG: 112 TABLET ORAL at 08:07

## 2018-04-30 RX ADMIN — OLANZAPINE 10 MG: 10 TABLET, FILM COATED ORAL at 08:07

## 2018-04-30 RX ADMIN — TRAZODONE HYDROCHLORIDE 50 MG: 50 TABLET ORAL at 22:12

## 2018-04-30 RX ADMIN — NICOTINE POLACRILEX 8 MG: 4 GUM, CHEWING ORAL at 14:06

## 2018-04-30 RX ADMIN — HYDROXYZINE HYDROCHLORIDE 50 MG: 50 TABLET, FILM COATED ORAL at 22:12

## 2018-04-30 RX ADMIN — NICOTINE POLACRILEX 8 MG: 4 GUM, CHEWING ORAL at 08:07

## 2018-04-30 RX ADMIN — NICOTINE POLACRILEX 8 MG: 4 GUM, CHEWING ORAL at 17:40

## 2018-04-30 RX ADMIN — QUETIAPINE FUMARATE 400 MG: 200 TABLET ORAL at 19:10

## 2018-04-30 RX ADMIN — TRAZODONE HYDROCHLORIDE 50 MG: 50 TABLET ORAL at 19:10

## 2018-04-30 RX ADMIN — VENLAFAXINE HYDROCHLORIDE 225 MG: 225 TABLET, FILM COATED, EXTENDED RELEASE ORAL at 08:07

## 2018-04-30 RX ADMIN — MELATONIN 6 MG: 3 TAB ORAL at 19:10

## 2018-04-30 RX ADMIN — PRAZOSIN HYDROCHLORIDE 3 MG: 2 CAPSULE ORAL at 19:10

## 2018-04-30 RX ADMIN — NICOTINE POLACRILEX 8 MG: 4 GUM, CHEWING ORAL at 19:11

## 2018-04-30 RX ADMIN — HYDROXYZINE HYDROCHLORIDE 50 MG: 50 TABLET, FILM COATED ORAL at 12:11

## 2018-04-30 ASSESSMENT — ACTIVITIES OF DAILY LIVING (ADL)
GROOMING: INDEPENDENT
DRESS: STREET CLOTHES
ORAL_HYGIENE: INDEPENDENT
LAUNDRY: WITH SUPERVISION

## 2018-04-30 NOTE — PLAN OF CARE
"Problem: Depressive Symptoms  Goal: Depressive Symptoms  Signs and symptoms of listed problems will be absent or manageable.   Will deny suicidal ideation and self harm thoughts.  Will rate depression no higher than a 5 on a scale with 10 being the worst.  Will communicate to staff when has thoughts of self harm.  Will be medication compliant with scheduled medications and prns.  Will use distraction such as board games, listening to music.   Outcome: No Change  Pt states he \"just wants to get through today\" Pt has been cooperative and medication compliant.  Pt states he has Right ear drainage that is clear. No pain. No hearing loss. States it started a \"couple days ago\".   was made aware of this. Pt states he is ready to leave and the plan is for him to discharge back to his group home tomorrow. Pt denies depression and states he has \"a little anxiety\"  Pt ate breakfast. Pt requested zyprexa earlier this am and when asked about hallucinations later he stated they were gone. Pt did not go to group.        "

## 2018-04-30 NOTE — PROGRESS NOTES
"[facility], [place]   Psychiatric Progress Note  Kamini Neal  [id number]  04/30/18    Chief Complaint: Continued medical care          Interim History:   The patient's care was discussed with the treatment team during the daily team meeting and/or staff's chart notes were reviewed.  Staff report patient has not been sleeping very well overnight and had some negative thoughts and watch television though according to report slept 7 hours.    Upon meeting with him he says that he is feeling good and he might be ready to go back home.  He denies any current problems including self-injurious thoughts hallucinations or paranoia or any thoughts of ending his life or harming others.  He mentions possibly wanting to be set up at the clinic here and possibly also therapy and understands that he might have to go back to [facility] if they have a long wait list.  He also describes possibly going on clozapine in the future.    Later nursing staff reported to me that he has some right ear drainage does not seem as though he has any fever or any other symptoms that would need to be evaluated currently and should follow-up as an outpatient if it continues.             Medications:       benztropine  1 mg Oral BID     levothyroxine  112 mcg Oral QAM AC     melatonin  6 mg Oral At Bedtime     nicotine polacrilex  4 mg Buccal Once     pantoprazole  20 mg Oral QAM     prazosin  3 mg Oral At Bedtime     QUEtiapine  400 mg Oral At Bedtime     venlafaxine  225 mg Oral Daily with breakfast          Allergies:     Allergies   Allergen Reactions     Haldol [Haloperidol]      Torticollis          Labs:   No results found for this or any previous visit (from the past 24 hour(s)).       Psychiatric Examination:     /72  Pulse 65  Temp 99.9  F (37.7  C) (Oral)  Resp 18  Ht 1.753 m (5' 9\")  Wt 101.2 kg (223 lb)  SpO2 97%  BMI 32.93 kg/m2  Weight is 223 lbs 0 oz  Body mass index is 32.93 kg/(m^2).          "       Sitting Orthostatic BP: 118/74      Sitting Orthostatic Pulse: 91 bpm      Standing Orthostatic BP: 112/76      Standing Orthostatic Pulse: 113 bpm       Weight over time:  Vitals:    04/25/18 0200 04/29/18 0909   Weight: 102.3 kg (225 lb 8 oz) 101.2 kg (223 lb)       Orthostatic Vitals       Most Recent      Sitting Orthostatic /74 04/30 0929    Sitting Orthostatic Pulse (bpm) 91 04/30 0929    Standing Orthostatic /76 04/30 0929    Standing Orthostatic Pulse (bpm) 113 04/30 0929               Beau is a 27-year-old male that is overweight wearing hospital scrubs.  His speech is of an appropriate rate and tone in his language is intact.  His behavior is appropriate and he does not have any abnormal movements.  His mood he describes as good.  His affect appears neutral to smiling.  His thought content consists of the above without thoughts of self-harm and no thoughts of harming others or any current delusions.  He does have negative thoughts about himself.  His thought process is negative slightly concrete without looseness of association.  He does have slight abnormal perceptions however those are not currently a problem.  His attention and concentration appears adequate.  His cognition and fund of knowledge appears normal.  His long-term/short-term/remote memory appears intact.  His insight and judgment are both limited.         Precautions:     Behavioral Orders   Procedures     Code 1 - Restrict to Unit     Routine Programming     As clinically indicated     Status 15     Every 15 minutes.     Suicide precautions     Patients on Suicide Precautions should have a Combination Diet ordered that includes a Diet selection(s) AND a Behavioral Tray selection for Safe Tray - with utensils, or Safe Tray - NO utensils            DIagnoses:     Borderline personality disorder  Schizoaffective disorder bipolar type  Cannabis use disorder currently in sustained remission  Alcohol use disorder currently in  sustained remission  Posttraumatic stress disorder  Generalized anxiety disorder  History of pornography addiction         Assessment & Plan:     Beau has been regulating his behaviors appropriately and does not have any worsening psychotic symptoms.  We discussed his current presentation and his ability to manage his mood and regulate his emotions.  He is wanting to get back into the community and go back to his group home we discussed how we can likely send him tomorrow.    Continue voluntary hospitalization likely discharge tomorrow    The risks, benefits, alternatives and side effects have been discussed and are understood by the patient and other caregivers.      Constantino Hoskins  [facility]      The following document has been created with voice recognition software and may contain unintentional word substitutions.    Non clinically relevant CMS requirements:  Clinical Global Impressions  First:  Considering your total clinical experience with this particular patient population, how severe are the patient's symptoms at this time?: 5 (04/25/18 1547)  Compared to the patient's condition at the START of treatment, this patient's condition is:: 4 (04/25/18 1547)  Most recent:  Considering your total clinical experience with this particular patient population, how severe are the patient's symptoms at this time?: 5 (04/25/18 1547)  Compared to the patient's condition at the START of treatment, this patient's condition is:: 4 (04/25/18 1547)    # Pain Assessment:  Current Pain Score 4/30/2018   Patient currently in pain? no   Pain score (0-10) -   Pain location -       Any incidence of pain both chronic or acute reported will be documented in above documentation though further documentation can also be found in the internal medicine documentation or pain specialist documentation.

## 2018-04-30 NOTE — PLAN OF CARE
Problem: Depressive Symptoms  Goal: Social and Therapeutic (Depression)  Signs and symptoms of listed problems will be absent or manageable.     Pt attended 0.5 out of 3.0 OT groups offered. Pt actively participated in occupational therapy clinic once structure and guidance was provided by writer to select one project at a time to complete, as he vocalized 3 projects he wanted to start all at once. Pt was able to ask for assistance as needed, and initiate a familiar, creative expression task. Pt demonstrated ability to remain focused on selected task for approximately x10min at a time. Pt appeared comfortable interacting with select peers, however generally kept to himself. Pt had a flat affect and was observed to frequently leave group room throughout clinic.

## 2018-04-30 NOTE — PROGRESS NOTES
submitted EPIC referral for [facility]'s psychiatry and therapy services.      spoke with Neela at Pt's group home ([phone]) regarding Pt discharge tomorrow, we should cab Pt to group home between 12-1pm. Medications should be filled with [facility] in [place].

## 2018-04-30 NOTE — PLAN OF CARE
"Problem: Psychotic Symptoms  Goal: Social and Therapeutic (Psychotic Symptoms)  Signs and symptoms of listed problems will be absent or manageable.    04/29/18 2129   Psychotic Symptoms   Psychotic Symptoms Assessed all   Psychotic Symptoms Present other (see comment)  (Auditory Hallucinations)     Pt was visible in the lounge watching TV with his peers briefly. Pt had a calm mood and brightened upon approach. Was cooperative and polite. He rated his depression 5/10 and anxiety 7/10.  Pt denied SI/ SIB but reported voices \" that come and go.\"  However, pt reported feeling safe. Reported his appetite good and sleep as \" not so good\". No physical concerns reported or observed. No medication side effects. Reported feeling hopeful.   Plan: Status 15s; Build trust with pt. Continue to build on strengths. Encourage healthy coping.         "

## 2018-05-01 PROCEDURE — 99239 HOSP IP/OBS DSCHRG MGMT >30: CPT | Performed by: PSYCHIATRY & NEUROLOGY

## 2018-05-01 PROCEDURE — 25000132 ZZH RX MED GY IP 250 OP 250 PS 637: Performed by: EMERGENCY MEDICINE

## 2018-05-01 PROCEDURE — 90853 GROUP PSYCHOTHERAPY: CPT

## 2018-05-01 RX ORDER — TRAZODONE HYDROCHLORIDE 50 MG/1
50 TABLET, FILM COATED ORAL
Qty: 90 TABLET | Refills: 0 | Status: ON HOLD | OUTPATIENT
Start: 2018-05-01 | End: 2019-08-29

## 2018-05-01 RX ORDER — OLANZAPINE 10 MG/1
10 TABLET ORAL 3 TIMES DAILY PRN
Qty: 90 TABLET | Refills: 0 | Status: ON HOLD | OUTPATIENT
Start: 2018-05-01 | End: 2019-08-29

## 2018-05-01 RX ADMIN — PANTOPRAZOLE SODIUM 20 MG: 20 TABLET, DELAYED RELEASE ORAL at 08:54

## 2018-05-01 RX ADMIN — BENZTROPINE MESYLATE 1 MG: 1 TABLET ORAL at 08:54

## 2018-05-01 RX ADMIN — VENLAFAXINE HYDROCHLORIDE 225 MG: 225 TABLET, FILM COATED, EXTENDED RELEASE ORAL at 08:54

## 2018-05-01 RX ADMIN — NICOTINE POLACRILEX 8 MG: 4 GUM, CHEWING ORAL at 08:55

## 2018-05-01 RX ADMIN — LEVOTHYROXINE SODIUM 112 MCG: 112 TABLET ORAL at 08:54

## 2018-05-01 RX ADMIN — NICOTINE POLACRILEX 8 MG: 4 GUM, CHEWING ORAL at 10:57

## 2018-05-01 NOTE — PROGRESS NOTES
"Pt will discharge today to his group home, Writer spoke with Pt this morning and he feels ready to go. Pt appears brighter and more \"put together\". Pt wanted to transfer psychiatric and therapy care to our hospital system. Referral was made by Writer, but we have not heard back from their  to schedule initial intake appointments. Pt has an appointment with current psychiatrist, Dr. Marco Campo [place] [facility] for [place] 10th, which he can attend. Pt was provided with contact information for [facility] so he can schedule intake appointments on his own.   "

## 2018-05-01 NOTE — DISCHARGE INSTRUCTIONS
Behavioral Discharge Planning and Instructions      Summary:  You were admitted on 4/24/2018 due to Schizoaffective disorder and Suicidal Ideations.  You were treated by Dr. Constantino Lugo MD and discharged on 5/1/18 from [facility].     Principal Diagnosis:   1.  Schizoaffective disorder -- bipolar type.   2.  Hypothyroidism.   3.  History of posttraumatic stress disorder.     Health Care Follow-up Appointments:   [facility]: [facility]   Phone: [phone] Fax: [phone] [facility] PLEASE FAX DISCHARGE INSTRUCTIONS   Your medications have been sent to [facility].     Psychiatry Appointment Date/Time: Thursday 5/10 at 10:20 am      Psychiatrist: Marco Campo   Therapy Appointment Date/time: Please make follow-up therapy appointment with your Therapist: Krystyna James  [facility]   [address]   [address] [place] [address]   Phone: [phone]  Fax: [phone] [facility] PLEASE FAX DISCHARGE INSTRUCTIONS     There is a walk-in connections group on Tuesdays and Fridays at 10:00am.   You may attend at these times to be seen for medication management and therapy without an appointment.     At your request a referral was made to [facility] for psychiatry and therapy services, however, we never heard back in time to schedule. *Please contact them to schedule appointments: [phone]    Attend all scheduled appointments with your outpatient providers. Call at least 24 hours in advance if you need to reschedule an appointment to ensure continued access to your outpatient providers.   Major Treatments, Procedures and Findings:  You were provided with: a psychiatric assessment, assessed for medical stability, medication evaluation and/or management, group therapy and milieu management    Symptoms to Report: feeling more aggressive, increased confusion, losing more  "sleep, mood getting worse or thoughts of suicide    Early warning signs can include: increased depression or anxiety sleep disturbances increased thoughts or behaviors of suicide or self-harm  increased unusual thinking, such as paranoia or hearing voices    Safety and Wellness:  Take all medicines as directed.  Make no changes unless your doctor suggests them.      Follow treatment recommendations.  Refrain from alcohol and non-prescribed drugs.  Ask your support system to help you reduce your access to items that could harm yourself or others. If there is a concern for safety, call 911.    Resources:   Crisis Intervention: 272.292.8710 or 014-788-4534 (TTY: 537.581.5697).  Call anytime for help.  National [place] on Mental Illness (www.[place] a.org): 493.591.3156 or 938-446-5328.  Alcoholics Anonymous (www.alcoholics-anonymous.org): Check your phone book for your local chapter.  Suicide Awareness Voices of Education (SAVE) (www.save.org): 286-448-CWRY (6524)  National Suicide Prevention Line (www.mentalhealthmn.org): 516-655-HUGE (8122)  Mental Health Consumer/Survivor Network of [place] (www.mhcsn.net): 179.824.4388 or 887-187-8104  Mental Health Association of [place] (www.mentalhealth.org): 575.698.5398 or 447-078-0853  Self- Management and Recovery Training., SMART-- Toll free: 311.718.6273  www.[employer].org  [facility] Crisis (COPE) Response - Adult 212 934-7334  Text 4 Life: txt \"LIFE\" to 34902 for immediate support and crisis intervention  Crisis text line: Text \"[place]\" to 524145. Free, confidential, 24/7.  Crisis Intervention: 297.570.5254 or 199-244-7768. Call anytime for help.     The treatment team has appreciated the opportunity to work with you.     Please take care and make your recovery a daily recovery.   If you have any questions or concerns our unit number is [phone].  Thank you.       "

## 2018-05-01 NOTE — PLAN OF CARE
Problem: Patient Care Overview  Goal: Plan of Care/Patient Progress Review  Outcome: Adequate for Discharge Date Met: 05/01/18  Patient verbalizes readiness for discharge. AVS reviewed with patient including follow up appointments and medication instructions. Patient denies suicidal ideation, plan, intent. All belongings returned to patient including those sent to security. Patient will have assistance with medication set up at his group home. Spoke with Neela, group home staff regarding his return and they will be expecting him. Patient and group home staff report no further questions. Patient will be sent via cab and is discharged at this time.

## 2018-05-01 NOTE — DISCHARGE SUMMARY
[facility], [place]   Psychiatric Discharge Summary  Time: 39 minutes on encounter.    Kamini Neal MRN# [id number]   Age: 27 year old YOB: 1990     Date of Admission:  4/24/2018  Date of Discharge:  05/01/18  Admitting Physician:  Constantino Lugo  Discharge Physician:  Constantino Lugo         Event Leading to Hospitalization and Hospital Course:   Kamini Neal was admitted for suicidal thoughts and self injury.       See Admission note by Constantino Lugo   on 4/25 for additional details.     Kamini Neal was hospitalized for suicidal thoughts and self injury voluntarily.  He reports that he has had increasingly negative thoughts though he has not been going to his outpatient therapy appointments recently and stopped going to the day treatment.  He routinely will have dysregulation of his mood and negative thoughts and it is not related to his underlying psychiatric condition of schizoaffective disorder in more related to borderline personality disorder.  During his last hospitalization his paliperidone injection was increased and the only thing we did was await his resolution of symptoms and behavioral management.  He was taking olanzapine 10 mg as needed 3 times a day and was previously taking this as a scheduled medication.  He was given trazodone to assist with sleep and is ready for discharge and has been managing his behaviors more appropriately.  We discussed planning of him going back to therapy and regulating his emotions and behaviors more adequately.  He may benefit from outpatient psychodynamic type therapy in addition to dialectical behavioral therapy.  He expressed interest in possibly going back on clozapine and we discussed he could do that as an outpatient and reduce his antipsychotic burden.  On day of discharge she is feeling well he is somewhat excited about going home and had trouble sleeping last night because of it.  He  believes he can be safe at home denies any thoughts of harming himself or others and does not have any worsening of negative thoughts.  He believes things are better and he is more hopeful about the future and we discussed future prospects about therapy and treatment.  We discussed safety planning in detail and his use of outpatient services such as the day treatment and also coming back to the hospital as needed.           DIagnoses:   Borderline personality disorder  Schizoaffective disorder bipolar type  Cannabis use disorder currently in sustained remission  Alcohol use disorder currently in sustained remission  Posttraumatic stress disorder  Generalized anxiety disorder  History of pornography addiction         Labs:   No results found for this or any previous visit (from the past 24 hour(s)).         Consults:   No consultations were requested during this admission           Discharge Medications:        Review of your medicines      START taking       Dose / Directions    traZODone 50 MG tablet   Commonly known as:  DESYREL   Used for:  Schizoaffective disorder, unspecified type (H)        Dose:  50 mg   Take 1 tablet (50 mg) by mouth nightly as needed for sleep   Quantity:  90 tablet   Refills:  0         CONTINUE these medicines which may have CHANGED, or have new prescriptions. If we are uncertain of the size of tablets/capsules you have at home, strength may be listed as something that might have changed.       Dose / Directions    OLANZapine 10 MG tablet   Commonly known as:  zyPREXA   This may have changed:    - medication strength  - how much to take  - when to take this  - reasons to take this   Used for:  Schizoaffective disorder, unspecified type (H), Suicidal ideation        Dose:  10 mg   Take 1 tablet (10 mg) by mouth 3 times daily as needed (self harm/ aggitation)   Quantity:  90 tablet   Refills:  0       polyethylene glycol Packet   Commonly known as:  MIRALAX/GLYCOLAX   This may have changed:   Another medication with the same name was removed. Continue taking this medication, and follow the directions you see here.   Used for:  Drug-induced constipation        Dose:  34 g   Take 34 g by mouth daily   Quantity:  50 packet   Refills:  0         CONTINUE these medicines which have NOT CHANGED       Dose / Directions    benztropine 1 MG tablet   Commonly known as:  COGENTIN   Used for:  Schizoaffective disorder, bipolar type (H)        Dose:  1 mg   Take 1 tablet (1 mg) by mouth 2 times daily   Quantity:  60 tablet   Refills:  0       hydrOXYzine 50 MG tablet   Commonly known as:  ATARAX   Used for:  Schizoaffective disorder, bipolar type (H)        Dose:  50 mg   Take 1 tablet (50 mg) by mouth every 4 hours as needed for anxiety   Quantity:  60 tablet   Refills:  0       levothyroxine 25 MCG tablet   Commonly known as:  SYNTHROID/LEVOTHROID   Used for:  Acquired hypothyroidism        Dose:  112 mcg   Take 4.5 tablets (112 mcg) by mouth daily noon   Quantity:  135 tablet   Refills:  0       melatonin 3 MG Caps   Used for:  Primary insomnia        Dose:  6 mg   Take 6 mg by mouth At Bedtime   Quantity:  60 capsule   Refills:  0       paliperidone 156 MG/ML Susp injection   Commonly known as:  INVEGA SUSTENNA   Used for:  Schizoaffective disorder, depressive type (H)        Dose:  156 mg   Start taking on:  5/10/2018   Inject 1 mL (156 mg) into the muscle once for 1 dose   Quantity:  1 mL   Refills:  0       pantoprazole 20 MG EC tablet   Commonly known as:  PROTONIX   Used for:  Gastroesophageal reflux disease, esophagitis presence not specified        Dose:  20 mg   Take 1 tablet (20 mg) by mouth daily   Quantity:  30 tablet   Refills:  0       prazosin 1 MG capsule   Commonly known as:  MINIPRESS   Used for:  PTSD (post-traumatic stress disorder)        Dose:  3 mg   Take 3 capsules (3 mg) by mouth At Bedtime This product only available from a Compounding Pharmacy.   Quantity:  90 capsule   Refills:  0        QUEtiapine 400 MG tablet   Commonly known as:  SEROquel   Used for:  Schizoaffective disorder, bipolar type (H)        Dose:  400 mg   Take 1 tablet (400 mg) by mouth At Bedtime   Quantity:  60 tablet   Refills:  0       venlafaxine 75 MG Tb24 24 hr tablet   Commonly known as:  EFFEXOR-ER   Used for:  Schizoaffective disorder, bipolar type (H)        Dose:  225 mg   Take 3 tablets (225 mg) by mouth daily (with breakfast)   Quantity:  90 each   Refills:  0       Vitamin D (Cholecalciferol) 1000 units Tabs   Used for:  Vitamin D deficiency        Dose:  1000 Units   Take 1,000 Units by mouth daily   Quantity:  30 tablet   Refills:  0            Where to get your medicines      These medications were sent to [address]  [address] [facility] [address], [facility] [address]     Phone:  [phone]      OLANZapine 10 MG tablet     traZODone 50 MG tablet                  Mental Status Examination:   Beau is a 27-year-old male that is overweight wearing hospital scrubs.  His speech is of an appropriate rate and tone in his language is intact.  His behavior is appropriate and he does not have any abnormal movements.  His mood he describes as good.  His affect appears neutral to smiling.  His thought content consists of the above without thoughts of self-harm and no thoughts of harming others or any current delusions.  He does have negative thoughts about himself.  His thought process is negative slightly concrete without looseness of association.  He does have slight abnormal perceptions however those are not currently a problem.  His attention and concentration appears adequate.  His cognition and fund of knowledge appears normal.  His long-term/short-term/remote memory appears intact.  His insight and judgment are both limited.         Discharge Plan:     Reason for your hospital stay   Self injury thoughts     Activity   Your activity upon discharge:  activity as tolerated     Discharge Instructions   Please see Primary for ear drainage  Please see psych for starting clozapine in future  Therapy of DBT and psychodynamic may help  Can do Day Treatment in future if needed    1. Please do not harm yourself or others.  2. Please continue to take your medications.  3. Please follow up with your outpatient care team.  4. Please do not take drugs or alcohol as this will worsen your condition.  5. Please do not take more than the prescribed doses of medications as this may make them dangerous.   6. Please follow your safety plan of action.  7. Please call crisis if having trouble.  8. If having thoughts of harming self or others please come in to the emergency department as soon as possible.     Full Code     Diet   Follow this diet upon discharge: Orders Placed This Encounter     Regular Diet Adult             Further instructions from your care team        Behavioral Discharge Planning and Instructions      Summary:  You were admitted on 4/24/2018 due to Schizoaffective disorder and Suicidal Ideations.  You were treated by Dr. Constantino Lugo MD and [facility] on  [facility].     Principal Diagnosis:   1.  Schizoaffective disorder -- bipolar type.   2.  Hypothyroidism.   3.  History of posttraumatic stress disorder.     Health Care Follow-up Appointments:   [facility]: [facility]   Phone: [phone] Fax: [phone] HUC PLEASE FAX DISCHARGE INSTRUCTIONS       Psychiatry Appointment Date/Time: Thursday 5/10 at 10:20 am      Psychiatrist: Marco Campo   Therapy Appointment Date/time: Friday 4/20 at 10 am   Therapist: Krystyna James  [facility]   [address]   [address]rd [place] [address]   Phone: [phone]  Fax: [phone]    There is a walk-in connections group on Tuesdays and Fridays at 10:00am.   You may attend at these times to be seen for  medication management and therapy without an appointment.     Your Invega Sustena shot is due the first week of May. Your medications have been sent to [facility].   Attend all scheduled appointments with your outpatient providers. Call at least 24 hours in advance if you need to reschedule an appointment to ensure continued access to your outpatient providers.   Major Treatments, Procedures and Findings:  You were provided with: a psychiatric assessment, assessed for medical stability, medication evaluation and/or management, group therapy and milieu management    Symptoms to Report: feeling more aggressive, increased confusion, losing more sleep, mood getting worse or thoughts of suicide    Early warning signs can include: increased depression or anxiety sleep disturbances increased thoughts or behaviors of suicide or self-harm  increased unusual thinking, such as paranoia or hearing voices    Safety and Wellness:  Take all medicines as directed.  Make no changes unless your doctor suggests them.      Follow treatment recommendations.  Refrain from alcohol and non-prescribed drugs.  Ask your support system to help you reduce your access to items that could harm yourself or others. If there is a concern for safety, call 911.    Resources:   Crisis Intervention: 669.973.3950 or 171-916-2972 (TTY: 310.765.9824).  Call anytime for help.  [place] on [place]ness (www.[place].jorge a.org): 828.332.5893 or 226-194-8214.  Alcoholics Anonymous (www.alcoholics-anonymous.org): Check your phone book for your local chapter.  Suicide Awareness Voices of Education (SAVE) (www.save.org): 916-072-XQMC (8183)  National Suicide Prevention Line (www.mentalhealthmn.org): 426-056-SCPM (5399)  Mental Health Consumer/Survivor Network of [place] (www.mhcsn.net): 741.558.9903 or 858-941-2320  Mental Health Association of [place] (www.mentalhealth.org): 166.522.2550 or 054-001-1780  Self- Management and Recovery Training., SMART-- Toll free:  "510.264.1269  www.[employer]football  [facility] Crisis (COPE) Response - Adult 046 063-2735  Text 4 Life: txt \"LIFE\" to 03943 for immediate support and crisis intervention  Crisis text line: Text \"[place]\" to 078400. Free, confidential, 24/7.  Crisis Intervention: 620.649.2324 or 759-509-9969. Call anytime for help.     The treatment team has appreciated the opportunity to work with you.     Please take care and make your recovery a daily recovery.   If you have any questions or concerns our unit number is [phone].  Thank you.               Please send copy to Marco Campo    During hospitalizations patients have perpetuating, complicating, situational, acute, and chronic conditions that lead to risk for suicidality or homicidality. During hospitalizations we mitigate any modifiable risk factors that may have been identified in the history and physical examination and throughout the hospitalization. Chronic non-modifiable risk factors are not able to be changed with acute hospitalization. As a patient that is discharging these risk factors have been modified as much as possible and a supportive network and safety plan has been put in place. Further modifications and assistance will have to be obtained in the outpatient setting and the inpatient hospitalization team is no longer responsible for outcomes.    Constantino Hoskins  [facility] Psychiatry      The following document has been created with voice recognition software and may contain unintentional word substitutions.      Non clinically relevant CMS requirements:  Clinical Global Impressions  First:  Considering your total clinical experience with this particular patient population, how severe are the patient's symptoms at this time?: 5 (04/25/18 1547)  Compared to the patient's condition at the START of treatment, this patient's condition is:: 4 (04/25/18 1547)  Most recent:  Considering your total clinical experience with this particular " patient population, how severe are the patient's symptoms at this time?: 5 (04/25/18 8335)  Compared to the patient's condition at the START of treatment, this patient's condition is:: 4 (04/25/18 1547)    # Pain Assessment:  Current Pain Score 4/30/2018   Patient currently in pain? no   Pain score (0-10) -   Pain location -       Any incidence of pain both chronic or acute reported will be documented in above documentation though further documentation can also be found in the internal medicine documentation or pain specialist documentation.

## 2018-05-01 NOTE — PROGRESS NOTES
04/30/18 2214   Behavioral Health   Hallucinations denies / not responding to hallucinations   Thinking poor concentration   Orientation time: oriented;date: oriented;place: oriented   Memory baseline memory   Insight insight appropriate to situation   Judgement impaired   Eye Contact at examiner   Affect blunted, flat   Mood mood is calm   Physical Appearance/Attire attire appropriate to age and situation   Hygiene neglected grooming - unclean body, hair, teeth   Suicidality (denied )   1. Wish to be Dead No   2. Non-Specific Active Suicidal Thoughts  No   Self Injury (denied )   Elopement (denied )   Speech coherent;clear   Medication Sensitivity no observed side effects;no stated side effects   Psychomotor / Gait balanced   Psycho Education   Type of Intervention 1:1 intervention   Response participates, initiates socially appropriate   Hours 0.5   Activities of Daily Living   Hygiene/Grooming independent   Oral Hygiene independent   Dress street clothes   Laundry with supervision   Room Organization independent   Activity   Activity Assistance Provided independent   Behavioral Health Interventions   Depression maintain safety precautions;monitor need to revise level of observation;maintain safe secure environment   Social and Therapeutic Interventions (Depression) encourage participation in therapeutic groups and milieu activities;encourage effective boundaries with peers;encourage socialization with peers     Pt was calm and cooperative with blunted mood affect. He was visibile in milieu for the majority part of the shift. He denied all mental health symptoms including SI/SIB except anxiety 3/10. He stated that he is anxious about tomorrow's discharge. Appetite was good and he complained a lack of sleep.

## 2018-07-06 ENCOUNTER — HOSPITAL ENCOUNTER (INPATIENT)
Facility: CLINIC | Age: 28
LOS: 6 days | Discharge: NURSING, REHAB OR LONG-TERM CARE | DRG: 885 | End: 2018-07-12
Attending: EMERGENCY MEDICINE | Admitting: PSYCHIATRY & NEUROLOGY
Payer: COMMERCIAL

## 2018-07-06 DIAGNOSIS — R45.851 SUICIDAL IDEATION: ICD-10-CM

## 2018-07-06 DIAGNOSIS — F25.0 SCHIZOAFFECTIVE DISORDER, BIPOLAR TYPE (H): ICD-10-CM

## 2018-07-06 PROBLEM — F25.9 SCHIZOAFFECTIVE DISORDER (H): Status: ACTIVE | Noted: 2017-11-21

## 2018-07-06 PROCEDURE — 99285 EMERGENCY DEPT VISIT HI MDM: CPT | Mod: Z6 | Performed by: EMERGENCY MEDICINE

## 2018-07-06 PROCEDURE — 12400003 ZZH R&B MH CRITICAL UMMC

## 2018-07-06 PROCEDURE — 99285 EMERGENCY DEPT VISIT HI MDM: CPT | Mod: 25 | Performed by: EMERGENCY MEDICINE

## 2018-07-06 PROCEDURE — 25000125 ZZHC RX 250: Performed by: EMERGENCY MEDICINE

## 2018-07-06 PROCEDURE — 80320 DRUG SCREEN QUANTALCOHOLS: CPT | Performed by: EMERGENCY MEDICINE

## 2018-07-06 PROCEDURE — 80307 DRUG TEST PRSMV CHEM ANLYZR: CPT | Performed by: EMERGENCY MEDICINE

## 2018-07-06 PROCEDURE — 25000132 ZZH RX MED GY IP 250 OP 250 PS 637: Performed by: EMERGENCY MEDICINE

## 2018-07-06 RX ORDER — TRAZODONE HYDROCHLORIDE 50 MG/1
50 TABLET, FILM COATED ORAL
Status: DISCONTINUED | OUTPATIENT
Start: 2018-07-06 | End: 2018-07-12 | Stop reason: HOSPADM

## 2018-07-06 RX ORDER — ACETAMINOPHEN 325 MG/1
650 TABLET ORAL EVERY 4 HOURS PRN
Status: DISCONTINUED | OUTPATIENT
Start: 2018-07-06 | End: 2018-07-12 | Stop reason: HOSPADM

## 2018-07-06 RX ORDER — OLANZAPINE 10 MG/1
10 TABLET, ORALLY DISINTEGRATING ORAL ONCE
Status: COMPLETED | OUTPATIENT
Start: 2018-07-06 | End: 2018-07-06

## 2018-07-06 RX ORDER — QUETIAPINE FUMARATE 100 MG/1
400 TABLET, FILM COATED ORAL ONCE
Status: COMPLETED | OUTPATIENT
Start: 2018-07-06 | End: 2018-07-06

## 2018-07-06 RX ORDER — OLANZAPINE 10 MG/2ML
10 INJECTION, POWDER, FOR SOLUTION INTRAMUSCULAR
Status: DISCONTINUED | OUTPATIENT
Start: 2018-07-06 | End: 2018-07-12 | Stop reason: HOSPADM

## 2018-07-06 RX ORDER — OLANZAPINE 10 MG/1
10 TABLET ORAL
Status: DISCONTINUED | OUTPATIENT
Start: 2018-07-06 | End: 2018-07-12 | Stop reason: HOSPADM

## 2018-07-06 RX ORDER — ONDANSETRON 4 MG/1
4 TABLET, ORALLY DISINTEGRATING ORAL ONCE
Status: COMPLETED | OUTPATIENT
Start: 2018-07-06 | End: 2018-07-06

## 2018-07-06 RX ORDER — HYDROXYZINE HYDROCHLORIDE 25 MG/1
25 TABLET, FILM COATED ORAL EVERY 4 HOURS PRN
Status: DISCONTINUED | OUTPATIENT
Start: 2018-07-06 | End: 2018-07-07

## 2018-07-06 RX ADMIN — OLANZAPINE 10 MG: 10 TABLET, ORALLY DISINTEGRATING ORAL at 17:49

## 2018-07-06 RX ADMIN — ONDANSETRON 4 MG: 4 TABLET, ORALLY DISINTEGRATING ORAL at 23:06

## 2018-07-06 RX ADMIN — QUETIAPINE FUMARATE 400 MG: 100 TABLET ORAL at 20:52

## 2018-07-06 ASSESSMENT — ENCOUNTER SYMPTOMS
VOMITING: 0
AGITATION: 1
DYSPHORIC MOOD: 1
DIARRHEA: 0
HALLUCINATIONS: 1
ABDOMINAL PAIN: 0
COUGH: 1

## 2018-07-06 NOTE — IP AVS SNAPSHOT
"` `           UR 12NB: [phone]                                              INTERAGENCY TRANSFER FORM - NURSING   2018                    Hospital Admission Date: 2018  JACKIE OROZCO   : 1990  Sex: Male        Attending Provider: Lala Briones MD     Allergies:  Haldol [Haloperidol]    Infection:  None   Service:  MENTAL HEALT    Ht:  1.778 m (5' 10\")   Wt:  103 kg (227 lb)   Admission Wt:  103 kg (227 lb)    BMI:  32.57 kg/m 2   BSA:  2.26 m 2            Patient PCP Information     Provider PCP Type    Physician No Ref-Primary General      Current Code Status     Date Active Code Status Order ID Comments User Context       2018  9:39 PM Full Code [id number]  Tammie Barrios MD ED       Code Status History     Date Active Date Inactive Code Status Order ID Comments User Context    2018  9:35 AM 2018  9:39 PM Full Code [id number]  Constantino Lugo MD Outpatient    2018  1:50 AM 2018  9:35 AM Full Code [id number]  Jayson Gracia MD Inpatient    2018  2:55 PM 2018 11:34 AM Full Code [id number]  Eze Zazueta MD Inpatient    2017 11:03 AM 2018  2:55 PM Full Code [id number]  Constantino Lugo MD Outpatient    2017  5:23 AM 2017 11:03 AM Full Code [id number]  Earl Gamez RN Inpatient    2017 10:30 PM 10/4/2017  3:17 PM Full Code [id number]  Jose Smart MD Inpatient    2017  4:41 PM 2017  6:53 PM Full Code [id number]  Marie Yang, RN Inpatient      Advance Directives        Scanned docmt in ACP Activity?           No scanned doc        Hospital Problems as of 2018              Priority Class Noted POA    Schizoaffective disorder (H) Medium  2017 Yes      Non-Hospital Problems as of 2018              Priority Class Noted    Depression Medium  2017    Suicidal ideation Medium  2017    Schizoaffective disorder, bipolar type (H) Medium  2017    " "Schizoaffective disorder, depressive type (H) Medium  10/9/2017    Suicidal behavior Medium  4/13/2018      Immunizations     Name Date      Influenza Vaccine IM 3yrs+ 4 Valent IIV4 09/26/17          END      ASSESSMENT     Discharge Profile Flowsheet     EXPECTED DISCHARGE     COMMUNICATION ASSESSMENT      Expected Discharge Date  07/27/18 07/09/18 0944   Patient's communication style  spoken language (English or Bilingual) 07/06/18 1709            Vitals     Vital Signs Flowsheet     COMMENTS     STANDING ORTHOSTATIC BP      Comments  pt refused 07/08/18 1542   Standing Orthostatic BP  107/78 07/12/18 0817    VITAL SIGNS     Standing Orthostatic Pulse  103 bpm 07/12/18 0817    Temp  97.4  F (36.3  C) 07/12/18 0817   OXYGEN THERAPY      Temp src  Tympanic 07/12/18 0817   SpO2  100 % 07/10/18 0843    Resp  16 07/12/18 0817   O2 Device  None (Room air) 07/10/18 0843    Pulse  103 07/11/18 1924   PAIN/COMFORT      Pulse/Heart Rate Source  Monitor 07/12/18 0817   Patient Currently in Pain  no 07/12/18 0817    BP  133/84 07/11/18 1924   HEIGHT AND WEIGHT      BP Location  Left arm 07/11/18 1924   Height  1.778 m (5' 10\") 07/07/18 0850    LYING ORTHOSTATIC BP     Weight  103 kg (227 lb) 07/10/18 0843    Lying Orthostatic BP  125/75 07/10/18 0843   Weight Method  Standing scale 07/10/18 0843    Lying Orthostatic Pulse  87 bpm 07/10/18 0843   CLINICALLY ALIGNED PAIN ASSESSMENT (CAPA) ([facility], [facility] AND [facility] ADULTS ONLY)      SITTING ORTHOSTATIC BP     Comfort  negligible pain 07/07/18 0603    Sitting Orthostatic BP  116/80 07/12/18 0817   DAILY CARE      Sitting Orthostatic Pulse  97 bpm 07/12/18 0817   Activity Assistance Provided  independent 07/11/18 1437            Patient Lines/Drains/Airways Status    Active LINES/DRAINS/AIRWAYS     None            Patient Lines/Drains/Airways Status    Active PICC/CVC     None            Intake/Output Detail Report     None      Case Management/Discharge Planning     Case " Management/Discharge Planning Flowsheet     LIVING ENVIRONMENT     ABUSE RISK SCREEN      Lives With  other (see comments) (group home ) 07/07/18 0953   QUESTION TO PATIENT:  Has a member of your family or a partner(now or in the past) intimidated, hurt, manipulated, or controlled you in any way?  patient declined to answer or is unable to answer 07/06/18 1728    COPING/STRESS     QUESTION TO PATIENT: Do you feel safe going back to the place where you are living?  patient declined to answer or is unable to answer 07/06/18 1728    Major Change/Loss/Stressor  denies 07/07/18 0951   OBSERVATION: Is there reason to believe there has been maltreatment of a vulnerable adult (ie. Physical/Sexual/Emotional abuse, self neglect, lack of adequate food, shelter, medical care, or financial exploitation)?  -- (SOLE) 07/06/18 1728    EXPECTED DISCHARGE     HOMICIDE RISK      Expected Discharge Date  07/27/18 07/09/18 0944   Feels Like Hurting Others  no 07/10/18 3706
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
## 2018-07-06 NOTE — IP AVS SNAPSHOT
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 Date/Time User Provider Type Action    > JH1.1 7/9/2018 10:28 PM Radha Harry, RN Registered Nurse Sign            Progress Notes by Lala Briones MD at 7/9/2018  5:02 PM     Author:  Lala Briones MD Service:  Psychiatry Author Type:  Physician    Filed:  7/9/2018  5:11 PM Date of Service:  7/9/2018  5:02 PM Creation Time:  7/9/2018  5:02 PM    Status:  Signed :  Lala Briones MD (Physician)         [facility]   Psychiatric Progress Note  Hospital Day: 3        Interim History:   The patient's care was discussed with the treatment team during the daily team meeting and/or staff's chart notes were reviewed.   "Staff report patient was asked to go to his room after a verbal altercation with appear on 7/7.  He was threatening to harm another peer, stating \"I am going to fix that wilbert (peer) for good when I get out of here.\"  He did not follow instructions and thus was placed in 5 point restraints.  Zyprexa 10 mg oral given.    Upon interview, the patient currently denies SIB thoughts, though notes he had SIB urges \"the day before yesterday.\"  When asked what led to his hospitalization, he responded \"confusion.\"  He would not elaborate.  He acknowledged that he was doing well at his last psychiatry appointment in June while taking clozapine.  He denies any recent stressors, though later acknowledged that there are new, younger a group home employees that he \"does not like very much.\"  He denies SI or worsening depressive symptoms.  When asked about side effects, he stated that he is experiencing drooling related to clozapine.  He is amenable with plan to  check clozapine level tomorrow morning.  He states he feels ready to discharge, though was willing to sign in on a voluntary basis for ongoing hospitalization further stabilization.         Medications:       benztropine  1 mg Oral BID     cholecalciferol  1,000 Units Oral Daily     cloZAPine  500 mg Oral At Bedtime     levothyroxine  112 mcg Oral Daily     melatonin  6 mg Oral At Bedtime     pantoprazole  20 mg Oral Daily     polyethylene glycol  34 g Oral Daily     prazosin  3 mg Oral At Bedtime     venlafaxine  225 mg Oral Daily with breakfast          Allergies:     Allergies   Allergen Reactions     Haldol [Haloperidol]      Other reaction(s): Tardive Dyskinesia  Torticollis  Torticollis          Labs:   No results found for this or any previous visit (from the past 24 hour(s)).       Psychiatric Examination:     /80  Pulse 95  Temp 98.4  F (36.9  C) (Tympanic)  Resp 16  Ht 1.778 m (5' 10\")  SpO2 100%  Weight is 0 lbs 0 oz  There is no height or weight on " file to calculate BMI.                Sitting Orthostatic BP: 114/80      Sitting Orthostatic Pulse: 95 bpm      Standing Orthostatic BP: 92/63      Standing Orthostatic Pulse: 105 bpm       Appearance: awake, alert  Attitude:  cooperative  Eye Contact:  good  Mood:  better  Affect:  mood congruent and intensity is blunted  Speech:  clear, coherent  Language: fluent and intact in English  Psychomotor, Gait, Musculoskeletal:  no evidence of tardive dyskinesia, dystonia, or tics  Throught Process:  linear and goal oriented  Associations:  no loose associations  Thought Content:  no evidence of suicidal ideation or homicidal ideation and no evidence of psychotic thought  Insight:  limited  Judgement:  limited  Oriented to:  time, person, and place  Attention Span and Concentration:  intact  Recent and Remote Memory:  intact  Fund of Knowledge:  appropriate    Clinical Global Impressions  First:  Considering your total clinical experience with this particular patient population, how severe are the patient's symptoms at this time?: 7 (07/07/18 1059)  Compared to the patient's condition at the START of treatment, this patient's condition is:: 4 (07/07/18 1059)  Most recent:  Considering your total clinical experience with this particular patient population, how severe are the patient's symptoms at this time?: 7 (07/07/18 1059)  Compared to the patient's condition at the START of treatment, this patient's condition is:: 4 (07/07/18 1059)    # Pain Assessment:  Current Pain Score 7/9/2018   Patient currently in pain? denies   Pain score (0-10) -   Pain location -   Kamini nielson pain level was assessed and he currently denies pain.               Precautions:     Behavioral Orders   Procedures     Code 1 - Restrict to Unit     Routine Programming     As clinically indicated     Self Injury Precaution     Status 15     Every 15 minutes.     Suicide precautions     Patients on Suicide Precautions should have a Combination Diet  ordered that includes a Diet selection(s) AND a Behavioral Tray selection for Safe Tray - with utensils, or Safe Tray - NO utensils       Withdrawal precautions          Diagnoses:      1.  Schizoaffective disorder, bipolar type.  2.  Borderline personality disorder.   3.  Posttraumatic stress disorder.   4.  Generalized anxiety disorder.   5.  History of cannabis use disorder in sustained remission.   6.  History of pornography addiction.          Assessment & Plan:     Assessment and hospital summary:   Mr. Neal has previous diagnoses of borderline personality disorder, schizoaffective disorder, bipolar type; posttraumatic stress disorder, generalized anxiety disorder and pornography addiction.  He was most recently hospitalized at [facility] in 04/2018, stabilized on Effexor, Seroquel, prazosin, melatonin, Vistaril, trazodone, Invega Sustenna injections and p.r.n. Zyprexa.  He discharged to his group home.  He was admitted to [facility] 05/14/2018.  Seroquel was discontinued and replaced with Clozaril.  He again returned to his group home.  Group home staff sent him to the ER due to his reports of suicidal thoughts with a plan to slit his wrists or to light himself on fire.  He was reportedly looking for knives at the group home.  On admission, PTA medications were resumed without changes.    Target psychiatric symptoms and interventions:  - Continue current medications without changes  -Continue Zyprexa as needed.  May consider adding scheduled Zyprexa if ongoing agitation and/or evidence of psychotic symptoms  - Will obtain clozapine level in a.m.    Medical Problems and Treatments:  Patient reports sialorrhea secondary to cause the pain  -Add atropine drops as needed    Behavioral/Psychological/Social:  Continue to encourage participation in groups  Assault, suicide, self injury, and withdrawal precautions in place    Legal: Patient was in a 72 hour hold though subsequently signed in on a voluntary  basis    Disposition: Pending clinical stabilization.  Will discharge to group home when stable.    Ellen Briones MD  [facility] Psychiatry[AA1.1]                 Revision History        User Key Date/Time User Provider Type Action    > AA1.1 7/9/2018  5:11 PM Lala Briones MD Physician Sign            Progress Notes by Edison Smith at 7/9/2018  3:12 PM     Author:  Edison Smith Service:  Mental Health Author Type:  Psych Associate    Filed:  7/9/2018  3:14 PM Date of Service:  7/9/2018  3:12 PM Creation Time:  7/9/2018  3:12 PM    Status:  Signed :  Edison Smith (Psych Associate)         Jeffrey slept a lot this shift, but came out in the afternoon to watch TV.  He told me he is not having hallucinations, and stated that he feels safe in the hospital, which reduces his anxiety and thus the hallucinations. His affect was relaxed and his mood calm and polite.  He stated the plan for him is to have his blood levels checked and medication adjusted, and that he will likely leave by the end of the week.[JM1.1]     Revision History        User Key Date/Time User Provider Type Action    > JM1.1 7/9/2018  3:14 PM Edison Smith Psych Associate Sign            Progress Notes by Paloma Foley OT at 7/9/2018  2:38 PM     Author:  Paloma Foley OT Service:  (none) Author Type:  Occupational Therapist    Filed:  7/9/2018  2:40 PM Date of Service:  7/9/2018  2:38 PM Creation Time:  7/9/2018  2:38 PM    Status:  Signed :  Paloma Foley OT (Occupational Therapist)         Attended only 5 minutes to OT group, (no charge). Participated and offered answers in context. Will encourage attendance and assess further. Will set goal plan with attendance and participation.[LW1.1]      Revision History        User Key Date/Time User Provider Type Action    > LW1.1 7/9/2018  2:40 PM Paloma Foley OT Occupational Therapist Sign                  Procedure Notes     No notes of this  type exist for this encounter.         Progress Notes - Therapies (Notes from 07/09/18 through 07/12/18)      Progress Notes by Yaneli Martinez OT at 7/11/2018  1:19 PM     Author:  Yaneli Martinez OT Service:  (none) Author Type:  Occupational Therapist    Filed:  7/11/2018  1:20 PM Date of Service:  7/11/2018  1:19 PM Creation Time:  7/11/2018  1:19 PM    Status:  Signed :  Yaneli Martinez OT (Occupational Therapist)         INITIAL OT ASSESSMENT[LS1.1]       07/11/18 1300   General Information   Date Initially Attended OT 07/09/18   Clinical Impression   Affect Appropriate to situation   Orientation Oriented to person, place and time   Appearance and ADLs General cleanliness observed in most areas   Attention to Internal Stimuli No observed signs   Interaction Skills Interacts appropriately with staff;Interacts appropriately with peers   Ability to Communicate Needs Independent   Verbal Content Articulate;Clear;Appropriate to topic   Ability to Maintain Boundaries Maintains appropriate physical boundaries;Maintains appropriate verbal boundaries   Participation Independently participates   Concentration Concentrates 20-30 minutes   Ability to Concentrate With structure   Follows and Comprehends Directions Independently follows multi-step directions   Memory Delayed and immediate recall intact   Organization Independently organizes medium tasks   Decision Making Independent   Planning and Problem Solving Independently plans ahead   Ability to Apply and Learn Concepts Applies within group structure   Frustrations / Stress Tolerance Needs further assessment   Level of Insight Identifies needs with structure/support   Self Esteem Can identify positives;Takes risks with support and encouragement;Accepts positive feedback   Social Supports Needs further assessment[LS1.2]        Revision History        User Key Date/Time User Provider Type Action    > LS1.2 7/11/2018  1:20 PM Yaneli Martinez OT Occupational Therapist  Sign     LS1.1 7/11/2018  1:19 PM Yaneli Martinez OT Occupational Therapist             Progress Notes by Paloma Foley OT at 7/9/2018  2:38 PM     Author:  Paloma Foley OT Service:  (none) Author Type:  Occupational Therapist    Filed:  7/9/2018  2:40 PM Date of Service:  7/9/2018  2:38 PM Creation Time:  7/9/2018  2:38 PM    Status:  Signed :  Paloma Foley OT (Occupational Therapist)         Attended only 5 minutes to OT group, (no charge). Participated and offered answers in context. Will encourage attendance and assess further. Will set goal plan with attendance and participation.[LW1.1]      Revision History        User Key Date/Time User Provider Type Action    > LW1.1 7/9/2018  2:40 PM Paloma Foley OT Occupational Therapist Sign

## 2018-07-06 NOTE — IP AVS SNAPSHOT
[address] [address]    Phone:  [phone]                                       After Visit Summary   7/6/2018    Kamini Neal    MRN: [id number]           After Visit Summary Signature Page     I have received my discharge instructions, and my questions have been answered. I have discussed any challenges I see with this plan with the nurse or doctor.    ..........................................................................................................................................  Patient/Patient Representative Signature      ..........................................................................................................................................  Patient Representative Print Name and Relationship to Patient    ..................................................               ................................................  Date                                            Time    ..........................................................................................................................................  Reviewed by Signature/Title    ...................................................              ..............................................  Date                                                            Time

## 2018-07-06 NOTE — ED TRIAGE NOTES
Patient reports he want set himself on fire, said that , he is hearing voice and the voice telling him to kill himself. Denies HI. Patient states he don't want to be in the hospital, he only agree to arrival to ED , So he can't placed on Hold by  scene. He agitated and impulsive but willing to listen and wait for provider to see him.

## 2018-07-06 NOTE — ED NOTES
Bed: ED11  Expected date: 7/6/18  Expected time: 4:58 PM  Means of arrival:   Comments:  Daniel 89  27M   SI, cooperative, voluntary

## 2018-07-06 NOTE — ED NOTES
Patient become agitated and verbally aggressive towards staff. He attempted to get out of his room, saying he want to go home. Patient redirected and escorted back to his room. Prn zyprexa PO given. He is resting in his room. Pt refused VS check, we will try again

## 2018-07-06 NOTE — IP AVS SNAPSHOT
` `     UR 12NB: [phone]            Medication Administration Report for Kamini Neal as of 07/12/18 0858   Legend:    Given Hold Not Given Due Canceled Entry Other Actions    Time Time (Time) Time  Time-Action       Inactive    Active    Linked        Medications 07/06/18 07/07/18 07/08/18 07/09/18 07/10/18 07/11/18 07/12/18    acetaminophen (TYLENOL) tablet 650 mg  Dose: 650 mg  Freq: EVERY 4 HOURS PRN Route: PO  PRN Reason: mild pain  Start: 07/06/18 2138   Admin Instructions: Do not use if the patient has significant liver disease. MAX acetaminophen 3000 mg/24 hrs for patients greater than or equal to 65 years old.  Maximum acetaminophen dose from all sources = 75 mg/kg/day not to exceed 4 grams/day.    Admin. Amount: 2 tablet (2 × 325 mg tablet)  Dispense Loc: [facility]                atropine 1 % ophthalmic solution 1-2 drop  Dose: 1-2 drop  Freq: 3 TIMES DAILY PRN Route: SL  PRN Reason: secretions  Start: 07/10/18 0815   Admin. Amount: 1-2 drop  Dispense Loc: [facility]  Volume: 2 mL               benztropine (COGENTIN) tablet 1 mg  Dose: 1 mg  Freq: 2 TIMES DAILY Route: PO  Start: 07/07/18 1115   Admin. Amount: 1 tablet (1 × 1 mg tablet)  Last Admin: 07/12/18 0830  Dispense Loc: [facility]       1221 (1 mg)-Given       1925 (1 mg)-Given        0949 (1 mg)-Given       1917 (1 mg)-Given        0850 (1 mg)-Given       1944 (1 mg)-Given        0856 (1 mg)-Given       1907 (1 mg)-Given        0812 (1 mg)-Given       1940 (1 mg)-Given        0830 (1 mg)-Given       [ ] 2000           calcium carbonate (TUMS) chewable tablet 500-1,000 mg  Dose: 500-1,000 mg  Freq: 3 TIMES DAILY PRN Route: PO  PRN Reason: heartburn  Start: 07/10/18 0321   Admin. Amount: 1-2 tablet (1-2 × 500 mg tablet)  Last Admin: 07/10/18 1908  Dispense Loc: [facility]          1908 (1,000 mg)-Given [C]             cholecalciferol (vitamin D3) tablet 1,000 Units  Dose: 1,000 Units  Freq: DAILY Route:  PO  Start: 07/07/18 1115   Admin. Amount: 1 tablet (1 × 1,000 Units tablet)  Last Admin: 07/12/18 0833  Dispense Loc: [facility]       1221 (1,000 Units)-Given        0949 (1,000 Units)-Given        0850 (1,000 Units)-Given        0856 (1,000 Units)-Given        0812 (1,000 Units)-Given        0833 (1,000 Units)-Given           cloZAPine (CLOZARIL) tablet 500 mg  Dose: 500 mg  Freq: AT BEDTIME Route: PO  Start: 07/07/18 2000   Admin. Amount: 5 tablet (5 × 100 mg tablet)  Last Admin: 07/11/18 1939  Dispense Loc: [facility]       1925 (500 mg)-Given        1917 (500 mg)-Given        1944 (500 mg)-Given        1907 (500 mg)-Given        1939 (500 mg)-Given        [ ] 2000           hydrOXYzine (ATARAX) tablet 50 mg  Dose: 50 mg  Freq: EVERY 4 HOURS PRN Route: PO  PRN Reason: anxiety  Start: 07/07/18 1104   Admin. Amount: 2 tablet (2 × 25 mg tablet)  Dispense Loc: [facility]                levothyroxine (SYNTHROID/LEVOTHROID) tablet 112 mcg  Dose: 112 mcg  Freq: Daily Route: PO  Start: 07/07/18 1200   Admin Instructions: Separate oral administration of iron- or calcium-containing products and levothyroxine by at least 4 hours.    Admin. Amount: 1 tablet (1 × 112 mcg tablet)  Last Admin: 07/11/18 1114  Dispense Loc: [facility]       1221 (112 mcg)-Given        1224 (112 mcg)-Given        1310 (112 mcg)-Given        1113 (112 mcg)-Given        1114 (112 mcg)-Given        [ ] 1200           melatonin tablet 6 mg  Dose: 6 mg  Freq: AT BEDTIME Route: PO  Start: 07/07/18 2000   Admin. Amount: 2 tablet (2 × 3 mg tablet)  Last Admin: 07/11/18 1939  Dispense Loc: [facility]       1925 (6 mg)-Given        1917 (6 mg)-Given        1944 (6 mg)-Given        1907 (6 mg)-Given        1939 (6 mg)-Given        [ ] 2000           nicotine polacrilex (NICORETTE) gum 4-8 mg  Dose: 4-8 mg  Freq: EVERY 1 HOUR PRN Route: BU  PRN Reason: smoking cessation  Start: 07/07/18 1133   Admin Instructions: Gum should be chewed  slowly until it tingles, then placed between cheek and gum:  when tingle gone, repeat process until tingle gone (about 30 minutes).    Admin. Amount: 1-2 each (1-2 × 4 mg each)  Last Admin: 07/12/18 0851  Dispense Loc: [facility]       1248 (8 mg)-Given       1406 (8 mg)-Given       1605 (8 mg)-Given       1811 (8 mg)-Given       1927 (8 mg)-Given        1032 (8 mg)-Given       1148 (8 mg)-Given       1408 (8 mg)-Given       1602 (8 mg)-Given       1703 (8 mg)-Given       1802 (8 mg)-Given       1917 (8 mg)-Given        0852 (8 mg)-Given       0956 (8 mg)-Given       1220 (8 mg)-Given       1417 (8 mg)-Given       1649 (8 mg)-Given       1757 (8 mg)-Given       1944 (8 mg)-Given        1113 (8 mg)-Given       1212 (8 mg)-Given       1313 (8 mg)-Given       1531 (8 mg)-Given       1648 (8 mg)-Given       1833 (4 mg)-Given        1010 (8 mg)-Given       1227 (8 mg)-Given       1410 (8 mg)-Given       1611 (8 mg)-Given       1718 (8 mg)-Given       1942 (8 mg)-Given        0851 (8 mg)-Given           OLANZapine (zyPREXA) tablet 10 mg  Dose: 10 mg  Freq: EVERY 2 HOURS PRN Route: PO  PRN Reason: agitation  PRN Comment: associated with psychosis or nicholas  Start: 07/06/18 2138   Admin Instructions: Consider lower dose if sedation or hypotension. Not to exceed 30 mg in 24 hours.  Combined IM and PO doses may significantly increase the risk of orthostatic hypotension at 30 mg per day or higher.    Admin. Amount: 1 tablet (1 × 10 mg tablet)  Last Admin: 07/10/18 1658  Dispense Loc: [facility]       1638 (10 mg)-Given        1106 (10 mg)-Given [C]       1622 (10 mg)-Given        1005 (10 mg)-Given        0316 (10 mg)-Given       1658 (10 mg)-Given [C]            Or  OLANZapine (zyPREXA) injection 10 mg  Dose: 10 mg  Freq: EVERY 2 HOURS PRN Route: IM  PRN Reason: agitation  PRN Comment: associated with psychosis or nicholas  Start: 07/06/18 2138   Admin Instructions: Not to exceed 30 mg in 24 hours.  Consider lower dose  if sedation or hypotension.  Dissolve the contents of the 10 mg vial using 2.1 mL of Sterile Water for Injection to provide a solution containing 5 mg/mL of olanzapine. Withdraw the ordered dose from vial. Use immediately (within 1 hour) after reconstitution. Discard any unused portion.    Admin. Amount: 10 mg  Dispense Loc: [facility]                                                          pantoprazole (PROTONIX) EC tablet 20 mg  Dose: 20 mg  Freq: DAILY Route: PO  Start: 07/07/18 1115   Admin Instructions: DO NOT CRUSH.    Admin. Amount: 1 tablet (1 × 20 mg tablet)  Last Admin: 07/12/18 0831  Dispense Loc: [facility]       1221 (20 mg)-Given        0949 (20 mg)-Given        0850 (20 mg)-Given        0856 (20 mg)-Given        0812 (20 mg)-Given        0831 (20 mg)-Given           polyethylene glycol (MIRALAX/GLYCOLAX) Packet 34 g  Dose: 34 g  Freq: DAILY Route: PO  Start: 07/07/18 1115   Admin Instructions: 1 Packet = 17 grams. Mixed prescribed dose in 8 ounces of water. Follow with 8 oz. of water.    Admin. Amount: 34 g  Last Admin: 07/09/18 0849  Dispense Loc: [facility]       1221 (34 g)-Given        0950 (34 g)-Given        0849 (34 g)-Given        (0856)-Not Given        (0812)-Not Given        (0833)-Not Given [C]           prazosin (MINIPRESS) capsule 3 mg  Dose: 3 mg  Freq: AT BEDTIME Route: PO  Start: 07/07/18 2000   Admin. Amount: 3 capsule (3 × 1 mg capsule)  Last Admin: 07/11/18 1939  Dispense Loc: [facility]       1930 (3 mg)-Given        1917 (3 mg)-Given        1944 (3 mg)-Given        1907 (3 mg)-Given        1939 (3 mg)-Given        [ ] 2000           traZODone (DESYREL) tablet 50 mg  Dose: 50 mg  Freq: AT BEDTIME PRN Route: PO  PRN Reason: sleep  Start: 07/06/18 2138   Admin Instructions: May repeat x 1    Admin. Amount: 1 tablet (1 × 50 mg tablet)  Last Admin: 07/10/18 0316  Dispense Loc: [facility]          0316 (50 mg)-Given             venlafaxine (EFFEXOR-ER) 24 hr  tablet 225 mg  Dose: 225 mg  Freq: DAILY WITH BREAKFAST Route: PO  Start: 07/07/18 1115   Admin Instructions: DO NOT CRUSH.    Admin. Amount: 1 tablet (1 × 225 mg tablet)  Last Admin: 07/12/18 0831  Dispense Loc: [facility]       1221 (225 mg)-Given        0949 (225 mg)-Given        0850 (225 mg)-Given        0856 (225 mg)-Given        0812 (225 mg)-Given        0831 (225 mg)-Given          Discontinued Medications  Medications 07/06/18 07/07/18 07/08/18 07/09/18 07/10/18 07/11/18 07/12/18         Dose: 1,000 mg  Freq: 3 TIMES DAILY PRN Route: PO  PRN Reason: heartburn  Start: 07/10/18 0320   End: 07/10/18 0322   Admin. Amount: 2 tablet (2 × 500 mg tablet)         0322-Med Discontinued

## 2018-07-06 NOTE — IP AVS SNAPSHOT
MRN:[id number]                      After Visit Summary   7/6/2018    Kamini Neal    MRN: [id number]           Thank you!     Thank you for choosing [facility] for your care. Our goal is always to provide you with excellent care.        Patient Information     Date Of Birth          1990        About your hospital stay     You were admitted on:  July 6, 2018 You last received care in the:   12[facility]    You were discharged on:  July 12, 2018       Who to Call     For medical emergencies, please call 911.  For non-urgent questions about your medical care, please call your primary care provider or clinic, None          Attending Provider     Provider Specialty    Tammie Barrios MD Emergency Medicine    Ansemlo, Lala Rivera MD Psychiatry       Primary Care Provider Fax #    Physician No Ref-Primary [phone]      Additional Services     Medication Therapy Management Referral       MTM referral reason            Clozapine prescribed     This service is designed to help you get the most from your medications.  A specially trained pharmacist will work closely with you and your doctors  to solve any problems related to your medications and to help you get the   best results from taking them.      The Medication Therapy Management staff will call you to schedule an appointment.                  Further instructions from your care team        Behavioral Discharge Planning and Instructions      Summary:  You were admitted on 7/6/2018 due to Schizoaffective disorder.  You were treated by Dr. Lala Briones MD and [facility] on [facility]      Principal Diagnoses:   Schizoaffective disorder, bipolar type.  Borderline personality disorder.   Post-traumatic stress disorder.   Generalized anxiety disorder.   History of cannabis use disorder in sustained remission.   History of pornography addiction.       Health Care Follow-up Appointments:   - Group home:   [facility]:  Contact: Neela Pollack  Address:     Phone: [phone]  Fax: [phone]   HUC TO FAX AVS     - Psychaitry   Your group home has your upcoming appointment and will take you to this  [place]. Marco Campo [place] [facility] [place]   Phone: [phone],   Fax: [phone]     Medications:  [facility]   [address] [place] [address]  Phone: [phone]    Attend all scheduled appointments with your outpatient providers. Call at least 24 hours in advance if you need to reschedule an appointment to ensure continued access to your outpatient providers.   Major Treatments, Procedures and Findings:  You were provided with: a psychiatric assessment, assessed for medical stability, medication evaluation and/or management, group therapy and milieu management    Symptoms to Report: feeling more aggressive, increased confusion, losing more sleep, mood getting worse or thoughts of suicide    Early warning signs can include: increased depression or anxiety sleep disturbances increased thoughts or behaviors of suicide or self-harm  increased unusual thinking, such as paranoia or hearing voices    Safety and Wellness:  The patient should take medications as prescribed.  Patient's caregivers are highly encouraged to supervise administering of medications and follow treatment recommendations.     Patient's caregivers should ensure patient does not have access to:   If there is a concern for safety, call 911.    Resources:   Crisis Intervention: 581.246.9615 or 442-194-0641 (TTY: 406.643.3405).  Call anytime for help.  National Independence on Mental Illness (www.[place].jorge a.org): 689.111.1007 or 955-191-3542.  [place] Association for Children's Mental Health (www.macmh.org): 554.972.3043.  Alcoholics Anonymous (www.alcoholics-anonymous.org): Check your phone book for your local chapter.  Suicide Awareness Voices of Education (SAVE) (www.save.org): 548-634-IRLM (4777)  National Suicide Prevention Line  "(www.mentalhealthmn.org): 426-183-PKCE (8415)  Mental Health Consumer/Survivor Network of [place] (www.mhcsn.net): 693.795.8097 or 207-814-0111  Mental Health Association of [place] (www.mentalhealth.org): 851.167.8509 or 114-371-5042  Self- Management and Recovery Training., SMART-- Toll free: 781.955.7009  www.[employer]  [facility] Crisis (COPE) Response - Adult 407 887-6059  [facility] Crisis Response - Adult [phone]  Crisis text line: Text \"[place]\" to 547655. Free, confidential, 24/7.  Crisis Intervention: 969.931.5253 or 291-761-9601. Call anytime for help.   [facility] Crisis Team - Child: [phone]  [facility] Mental Health Crisis Response Team - Child: [phone]    The treatment team has appreciated the opportunity to work with you.  If you have any questions or concerns our unit number is [phone]        Pending Results     No orders found from 7/4/2018 to 7/7/2018.            Admission Information     Date & Time Provider Department Dept. Phone    7/6/2018 Lala Briones MD [facility] [phone]      Your Vitals Were     Blood Pressure Pulse Temperature Respirations Height Weight    133/84 (BP Location: Left arm) 103 97.4  F (36.3  C) (Tympanic) 16 1.778 m (5' 10\") 103 kg (227 lb)    Pulse Oximetry BMI (Body Mass Index)                100% 32.57 kg/m2          [employer] Information     [employer] lets you send messages to your doctor, view your test results, renew your prescriptions, schedule appointments and more. To sign up, go to [email or web address] . Click on \"Log in\" on the left side of the screen, which will take you to the Welcome page. Then click on \"Sign up Now\" on the right side of the page.     You will be asked to enter the access code listed below, as well as some personal information. Please follow the directions to create your username and password.     Your access code is: [email or web address]  Expires: 7/16/2018  6:33 PM     Your " access code will  in 90 days. If you need help or a new code, please call your [facility] clinic or [phone].        Equal Access to Services     FERNANDA WILCOX : Michael Cedeño, kalpesh khalil, naeem clementmamerly echeverria, christiano wademariellajudith anna. So [facility] [phone].    ATENCIÓN: Si habla español, tiene a reyes disposición servicios gratuitos de asistencia lingüística. Llame al [phone].    We comply with applicable federal civil rights laws and [place] laws. We do not discriminate on the basis of race, color, national origin, age, disability, sex, sexual orientation, or gender identity.               Review of your medicines      START taking        Dose / Directions    cloZAPine 100 MG tablet   Commonly known as:  CLOZARIL   Used for:  Schizoaffective disorder, bipolar type (H)        Dose:  500 mg   Take 5 tablets (500 mg) by mouth At Bedtime   Refills:  0         CONTINUE these medicines which have NOT CHANGED        Dose / Directions    benztropine 1 MG tablet   Commonly known as:  COGENTIN   Used for:  Schizoaffective disorder, bipolar type (H)        Dose:  1 mg   Take 1 tablet (1 mg) by mouth 2 times daily   Quantity:  60 tablet   Refills:  0       hydrOXYzine 50 MG tablet   Commonly known as:  ATARAX   Used for:  Schizoaffective disorder, bipolar type (H)        Dose:  50 mg   Take 1 tablet (50 mg) by mouth every 4 hours as needed for anxiety   Quantity:  60 tablet   Refills:  0       levothyroxine 25 MCG tablet   Commonly known as:  SYNTHROID/LEVOTHROID   Used for:  Acquired hypothyroidism        Dose:  112 mcg   Take 4.5 tablets (112 mcg) by mouth daily noon   Quantity:  135 tablet   Refills:  0       melatonin 3 MG Caps   Used for:  Primary insomnia        Dose:  6 mg   Take 6 mg by mouth At Bedtime   Quantity:  60 capsule   Refills:  0       OLANZapine 10 MG tablet   Commonly known as:  zyPREXA   Used for:  Schizoaffective disorder, unspecified type (H),  Suicidal ideation        Dose:  10 mg   Take 1 tablet (10 mg) by mouth 3 times daily as needed (self harm/ aggitation)   Quantity:  90 tablet   Refills:  0       paliperidone 156 MG/ML Susp injection   Commonly known as:  INVEGA SUSTENNA   Used for:  Schizoaffective disorder, depressive type (H)        Dose:  156 mg   Inject 1 mL (156 mg) into the muscle once for 1 dose   Quantity:  1 mL   Refills:  0       pantoprazole 20 MG EC tablet   Commonly known as:  PROTONIX   Used for:  Gastroesophageal reflux disease, esophagitis presence not specified        Dose:  20 mg   Take 1 tablet (20 mg) by mouth daily   Quantity:  30 tablet   Refills:  0       polyethylene glycol Packet   Commonly known as:  MIRALAX/GLYCOLAX   Used for:  Drug-induced constipation        Dose:  34 g   Take 34 g by mouth daily   Quantity:  50 packet   Refills:  0       prazosin 1 MG capsule   Commonly known as:  MINIPRESS   Used for:  PTSD (post-traumatic stress disorder)        Dose:  3 mg   Take 3 capsules (3 mg) by mouth At Bedtime This product only available from a Compounding Pharmacy.   Quantity:  90 capsule   Refills:  0       QUEtiapine 400 MG tablet   Commonly known as:  SEROquel   Used for:  Schizoaffective disorder, bipolar type (H)        Dose:  400 mg   Take 1 tablet (400 mg) by mouth At Bedtime   Quantity:  60 tablet   Refills:  0       traZODone 50 MG tablet   Commonly known as:  DESYREL   Used for:  Schizoaffective disorder, unspecified type (H)        Dose:  50 mg   Take 1 tablet (50 mg) by mouth nightly as needed for sleep   Quantity:  90 tablet   Refills:  0       venlafaxine 75 MG Tb24 24 hr tablet   Commonly known as:  EFFEXOR-ER   Used for:  Schizoaffective disorder, bipolar type (H)        Dose:  225 mg   Take 3 tablets (225 mg) by mouth daily (with breakfast)   Quantity:  90 each   Refills:  0       Vitamin D (Cholecalciferol) 1000 units Tabs   Used for:  Vitamin D deficiency        Dose:  1000 Units   Take 1,000 Units by  mouth daily   Quantity:  30 tablet   Refills:  0            Where to get your medicines      Some of these will need a paper prescription and others can be bought over the counter. Ask your nurse if you have questions.     You don't need a prescription for these medications     cloZAPine 100 MG tablet                Protect others around you: Learn how to safely use, store and throw away your medicines at www.disposemymeds.org.             Medication List: This is a list of all your medications and when to take them. Check marks below indicate your daily home schedule. Keep this list as a reference.      Medications           Morning Afternoon Evening Bedtime As Needed    benztropine 1 MG tablet   Commonly known as:  COGENTIN   Take 1 tablet (1 mg) by mouth 2 times daily   Last time this was given:  1 mg on 7/12/2018  8:30 AM                                cloZAPine 100 MG tablet   Commonly known as:  CLOZARIL   Take 5 tablets (500 mg) by mouth At Bedtime   Last time this was given:  500 mg on 7/11/2018  7:39 PM                                hydrOXYzine 50 MG tablet   Commonly known as:  ATARAX   Take 1 tablet (50 mg) by mouth every 4 hours as needed for anxiety                                levothyroxine 25 MCG tablet   Commonly known as:  SYNTHROID/LEVOTHROID   Take 4.5 tablets (112 mcg) by mouth daily noon   Last time this was given:  112 mcg on 7/11/2018 11:14 AM                                melatonin 3 MG Caps   Take 6 mg by mouth At Bedtime                                OLANZapine 10 MG tablet   Commonly known as:  zyPREXA   Take 1 tablet (10 mg) by mouth 3 times daily as needed (self harm/ aggitation)   Last time this was given:  10 mg on 7/10/2018  4:58 PM                                paliperidone 156 MG/ML Susp injection   Commonly known as:  INVEGA SUSTENNA   Inject 1 mL (156 mg) into the muscle once for 1 dose                                pantoprazole 20 MG EC tablet   Commonly known as:   PROTONIX   Take 1 tablet (20 mg) by mouth daily   Last time this was given:  20 mg on 7/12/2018  8:31 AM                                polyethylene glycol Packet   Commonly known as:  MIRALAX/GLYCOLAX   Take 34 g by mouth daily   Last time this was given:  34 g on 7/9/2018  8:49 AM                                prazosin 1 MG capsule   Commonly known as:  MINIPRESS   Take 3 capsules (3 mg) by mouth At Bedtime This product only available from a Compounding Pharmacy.   Last time this was given:  3 mg on 7/11/2018  7:39 PM                                QUEtiapine 400 MG tablet   Commonly known as:  SEROquel   Take 1 tablet (400 mg) by mouth At Bedtime   Last time this was given:  400 mg on 7/6/2018  8:52 PM                                traZODone 50 MG tablet   Commonly known as:  DESYREL   Take 1 tablet (50 mg) by mouth nightly as needed for sleep   Last time this was given:  50 mg on 7/10/2018  3:16 AM                                venlafaxine 75 MG Tb24 24 hr tablet   Commonly known as:  EFFEXOR-ER   Take 3 tablets (225 mg) by mouth daily (with breakfast)   Last time this was given:  225 mg on 7/12/2018  8:31 AM                                Vitamin D (Cholecalciferol) 1000 units Tabs   Take 1,000 Units by mouth daily   Last time this was given:  1,000 Units on 7/12/2018  8:33 AM

## 2018-07-07 LAB
ALBUMIN SERPL-MCNC: 3.8 G/DL (ref 3.4–5)
ALP SERPL-CCNC: 81 U/L (ref 40–150)
ALT SERPL W P-5'-P-CCNC: 22 U/L (ref 0–70)
ANION GAP SERPL CALCULATED.3IONS-SCNC: 8 MMOL/L (ref 3–14)
AST SERPL W P-5'-P-CCNC: 14 U/L (ref 0–45)
BASOPHILS # BLD AUTO: 0.1 10E9/L (ref 0–0.2)
BASOPHILS NFR BLD AUTO: 0.9 %
BILIRUB SERPL-MCNC: 0.4 MG/DL (ref 0.2–1.3)
BUN SERPL-MCNC: 10 MG/DL (ref 7–30)
CALCIUM SERPL-MCNC: 8.7 MG/DL (ref 8.5–10.1)
CHLORIDE SERPL-SCNC: 110 MMOL/L (ref 94–109)
CHOLEST SERPL-MCNC: 140 MG/DL
CO2 SERPL-SCNC: 25 MMOL/L (ref 20–32)
CREAT SERPL-MCNC: 0.72 MG/DL (ref 0.66–1.25)
DIFFERENTIAL METHOD BLD: NORMAL
EOSINOPHIL # BLD AUTO: 0 10E9/L (ref 0–0.7)
EOSINOPHIL NFR BLD AUTO: 0 %
GFR SERPL CREATININE-BSD FRML MDRD: >90 ML/MIN/1.7M2
GLUCOSE SERPL-MCNC: 129 MG/DL (ref 70–99)
HDLC SERPL-MCNC: 34 MG/DL
LDLC SERPL CALC-MCNC: 46 MG/DL
LYMPHOCYTES # BLD AUTO: 1.7 10E9/L (ref 0.8–5.3)
LYMPHOCYTES NFR BLD AUTO: 18.4 %
MONOCYTES # BLD AUTO: 0.6 10E9/L (ref 0–1.3)
MONOCYTES NFR BLD AUTO: 7 %
NEUTROPHILS # BLD AUTO: 6.7 10E9/L (ref 1.6–8.3)
NEUTROPHILS NFR BLD AUTO: 73.7 %
NONHDLC SERPL-MCNC: 106 MG/DL
PLATELET # BLD EST: NORMAL 10*3/UL
POTASSIUM SERPL-SCNC: 3.8 MMOL/L (ref 3.4–5.3)
PROT SERPL-MCNC: 7 G/DL (ref 6.8–8.8)
RBC MORPH BLD: NORMAL
SODIUM SERPL-SCNC: 143 MMOL/L (ref 133–144)
TRIGL SERPL-MCNC: 299 MG/DL
TSH SERPL DL<=0.005 MIU/L-ACNC: 1.26 MU/L (ref 0.4–4)
WBC # BLD AUTO: 9.1 10E9/L (ref 4–11)

## 2018-07-07 PROCEDURE — 36415 COLL VENOUS BLD VENIPUNCTURE: CPT | Performed by: NURSE PRACTITIONER

## 2018-07-07 PROCEDURE — 80053 COMPREHEN METABOLIC PANEL: CPT | Performed by: NURSE PRACTITIONER

## 2018-07-07 PROCEDURE — 99223 1ST HOSP IP/OBS HIGH 75: CPT | Mod: AI | Performed by: NURSE PRACTITIONER

## 2018-07-07 PROCEDURE — 85048 AUTOMATED LEUKOCYTE COUNT: CPT | Performed by: NURSE PRACTITIONER

## 2018-07-07 PROCEDURE — 12400003 ZZH R&B MH CRITICAL UMMC

## 2018-07-07 PROCEDURE — 84443 ASSAY THYROID STIM HORMONE: CPT | Performed by: NURSE PRACTITIONER

## 2018-07-07 PROCEDURE — 25000132 ZZH RX MED GY IP 250 OP 250 PS 637: Performed by: PSYCHIATRY & NEUROLOGY

## 2018-07-07 PROCEDURE — 80061 LIPID PANEL: CPT | Performed by: NURSE PRACTITIONER

## 2018-07-07 PROCEDURE — 25000132 ZZH RX MED GY IP 250 OP 250 PS 637: Performed by: NURSE PRACTITIONER

## 2018-07-07 PROCEDURE — 85004 AUTOMATED DIFF WBC COUNT: CPT | Performed by: NURSE PRACTITIONER

## 2018-07-07 PROCEDURE — 25000132 ZZH RX MED GY IP 250 OP 250 PS 637: Performed by: EMERGENCY MEDICINE

## 2018-07-07 RX ORDER — BENZTROPINE MESYLATE 1 MG/1
1 TABLET ORAL 2 TIMES DAILY
Status: DISCONTINUED | OUTPATIENT
Start: 2018-07-07 | End: 2018-07-12 | Stop reason: HOSPADM

## 2018-07-07 RX ORDER — POLYETHYLENE GLYCOL 3350 17 G/17G
34 POWDER, FOR SOLUTION ORAL DAILY
Status: DISCONTINUED | OUTPATIENT
Start: 2018-07-07 | End: 2018-07-12 | Stop reason: HOSPADM

## 2018-07-07 RX ORDER — LEVOTHYROXINE SODIUM 112 UG/1
112 TABLET ORAL
Status: DISCONTINUED | OUTPATIENT
Start: 2018-07-07 | End: 2018-07-12 | Stop reason: HOSPADM

## 2018-07-07 RX ORDER — PANTOPRAZOLE SODIUM 20 MG/1
20 TABLET, DELAYED RELEASE ORAL DAILY
Status: DISCONTINUED | OUTPATIENT
Start: 2018-07-07 | End: 2018-07-12 | Stop reason: HOSPADM

## 2018-07-07 RX ORDER — CLOZAPINE 100 MG/1
500 TABLET ORAL AT BEDTIME
Status: DISCONTINUED | OUTPATIENT
Start: 2018-07-07 | End: 2018-07-12 | Stop reason: HOSPADM

## 2018-07-07 RX ORDER — HYDROXYZINE HYDROCHLORIDE 25 MG/1
50 TABLET, FILM COATED ORAL EVERY 4 HOURS PRN
Status: DISCONTINUED | OUTPATIENT
Start: 2018-07-07 | End: 2018-07-12 | Stop reason: HOSPADM

## 2018-07-07 RX ORDER — PRAZOSIN HYDROCHLORIDE 1 MG/1
3 CAPSULE ORAL AT BEDTIME
Status: DISCONTINUED | OUTPATIENT
Start: 2018-07-07 | End: 2018-07-12 | Stop reason: HOSPADM

## 2018-07-07 RX ORDER — VENLAFAXINE HYDROCHLORIDE 225 MG/1
225 TABLET, EXTENDED RELEASE ORAL
Status: DISCONTINUED | OUTPATIENT
Start: 2018-07-07 | End: 2018-07-12 | Stop reason: HOSPADM

## 2018-07-07 RX ORDER — LANOLIN ALCOHOL/MO/W.PET/CERES
6 CREAM (GRAM) TOPICAL AT BEDTIME
Status: DISCONTINUED | OUTPATIENT
Start: 2018-07-07 | End: 2018-07-12 | Stop reason: HOSPADM

## 2018-07-07 RX ORDER — QUETIAPINE FUMARATE 200 MG/1
400 TABLET, FILM COATED ORAL AT BEDTIME
Status: DISCONTINUED | OUTPATIENT
Start: 2018-07-07 | End: 2018-07-07

## 2018-07-07 RX ORDER — TRAZODONE HYDROCHLORIDE 50 MG/1
50 TABLET, FILM COATED ORAL
Status: DISCONTINUED | OUTPATIENT
Start: 2018-07-07 | End: 2018-07-07

## 2018-07-07 RX ADMIN — POLYETHYLENE GLYCOL 3350 34 G: 17 POWDER, FOR SOLUTION ORAL at 12:21

## 2018-07-07 RX ADMIN — VENLAFAXINE HYDROCHLORIDE 225 MG: 225 TABLET, FILM COATED, EXTENDED RELEASE ORAL at 12:21

## 2018-07-07 RX ADMIN — VITAMIN D, TAB 1000IU (100/BT) 1000 UNITS: 25 TAB at 12:21

## 2018-07-07 RX ADMIN — NICOTINE POLACRILEX 8 MG: 4 GUM, CHEWING ORAL at 14:06

## 2018-07-07 RX ADMIN — PRAZOSIN HYDROCHLORIDE 3 MG: 1 CAPSULE ORAL at 19:30

## 2018-07-07 RX ADMIN — LEVOTHYROXINE SODIUM 112 MCG: 112 TABLET ORAL at 12:21

## 2018-07-07 RX ADMIN — BENZTROPINE MESYLATE 1 MG: 1 TABLET ORAL at 19:25

## 2018-07-07 RX ADMIN — NICOTINE POLACRILEX 8 MG: 4 GUM, CHEWING ORAL at 12:48

## 2018-07-07 RX ADMIN — NICOTINE POLACRILEX 8 MG: 4 GUM, CHEWING ORAL at 16:05

## 2018-07-07 RX ADMIN — NICOTINE POLACRILEX 8 MG: 4 GUM, CHEWING ORAL at 18:11

## 2018-07-07 RX ADMIN — OLANZAPINE 10 MG: 10 TABLET, FILM COATED ORAL at 16:38

## 2018-07-07 RX ADMIN — Medication 6 MG: at 19:25

## 2018-07-07 RX ADMIN — BENZTROPINE MESYLATE 1 MG: 1 TABLET ORAL at 12:21

## 2018-07-07 RX ADMIN — CLOZAPINE 500 MG: 100 TABLET ORAL at 19:25

## 2018-07-07 RX ADMIN — PANTOPRAZOLE SODIUM 20 MG: 20 TABLET, DELAYED RELEASE ORAL at 12:21

## 2018-07-07 RX ADMIN — NICOTINE POLACRILEX 8 MG: 4 GUM, CHEWING ORAL at 19:27

## 2018-07-07 ASSESSMENT — ACTIVITIES OF DAILY LIVING (ADL)
DRESS: SCRUBS (BEHAVIORAL HEALTH)
ORAL_HYGIENE: INDEPENDENT
ORAL_HYGIENE: INDEPENDENT
GROOMING: INDEPENDENT
BATHING: 0-->INDEPENDENT
TRANSFERRING: 0-->INDEPENDENT
DRESS: 0-->INDEPENDENT
COGNITION: 2 - DIFFICULTY WITH ORGANIZING THOUGHTS
FALL_HISTORY_WITHIN_LAST_SIX_MONTHS: NO
GROOMING: INDEPENDENT
TOILETING: 0-->INDEPENDENT
LAUNDRY: UNABLE TO COMPLETE
AMBULATION: 0-->INDEPENDENT
DRESS: SCRUBS (BEHAVIORAL HEALTH);INDEPENDENT
RETIRED_EATING: 0-->INDEPENDENT
RETIRED_COMMUNICATION: 0-->UNDERSTANDS/COMMUNICATES WITHOUT DIFFICULTY
SWALLOWING: 0-->SWALLOWS FOODS/LIQUIDS WITHOUT DIFFICULTY
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
## 2018-07-07 NOTE — ED NOTES
Pt placed on 72hrs started 6pm  7/6/18.  Copy of patient right was given to patient. He verbalized understanding of 72hrs hold and his right.

## 2018-07-07 NOTE — PLAN OF CARE
"Problem: Suicidal Behavior (Adult)  Goal: Suicidal Behavior is Absent/Minimized/Managed  Outcome: Improving  Pt admitted [facility] from the  ED. Pt admitted on a 72 HH started 7/6/18 at 1800. Pt  Admitted for poor thought clarity, auditory command hallucinations and SI with a plan and intent to act, \"I was going looking for a knife to slit my wrists when they brought me in.\" Pt has denied and endorsed SI and intention of SIB multiple times this shift sometimes saying he is totally fine and others endorsing significant desire to harm self. Pt repeatedly agreed that he would talk to staff before doing anything of this nature. Pt fully denied any aggressive thoughts or behaviors, but did at one point report to a staff member that he wanted \"bash someone in the head.\" Pt denied that he would act on this and continued to follow directions. Pt has consistently denied hallucinations since admission to the unit. Pt was restarted on his medications this shift. Pt will also resume his clozapine this evening at 500 mg. Per group home staff and manager he has been on this dose for a while despite is chart indicating that he was prescribed 650 mg. Pt and group home also reported that he has not been on Seroquel despite it being in his admission meds, as it was discontinued on an outpatient basis. Per group home staff pt last had his invega injection on 7/5/2018. Pt denied all physical health concerns and side effects from medications. Some sections of the admission documentation are currently incomplete due to patient requesting to stop after about 80% completed.        "
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
## 2018-07-07 NOTE — PROGRESS NOTES
"Pt is threatening to harm other pt (H.S.) and is unable to process discontinuation criteria at this time. Pts RN and this writer have attempted to process with pt and pt continues to be fixated on peer (H.S.) stating, \"I'm going to fix that wilbert for good when I get out of here.\" Staff has informed pt that threatening other pts is inappropriate and cannot happen at the hospital. Pt remains fixated on peer, this writer will continue to monitor closely.      07/07/18 1700   Restraint Monitoring Q15 Minutes   Psychological Status O  (threatening other pts)   Physical Comfort D   Circulation NS   Continuous Observation Yes   Restraint Type   Wrist - R (BH) Continued   Wrist - L (BH) Continued   Ankle - R (BH) Continued   Ankle - L (BH) Continued   Chest (BH) Continued     "
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
## 2018-07-07 NOTE — PROGRESS NOTES
"   07/07/18 1656   Seclusion or Restraint Order   In Person Face to Face Assessment Conducted Yes-Eval of pt's immediate situation, reaction to intervention, complete review of systems assessment, behavioral assessment & review/assessment of hx, drugs & meds, recent labs, etc, behavioral condition, need to continue/terminate restraint/seclusion   Patient Experienced No adverse physical outcome from seclusion/restraint initiation   Continuation of Seclus/Restraint indicated at this time Yes   Conducted face to face assessment with PT in 5 point soft restraints. PT dismissive of events leading up to restraint stating \"I didn't do anything why am I here\". Upon assessment PT was calm and not resisting restraints. He denied any physical injuries at this time and writer noted no visible injuries. Writer informed patient of discontinuation criteria which patient agreed to. Provider notified of face to face results.  "

## 2018-07-07 NOTE — ED NOTES
"Patient appears to be responding to his internal stimuli, making loud noise,\"go away\". Patient states he still hearing voice telling him to harm himself. Patient contacts for safety. Remain on 1:1 for safety. MD notified patient current behaviors.  "
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
## 2018-07-07 NOTE — PROGRESS NOTES
07/07/18 7372   Debriefing   Debriefing DO   Does patient understand why the event happened? Yes   Does patient agree to safe behaviors? Yes   What can we do differently so this doesn't happen again? Other (comment)  (see note)   Plan of care reviewed and modified Yes   Pt was calm at this time. He agreed to safe behaviors on the unit. He agreed not to threaten or instigate a fight. He also agreed to follow staff redirection. Restraints discontinued at this time.

## 2018-07-08 PROCEDURE — 25000132 ZZH RX MED GY IP 250 OP 250 PS 637: Performed by: PSYCHIATRY & NEUROLOGY

## 2018-07-08 PROCEDURE — 12400003 ZZH R&B MH CRITICAL UMMC

## 2018-07-08 PROCEDURE — 25000132 ZZH RX MED GY IP 250 OP 250 PS 637: Performed by: NURSE PRACTITIONER

## 2018-07-08 PROCEDURE — 25000132 ZZH RX MED GY IP 250 OP 250 PS 637: Performed by: EMERGENCY MEDICINE

## 2018-07-08 RX ADMIN — CLOZAPINE 500 MG: 100 TABLET ORAL at 19:17

## 2018-07-08 RX ADMIN — NICOTINE POLACRILEX 8 MG: 4 GUM, CHEWING ORAL at 10:32

## 2018-07-08 RX ADMIN — PANTOPRAZOLE SODIUM 20 MG: 20 TABLET, DELAYED RELEASE ORAL at 09:49

## 2018-07-08 RX ADMIN — OLANZAPINE 10 MG: 10 TABLET, FILM COATED ORAL at 11:06

## 2018-07-08 RX ADMIN — VITAMIN D, TAB 1000IU (100/BT) 1000 UNITS: 25 TAB at 09:49

## 2018-07-08 RX ADMIN — Medication 6 MG: at 19:17

## 2018-07-08 RX ADMIN — NICOTINE POLACRILEX 8 MG: 4 GUM, CHEWING ORAL at 18:02

## 2018-07-08 RX ADMIN — NICOTINE POLACRILEX 8 MG: 4 GUM, CHEWING ORAL at 19:17

## 2018-07-08 RX ADMIN — LEVOTHYROXINE SODIUM 112 MCG: 112 TABLET ORAL at 12:24

## 2018-07-08 RX ADMIN — NICOTINE POLACRILEX 8 MG: 4 GUM, CHEWING ORAL at 17:03

## 2018-07-08 RX ADMIN — PRAZOSIN HYDROCHLORIDE 3 MG: 1 CAPSULE ORAL at 19:17

## 2018-07-08 RX ADMIN — BENZTROPINE MESYLATE 1 MG: 1 TABLET ORAL at 19:17

## 2018-07-08 RX ADMIN — NICOTINE POLACRILEX 8 MG: 4 GUM, CHEWING ORAL at 11:48

## 2018-07-08 RX ADMIN — NICOTINE POLACRILEX 8 MG: 4 GUM, CHEWING ORAL at 14:08

## 2018-07-08 RX ADMIN — NICOTINE POLACRILEX 8 MG: 4 GUM, CHEWING ORAL at 16:02

## 2018-07-08 RX ADMIN — VENLAFAXINE HYDROCHLORIDE 225 MG: 225 TABLET, FILM COATED, EXTENDED RELEASE ORAL at 09:49

## 2018-07-08 RX ADMIN — OLANZAPINE 10 MG: 10 TABLET, FILM COATED ORAL at 16:22

## 2018-07-08 RX ADMIN — BENZTROPINE MESYLATE 1 MG: 1 TABLET ORAL at 09:49

## 2018-07-08 RX ADMIN — POLYETHYLENE GLYCOL 3350 34 G: 17 POWDER, FOR SOLUTION ORAL at 09:50

## 2018-07-08 ASSESSMENT — ACTIVITIES OF DAILY LIVING (ADL)
ORAL_HYGIENE: INDEPENDENT
LAUNDRY: UNABLE TO COMPLETE
ORAL_HYGIENE: INDEPENDENT
DRESS: SCRUBS (BEHAVIORAL HEALTH)
DRESS: SCRUBS (BEHAVIORAL HEALTH)
GROOMING: INDEPENDENT
HYGIENE/GROOMING: INDEPENDENT

## 2018-07-08 NOTE — PROGRESS NOTES
07/08/18 1500   Behavioral Health   Hallucinations denies / not responding to hallucinations   Thinking confused;distractable   Orientation person: oriented;place: oriented   Memory baseline memory   Insight poor   Judgement impaired   Eye Contact at examiner   Affect full range affect   Mood mood is calm   Physical Appearance/Attire attire appropriate to age and situation   Hygiene well groomed   1. Wish to be Dead No   2. Non-Specific Active Suicidal Thoughts  No   Self Injury other (see comment)  (none stated )   Activities of Daily Living   Hygiene/Grooming independent   Oral Hygiene independent   Dress scrubs (behavioral health)   Laundry unable to complete     Pt. Appeared to be calm at times.  PT. Was social with others. PT. Went to group and participated. PT. Spent most of the time in his room. NO signs of SI and SIB.

## 2018-07-08 NOTE — PROGRESS NOTES
Pt was in the milieu.  Interacted with staff + peers.  Needed redirection from staff when swearing, interacting with peers.  Pt denies anx/dep/SI/SIB.  Was in 5 points at the beginning of the shift d/t agression shown towards peer.

## 2018-07-09 PROCEDURE — 99233 SBSQ HOSP IP/OBS HIGH 50: CPT | Performed by: PSYCHIATRY & NEUROLOGY

## 2018-07-09 PROCEDURE — 25000132 ZZH RX MED GY IP 250 OP 250 PS 637: Performed by: PSYCHIATRY & NEUROLOGY

## 2018-07-09 PROCEDURE — 25000132 ZZH RX MED GY IP 250 OP 250 PS 637: Performed by: EMERGENCY MEDICINE

## 2018-07-09 PROCEDURE — 12400003 ZZH R&B MH CRITICAL UMMC

## 2018-07-09 PROCEDURE — 25000132 ZZH RX MED GY IP 250 OP 250 PS 637: Performed by: NURSE PRACTITIONER

## 2018-07-09 RX ADMIN — PANTOPRAZOLE SODIUM 20 MG: 20 TABLET, DELAYED RELEASE ORAL at 08:50

## 2018-07-09 RX ADMIN — LEVOTHYROXINE SODIUM 112 MCG: 112 TABLET ORAL at 13:10

## 2018-07-09 RX ADMIN — NICOTINE POLACRILEX 8 MG: 4 GUM, CHEWING ORAL at 08:52

## 2018-07-09 RX ADMIN — Medication 6 MG: at 19:44

## 2018-07-09 RX ADMIN — PRAZOSIN HYDROCHLORIDE 3 MG: 1 CAPSULE ORAL at 19:44

## 2018-07-09 RX ADMIN — POLYETHYLENE GLYCOL 3350 34 G: 17 POWDER, FOR SOLUTION ORAL at 08:49

## 2018-07-09 RX ADMIN — BENZTROPINE MESYLATE 1 MG: 1 TABLET ORAL at 19:44

## 2018-07-09 RX ADMIN — OLANZAPINE 10 MG: 10 TABLET, FILM COATED ORAL at 10:05

## 2018-07-09 RX ADMIN — NICOTINE POLACRILEX 8 MG: 4 GUM, CHEWING ORAL at 17:57

## 2018-07-09 RX ADMIN — CLOZAPINE 500 MG: 100 TABLET ORAL at 19:44

## 2018-07-09 RX ADMIN — NICOTINE POLACRILEX 8 MG: 4 GUM, CHEWING ORAL at 14:17

## 2018-07-09 RX ADMIN — VENLAFAXINE HYDROCHLORIDE 225 MG: 225 TABLET, FILM COATED, EXTENDED RELEASE ORAL at 08:50

## 2018-07-09 RX ADMIN — BENZTROPINE MESYLATE 1 MG: 1 TABLET ORAL at 08:50

## 2018-07-09 RX ADMIN — VITAMIN D, TAB 1000IU (100/BT) 1000 UNITS: 25 TAB at 08:50

## 2018-07-09 RX ADMIN — NICOTINE POLACRILEX 8 MG: 4 GUM, CHEWING ORAL at 16:49

## 2018-07-09 RX ADMIN — NICOTINE POLACRILEX 8 MG: 4 GUM, CHEWING ORAL at 19:44

## 2018-07-09 RX ADMIN — NICOTINE POLACRILEX 8 MG: 4 GUM, CHEWING ORAL at 09:56

## 2018-07-09 RX ADMIN — NICOTINE POLACRILEX 8 MG: 4 GUM, CHEWING ORAL at 12:20

## 2018-07-09 ASSESSMENT — ACTIVITIES OF DAILY LIVING (ADL)
ORAL_HYGIENE: INDEPENDENT
DRESS: SCRUBS (BEHAVIORAL HEALTH)
GROOMING: INDEPENDENT
LAUNDRY: UNABLE TO COMPLETE
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
## 2018-07-09 NOTE — PROGRESS NOTES
Jeffrey slept a lot this shift, but came out in the afternoon to watch TV.  He told me he is not having hallucinations, and stated that he feels safe in the hospital, which reduces his anxiety and thus the hallucinations. His affect was relaxed and his mood calm and polite.  He stated the plan for him is to have his blood levels checked and medication adjusted, and that he will likely leave by the end of the week.

## 2018-07-09 NOTE — PROGRESS NOTES
Attended only 5 minutes to OT group, (no charge). Participated and offered answers in context. Will encourage attendance and assess further. Will set goal plan with attendance and participation.

## 2018-07-09 NOTE — PROGRESS NOTES
"Pt spent the evening to himself. Pt rated his mood at a \"5 out of 10\", 10 being best mood possible, and his anxiety at a \"4 out of 10\", 10 being most severe anxiety possible. Pt states that napping (which he did for much of the evening) helps him when he is anxious.    When pt attended group earlier in the day he had to leave early and continue the art project in his room because he \"was paranoid and had to leave\", stating, \"people were reading my mind\". He continued to say that he gets especially paranoid when he is around strangers.     Pt denied any SI, but did say that he was having thoughts of SIB (\"Banging my head\"). Pt contracted for safety, said he'd come to staff if thoughts or urges got worse, and also said he felt safe here in the hospital.    Pt showed good insight and judgment with respect to his paranoia and SIB-thoughts. Pt denied any HI and any AVH, and there were no concerns for elopement nor aggression.     07/08/18 2043   Behavioral Health   Hallucinations denies / not responding to hallucinations   Thinking paranoid;poor concentration   Orientation person: oriented;place: oriented;time: oriented   Memory baseline memory   Insight insight appropriate to situation   Judgement intact   Eye Contact at examiner   Affect blunted, flat;sad   Mood depressed;anxious;mood is calm   Physical Appearance/Attire attire appropriate to age and situation;appears stated age;untidy   Hygiene other (see comment)  (adequate)   Suicidality other (see comments)  (denies)   1. Wish to be Dead No   2. Non-Specific Active Suicidal Thoughts  No   Self Injury thoughts only;plan;safety plan   Elopement (nothing to note)   Activity isolative;withdrawn   Speech clear;coherent   Medication Sensitivity other (see comment)  (\"drooling\")   Psychomotor / Gait balanced;steady   Activities of Daily Living   Hygiene/Grooming independent   Oral Hygiene independent   Dress scrubs (behavioral health)   Room Organization independent "   Activity   Activity Assistance Provided independent

## 2018-07-10 PROCEDURE — 97150 GROUP THERAPEUTIC PROCEDURES: CPT | Mod: GO

## 2018-07-10 PROCEDURE — 25000132 ZZH RX MED GY IP 250 OP 250 PS 637: Performed by: PSYCHIATRY & NEUROLOGY

## 2018-07-10 PROCEDURE — 99233 SBSQ HOSP IP/OBS HIGH 50: CPT | Performed by: PSYCHIATRY & NEUROLOGY

## 2018-07-10 PROCEDURE — 12400003 ZZH R&B MH CRITICAL UMMC

## 2018-07-10 PROCEDURE — 25000132 ZZH RX MED GY IP 250 OP 250 PS 637: Performed by: NURSE PRACTITIONER

## 2018-07-10 PROCEDURE — 80159 DRUG ASSAY CLOZAPINE: CPT | Performed by: PSYCHIATRY & NEUROLOGY

## 2018-07-10 PROCEDURE — 25000132 ZZH RX MED GY IP 250 OP 250 PS 637: Performed by: EMERGENCY MEDICINE

## 2018-07-10 PROCEDURE — 36415 COLL VENOUS BLD VENIPUNCTURE: CPT | Performed by: PSYCHIATRY & NEUROLOGY

## 2018-07-10 PROCEDURE — G0177 OPPS/PHP; TRAIN & EDUC SERV: HCPCS

## 2018-07-10 RX ORDER — CALCIUM CARBONATE 500 MG/1
500-1000 TABLET, CHEWABLE ORAL 3 TIMES DAILY PRN
Status: DISCONTINUED | OUTPATIENT
Start: 2018-07-10 | End: 2018-07-12 | Stop reason: HOSPADM

## 2018-07-10 RX ORDER — CALCIUM CARBONATE 500 MG/1
1000 TABLET, CHEWABLE ORAL 3 TIMES DAILY PRN
Status: DISCONTINUED | OUTPATIENT
Start: 2018-07-10 | End: 2018-07-10

## 2018-07-10 RX ORDER — ATROPINE SULFATE 10 MG/ML
1-2 SOLUTION/ DROPS OPHTHALMIC 3 TIMES DAILY PRN
Status: DISCONTINUED | OUTPATIENT
Start: 2018-07-10 | End: 2018-07-12 | Stop reason: HOSPADM

## 2018-07-10 RX ADMIN — PANTOPRAZOLE SODIUM 20 MG: 20 TABLET, DELAYED RELEASE ORAL at 08:56

## 2018-07-10 RX ADMIN — BENZTROPINE MESYLATE 1 MG: 1 TABLET ORAL at 19:07

## 2018-07-10 RX ADMIN — VENLAFAXINE HYDROCHLORIDE 225 MG: 225 TABLET, FILM COATED, EXTENDED RELEASE ORAL at 08:56

## 2018-07-10 RX ADMIN — LEVOTHYROXINE SODIUM 112 MCG: 112 TABLET ORAL at 11:13

## 2018-07-10 RX ADMIN — TRAZODONE HYDROCHLORIDE 50 MG: 50 TABLET ORAL at 03:16

## 2018-07-10 RX ADMIN — NICOTINE POLACRILEX 4 MG: 4 GUM, CHEWING ORAL at 18:33

## 2018-07-10 RX ADMIN — OLANZAPINE 10 MG: 10 TABLET, FILM COATED ORAL at 03:16

## 2018-07-10 RX ADMIN — NICOTINE POLACRILEX 8 MG: 4 GUM, CHEWING ORAL at 15:31

## 2018-07-10 RX ADMIN — CALCIUM CARBONATE (ANTACID) CHEW TAB 500 MG 1000 MG: 500 CHEW TAB at 19:08

## 2018-07-10 RX ADMIN — OLANZAPINE 10 MG: 10 TABLET, FILM COATED ORAL at 16:58

## 2018-07-10 RX ADMIN — NICOTINE POLACRILEX 8 MG: 4 GUM, CHEWING ORAL at 13:13

## 2018-07-10 RX ADMIN — VITAMIN D, TAB 1000IU (100/BT) 1000 UNITS: 25 TAB at 08:56

## 2018-07-10 RX ADMIN — PRAZOSIN HYDROCHLORIDE 3 MG: 1 CAPSULE ORAL at 19:07

## 2018-07-10 RX ADMIN — NICOTINE POLACRILEX 8 MG: 4 GUM, CHEWING ORAL at 12:12

## 2018-07-10 RX ADMIN — BENZTROPINE MESYLATE 1 MG: 1 TABLET ORAL at 08:56

## 2018-07-10 RX ADMIN — CLOZAPINE 500 MG: 100 TABLET ORAL at 19:07

## 2018-07-10 RX ADMIN — Medication 6 MG: at 19:07

## 2018-07-10 RX ADMIN — NICOTINE POLACRILEX 8 MG: 4 GUM, CHEWING ORAL at 16:48

## 2018-07-10 RX ADMIN — NICOTINE POLACRILEX 8 MG: 4 GUM, CHEWING ORAL at 11:13

## 2018-07-10 ASSESSMENT — ACTIVITIES OF DAILY LIVING (ADL)
GROOMING: SHOWER;INDEPENDENT
GROOMING: INDEPENDENT
DRESS: SCRUBS (BEHAVIORAL HEALTH);INDEPENDENT
ORAL_HYGIENE: INDEPENDENT
ORAL_HYGIENE: INDEPENDENT
DRESS: SCRUBS (BEHAVIORAL HEALTH)

## 2018-07-10 NOTE — PROGRESS NOTES
"[facility], [place]   Psychiatric Progress Note  Hospital Day: 4        Interim History:   The patient's care was discussed with the treatment team during the daily team meeting and/or staff's chart notes were reviewed.  Staff report patient is progressing towards goals and developing healthier coping skills.  Patient participates with encouragement though easily retreats to his room.  Patient endorsed auditory command hallucinations though not distressing.  Patient has been receiving, on average, 10 mg oral Zyprexa daily. Actively participating in groups.     Upon interview, the patient states that his \"paranoia\" is improving. He reported AH last evening \"of voices just calling me names sometimes,\" though denies that they are command in nature. He denies SI, SIB, and HI. States that his mood is \"much better.\" Denies feeling depressed. After more information gathered about med regimen, he acknowledged that he was not always adherent with his Clozaril at his group home. He said that on 2-3 occasions approximately one week prior to admission, he placed Clozaril in his pocket so that he could stay up longer and play video games. He states that he has been taking Clozaril consistently here. He denies side effects at this time. He states that he feels ready to go back to his group home. He also believes he would benefit from more structure and asked this writer about the option of engaging in a day treatment program.          Medications:       benztropine  1 mg Oral BID     cholecalciferol  1,000 Units Oral Daily     cloZAPine  500 mg Oral At Bedtime     levothyroxine  112 mcg Oral Daily     melatonin  6 mg Oral At Bedtime     pantoprazole  20 mg Oral Daily     polyethylene glycol  34 g Oral Daily     prazosin  3 mg Oral At Bedtime     venlafaxine  225 mg Oral Daily with breakfast          Allergies:     Allergies   Allergen Reactions     Haldol [Haloperidol]      Other reaction(s): Tardive " "Dyskinesia  Torticollis  Torticollis          Labs:   No results found for this or any previous visit (from the past 24 hour(s)).       Psychiatric Examination:     /75  Pulse 87  Temp 98.8  F (37.1  C) (Tympanic)  Resp 16  Ht 1.778 m (5' 10\")  Wt 103 kg (227 lb)  SpO2 100%  BMI 32.57 kg/m2  Weight is 227 lbs 0 oz  Body mass index is 32.57 kg/(m^2).                Sitting Orthostatic BP: 114/80      Sitting Orthostatic Pulse: 95 bpm      Standing Orthostatic BP: 92/63      Standing Orthostatic Pulse: 105 bpm       Appearance: awake, alert  Attitude:  cooperative  Eye Contact:  good  Mood:  better  Affect:  mood congruent and intensity is blunted  Speech:  clear, coherent  Language: fluent and intact in English  Psychomotor, Gait, Musculoskeletal:  no evidence of tardive dyskinesia, dystonia, or tics  Throught Process:  linear and goal oriented  Associations:  no loose associations  Thought Content:  no evidence of suicidal ideation or homicidal ideation and no evidence of psychotic thought  Insight:  limited  Judgement:  limited  Oriented to:  time, person, and place  Attention Span and Concentration:  intact  Recent and Remote Memory:  intact  Fund of Knowledge:  appropriate    Clinical Global Impressions  First:  Considering your total clinical experience with this particular patient population, how severe are the patient's symptoms at this time?: 7 (07/07/18 1059)  Compared to the patient's condition at the START of treatment, this patient's condition is:: 4 (07/07/18 1059)  Most recent:  Considering your total clinical experience with this particular patient population, how severe are the patient's symptoms at this time?: 7 (07/07/18 1059)  Compared to the patient's condition at the START of treatment, this patient's condition is:: 4 (07/07/18 1059)    # Pain Assessment:  Current Pain Score 7/10/2018   Patient currently in pain? denies   Pain score (0-10) -   Pain location -   Abdallah s pain level " was assessed and he currently denies pain.               Precautions:     Behavioral Orders   Procedures     Assault precautions     Code 1 - Restrict to Unit     Routine Programming     As clinically indicated     Self Injury Precaution     Status 15     Every 15 minutes.     Suicide precautions     Patients on Suicide Precautions should have a Combination Diet ordered that includes a Diet selection(s) AND a Behavioral Tray selection for Safe Tray - with utensils, or Safe Tray - NO utensils       Withdrawal precautions          Diagnoses:      1.  Schizoaffective disorder, bipolar type.  2.  Borderline personality disorder.   3.  Posttraumatic stress disorder.   4.  Generalized anxiety disorder.   5.  History of cannabis use disorder in sustained remission.   6.  History of pornography addiction.          Assessment & Plan:     Assessment and hospital summary:   Mr. Neal has previous diagnoses of borderline personality disorder, schizoaffective disorder, bipolar type; posttraumatic stress disorder, generalized anxiety disorder and pornography addiction.  He was most recently hospitalized at [facility] in 04/2018, stabilized on Effexor, Seroquel, prazosin, melatonin, Vistaril, trazodone, Invega Sustenna injections and p.r.n. Zyprexa.  He discharged to his group home.  He was admitted to [facility] 05/14/2018.  Seroquel was discontinued and replaced with Clozaril.  He again returned to his group home.  Group home staff sent him to the ER due to his reports of suicidal thoughts with a plan to slit his wrists or to light himself on fire.  He was reportedly looking for knives at the group home.  On admission, PTA medications were resumed without changes.    Patient admitted to Clozaril non-adherence prior to admission, which certainly may have been contributing to recent decompensation. Since admission to our unit, patient has demonstrated gradual improvement in psychotic and behavioral symptoms. Since his behavioral  code on 7/7 (threatening peer), he has not exhibited any signs of aggressive behaviors. He is actively participating in groups, taking his medications, and has been pleasant with staff/peers. He notes overall improvement in AH and resolution of SI/SIB thoughts.     Target psychiatric symptoms and interventions:  - Continue current medications without changes  - Continue Zyprexa as needed.  May consider adding scheduled Zyprexa if ongoing agitation and/or evidence of psychotic symptoms  - Obtain clozapine level, in process.    Medical Problems and Treatments:  Patient reports sialorrhea secondary to cause the pain  -Add atropine drops as needed    Behavioral/Psychological/Social:  Continue to encourage participation in groups  Assault, suicide, self injury, and withdrawal precautions in place    Legal: Patient was in a 72 hour hold though subsequently signed in on a voluntary basis    Disposition: Pending clinical stabilization.  Will discharge to group home when stable, possibly this Thursday if ongoing improvement noted.    Ellen Briones MD  [facility]

## 2018-07-10 NOTE — PROGRESS NOTES
This writer spoke to Kaylee the  at [facility].  Phone: [phone]    She agrees that IF he does well tomorrow a d/c and return to [facility] on Thursday works for them.      She asks that if there are any medication changes or adjustments that they be sent a day prior to d/c to [facility] in [place].

## 2018-07-10 NOTE — PLAN OF CARE
"Problem: Overarching Goals (Adult)  Goal: Optimized Coping Skills in Response to Life Stressors    Pt attended 1.5 out of 3.0 OT groups offered. Pt actively participated in a structured occupational therapy group with a focus on facilitating communication skills. Pt engaged in a discussion activity with topics including good work habits, social and leisure activities, self-esteem, and time management. Pt offered thoughtful responses throughout group, and was respectful in listening and responding to writer (pt was the only individual in group). Pt offered particularly relevant and insightful responses to prompts in each category. Appropriately elaborated on responses offered by writer, and was receptive to comments offered by writer. When prompted about something he would like to resolve within his lifetime, pt shared \"my relationship with my father; I don't know where he is or if he's even alive.\" Also shared his goal to attend the \"EMA walk this year,\" explaining that he wanted to attend last year but was unable. Pt actively participated in about half of occupational therapy clinic. Pt was able to ask for assistance as needed, and independently initiated a structured creative expression task. Pt demonstrated good focus, planning, and problem solving. Pt appeared comfortable interacting with peers, though mostly kept to himself, appearing engaged in his task. Pleasant, cooperative, and engaged throughout groups today. Initial assessment to be completed upon additional group participation.         "

## 2018-07-10 NOTE — PROGRESS NOTES
"Pt spent the day going to groups, which were smaller in attendance. Pt stated that the smaller group went better and that he was less paranoid. Other than group, pt was isolative and watched soccer. Pt rated his mood at a 6/5 out of 10, and denied having any anxiety. Pt denied SI but did admit to SIB-thoughts. When asked more about his SIB-thoughts, pt stated that he wanted to bang his head but then thought, \"why would I do that? What good would that do? So I didn't. I'll talk to staff instead.\"    There were no concerns regarding elopement nor aggression.     07/10/18 1534   Behavioral Health   Hallucinations denies / not responding to hallucinations   Thinking intact   Orientation person: oriented;place: oriented;time: oriented   Memory baseline memory   Insight insight appropriate to situation   Judgement intact   Eye Contact at examiner   Affect blunted, flat;other (see comments)  (less so than yesterday)   Physical Appearance/Attire appears stated age;attire appropriate to age and situation;neat   Hygiene well groomed   Suicidality other (see comments)  (denies)   1. Wish to be Dead No   2. Non-Specific Active Suicidal Thoughts  No   Self Injury thoughts only;safety plan   Elopement (no concerns)   Activity isolative;other (see comment)  (attended and participated in group; otherwise isolative)   Speech clear;coherent   Medication Sensitivity no stated side effects;no observed side effects   Psychomotor / Gait balanced;steady   Activities of Daily Living   Hygiene/Grooming shower;independent   Oral Hygiene independent   Dress scrubs (behavioral health)   Room Organization independent   Activity   Activity Assistance Provided independent     "

## 2018-07-10 NOTE — PROGRESS NOTES
Assessed mood, anxiety, thoughts, and behavior. Is progressing towards goals.  and developing healthy coping skills.Participates with encouragement, easily retreats to room. Pt endorsed auditory command hallucinations though not distressing at this time.. Came out for meds trying to bargain to take them early but did stay up until 8 was pleasant to staff.  Will continue to assess.

## 2018-07-11 PROCEDURE — G0177 OPPS/PHP; TRAIN & EDUC SERV: HCPCS

## 2018-07-11 PROCEDURE — 12400003 ZZH R&B MH CRITICAL UMMC

## 2018-07-11 PROCEDURE — 25000132 ZZH RX MED GY IP 250 OP 250 PS 637: Performed by: PSYCHIATRY & NEUROLOGY

## 2018-07-11 PROCEDURE — 25000132 ZZH RX MED GY IP 250 OP 250 PS 637: Performed by: NURSE PRACTITIONER

## 2018-07-11 RX ADMIN — PANTOPRAZOLE SODIUM 20 MG: 20 TABLET, DELAYED RELEASE ORAL at 08:12

## 2018-07-11 RX ADMIN — NICOTINE POLACRILEX 8 MG: 4 GUM, CHEWING ORAL at 10:10

## 2018-07-11 RX ADMIN — BENZTROPINE MESYLATE 1 MG: 1 TABLET ORAL at 19:40

## 2018-07-11 RX ADMIN — NICOTINE POLACRILEX 8 MG: 4 GUM, CHEWING ORAL at 12:27

## 2018-07-11 RX ADMIN — Medication 6 MG: at 19:39

## 2018-07-11 RX ADMIN — LEVOTHYROXINE SODIUM 112 MCG: 112 TABLET ORAL at 11:14

## 2018-07-11 RX ADMIN — BENZTROPINE MESYLATE 1 MG: 1 TABLET ORAL at 08:12

## 2018-07-11 RX ADMIN — VITAMIN D, TAB 1000IU (100/BT) 1000 UNITS: 25 TAB at 08:12

## 2018-07-11 RX ADMIN — NICOTINE POLACRILEX 8 MG: 4 GUM, CHEWING ORAL at 14:10

## 2018-07-11 RX ADMIN — NICOTINE POLACRILEX 8 MG: 4 GUM, CHEWING ORAL at 16:11

## 2018-07-11 RX ADMIN — CLOZAPINE 500 MG: 100 TABLET ORAL at 19:39

## 2018-07-11 RX ADMIN — PRAZOSIN HYDROCHLORIDE 3 MG: 1 CAPSULE ORAL at 19:39

## 2018-07-11 RX ADMIN — NICOTINE POLACRILEX 8 MG: 4 GUM, CHEWING ORAL at 19:42

## 2018-07-11 RX ADMIN — NICOTINE POLACRILEX 8 MG: 4 GUM, CHEWING ORAL at 17:18

## 2018-07-11 RX ADMIN — VENLAFAXINE HYDROCHLORIDE 225 MG: 225 TABLET, FILM COATED, EXTENDED RELEASE ORAL at 08:12

## 2018-07-11 ASSESSMENT — ACTIVITIES OF DAILY LIVING (ADL)
GROOMING: INDEPENDENT
DRESS: SCRUBS (BEHAVIORAL HEALTH)
LAUNDRY: UNABLE TO COMPLETE
GROOMING: INDEPENDENT
DRESS: SCRUBS (BEHAVIORAL HEALTH)
ORAL_HYGIENE: INDEPENDENT
LAUNDRY: WITH SUPERVISION
ORAL_HYGIENE: INDEPENDENT

## 2018-07-11 NOTE — PLAN OF CARE
Problem: Overarching Goals (Adult)  Goal: Optimized Coping Skills in Response to Life Stressors    Pt actively participated in a structured occupational therapy group with a focus on a visual-spatial leisure task. Pt was able to follow 2-step directions of the familiar task, and demonstrated strategic planning and problem solving throughout task. Organized and attentive in his task approach. Pt remained focused for full duration of group. Politely and appropriately assisted peers who joined in the middle of the task. Pleasant, cooperative, and engaged throughout group.

## 2018-07-11 NOTE — PROGRESS NOTES
"Pt has been in the milieu all shift and participating appropriately in therapeutic programming. Affect blunted. Reports that his mood is \"good,\" and denies all mental health symptoms. No behavioral issues. Calm and cooperative. Compliant with scheduled medications.   "

## 2018-07-11 NOTE — PROGRESS NOTES
This writer spoke to Neela from the group home.  They will take him back anytime tomorrow.  We said we will get him up and have breakfast and then likely send him over.  She said he can go via cab.

## 2018-07-11 NOTE — PLAN OF CARE
"Problem: Suicidal Behavior (Adult)  Goal: Suicidal Behavior is Absent/Minimized/Managed  Outcome: Improving  Patient presents as calm, pleasant, and cooperative.  He feels that his target psychotic symptoms have improved over the course of his hospitalization, noting that he feels \"much less paranoid\".  He reported some agitation associated with breakthrough psychotic symptoms and requested a dose of PRN Zyprexa 10 mg, which he was given at 16:58; when reassessed at 18:15, he reported moderate relief from his AH.  He denies any command hallucinations or dangerous ideations.  He feels that the Clozaril is a very effective medication for him; however, he does note some residual sedation in the morning.  Apart from this, he denies any adverse side effects from his current regimen.  He denies any acute physical distress.  He looks forward to returning to his group home later this week.      "

## 2018-07-11 NOTE — PROGRESS NOTES
Left  for Neela @ [facility] (Phone: [phone]).  Informed her that we would like to plan a d/c for tomorrow.  Asked what time they would like to pick him up or if they wanted us to cab him.

## 2018-07-11 NOTE — PROGRESS NOTES
INITIAL OT ASSESSMENT       07/11/18 1300   General Information   Date Initially Attended OT 07/09/18   Clinical Impression   Affect Appropriate to situation   Orientation Oriented to person, place and time   Appearance and ADLs General cleanliness observed in most areas   Attention to Internal Stimuli No observed signs   Interaction Skills Interacts appropriately with staff;Interacts appropriately with peers   Ability to Communicate Needs Independent   Verbal Content Articulate;Clear;Appropriate to topic   Ability to Maintain Boundaries Maintains appropriate physical boundaries;Maintains appropriate verbal boundaries   Participation Independently participates   Concentration Concentrates 20-30 minutes   Ability to Concentrate With structure   Follows and Comprehends Directions Independently follows multi-step directions   Memory Delayed and immediate recall intact   Organization Independently organizes medium tasks   Decision Making Independent   Planning and Problem Solving Independently plans ahead   Ability to Apply and Learn Concepts Applies within group structure   Frustrations / Stress Tolerance Needs further assessment   Level of Insight Identifies needs with structure/support   Self Esteem Can identify positives;Takes risks with support and encouragement;Accepts positive feedback   Social Supports Needs further assessment

## 2018-07-11 NOTE — PROGRESS NOTES
Visited with pt on the basis of spiritual support for the pt.  Reflected with pt around his hospital experience, sources of spiritual and emotional support and current spiritual health needs.  During my visit, pt was watching world soccer game. Pt talked about his current situation and what it means for him.  Emotional support. Reflective conversation integrating illness elements and family spiritual narratives. I gave Islamic Prayer Booklet.     Pt received spiritual support and reflective conversation in the context of this hospitalization.  Will continue to provide support to pt/family during their hospitalization at least 1x/wk.

## 2018-07-11 NOTE — DISCHARGE INSTRUCTIONS
Behavioral Discharge Planning and Instructions      Summary:  You were admitted on 7/6/2018 due to Schizoaffective disorder.  You were treated by Dr. Lala Briones MD and discharged on 07/12/[facility]      Principal Diagnoses:   Schizoaffective disorder, bipolar type.  Borderline personality disorder.   Post-traumatic stress disorder.   Generalized anxiety disorder.   History of cannabis use disorder in sustained remission.   History of pornography addiction.       Health Care Follow-up Appointments:   - Group home:  [facility]:  Contact: Neela Pollack  Address:     Phone: [phone]  Fax: [phone]   [facility] TO FAX AVS     - Psychaitry   Your group home has your upcoming appointment and will take you to this  [place]. Marco [place] [facility]   Phone: [phone],   Fax: [phone]     Medications:  [facility]   [address] [place] [address]  Phone: [phone]    Attend all scheduled appointments with your outpatient providers. Call at least 24 hours in advance if you need to reschedule an appointment to ensure continued access to your outpatient providers.   Major Treatments, Procedures and Findings:  You were provided with: a psychiatric assessment, assessed for medical stability, medication evaluation and/or management, group therapy and milieu management    Symptoms to Report: feeling more aggressive, increased confusion, losing more sleep, mood getting worse or thoughts of suicide    Early warning signs can include: increased depression or anxiety sleep disturbances increased thoughts or behaviors of suicide or self-harm  increased unusual thinking, such as paranoia or hearing voices    Safety and Wellness:  The patient should take medications as prescribed.  Patient's caregivers are highly encouraged to supervise administering of medications and follow treatment recommendations.     Patient's caregivers should ensure patient does not have  "access to:   If there is a concern for safety, call 911.    Resources:   Crisis Intervention: 901.344.8640 or 359-576-3449 (TTY: 691.862.6406).  Call anytime for help.  National [place] on Mental Illness (www.[place] a.org): 617.943.5748 or 427-711-9559.  [place] Association for Children's Mental Health (www.[facility].org): 598.978.7502.  Alcoholics Anonymous (www.alcoholics-anonymous.org): Check your phone book for your local chapter.  Suicide Awareness Voices of Education (SAVE) (www.save.org): 819-690-FYCF (5764)  National Suicide Prevention Line (www.mentalhealthmn.org): 900-018-VPSY (8557)  Mental Health Consumer/Survivor Network of [place] (www.mhcsn.net): 436.841.3812 or 677-782-0431  Mental Health Association of [place] (www.mentalhealth.org): 193.686.8833 or 539-022-3000  Self- Management and Recovery Training., SMART-- Toll free: 521.666.8457  www.[employer].org  [facility] Crisis (COPE) Response - Adult 925 159-7866  [facility] Crisis Response - Adult 820 094-5237  Crisis text line: Text \"[place]\" to 070266. Free, confidential, 24/7.  Crisis Intervention: 182.724.1458 or 764-311-4481. Call anytime for help.   [facility] Crisis Team - Child: [phone]  [facility] Mental [facility] Crisis Response Team - Child: [phone]    The treatment team has appreciated the opportunity to work with you.  If you have any questions or concerns our unit number is [phone]      "

## 2018-07-12 VITALS
HEART RATE: 103 BPM | TEMPERATURE: 97.4 F | DIASTOLIC BLOOD PRESSURE: 84 MMHG | BODY MASS INDEX: 32.5 KG/M2 | RESPIRATION RATE: 16 BRPM | HEIGHT: 70 IN | OXYGEN SATURATION: 100 % | WEIGHT: 227 LBS | SYSTOLIC BLOOD PRESSURE: 133 MMHG

## 2018-07-12 LAB
CLOZAPINE AND METABOLITES TOTAL: 1378 NG/ML
CLOZAPINE SERPL-MCNC: 754 NG/ML
CLOZAPINE-N-OXIDE QUANT: 110 NG/ML
NORCLOZAPINE SERPL-MCNC: 514 NG/ML

## 2018-07-12 PROCEDURE — 25000132 ZZH RX MED GY IP 250 OP 250 PS 637: Performed by: NURSE PRACTITIONER

## 2018-07-12 PROCEDURE — 99238 HOSP IP/OBS DSCHRG MGMT 30/<: CPT | Performed by: PSYCHIATRY & NEUROLOGY

## 2018-07-12 RX ORDER — CLOZAPINE 100 MG/1
500 TABLET ORAL AT BEDTIME
Status: ON HOLD
Start: 2018-07-12 | End: 2020-12-06

## 2018-07-12 RX ADMIN — BENZTROPINE MESYLATE 1 MG: 1 TABLET ORAL at 08:30

## 2018-07-12 RX ADMIN — VITAMIN D, TAB 1000IU (100/BT) 1000 UNITS: 25 TAB at 08:33

## 2018-07-12 RX ADMIN — PANTOPRAZOLE SODIUM 20 MG: 20 TABLET, DELAYED RELEASE ORAL at 08:31

## 2018-07-12 RX ADMIN — NICOTINE POLACRILEX 8 MG: 4 GUM, CHEWING ORAL at 08:51

## 2018-07-12 RX ADMIN — VENLAFAXINE HYDROCHLORIDE 225 MG: 225 TABLET, FILM COATED, EXTENDED RELEASE ORAL at 08:31

## 2018-07-12 ASSESSMENT — ACTIVITIES OF DAILY LIVING (ADL)
DRESS: SCRUBS (BEHAVIORAL HEALTH);INDEPENDENT
GROOMING: INDEPENDENT
ORAL_HYGIENE: INDEPENDENT

## 2018-07-12 NOTE — PROGRESS NOTES
Pt was active and social with peers and staff in milieu. Pt mood was calm and there were no behavioral concerns during this shift.

## 2018-07-12 NOTE — PROGRESS NOTES
"Pt was discharged into the care of  [facility] ([facility]). Discharge teaching, including follow up care and medication teaching, complete. Patient is able to contract for safety and denies SI/SIB/HI, stating that \"it doesn't make sense for me to hurt myself\". Pt plans on going to  and feels it is a safe plan for him; he is looking forward to having more structure, taking it easy, and having things to wake for. Pt states his support system consists of his cousin, mother, and  staff, and is in contact with them daily. Pt describes his coping mechanisms are \"medications\". Pt was very appreciative of all staff and their helpfulness during this hospitalization and feels he is in a better place than he was on admission. All belongings were returned to patient.    "

## 2018-07-13 NOTE — DISCHARGE SUMMARY
"Psychiatric Discharge Summary    Kamini Neal MRN# [id number]   Age: 27 year old YOB: 1990     Date of Admission:  7/6/2018  Date of Discharge:  7/12/2018 10:35 AM  Admitting Physician:  Lala Briones MD  Discharge Physician:  Lala Briones MD (Contact: [phone])         Event Leading to Hospitalization:   Per H&P:  \"Mr. Neal has previous diagnoses of borderline personality disorder, schizoaffective disorder, bipolar type; posttraumatic stress disorder, generalized anxiety disorder and pornography addiction.  He was most recently hospitalized at [facility] in 04/2018, stabilized on Effexor, Seroquel, prazosin, melatonin, Vistaril, trazodone, Invega Sustenna injections and p.r.n. Zyprexa.  He discharged to his group home.  He was admitted to [facility] 05/14/2018.  Seroquel was discontinued and replaced with Clozaril.  He again returned to his group home.  Group home staff sent him to the ER due to his reports of suicidal thoughts with a plan to slit his wrists or to light himself on fire.  He was reportedly looking for knives at the group home.  The patient has resided in the group home for 6 years and states that he likes it.  He reports that he has had no recent stressors.       During the present assessment, the patient reports that his mood is mildly depressed \"4/10.\"  He says that his sleep is good.  He has been eating well.  He reports suicidal thoughts with a plan to cut himself or light himself on fire.  He contracts for safety.  He states that auditory hallucinations have been worse for the last few weeks.  He often times believes people can read his thoughts and that he can read other peoples' thoughts.  He believes that people are looking at him and talking about him.  This causes him to feel very anxious and subsequently he spends most of his time in his room in his group home.  He denies homicidal ideation.  He states that his PTSD symptoms have been \"okay\" recently.  " "He has nightmares about once a week.  He feels easily startled and is hypervigilant.  He has avoidance behaviors.\"        See Admission note by Merry Rinaldi NP on 7/7/18 for additional details.          Diagnoses:     1.  Schizoaffective disorder, bipolar type.  2.  Borderline personality disorder.   3.  Posttraumatic stress disorder.   4.  Generalized anxiety disorder.   5.  History of cannabis use disorder in sustained remission.   6.  History of pornography addiction.          Labs:     Recent Results (from the past 168 hour(s))   Drug abuse screen 6 urine (tox)    Collection Time: 07/06/18  5:24 PM   Result Value Ref Range    Amphetamine Qual Urine Negative NEG^Negative    Barbiturates Qual Urine Negative NEG^Negative    Benzodiazepine Qual Urine Negative NEG^Negative    Cannabinoids Qual Urine Negative NEG^Negative    Cocaine Qual Urine Negative NEG^Negative    Ethanol Qual Urine Negative NEG^Negative    Opiates Qualitative Urine Negative NEG^Negative   WBC and differential    Collection Time: 07/07/18 12:19 PM   Result Value Ref Range    WBC 9.1 4.0 - 11.0 10e9/L    Diff Method Manual Differential     % Neutrophils 73.7 %    % Lymphocytes 18.4 %    % Monocytes 7.0 %    % Eosinophils 0.0 %    % Basophils 0.9 %    Absolute Neutrophil 6.7 1.6 - 8.3 10e9/L    Absolute Lymphocytes 1.7 0.8 - 5.3 10e9/L    Absolute Monocytes 0.6 0.0 - 1.3 10e9/L    Absolute Eosinophils 0.0 0.0 - 0.7 10e9/L    Absolute Basophils 0.1 0.0 - 0.2 10e9/L    RBC Morphology Normal     Platelet Estimate Confirming automated cell count    TSH with free T4 reflex    Collection Time: 07/07/18 12:19 PM   Result Value Ref Range    TSH 1.26 0.40 - 4.00 mU/L   Lipid panel reflex to direct LDL    Collection Time: 07/07/18 12:19 PM   Result Value Ref Range    Cholesterol 140 <200 mg/dL    Triglycerides 299 (H) <150 mg/dL    HDL Cholesterol 34 (L) >39 mg/dL    LDL Cholesterol Calculated 46 <100 mg/dL    Non HDL Cholesterol 106 <130 mg/dL "   Comprehensive metabolic panel    Collection Time: 07/07/18 12:19 PM   Result Value Ref Range    Sodium 143 133 - 144 mmol/L    Potassium 3.8 3.4 - 5.3 mmol/L    Chloride 110 (H) 94 - 109 mmol/L    Carbon Dioxide 25 20 - 32 mmol/L    Anion Gap 8 3 - 14 mmol/L    Glucose 129 (H) 70 - 99 mg/dL    Urea Nitrogen 10 7 - 30 mg/dL    Creatinine 0.72 0.66 - 1.25 mg/dL    GFR Estimate >90 >60 mL/min/1.7m2    GFR Estimate If Black >90 >60 mL/min/1.7m2    Calcium 8.7 8.5 - 10.1 mg/dL    Bilirubin Total 0.4 0.2 - 1.3 mg/dL    Albumin 3.8 3.4 - 5.0 g/dL    Protein Total 7.0 6.8 - 8.8 g/dL    Alkaline Phosphatase 81 40 - 150 U/L    ALT 22 0 - 70 U/L    AST 14 0 - 45 U/L   Clozapine and Metabolites Quant    Collection Time: 07/10/18  7:37 AM   Result Value Ref Range    Clozapine Quant 754 ng/mL    Norclozapine Quant 514 ng/mL    Clozapine-N-Oxide Quant 110 ng/mL    Clozapine and Metabolites Total 1378 <=1500 ng/mL            Consults:   No consultations were requested during this admission         Hospital Course:   Kamini Neal was admitted to Station 12 with attending Lala Briones MD on a 72 hour mental health hold, but patient subsequently signed in voluntarily. The patient was placed under status 15 (15 minute checks) to ensure patient safety.     Mr. Neal has previous diagnoses of borderline personality disorder, schizoaffective disorder, bipolar type; posttraumatic stress disorder, generalized anxiety disorder and pornography addiction.  He was most recently hospitalized at [facility] in 04/2018, stabilized on Effexor, Seroquel, prazosin, melatonin, Vistaril, trazodone, Invega Sustenna injections and p.r.n. Zyprexa.  He discharged to his group home.  He was admitted to [facility] 05/14/2018.  Seroquel was discontinued and replaced with Clozaril.  He again returned to his group home.  Group home staff sent him to the ER due to his reports of suicidal thoughts with a plan to slit his wrists or to light himself on fire.  " He was reportedly looking for knives at the group home.  On admission, PTA medications were resumed without changes. R/B/A of Clozaril were discussed with patient, and he agreed with plan to resume this medication.      Patient admitted to Clozaril non-adherence prior to admission, which certainly may have been contributing to recent decompensation. After more information gathered about med regimen, he acknowledged that he was not always adherent with his Clozaril at his group home. He said that on 2-3 occasions approximately one week prior to admission, he placed Clozaril in his pocket so that he could stay up longer and play video games. He states that he has been taking Clozaril consistently here. Since admission to our unit, patient has demonstrated gradual improvement in psychotic and behavioral symptoms. Since his behavioral code on 7/7 (threatening peer), he has not exhibited any signs of aggressive behaviors. He has been actively participating in groups, taking his medications as prescribed, and has been pleasant with staff/peers. He notes overall improvement in AH and resolution of SI/SIB thoughts. He has not endorsed any sx of psychosis in >3 days. Clozaril level checked and is within therapeutic range. His affect is bright and he is future oriented. He stated that he appreciates his group home staff, and \"I am very agustina they love me so much and I love them!\" Patient did express desire for more structure in his daily life, however, and requested referral to a day treatment program.     Kamini Neal did participate in groups and was visible in the milieu.     The patient's symptoms of psychosis and emotional dysregulation improved. SI and AH resolved. Pt denied HI throughout hospitalization.     # Discharge Pain Plan:   - Patient currently has NO PAIN and is not being prescribed pain medications on discharge.    Kamini Neal was released to group home. At the time of discharge Kamini Neal " was determined to not be a danger to himself or others.   We discussed safety planning in detail and his use of outpatient services such as the day treatment and also coming back to the hospital as needed.         Discharge Medications:     Discharge Medication List as of 7/12/2018  9:32 AM      START taking these medications    Details   cloZAPine (CLOZARIL) 100 MG tablet Take 5 tablets (500 mg) by mouth At Bedtime, No Print Out         CONTINUE these medications which have NOT CHANGED    Details   benztropine (COGENTIN) 1 MG tablet Take 1 tablet (1 mg) by mouth 2 times daily, Disp-60 tablet, R-0, E-Prescribe      hydrOXYzine (ATARAX) 50 MG tablet Take 1 tablet (50 mg) by mouth every 4 hours as needed for anxiety, Disp-60 tablet, R-0, E-Prescribe      levothyroxine (SYNTHROID/LEVOTHROID) 25 MCG tablet Take 4.5 tablets (112 mcg) by mouth daily noon, Disp-135 tablet, R-0, E-Prescribe      melatonin 3 MG CAPS Take 6 mg by mouth At Bedtime, Disp-60 capsule, R-0, E-Prescribe      OLANZapine (ZYPREXA) 10 MG tablet Take 1 tablet (10 mg) by mouth 3 times daily as needed (self harm/ aggitation), Disp-90 tablet, R-0, E-Prescribe      paliperidone (INVEGA SUSTENNA) 156 MG/ML SUSP injection Inject 1 mL (156 mg) into the muscle once for 1 dose, Disp-1 mL, R-0, E-PrescribeDose was increased from 117 mg. Next dose will be due on 5/10.      pantoprazole (PROTONIX) 20 MG EC tablet Take 1 tablet (20 mg) by mouth daily, Disp-30 tablet, R-0, E-Prescribe      polyethylene glycol (MIRALAX/GLYCOLAX) Packet Take 34 g by mouth daily, Disp-50 packet, R-0, E-Prescribe      prazosin (MINIPRESS) 1 MG capsule Take 3 capsules (3 mg) by mouth At Bedtime This product only available from a Compounding Pharmacy., Disp-90 capsule, R-0, E-Prescribe      QUEtiapine (SEROQUEL) 400 MG tablet Take 1 tablet (400 mg) by mouth At Bedtime, Disp-60 tablet, R-0, E-Prescribe      traZODone (DESYREL) 50 MG tablet Take 1 tablet (50 mg) by mouth nightly as needed  for sleep, Disp-90 tablet, R-0, E-Prescribe      venlafaxine (EFFEXOR-ER) 75 MG TB24 24 hr tablet Take 3 tablets (225 mg) by mouth daily (with breakfast), Disp-90 each, R-0, E-Prescribe      Vitamin D, Cholecalciferol, 1000 UNITS TABS Take 1,000 Units by mouth daily, Disp-30 tablet, R-0, E-Prescribe                  Psychiatric Examination:   Appearance:  awake, alert and adequately groomed  Attitude:  cooperative  Eye Contact:  good  Mood:  good  Affect:  appropriate and in normal range and mood congruent, bright, smiling  Speech:  clear, coherent  Psychomotor Behavior:  no evidence of tardive dyskinesia, dystonia, or tics  Thought Process:  logical, linear and goal oriented  Associations:  no loose associations  Thought Content:  no evidence of suicidal ideation or homicidal ideation and no evidence of psychotic thought  Insight:  good  Judgment:  intact  Oriented to:  time, person, and place  Attention Span and Concentration:  intact  Recent and Remote Memory:  intact  Language: Able to name objects, Able to repeat phrases and Able to read and write  Fund of Knowledge: appropriate  Muscle Strength and Tone: normal  Gait and Station: Normal         Discharge Plan:      Health Care Follow-up Appointments:   - Group home:  [facility]:  Contact: Neela Pollack  Address:      Phone: [phone]  Fax: [phone]   HUC TO FAX AVS      - Psychaitry   Your group home has your upcoming appointment and will take you to this  Dr. Marco Campo @ [facility]   Phone: [phone],   Fax: [phone]      Medications:  [facility]   [address] [place] [address]  Phone: [phone]     Attend all scheduled appointments with your outpatient providers. Call at least 24 hours in advance if you need to reschedule an appointment to ensure continued access to your outpatient providers.   Major Treatments, Procedures and Findings:  You were provided with: a psychiatric assessment, assessed for  "medical stability, medication evaluation and/or management, group therapy and milieu management     Symptoms to Report: feeling more aggressive, increased confusion, losing more sleep, mood getting worse or thoughts of suicide     Early warning signs can include: increased depression or anxiety sleep disturbances increased thoughts or behaviors of suicide or self-harm  increased unusual thinking, such as paranoia or hearing voices     Safety and Wellness:  The patient should take medications as prescribed.  Patient's caregivers are highly encouraged to supervise administering of medications and follow treatment recommendations.     Patient's caregivers should ensure patient does not have access to:   If there is a concern for safety, call 911.     Resources:   Crisis Intervention: 696.519.5802 or 377-793-5244 (TTY: 109.212.2301).  Call anytime for help.  National [place] on Mental Illness (www.[place].jorge a.org): 679.903.4469 or 216-705-7412.  [place] Association for Children's Mental Health (www.mac.org): 152.319.4125.  Alcoholics Anonymous (www.alcoholics-anonymous.org): Check your phone book for your local chapter.  Suicide Awareness Voices of Education (SAVE) (www.save.org): 058-207-CMVB (3378)  National Suicide Prevention Line (www.mentalhealthmn.org): 344-671-YMYV (7565)  Mental Health Consumer/Survivor Network of [place] (www.mhcsn.net): 832.807.4860 or 315-620-7569  Mental Health Association of [place] (www.mentalhealth.org): 622.236.2212 or 133-541-3744  Self- Management and Recovery Training., SMART-- Toll free: 581.925.6544  www.zumatek.org  [facility] Crisis (COPE) Response - Adult 874 388-7746  [facility] Crisis Response - Adult 163 886-2901  Crisis text line: Text \"[place]\" to 555498. Free, confidential, 24/7.  Crisis Intervention: 932.352.9843 or 683-655-5101. Call anytime for help.   [facility] Crisis Team - Child: 538.125.5404  [facility] Mental Health Crisis " Response Team - Child: 680.409.5608     The treatment team has appreciated the opportunity to work with you.  If you have any questions or concerns our unit number is [phone]    Attestation:  The patient has been seen and evaluated by me,  Lala Briones MD

## 2018-07-19 ENCOUNTER — APPOINTMENT (OUTPATIENT)
Dept: PHARMACY | Facility: CLINIC | Age: 28
End: 2018-07-19
Attending: PSYCHIATRY & NEUROLOGY
Payer: COMMERCIAL

## 2018-08-04 ENCOUNTER — HOSPITAL ENCOUNTER (EMERGENCY)
Facility: CLINIC | Age: 28
Discharge: NURSING, REHAB OR LONG-TERM CARE | End: 2018-08-04
Attending: FAMILY MEDICINE | Admitting: FAMILY MEDICINE
Payer: COMMERCIAL

## 2018-08-04 VITALS
OXYGEN SATURATION: 96 % | HEART RATE: 98 BPM | RESPIRATION RATE: 16 BRPM | DIASTOLIC BLOOD PRESSURE: 83 MMHG | TEMPERATURE: 98.3 F | SYSTOLIC BLOOD PRESSURE: 126 MMHG

## 2018-08-04 DIAGNOSIS — F25.0 SCHIZOAFFECTIVE DISORDER, BIPOLAR TYPE (H): ICD-10-CM

## 2018-08-04 PROCEDURE — 99284 EMERGENCY DEPT VISIT MOD MDM: CPT | Mod: Z6 | Performed by: FAMILY MEDICINE

## 2018-08-04 PROCEDURE — 99285 EMERGENCY DEPT VISIT HI MDM: CPT | Mod: 25 | Performed by: FAMILY MEDICINE

## 2018-08-04 PROCEDURE — 90791 PSYCH DIAGNOSTIC EVALUATION: CPT

## 2018-08-04 PROCEDURE — 25000132 ZZH RX MED GY IP 250 OP 250 PS 637: Performed by: FAMILY MEDICINE

## 2018-08-04 RX ORDER — TRAZODONE HYDROCHLORIDE 50 MG/1
50 TABLET, FILM COATED ORAL ONCE
Status: COMPLETED | OUTPATIENT
Start: 2018-08-04 | End: 2018-08-04

## 2018-08-04 RX ORDER — QUETIAPINE FUMARATE 100 MG/1
400 TABLET, FILM COATED ORAL ONCE
Status: COMPLETED | OUTPATIENT
Start: 2018-08-04 | End: 2018-08-04

## 2018-08-04 RX ORDER — OLANZAPINE 10 MG/1
10 TABLET, ORALLY DISINTEGRATING ORAL ONCE
Status: COMPLETED | OUTPATIENT
Start: 2018-08-04 | End: 2018-08-04

## 2018-08-04 RX ORDER — BENZTROPINE MESYLATE 1 MG/1
1 TABLET ORAL ONCE
Status: COMPLETED | OUTPATIENT
Start: 2018-08-04 | End: 2018-08-04

## 2018-08-04 RX ORDER — LANOLIN ALCOHOL/MO/W.PET/CERES
6 CREAM (GRAM) TOPICAL
Status: DISCONTINUED | OUTPATIENT
Start: 2018-08-04 | End: 2018-08-05 | Stop reason: HOSPADM

## 2018-08-04 RX ADMIN — TRAZODONE HYDROCHLORIDE 50 MG: 50 TABLET ORAL at 23:21

## 2018-08-04 RX ADMIN — BENZTROPINE MESYLATE 1 MG: 1 TABLET ORAL at 23:21

## 2018-08-04 RX ADMIN — Medication 6 MG: at 23:21

## 2018-08-04 RX ADMIN — OLANZAPINE 10 MG: 10 TABLET, ORALLY DISINTEGRATING ORAL at 23:00

## 2018-08-04 RX ADMIN — QUETIAPINE FUMARATE 400 MG: 100 TABLET ORAL at 23:00

## 2018-08-04 NOTE — ED AVS SNAPSHOT
[facility], Emergency Department    [address]    [place] [address]    Phone:  [phone]    Fax:  [phone]                                       Kamini Neal   MRN: [id number]    Department:  [facility], Emergency Department   Date of Visit:  8/4/2018           After Visit Summary Signature Page     I have received my discharge instructions, and my questions have been answered. I have discussed any challenges I see with this plan with the nurse or doctor.    ..........................................................................................................................................  Patient/Patient Representative Signature      ..........................................................................................................................................  Patient Representative Print Name and Relationship to Patient    ..................................................               ................................................  Date                                            Time    ..........................................................................................................................................  Reviewed by Signature/Title    ...................................................              ..............................................  Date                                                            Time

## 2018-08-04 NOTE — ED AVS SNAPSHOT
[facility], Emergency Department    [address]    [place] [address]    Phone:  [phone]    Fax:  [phone]                                       Kamini Neal   MRN: [id number]    Department:  [facility], Emergency Department   Date of Visit:  8/4/2018           Patient Information     Date Of Birth          1990        Your diagnoses for this visit were:     Schizoaffective disorder, bipolar type (H)        You were seen by George Dia MD.      Follow-up Information     Follow up with Follow-up with your outpatient providers..        Discharge Instructions       Discharge back to group home with plans to continue with current outpatient medications and therapies.    24 Hour Appointment Hotline       To make an appointment at any [facility] clinic, call [phone] ([phone]). If you don't have a family doctor or clinic, we will help you find one. [facility] clinics are conveniently located to serve the needs of you and your family.             Review of your medicines      Our records show that you are taking the medicines listed below. If these are incorrect, please call your family doctor or clinic.        Dose / Directions Last dose taken    benztropine 1 MG tablet   Commonly known as:  COGENTIN   Dose:  1 mg   Quantity:  60 tablet        Take 1 tablet (1 mg) by mouth 2 times daily   Refills:  0        cloZAPine 100 MG tablet   Commonly known as:  CLOZARIL   Dose:  500 mg        Take 5 tablets (500 mg) by mouth At Bedtime   Refills:  0        hydrOXYzine 50 MG tablet   Commonly known as:  ATARAX   Dose:  50 mg   Quantity:  60 tablet        Take 1 tablet (50 mg) by mouth every 4 hours as needed for anxiety   Refills:  0        levothyroxine 25 MCG tablet   Commonly known as:  SYNTHROID/LEVOTHROID   Dose:  112 mcg   Quantity:  135 tablet        Take 4.5 tablets (112 mcg) by mouth daily noon   Refills:  0        melatonin 3 MG Caps   Dose:  6 mg   Quantity:  60  capsule        Take 6 mg by mouth At Bedtime   Refills:  0        OLANZapine 10 MG tablet   Commonly known as:  zyPREXA   Dose:  10 mg   Quantity:  90 tablet        Take 1 tablet (10 mg) by mouth 3 times daily as needed (self harm/ aggitation)   Refills:  0        paliperidone 156 MG/ML Susp injection   Commonly known as:  INVEGA SUSTENNA   Dose:  156 mg   Quantity:  1 mL        Inject 1 mL (156 mg) into the muscle once for 1 dose   Refills:  0        pantoprazole 20 MG EC tablet   Commonly known as:  PROTONIX   Dose:  20 mg   Quantity:  30 tablet        Take 1 tablet (20 mg) by mouth daily   Refills:  0        polyethylene glycol Packet   Commonly known as:  MIRALAX/GLYCOLAX   Dose:  34 g   Quantity:  50 packet        Take 34 g by mouth daily   Refills:  0        prazosin 1 MG capsule   Commonly known as:  MINIPRESS   Dose:  3 mg   Quantity:  90 capsule        Take 3 capsules (3 mg) by mouth At Bedtime This product only available from a Compounding Pharmacy.   Refills:  0        QUEtiapine 400 MG tablet   Commonly known as:  SEROquel   Dose:  400 mg   Quantity:  60 tablet        Take 1 tablet (400 mg) by mouth At Bedtime   Refills:  0        traZODone 50 MG tablet   Commonly known as:  DESYREL   Dose:  50 mg   Quantity:  90 tablet        Take 1 tablet (50 mg) by mouth nightly as needed for sleep   Refills:  0        venlafaxine 75 MG Tb24 24 hr tablet   Commonly known as:  EFFEXOR-ER   Dose:  225 mg   Quantity:  90 each        Take 3 tablets (225 mg) by mouth daily (with breakfast)   Refills:  0        Vitamin D (Cholecalciferol) 1000 units Tabs   Dose:  1000 Units   Quantity:  30 tablet        Take 1,000 Units by mouth daily   Refills:  0                Orders Needing Specimen Collection     None      Pending Results     No orders found from 8/2/2018 to 8/5/2018.            Pending Culture Results     No orders found from 8/2/2018 to 8/5/2018.            Pending Results Instructions     If you had any lab results  "that were not finalized at the time of your Discharge, you can call the ED Lab Result RN at [phone]. You will be contacted by this team for any positive Lab results or changes in treatment. The nurses are available 7 days a week from 10A to 6:30P.  You can leave a message 24 hours per day and they will return your call.        Thank you for choosing [facility]       Thank you for choosing [facility] for your care. Our goal is always to provide you with excellent care. Hearing back from our patients is one way we can continue to improve our services. Please take a few minutes to complete the written survey that you may receive in the mail after you visit with us. Thank you!        [facility] Information     [employer] lets you send messages to your doctor, view your test results, renew your prescriptions, schedule appointments and more. To sign up, go to [email or web address] . Click on \"Log in\" on the left side of the screen, which will take you to the Welcome page. Then click on \"Sign up Now\" on the right side of the page.     You will be asked to enter the access code listed below, as well as some personal information. Please follow the directions to create your username and password.     Your access code is: [email or web address]  Expires: 2018  9:17 PM     Your access code will  in 90 days. If you need help or a new code, please call your [facility] clinic or [phone].        Equal Access to Services     [facility] : Hadii melonie freemano Soaaron, waaxda luqadaha, qaybta kaalmada adeegyada, christiano edwards . So [facility] [phone].    ATENCIÓN: Si habla español, tiene a reyes disposición servicios gratuitos de asistencia lingüística. Llame al [phone].    We comply with applicable federal civil rights laws and [place] laws. We do not discriminate on the basis of race, color, national origin, age, disability, sex, sexual orientation, or gender identity.            After Visit Summary  "      This is your record. Keep this with you and show to your community pharmacist(s) and doctor(s) at your next visit.

## 2018-08-05 NOTE — DISCHARGE INSTRUCTIONS
Discharge back to group home with plans to continue with current outpatient medications and therapies.

## 2018-08-05 NOTE — ED NOTES
I have performed an in person assessment of the patient. Based on this assessment the patient no longer requires a one on one attendant at this point in time.    George Dia MD  9:19 PM  August 4, 2018           George Dia MD  08/04/18 2316

## 2018-08-05 NOTE — ED NOTES
[facility] ambulance has been called. ETA: 30-45 mins    Group Home: [facility] ([phone]) was contacted and was able to talk to Neela. Neela has been made aware that pt will be sent back to the group home.     Pt has been made aware of the plan.

## 2018-08-07 ASSESSMENT — ENCOUNTER SYMPTOMS
ABDOMINAL PAIN: 0
NERVOUS/ANXIOUS: 1
FEVER: 0
DYSPHORIC MOOD: 1
AGITATION: 1
SHORTNESS OF BREATH: 0

## 2018-08-07 NOTE — ED PROVIDER NOTES
History     Chief Complaint   Patient presents with     Suicidal     EMS reports pt coming from group home, SI, increased depression. VSS and cooperative on route except for hitting his head. Pt reports his plan is to OD.     HPI  Kamini Neal is a 28 year old male who presents to us from the group home where he has had increase in agitation and verbal aggression as well as some thoughts of harming himself and hitting his head against the wall.  Initially patient also had expressed some suicidal ideation with plans to overdose but denies intent to harm himself here in the emergency room is at baseline and has remained calm and cooperative.  Patient was seen and evaluated by the  here as well please refer to their documentation.  Once again at this time patient's symptoms he has returned to baseline and is no longer threatening to harm himself and is not wanting to be hospitalized.    I have reviewed the Medications, Allergies, Past Medical and Surgical History, and Social History in the Epic system.    PERSONAL MEDICAL HISTORY  Past Medical History:   Diagnosis Date     Anxiety      Depressive disorder      Schizo affective schizophrenia (H)      PAST SURGICAL HISTORY  Past Surgical History:   Procedure Laterality Date     TONSILLECTOMY       FAMILY HISTORY  Family History   Problem Relation Age of Onset     Mental Illness Maternal Uncle      Mental Illness Maternal Aunt      Glaucoma No family hx of      Macular Degeneration No family hx of      SOCIAL HISTORY  Social History   Substance Use Topics     Smoking status: Current Every Day Smoker     Packs/day: 1.00     Smokeless tobacco: Never Used     Alcohol use No     MEDICATIONS  No current facility-administered medications for this encounter.      Current Outpatient Prescriptions   Medication     benztropine (COGENTIN) 1 MG tablet     cloZAPine (CLOZARIL) 100 MG tablet     levothyroxine (SYNTHROID/LEVOTHROID) 25 MCG tablet     melatonin 3 MG CAPS      OLANZapine (ZYPREXA) 10 MG tablet     pantoprazole (PROTONIX) 20 MG EC tablet     prazosin (MINIPRESS) 1 MG capsule     traZODone (DESYREL) 50 MG tablet     venlafaxine (EFFEXOR-ER) 75 MG TB24 24 hr tablet     Vitamin D, Cholecalciferol, 1000 UNITS TABS     hydrOXYzine (ATARAX) 50 MG tablet     paliperidone (INVEGA SUSTENNA) 156 MG/ML SUSP injection     polyethylene glycol (MIRALAX/GLYCOLAX) Packet     QUEtiapine (SEROQUEL) 400 MG tablet     ALLERGIES  Allergies   Allergen Reactions     Haldol [Haloperidol]      Other reaction(s): Tardive Dyskinesia  Torticollis  Torticollis         Review of Systems   Constitutional: Negative for fever.   Respiratory: Negative for shortness of breath.    Cardiovascular: Negative for chest pain.   Gastrointestinal: Negative for abdominal pain.   Psychiatric/Behavioral: Positive for agitation, behavioral problems and dysphoric mood. Negative for suicidal ideas. The patient is nervous/anxious.    All other systems reviewed and are negative.      Physical Exam   BP: 130/90  Pulse: 113  Temp: 98.4  F (36.9  C)  Resp: 20  SpO2: 99 %      Physical Exam   Constitutional: He is oriented to person, place, and time. No distress.   HENT:   Head: Atraumatic.   Mouth/Throat: Oropharynx is clear and moist. No oropharyngeal exudate.   Eyes: Pupils are equal, round, and reactive to light. No scleral icterus.   Cardiovascular: Normal heart sounds and intact distal pulses.    Pulmonary/Chest: Breath sounds normal. No respiratory distress.   Abdominal: Soft. Bowel sounds are normal. There is no tenderness.   Musculoskeletal: He exhibits no edema or tenderness.   Neurological: He is alert and oriented to person, place, and time. No cranial nerve deficit. He exhibits normal muscle tone. Coordination normal.   Skin: Skin is warm. No rash noted. He is not diaphoretic.   Psychiatric: His mood appears anxious. He expresses no suicidal ideation.       ED Course     ED Course     Procedures    Patient  seen and evaluated by the  please refer to the documentation in the note section of the epic chart dated 8/4/2018    Critical Care time:  none         Assessments & Plan (with Medical Decision Making)       I have reviewed the nursing notes.    I have reviewed the findings, diagnosis, plan and need for follow up with the patient.    Patient with history of schizoaffective disorder bipolar type initially with some self-injurious behaviors at his group home at this time patient was seen and evaluated and was at baseline cooperative and denying any intent to harm himself or others.  Patient has had some increase in agitation at the group home but at this time is not holdable and does not want to be admitted he will be returning back to the group home.    Final diagnoses:   Schizoaffective disorder, bipolar type (H)       8/4/2018   [facility], [place], EMERGENCY DEPARTMENT     George Dia MD  08/07/18 1543

## 2018-10-09 ENCOUNTER — HOSPITAL ENCOUNTER (EMERGENCY)
Facility: CLINIC | Age: 28
Discharge: HOME | End: 2018-10-10
Attending: EMERGENCY MEDICINE | Admitting: EMERGENCY MEDICINE
Payer: COMMERCIAL

## 2018-10-09 DIAGNOSIS — F25.0 SCHIZOAFFECTIVE DISORDER, BIPOLAR TYPE (H): ICD-10-CM

## 2018-10-09 LAB
ALCOHOL BREATH TEST: 0.03 (ref 0–0.01)
AMPHETAMINES UR QL SCN: NEGATIVE
BARBITURATES UR QL: NEGATIVE
BENZODIAZ UR QL: NEGATIVE
CANNABINOIDS UR QL SCN: NEGATIVE
COCAINE UR QL: NEGATIVE
ETHANOL UR QL SCN: POSITIVE
OPIATES UR QL SCN: NEGATIVE

## 2018-10-09 PROCEDURE — 80320 DRUG SCREEN QUANTALCOHOLS: CPT | Performed by: FAMILY MEDICINE

## 2018-10-09 PROCEDURE — 99284 EMERGENCY DEPT VISIT MOD MDM: CPT | Mod: Z6 | Performed by: EMERGENCY MEDICINE

## 2018-10-09 PROCEDURE — 99285 EMERGENCY DEPT VISIT HI MDM: CPT | Mod: 25 | Performed by: EMERGENCY MEDICINE

## 2018-10-09 PROCEDURE — 82075 ASSAY OF BREATH ETHANOL: CPT | Performed by: EMERGENCY MEDICINE

## 2018-10-09 PROCEDURE — 80307 DRUG TEST PRSMV CHEM ANLYZR: CPT | Performed by: FAMILY MEDICINE

## 2018-10-09 NOTE — ED AVS SNAPSHOT
[facility], Emergency Department    [address]    [place] [address]    Phone:  [phone]    Fax:  [phone]                                       Kamini Neal   MRN: [id number]    Department:  [facility], Emergency Department   Date of Visit:  10/9/2018           Patient Information     Date Of Birth          1990        Your diagnoses for this visit were:     Schizoaffective disorder, bipolar type (H)        You were seen by Jayson Gracia MD.        Discharge Instructions       Continue current medications.  Follow up with therapist and psychiatrist.  Return if persistent symptoms, especially if any thoughts of harming yourself.    24 Hour Appointment Hotline       To make an appointment at any [facility] clinic, call [phone] ([phone]). If you don't have a family doctor or clinic, we will help you find one. [facility] clinics are conveniently located to serve the needs of you and your family.             Review of your medicines      Our records show that you are taking the medicines listed below. If these are incorrect, please call your family doctor or clinic.        Dose / Directions Last dose taken    benztropine 1 MG tablet   Commonly known as:  COGENTIN   Dose:  1 mg   Quantity:  60 tablet        Take 1 tablet (1 mg) by mouth 2 times daily   Refills:  0        cloZAPine 100 MG tablet   Commonly known as:  CLOZARIL   Dose:  500 mg        Take 5 tablets (500 mg) by mouth At Bedtime   Refills:  0        hydrOXYzine 50 MG tablet   Commonly known as:  ATARAX   Dose:  50 mg   Quantity:  60 tablet        Take 1 tablet (50 mg) by mouth every 4 hours as needed for anxiety   Refills:  0        levothyroxine 25 MCG tablet   Commonly known as:  SYNTHROID/LEVOTHROID   Dose:  112 mcg   Quantity:  135 tablet        Take 4.5 tablets (112 mcg) by mouth daily noon   Refills:  0        melatonin 3 MG Caps   Dose:  6 mg   Quantity:  60 capsule        Take 6 mg by mouth At Bedtime    Refills:  0        OLANZapine 10 MG tablet   Commonly known as:  zyPREXA   Dose:  10 mg   Quantity:  90 tablet        Take 1 tablet (10 mg) by mouth 3 times daily as needed (self harm/ aggitation)   Refills:  0        paliperidone 156 MG/ML Susp injection   Commonly known as:  INVEGA SUSTENNA   Dose:  156 mg   Quantity:  1 mL        Inject 1 mL (156 mg) into the muscle once for 1 dose   Refills:  0        pantoprazole 20 MG EC tablet   Commonly known as:  PROTONIX   Dose:  20 mg   Quantity:  30 tablet        Take 1 tablet (20 mg) by mouth daily   Refills:  0        polyethylene glycol Packet   Commonly known as:  MIRALAX/GLYCOLAX   Dose:  34 g   Quantity:  50 packet        Take 34 g by mouth daily   Refills:  0        prazosin 1 MG capsule   Commonly known as:  MINIPRESS   Dose:  3 mg   Quantity:  90 capsule        Take 3 capsules (3 mg) by mouth At Bedtime This product only available from a Compounding Pharmacy.   Refills:  0        traZODone 50 MG tablet   Commonly known as:  DESYREL   Dose:  50 mg   Quantity:  90 tablet        Take 1 tablet (50 mg) by mouth nightly as needed for sleep   Refills:  0        venlafaxine 75 MG Tb24 24 hr tablet   Commonly known as:  EFFEXOR-ER   Dose:  225 mg   Quantity:  90 each        Take 3 tablets (225 mg) by mouth daily (with breakfast)   Refills:  0        Vitamin D (Cholecalciferol) 1000 units Tabs   Dose:  1000 Units   Quantity:  30 tablet        Take 1,000 Units by mouth daily   Refills:  0                Procedures and tests performed during your visit     Alcohol breath test POCT    Drug abuse screen 6 urine (tox)      Orders Needing Specimen Collection     None      Pending Results     No orders found for last 3 day(s).            Pending Culture Results     No orders found for last 3 day(s).            Pending Results Instructions     If you had any lab results that were not finalized at the time of your Discharge, you can call the ED Lab Result RN at [phone]. You  "will be contacted by this team for any positive Lab results or changes in treatment. The nurses are available 7 days a week from 10A to 6:30P.  You can leave a message 24 hours per day and they will return your call.        Thank you for choosing [facility]       Thank you for choosing [facility] for your care. Our goal is always to provide you with excellent care. Hearing back from our patients is one way we can continue to improve our services. Please take a few minutes to complete the written survey that you may receive in the mail after you visit with us. Thank you!        [facility] Information     [facility] lets you send messages to your doctor, view your test results, renew your prescriptions, schedule appointments and more. To sign up, go to [email or web address] . Click on \"Log in\" on the left side of the screen, which will take you to the Welcome page. Then click on \"Sign up Now\" on the right side of the page.     You will be asked to enter the access code listed below, as well as some personal information. Please follow the directions to create your username and password.     Your access code is: [email or web address]  Expires: 2018  9:17 PM     Your access code will  in 90 days. If you need help or a new code, please call your [facility] clinic or [phone].        Equal Access to Services     FERNANDA WILCOX : Michael Cedeño, waaxda luqadaha, qaybta kaalmamerly echeverria, christiano edwards . [facility] [phone].    ATENCIÓN: Si habla español, tiene a reyes disposición servicios gratuitos de asistencia lingüística. Llame al [phone].    We comply with applicable federal civil rights laws and [place] laws. We do not discriminate on the basis of race, color, national origin, age, disability, sex, sexual orientation, or gender identity.            After Visit Summary       This is your record. Keep this with you and show to your community pharmacist(s) and doctor(s) at your " next visit.

## 2018-10-09 NOTE — ED AVS SNAPSHOT
[facility], Emergency Department    [address]    [place] [address]    Phone:  [phone]    Fax:  [phone]                                       Kamini Neal   MRN: [id number]    Department:  [facility], Emergency Department   Date of Visit:  10/9/2018           After Visit Summary Signature Page     I have received my discharge instructions, and my questions have been answered. I have discussed any challenges I see with this plan with the nurse or doctor.    ..........................................................................................................................................  Patient/Patient Representative Signature      ..........................................................................................................................................  Patient Representative Print Name and Relationship to Patient    ..................................................               ................................................  Date                                   Time    ..........................................................................................................................................  Reviewed by Signature/Title    ...................................................              ..............................................  Date                                               Time          22EPIC Rev 08/18

## 2018-10-10 VITALS
SYSTOLIC BLOOD PRESSURE: 133 MMHG | TEMPERATURE: 99.5 F | DIASTOLIC BLOOD PRESSURE: 83 MMHG | HEART RATE: 108 BPM | RESPIRATION RATE: 14 BRPM | OXYGEN SATURATION: 99 %

## 2018-10-10 PROCEDURE — 90791 PSYCH DIAGNOSTIC EVALUATION: CPT

## 2018-10-10 ASSESSMENT — ENCOUNTER SYMPTOMS
CONSTITUTIONAL NEGATIVE: 1
NAUSEA: 0
NUMBNESS: 0
CONFUSION: 0
HALLUCINATIONS: 0
FEVER: 0
ABDOMINAL PAIN: 0
CHILLS: 0
WOUND: 1
FATIGUE: 0
RHINORRHEA: 0
DYSPHORIC MOOD: 1
CHEST TIGHTNESS: 0
MYALGIAS: 0
LIGHT-HEADEDNESS: 0
SORE THROAT: 0
NECK PAIN: 0
HEADACHES: 0
COUGH: 0
SHORTNESS OF BREATH: 0
WEAKNESS: 0
DIZZINESS: 0
APPETITE CHANGE: 0
ARTHRALGIAS: 0
PALPITATIONS: 0
BACK PAIN: 0
AGITATION: 0
DIARRHEA: 0
BRUISES/BLEEDS EASILY: 0
VOMITING: 0
NECK STIFFNESS: 0
SLEEP DISTURBANCE: 0

## 2018-10-10 NOTE — ED NOTES
Ambulance ordered for pt to be picked up and dropped at group home.     RN attempt to call the group home to notify them of pt status being ready to get picked up by ambulance but phone was not answered. RN will make another attempt to notify them of pt status.

## 2018-10-10 NOTE — DISCHARGE INSTRUCTIONS
Continue current medications.  Follow up with therapist and psychiatrist.  Return if persistent symptoms, especially if any thoughts of harming yourself.

## 2018-10-10 NOTE — ED NOTES
Spoke with staff at the group home about the plan for care and to do safety check before pt gets back to the house.     Staff confirmed that his room is checked for safety.

## 2018-10-10 NOTE — ED NOTES
I have performed an in person assessment of the patient. Based on this assessment the patient no longer requires a one on one attendant at this point in time.    Conner Vences MD  11:58 PM  October 9, 2018           Conner Vences MD  10/09/18 4836

## 2018-10-10 NOTE — ED PROVIDER NOTES
[facility] [place]    10/09/18       History     Chief Complaint   Patient presents with     Suicidal     Pt has superficial self inflicted lac to L wrist. Lives in . Stopped cutting d/t pain. Last attempt was 3 months ago. Today had alternative plan to OD on pills, but denies taking any pills. Auditory hallucinations at baseline, denies currently.     HPI  Kamini Neal is a 28 year old male with a medical history significant for schizoaffective disorder bipolar type, personality disorder, polysubstance abuse, depression and anxiety who presents to the Emergency Department for evaluation of suicidal ideation.  Patient is currently living in a group home.  Patient reports that he goes through phases of worsening depression with thoughts of killing himself.  Tonight the patient broke a CD and self-inflicted superficial lacerations to his left wrist.  Patient here reports that he has no plan or intent or any thoughts about suicide or further self harm.  Patient does endorse drinking beer tonight which he feels worsened his depression, but he denies any other triggers.  Patient does have a therapist and psychiatrist.  No other injuries. No recent illness. No drug use. He feels well now and wants to return to group home.  I have reviewed the Medications, Allergies, Past Medical and Surgical History, and Social History in the [employer] system.    Past Medical History:   Diagnosis Date     Anxiety      Depressive disorder      Schizo affective schizophrenia (H)        Past Surgical History:   Procedure Laterality Date     TONSILLECTOMY         Family History   Problem Relation Age of Onset     Mental Illness Maternal Uncle      Mental Illness Maternal Aunt      Glaucoma No family hx of      Macular Degeneration No family hx of        Social History   Substance Use Topics     Smoking status: Current Every Day Smoker     Packs/day: 1.00     Smokeless tobacco: Never Used     Alcohol use No        No current facility-administered medications for this encounter.      Current Outpatient Prescriptions   Medication     cloZAPine (CLOZARIL) 100 MG tablet     levothyroxine (SYNTHROID/LEVOTHROID) 25 MCG tablet     melatonin 3 MG CAPS     pantoprazole (PROTONIX) 20 MG EC tablet     prazosin (MINIPRESS) 1 MG capsule     traZODone (DESYREL) 50 MG tablet     venlafaxine (EFFEXOR-ER) 75 MG TB24 24 hr tablet     Vitamin D, Cholecalciferol, 1000 UNITS TABS     benztropine (COGENTIN) 1 MG tablet     hydrOXYzine (ATARAX) 50 MG tablet     OLANZapine (ZYPREXA) 10 MG tablet     paliperidone (INVEGA SUSTENNA) 156 MG/ML SUSP injection     polyethylene glycol (MIRALAX/GLYCOLAX) Packet        Allergies   Allergen Reactions     Haldol [Haloperidol]      Other reaction(s): Tardive Dyskinesia  Torticollis  Torticollis         Review of Systems   Constitutional: Negative.  Negative for appetite change, chills, fatigue and fever.   HENT: Negative for congestion, rhinorrhea and sore throat.    Eyes: Negative for visual disturbance.   Respiratory: Negative for cough, chest tightness and shortness of breath.    Cardiovascular: Negative for chest pain, palpitations and leg swelling.   Gastrointestinal: Negative for abdominal pain, diarrhea, nausea and vomiting.   Musculoskeletal: Negative for arthralgias, back pain, myalgias, neck pain and neck stiffness.   Skin: Positive for wound (superficial lacerations). Negative for rash.   Allergic/Immunologic: Negative for immunocompromised state.   Neurological: Negative for dizziness, syncope, weakness, light-headedness, numbness and headaches.   Hematological: Does not bruise/bleed easily.   Psychiatric/Behavioral: Positive for dysphoric mood and self-injury (superficial lacerations). Negative for agitation, confusion, hallucinations, sleep disturbance and suicidal ideas.   All other systems reviewed and are negative.      Physical Exam   BP: 133/83  Pulse: 108  Temp: 99.5  F (37.5   C)  Resp: 14  SpO2: 99 %      Physical Exam   Constitutional: He is oriented to person, place, and time. He appears well-developed and well-nourished. No distress.   HENT:   Head: Normocephalic and atraumatic.   Mouth/Throat: Oropharynx is clear and moist.   Eyes: Conjunctivae and EOM are normal. Pupils are equal, round, and reactive to light.   Neck: Normal range of motion. Neck supple.   Cardiovascular: Normal rate, regular rhythm, normal heart sounds and intact distal pulses.    Pulmonary/Chest: Effort normal and breath sounds normal. No respiratory distress.   Abdominal: Soft. There is no tenderness.   Musculoskeletal: Normal range of motion. He exhibits no edema or tenderness.   Neurological: He is alert and oriented to person, place, and time. He has normal strength. No sensory deficit.   Skin: Skin is warm and dry. No rash noted.   Superficial laceration volar aspect of left forearm.   Psychiatric: He has a normal mood and affect. His speech is normal and behavior is normal. Thought content normal. He is not agitated, not actively hallucinating and not combative. Thought content is not paranoid and not delusional. He expresses no homicidal and no suicidal ideation.   Nursing note and vitals reviewed.      ED Course     ED Course     Procedures             Critical Care time:  none             Labs Ordered and Resulted from Time of ED Arrival Up to the Time of Departure from the ED   DRUG ABUSE SCREEN 6 CHEM DEP URINE ([facility]) - Abnormal; Notable for the following:        Result Value    Ethanol Qual Urine Positive (*)     All other components within normal limits   ALCOHOL BREATH TEST POCT - Abnormal; Notable for the following:     Alcohol Breath Test 0.03 (*)     All other components within normal limits            Assessments & Plan (with Medical Decision Making)   See also mental health  note. At this time he denies suicidal ideation and denies any thoughts of self harm. He is in a group home and  wants to return there.     I have reviewed the nursing notes.    I have reviewed the findings, diagnosis, plan and need for follow up with the patient.    New Prescriptions    No medications on file       Final diagnoses:   Schizoaffective disorder, bipolar type (H)     IThomas, am serving as a trained medical scribe to document services personally performed by Jayson Gracia MD, based on the provider's statements to me.   IJayson MD, was physically present and have reviewed and verified the accuracy of this note documented by Thomas Loya.    10/9/2018   [facility], [place], EMERGENCY DEPARTMENT     Jayson Gracia MD  10/10/18 0026

## 2019-08-28 ENCOUNTER — HOSPITAL ENCOUNTER (INPATIENT)
Facility: CLINIC | Age: 29
LOS: 5 days | Discharge: NURSING, REHAB OR LONG-TERM CARE | End: 2019-09-03
Attending: EMERGENCY MEDICINE | Admitting: PSYCHIATRY & NEUROLOGY
Payer: COMMERCIAL

## 2019-08-28 DIAGNOSIS — F60.3 EXPLOSIVE PERSONALITY DISORDER (H): ICD-10-CM

## 2019-08-28 DIAGNOSIS — F25.0 SCHIZOAFFECTIVE DISORDER, BIPOLAR TYPE (H): ICD-10-CM

## 2019-08-28 DIAGNOSIS — F43.10 POSTTRAUMATIC STRESS DISORDER: ICD-10-CM

## 2019-08-28 DIAGNOSIS — R45.851 SUICIDAL IDEATION: ICD-10-CM

## 2019-08-28 DIAGNOSIS — F25.9 SCHIZOAFFECTIVE DISORDER, UNSPECIFIED TYPE (H): ICD-10-CM

## 2019-08-28 PROCEDURE — 80307 DRUG TEST PRSMV CHEM ANLYZR: CPT | Performed by: EMERGENCY MEDICINE

## 2019-08-28 PROCEDURE — 99285 EMERGENCY DEPT VISIT HI MDM: CPT | Mod: 25 | Performed by: EMERGENCY MEDICINE

## 2019-08-28 PROCEDURE — 99285 EMERGENCY DEPT VISIT HI MDM: CPT | Mod: Z6 | Performed by: EMERGENCY MEDICINE

## 2019-08-28 PROCEDURE — 90791 PSYCH DIAGNOSTIC EVALUATION: CPT

## 2019-08-28 PROCEDURE — 80320 DRUG SCREEN QUANTALCOHOLS: CPT | Performed by: EMERGENCY MEDICINE

## 2019-08-28 ASSESSMENT — ENCOUNTER SYMPTOMS
DYSPHORIC MOOD: 1
HALLUCINATIONS: 0

## 2019-08-28 NOTE — LETTER
To Whom It May Concern,       Kamini was hospitalized at [facility] from 8/28/2019 - 9/3/2019. He will need the rest of this week to stabilize and get situated back at his home. Please excuse him from this absence during this time. He will be ready to return to work duties on 9/9/2019. Thank you.      Sincerely,       Eze Zazueta MD  [facility]

## 2019-08-29 PROBLEM — R45.851 SUICIDAL IDEATIONS: Status: ACTIVE | Noted: 2019-08-29

## 2019-08-29 LAB
BASOPHILS # BLD AUTO: 0 10E9/L (ref 0–0.2)
BASOPHILS NFR BLD AUTO: 0.3 %
DIFFERENTIAL METHOD BLD: NORMAL
EOSINOPHIL # BLD AUTO: 0 10E9/L (ref 0–0.7)
EOSINOPHIL NFR BLD AUTO: 0 %
IMM GRANULOCYTES # BLD: 0.1 10E9/L (ref 0–0.4)
IMM GRANULOCYTES NFR BLD: 0.6 %
LYMPHOCYTES # BLD AUTO: 2.5 10E9/L (ref 0.8–5.3)
LYMPHOCYTES NFR BLD AUTO: 26.8 %
MONOCYTES # BLD AUTO: 0.8 10E9/L (ref 0–1.3)
MONOCYTES NFR BLD AUTO: 8 %
NEUTROPHILS # BLD AUTO: 6.1 10E9/L (ref 1.6–8.3)
NEUTROPHILS NFR BLD AUTO: 64.3 %
NRBC # BLD AUTO: 0 10*3/UL
NRBC BLD AUTO-RTO: 0 /100
WBC # BLD AUTO: 9.4 10E9/L (ref 4–11)

## 2019-08-29 PROCEDURE — 85004 AUTOMATED DIFF WBC COUNT: CPT | Performed by: PSYCHIATRY & NEUROLOGY

## 2019-08-29 PROCEDURE — 85048 AUTOMATED LEUKOCYTE COUNT: CPT | Performed by: PSYCHIATRY & NEUROLOGY

## 2019-08-29 PROCEDURE — 25000132 ZZH RX MED GY IP 250 OP 250 PS 637: Performed by: PSYCHIATRY & NEUROLOGY

## 2019-08-29 PROCEDURE — H2032 ACTIVITY THERAPY, PER 15 MIN: HCPCS

## 2019-08-29 PROCEDURE — 12400001 ZZH R&B MH UMMC

## 2019-08-29 PROCEDURE — 36415 COLL VENOUS BLD VENIPUNCTURE: CPT | Performed by: NURSE PRACTITIONER

## 2019-08-29 PROCEDURE — 36415 COLL VENOUS BLD VENIPUNCTURE: CPT | Performed by: PSYCHIATRY & NEUROLOGY

## 2019-08-29 PROCEDURE — 25000132 ZZH RX MED GY IP 250 OP 250 PS 637: Performed by: NURSE PRACTITIONER

## 2019-08-29 PROCEDURE — 80159 DRUG ASSAY CLOZAPINE: CPT | Performed by: NURSE PRACTITIONER

## 2019-08-29 PROCEDURE — 99222 1ST HOSP IP/OBS MODERATE 55: CPT | Mod: AI | Performed by: PSYCHIATRY & NEUROLOGY

## 2019-08-29 RX ORDER — OLANZAPINE 10 MG/1
10 TABLET ORAL
Status: DISCONTINUED | OUTPATIENT
Start: 2019-08-29 | End: 2019-09-03 | Stop reason: HOSPADM

## 2019-08-29 RX ORDER — ACETAMINOPHEN 325 MG/1
650 TABLET ORAL EVERY 4 HOURS PRN
Status: DISCONTINUED | OUTPATIENT
Start: 2019-08-29 | End: 2019-09-03 | Stop reason: HOSPADM

## 2019-08-29 RX ORDER — LEVOTHYROXINE SODIUM 112 UG/1
112 TABLET ORAL DAILY
Status: ON HOLD | COMMUNITY
End: 2022-01-03

## 2019-08-29 RX ORDER — TRAZODONE HYDROCHLORIDE 50 MG/1
50 TABLET, FILM COATED ORAL
Status: DISCONTINUED | OUTPATIENT
Start: 2019-08-29 | End: 2019-09-03 | Stop reason: HOSPADM

## 2019-08-29 RX ORDER — VENLAFAXINE HYDROCHLORIDE 225 MG/1
225 TABLET, EXTENDED RELEASE ORAL
Status: DISCONTINUED | OUTPATIENT
Start: 2019-08-30 | End: 2019-09-03 | Stop reason: HOSPADM

## 2019-08-29 RX ORDER — OLANZAPINE 10 MG/2ML
10 INJECTION, POWDER, FOR SOLUTION INTRAMUSCULAR
Status: DISCONTINUED | OUTPATIENT
Start: 2019-08-29 | End: 2019-09-03 | Stop reason: HOSPADM

## 2019-08-29 RX ORDER — LEVOTHYROXINE SODIUM 112 UG/1
112 TABLET ORAL
Status: DISCONTINUED | OUTPATIENT
Start: 2019-08-30 | End: 2019-09-03 | Stop reason: HOSPADM

## 2019-08-29 RX ORDER — HYDROXYZINE HYDROCHLORIDE 25 MG/1
25 TABLET, FILM COATED ORAL EVERY 4 HOURS PRN
Status: DISCONTINUED | OUTPATIENT
Start: 2019-08-29 | End: 2019-09-03 | Stop reason: HOSPADM

## 2019-08-29 RX ORDER — CLOZAPINE 100 MG/1
400 TABLET ORAL AT BEDTIME
Status: DISCONTINUED | OUTPATIENT
Start: 2019-08-29 | End: 2019-08-30

## 2019-08-29 RX ORDER — ALUMINA, MAGNESIA, AND SIMETHICONE 2400; 2400; 240 MG/30ML; MG/30ML; MG/30ML
30 SUSPENSION ORAL EVERY 4 HOURS PRN
Status: DISCONTINUED | OUTPATIENT
Start: 2019-08-29 | End: 2019-09-03 | Stop reason: HOSPADM

## 2019-08-29 RX ORDER — CLOZAPINE 100 MG/1
200 TABLET ORAL AT BEDTIME
Status: DISCONTINUED | OUTPATIENT
Start: 2019-08-29 | End: 2019-08-29

## 2019-08-29 RX ORDER — BISACODYL 10 MG
10 SUPPOSITORY, RECTAL RECTAL DAILY PRN
Status: DISCONTINUED | OUTPATIENT
Start: 2019-08-29 | End: 2019-09-03 | Stop reason: HOSPADM

## 2019-08-29 RX ADMIN — OLANZAPINE 10 MG: 10 TABLET, FILM COATED ORAL at 11:59

## 2019-08-29 RX ADMIN — NICOTINE POLACRILEX 4 MG: 2 GUM, CHEWING BUCCAL at 18:30

## 2019-08-29 RX ADMIN — NICOTINE POLACRILEX 4 MG: 2 GUM, CHEWING BUCCAL at 16:19

## 2019-08-29 RX ADMIN — TRAZODONE HYDROCHLORIDE 50 MG: 50 TABLET ORAL at 21:25

## 2019-08-29 RX ADMIN — NICOTINE POLACRILEX 4 MG: 2 GUM, CHEWING BUCCAL at 19:51

## 2019-08-29 RX ADMIN — OLANZAPINE 10 MG: 10 TABLET, FILM COATED ORAL at 16:49

## 2019-08-29 RX ADMIN — NICOTINE POLACRILEX 4 MG: 2 GUM, CHEWING BUCCAL at 21:24

## 2019-08-29 RX ADMIN — NICOTINE POLACRILEX 4 MG: 2 GUM, CHEWING BUCCAL at 17:24

## 2019-08-29 RX ADMIN — NICOTINE POLACRILEX 4 MG: 2 GUM, CHEWING BUCCAL at 13:49

## 2019-08-29 RX ADMIN — CLOZAPINE 400 MG: 100 TABLET ORAL at 21:23

## 2019-08-29 RX ADMIN — NICOTINE POLACRILEX 4 MG: 2 GUM, CHEWING BUCCAL at 14:53

## 2019-08-29 ASSESSMENT — MIFFLIN-ST. JEOR: SCORE: 2019.99

## 2019-08-29 NOTE — ED NOTES
"\"No one cares about me, I'm going to kill myself.\"  With further discussion patient clarified that his dad doesn't care about him and he lives in [place], [place]. States that his mom is in [place] and he hasn't seen her since he was 9. He misses her but when he talks to her she doesn't understand that he has mental health issues. States that if he goes home he will kill himself, he will burn himself. He has done this before but now he will be successful.\" Spoke with patient at length, reassuring him that we do care and want him to get well.  "

## 2019-08-29 NOTE — PLAN OF CARE
BEHAVIORAL TEAM DISCUSSION    Participants: Dr. Eze Zazueta MD, Shawna John RN, Derek Alejandre LPC  Progress: New Admit  Anticipated length of stay: 7 days, subject to change based on evaluation and assessment  Continued Stay Criteria/Rationale: Evaluation and assessment  Medical/Physical: None reported in team  Precautions:   Behavioral Orders   Procedures    Code 1 - Restrict to Unit    Routine Programming     As clinically indicated    Status 15     Every 15 minutes.    Suicide precautions     Patients on Suicide Precautions should have a Combination Diet ordered that includes a Diet selection(s) AND a Behavioral Tray selection for Safe Tray - with utensils, or Safe Tray - NO utensils       Plan: Continue to evaluate and assess  Rationale for change in precautions or plan: None

## 2019-08-29 NOTE — PROGRESS NOTES
"Patient verbalized to staff \" I feel like hurting myself.\" Patient was asked if he has a plan, patient stated, \" I'm thinking about banging my head on the wall\".  He stated what helps with these thoughts is \" my medication\". Patient was asked if he can contract for safety, he stated, \" Yes. I can probably control, it's not that severe. He verbalized that he feels most safe pacing the hallway. Patient was given headphones and PRN Zyprexa 10mg PO. [facility]. Continue to offer support, reassurance, and provide safe environment.   "

## 2019-08-29 NOTE — PHARMACY-ADMISSION MEDICATION HISTORY
Admission medication history for the August 29, 2019 admission is complete.     Interview sources: All medications verified via dispense report, .  Group home has not been responsive to sending over MAR and pharmacy was closed.  Need to confirm last dose of clozapine and paliperidone.     Reliability of source: N/A    Medication compliance: Unknown    Current Outpatient Pharmacy: [facility]    Changes made to PTA medication list (reason)  Added: Omeprazole (per dispense history)  Deleted: Hydroxyzine 50 mg tab Q4H PRN anxiety, melatonin 3 mg take 2 caps at bedtime, olanzapine 10 mg tab TID PRN self harm/aggitation, pantoprazole 20 mg EC tab daily, polyethylene glycol 34 g daily, trazodone 50 mg tablet PRN sleep (per no recent dispense history)  Changed: levothyroxine 25 mcg take 4.5 tabs by mouth daily --> levothyroxine 112 mcg take 1 tablet daily (per dispense history)    Additional medication history information:   -Pt stays in group home, unable to get ahold of to verify medications, therefore all medications were verified via pharmacy ([facility])    Addendum (8/30/2019):   Pharmacist spoke with Paloma ([phone]),  and Health Coordinator of [facility] who stated patient is adherent to medication, no recent missed doses.     Pharmacist verified all PTA medications with [facility] pharmacy. All medications refilled within the past month.     Clozapine 500mg PO HS: filled at [facility] ([phone])    Invega Sustenna 156mg IM injection [id number] filled at [facility] ([phone]) and administered at [facility] ([phone]). Last dose 8/20/2019 to left deltoid     All other PTA mediations in table below filled at [facility] location ([phone])  -------------------  Selina Vicente, Pharm.D., [facility]: [facility] *[phone]      Prior to Admission Medication List:  Prior to Admission Medications    Prescriptions Last Dose Informant Taking?   Vitamin D, Cholecalciferol, 1000 UNITS TABS Unknown Pharmacy No   Sig: Take 1,000 Units by mouth daily   benztropine (COGENTIN) 1 MG tablet Unknown Pharmacy No   Sig: Take 1 tablet (1 mg) by mouth 2 times daily   cloZAPine (CLOZARIL) 100 MG tablet Unknown Pharmacy No   Sig: Take 5 tablets (500 mg) by mouth At Bedtime   levothyroxine (SYNTHROID/LEVOTHROID) 112 MCG tablet Unknown Pharmacy No   Sig: Take 112 mcg by mouth daily   omeprazole (PRILOSEC) 20 MG DR capsule Unknown Pharmacy No   Sig: Take 20 mg by mouth daily   paliperidone (INVEGA SUSTENNA) 156 MG/ML SUSP injection   No   Sig: Inject 1 mL (156 mg) into the muscle once for 1 dose   prazosin (MINIPRESS) 1 MG capsule Unknown Pharmacy No   Sig: Take 3 capsules (3 mg) by mouth At Bedtime This product only available from a Compounding Pharmacy.   venlafaxine (EFFEXOR-ER) 75 MG TB24 24 hr tablet Unknown Pharmacy No   Sig: Take 3 tablets (225 mg) by mouth daily (with breakfast)      Facility-Administered Medications: None     Time spent: 20 minutes    Medication history completed by:   QUENTIN So (Pharmacy Intern)

## 2019-08-29 NOTE — PLAN OF CARE
Kamini is a 29-year old patient of [place] descent who called 911 earlier this evening reporting suicidal ideations with the thought to jump in front of a train in an attempt to kill himself. He has past diagnoses of Schizoaffective disorder, PTSD, and an unspecified Personality Disorder. According to patient, he works as a  at a facility where he thought he was doing a great job. But earlier tonight he was confronted by his boss who told him that another employee had reported the patient and stated that he was not doing a good job. Patient then got upset and  embarrassed  such that he wanted to kill himself.    Upon arrival to the unit, patient denied any present thought or intent to kill or harm himself, and requested to go to bed immediately. He was also unable to verify his current medication regimen except that he takes clozapine 500mg at bedtime and Cogentin 1mg; he told us to call his group home tomorrow for verification.     Admission profile was not completed due to patient's request to go to bed upon arrival to unit.  Will attempt t do so when patient is awake and alert.    He did not identify someone to notify of his admission except his group home

## 2019-08-29 NOTE — PROGRESS NOTES
08/29/19 1503   General Information   Has Not Attended OT as of: 08/29/19     Plan: OT staff will meet with pt to review the role of occupational therapy and explain the value of having them involved in their treatment plan including options to meet current needs/self-identified goals. As group attendance is established, Pt will be given self-assessment to inform OT initial assessment.

## 2019-08-29 NOTE — PROGRESS NOTES
08/29/19 1400   Behavioral Health   Hallucinations denies / not responding to hallucinations   Thinking poor concentration   Orientation person: oriented;place: oriented;date: oriented;time: oriented   Memory baseline memory   Insight insight appropriate to situation   Judgement impaired   Eye Contact at examiner   Affect blunted, flat   Mood depressed;anxious   Physical Appearance/Attire attire appropriate to age and situation   Psycho Education   Type of Intervention 1:1 intervention   Response participates, initiates socially appropriate   Hours 0.5   Treatment Detail check in with pt     Pt informed staff around noon that he was having thoughts of being better off dead and of hurting himself. Pt was given meds and seemed to calm down. Pt was checked in with two hours later and stated feeling better but that his thoughts of suicide were still there just not as severe.

## 2019-08-29 NOTE — ED PROVIDER NOTES
History     Chief Complaint   Patient presents with     Suicidal     reports was at work and a coworker whom he thought was his friend turned him in to his supervisor, has been feeling depressed since and is thinking of jumping in front of a train, denies plan     HPI  Kamini Neal is a 29 year old male with schizoaffective disorder who called 911 himself because he was thinking about hurting himself. He thought about jumping in front of a train.  He works as a  and felt he was doing a job.  A coworker complained about how he was doing his job.  His boss spoke to him about the complaint.  This was very upsetting to the patient.  He says he is embarrassed and upset and cannot show his face at work.  He says he wants to kill himself.  He cannot contract for safety.  He thought he was doing a good job.  He liked his job. He says this has happened his whole life.  He is followed by [facility] mental health but doesn't like it there because they are incompetent and mean.  He is on invega.  His last shot was 8/20 and his next is 9/11.  He lives in a group home in [facility].  He has a therapist he sees weekly.     I have reviewed the Medications, Allergies, Past Medical and Surgical History, and Social History in the Epic system.    Review of Systems   Psychiatric/Behavioral: Positive for dysphoric mood. Negative for hallucinations.   All other systems reviewed and are negative.      Physical Exam   BP: 130/84  Pulse: 79  Temp: 98.6  F (37  C)  Resp: 16  SpO2: 100 %      Physical Exam   Constitutional: He is oriented to person, place, and time. He appears well-developed and well-nourished. No distress.   HENT:   Head: Normocephalic and atraumatic.   Right Ear: External ear normal.   Left Ear: External ear normal.   Nose: Nose normal.   Eyes: EOM are normal.   Neck: Normal range of motion.   Cardiovascular: Normal rate.   Pulmonary/Chest: Effort normal.   Musculoskeletal: Normal range of motion.   Neurological: He  is alert and oriented to person, place, and time.   Skin: Skin is warm and dry. He is not diaphoretic.   Psychiatric: His speech is normal and behavior is normal. Judgment and thought content normal. He is not actively hallucinating. Cognition and memory are normal. He exhibits a depressed mood. He is attentive.   Nursing note and vitals reviewed.      ED Course        Procedures           Labs Ordered and Resulted from Time of ED Arrival Up to the Time of Departure from the ED   DRUG ABUSE SCREEN 6 CHEM DEP URINE ([facility])            Assessments & Plan (with Medical Decision Making)   The patient has schizoaffective disorder and personality disorder who presents to the ED via 911 due to feeling suicidal. He says he called 911 himself. His boss told his a coworker complained about how he was doing his job.  This was very upsetting for him and says he thought he was doing a good job.  He is feeling suicidal.  He thinks about jumping in front of a train.  He cannot contract for safety.  He will be admitted to inpatient mental health.  He agrees with admission.     I have reviewed the nursing notes.    I have reviewed the findings, diagnosis, plan and need for follow up with the patient.    New Prescriptions    No medications on file       Final diagnoses:   Schizoaffective disorder, unspecified type (H)   Suicidal ideation       8/28/2019   [facility], [place], EMERGENCY DEPARTMENT     Jena Balderas MD  08/28/19 2221       Jena Balderas MD  08/28/19 2222
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
## 2019-08-29 NOTE — PROGRESS NOTES
08/29/19 0227   Patient Belongings   Did you bring any home meds/supplements to the hospital?  No   Patient Belongings locker;sent to security per site process   Patient Belongings Put in Hospital Secure Location (Security or Locker, etc.) cash/credit card;clothing;cell phone/electronics;wallet;shoes;other (see comments)   Belongings Search Yes   Clothing Search Yes   Second Staff León HA & Lalo DOWNS     In Locker: Blue t-shirt, 1 pair khaki pants, 1 pair boxers/briefs, 1 pair socks, 1 pair teal/blue sneakers, brown belt, pack of cigarettes, lighter, work/visitor ID badge on keychain, gray iPhone.  Black wallet containing: [place] identification card w/ yellow papers, [employer] card.    Sent to Security: [employer] Visa debit (*[id number]), [place] EBT (*[id number]).    A               Admission:  I am responsible for any personal items that are not sent to the safe or pharmacy.  [facility] is not responsible for loss, theft or damage of any property in my possession.    Signature:  _________________________________ Date: _______  Time: _____                                              Staff Signature:  ____________________________ Date: ________  Time: _____      2nd Staff person, if patient is unable/unwilling to sign:    Signature: ________________________________ Date: ________  Time: _____     Discharge:  [facility] has returned all of my personal belongings:    Signature: _________________________________ Date: ________  Time: _____                                          Staff Signature:  ____________________________ Date: ________  Time: _____

## 2019-08-29 NOTE — ED TRIAGE NOTES
Suicidal, no plans, thoughts of self-harm, depressed and anxious, upset with his life, resides at group home, hx. Schizophrena, ptsd, bipolar

## 2019-08-30 LAB
ALBUMIN SERPL-MCNC: 3.5 G/DL (ref 3.4–5)
ALP SERPL-CCNC: 87 U/L (ref 40–150)
ALT SERPL W P-5'-P-CCNC: 18 U/L (ref 0–70)
ANION GAP SERPL CALCULATED.3IONS-SCNC: 6 MMOL/L (ref 3–14)
AST SERPL W P-5'-P-CCNC: 13 U/L (ref 0–45)
BASOPHILS # BLD AUTO: 0 10E9/L (ref 0–0.2)
BASOPHILS NFR BLD AUTO: 0.2 %
BILIRUB SERPL-MCNC: 0.4 MG/DL (ref 0.2–1.3)
BUN SERPL-MCNC: 10 MG/DL (ref 7–30)
CALCIUM SERPL-MCNC: 8.5 MG/DL (ref 8.5–10.1)
CHLORIDE SERPL-SCNC: 110 MMOL/L (ref 94–109)
CHOLEST SERPL-MCNC: 143 MG/DL
CO2 SERPL-SCNC: 27 MMOL/L (ref 20–32)
CREAT SERPL-MCNC: 0.7 MG/DL (ref 0.66–1.25)
DIFFERENTIAL METHOD BLD: NORMAL
EOSINOPHIL # BLD AUTO: 0 10E9/L (ref 0–0.7)
EOSINOPHIL NFR BLD AUTO: 0 %
ERYTHROCYTE [DISTWIDTH] IN BLOOD BY AUTOMATED COUNT: 12.6 % (ref 10–15)
GFR SERPL CREATININE-BSD FRML MDRD: >90 ML/MIN/{1.73_M2}
GLUCOSE SERPL-MCNC: 81 MG/DL (ref 70–99)
HCT VFR BLD AUTO: 41.4 % (ref 40–53)
HDLC SERPL-MCNC: 37 MG/DL
HGB BLD-MCNC: 14.2 G/DL (ref 13.3–17.7)
IMM GRANULOCYTES # BLD: 0 10E9/L (ref 0–0.4)
IMM GRANULOCYTES NFR BLD: 0.5 %
LDLC SERPL CALC-MCNC: 86 MG/DL
LYMPHOCYTES # BLD AUTO: 2.1 10E9/L (ref 0.8–5.3)
LYMPHOCYTES NFR BLD AUTO: 25.8 %
MCH RBC QN AUTO: 31.9 PG (ref 26.5–33)
MCHC RBC AUTO-ENTMCNC: 34.3 G/DL (ref 31.5–36.5)
MCV RBC AUTO: 93 FL (ref 78–100)
MONOCYTES # BLD AUTO: 0.7 10E9/L (ref 0–1.3)
MONOCYTES NFR BLD AUTO: 8.7 %
NEUTROPHILS # BLD AUTO: 5.2 10E9/L (ref 1.6–8.3)
NEUTROPHILS NFR BLD AUTO: 64.8 %
NONHDLC SERPL-MCNC: 106 MG/DL
NRBC # BLD AUTO: 0 10*3/UL
NRBC BLD AUTO-RTO: 0 /100
PLATELET # BLD AUTO: 206 10E9/L (ref 150–450)
POTASSIUM SERPL-SCNC: 3.6 MMOL/L (ref 3.4–5.3)
PROT SERPL-MCNC: 6.5 G/DL (ref 6.8–8.8)
RBC # BLD AUTO: 4.45 10E12/L (ref 4.4–5.9)
SODIUM SERPL-SCNC: 143 MMOL/L (ref 133–144)
TRIGL SERPL-MCNC: 98 MG/DL
TSH SERPL DL<=0.005 MIU/L-ACNC: 1.88 MU/L (ref 0.4–4)
WBC # BLD AUTO: 8.1 10E9/L (ref 4–11)

## 2019-08-30 PROCEDURE — 99231 SBSQ HOSP IP/OBS SF/LOW 25: CPT | Performed by: PSYCHIATRY & NEUROLOGY

## 2019-08-30 PROCEDURE — 80053 COMPREHEN METABOLIC PANEL: CPT | Performed by: NURSE PRACTITIONER

## 2019-08-30 PROCEDURE — 80061 LIPID PANEL: CPT | Performed by: NURSE PRACTITIONER

## 2019-08-30 PROCEDURE — H2032 ACTIVITY THERAPY, PER 15 MIN: HCPCS

## 2019-08-30 PROCEDURE — 36415 COLL VENOUS BLD VENIPUNCTURE: CPT | Performed by: NURSE PRACTITIONER

## 2019-08-30 PROCEDURE — 25000132 ZZH RX MED GY IP 250 OP 250 PS 637: Performed by: NURSE PRACTITIONER

## 2019-08-30 PROCEDURE — G0177 OPPS/PHP; TRAIN & EDUC SERV: HCPCS

## 2019-08-30 PROCEDURE — 25000132 ZZH RX MED GY IP 250 OP 250 PS 637: Performed by: PSYCHIATRY & NEUROLOGY

## 2019-08-30 PROCEDURE — 84443 ASSAY THYROID STIM HORMONE: CPT | Performed by: NURSE PRACTITIONER

## 2019-08-30 PROCEDURE — 85025 COMPLETE CBC W/AUTO DIFF WBC: CPT | Performed by: NURSE PRACTITIONER

## 2019-08-30 PROCEDURE — 12400001 ZZH R&B MH UMMC

## 2019-08-30 RX ORDER — CLOZAPINE 100 MG/1
500 TABLET ORAL AT BEDTIME
Status: DISCONTINUED | OUTPATIENT
Start: 2019-08-30 | End: 2019-09-03 | Stop reason: HOSPADM

## 2019-08-30 RX ADMIN — NICOTINE POLACRILEX 4 MG: 2 GUM, CHEWING BUCCAL at 19:53

## 2019-08-30 RX ADMIN — VENLAFAXINE HYDROCHLORIDE 225 MG: 225 TABLET, EXTENDED RELEASE ORAL at 11:40

## 2019-08-30 RX ADMIN — MAGNESIUM HYDROXIDE 30 ML: 400 SUSPENSION ORAL at 16:06

## 2019-08-30 RX ADMIN — LEVOTHYROXINE SODIUM 112 MCG: 112 TABLET ORAL at 11:40

## 2019-08-30 RX ADMIN — ALUMINUM HYDROXIDE, MAGNESIUM HYDROXIDE, AND DIMETHICONE 30 ML: 400; 400; 40 SUSPENSION ORAL at 18:11

## 2019-08-30 RX ADMIN — NICOTINE POLACRILEX 4 MG: 2 GUM, CHEWING BUCCAL at 13:20

## 2019-08-30 RX ADMIN — CLOZAPINE 500 MG: 100 TABLET ORAL at 20:16

## 2019-08-30 RX ADMIN — OLANZAPINE 10 MG: 10 TABLET, FILM COATED ORAL at 16:52

## 2019-08-30 RX ADMIN — NICOTINE POLACRILEX 4 MG: 2 GUM, CHEWING BUCCAL at 16:52

## 2019-08-30 RX ADMIN — TRAZODONE HYDROCHLORIDE 50 MG: 50 TABLET ORAL at 20:16

## 2019-08-30 RX ADMIN — NICOTINE POLACRILEX 4 MG: 2 GUM, CHEWING BUCCAL at 11:41

## 2019-08-30 RX ADMIN — NICOTINE POLACRILEX 4 MG: 2 GUM, CHEWING BUCCAL at 16:06

## 2019-08-30 RX ADMIN — NICOTINE POLACRILEX 4 MG: 2 GUM, CHEWING BUCCAL at 18:11

## 2019-08-30 RX ADMIN — NICOTINE POLACRILEX 4 MG: 2 GUM, CHEWING BUCCAL at 14:16

## 2019-08-30 ASSESSMENT — ACTIVITIES OF DAILY LIVING (ADL)
DRESS: SCRUBS (BEHAVIORAL HEALTH)
LAUNDRY: WITH SUPERVISION
ORAL_HYGIENE: INDEPENDENT
ORAL_HYGIENE: PROMPTS
DRESS: SCRUBS (BEHAVIORAL HEALTH)
HYGIENE/GROOMING: INDEPENDENT
HYGIENE/GROOMING: INDEPENDENT

## 2019-08-30 NOTE — PROGRESS NOTES
08/30/19 1418   General Information   Date Initially Attended OT 08/30/19     Groups Attended: OT Mental Health Management     Affect/Hygiene/Presentation: Calm/pleasant mood, engaged, flat affect. No apparent hygiene concerns.     Patient Performance/Response: Pt actively participated in a structured occupational therapy group with a focus on a visual-spatial leisure task. Pt was able to follow 2-step directions of the novel task, and demonstrated strategic planning and problem solving throughout task. Pt remained focused for full duration of group. Pt contributed ideas to a discussion regarding the importance of engaging in leisure activities for mental health and overall wellbeing.     Plan: More information needed to complete OT Initial Assessment; OT staff will meet with pt to review the role of occupational therapy and explain the value of having them involved in their treatment plan including options to meet current needs/self-identified goals. As group attendance is established, Pt will be given self-assessment to inform OT initial assessment.

## 2019-08-30 NOTE — PROGRESS NOTES
AL (writer) met with pt to complete his psychosocial assessment. Pt was calm and cooperative during the interview. Pt confirmed why he was admitted, but denies SI and endorsed reduced depression symptoms at time of interview. Pt expressed wanting to return to his group home and go back to work. Pt agreed to talk with his doctor about discharge planing and reported that he would like to discharge soon.

## 2019-08-30 NOTE — PROGRESS NOTES
08/30/19 1615   Group Therapy Session   Group Attendance attended group session   Total Time (minutes) 30   Group Type psychotherapeutic   Group Topic Covered other (see comments)   Patient Participation/Contribution cooperative with task;discussed personal experience with topic;listened actively   Patient participated in psychotherapy group which focused on personal resiliency by identifying individual strengths and positive coping skills.  Kamini was in a pleasant mood. He engaged in the activity and group processing.

## 2019-08-30 NOTE — PROGRESS NOTES
Patient present/visible in the milieu most of the evening.  Overall presentation - alert, pleasant, and cooperative.  Mood appeared mildly anxious at times but stable.  Affective range blunted, but brightened some on approach.  Psychomotor status mildly restless - lots of pacing noted.  Vervbalized thought content devoid of delusional references or overt psychotic distortions.  Thought form generally linear and organized.  No threatening or aggressive behaviors.  Peer interactions passive and non-contentious - generally on the periphery of group activities.  Open and responsive to staff inquiries and necessary interventions.  Currently endorsing mild to moderate depression and anxiety, but denying all other MH concerns, issues, and acuities including SI/SIB/HI, AH/VH's, and impairments of concentration and processing.  Sleep quality and energy level reported as good.  Appetite reported as getting better.  Focused on meeting with his doctor and CM tomorrow to continue working on his recovery and aftercare planning.   Kai Olmos   08/29/2019

## 2019-08-30 NOTE — PROGRESS NOTES
AL (writer) met with pt and discussed his therapist coming to visit. CTC clarified that his therapist could come as a visitor, but could not come to do therapy; this included that his therapist would need to come during normal visiting hours. Pt talked about discharge next week and confirmed that was his plan with the doctor. AL confirmed again that pt did not feel he needed additional services or resources at this time, but would check in about this again next week prior to pt discharge.
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
## 2019-08-30 NOTE — PLAN OF CARE
"Patient slept in till 11am, attempted to awake patient x3. He refused stating, \" No. I'm too tired right now.\" He was present in the Milieu and didn't attend  Groups today.  Patient was alert, calm, appropriate to the situation, flat affect and feeling hopeful. Patient's goal for today is \" to be positive and not to have negative thoughts\". He requested to call his mom, he stated, \" I want to call my mom, she's in [facility] and probably doesn't know I'm in the hospital.\"   Patient denies depression, anxiety, paranoid thoughts, psychotic symptoms, SI, SIB, hallucinations, and voices. Sleep quality and appetite are \"good\". He reported that his medications are effective, he stated, \" They are working as long as I stay sober\". He expressed concern that his medications are causing him \" to sleep a lot and drooling at night\". He was able to call his mother at the end of shift with staff supervision.   "

## 2019-08-30 NOTE — PROGRESS NOTES
Participated in Music Therapy group with focus on mood elevation, validation and decreasing anxiety and improved group cohesiveness. Engaged and cooperative in music listening interventions.  Stayed only briefly.
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
## 2019-08-31 LAB
CLOZAPINE AND METABOLITES TOTAL: 471 NG/ML
CLOZAPINE SERPL-MCNC: 260 NG/ML
CLOZAPINE-N-OXIDE QUANT: <100 NG/ML
NORCLOZAPINE SERPL-MCNC: 211 NG/ML

## 2019-08-31 PROCEDURE — 80159 DRUG ASSAY CLOZAPINE: CPT | Performed by: PSYCHIATRY & NEUROLOGY

## 2019-08-31 PROCEDURE — 25000132 ZZH RX MED GY IP 250 OP 250 PS 637: Performed by: NURSE PRACTITIONER

## 2019-08-31 PROCEDURE — 36415 COLL VENOUS BLD VENIPUNCTURE: CPT | Performed by: PSYCHIATRY & NEUROLOGY

## 2019-08-31 PROCEDURE — 25000132 ZZH RX MED GY IP 250 OP 250 PS 637: Performed by: PSYCHIATRY & NEUROLOGY

## 2019-08-31 PROCEDURE — 12400001 ZZH R&B MH UMMC

## 2019-08-31 RX ADMIN — NICOTINE POLACRILEX 4 MG: 2 GUM, CHEWING BUCCAL at 17:49

## 2019-08-31 RX ADMIN — TRAZODONE HYDROCHLORIDE 50 MG: 50 TABLET ORAL at 20:13

## 2019-08-31 RX ADMIN — NICOTINE POLACRILEX 4 MG: 2 GUM, CHEWING BUCCAL at 19:22

## 2019-08-31 RX ADMIN — VENLAFAXINE HYDROCHLORIDE 225 MG: 225 TABLET, EXTENDED RELEASE ORAL at 08:54

## 2019-08-31 RX ADMIN — NICOTINE POLACRILEX 4 MG: 2 GUM, CHEWING BUCCAL at 20:13

## 2019-08-31 RX ADMIN — NICOTINE POLACRILEX 4 MG: 2 GUM, CHEWING BUCCAL at 12:57

## 2019-08-31 RX ADMIN — NICOTINE POLACRILEX 4 MG: 2 GUM, CHEWING BUCCAL at 11:29

## 2019-08-31 RX ADMIN — CLOZAPINE 500 MG: 100 TABLET ORAL at 20:13

## 2019-08-31 RX ADMIN — HYDROXYZINE HYDROCHLORIDE 25 MG: 25 TABLET, FILM COATED ORAL at 20:13

## 2019-08-31 RX ADMIN — LEVOTHYROXINE SODIUM 112 MCG: 112 TABLET ORAL at 08:54

## 2019-08-31 RX ADMIN — NICOTINE POLACRILEX 4 MG: 2 GUM, CHEWING BUCCAL at 16:24

## 2019-08-31 ASSESSMENT — ACTIVITIES OF DAILY LIVING (ADL)
DRESS: STREET CLOTHES;INDEPENDENT
ORAL_HYGIENE: INDEPENDENT
HYGIENE/GROOMING: INDEPENDENT
HYGIENE/GROOMING: INDEPENDENT
ORAL_HYGIENE: INDEPENDENT
DRESS: SCRUBS (BEHAVIORAL HEALTH)
LAUNDRY: UNABLE TO COMPLETE

## 2019-08-31 NOTE — PROGRESS NOTES
Pt had a good day socializing with other patients. He reported having great concentration. Sleep and food are good. Pt stated that his medication makes him sleep a lot which he hates. He had a calm mood but flat affect. He denies all SI/SIB/HI/AH. Pt only concern at this time is to stay behavioral free so he can leave the hospital on time.       08/31/19 1520   Behavioral Health   Hallucinations denies / not responding to hallucinations   Thinking intact   Orientation date: oriented;place: oriented;person: oriented;time: oriented   Memory baseline memory   Insight insight appropriate to situation;insight appropriate to events   Judgement intact   Eye Contact at examiner   Affect blunted, flat   Mood mood is calm   Physical Appearance/Attire appears stated age;attire appropriate to age and situation   1. Wish to be Dead (Past Month) No   2. Non-Specific Active Suicidal Thoughts (Past Month) No   Speech clear   Medication Sensitivity no stated side effects;no observed side effects   Activities of Daily Living   Hygiene/Grooming independent   Oral Hygiene independent   Dress scrubs (behavioral health)   Laundry unable to complete   Room Organization unable

## 2019-08-31 NOTE — PLAN OF CARE
Patient this afternoon expressed he was having SI but did not have a plan. He is contracted for safety. Mood is tense, sad and hopeless. Affect is depressed. Patient decided to attend afternoon group to self regulated. After group, patient expressed he is feeling agitated and anxious. Patient received zyprexa 10 mg.  Patient after an hour, verbalized that he is no longer feeling suicidal - denies SI/SIB. He went on to elaborate how proud the is of his progress regarding his life and mental health. Patient is hopeful for the future and has plans to go back to school. Patient complaining of constipation - last BM was 8/29/19. He received Milk of Magnesia PRN and later Mylaanta PRN. Patient is compliant with medications. He denies AH/VH, pain anxiety and depression tonight. Will continue to monitor.

## 2019-09-01 LAB
CLOZAPINE AND METABOLITES TOTAL: 916 NG/ML
CLOZAPINE SERPL-MCNC: 601 NG/ML
CLOZAPINE-N-OXIDE QUANT: <100 NG/ML
NORCLOZAPINE SERPL-MCNC: 315 NG/ML

## 2019-09-01 PROCEDURE — 12400001 ZZH R&B MH UMMC

## 2019-09-01 PROCEDURE — 25000132 ZZH RX MED GY IP 250 OP 250 PS 637: Performed by: NURSE PRACTITIONER

## 2019-09-01 PROCEDURE — 25000132 ZZH RX MED GY IP 250 OP 250 PS 637: Performed by: PSYCHIATRY & NEUROLOGY

## 2019-09-01 RX ADMIN — NICOTINE POLACRILEX 4 MG: 2 GUM, CHEWING BUCCAL at 11:58

## 2019-09-01 RX ADMIN — TRAZODONE HYDROCHLORIDE 50 MG: 50 TABLET ORAL at 18:36

## 2019-09-01 RX ADMIN — NICOTINE POLACRILEX 4 MG: 2 GUM, CHEWING BUCCAL at 17:24

## 2019-09-01 RX ADMIN — NICOTINE POLACRILEX 4 MG: 2 GUM, CHEWING BUCCAL at 20:22

## 2019-09-01 RX ADMIN — LEVOTHYROXINE SODIUM 112 MCG: 112 TABLET ORAL at 10:33

## 2019-09-01 RX ADMIN — NICOTINE POLACRILEX 4 MG: 2 GUM, CHEWING BUCCAL at 13:41

## 2019-09-01 RX ADMIN — NICOTINE POLACRILEX 4 MG: 2 GUM, CHEWING BUCCAL at 10:33

## 2019-09-01 RX ADMIN — NICOTINE POLACRILEX 4 MG: 2 GUM, CHEWING BUCCAL at 16:05

## 2019-09-01 RX ADMIN — VENLAFAXINE HYDROCHLORIDE 225 MG: 225 TABLET, EXTENDED RELEASE ORAL at 10:32

## 2019-09-01 RX ADMIN — NICOTINE POLACRILEX 4 MG: 2 GUM, CHEWING BUCCAL at 18:32

## 2019-09-01 RX ADMIN — CLOZAPINE 500 MG: 100 TABLET ORAL at 18:36

## 2019-09-01 ASSESSMENT — ACTIVITIES OF DAILY LIVING (ADL)
DRESS: INDEPENDENT
LAUNDRY: WITH SUPERVISION
ORAL_HYGIENE: INDEPENDENT
HYGIENE/GROOMING: INDEPENDENT
ORAL_HYGIENE: INDEPENDENT
HYGIENE/GROOMING: INDEPENDENT
LAUNDRY: WITH SUPERVISION
DRESS: INDEPENDENT

## 2019-09-01 ASSESSMENT — MIFFLIN-ST. JEOR: SCORE: 2006.38

## 2019-09-01 NOTE — PROGRESS NOTES
Patient reported auditory hallucination of people talking about him. The voices were also commanding to hurt himself. Patient took his dose of clozapine and was given a dose of hydroxyzine for anxiety. He reported that the high dose of clozapine makes him more drowsy and sedated. Patient did not appear sedated.

## 2019-09-01 NOTE — PROGRESS NOTES
Patient visible/present in the milieu about half the shift, spending the remainder in his room resting and napping.  Peer interactions generally passive but respectful and appropriate.  No observed program involvement.  Overall presentation quiet and withdrawn, but alert, pleasant, and attentive to his surroundings.  Mood observed as generally calm and stable.  Affect was full-range and congruent with mood.  Psychomotor status a bit restless. Speech normal pressure.  No aggressive, threatening, or SIB comments or behaviors.  Reported concerns about oversedation on his Clozaril dosage.  Currently denying all MH concerns, issues, and acuities, including depression, anxiety, SI/SIB, delusions, hallucinations, and attentional/processing/concentration impairments.  Stated that his appetite was good.  Is focused on his impending discharge back to his group home next Tuesday.  Wants to talk to his doctor about a letter of support for him to take the rest of this coming week post-discharge off from his job to readjust and re-acclimate.    Kai Olmos   08/31/2019 08/31/19 2136   Sleep/Rest/Relaxation   Day/Evening Time Hours napping;resting in bed   Cognitive   Orientation oriented x 4   Follows Commands yes   Speech clear;spontaneous;logical   Best Language 0 - No aphasia   Mood/Behavior calm;cooperative;behavior appropriate to situation   Behavioral Health   Hallucinations denies / not responding to hallucinations   Thinking intact   Orientation person: oriented;place: oriented;date: oriented   Memory baseline memory   Insight admits / accepts;insight appropriate to situation   Judgement intact   Eye Contact at examiner   Affect full range affect   Mood mood is calm   Physical Appearance/Attire appears stated age;attire appropriate to age and situation;neat   Hygiene well groomed   Suicidality other (see comments)  (denied SI)   1. Wish to be Dead (Past Month) No   2. Non-Specific Active Suicidal Thoughts (Past  Month) No   Self Injury other (see comment)  (denied SIB urges)   Elopement   (no indicators)   Activity withdrawn   Speech clear;coherent   Psychomotor / Gait balanced;steady   Overt Aggression Scale   Verbal Aggression 0   Aggression against Property 0   Auto-Aggression 0   Physical Aggression 0   Overt Aggression Total Score 0   Psycho Education   Type of Intervention 1:1 intervention   Response participates with encouragement   Hours 0.5   Treatment Detail current MH status   Safety   Suicidality Status 15   Violence Risk   Feels Like Hurting Others no   Daily Care   Activity up ad eddie   Activities of Daily Living   Hygiene/Grooming independent   Oral Hygiene independent   Dress street clothes;independent

## 2019-09-01 NOTE — PLAN OF CARE
Nursing assessment completed; assessed mood, anxiety, thoughts, and behavior. No significant change in clinical presentation this period: patient appears to be at or close to baseline with no report of acute psychotic symptoms; he is socially engaged with staff and peers; also takes his medications as ordered and reports no adverse side effects.    Denies hallucinations, denies any thought or intent to harm self or others; also denies depression and anxiety both of which he rated at 0 on a scale where 10 is the worst case of clinical presentation. Affect is full range and responsive, speech is clear and coherent with no tangents and he appears appropriate for condition/situation, good hygiene and grooming. Thought process appears to be rational and focused on current treatment activities, insight is fair and judgment intact. No threatening or other aggressive behaviors this period. Will continue to assess per protocol. Refer to daily notes for implementation of individualized treatment plan.

## 2019-09-01 NOTE — PLAN OF CARE
RN Assessment: Patient requested to take his bedtime medication early. Reported feeling tired and wanted to go sleep. Denied SI/SIB/Hallucinations. His affect was bright. He was more engaging and social with peers and staff. Patient did not appear drowsy or sedated. Denied having constipation and other side effects to clozapine. Patient followed directions from staff. Did not require R&S to manage his behavior or maintain safety in the unit. Staff will continue to monitor.

## 2019-09-01 NOTE — PLAN OF CARE
Illness Management Recovery model:  Self-Reflection & Planning.    Assessed patient's progress completing forms related to Illness Management Recovery (including Personal Plan of Care, Adult Coping Plan, and My Support and Coping Plan) and assisted as needed.    Encouraged patient to continue to consider triggers, wellness strategies, early warning signs, feedback from others, actions to take to prevent relapse, and coping strategies as part of a plan to remain well after leaving the hospital.

## 2019-09-02 VITALS
HEIGHT: 70 IN | SYSTOLIC BLOOD PRESSURE: 115 MMHG | OXYGEN SATURATION: 100 % | BODY MASS INDEX: 32.35 KG/M2 | TEMPERATURE: 99 F | WEIGHT: 226 LBS | RESPIRATION RATE: 16 BRPM | DIASTOLIC BLOOD PRESSURE: 83 MMHG | HEART RATE: 103 BPM

## 2019-09-02 PROCEDURE — 25000132 ZZH RX MED GY IP 250 OP 250 PS 637: Performed by: NURSE PRACTITIONER

## 2019-09-02 PROCEDURE — G0177 OPPS/PHP; TRAIN & EDUC SERV: HCPCS

## 2019-09-02 PROCEDURE — 25000132 ZZH RX MED GY IP 250 OP 250 PS 637: Performed by: PSYCHIATRY & NEUROLOGY

## 2019-09-02 PROCEDURE — 90853 GROUP PSYCHOTHERAPY: CPT

## 2019-09-02 PROCEDURE — 12400001 ZZH R&B MH UMMC

## 2019-09-02 RX ORDER — POLYETHYLENE GLYCOL 3350 17 G/17G
17 POWDER, FOR SOLUTION ORAL 2 TIMES DAILY PRN
Status: DISCONTINUED | OUTPATIENT
Start: 2019-09-02 | End: 2019-09-03 | Stop reason: HOSPADM

## 2019-09-02 RX ADMIN — VENLAFAXINE HYDROCHLORIDE 225 MG: 225 TABLET, EXTENDED RELEASE ORAL at 08:51

## 2019-09-02 RX ADMIN — POLYETHYLENE GLYCOL 3350 17 G: 17 POWDER, FOR SOLUTION ORAL at 11:31

## 2019-09-02 RX ADMIN — NICOTINE POLACRILEX 4 MG: 2 GUM, CHEWING BUCCAL at 10:06

## 2019-09-02 RX ADMIN — NICOTINE POLACRILEX 4 MG: 2 GUM, CHEWING BUCCAL at 17:00

## 2019-09-02 RX ADMIN — ALUMINUM HYDROXIDE, MAGNESIUM HYDROXIDE, AND DIMETHICONE 30 ML: 400; 400; 40 SUSPENSION ORAL at 16:08

## 2019-09-02 RX ADMIN — NICOTINE POLACRILEX 4 MG: 2 GUM, CHEWING BUCCAL at 14:01

## 2019-09-02 RX ADMIN — TRAZODONE HYDROCHLORIDE 50 MG: 50 TABLET ORAL at 20:35

## 2019-09-02 RX ADMIN — LEVOTHYROXINE SODIUM 112 MCG: 112 TABLET ORAL at 08:51

## 2019-09-02 RX ADMIN — ALUMINUM HYDROXIDE, MAGNESIUM HYDROXIDE, AND DIMETHICONE 30 ML: 400; 400; 40 SUSPENSION ORAL at 21:17

## 2019-09-02 RX ADMIN — ALUMINUM HYDROXIDE, MAGNESIUM HYDROXIDE, AND DIMETHICONE 30 ML: 400; 400; 40 SUSPENSION ORAL at 08:53

## 2019-09-02 RX ADMIN — NICOTINE POLACRILEX 4 MG: 2 GUM, CHEWING BUCCAL at 15:17

## 2019-09-02 RX ADMIN — CLOZAPINE 500 MG: 100 TABLET ORAL at 20:35

## 2019-09-02 RX ADMIN — NICOTINE POLACRILEX 4 MG: 2 GUM, CHEWING BUCCAL at 12:29

## 2019-09-02 RX ADMIN — NICOTINE POLACRILEX 4 MG: 2 GUM, CHEWING BUCCAL at 20:35

## 2019-09-02 RX ADMIN — NICOTINE POLACRILEX 4 MG: 2 GUM, CHEWING BUCCAL at 18:59

## 2019-09-02 RX ADMIN — NICOTINE POLACRILEX 4 MG: 2 GUM, CHEWING BUCCAL at 08:53

## 2019-09-02 ASSESSMENT — ACTIVITIES OF DAILY LIVING (ADL)
DRESS: SCRUBS (BEHAVIORAL HEALTH)
HYGIENE/GROOMING: SHOWER;INDEPENDENT;PROMPTS
ORAL_HYGIENE: INDEPENDENT;PROMPTS
LAUNDRY: WITH SUPERVISION

## 2019-09-02 NOTE — PROGRESS NOTES
"   09/02/19 1600   Group Therapy Session   Group Attendance attended group session   Total Time (minutes) 30   Group Type psychotherapeutic   Group Topic Covered other (see comments)   Patient Participation/Contribution cooperative with task;discussed personal experience with topic   Psychotherapy goal: Self-reflection through the use of the \"[facility]\" activity.    Kamini reported feeling happy. He was in a pleasant mood and participated in the activity. He shared his personal reflections with the group. He left before hearing others' responses or process as a group.  "

## 2019-09-02 NOTE — PROGRESS NOTES
"Pt. Said he is hopeful and denies dep. And anxiety. Said he has no SI, or SIB. Pt said \"It is a good day and I am feeling hopeful.\" pt said he will leave tomorrow     09/02/19 1300   Sleep/Rest/Relaxation   Sleep/Rest/Relaxation no problem identified   Behavioral Health   Hallucinations denies / not responding to hallucinations   Thinking intact   Orientation time: oriented;date: oriented;place: oriented;person: oriented   Memory baseline memory   Insight insight appropriate to situation   Judgement intact   Eye Contact at examiner   Affect full range affect   Mood mood is calm   Physical Appearance/Attire untidy   Hygiene neglected grooming - unclean body, hair, teeth   Suicidality other (see comments)  (denies)   Self Injury other (see comment)  (denies)   Elopement   (no)   Activity other (see comment)  (went to group and stayed in lounge)   Speech clear;coherent   Psychomotor / Gait balanced;steady   Overt Aggression Scale   Verbal Aggression 0   Aggression against Property 0   Auto-Aggression 0   Physical Aggression 0   Overt Aggression Total Score 0   Coping/Psychosocial   Verbalized Emotional State hopefulness;acceptance;happiness;powerlessness   Psycho Education   Type of Intervention 1:1 intervention   Response participates with cues/redirection   Hours 0.5   Treatment Detail   (staying on meds)   Safety   Suicidality Status 15   Activities of Daily Living   Hygiene/Grooming shower;independent;prompts   Oral Hygiene independent;prompts   Dress scrubs (behavioral health)   Laundry with supervision   Room Organization prompts     "

## 2019-09-02 NOTE — PROGRESS NOTES
Unable to finish admission profile.  Pt stated he has not had a BM in 4 days and requested Miralax.  States he usually uses some every couple days. PRN miralax ordered and given to pt. Encouraged prune juice, which he refused and encouraged pt to drink water, which he states he does.

## 2019-09-03 PROCEDURE — 25000132 ZZH RX MED GY IP 250 OP 250 PS 637: Performed by: PSYCHIATRY & NEUROLOGY

## 2019-09-03 RX ADMIN — LEVOTHYROXINE SODIUM 112 MCG: 112 TABLET ORAL at 10:22

## 2019-09-03 RX ADMIN — VENLAFAXINE HYDROCHLORIDE 225 MG: 225 TABLET, EXTENDED RELEASE ORAL at 10:22

## 2019-09-03 RX ADMIN — NICOTINE POLACRILEX 4 MG: 2 GUM, CHEWING BUCCAL at 10:22

## 2019-09-03 RX ADMIN — NICOTINE POLACRILEX 4 MG: 2 GUM, CHEWING BUCCAL at 11:11

## 2019-09-03 NOTE — DISCHARGE INSTRUCTIONS
Behavioral Discharge Planning and Instructions      Summary:  You were admitted on 8/28/2019  due to Suicidal Ideations.  You were treated by Dr Eze Zazueta MD and discharged on 09/03/2019 from [facility] to Group Home      Principal Diagnosis:   Schizoaffective disorder, unspecified type     Health Care Follow-up Appointments:   As discussed with your treatment team, you declined any additional aftercare appointments and reported not needing any additional resources. Your group home was contacted to inform them that you would be returning 9/3/19 and to confirm that you did not need any meds sent home/refilled either, as per your consent.     Attend all scheduled appointments with your outpatient providers. Call at least 24 hours in advance if you need to reschedule an appointment to ensure continued access to your outpatient providers.   Major Treatments, Procedures and Findings:  You were provided with: a psychiatric assessment, assessed for medical stability, medication evaluation and/or management, group therapy and milieu management    Symptoms to Report: mood getting worse or thoughts of suicide    Early warning signs can include: increased depression or anxiety increased thoughts or behaviors of suicide or self-harm     Safety and Wellness:  Take all medicines as directed.  Make no changes unless your doctor suggests them.      Follow treatment recommendations.  Refrain from alcohol and non-prescribed drugs.  If there is a concern for safety, call 911.    Resources:   Crisis Intervention: 332.566.6408 or 426-526-9739 (TTY: 754.429.1283).  Call anytime for help.  National Mobile on Mental Illness (www.[place].jorge a.org): 697.783.6827 or 474-556-9328.  [place] Association for Children's Mental Health (www.macmh.org): 708.755.4644.  Suicide Awareness Voices of Education (SAVE) (www.save.org): 777-167-JFAI (4193)  National Suicide Prevention Line (www.mentalhealthmn.org): 814-988-FSPD (4830)  Mental Health  "Consumer/Survivor Network of [place] (www.mhcsn.net): 221-790-8008 or 545-657-8359  Mental Health Association of [place] (www.mentalhealth.org): 800.915.2993 or 040-838-2688  [facility] Crisis (COPE) Response - Adult 603 083-0842  Crisis text line: Text \"[place]\" to 451953. Free, confidential, 24/7.      The treatment team has appreciated the opportunity to work with you.     If you have any questions or concerns our unit number is [phone].    "

## 2019-09-03 NOTE — PROGRESS NOTES
09/02/19 1600   Occupational Therapy   Type of Intervention structured groups   Response Initiates, socially acceptable   Hours 1

## 2019-09-03 NOTE — PLAN OF CARE
Pt was escorted off the unit to cab. Pt was returning to his group home. Pt verbalized understanding of discharge plan including discharge medications and follow up appointments.  Pt denied SI and SIB. Pt left with personal belongings. Calm and polite.

## 2019-09-03 NOTE — PROGRESS NOTES
patient spent the first few hours of the shift visible in the lounge, retiring shortly after supper to bed. No issues reported nor observed this shift, with patient appearing to hold a bright affect.

## 2019-09-03 NOTE — PROGRESS NOTES
AL (writer) met with pt briefly to confirm that pt did not need any additional aftercare appointments or resources. Pt gave verbal consent to contact his group home to inform them that he was returning home and to confirm that he didn't need any meds set up prior to discharge. Pt confirmed that he is ready to discharge.
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
## 2019-12-11 NOTE — PLAN OF CARE
"Problem: Depressive Symptoms  Goal: Depressive Symptoms  Signs and symptoms of listed problems will be absent or manageable.  Outcome: No Change  Earlier this morning met with patient who had indicated was having thoughts to bang head, has a history of doing this in the past.  Indicates is feeling more depressed, rating this at an 8 on a scale with 10 being the worst.  Rates anxiety at a 4 on the same scale.  Indicates focus and concentration are \"OK\".  Endorses racing and ruminating thoughts.  States took a while to fall asleep last night as didn't take Melatonin, but slept after fell asleep.  Refused medications last evening and this morning.  When asked why indicated didn't think needed them.  See's a psychiatrist and a therapist outpatient and plans to continue to work with them after discharge.  Was asked why quit his job yesterday replied \"I don't need it.\" Refused medications last evening and this morning stating \"I don't need them.\"  After meeting with this author was given 10 mgs of Zyprexa po.  About 15 minutes later approached this staff again indicating had thoughts to bang head, specifically in the mirror was walking by.  Turned with staff to walk to the lounge and passed a metal plate on the wall when banged head against this twice.  Was walked by 3 staff down to room, hit head once more on door jam to room.  Was assisted to lying position on bed to be placed in 5 point restraints.   Did resist this when restraints were applied. Remained in restraints for an hour and a half.  Was placed on SIO one to one staffing after released from restraints at 1255. No open area, redness or swelling noted on forehead.  States has a slight headache.  Tylenol 650 mgs given. Was again stating had thoughts to bang head, was given 50 mgs of Vistaril at 1330.  After lunch is went back to bed and is presently resting in bed.      " December 11, 2019    Rima Allred  [address] [place] [id number]    Dear Angel Abarca cares about your health and your health plan.  I have reviewed your medical conditions, medication list and lab results, and am making recommendations based on this review to better manage your health.    You are in particular need of attention regarding:  -Cervical Cancer Screening  -Wellness (Physical) Visit     I am recommending that you:     -schedule a WELLNESS (Physical) APPOINTMENT with me.       -schedule a PAP SMEAR EXAM which is due.  Please disregard this reminder if you have had this exam elsewhere within the last year.  It would be helpful for us to have a copy of your recent pap smear report in our file so that we can best coordinate your care.    If you are under/uninsured, we recommend you contact the [facility]. They offer pap smears at no charge or on a sliding fee charge. You can schedule with them at [phone]. Please have them send us the results.      Please call us at the [place] location:  [phone] or use [employer] to address the above recommendations.     Thank you for trusting [facility].  We appreciate the opportunity to serve you and look forward to supporting your healthcare in the future.    If you have (or plan to have) any of these tests done at a facility other than a [facility] or a [facility], please have the results sent to the [facility] location noted above.      Best Regards,    Joyce Stock MD

## 2019-12-20 ENCOUNTER — HOSPITAL ENCOUNTER (EMERGENCY)
Facility: CLINIC | Age: 29
Discharge: HOME | End: 2019-12-20
Attending: EMERGENCY MEDICINE | Admitting: EMERGENCY MEDICINE
Payer: COMMERCIAL

## 2019-12-20 ENCOUNTER — APPOINTMENT (OUTPATIENT)
Dept: GENERAL RADIOLOGY | Facility: CLINIC | Age: 29
End: 2019-12-20
Attending: EMERGENCY MEDICINE
Payer: COMMERCIAL

## 2019-12-20 VITALS
DIASTOLIC BLOOD PRESSURE: 87 MMHG | HEART RATE: 82 BPM | TEMPERATURE: 98.7 F | SYSTOLIC BLOOD PRESSURE: 124 MMHG | RESPIRATION RATE: 16 BRPM | BODY MASS INDEX: 33 KG/M2 | OXYGEN SATURATION: 99 % | WEIGHT: 230 LBS

## 2019-12-20 DIAGNOSIS — R07.89 ATYPICAL CHEST PAIN: ICD-10-CM

## 2019-12-20 DIAGNOSIS — F17.210 CIGARETTE SMOKER: ICD-10-CM

## 2019-12-20 LAB — TROPONIN I BLD-MCNC: 0.01 UG/L (ref 0–0.08)

## 2019-12-20 PROCEDURE — 99285 EMERGENCY DEPT VISIT HI MDM: CPT | Mod: 25 | Performed by: EMERGENCY MEDICINE

## 2019-12-20 PROCEDURE — 93010 ELECTROCARDIOGRAM REPORT: CPT | Mod: Z6 | Performed by: EMERGENCY MEDICINE

## 2019-12-20 PROCEDURE — 71046 X-RAY EXAM CHEST 2 VIEWS: CPT

## 2019-12-20 PROCEDURE — 99285 EMERGENCY DEPT VISIT HI MDM: CPT | Mod: 25

## 2019-12-20 PROCEDURE — 93005 ELECTROCARDIOGRAM TRACING: CPT

## 2019-12-20 PROCEDURE — 84484 ASSAY OF TROPONIN QUANT: CPT

## 2019-12-20 ASSESSMENT — ENCOUNTER SYMPTOMS
PALPITATIONS: 0
SHORTNESS OF BREATH: 0
CHILLS: 0
NAUSEA: 0
COUGH: 0
FEVER: 0
SORE THROAT: 0

## 2019-12-20 NOTE — ED AVS SNAPSHOT
[facility], Emergency Department  [address] [place] [address]  Phone:  [phone]  Fax:  [phone]                                    Kamini Neal   MRN: [id number]    Department:  [facility], Emergency Department   Date of Visit:  12/20/2019           After Visit Summary Signature Page    I have received my discharge instructions, and my questions have been answered. I have discussed any challenges I see with this plan with the nurse or doctor.    ..........................................................................................................................................  Patient/Patient Representative Signature      ..........................................................................................................................................  Patient Representative Print Name and Relationship to Patient    ..................................................               ................................................  Date                                   Time    ..........................................................................................................................................  Reviewed by Signature/Title    ...................................................              ..............................................  Date                                               Time          22EPIC Rev 08/18

## 2019-12-21 NOTE — ED PROVIDER NOTES
History     Chief Complaint   Patient presents with     Chest Pain     HPI  Kamini Neal is a 29 year old male who presents to the emergency department today complaining of left-sided chest pain that has been present intermittently for about a week.  That he notices it fleetingly, for about a second at a time.  It happens randomly perhaps 10 or 12 times throughout the day.  This is been going on for about 1 week.  No shortness of breath, no cough.  No fever.  It does not seem to be worse with movement of the left shoulder.  Does not seem to be worse with deep breaths.  It does seem to be worse with getting up and moving around though not necessarily with exertion per se.  He works as a  and does a lot of lifting as well as mopping and sweeping.  He has never had this before.  No recent long travel, no leg pain or leg swelling, no thromboembolic risk factors.  He is a smoker.    Patient lives in a group home and has a history of schizoaffective disorder.    Past Medical History:   Diagnosis Date     Anxiety      Depressive disorder      Schizo affective schizophrenia (H)        Past Surgical History:   Procedure Laterality Date     TONSILLECTOMY         Family History   Problem Relation Age of Onset     Mental Illness Maternal Uncle      Mental Illness Maternal Aunt      Glaucoma No family hx of      Macular Degeneration No family hx of        Social History     Tobacco Use     Smoking status: Current Every Day Smoker     Packs/day: 1.00     Smokeless tobacco: Never Used   Substance Use Topics     Alcohol use: No         I have reviewed the Medications, Allergies, Past Medical and Surgical History, and Social History in the Epic system.    Review of Systems   Constitutional: Negative for chills and fever.   HENT: Negative for sore throat.    Respiratory: Negative for cough and shortness of breath.    Cardiovascular: Positive for chest pain. Negative for palpitations and leg swelling.   Gastrointestinal:  Negative for nausea.   All other systems reviewed and are negative.      Physical Exam   BP: 131/89  Pulse: 94  Heart Rate: 85  Temp: 98.7  F (37.1  C)  Resp: 18  Weight: 104.3 kg (230 lb)  SpO2: 100 %      Physical Exam  Vitals signs and nursing note reviewed.   Constitutional:       Appearance: He is well-developed.      Comments: Well appearing male, alert, cooperative, NAD   HENT:      Head: Normocephalic.   Eyes:      Pupils: Pupils are equal, round, and reactive to light.   Cardiovascular:      Rate and Rhythm: Normal rate and regular rhythm.      Heart sounds: Normal heart sounds. No murmur. No gallop.    Pulmonary:      Effort: Pulmonary effort is normal. No respiratory distress.      Breath sounds: Normal breath sounds. No wheezing or rales.   Chest:      Chest wall: No tenderness.   Abdominal:      General: Bowel sounds are normal. There is no distension.      Palpations: Abdomen is soft.      Tenderness: There is no abdominal tenderness. There is no guarding or rebound.   Skin:     General: Skin is warm and dry.   Neurological:      Mental Status: He is alert and oriented to person, place, and time.   Psychiatric:         Behavior: Behavior normal.         ED Course        Procedures             EKG Interpretation:      Interpreted by Tammie Barrios MD  Time reviewed: 2140  Symptoms at time of EKG: none currently   Rhythm: normal sinus   Rate: normal  Axis: normal  Ectopy: none  Conduction: normal  ST Segments/ T Waves: No ST-T wave changes  Q Waves: none  Comparison to prior: Unchanged    Clinical Impression: normal EKG          Critical Care time:  none             Results for orders placed or performed during the hospital encounter of 12/20/19   Troponin POCT     Status: None   Result Value Ref Range    Troponin I 0.01 0.00 - 0.08 ug/L              Assessments & Plan (with Medical Decision Making)   This is a previously healthy 29-year-old male who presents to the emergency department today  complaining of chest pain.  Pain is intermittent and fleeting, lasts a second and then resolves.  Does not appear to be classic for cardiac or ACS.  Could be muscular as he works as a  and has been sweeping/mopping and lifting.  Rather doubt pneumothorax.  GERD or anxiety possible. Hasn't tried anything for this. PERC rule negative.    EKG WNL without any sign of ischemia. I-stat trop is negative. CXR clear.    Suspect musculoskeletal at this point.  Would recommend Tylenol or ibuprofen.  Offered ibuprofen here in the emergency department and the patient declined stating he had this at home.  Advised him to use this, follow-up with clinic next week if not improving.  Patient verbalizes understanding.    I have reviewed the nursing notes.    I have reviewed the findings, diagnosis, plan and need for follow up with the patient.    Discharge Medication List as of 12/20/2019 11:36 PM          Final diagnoses:   Atypical chest pain       12/20/2019   [facility], [place], EMERGENCY DEPARTMENT     Tammie Barrios MD  12/21/19 0001

## 2019-12-21 NOTE — ED TRIAGE NOTES
Pt states a one wk hx of CP in the middle to left of his chest.  Pt states he told his  staff about it and they told him to come to the ED.

## 2019-12-21 NOTE — DISCHARGE INSTRUCTIONS
You have been seen in the emergency department today for chest pain.  Your chest x-ray, blood work, and EKG are normal.  This is likely due to muscle pain.  We recommend that you use Tylenol or ibuprofen as needed for discomfort.  Follow-up with your primary clinic as needed.

## 2019-12-22 LAB — INTERPRETATION ECG - MUSE: NORMAL

## 2020-05-11 ENCOUNTER — HOSPITAL ENCOUNTER (EMERGENCY)
Facility: CLINIC | Age: 30
Discharge: HOME | End: 2020-05-11
Attending: EMERGENCY MEDICINE | Admitting: EMERGENCY MEDICINE
Payer: COMMERCIAL

## 2020-05-11 ENCOUNTER — APPOINTMENT (OUTPATIENT)
Dept: CT IMAGING | Facility: CLINIC | Age: 30
End: 2020-05-11
Attending: EMERGENCY MEDICINE
Payer: COMMERCIAL

## 2020-05-11 VITALS
SYSTOLIC BLOOD PRESSURE: 108 MMHG | WEIGHT: 222 LBS | BODY MASS INDEX: 31.85 KG/M2 | DIASTOLIC BLOOD PRESSURE: 71 MMHG | OXYGEN SATURATION: 100 % | TEMPERATURE: 99.1 F | HEART RATE: 87 BPM | RESPIRATION RATE: 16 BRPM

## 2020-05-11 DIAGNOSIS — F17.210 CIGARETTE SMOKER: ICD-10-CM

## 2020-05-11 DIAGNOSIS — K05.219 ABSCESS OF UPPER GINGIVA: ICD-10-CM

## 2020-05-11 PROCEDURE — 25000128 H RX IP 250 OP 636: Performed by: EMERGENCY MEDICINE

## 2020-05-11 PROCEDURE — 25000125 ZZHC RX 250: Performed by: EMERGENCY MEDICINE

## 2020-05-11 PROCEDURE — 70487 CT MAXILLOFACIAL W/DYE: CPT

## 2020-05-11 PROCEDURE — 99284 EMERGENCY DEPT VISIT MOD MDM: CPT | Mod: 25

## 2020-05-11 PROCEDURE — 99284 EMERGENCY DEPT VISIT MOD MDM: CPT | Mod: Z6 | Performed by: EMERGENCY MEDICINE

## 2020-05-11 PROCEDURE — 41800 DRAINAGE OF GUM LESION: CPT

## 2020-05-11 RX ORDER — CLINDAMYCIN HCL 300 MG
300 CAPSULE ORAL 4 TIMES DAILY
Qty: 40 CAPSULE | Refills: 0 | Status: SHIPPED | OUTPATIENT
Start: 2020-05-11 | End: 2020-05-18

## 2020-05-11 RX ORDER — IOPAMIDOL 755 MG/ML
100 INJECTION, SOLUTION INTRAVASCULAR ONCE
Status: COMPLETED | OUTPATIENT
Start: 2020-05-11 | End: 2020-05-11

## 2020-05-11 RX ORDER — DOCUSATE SODIUM 100 MG/1
100 CAPSULE, LIQUID FILLED ORAL 2 TIMES DAILY
Status: ON HOLD | COMMUNITY
End: 2024-07-02

## 2020-05-11 RX ORDER — OLANZAPINE 10 MG/1
10 TABLET ORAL AT BEDTIME
Status: ON HOLD | COMMUNITY
End: 2020-12-06

## 2020-05-11 RX ADMIN — SODIUM CHLORIDE 50 ML: 9 INJECTION, SOLUTION INTRAVENOUS at 12:11

## 2020-05-11 RX ADMIN — IOPAMIDOL 80 ML: 755 INJECTION, SOLUTION INTRAVENOUS at 12:10

## 2020-05-11 ASSESSMENT — ENCOUNTER SYMPTOMS
NAUSEA: 0
SHORTNESS OF BREATH: 0
VOMITING: 0
ABDOMINAL PAIN: 0
DYSURIA: 0
NECK PAIN: 0
SORE THROAT: 0
FEVER: 0
RHINORRHEA: 0
FACIAL SWELLING: 1
DIARRHEA: 0
COUGH: 0

## 2020-05-11 NOTE — ED PROVIDER NOTES
[facility] EMERGENCY DEPARTMENT ([facility])    5/11/20        History     Chief Complaint   Patient presents with     Facial Swelling     right sided facial swelling and pain for two days, reports started out as a toothache on that side     The history is provided by the patient and medical records.     Kamini Neal is a 29 year old male with a past medical history significant for schizoaffective disorder, anxiety and depression who presents here to the Emergency Department due to right sided facial swelling. Patient reports that the has been having upper right sided tooth pain for the past x1 month. Patient states that over the past x2 days he began having right sided facial swelling. Patient states that he has a dentist but has been unable to get in due to the COVID-19 pandemic. Patient denies fevers, sore throat, cough, rhinorrhea, neck pain, chest pain, shortness of breath, abdominal pain, nausea, vomiting or diarrhea. Patient denies known COVID-19 exposure.  Patient's dental clinic is [facility] dent[facility]    I have reviewed the Medications, Allergies, Past Medical and Surgical History, and Social History in the [employer] system.  PAST MEDICAL HISTORY:   Past Medical History:   Diagnosis Date     Anxiety      Depressive disorder      Schizo affective schizophrenia (H)        PAST SURGICAL HISTORY:   Past Surgical History:   Procedure Laterality Date     TONSILLECTOMY         Past medical history, past surgical history, medications, and allergies were reviewed with the patient. Additional pertinent items: None    FAMILY HISTORY:   Family History   Problem Relation Age of Onset     Mental Illness Maternal Uncle      Mental Illness Maternal Aunt      Glaucoma No family hx of      Macular Degeneration No family hx of        SOCIAL HISTORY:   Social History     Tobacco Use     Smoking status: Current Every Day Smoker     Packs/day: 0.50     Smokeless tobacco: Never Used   Substance Use Topics      Alcohol use: No     Social history was reviewed with the patient. Additional pertinent items: None      Discharge Medication List as of 5/11/2020  1:40 PM      START taking these medications    Details   clindamycin (CLEOCIN) 300 MG capsule Take 1 capsule (300 mg) by mouth 4 times daily for 7 days, Disp-40 capsule,R-0, E-Prescribe         CONTINUE these medications which have NOT CHANGED    Details   docusate sodium (COLACE) 100 MG capsule Take 100 mg by mouth daily, Historical      levothyroxine (SYNTHROID/LEVOTHROID) 112 MCG tablet Take 112 mcg by mouth daily, Historical      OLANZapine (ZYPREXA) 10 MG tablet Take 10 mg by mouth At Bedtime, Historical      omeprazole (PRILOSEC) 20 MG DR capsule Take 20 mg by mouth daily, Historical      paliperidone (INVEGA SUSTENNA) 156 MG/ML SUSP injection Inject 1 mL (156 mg) into the muscle once for 1 dose, Disp-1 mL,R-0, E-PrescribeDose was increased from 117 mg. Next dose will be due on 5/10.      prazosin (MINIPRESS) 1 MG capsule Take 3 capsules (3 mg) by mouth At Bedtime This product only available from a Compounding Pharmacy., Disp-90 capsule, R-0, E-Prescribe      venlafaxine (EFFEXOR-ER) 75 MG TB24 24 hr tablet Take 3 tablets (225 mg) by mouth daily (with breakfast), Disp-90 each, R-0, E-Prescribe      Vitamin D, Cholecalciferol, 1000 UNITS TABS Take 1,000 Units by mouth daily, Disp-30 tablet, R-0, E-Prescribe      benztropine (COGENTIN) 1 MG tablet Take 1 tablet (1 mg) by mouth 2 times daily, Disp-60 tablet, R-0, E-Prescribe      cloZAPine (CLOZARIL) 100 MG tablet Take 5 tablets (500 mg) by mouth At Bedtime, No Print Out                Allergies   Allergen Reactions     Haldol [Haloperidol]      Other reaction(s): Tardive Dyskinesia  Torticollis  Torticollis        Review of Systems   Constitutional: Negative for fever.   HENT: Positive for dental problem (Right upper) and facial swelling (Right sided). Negative for rhinorrhea and sore throat.    Respiratory: Negative  for cough and shortness of breath.    Gastrointestinal: Negative for abdominal pain, diarrhea, nausea and vomiting.   Genitourinary: Negative for dysuria.   Musculoskeletal: Negative for neck pain.   Skin: Negative for rash.   All other systems reviewed and are negative.      Physical Exam   BP: 108/71  Pulse: 87  Temp: 99.1  F (37.3  C)  Resp: 16  Weight: 100.7 kg (222 lb)  SpO2: 100 %      Physical Exam  GEN:  Well developed, no acute distress  HEENT:  EOMI, Mucous membranes are moist.  There is tenderness of the right maxillary first molar tooth with associated gingival swelling and fluctuance. Posterior pharynx is normal.  There is right sided facial swelling,mostly over the right zygoma area.   Musculoskeletal:  normal range of motion, no lower extremity swelling or calf tenderness  Neuro:  Alert and oriented X3, Follows commands, moving all extremities spontaneously   Skin:  Warm, dry    ED Course   10:53 AM  The patient was seen and examined by Annie Cheema MD in Room ED02.        Procedures           I tried calling the patient's dentist and got an answering machine.  I then asked for dentistry consult in the ED.  The dental resident did an I &D of the abscess in the ED with successful drainage of purulent material. Patient was advised to follow up with his dentist as soon as possible.  The [facility] dental clinic is not yet reopening after the COVID shutdown. Some private dental clinics are re-opening today, so he will check again with his dental clinic.     Labs Ordered and Resulted from Time of ED Arrival Up to the Time of Departure from the ED - No data to display       Assessments & Plan (with Medical Decision Making)   Gingival abscess with facial swelling.  Drained by dentist in the ED.  He was prescribed Clinda for 7 days.  Advised to follow up with dentist if possible and return to the ED if worsening symptoms or other concerns.     I have reviewed the nursing notes.    I have reviewed  the findings, diagnosis, plan and need for follow up with the patient.    Discharge Medication List as of 5/11/2020  1:40 PM      START taking these medications    Details   clindamycin (CLEOCIN) 300 MG capsule Take 1 capsule (300 mg) by mouth 4 times daily for 7 days, Disp-40 capsule,R-0, E-Prescribe             Final diagnoses:   Abscess of upper gingiva     IAlton, am serving as a trained medical scribe to document services personally performed by Annie Cheema MD, based on the provider's statements to me.   IAnnie MD, was physically present and have reviewed and verified the accuracy of this note documented by Alton Conner.    5/11/2020   [facility], [place], EMERGENCY DEPARTMENT     Annie Cheema MD  05/11/20 [id number]

## 2020-05-11 NOTE — ED AVS SNAPSHOT
[facility], [place], Emergency Department  [address]  [facility] [place] [address]  Phone:  [phone]  Fax:  [phone]                                    Kamini Neal   MRN: [id number]    Department:  [facility], Emergency Department   Date of Visit:  5/11/2020           After Visit Summary Signature Page    I have received my discharge instructions, and my questions have been answered. I have discussed any challenges I see with this plan with the nurse or doctor.    ..........................................................................................................................................  Patient/Patient Representative Signature      ..........................................................................................................................................  Patient Representative Print Name and Relationship to Patient    ..................................................               ................................................  Date                                   Time    ..........................................................................................................................................  Reviewed by Signature/Title    ...................................................              ..............................................  Date                                               Time          22EPIC Rev 08/18

## 2020-05-11 NOTE — CONSULTS
"Dental Service Consultation        Kaimni Neal MRN# [id number]   YOB: 1990 Age: 29 year old   Date of Admission: 5/11/2020     Reason for consult: I was asked by Dr. Deni GTZ to evaluate this patient for right sided facial swelling and dental abscess.           Assessment and Plan:   Assessment:   PARL adjacent to tooth #4 upper right maxillary second premolar consistent with acute abscess and buccal vestibular abscess.    Plan:  Confirmed anesthesia with 1 ml 2% lidocaine/1:100,000 epinephrine and 1 ml 0.5% marcaine/1:100,000 epinephrine.  Completed incision and drainage of right maxillary buccal vestibular abscess, confirmed approximately 1 ml production from site. Irrigated with saline rinse.   Recommend patient discharge with Clindamycin 300 mg QID 7 days for resolution of acute infection.  Recommended to patient to see primary dental provider for evaluation of dentition and possible root canal or extraction.       Chief Complaint:   \"Um it started on Friday and I tried to get in to see my dentist but they're closed. The swelling got bigger over the weekend.\"    History is obtained from the patient         History of Present Illness:   This patient is a 29 year old male who presents to [facility] ED with right maxillary buccal vestibular swelling starting 2-3 days ago. Patient has had pain on upper right for about 1 month but was unable to see his dentist due to COVID-19 pandemic.               Past Medical History:     Past Medical History:   Diagnosis Date     Anxiety      Depressive disorder      Schizo affective schizophrenia (H)              Past Surgical History:     Past Surgical History:   Procedure Laterality Date     TONSILLECTOMY                 Social History:     Social History     Tobacco Use     Smoking status: Current Every Day Smoker     Packs/day: 0.50     Smokeless tobacco: Never Used   Substance Use Topics     Alcohol use: No             Family History:     Family " History   Problem Relation Age of Onset     Mental Illness Maternal Uncle      Mental Illness Maternal Aunt      Glaucoma No family hx of      Macular Degeneration No family hx of              Immunizations:     Immunization History   Administered Date(s) Administered     Influenza Vaccine IM > 6 months Valent IIV4 09/26/2017             Allergies:     Allergies   Allergen Reactions     Haldol [Haloperidol]      Other reaction(s): Tardive Dyskinesia  Torticollis  Torticollis             Medications:     No current Epic-ordered facility-administered medications on file.      Current Outpatient Medications Ordered in Epic   Medication     clindamycin (CLEOCIN) 300 MG capsule     docusate sodium (COLACE) 100 MG capsule     levothyroxine (SYNTHROID/LEVOTHROID) 112 MCG tablet     OLANZapine (ZYPREXA) 10 MG tablet     omeprazole (PRILOSEC) 20 MG DR capsule     paliperidone (INVEGA SUSTENNA) 156 MG/ML SUSP injection     prazosin (MINIPRESS) 1 MG capsule     venlafaxine (EFFEXOR-ER) 75 MG TB24 24 hr tablet     Vitamin D, Cholecalciferol, 1000 UNITS TABS     benztropine (COGENTIN) 1 MG tablet     cloZAPine (CLOZARIL) 100 MG tablet             Review of Systems:   The 10 point Review of Systems is negative other than noted in the HPI            Physical Exam:   Vitals were reviewed  Temp: 99.1  F (37.3  C) Temp src: Oral BP: 108/71 Pulse: 87   Resp: 16 SpO2: 100 % O2 Device: None (Room air)      Head and neck exam:  No swellings, lesions, or masses present. Bilateral maxillary sinus and submandibular regions normal. Normal opening. No erythema.    Intraoral exam:  Approximately 20x10 buccal vestibular swelling regions #3 to #5. Generalized dental caries.       Data:   Radiographic interpretation: Dental CT w/o contrast taken on 5/11/2020  Osseous pathology: none detected  Pulpal Pathology: PARL approximating #4  Periodontal Pathology: none detected  Caries: Generalized dental caries  Odontogenic pathology: PARL approximating  #4    The patient was discussed with:   JA Tejada DDS  PGY1  Pager: [phone]

## 2020-12-05 ENCOUNTER — TELEPHONE (OUTPATIENT)
Dept: BEHAVIORAL HEALTH | Facility: CLINIC | Age: 30
End: 2020-12-05

## 2020-12-05 ENCOUNTER — HOSPITAL ENCOUNTER (INPATIENT)
Facility: CLINIC | Age: 30
LOS: 5 days | Discharge: NURSING, REHAB OR LONG-TERM CARE | End: 2020-12-11
Attending: PSYCHIATRY & NEUROLOGY | Admitting: PSYCHIATRY & NEUROLOGY
Payer: COMMERCIAL

## 2020-12-05 DIAGNOSIS — F43.10 PTSD (POST-TRAUMATIC STRESS DISORDER): ICD-10-CM

## 2020-12-05 DIAGNOSIS — F25.9 SCHIZOAFFECTIVE DISORDER, UNSPECIFIED TYPE (H): ICD-10-CM

## 2020-12-05 DIAGNOSIS — K59.03 DRUG-INDUCED CONSTIPATION: Primary | ICD-10-CM

## 2020-12-05 DIAGNOSIS — Z20.828 CONTACT WITH AND (SUSPECTED) EXPOSURE TO OTHER VIRAL COMMUNICABLE DISEASES: ICD-10-CM

## 2020-12-05 DIAGNOSIS — F25.0 SCHIZOAFFECTIVE DISORDER, BIPOLAR TYPE (H): ICD-10-CM

## 2020-12-05 DIAGNOSIS — T14.91XA SUICIDAL BEHAVIOR WITH ATTEMPTED SELF-INJURY (H): ICD-10-CM

## 2020-12-05 DIAGNOSIS — F60.3 BORDERLINE PERSONALITY DISORDER (H): ICD-10-CM

## 2020-12-05 PROCEDURE — 90791 PSYCH DIAGNOSTIC EVALUATION: CPT

## 2020-12-05 PROCEDURE — 36415 COLL VENOUS BLD VENIPUNCTURE: CPT | Performed by: PSYCHIATRY & NEUROLOGY

## 2020-12-05 PROCEDURE — 99285 EMERGENCY DEPT VISIT HI MDM: CPT | Mod: 25 | Performed by: PSYCHIATRY & NEUROLOGY

## 2020-12-05 PROCEDURE — 99284 EMERGENCY DEPT VISIT MOD MDM: CPT | Performed by: PSYCHIATRY & NEUROLOGY

## 2020-12-05 PROCEDURE — 250N000013 HC RX MED GY IP 250 OP 250 PS 637: Performed by: PSYCHIATRY & NEUROLOGY

## 2020-12-05 PROCEDURE — 84443 ASSAY THYROID STIM HORMONE: CPT | Performed by: PSYCHIATRY & NEUROLOGY

## 2020-12-05 PROCEDURE — U0003 INFECTIOUS AGENT DETECTION BY NUCLEIC ACID (DNA OR RNA); SEVERE ACUTE RESPIRATORY SYNDROME CORONAVIRUS 2 (SARS-COV-2) (CORONAVIRUS DISEASE [COVID-19]), AMPLIFIED PROBE TECHNIQUE, MAKING USE OF HIGH THROUGHPUT TECHNOLOGIES AS DESCRIBED BY CMS-2020-01-R: HCPCS | Performed by: PSYCHIATRY & NEUROLOGY

## 2020-12-05 PROCEDURE — 80307 DRUG TEST PRSMV CHEM ANLYZR: CPT | Performed by: PSYCHIATRY & NEUROLOGY

## 2020-12-05 PROCEDURE — 80053 COMPREHEN METABOLIC PANEL: CPT | Performed by: PSYCHIATRY & NEUROLOGY

## 2020-12-05 PROCEDURE — 85025 COMPLETE CBC W/AUTO DIFF WBC: CPT | Performed by: PSYCHIATRY & NEUROLOGY

## 2020-12-05 PROCEDURE — 80320 DRUG SCREEN QUANTALCOHOLS: CPT | Performed by: PSYCHIATRY & NEUROLOGY

## 2020-12-05 PROCEDURE — C9803 HOPD COVID-19 SPEC COLLECT: HCPCS | Performed by: PSYCHIATRY & NEUROLOGY

## 2020-12-05 PROCEDURE — 250N000013 HC RX MED GY IP 250 OP 250 PS 637: Performed by: EMERGENCY MEDICINE

## 2020-12-05 RX ORDER — OLANZAPINE 10 MG/1
10 TABLET, ORALLY DISINTEGRATING ORAL ONCE
Status: COMPLETED | OUTPATIENT
Start: 2020-12-05 | End: 2020-12-05

## 2020-12-05 RX ORDER — OLANZAPINE 5 MG/1
5 TABLET, ORALLY DISINTEGRATING ORAL ONCE
Status: COMPLETED | OUTPATIENT
Start: 2020-12-05 | End: 2020-12-05

## 2020-12-05 RX ADMIN — OLANZAPINE 10 MG: 10 TABLET, ORALLY DISINTEGRATING ORAL at 23:15

## 2020-12-05 RX ADMIN — OLANZAPINE 5 MG: 5 TABLET, ORALLY DISINTEGRATING ORAL at 20:38

## 2020-12-05 ASSESSMENT — ENCOUNTER SYMPTOMS
EYES NEGATIVE: 1
CONSTITUTIONAL NEGATIVE: 1
HYPERACTIVE: 0
MUSCULOSKELETAL NEGATIVE: 1
GASTROINTESTINAL NEGATIVE: 1
HALLUCINATIONS: 0
RESPIRATORY NEGATIVE: 1
NEUROLOGICAL NEGATIVE: 1
CARDIOVASCULAR NEGATIVE: 1

## 2020-12-06 ENCOUNTER — TELEPHONE (OUTPATIENT)
Dept: BEHAVIORAL HEALTH | Facility: CLINIC | Age: 30
End: 2020-12-06

## 2020-12-06 LAB
ALBUMIN SERPL-MCNC: 3.9 G/DL (ref 3.4–5)
ALP SERPL-CCNC: 127 U/L (ref 40–150)
ALT SERPL W P-5'-P-CCNC: 23 U/L (ref 0–70)
ANION GAP SERPL CALCULATED.3IONS-SCNC: 3 MMOL/L (ref 3–14)
AST SERPL W P-5'-P-CCNC: 12 U/L (ref 0–45)
BASOPHILS # BLD AUTO: 0 10E9/L (ref 0–0.2)
BASOPHILS NFR BLD AUTO: 0.4 %
BILIRUB SERPL-MCNC: 0.4 MG/DL (ref 0.2–1.3)
BUN SERPL-MCNC: 9 MG/DL (ref 7–30)
CALCIUM SERPL-MCNC: 8.5 MG/DL (ref 8.5–10.1)
CHLORIDE SERPL-SCNC: 110 MMOL/L (ref 94–109)
CO2 SERPL-SCNC: 27 MMOL/L (ref 20–32)
CREAT SERPL-MCNC: 0.72 MG/DL (ref 0.66–1.25)
DIFFERENTIAL METHOD BLD: NORMAL
EOSINOPHIL # BLD AUTO: 0 10E9/L (ref 0–0.7)
EOSINOPHIL NFR BLD AUTO: 0.1 %
ERYTHROCYTE [DISTWIDTH] IN BLOOD BY AUTOMATED COUNT: 12.7 % (ref 10–15)
GFR SERPL CREATININE-BSD FRML MDRD: >90 ML/MIN/{1.73_M2}
GLUCOSE SERPL-MCNC: 124 MG/DL (ref 70–99)
HCT VFR BLD AUTO: 42.1 % (ref 40–53)
HGB BLD-MCNC: 14.4 G/DL (ref 13.3–17.7)
IMM GRANULOCYTES # BLD: 0.1 10E9/L (ref 0–0.4)
IMM GRANULOCYTES NFR BLD: 1.2 %
LABORATORY COMMENT REPORT: NORMAL
LYMPHOCYTES # BLD AUTO: 2.6 10E9/L (ref 0.8–5.3)
LYMPHOCYTES NFR BLD AUTO: 27.9 %
MCH RBC QN AUTO: 32.3 PG (ref 26.5–33)
MCHC RBC AUTO-ENTMCNC: 34.2 G/DL (ref 31.5–36.5)
MCV RBC AUTO: 94 FL (ref 78–100)
MONOCYTES # BLD AUTO: 0.7 10E9/L (ref 0–1.3)
MONOCYTES NFR BLD AUTO: 7.6 %
NEUTROPHILS # BLD AUTO: 5.9 10E9/L (ref 1.6–8.3)
NEUTROPHILS NFR BLD AUTO: 62.8 %
NRBC # BLD AUTO: 0 10*3/UL
NRBC BLD AUTO-RTO: 0 /100
PLATELET # BLD AUTO: 234 10E9/L (ref 150–450)
POTASSIUM SERPL-SCNC: 3.8 MMOL/L (ref 3.4–5.3)
PROT SERPL-MCNC: 7.1 G/DL (ref 6.8–8.8)
RBC # BLD AUTO: 4.46 10E12/L (ref 4.4–5.9)
SARS-COV-2 RNA SPEC QL NAA+PROBE: NEGATIVE
SARS-COV-2 RNA SPEC QL NAA+PROBE: NORMAL
SODIUM SERPL-SCNC: 140 MMOL/L (ref 133–144)
SPECIMEN SOURCE: NORMAL
SPECIMEN SOURCE: NORMAL
TSH SERPL DL<=0.005 MIU/L-ACNC: 3.79 MU/L (ref 0.4–4)
WBC # BLD AUTO: 9.4 10E9/L (ref 4–11)

## 2020-12-06 PROCEDURE — 250N000013 HC RX MED GY IP 250 OP 250 PS 637: Performed by: NURSE PRACTITIONER

## 2020-12-06 PROCEDURE — 99223 1ST HOSP IP/OBS HIGH 75: CPT | Mod: 95 | Performed by: NURSE PRACTITIONER

## 2020-12-06 PROCEDURE — 124N000002 HC R&B MH UMMC

## 2020-12-06 PROCEDURE — 250N000013 HC RX MED GY IP 250 OP 250 PS 637: Performed by: PSYCHIATRY & NEUROLOGY

## 2020-12-06 RX ORDER — OLANZAPINE 20 MG/1
10 TABLET ORAL 3 TIMES DAILY PRN
Status: ON HOLD | COMMUNITY
End: 2020-12-10

## 2020-12-06 RX ORDER — ACETAMINOPHEN 325 MG/1
650 TABLET ORAL EVERY 4 HOURS PRN
Status: DISCONTINUED | OUTPATIENT
Start: 2020-12-06 | End: 2020-12-11 | Stop reason: HOSPADM

## 2020-12-06 RX ORDER — HYDROXYZINE HYDROCHLORIDE 25 MG/1
25 TABLET, FILM COATED ORAL EVERY 4 HOURS PRN
Status: DISCONTINUED | OUTPATIENT
Start: 2020-12-06 | End: 2020-12-07

## 2020-12-06 RX ORDER — AMOXICILLIN 250 MG
1 CAPSULE ORAL 2 TIMES DAILY PRN
Status: DISCONTINUED | OUTPATIENT
Start: 2020-12-06 | End: 2020-12-11 | Stop reason: HOSPADM

## 2020-12-06 RX ORDER — CLOZAPINE 100 MG/1
400 TABLET ORAL AT BEDTIME
Status: ON HOLD | COMMUNITY
End: 2021-12-07

## 2020-12-06 RX ORDER — LEVOTHYROXINE SODIUM 112 UG/1
112 TABLET ORAL DAILY
Status: DISCONTINUED | OUTPATIENT
Start: 2020-12-06 | End: 2020-12-11 | Stop reason: HOSPADM

## 2020-12-06 RX ORDER — CLOZAPINE 25 MG/1
50 TABLET ORAL AT BEDTIME
Status: ON HOLD | COMMUNITY
End: 2021-12-07

## 2020-12-06 RX ORDER — OLANZAPINE 10 MG/1
10 TABLET ORAL 3 TIMES DAILY PRN
Status: DISCONTINUED | OUTPATIENT
Start: 2020-12-06 | End: 2020-12-10

## 2020-12-06 RX ORDER — DOCUSATE SODIUM 100 MG/1
100 CAPSULE, LIQUID FILLED ORAL 2 TIMES DAILY
Status: DISCONTINUED | OUTPATIENT
Start: 2020-12-06 | End: 2020-12-11 | Stop reason: HOSPADM

## 2020-12-06 RX ORDER — MAGNESIUM HYDROXIDE/ALUMINUM HYDROXICE/SIMETHICONE 120; 1200; 1200 MG/30ML; MG/30ML; MG/30ML
30 SUSPENSION ORAL EVERY 4 HOURS PRN
Status: DISCONTINUED | OUTPATIENT
Start: 2020-12-06 | End: 2020-12-11 | Stop reason: HOSPADM

## 2020-12-06 RX ORDER — OLANZAPINE 10 MG/2ML
10 INJECTION, POWDER, FOR SOLUTION INTRAMUSCULAR 3 TIMES DAILY PRN
Status: DISCONTINUED | OUTPATIENT
Start: 2020-12-06 | End: 2020-12-10

## 2020-12-06 RX ORDER — TRAZODONE HYDROCHLORIDE 50 MG/1
50 TABLET, FILM COATED ORAL
Status: DISCONTINUED | OUTPATIENT
Start: 2020-12-06 | End: 2020-12-11 | Stop reason: HOSPADM

## 2020-12-06 RX ORDER — BENZTROPINE MESYLATE 1 MG/1
1 TABLET ORAL 2 TIMES DAILY
Status: DISCONTINUED | OUTPATIENT
Start: 2020-12-06 | End: 2020-12-11 | Stop reason: HOSPADM

## 2020-12-06 RX ADMIN — BENZTROPINE MESYLATE 1 MG: 1 TABLET ORAL at 20:25

## 2020-12-06 RX ADMIN — LEVOTHYROXINE SODIUM 112 MCG: 112 TABLET ORAL at 15:16

## 2020-12-06 RX ADMIN — NICOTINE POLACRILEX 4 MG: 2 GUM, CHEWING BUCCAL at 17:54

## 2020-12-06 RX ADMIN — OLANZAPINE 10 MG: 10 TABLET, FILM COATED ORAL at 11:52

## 2020-12-06 RX ADMIN — CLOZAPINE 450 MG: 100 TABLET ORAL at 20:26

## 2020-12-06 RX ADMIN — DOCUSATE SODIUM 100 MG: 100 CAPSULE, LIQUID FILLED ORAL at 20:25

## 2020-12-06 RX ADMIN — PRAZOSIN HYDROCHLORIDE 3 MG: 2 CAPSULE ORAL at 20:26

## 2020-12-06 RX ADMIN — NICOTINE POLACRILEX 4 MG: 2 GUM, CHEWING BUCCAL at 11:52

## 2020-12-06 RX ADMIN — NICOTINE POLACRILEX 2 MG: 2 GUM, CHEWING BUCCAL at 00:38

## 2020-12-06 RX ADMIN — ACETAMINOPHEN 650 MG: 325 TABLET, FILM COATED ORAL at 15:16

## 2020-12-06 RX ADMIN — NICOTINE POLACRILEX 4 MG: 2 GUM, CHEWING BUCCAL at 17:01

## 2020-12-06 RX ADMIN — NICOTINE POLACRILEX 4 MG: 2 GUM, CHEWING BUCCAL at 12:57

## 2020-12-06 RX ADMIN — HYDROXYZINE HYDROCHLORIDE 25 MG: 25 TABLET, FILM COATED ORAL at 14:03

## 2020-12-06 RX ADMIN — OLANZAPINE 10 MG: 10 TABLET, FILM COATED ORAL at 14:02

## 2020-12-06 RX ADMIN — OMEPRAZOLE 20 MG: 20 CAPSULE, DELAYED RELEASE ORAL at 15:16

## 2020-12-06 RX ADMIN — VENLAFAXINE HYDROCHLORIDE 225 MG: 150 TABLET, EXTENDED RELEASE ORAL at 15:16

## 2020-12-06 ASSESSMENT — ACTIVITIES OF DAILY LIVING (ADL)
HYGIENE/GROOMING: HANDWASHING;INDEPENDENT;PROMPTS
DRESS: SCRUBS (BEHAVIORAL HEALTH);INDEPENDENT
LAUNDRY: UNABLE TO COMPLETE
DRESS: SCRUBS (BEHAVIORAL HEALTH);INDEPENDENT
LAUNDRY: UNABLE TO COMPLETE
HYGIENE/GROOMING: HANDWASHING;INDEPENDENT
ORAL_HYGIENE: INDEPENDENT
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
## 2020-12-06 NOTE — PROGRESS NOTES
12/06/20 1117   Valuables   Patient Belongings locker   Patient Belongings Put in Hospital Secure Location (Security or Locker, etc.) cell phone/electronics;clothing;shoes   Did you bring any home meds/supplements to the hospital?  No       IN PT BIN:  Blue t-shirt, underwear, red pj pants, jacket, pack of cigarettes, cell phone, socks and blue shoes.     NOTHING SENT TO SECURITY     A               Admission:  I am responsible for any personal items that are not sent to the safe or pharmacy.  [facility] is not responsible for loss, theft or damage of any property in my possession.    Signature:  _________________________________ Date: _______  Time: _____                                              Staff Signature:  ____________________________ Date: ________  Time: _____      2nd Staff person, if patient is unable/unwilling to sign:    Signature: ________________________________ Date: ________  Time: _____     Discharge:  [facility] has returned all of my personal belongings:    Signature: _________________________________ Date: ________  Time: _____                                          Staff Signature:  ____________________________ Date: ________  Time: _____

## 2020-12-06 NOTE — PHARMACY-ADMISSION MEDICATION HISTORY
Admission Medication History Completed by Pharmacy    See Fetchnotes Admission Navigator for allergy information, preferred outpatient pharmacy and prior to admission medications.     Medication History Sources:     Group home staff via phone, [phone] ([address] [place] [address], [address])     Prescription fill history ([facility] in [place]) via Epic Surescripts data    [facility] for Invega Sustenna dosing    Patient (via phone)    Changes made to PTA medication list (reason):    Added: None    Deleted: vitamin D 1,000 units daily (not on  med list, last filled July 2020)    Changed:   o clozapine dosing (last updated in our system in 2018. In 2018, prescribed 550mg HS but current dose is 450mg HS per GH and fill history)   o olanzapine dosing from 10mg PO HS (old Rx) --> 10mg PO TID PRN agitation (per  staff)  o docusate 100mg PO daily --> PO BID (per  staff, consistent with fill history)    Additional Information:    Writer initially called Group Home director Neela ([phone]) but no answer and voicemail full. Writer then spoke with patient who provided group home information ([facility] ([facility]) Address: [address] [place] [address], [address], phone number [phone].  Briefly reviewed medications with patient, who was able to state the names of most his medications; his report was consistent with  staff's report and fill history. Writer called group [facility] and reviewed all medications with staff member.  med list is consistent with fill history at [facility]. Staff state last PM doses were given on 12/4 and AM doses given on 12/5 prior to coming to the ED. Staff report patient has good overall med adherence and rarely needs his PRN olanzapine dose (last dose ~3 weeks ago)      Invega Sustenna 234mg subcutaneous every 4 weeks:  staff explained a health care professional comes to give him injection at the house, last injection on 11/23/2020.     Prior to Admission medications     Medication Sig Last Dose     benztropine (COGENTIN) 1 MG tablet Take 1 tablet (1 mg) by mouth 2 times daily     12/5/2020 at x1     cloZAPine (CLOZARIL) 100 MG tablet Take 400 mg by mouth At Bedtime Take along with two 25mg tablets for total dose of 450mg at bedtime.     12/4/2020     cloZAPine (CLOZARIL) 25 MG tablet Take 50 mg by mouth At Bedtime Take along with four 100 mg tablets for a total dose of 450 mg at bedtime     12/4/2020     docusate sodium (COLACE) 100 MG capsule     Take 100 mg by mouth 2 times daily  12/5/2020 at x1     levothyroxine (SYNTHROID/LEVOTHROID) 112 MCG tablet     Take 112 mcg by mouth daily 12/5/2020     OLANZapine (ZYPREXA) 20 MG tablet Take 10 mg by mouth 3 times daily as needed (agitation)     Past Month     omeprazole (PRILOSEC) 20 MG DR capsule Take 20 mg by mouth daily     12/5/2020     paliperidone (INVEGA SUSTENNA) 234 MG/ML SUSP injection Inject 1 mL (234 mg) into the muscle every 28 days     11/23/2020     prazosin (MINIPRESS) 1 MG capsule Take 3 capsules (3 mg) by mouth At Bedtime     12/4/2020     venlafaxine (EFFEXOR-ER) 75 MG TB24 24 hr tablet Take 3 tablets (225 mg) by mouth daily (with breakfast)     12/5/2020       Date completed: 12/06/20    Medication history completed by:   Selina Vicente, Pharm.D., [facility]  Behavioral Health Inpatient Pharmacist  [facility] ([facility]) Emergency Department  Phone: *[phone] ([email or web address]) or [phone]

## 2020-12-06 NOTE — PHARMACY
Pharmacy Clozapine Note    Date of Service: 2020  Patient's : 1990  30 year old, male    Current clozapine regimen: 450 mg by mouth at bedtime   Has there been a known interruption in therapy for greater than/equal to 48 hours? No    Recent ANC Value(s):  Recent Labs   Lab Test 20  2352 19  0735 19  1921 18  1219 17  0733 10/04/17  0803 17  0811   ANEU 5.9 5.2 6.1 6.7 4.4 4.4 4.8       Is the patient enrolled in the clozapine REMS program? Yes  Ordering prescriber: Annalise Rinaldi   Is this provider certified in the clozapine REMS program? Yes  Is the ANC within recommended limits? Yes  Does the patient have any signs or symptoms of infection, including fever or sore throat? No    Plan:  1. Continue clozapine therapy at 450 mg PO at bedtime.  2. A WBC with differential will be ordered at least weekly.  ANC values will be entered into the REMS program.  3. Signs/symptoms of infection will be monitored daily.    Lo Weston, PharmD  PGY-1 Pharmacy Resident - Behavioral Health Services   Phone / Pager: *[phone]
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
## 2020-12-06 NOTE — PROVIDER NOTIFICATION
Suicide/Self injurious Reassessment.  Patient denies any SI/SIB and appears calm cooperative. Will monitor closely and reassess.     12/06/20 9285   Behavioral Health   1. Wish to be Dead (Recent) No   Wish to be Dead Description (Recent)   (patient denies any thoughts currently)   2. Non-Specific Active Suicidal Thoughts (Recent) No   Non-Specific Active Suicidal Thought Description (Recent)   (denies SI/SIB and no actions)   3. Active Sucidal Ideation with any Methods (Not Plan) Without Intent to Act (Recent) No   Active Suicidal Ideation with any Methods (Not Plan) Description (Recent) pt denies any current thoughts/no actions   4. Active Suicidal Ideation with Some Intent to Act, Without Specific Plan (Recent) No   Active Suicidal Ideation with Some Intent to Act, Without Specific Plan Description (Recent) pt denies any SI/SIB thoughts, no actions   5. Active Suicidal Ideation with Specific Plan and Intent (Recent) No   Duration (Lifetime) 3   Change in Protective Factors? No   Enviromental Risk Factors None   Self Injury other (see comment)  (denies)
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
## 2020-12-06 NOTE — PROGRESS NOTES
[facility], Neela Brown- Manager [phone]  Writer unable to contact  Manager or obtain MAR; message indicating Voice Mail box is full.

## 2020-12-06 NOTE — PROGRESS NOTES
"Writer informed by Vince GOODMAN today that patient in his room sitting on window ledge and when PA walked by patient's room, patient begins banging his head against the  Window a few times. Staff enter room and check on patient, who reports feeling depressed, \"I want to kill myself\". Patient poor eye contact look down to floor, away from writer and another male nurse who assisted with admission and search earlier.     Reassurance, snack offered and patient in dining room eating and doing word search. Patient very perseverative in thought and writer perceives patient withholding and internalizing.  Charge RN contact o/c provider and SIO started for safety at 1414. Patient given zyprexa 10 mg( second dose within two hours) and writer stays with patient to ensure he has swallowed the medication.   "

## 2020-12-06 NOTE — TELEPHONE ENCOUNTER
R:    Patient cleared and ready for behavioral bed placement: Yes   Provider paged at 8:22am for [facility]/ Nae[facility]  Provider called back at 8:52am accepted for [facility] - Naege[facility]  Pt placed in cue at 9:05am and Unit charge called with Disposition  ED Updated at 9:08am with placement/Repot time

## 2020-12-06 NOTE — ED NOTES
Pt stood up and was hitting head on TV case. Psych associate intervened verbally and pt returned to chair. Writer informed pt that his behavior was unacceptable and that he may end up in restraints if he repeats the behavior.

## 2020-12-06 NOTE — ED NOTES
Pt remained calm after his 2nd dose of zyprexa. He slept with a 1:1 sitter in the room. He was up to the bathroom once.
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
## 2020-12-06 NOTE — ED NOTES
1:1 discontinued.  Patient has been calm since yesterday.  No agitation or aggression.  Will remove 1:1 at this time.  If behavioral changes, may need to restart.      Jena Balderas MD  12/06/20 6376

## 2020-12-07 LAB
BASOPHILS # BLD AUTO: 0 10E9/L (ref 0–0.2)
BASOPHILS NFR BLD AUTO: 0.4 %
CHOLEST SERPL-MCNC: 152 MG/DL
DIFFERENTIAL METHOD BLD: NORMAL
EOSINOPHIL # BLD AUTO: 0 10E9/L (ref 0–0.7)
EOSINOPHIL NFR BLD AUTO: 0 %
HDLC SERPL-MCNC: 42 MG/DL
IMM GRANULOCYTES # BLD: 0.1 10E9/L (ref 0–0.4)
IMM GRANULOCYTES NFR BLD: 1.1 %
LDLC SERPL CALC-MCNC: 83 MG/DL
LYMPHOCYTES # BLD AUTO: 1.7 10E9/L (ref 0.8–5.3)
LYMPHOCYTES NFR BLD AUTO: 24.4 %
MONOCYTES # BLD AUTO: 0.7 10E9/L (ref 0–1.3)
MONOCYTES NFR BLD AUTO: 9.5 %
NEUTROPHILS # BLD AUTO: 4.6 10E9/L (ref 1.6–8.3)
NEUTROPHILS NFR BLD AUTO: 64.6 %
NONHDLC SERPL-MCNC: 110 MG/DL
NRBC # BLD AUTO: 0 10*3/UL
NRBC BLD AUTO-RTO: 0 /100
TRIGL SERPL-MCNC: 133 MG/DL
TSH SERPL DL<=0.005 MIU/L-ACNC: 1.43 MU/L (ref 0.4–4)
WBC # BLD AUTO: 7.1 10E9/L (ref 4–11)

## 2020-12-07 PROCEDURE — 85004 AUTOMATED DIFF WBC COUNT: CPT | Performed by: PSYCHIATRY & NEUROLOGY

## 2020-12-07 PROCEDURE — 250N000013 HC RX MED GY IP 250 OP 250 PS 637: Performed by: NURSE PRACTITIONER

## 2020-12-07 PROCEDURE — 36415 COLL VENOUS BLD VENIPUNCTURE: CPT | Performed by: PSYCHIATRY & NEUROLOGY

## 2020-12-07 PROCEDURE — 124N000002 HC R&B MH UMMC

## 2020-12-07 PROCEDURE — 99233 SBSQ HOSP IP/OBS HIGH 50: CPT | Mod: 95 | Performed by: CLINICAL NURSE SPECIALIST

## 2020-12-07 PROCEDURE — 80061 LIPID PANEL: CPT | Performed by: PSYCHIATRY & NEUROLOGY

## 2020-12-07 PROCEDURE — 85048 AUTOMATED LEUKOCYTE COUNT: CPT | Performed by: PSYCHIATRY & NEUROLOGY

## 2020-12-07 PROCEDURE — 99207 PR CDG-MDM COMPONENT: MEETS HIGH - UP CODED: CPT | Performed by: CLINICAL NURSE SPECIALIST

## 2020-12-07 PROCEDURE — 84443 ASSAY THYROID STIM HORMONE: CPT | Performed by: PSYCHIATRY & NEUROLOGY

## 2020-12-07 RX ORDER — ATROPINE SULFATE 10 MG/ML
2 SOLUTION/ DROPS OPHTHALMIC 2 TIMES DAILY PRN
Status: DISCONTINUED | OUTPATIENT
Start: 2020-12-07 | End: 2020-12-11 | Stop reason: HOSPADM

## 2020-12-07 RX ORDER — HYDROXYZINE HYDROCHLORIDE 25 MG/1
25-50 TABLET, FILM COATED ORAL EVERY 4 HOURS PRN
Status: DISCONTINUED | OUTPATIENT
Start: 2020-12-07 | End: 2020-12-11 | Stop reason: HOSPADM

## 2020-12-07 RX ADMIN — TRAZODONE HYDROCHLORIDE 50 MG: 50 TABLET ORAL at 21:32

## 2020-12-07 RX ADMIN — NICOTINE POLACRILEX 2 MG: 2 GUM, CHEWING BUCCAL at 13:45

## 2020-12-07 RX ADMIN — PRAZOSIN HYDROCHLORIDE 3 MG: 2 CAPSULE ORAL at 21:32

## 2020-12-07 RX ADMIN — CLOZAPINE 450 MG: 100 TABLET ORAL at 21:32

## 2020-12-07 RX ADMIN — DOCUSATE SODIUM 100 MG: 100 CAPSULE, LIQUID FILLED ORAL at 20:09

## 2020-12-07 RX ADMIN — LEVOTHYROXINE SODIUM 112 MCG: 112 TABLET ORAL at 09:13

## 2020-12-07 RX ADMIN — OLANZAPINE 10 MG: 10 TABLET, FILM COATED ORAL at 15:54

## 2020-12-07 RX ADMIN — DOCUSATE SODIUM 100 MG: 100 CAPSULE, LIQUID FILLED ORAL at 09:13

## 2020-12-07 RX ADMIN — BENZTROPINE MESYLATE 1 MG: 1 TABLET ORAL at 09:13

## 2020-12-07 RX ADMIN — NICOTINE POLACRILEX 4 MG: 2 GUM, CHEWING BUCCAL at 15:55

## 2020-12-07 RX ADMIN — NICOTINE POLACRILEX 4 MG: 2 GUM, CHEWING BUCCAL at 20:16

## 2020-12-07 RX ADMIN — NICOTINE POLACRILEX 4 MG: 2 GUM, CHEWING BUCCAL at 17:29

## 2020-12-07 RX ADMIN — NICOTINE POLACRILEX 4 MG: 2 GUM, CHEWING BUCCAL at 19:11

## 2020-12-07 RX ADMIN — OMEPRAZOLE 20 MG: 20 CAPSULE, DELAYED RELEASE ORAL at 09:13

## 2020-12-07 RX ADMIN — NICOTINE POLACRILEX 4 MG: 2 GUM, CHEWING BUCCAL at 21:34

## 2020-12-07 RX ADMIN — VENLAFAXINE HYDROCHLORIDE 225 MG: 150 TABLET, EXTENDED RELEASE ORAL at 09:13

## 2020-12-07 RX ADMIN — BENZTROPINE MESYLATE 1 MG: 1 TABLET ORAL at 20:09

## 2020-12-07 RX ADMIN — NICOTINE POLACRILEX 4 MG: 2 GUM, CHEWING BUCCAL at 12:40

## 2020-12-07 ASSESSMENT — ACTIVITIES OF DAILY LIVING (ADL)
LAUNDRY: WITH SUPERVISION
ORAL_HYGIENE: INDEPENDENT
HYGIENE/GROOMING: HANDWASHING;SHOWER;INDEPENDENT
ORAL_HYGIENE: INDEPENDENT
DRESS: SCRUBS (BEHAVIORAL HEALTH);INDEPENDENT
LAUNDRY: UNABLE TO COMPLETE
HYGIENE/GROOMING: HANDWASHING
DRESS: INDEPENDENT

## 2020-12-07 NOTE — PLAN OF CARE
"  Problem: Suicidal Behavior  Goal: Suicidal Behavior is Absent or Managed  Outcome: Improving  Flowsheets (Taken 12/7/2020 2734)  Mutually Determined Action Steps (Suicidal Behavior Absent/Managed):   identifies protective factors   sets future-oriented goal  Individualized Action Step (Suicidal Behavior Absent/Managed): Patient maintains safety and utilizes coping skills that writes down in his journal.    D) Patient appears to be doing better today after sleeping in late, reports feeling more rested, denies any hallucinations or disturbing thoughts, denies SI/SIB thoughts and remains active and visible in milieu; appetite good, VSS, denies pain concerns and reports last BM yesterday, takes shower today and walking halls.  A) 1:1 with writer, patient reports desire to hopefully be discharged from here on Friday so that he can rest over weekend and return back to work next week. Patient reports he is only son and needs to work so that he can send money to Mother who is blind and living in [place]; patient. Patient able to verbalize 5-7 coping skills and he agrees to write down in his journal so that he can access when feeling distressed.   R) Patient has been free of any self injurious behavior for 24 hours and feels he can be rid of his \"shadow\" the SIO likely not needed and feels comfortable approaching staff.  Provider agrees with this plan and discontinued at 1428.  Evening shift can continue to reassess at 1628 and 1828.      "

## 2020-12-07 NOTE — PROGRESS NOTES
Patient remains on SIO throughout the shift. Patient slept 7.0 hours without complaint, request or incident.

## 2020-12-07 NOTE — PROGRESS NOTES
Writer was assigned as patient's SIO staff at 1630. Patient reported at approximately 1639 that he was experiencing urges to harm himself, though he would not elaborate on what these urges entailed. Writer offered coping mechanisms (fidgets, conversation, coloring pages, puzzles, games, movies, deep breathing, PRN, etc). Patient received a stress ball and engaged in conversation about video games. Patient also played a game of scrabble with writer. Patient seemed to be in much better spirits by 1730.

## 2020-12-07 NOTE — PROVIDER NOTIFICATION
Patient restraint was discontinued at 1615.  Patient was able to verbalize and demonstrate cooperation.

## 2020-12-07 NOTE — PROGRESS NOTES
[facility]   Psychiatric Progress Note        Interim History:   The patient's care was discussed with the treatment team during the daily team meeting and/or staff's chart notes were reviewed.  Staff report patient is visible in the lounge area.     Psychiatric symptoms and interventions:   Patient reports feeling tired. Met with patient in his room with staff present. Patient denied any urges to hit his head. Patient stated he was having suicidal thinking that was intense so he hit his head.     Continue:   Clozapine 450 mg   Atropine 2 drops BIDPRN for sialorrhea   Colace scheduled and Senna PRN for medication induced constipation.   Venlafaxine 225 mg maxine mood  Prazosin 3 mg for nightmares  Synthroid 112 mcg    Medical:  Latent tuberculosis  GERD  Hypothyroidism     Reviewed admission labs: CMP WNL, TSH 3.79, CBC with diff , COVID screen negative, UTOX is negative.   Clozapine level ordered    Monthly lab draws for clozapine.     Behavioral/psychological/social:   Encouraged patient to attend therapeutic hospital programming as tolerated   Discontinue 1:1- Patient deneis urges to hit his head.   No room mate order       Medications:       benztropine  1 mg Oral BID     cloZAPine  450 mg Oral At Bedtime     docusate sodium  100 mg Oral BID     levothyroxine  112 mcg Oral Daily     omeprazole  20 mg Oral Daily     prazosin  3 mg Oral At Bedtime     venlafaxine  225 mg Oral Daily with breakfast          Allergies:     Allergies   Allergen Reactions     Haldol [Haloperidol]      Other reaction(s): Tardive Dyskinesia  Torticollis  Torticollis          Labs:     Recent Results (from the past 24 hour(s))   WBC and differential    Collection Time: 12/07/20  8:59 AM   Result Value Ref Range    WBC 7.1 4.0 - 11.0 10e9/L    Diff Method Automated Method     % Neutrophils 64.6 %    % Lymphocytes 24.4 %    % Monocytes 9.5 %    % Eosinophils 0.0 %    % Basophils 0.4 %    % Immature  Granulocytes 1.1 %    Nucleated RBCs 0 0 /100    Absolute Neutrophil 4.6 1.6 - 8.3 10e9/L    Absolute Lymphocytes 1.7 0.8 - 5.3 10e9/L    Absolute Monocytes 0.7 0.0 - 1.3 10e9/L    Absolute Eosinophils 0.0 0.0 - 0.7 10e9/L    Absolute Basophils 0.0 0.0 - 0.2 10e9/L    Abs Immature Granulocytes 0.1 0 - 0.4 10e9/L    Absolute Nucleated RBC 0.0           Psychiatric Examination:     /76   Pulse 80   Temp 98.2  F (36.8  C) (Oral)   Resp 18   Wt 104.3 kg (230 lb)   SpO2 100%   BMI 33.00 kg/m    Weight is 230 lbs 0 oz  Body mass index is 33 kg/m .  Orthostatic Vitals       Most Recent      Sitting Orthostatic /86 12/06 1621    Sitting Orthostatic Pulse (bpm) 76 12/06 1621    Standing Orthostatic BP 94/71 12/06 1100    Standing Orthostatic Pulse (bpm) 112 12/06 1100            Appearance: awake, alert, adequately groomed and dressed in hospital scrubs  Attitude:  cooperative  Eye Contact:  good  Mood:  tired  Affect:  intensity is blunted  Speech:  normal prosody  Psychomotor Behavior:  no evidence of tardive dyskinesia, dystonia, or tics  Throught Process:  goal oriented  Associations:  no loose associations  Thought Content:  passive suicidal ideation present  Insight:  fair  Judgement:  fair  Oriented to:  time, person, and place  Attention Span and Concentration:  fair  Recent and Remote Memory:  intact    Clinical Global Impressions  First:  Considering your total clinical experience with this particular patient population, how severe are the patient's symptoms at this time?: 6 (12/06/20 1127)  Compared to the patient's condition at the START of treatment, this patient's condition is: 4 (12/06/20 1127)  Most recent:  Considering your total clinical experience with this particular patient population, how severe are the patient's symptoms at this time?: 6 (12/06/20 1127)  Compared to the patient's condition at the START of treatment, this patient's condition is: 4 (12/06/20 1127)         Precautions:      Behavioral Orders   Procedures     Cheeking Precautions (behavioral units)     Patient Observed swallowing PO medications; Patient asked to drink water after swallowing medication; Patient in Staff line of sight for 15 minutes after medication given; Mouth checks after PO administration (patient asked to open mouth and stick out their tongue).     Code 1 - Restrict to Unit     Discontinue 1:1 attendant for suicide risk     Order Specific Question:   I have performed an in person assessment of the patient     Answer:   Based on this assessment the patient no longer requires a one on one attendant at this point in time.     Discontinue 1:1 attendant for suicide risk     Order Specific Question:   I have performed an in person assessment of the patient     Answer:   Based on this assessment the patient no longer requires a one on one attendant at this point in time.     Routine Programming     As clinically indicated     Self Injury Precaution     Single Room     Status 15     Every 15 minutes.     Status Individual Observation     Patient SIO status reviewed with team/RN.  Please also refer to RN/team documentation for add'l detail.    -SIO staff to monitor following which have contributed to patient being on SIO:  Suicidal ideation, head banging in ER  -Possible interventions SIO staff could use to support patient's treatment progress:  Offer support, distraction, coping skills, PRNs  -When following observed, team will review discontinuation of SIO:  Ability to contract for safety, no head banging behaviors     Order Specific Question:   CONTINUOUS 24 hours / day     Answer:   Other     Order Specific Question:   Specify distance     Answer:   6 feet     Order Specific Question:   Indications for SIO     Answer:   Self-injury risk     Order Specific Question:   Indications for SIO     Answer:   Suicide risk     Suicide precautions     Patients on Suicide Precautions should have a Combination Diet ordered that  includes a Diet selection(s) AND a Behavioral Tray selection for Safe Tray - with utensils, or Safe Tray - NO utensils            DIagnoses:   Schizoaffective disorder, depressive type  Borderline personality disorder  PTSD  Nicotine use disorder         Plan:     Legal status: Voluntary     Medication management:   Clozapine 450 mg   Atropine 2 drops BIDPRN for sialorrhea   Colace scheduled and Senna PRN for medication induced constipation.   Venlafaxine 225 mg maxine mood  Prazosin 3 mg for nightmares  Synthroid 112 mcg    Disposition plan:   Reason for continued hospitalization: Patient is unable to care for self and is a risk to self.   Stabilize with medications 5-6 until discharge   Return to [facility] home.     CONSENT FOR TELEMEDICINE VISIT:  The patient's condition can be safely assessed and treated via synchronous audio and visual telemedicine encounter.      START TIME:  11:25 am     STOP TIME:  11:37 am     REASON FOR TELEMEDICINE VISIT:  COVID-19.      ORIGINATING SITE (PATIENT LOCATION):  [facility], unit 4A.      DISTANT SITE (PROVIDER LOCATION):  Provider in remote setting.      CONSENT:  The patient/guardian has verbally consented to potential risks and benefits of telemedicine (video visit) versus in-person care, bill my insurance or make self-payment for services provided and responsibility for payment of noncovered services.      MODE OF COMMUNICATION:  Video conference via [employer].      As the provider, I attest to compliance with applicable laws and regulations related to telemedicine.

## 2020-12-07 NOTE — PROVIDER NOTIFICATION
12/07/20 1613   Seclusion or Restraint Order   Patient Experienced No adverse physical outcome from seclusion/restraint initiation   Pt was compliant and stopped SIB behavior.  No pain, discomfort or injury noted from restraints.  Provider notified.

## 2020-12-07 NOTE — PLAN OF CARE
BEHAVIORAL TEAM DISCUSSION    Participants: 4A Provider: Debra Naegele, APRN, CNS; 4A RN's: Xiomara Honeycutt, RN; 4A CTC's:  Catia Simms (CTC).  Progress:  Continuing to Assess .  Continued Stay Criteria/Rationale: New Patient  Medical/Physical: None  Precautions:    Behavioral Orders   Procedures    Cheeking Precautions (behavioral units)     Patient Observed swallowing PO medications; Patient asked to drink water after swallowing medication; Patient in Staff line of sight for 15 minutes after medication given; Mouth checks after PO administration (patient asked to open mouth and stick out their tongue).    Code 1 - Restrict to Unit    Discontinue 1:1 attendant for suicide risk     Order Specific Question:   I have performed an in person assessment of the patient     Answer:   Based on this assessment the patient no longer requires a one on one attendant at this point in time.    Discontinue 1:1 attendant for suicide risk     Order Specific Question:   I have performed an in person assessment of the patient     Answer:   Based on this assessment the patient no longer requires a one on one attendant at this point in time.    Routine Programming     As clinically indicated    Self Injury Precaution    Single Room    Status 15     Every 15 minutes.    Suicide precautions     Patients on Suicide Precautions should have a Combination Diet ordered that includes a Diet selection(s) AND a Behavioral Tray selection for Safe Tray - with utensils, or Safe Tray - NO utensils       Plan: CTC will coordinate disposition and after care planning.  The following services will be provided to the patient; psychiatric assessment, medication management, therapeutic milieu, individual and group support, art therapy, and skills/OT groups.   Rationale for change in precautions or plan: No Change.

## 2020-12-07 NOTE — PLAN OF CARE
Problem: Suicide Risk  Goal: Absence of Self-Harm  Outcome: Declining     Problem: Suicidal Behavior  Goal: Suicidal Behavior is Absent or Managed  Outcome: Declining       S:  Patient required immediate restraints for safety.   Patient is placed on SIO.     B/A:  Patient has a history of SI with intent.  Patient was placed on a SIO for safety, patient was calm and cooperative, the SIO was d/c'd 12/7/20 at 1428.  At 1554, the patient approached the RN for a PRN.  Patient reported active thoughts of SI.  PRN medication is given.  Patient walked to his room and proceeded to bang his head.  Patient was replaced on SIO immediately for safety.  Staff observed patient place a pillow case around his neck. A Code 21 is called for immediate support.  Due to the patient actively holding the pillow case on his neck.  Staff required immediate hands on restraints.  In addition to the upper extremities, the patients lower extremities were restrained.   The patient verbalized willing to cooperate and stated that he will no longer try to harm himself.  Patient verbalized and demonstrated safety.  Patient linen and other potentially hazordous items are removed from th patients room.  Patient remained coopeative.      R:  The Provider is called and udated on the patient status.  New ordes are received.  Patient is placed on a SIO for high risk of self harm.  Will continue to monitor.

## 2020-12-07 NOTE — PROVIDER NOTIFICATION
12/07/20 1300   Vital Signs   Temp 98.2  F (36.8  C)   Temp src Oral   Pulse 86   Pulse Rate Source Monitor   /79   Standing Orthostatic BP   Standing Orthostatic /78   Standing Orthostatic Pulse 85 bpm   Oxygen Therapy   SpO2 97 %   O2 Device None (Room air)     Patient has been coloring in lounge, using lavender patch, walking halls, talking with staff, drinking coffee and chewing nicotine gum; patient denies any side effects from medications, but now informs writer he has some drooling and uses atropine at home and would like available here if able. Provider informed of this concern and request for order.

## 2020-12-07 NOTE — PLAN OF CARE
Problem: Suicide Risk  Goal: Absence of Self-Harm    D) Patient appears to be engaged with SIO staff playing scrabble in lounge before dinner. After dinner, patient reports increased depression, anxiety and SIB thoughts/urges to bang his head;     A) SIO for safety, reassurance/assist with coping skills and distraction techniques, pacing rincon, snack and patient requesting another prn medication, but already receives 35 mg of zyprexa within 24 hours. Writer explains this to patient who seem to be accepting of this and reassurance he may take his bedtime medications by 8 pm; staff follow thru with medication monitoring as ordered.      R) Eventually, patient lie down to rest or fall asleep an hour or so until 8 pm; writer gives bedtime medications (one being clozapine 400 mg) along with extra fluids after ensuring VSS; patient make verbal threats, but did not engage in any self injurious headbanging behaviors evening shift since being placed on SIO during dayshift.

## 2020-12-07 NOTE — PROGRESS NOTES
Work Completed: Attended team. Reviewed Chart. Completed team note.     Discharge plan or goal: Return to group home                Barriers to discharge: Stabilization of symptoms

## 2020-12-08 PROCEDURE — 36415 COLL VENOUS BLD VENIPUNCTURE: CPT | Performed by: CLINICAL NURSE SPECIALIST

## 2020-12-08 PROCEDURE — 124N000002 HC R&B MH UMMC

## 2020-12-08 PROCEDURE — 99233 SBSQ HOSP IP/OBS HIGH 50: CPT | Mod: 95 | Performed by: CLINICAL NURSE SPECIALIST

## 2020-12-08 PROCEDURE — 80159 DRUG ASSAY CLOZAPINE: CPT | Performed by: CLINICAL NURSE SPECIALIST

## 2020-12-08 PROCEDURE — 250N000013 HC RX MED GY IP 250 OP 250 PS 637: Performed by: CLINICAL NURSE SPECIALIST

## 2020-12-08 PROCEDURE — 250N000013 HC RX MED GY IP 250 OP 250 PS 637: Performed by: NURSE PRACTITIONER

## 2020-12-08 RX ADMIN — BENZTROPINE MESYLATE 1 MG: 1 TABLET ORAL at 20:05

## 2020-12-08 RX ADMIN — NICOTINE POLACRILEX 4 MG: 2 GUM, CHEWING BUCCAL at 11:59

## 2020-12-08 RX ADMIN — OMEPRAZOLE 20 MG: 20 CAPSULE, DELAYED RELEASE ORAL at 09:19

## 2020-12-08 RX ADMIN — NICOTINE POLACRILEX 4 MG: 2 GUM, CHEWING BUCCAL at 10:27

## 2020-12-08 RX ADMIN — BENZTROPINE MESYLATE 1 MG: 1 TABLET ORAL at 09:21

## 2020-12-08 RX ADMIN — LEVOTHYROXINE SODIUM 112 MCG: 112 TABLET ORAL at 09:21

## 2020-12-08 RX ADMIN — VENLAFAXINE HYDROCHLORIDE 225 MG: 150 TABLET, EXTENDED RELEASE ORAL at 09:19

## 2020-12-08 RX ADMIN — HYDROXYZINE HYDROCHLORIDE 50 MG: 25 TABLET, FILM COATED ORAL at 16:11

## 2020-12-08 RX ADMIN — NICOTINE POLACRILEX 4 MG: 2 GUM, CHEWING BUCCAL at 16:11

## 2020-12-08 RX ADMIN — NICOTINE POLACRILEX 4 MG: 2 GUM, CHEWING BUCCAL at 19:01

## 2020-12-08 RX ADMIN — CLOZAPINE 450 MG: 100 TABLET ORAL at 22:12

## 2020-12-08 RX ADMIN — DOCUSATE SODIUM 100 MG: 100 CAPSULE, LIQUID FILLED ORAL at 22:12

## 2020-12-08 RX ADMIN — PRAZOSIN HYDROCHLORIDE 3 MG: 2 CAPSULE ORAL at 22:12

## 2020-12-08 RX ADMIN — NICOTINE POLACRILEX 4 MG: 2 GUM, CHEWING BUCCAL at 14:28

## 2020-12-08 RX ADMIN — DOCUSATE SODIUM 100 MG: 100 CAPSULE, LIQUID FILLED ORAL at 09:20

## 2020-12-08 RX ADMIN — NICOTINE POLACRILEX 2 MG: 2 GUM, CHEWING BUCCAL at 20:06

## 2020-12-08 RX ADMIN — TRAZODONE HYDROCHLORIDE 50 MG: 50 TABLET ORAL at 22:12

## 2020-12-08 RX ADMIN — HYDROXYZINE HYDROCHLORIDE 50 MG: 25 TABLET, FILM COATED ORAL at 12:55

## 2020-12-08 RX ADMIN — OLANZAPINE 10 MG: 10 TABLET, FILM COATED ORAL at 16:56

## 2020-12-08 RX ADMIN — OLANZAPINE 10 MG: 10 TABLET, FILM COATED ORAL at 10:29

## 2020-12-08 RX ADMIN — NICOTINE POLACRILEX 4 MG: 2 GUM, CHEWING BUCCAL at 12:55

## 2020-12-08 ASSESSMENT — ACTIVITIES OF DAILY LIVING (ADL)
DRESS: INDEPENDENT
LAUNDRY: UNABLE TO COMPLETE
LAUNDRY: WITH SUPERVISION
ORAL_HYGIENE: INDEPENDENT
HYGIENE/GROOMING: INDEPENDENT
DRESS: INDEPENDENT;SCRUBS (BEHAVIORAL HEALTH)
ORAL_HYGIENE: INDEPENDENT
HYGIENE/GROOMING: HANDWASHING;SHOWER;INDEPENDENT

## 2020-12-08 NOTE — PROGRESS NOTES
Work Completed: Attended team. Reviewed chart    Discharge plan or goal: Return to group home and current outpatient services                Barriers to discharge: Symptom Management.

## 2020-12-08 NOTE — PLAN OF CARE
"  Problem: Suicide Risk  Goal: Absence of Self-Harm  Outcome: In process,  SIB 1600 12/07/2020, last evening, head banging and wrapped pillow case around neck  D) Takes medications at 0930 am at bedside, with fluids, writer awakens encourages to get up for the day.  10:30 am, patient now out of room, earlier than yesterday, affect blunt, pacing rincon and chewing gum drinking coffee; \"they took my tray and returned it already\" patient also reports drooling at night and smaller bowel movement yesterday.    A) Patient informed that staff unable to save meals beyond a certain time to prevent food poisoning, but informed of ala cart options in dining room and patient accepts this. Zyprexa prn given upon request from patient-prn gum, offered prn for constipation, but patient declines states he doesn't need anymore-patient reminded of as needed medications available.   service consult for cultural supports, remains on SIO for safety along with limited access to items.    R) Accepting of a book and Reginaldo ran from , playing board game with SIO, reports zyprexa helps some, but not totally; patient encouraged to write down top 5 distress skills and states he has 3 so far, prn hydroxyzine given along with fluids.  Continue to monitor, promote skills. So far safe, continues to endorse vague passive SI thoughts and states, \"I feel like a dummy\" ; patient given praise for starting list; so far patient has demonstrated safe behavior with no SI/ SIB.     "

## 2020-12-08 NOTE — PROGRESS NOTES
"SPIRITUAL HEALTH SERVICES: Tele-Encounter  Patient Location ([facility], Unit[place] [facility] [facility] [place] [facility] [place] [facility]  Spoke with (patient, family relationship): PT Kamini      Referral Source:  Self initiated  visit, Latter day specific.    If applicable: patient was appropriately screened for telechaplaincy support with bedside nurse prior to visit (e.g. Mental Health and Addiction contexts). See call details below.      DATA: Pt Kamini Neal identifies as Latter day and is of [place] descent.    Kamini welcomed Islamic incantations/prayer from me via CDP phone. He declined to speak about his stay or process any emotions/feelings with me at this time staying, \"I'm doing ok, just bring me a prayer book\".     I will provide Kamini with an Islamic prayer booklet and he also asked me for and English translation of the Holy Quran (to which I will provide as well).      PLAN: I will follow up with Joseshoshana APRYL Neal for the duration of his stay. [facility] is available to Joseshoshana APRYL Neal per request.     SLOAN Cox    ______________________________    Type of service:  Telephone Visit     has received verbal consent for a TelephoneVisit from the patient? Yes    Distance Provider Location: designated [place] office or home office (secure setting)    Mode of Communication: telephone (via CDP phone or [place] tele-call-number ([phone]))      "

## 2020-12-08 NOTE — PROGRESS NOTES
"[facility], [place]   Psychiatric Progress Note        Interim History:   The patient's care was discussed with the treatment team during the daily team meeting and/or staff's chart notes were reviewed.  Staff report patient isolated to room in the evening. He was out in the lounge area during the afternoon. Patient tried to strangle self with pillow case about 4:00 pm.     Psychiatric symptoms and interventions:   Patient reported he felt \"distress\" in the late afternoon and asked for PRN Zyprexa. Patient was asked to wait. He stated he felt \"dismissed\" and then had suicidal thinking. Patient states that is when he tried to use the pillowcase around his neck . Patient reports more suicidal thinking suicidal thinking  in the evening hours. He did well during the day. This morning patient denies suicidal thinking.Patient reports his suicidal thinking comes and goes. Patient asked to return to his group home. Provider explained he needed to maintain safe behavior for discharge.     Continue:   Clozapine 450 mg   Atropine 2 drops BIDPRN for sialorrhea   Colace scheduled and Senna PRN for medication induced constipation.   Venlafaxine 225 mg maxine mood  Prazosin 3 mg for nightmares  Synthroid 112 mcg    Medical:  Latent tuberculosis  GERD  Hypothyroidism      Reviewed admission labs: CMP WNL, TSH 3.79, CBC with diff , COVID screen negative, UTOX is negative.   Clozapine level ordered     Monthly lab draws for clozapine.      Behavioral/psychological/social:   Encouraged patient to attend therapeutic hospital programming as tolerated   12/8 Restarted1:1- Patient Notried to strangle self with pillow case.   No room mate order  Cheeking precuations         Medications:       benztropine  1 mg Oral BID     cloZAPine  450 mg Oral At Bedtime     docusate sodium  100 mg Oral BID     levothyroxine  112 mcg Oral Daily     omeprazole  20 mg Oral Daily     prazosin  3 mg Oral At Bedtime     venlafaxine  " 225 mg Oral Daily with breakfast          Allergies:     Allergies   Allergen Reactions     Haldol [Haloperidol]      Other reaction(s): Tardive Dyskinesia  Torticollis  Torticollis          Labs:     Recent Results (from the past 24 hour(s))   WBC and differential    Collection Time: 12/07/20  8:59 AM   Result Value Ref Range    WBC 7.1 4.0 - 11.0 10e9/L    Diff Method Automated Method     % Neutrophils 64.6 %    % Lymphocytes 24.4 %    % Monocytes 9.5 %    % Eosinophils 0.0 %    % Basophils 0.4 %    % Immature Granulocytes 1.1 %    Nucleated RBCs 0 0 /100    Absolute Neutrophil 4.6 1.6 - 8.3 10e9/L    Absolute Lymphocytes 1.7 0.8 - 5.3 10e9/L    Absolute Monocytes 0.7 0.0 - 1.3 10e9/L    Absolute Eosinophils 0.0 0.0 - 0.7 10e9/L    Absolute Basophils 0.0 0.0 - 0.2 10e9/L    Abs Immature Granulocytes 0.1 0 - 0.4 10e9/L    Absolute Nucleated RBC 0.0    Lipid panel    Collection Time: 12/07/20  8:59 AM   Result Value Ref Range    Cholesterol 152 <200 mg/dL    Triglycerides 133 <150 mg/dL    HDL Cholesterol 42 >39 mg/dL    LDL Cholesterol Calculated 83 <100 mg/dL    Non HDL Cholesterol 110 <130 mg/dL   TSH with free T4 reflex and/or T3 as indicated    Collection Time: 12/07/20  8:59 AM   Result Value Ref Range    TSH 1.43 0.40 - 4.00 mU/L          Psychiatric Examination:     /83   Pulse 81   Temp 98.8  F (37.1  C) (Tympanic)   Resp 18   Wt 104.3 kg (230 lb)   SpO2 99%   BMI 33.00 kg/m    Weight is 230 lbs 0 oz  Body mass index is 33 kg/m .  Orthostatic Vitals       Most Recent      Standing Orthostatic /78 12/07 1300    Standing Orthostatic Pulse (bpm) 85 12/07 1300          Appearance: awake, alert, adequately groomed and dressed in hospital scrubs  Attitude:  cooperative  Eye Contact:  good  Mood:  tired  Affect:  intensity is blunted  Speech:  normal prosody  Psychomotor Behavior:  no evidence of tardive dyskinesia, dystonia, or tics  Throught Process:  goal oriented  Associations:  no loose  associations  Thought Content:  passive suicidal ideation present  Insight:  fair  Judgement:  fair  Oriented to:  time, person, and place  Attention Span and Concentration:  fair  Recent and Remote Memory:  intact    Clinical Global Impressions  First:  Considering your total clinical experience with this particular patient population, how severe are the patient's symptoms at this time?: 6 (12/06/20 1127)  Compared to the patient's condition at the START of treatment, this patient's condition is: 4 (12/06/20 1127)  Most recent:  Considering your total clinical experience with this particular patient population, how severe are the patient's symptoms at this time?: 6 (12/06/20 1127)  Compared to the patient's condition at the START of treatment, this patient's condition is: 4 (12/06/20 1127)         Precautions:     Behavioral Orders   Procedures     Cheeking Precautions (behavioral units)     Patient Observed swallowing PO medications; Patient asked to drink water after swallowing medication; Patient in Staff line of sight for 15 minutes after medication given; Mouth checks after PO administration (patient asked to open mouth and stick out their tongue).     Code 1 - Restrict to Unit     Discontinue 1:1 attendant for suicide risk     Order Specific Question:   I have performed an in person assessment of the patient     Answer:   Based on this assessment the patient no longer requires a one on one attendant at this point in time.     Discontinue 1:1 attendant for suicide risk     Order Specific Question:   I have performed an in person assessment of the patient     Answer:   Based on this assessment the patient no longer requires a one on one attendant at this point in time.     Routine Programming     As clinically indicated     Self Injury Precaution     Single Room     Status 15     Every 15 minutes.     Status Individual Observation     Patient SIO status reviewed with team/RN.  Please also refer to RN/team  documentation for add'l detail.    -SIO staff to monitor following which have contributed to patient being on SIO:  Patient put pillow case around his neck and staff had to put hands on to remove the pillow case.   -Possible interventions SIO staff could use to support patient's treatment progress:  Support patient with distraction, calming coping skills and reassurance.   -When following observed, team will review discontinuation of SIO:  Patient will remain safe from SIB, suicidal gestures for 48 hours.     Order Specific Question:   CONTINUOUS 24 hours / day     Answer:   5 feet     Order Specific Question:   Indications for SIO     Answer:   Self-injury risk     Suicide precautions     Patients on Suicide Precautions should have a Combination Diet ordered that includes a Diet selection(s) AND a Behavioral Tray selection for Safe Tray - with utensils, or Safe Tray - NO utensils            DIagnoses:   Schizoaffective disorder, depressive type  Borderline personality disorder  PTSD  Nicotine use disorder         Plan:   Legal status: Voluntary      Medication management:   Clozapine 450 mg   Atropine 2 drops BIDPRN for sialorrhea   Colace scheduled and Senna PRN for medication induced constipation.   Venlafaxine 225 mg maxine mood  Prazosin 3 mg for nightmares  Synthroid 112 mcg     Disposition plan:   Reason for continued hospitalization: Patient is unable to care for self and is a risk to self.   Stabilize with medications 5-6 until discharge   Return to group home.      CONSENT FOR TELEMEDICINE VISIT:  The patient's condition can be safely assessed and treated via synchronous audio and visual telemedicine encounter.      START TIME:  12:02 pm     STOP TIME:  12:11 pm     REASON FOR TELEMEDICINE VISIT:  COVID-19.      ORIGINATING SITE (PATIENT LOCATION):  [facility], unit 4A.      DISTANT SITE (PROVIDER LOCATION):  Provider in remote setting.      CONSENT:  The patient/guardian has verbally consented to  potential risks and benefits of telemedicine (video visit) versus in-person care, bill my insurance or make self-payment for services provided and responsibility for payment of noncovered services.      MODE OF COMMUNICATION:  Video conference via [employer].      As the provider, I attest to compliance with applicable laws and regulations related to telemedicine.

## 2020-12-09 LAB
CLOZAPINE AND METABOLITES TOTAL: 722 NG/ML
CLOZAPINE SERPL-MCNC: 411 NG/ML
CLOZAPINE-N-OXIDE QUANT: <100 NG/ML
NORCLOZAPINE SERPL-MCNC: 311 NG/ML

## 2020-12-09 PROCEDURE — 99233 SBSQ HOSP IP/OBS HIGH 50: CPT | Mod: 95 | Performed by: CLINICAL NURSE SPECIALIST

## 2020-12-09 PROCEDURE — 250N000013 HC RX MED GY IP 250 OP 250 PS 637: Performed by: NURSE PRACTITIONER

## 2020-12-09 PROCEDURE — 250N000013 HC RX MED GY IP 250 OP 250 PS 637: Performed by: CLINICAL NURSE SPECIALIST

## 2020-12-09 PROCEDURE — 124N000002 HC R&B MH UMMC

## 2020-12-09 RX ADMIN — BENZTROPINE MESYLATE 1 MG: 1 TABLET ORAL at 09:01

## 2020-12-09 RX ADMIN — NICOTINE POLACRILEX 4 MG: 2 GUM, CHEWING BUCCAL at 21:22

## 2020-12-09 RX ADMIN — DOCUSATE SODIUM AND SENNOSIDES 1 TABLET: 8.6; 5 TABLET ORAL at 18:57

## 2020-12-09 RX ADMIN — DOCUSATE SODIUM 100 MG: 100 CAPSULE, LIQUID FILLED ORAL at 09:01

## 2020-12-09 RX ADMIN — NICOTINE POLACRILEX 4 MG: 2 GUM, CHEWING BUCCAL at 12:37

## 2020-12-09 RX ADMIN — HYDROXYZINE HYDROCHLORIDE 50 MG: 25 TABLET, FILM COATED ORAL at 14:47

## 2020-12-09 RX ADMIN — NICOTINE POLACRILEX 4 MG: 2 GUM, CHEWING BUCCAL at 11:02

## 2020-12-09 RX ADMIN — LEVOTHYROXINE SODIUM 112 MCG: 112 TABLET ORAL at 09:01

## 2020-12-09 RX ADMIN — BENZTROPINE MESYLATE 1 MG: 1 TABLET ORAL at 21:20

## 2020-12-09 RX ADMIN — OLANZAPINE 10 MG: 10 TABLET, FILM COATED ORAL at 11:02

## 2020-12-09 RX ADMIN — VENLAFAXINE HYDROCHLORIDE 225 MG: 150 TABLET, EXTENDED RELEASE ORAL at 09:01

## 2020-12-09 RX ADMIN — DOCUSATE SODIUM 100 MG: 100 CAPSULE, LIQUID FILLED ORAL at 21:20

## 2020-12-09 RX ADMIN — NICOTINE POLACRILEX 4 MG: 2 GUM, CHEWING BUCCAL at 16:43

## 2020-12-09 RX ADMIN — CLOZAPINE 450 MG: 100 TABLET ORAL at 21:20

## 2020-12-09 RX ADMIN — PRAZOSIN HYDROCHLORIDE 3 MG: 2 CAPSULE ORAL at 21:20

## 2020-12-09 RX ADMIN — NICOTINE POLACRILEX 4 MG: 2 GUM, CHEWING BUCCAL at 18:12

## 2020-12-09 RX ADMIN — OLANZAPINE 10 MG: 10 TABLET, FILM COATED ORAL at 14:47

## 2020-12-09 RX ADMIN — OMEPRAZOLE 20 MG: 20 CAPSULE, DELAYED RELEASE ORAL at 09:01

## 2020-12-09 ASSESSMENT — ACTIVITIES OF DAILY LIVING (ADL)
HYGIENE/GROOMING: HANDWASHING;SHOWER;INDEPENDENT
LAUNDRY: WITH SUPERVISION
ORAL_HYGIENE: INDEPENDENT
DRESS: INDEPENDENT
LAUNDRY: UNABLE TO COMPLETE
HYGIENE/GROOMING: INDEPENDENT
DRESS: SCRUBS (BEHAVIORAL HEALTH);INDEPENDENT
ORAL_HYGIENE: INDEPENDENT

## 2020-12-09 NOTE — PLAN OF CARE
Patient continues to verbalize and reports thoughts of SI.  Patient reports increased thoughts of SI with anxiety and panic attacks.  He reports panic attacks when he is around other people.  Patient identifies the following symptoms; beating fast heart beat and  strong negative thoughts.  Patient is encouraged to discuss the coping skills and strategies that he has identified.  Patient verbalized coping skills; talk w/staff, take meds, aromatherapy, call family, play a game and color. Unfortunately, the patient check in increased the patients thought of suicide. Patients mood changed to a more depressed mood.  Patient sat on the bed, with his hands covering his face.   Patient continued to verbalize thoughts of SI.  Patient did not engage in any unsafe behaviors.  Patient continues on Status Individual Observation.  Will continue to monitor.

## 2020-12-09 NOTE — PROGRESS NOTES
12/09/20 0600   Sleep/Rest/Relaxation   Sleep/Rest/Relaxation (WDL) WDL   Sleep/Rest/Relaxation appears asleep   Night Time # Hours 7 hours   Focus: Shift summary    Data: Patient slept 7 hours last night. No interventions needed. Respirations even and unlabored on status 15 checks. Will continue to monitor and report to oncoming staff.    Response: Report sleep hours to day shift. Continue to monitor patient and provide therapeutic interventions as necessary.

## 2020-12-09 NOTE — PLAN OF CARE
"  Problem: Suicidal Behavior  Goal: Suicidal Behavior is Absent or Managed  D) Outcome: Improving, no reported SI/SIB thoughts or actions; \"I am doing good and have a plan\" patient reports with feelings of hopefulness; \"I cannot think about the past and need to move forward\" he goes on to say in self affirming tone. Sleeping well and later again this morning; taking medications with VSS and good appetite. Reports having bowel movement and denies any physical pain; Atropine drops finally here from pharmacy and review with patient who would like to begin starting this evening.  A) MH assessment, superficial conversation about fun and positive enjoyments; remains on SIO for safety due to SI/SIB thoughts to kill himself and last active verbal threat only change of shift yesterday 12/8/2020; cheeking precautions; prn Zyprexa for anxiety.  R) Highly sensitive and writer feels best to assess by observation only, based on mood lability following sessions of asking patient if he is having disturbing thoughts; distraction techniques of taking shower, walking rincon, playing games, chew gum and using prn's used today. Patient verbalizes understanding and agrees to his plan to increase his clozapine medication. Writer will give prn medications at change of shift to best support patient during change of shift. No aggression or self injury.     "

## 2020-12-09 NOTE — PROGRESS NOTES
"Writer was on SIO and patient reported \"wanting to die.\" Writer inquired further and offered coping strategies. RN was notified and checked in as well. Patient was agreeable to taking a walk with writer in the hallway. Patient spoke about lighter topics, such as video games and animals, later stating that it is \"difficult to talk about his mental health,\" and that sometimes he \"shuts down.\" Patient also reported he felt he \"didn't know who he was,\" and again, stated he \"wanted to die.\" He was encouraged to utilize his coping list. Patient utilized his stress ball and took another walk. Patient added a few items to a \"who am I\" list and to his coping skills list, which he said was helpful. After several minutes, patient reported feeling better and presented with a brighter affect.   "

## 2020-12-09 NOTE — PROGRESS NOTES
[facility], [place]   Psychiatric Progress Note        Interim History:   The patient's care was discussed with the treatment team during the daily team meeting and/or staff's chart notes were reviewed.  Staff report patient repost SI. He will remain on 1:1 due to his attempt to strangle self on 12/7 evening.     Psychiatric symptoms and interventions: Patient is reporting suicidal ideation daily. He did not act on his thoughts today . He will continue on 1:1 observation due to wrapping a pillowcase around his neck. Patient made a list of coping skills. His goal is to practice coping skills to manage his anxiety and suicidal thinking.     Waiting for clozapine level. Possible medication adjustment.     Continue:   Clozapine 450 mg   Atropine 2 drops BIDPRN for sialorrhea   Colace scheduled and Senna PRN for medication induced constipation.   Venlafaxine 225 mg maxine mood  Prazosin 3 mg for nightmares  Synthroid 112 mcg     Medical:  Latent tuberculosis  GERD  Hypothyroidism      Reviewed admission labs: CMP WNL, TSH 3.79, CBC with diff , COVID screen negative, UTOX is negative.   Clozapine level ordered     Monthly lab draws      Behavioral/psychological/social:   Encouraged patient to attend therapeutic hospital programming as tolerated   12/9 Restarted1:1- Patient Notried to strangle self with pillow case.   No room mate order  Cheeking precuations         Medications:       benztropine  1 mg Oral BID     cloZAPine  450 mg Oral At Bedtime     docusate sodium  100 mg Oral BID     levothyroxine  112 mcg Oral Daily     omeprazole  20 mg Oral Daily     prazosin  3 mg Oral At Bedtime     venlafaxine  225 mg Oral Daily with breakfast          Allergies:     Allergies   Allergen Reactions     Haldol [Haloperidol]      Other reaction(s): Tardive Dyskinesia  Torticollis  Torticollis          Labs:   No results found for this or any previous visit (from the past 24 hour(s)).       Psychiatric  Examination:     /80   Pulse 102   Temp 98.2  F (36.8  C)   Resp 16   Wt 104.3 kg (230 lb)   SpO2 100%   BMI 33.00 kg/m    Weight is 230 lbs 0 oz  Body mass index is 33 kg/m .  Orthostatic Vitals       Most Recent      Sitting Orthostatic /83 12/08 1619    Sitting Orthostatic Pulse (bpm) 102 12/08 1619    Standing Orthostatic BP --  Comment: refused 12/08 1000    Standing Orthostatic Pulse (bpm) --  Comment: refused 12/08 1000          Appearance: awake, alert, adequately groomed and dressed in hospital scrubs  Attitude:  cooperative  Eye Contact:  good  Mood:  tired  Affect:  intensity is blunted  Speech:  normal prosody  Psychomotor Behavior:  no evidence of tardive dyskinesia, dystonia, or tics  Throught Process:  goal oriented  Associations:  no loose associations  Thought Content:  passive suicidal ideation present  Insight:  fair  Judgement:  fair  Oriented to:  time, person, and place  Attention Span and Concentration:  fair  Recent and Remote Memory:  intact      Clinical Global Impressions  First:  Considering your total clinical experience with this particular patient population, how severe are the patient's symptoms at this time?: 6 (12/06/20 1127)  Compared to the patient's condition at the START of treatment, this patient's condition is: 4 (12/06/20 1127)  Most recent:  Considering your total clinical experience with this particular patient population, how severe are the patient's symptoms at this time?: 6 (12/06/20 1127)  Compared to the patient's condition at the START of treatment, this patient's condition is: 4 (12/06/20 1127)         Precautions:     Behavioral Orders   Procedures     Cheeking Precautions (behavioral units)     Patient Observed swallowing PO medications; Patient asked to drink water after swallowing medication; Patient in Staff line of sight for 15 minutes after medication given; Mouth checks after PO administration (patient asked to open mouth and stick out their  tongue).     Code 1 - Restrict to Unit     Discontinue 1:1 attendant for suicide risk     Order Specific Question:   I have performed an in person assessment of the patient     Answer:   Based on this assessment the patient no longer requires a one on one attendant at this point in time.     Discontinue 1:1 attendant for suicide risk     Order Specific Question:   I have performed an in person assessment of the patient     Answer:   Based on this assessment the patient no longer requires a one on one attendant at this point in time.     Routine Programming     As clinically indicated     Self Injury Precaution     Single Room     Status 15     Every 15 minutes.     Status Individual Observation     Patient SIO status reviewed with team/RN.  Please also refer to RN/team documentation for add'l detail.    -SIO staff to monitor following which have contributed to patient being on SIO:  Patient put pillow case around his neck and staff had to put hands on to remove the pillow case.   -Possible interventions SIO staff could use to support patient's treatment progress:  Support patient with distraction, calming coping skills and reassurance.   -When following observed, team will review discontinuation of SIO:  Patient will remain safe from SIB, suicidal gestures for 48 hours.     Order Specific Question:   CONTINUOUS 24 hours / day     Answer:   5 feet     Order Specific Question:   Indications for SIO     Answer:   Self-injury risk     Suicide precautions     Patients on Suicide Precautions should have a Combination Diet ordered that includes a Diet selection(s) AND a Behavioral Tray selection for Safe Tray - with utensils, or Safe Tray - NO utensils            DIagnoses:   Schizoaffective disorder, depressive type  Borderline personality disorder  PTSD  Nicotine use disorder         Plan:     Legal status: Voluntary      Medication management:   Clozapine 450 mg   Atropine 2 drops BIDPRN for sialorrhea   Colace  scheduled and Senna PRN for medication induced constipation.   Venlafaxine 225 mg maxine mood  Prazosin 3 mg for nightmares  Synthroid 112 mcg     Disposition plan:   Reason for continued hospitalization: Patient is unable to care for self and is a risk to self.   Stabilize with medications 5-6 until discharge   Return to [facility] home.      CONSENT FOR TELEMEDICINE VISIT:  The patient's condition can be safely assessed and treated via synchronous audio and visual telemedicine encounter.      START TIME:  12:00 pm     STOP TIME:  12:08 pm     REASON FOR TELEMEDICINE VISIT:  COVID-19.      ORIGINATING SITE (PATIENT LOCATION):  [facility] [place], unit [address].      DISTANT SITE (PROVIDER LOCATION):  Provider in remote setting.      CONSENT:  The patient/guardian has verbally consented to potential risks and benefits of telemedicine (video visit) versus in-person care, bill my insurance or make self-payment for services provided and responsibility for payment of noncovered services.      MODE OF COMMUNICATION:  Video conference via [employer].      As the provider, I attest to compliance with applicable laws and regulations related to telemedicine.

## 2020-12-10 PROCEDURE — 99233 SBSQ HOSP IP/OBS HIGH 50: CPT | Mod: 95 | Performed by: CLINICAL NURSE SPECIALIST

## 2020-12-10 PROCEDURE — 124N000002 HC R&B MH UMMC

## 2020-12-10 PROCEDURE — 99207 PR CDG-MDM COMPONENT: MEETS HIGH - UP CODED: CPT | Mod: 95 | Performed by: CLINICAL NURSE SPECIALIST

## 2020-12-10 PROCEDURE — 250N000013 HC RX MED GY IP 250 OP 250 PS 637: Performed by: CLINICAL NURSE SPECIALIST

## 2020-12-10 PROCEDURE — 250N000013 HC RX MED GY IP 250 OP 250 PS 637: Performed by: NURSE PRACTITIONER

## 2020-12-10 PROCEDURE — H2032 ACTIVITY THERAPY, PER 15 MIN: HCPCS

## 2020-12-10 RX ORDER — OLANZAPINE 10 MG/1
10 TABLET ORAL DAILY PRN
Status: DISCONTINUED | OUTPATIENT
Start: 2020-12-10 | End: 2020-12-10

## 2020-12-10 RX ORDER — OLANZAPINE 10 MG/2ML
10 INJECTION, POWDER, FOR SOLUTION INTRAMUSCULAR DAILY PRN
Status: DISCONTINUED | OUTPATIENT
Start: 2020-12-10 | End: 2020-12-11 | Stop reason: HOSPADM

## 2020-12-10 RX ORDER — POLYETHYLENE GLYCOL 3350 17 G/17G
17 POWDER, FOR SOLUTION ORAL DAILY PRN
Qty: 30 PACKET | Refills: 0 | Status: ON HOLD | OUTPATIENT
Start: 2020-12-10 | End: 2021-08-16

## 2020-12-10 RX ORDER — POLYETHYLENE GLYCOL 3350 17 G/17G
17 POWDER, FOR SOLUTION ORAL DAILY PRN
Status: DISCONTINUED | OUTPATIENT
Start: 2020-12-10 | End: 2020-12-11 | Stop reason: HOSPADM

## 2020-12-10 RX ORDER — OLANZAPINE 10 MG/2ML
10 INJECTION, POWDER, FOR SOLUTION INTRAMUSCULAR 3 TIMES DAILY PRN
Status: DISCONTINUED | OUTPATIENT
Start: 2020-12-10 | End: 2020-12-10

## 2020-12-10 RX ORDER — OLANZAPINE 10 MG/1
10 TABLET ORAL DAILY PRN
Status: DISCONTINUED | OUTPATIENT
Start: 2020-12-10 | End: 2020-12-11 | Stop reason: HOSPADM

## 2020-12-10 RX ORDER — OLANZAPINE 10 MG/1
10 TABLET ORAL DAILY PRN
Qty: 30 TABLET | Refills: 0 | Status: ON HOLD | OUTPATIENT
Start: 2020-12-10 | End: 2021-07-02

## 2020-12-10 RX ADMIN — NICOTINE POLACRILEX 4 MG: 2 GUM, CHEWING BUCCAL at 10:30

## 2020-12-10 RX ADMIN — DOCUSATE SODIUM 100 MG: 100 CAPSULE, LIQUID FILLED ORAL at 20:40

## 2020-12-10 RX ADMIN — NICOTINE POLACRILEX 4 MG: 2 GUM, CHEWING BUCCAL at 09:31

## 2020-12-10 RX ADMIN — CLOZAPINE 450 MG: 100 TABLET ORAL at 20:40

## 2020-12-10 RX ADMIN — OMEPRAZOLE 20 MG: 20 CAPSULE, DELAYED RELEASE ORAL at 08:43

## 2020-12-10 RX ADMIN — NICOTINE POLACRILEX 4 MG: 2 GUM, CHEWING BUCCAL at 16:14

## 2020-12-10 RX ADMIN — PRAZOSIN HYDROCHLORIDE 3 MG: 2 CAPSULE ORAL at 20:40

## 2020-12-10 RX ADMIN — NICOTINE POLACRILEX 4 MG: 2 GUM, CHEWING BUCCAL at 12:01

## 2020-12-10 RX ADMIN — NICOTINE POLACRILEX 4 MG: 2 GUM, CHEWING BUCCAL at 17:29

## 2020-12-10 RX ADMIN — VENLAFAXINE HYDROCHLORIDE 225 MG: 150 TABLET, EXTENDED RELEASE ORAL at 08:43

## 2020-12-10 RX ADMIN — NICOTINE POLACRILEX 4 MG: 2 GUM, CHEWING BUCCAL at 19:23

## 2020-12-10 RX ADMIN — OLANZAPINE 10 MG: 10 TABLET, FILM COATED ORAL at 14:17

## 2020-12-10 RX ADMIN — HYDROXYZINE HYDROCHLORIDE 50 MG: 25 TABLET, FILM COATED ORAL at 14:17

## 2020-12-10 RX ADMIN — NICOTINE POLACRILEX 4 MG: 2 GUM, CHEWING BUCCAL at 13:52

## 2020-12-10 RX ADMIN — BENZTROPINE MESYLATE 1 MG: 1 TABLET ORAL at 08:43

## 2020-12-10 RX ADMIN — LEVOTHYROXINE SODIUM 112 MCG: 112 TABLET ORAL at 08:43

## 2020-12-10 RX ADMIN — DOCUSATE SODIUM AND SENNOSIDES 1 TABLET: 8.6; 5 TABLET ORAL at 13:52

## 2020-12-10 RX ADMIN — DOCUSATE SODIUM 100 MG: 100 CAPSULE, LIQUID FILLED ORAL at 08:43

## 2020-12-10 RX ADMIN — POLYETHYLENE GLYCOL 3350 17 G: 17 POWDER, FOR SOLUTION ORAL at 14:15

## 2020-12-10 RX ADMIN — BENZTROPINE MESYLATE 1 MG: 1 TABLET ORAL at 20:40

## 2020-12-10 RX ADMIN — NICOTINE POLACRILEX 4 MG: 2 GUM, CHEWING BUCCAL at 20:40

## 2020-12-10 ASSESSMENT — ACTIVITIES OF DAILY LIVING (ADL)
DRESS: INDEPENDENT;SCRUBS (BEHAVIORAL HEALTH)
ORAL_HYGIENE: INDEPENDENT
HYGIENE/GROOMING: HANDWASHING;SHOWER;INDEPENDENT

## 2020-12-10 NOTE — PROVIDER NOTIFICATION
Patient awake, makes his bed, eats breakfast and has been walking halls, able to verbalize his needs.  Writer encourages patient to attend just one group this morning, using nicotine gum and denies any need for prn medication, no significant anxiety or disturbing thoughts. Patient appears to be coping well and socializing.  Will continue to monitor every 15 minutes.       12/10/20 1044   Coping/Psychosocial   Verbalized Emotional State acceptance;hopefulness   Plan of Care Reviewed With patient   Patient Agreement with Plan of Care agrees   Family/Support System Care support provided;self-care encouraged   Psycho Education   Type of Intervention 1:1 intervention   Response observes from a distance   Treatment Detail assessment   Behavioral Health   1. Wish to be Dead (Recent) No   Wish to be Dead Description (Recent)   (patient in group, no SI/SiB reports or actions)   2. Non-Specific Active Suicidal Thoughts (Recent) No   Non-Specific Active Suicidal Thought Description (Recent) no SI/SIB reports or actions   3. Active Sucidal Ideation with any Methods (Not Plan) Without Intent to Act (Recent) No   Active Suicidal Ideation with any Methods (Not Plan) Description (Recent) pt denies any current thoughts/no actions   4. Active Suicidal Ideation with Some Intent to Act, Without Specific Plan (Recent) No   5. Active Suicidal Ideation with Specific Plan and Intent (Recent) No   Duration (Lifetime) 1   Change in Protective Factors? No   Enviromental Risk Factors None   Self Injury other (see comment)  (denies thoughts and no SIB)   Elopement (WDL) WDL   Activity (WDL) WDL

## 2020-12-10 NOTE — PLAN OF CARE
Patient is observed in the milieu for the shift.  Patient observed going in and out of group.  He asked if we could discontinue the SIO.  Patient stated he has not had any unsafe behaviors in the last two days.  Patient denies thoughts of SI, SIB and HI.  Patient has remained calm this shift and did not have symptoms of uncontrolled anxiety.  Patient is told we will bring his suggestion to team.  Patient is given a top sheet per his request.  As he has demonstrated his ability to remain safe this shift. Will continue to monitor.

## 2020-12-10 NOTE — PROVIDER NOTIFICATION
"Patient reports good appetite, states he had small BM \"kind of hard, think I'm constipated\"  Patient requests mirilax, denies any help from the anthony-colace/senakot he took yesterday.  Patient calm, denies any SI/SIB and remains safe today verbalizing needs; patient agrees to take another prn anthony-colace/senakot now, increase fluids/walk halls and writer will ask provider for mirilax prn order.  Patient agrees to this Plan of care.       12/10/20 0944   Behavioral Health   1. Wish to be Dead (Recent) No   Wish to be Dead Description (Recent)   (denies any SI thoughts)   2. Non-Specific Active Suicidal Thoughts (Recent) No   Non-Specific Active Suicidal Thought Description (Recent)   (denies SI)   3. Active Sucidal Ideation with any Methods (Not Plan) Without Intent to Act (Recent) No   4. Active Suicidal Ideation with Some Intent to Act, Without Specific Plan (Recent) No   5. Active Suicidal Ideation with Specific Plan and Intent (Recent) No   Duration (Lifetime) 1   Change in Protective Factors? No   Enviromental Risk Factors None   Self Injury other (see comment)  (no self injurious reports or actions)   Elopement (WDL) WDL   Elopement Statements about wanting to leave   Activity (WDL) WDL     "

## 2020-12-10 NOTE — PLAN OF CARE
"  Problem: Suicidal Behavior  Goal: Suicidal Behavior is Absent or Managed  Outcome: Improving  Flowsheets (Taken 12/10/2020 4899)  Mutually Determined Action Steps (Suicidal Behavior Absent/Managed): sets future-oriented goal    Patient given prn's including mirilax prior to taking nap and prior to change of shift, patient states \"I feel  good, but want to do what I did yesterday because it worked,\" he goes on to say, \"I got what I came here for and ready to go back home\" patient shares with writer his very large screen television, lava lamp and rainbow lights that he is wanting to enjoy and ready to go back to work within a week or so.  No aggression or self  injurious thoughts or behavior for greater than 48 hours.  Patient no longer on SIO and doing well.  Nursing to follow up with patient to ensure has bowel movement.     "

## 2020-12-10 NOTE — PROGRESS NOTES
[facility], [place]   Psychiatric Progress Note        Interim History:   The patient's care was discussed with the treatment team during the daily team meeting and/or staff's chart notes were reviewed.  Staff report patient is going to groups. He has been safe on the unit for 48 hours    Psychiatric symptoms and interventions:  Patient reports no suicidal thinking for the last 24 hours. He has been safe on the unit for 72 hours. 1:1 was discontinued. Patient is focusing on using skills to mange his symptoms. Patient has been cooperative with taking his medications. Clozapine level is therapeutic.     Medical:  Latent tuberculosis  GERD  Hypothyroidism      Reviewed admission labs: CMP WNL, TSH 3.79, CBC with diff , COVID screen negative, UTOX is negative.  Levels as of  12/7  Clozapine level 411  Norclozapine 311  Total 722     Monthly lab draws       Behavioral/psychological/social:   Encouraged patient to attend therapeutic hospital programming as tolerated   12/9 Restarted1:1- Patient Notried to strangle self with pillow case.   No room mate order  Cheeking precuations         Medications:       benztropine  1 mg Oral BID     cloZAPine  450 mg Oral At Bedtime     docusate sodium  100 mg Oral BID     levothyroxine  112 mcg Oral Daily     omeprazole  20 mg Oral Daily     prazosin  3 mg Oral At Bedtime     venlafaxine  225 mg Oral Daily with breakfast          Allergies:     Allergies   Allergen Reactions     Haldol [Haloperidol]      Other reaction(s): Tardive Dyskinesia  Torticollis  Torticollis          Labs:   No results found for this or any previous visit (from the past 24 hour(s)).       Psychiatric Examination:     /89   Pulse 100   Temp 98.3  F (36.8  C) (Tympanic)   Resp 17   Wt 104.3 kg (230 lb)   SpO2 100%   BMI 33.00 kg/m    Weight is 230 lbs 0 oz  Body mass index is 33 kg/m .  Orthostatic Vitals     None         Appearance: awake, alert, adequately groomed and  dressed in hospital scrubs  Attitude:  cooperative  Eye Contact:  good  Mood:  tired  Affect:  intensity is blunted  Speech:  normal prosody  Psychomotor Behavior:  no evidence of tardive dyskinesia, dystonia, or tics  Throught Process:  goal oriented  Associations:  no loose associations  Thought Content: denies  passive suicidal ideation   Insight:  fair  Judgement:  fair  Oriented to:  time, person, and place  Attention Span and Concentration:  fair  Recent and Remote Memory:  intact    Clinical Global Impressions  First:  Considering your total clinical experience with this particular patient population, how severe are the patient's symptoms at this time?: 6 (12/06/20 1127)  Compared to the patient's condition at the START of treatment, this patient's condition is: 4 (12/06/20 1127)  Most recent:  Considering your total clinical experience with this particular patient population, how severe are the patient's symptoms at this time?: 6 (12/06/20 1127)  Compared to the patient's condition at the START of treatment, this patient's condition is: 4 (12/06/20 1127)         Precautions:     Behavioral Orders   Procedures     Cheeking Precautions (behavioral units)     Patient Observed swallowing PO medications; Patient asked to drink water after swallowing medication; Patient in Staff line of sight for 15 minutes after medication given; Mouth checks after PO administration (patient asked to open mouth and stick out their tongue).     Code 1 - Restrict to Unit     Discontinue 1:1 attendant for suicide risk     Order Specific Question:   I have performed an in person assessment of the patient     Answer:   Based on this assessment the patient no longer requires a one on one attendant at this point in time.     Discontinue 1:1 attendant for suicide risk     Order Specific Question:   I have performed an in person assessment of the patient     Answer:   Based on this assessment the patient no longer requires a one on one  attendant at this point in time.     Routine Programming     As clinically indicated     Self Injury Precaution     Single Room     Status 15     Every 15 minutes.     Status Individual Observation     Patient SIO status reviewed with team/RN.  Please also refer to RN/team documentation for add'l detail.    -SIO staff to monitor following which have contributed to patient being on SIO:  Patient put pillow case around his neck and staff had to put hands on to remove the pillow case.   -Possible interventions SIO staff could use to support patient's treatment progress:  Support patient with distraction, calming coping skills and reassurance.   -When following observed, team will review discontinuation of SIO:  Patient will remain safe from SIB, suicidal gestures for 48 hours.     Order Specific Question:   CONTINUOUS 24 hours / day     Answer:   5 feet     Order Specific Question:   Indications for SIO     Answer:   Self-injury risk     Suicide precautions     Patients on Suicide Precautions should have a Combination Diet ordered that includes a Diet selection(s) AND a Behavioral Tray selection for Safe Tray - with utensils, or Safe Tray - NO utensils            DIagnoses:   Schizoaffective disorder, depressive type  Borderline personality disorder  PTSD  Nicotine use disorder         Plan:   Legal status: Voluntary      Medication management:   Clozapine 450 mg   Atropine 2 drops BIDPRN for sialorrhea   Colace scheduled and Senna PRN for medication induced constipation.   Venlafaxine 225 mg maxine mood  Prazosin 3 mg for nightmares  Synthroid 112 mcg     Disposition plan:   Reason for continued hospitalization: Patient is unable to care for self and is a risk to self.   Stabilize with medications 5-6 until discharge   Return to group home on Friday, Meds ordered,  is 2:00 pm     CONSENT FOR TELEMEDICINE VISIT:  The patient's condition can be safely assessed and treated via synchronous audio and visual  telemedicine encounter.      START TIME:   11:56 am    STOP TIME:  12:05 pm    REASON FOR TELEMEDICINE VISIT:  COVID-19.      ORIGINATING SITE (PATIENT LOCATION):  [facility], [address].      DISTANT SITE (PROVIDER LOCATION):  Provider in remote setting.      CONSENT:  The patient/guardian has verbally consented to potential risks and benefits of telemedicine (video visit) versus in-person care, bill my insurance or make self-payment for services provided and responsibility for payment of noncovered services.      MODE OF COMMUNICATION:  Video conference via [employer].      As the provider, I attest to compliance with applicable laws and regulations related to telemedicine.

## 2020-12-10 NOTE — DISCHARGE SUMMARY
"Psychiatric Discharge Summary    Kamini Neal MRN# [id number]   Age: 30 year old YOB: 1990     Date of Admission:  12/5/2020  Date of Discharge:  12/11/2020  Admitting Physician:  Ulices Richmond MD  Discharge Physician:  Debra A. Naegele, APRN CNS (Contact: [phone])         Event Leading to Hospitalization:   Kamini Neal is a 30-year-old male admitted to [address] on 12/5/2020.  He was admitted as a voluntary patient through the ER due to suicidal ideation with a plan to set himself on fire.  He smokes cigarettes and has access to lighters.  He has lived in the same group home, [facility], for 8 years and says he loves it.  Three other residents live there as well.  Group home staff report he has been expressing suicidal thoughts for 1 week.  They are concerned because when he perseverates on suicidal thoughts, he tends to act impulsively.  He banged his head while he was in the ER.  He works 30 hours per week as a .  Stressors include his belief that coworkers have been making negative statements about him.  \"They probably hate me or something like that.\"   He has been taking medications as prescribed and report they are beneficial, especially the Clozaril.  He denies recent substance use, and UTOX was negative.            See Admission note by  Merry Rinaldi APRN CNPon 12/6/2020  for additional details.          DIagnoses:     Schizoaffective disorder, depressive type  Borderline personality disorder  PTSD  Nicotine use disorder         Labs:     Results for orders placed or performed during the hospital encounter of 12/05/20   Drug abuse screen 6 urine (tox)     Status: None   Result Value Ref Range    Amphetamine Qual Urine Negative NEG^Negative    Barbiturates Qual Urine Negative NEG^Negative    Benzodiazepine Qual Urine Negative NEG^Negative    Cannabinoids Qual Urine Negative NEG^Negative    Cocaine Qual Urine Negative NEG^Negative    Ethanol " Qual Urine Negative NEG^Negative    Opiates Qualitative Urine Negative NEG^Negative   Asymptomatic COVID-19 Virus (Coronavirus) by PCR     Status: None    Specimen: Nasopharyngeal   Result Value Ref Range    COVID-19 Virus PCR to [facility] - Source Nasopharyngeal     COVID-19 Virus PCR to [facility] - Result       Test received-See reflex to IDDL test SARS CoV2 (COVID-19) Virus RT-PCR   CBC with platelets differential     Status: None   Result Value Ref Range    WBC 9.4 4.0 - 11.0 10e9/L    RBC Count 4.46 4.4 - 5.9 10e12/L    Hemoglobin 14.4 13.3 - 17.7 g/dL    Hematocrit 42.1 40.0 - 53.0 %    MCV 94 78 - 100 fl    MCH 32.3 26.5 - 33.0 pg    MCHC 34.2 31.5 - 36.5 g/dL    RDW 12.7 10.0 - 15.0 %    Platelet Count 234 150 - 450 10e9/L    Diff Method Automated Method     % Neutrophils 62.8 %    % Lymphocytes 27.9 %    % Monocytes 7.6 %    % Eosinophils 0.1 %    % Basophils 0.4 %    % Immature Granulocytes 1.2 %    Nucleated RBCs 0 0 /100    Absolute Neutrophil 5.9 1.6 - 8.3 10e9/L    Absolute Lymphocytes 2.6 0.8 - 5.3 10e9/L    Absolute Monocytes 0.7 0.0 - 1.3 10e9/L    Absolute Eosinophils 0.0 0.0 - 0.7 10e9/L    Absolute Basophils 0.0 0.0 - 0.2 10e9/L    Abs Immature Granulocytes 0.1 0 - 0.4 10e9/L    Absolute Nucleated RBC 0.0    Comprehensive metabolic panel     Status: Abnormal   Result Value Ref Range    Sodium 140 133 - 144 mmol/L    Potassium 3.8 3.4 - 5.3 mmol/L    Chloride 110 (H) 94 - 109 mmol/L    Carbon Dioxide 27 20 - 32 mmol/L    Anion Gap 3 3 - 14 mmol/L    Glucose 124 (H) 70 - 99 mg/dL    Urea Nitrogen 9 7 - 30 mg/dL    Creatinine 0.72 0.66 - 1.25 mg/dL    GFR Estimate >90 >60 mL/min/[1.73_m2]    GFR Estimate If Black >90 >60 mL/min/[1.73_m2]    Calcium 8.5 8.5 - 10.1 mg/dL    Bilirubin Total 0.4 0.2 - 1.3 mg/dL    Albumin 3.9 3.4 - 5.0 g/dL    Protein Total 7.1 6.8 - 8.8 g/dL    Alkaline Phosphatase 127 40 - 150 U/L    ALT 23 0 - 70 U/L    AST 12 0 - 45 U/L   TSH with free T4 reflex     Status: None    Result Value Ref Range    TSH 3.79 0.40 - 4.00 mU/L   SARS-CoV-2 COVID-19 Virus (Coronavirus) RT-PCR Nasopharyngeal     Status: None    Specimen: Nasopharyngeal   Result Value Ref Range    SARS-CoV-2 Virus Specimen Source Nasopharyngeal     SARS-CoV-2 PCR Result NEGATIVE     SARS-CoV-2 PCR Comment       Testing was performed using the Aptima SARS-CoV-2 Assay on the [employer] Instrument System.   Additional information about this Emergency Use Authorization (EUA) assay can be found via   the Lab Guide.     WBC and differential     Status: None   Result Value Ref Range    WBC 7.1 4.0 - 11.0 10e9/L    Diff Method Automated Method     % Neutrophils 64.6 %    % Lymphocytes 24.4 %    % Monocytes 9.5 %    % Eosinophils 0.0 %    % Basophils 0.4 %    % Immature Granulocytes 1.1 %    Nucleated RBCs 0 0 /100    Absolute Neutrophil 4.6 1.6 - 8.3 10e9/L    Absolute Lymphocytes 1.7 0.8 - 5.3 10e9/L    Absolute Monocytes 0.7 0.0 - 1.3 10e9/L    Absolute Eosinophils 0.0 0.0 - 0.7 10e9/L    Absolute Basophils 0.0 0.0 - 0.2 10e9/L    Abs Immature Granulocytes 0.1 0 - 0.4 10e9/L    Absolute Nucleated RBC 0.0    Lipid panel     Status: None   Result Value Ref Range    Cholesterol 152 <200 mg/dL    Triglycerides 133 <150 mg/dL    HDL Cholesterol 42 >39 mg/dL    LDL Cholesterol Calculated 83 <100 mg/dL    Non HDL Cholesterol 110 <130 mg/dL   TSH with free T4 reflex and/or T3 as indicated     Status: None   Result Value Ref Range    TSH 1.43 0.40 - 4.00 mU/L   Clozapine and Metabolites Quant     Status: None   Result Value Ref Range    Clozapine Quant 411 ng/mL    Norclozapine Quant 311 ng/mL    Clozapine-N-Oxide Quant <100 ng/mL    Clozapine and Metabolites Total 722 <=1,500 ng/mL            Consults:   No consultations were requested during this admission         Hospital Course:   Kamini Neal was admitted to Station 4[facility] with attending Ulices Richmond MD as a voluntary patient. The patient was placed under status 15 (15  minute checks) to ensure patient safety.     Continued clozapine 450 mg at bedtime to address delusional thinking . Patient believes he gets signs which he inteprepts that he should harm himself.  Patient has a history of noncompliance with his medications.     Clozapine level 411, Norclozapine 311 for a total of 722. Patient's level is therapeutic.    Added Miralax to colace for drug induced constipation. Patient used Zyprexa PRN to manage his agitation. He used atropine drops for excess secretions. Discussed risks, benefits and side effects of his medications.     Discussed smoking cessation. Patient was educated that cigarette smoking can reduce the efficacy of clozapine. Recommendation was to abstain.       Kamini Neal did participate in groups and was visible in the milieu.     The patient's symptoms of suicidal thinking improved. Patient used coping skills to manage his urges for SIB. Patient was on 1:1 for  two days after he tried to wrap a pillow case around his neck. Patient has been safe on the unit and has not needed 1:1 observation for safety. He is using coping skills and talking with staff. Patient has protective factors of seeking out help and returning to his group home for support. He has a job that he really likes as .    Patient no longer meets criteria for hospital level of care.     Kamini Neal was released to group home. At the time of discharge Kamini Neal was determined to not be a danger to himself or others.          Discharge Medications:     Current Discharge Medication List      CONTINUE these medications which have CHANGED    Details   OLANZapine (ZYPREXA) 10 MG tablet Take 1 tablet (10 mg) by mouth daily as needed (associated with psychosis or nicholas)  Qty: 30 tablet, Refills: 0    Associated Diagnoses: Schizoaffective disorder, unspecified type (H)      Miralax 17 gm use daily PRN for constipation   CONTINUE these medications which have NOT CHANGED    Details    benztropine (COGENTIN) 1 MG tablet Take 1 tablet (1 mg) by mouth 2 times daily  Qty: 60 tablet, Refills: 0    Associated Diagnoses: Schizoaffective disorder, bipolar type (H)      !! cloZAPine (CLOZARIL) 100 MG tablet Take 400 mg by mouth At Bedtime Take along with two 25mg tablets for total dose of 450mg at bedtime.      !! cloZAPine (CLOZARIL) 25 MG tablet Take 50 mg by mouth At Bedtime Take along with four 100 mg tablets for a total dose of 450 mg at bedtime      docusate sodium (COLACE) 100 MG capsule Take 100 mg by mouth 2 times daily       levothyroxine (SYNTHROID/LEVOTHROID) 112 MCG tablet Take 112 mcg by mouth daily      omeprazole (PRILOSEC) 20 MG DR capsule Take 20 mg by mouth daily      paliperidone (INVEGA SUSTENNA) 234 MG/1.5ML ANTHONY Inject 234 mg into the muscle every 28 days      prazosin (MINIPRESS) 1 MG capsule Take 3 capsules (3 mg) by mouth At Bedtime This product only available from a Compounding Pharmacy.  Qty: 90 capsule, Refills: 0    Associated Diagnoses: PTSD (post-traumatic stress disorder)      venlafaxine (EFFEXOR-ER) 75 MG TB24 24 hr tablet Take 3 tablets (225 mg) by mouth daily (with breakfast)  Qty: 90 each, Refills: 0    Associated Diagnoses: Schizoaffective disorder, bipolar type (H)       !! - Potential duplicate medications found. Please discuss with provider.               Psychiatric Examination:   Appearance:  awake, alert and adequately groomed  Attitude:  cooperative  Eye Contact:  good  Mood:  better  Affect:  appropriate and in normal range  Speech:  clear, coherent  Psychomotor Behavior:  no evidence of tardive dyskinesia, dystonia, or tics  Thought Process:  goal oriented  Associations:  no loose associations  Thought Content:  no evidence of suicidal ideation or homicidal ideation, no auditory hallucinations present and no visual hallucinations present  Insight:  fair  Judgment:  fair  Oriented to:  time, person, and place  Attention Span and Concentration:   intact  Recent and Remote Memory:  intact  Language: Able to name objects, Able to repeat phrases and Able to read and write  Fund of Knowledge: Low average  Muscle Strength and Tone: normal  Gait and Station: Normal         Discharge Plan:   Behavioral Discharge Planning and Instructions        Summary:  You were admitted on 12/5/2020  due to worsening depression and suicidal ideations .  You were treated by Debra Naegele, APRN, CNS and discharged on 12/11/2020 from [facility] to Group Home     Principal Diagnosis:   Schizoaffective disorder, depressive type  Borderline personality disorder  PTSD  Nicotine use disorder     Health Care Follow-up Appointments:   Group Home:   [facility]  [facility] [phone]     Medication Management:  Date/Time: Group home will assist in scheduling follow up appointment    Provider: Marco Campo  Address: [address]  Phone:[phone]  Fax: [phone]     Attend all scheduled appointments with your outpatient providers. Call at least 24 hours in advance if you need to reschedule an appointment to ensure continued access to your outpatient providers.   Major Treatments, Procedures and Findings:  You were provided with: a psychiatric assessment, assessed for medical stability, medication evaluation and/or management and group therapy     Symptoms to Report: feeling more aggressive, increased confusion, losing more sleep, mood getting worse or thoughts of suicide     Early warning signs can include: increased depression or anxiety sleep disturbances increased thoughts or behaviors of suicide or self-harm  increased unusual thinking, such as paranoia or hearing voices     Safety and Wellness:  Take all medicines as directed.  Make no changes unless your doctor suggests them.      Follow treatment recommendations.  Refrain from alcohol and non-prescribed drugs.  Ask your support system to help you reduce your access to items that could harm yourself  "or others. Items could include:  Firearms  Medicines (both prescribed and over-the-counter)  Knives and other sharp objects  Ropes and like materials  Car keys  If there is a concern for safety, call 911. If there is a concern for safety, call 911.     Resources:   Crisis Intervention: 807.414.6562 or 235-788-6100 (TTY: 455.867.5740).  Call anytime for help.  National [place] on Mental Illness (www.[place].jorge a.org): 882.169.4004 or 558-341-2635.  National Suicide Prevention Line (www.mentalhealthmn.org): 510-696-HDBR (4230)  [facility] Crisis (COPE) Response - Adult 237 302-0266  Text 4 Life: txt \"LIFE\" to 94587 for immediate support and crisis intervention  Crisis text line: Text \"[place]\" to 323533. Free, confidential, 24/7.     The treatment team has appreciated the opportunity to work with you.     Abdhola, please take care and make your recovery a daily recovery.   If you have any questions or concerns our unit number is [phone]     If you would like to obtain any specific documentation regarding your hospitalization after your discharge, contact [facility] Release of Information/Medical Records:  [phone]               Attestation:  The patient has been seen and evaluated by me,  Debra A. Naegele, APRN CNS on 12/11/2020  Discharge summary time > 30 minutes        CONSENT FOR TELEMEDICINE VISIT:  The patient's condition can be safely assessed and treated via synchronous audio and visual telemedicine encounter.      START TIME:   11:56 am     STOP TIME:  12:05 pm     REASON FOR TELEMEDICINE VISIT:  COVID-19.      ORIGINATING SITE (PATIENT LOCATION):  [facility], unit 4A.      DISTANT SITE (PROVIDER LOCATION):  Provider in remote setting.      CONSENT:  The patient/guardian has verbally consented to potential risks and benefits of telemedicine (video visit) versus in-person care, bill my insurance or make self-payment for services provided and responsibility for payment of noncovered services. "      MODE OF COMMUNICATION:  Video conference via [employer].      As the provider, I attest to compliance with applicable laws and regulations related to telemedicine.

## 2020-12-11 VITALS
BODY MASS INDEX: 31.57 KG/M2 | SYSTOLIC BLOOD PRESSURE: 138 MMHG | RESPIRATION RATE: 16 BRPM | HEART RATE: 94 BPM | TEMPERATURE: 97.9 F | OXYGEN SATURATION: 99 % | WEIGHT: 220 LBS | DIASTOLIC BLOOD PRESSURE: 76 MMHG

## 2020-12-11 PROCEDURE — 250N000013 HC RX MED GY IP 250 OP 250 PS 637: Performed by: NURSE PRACTITIONER

## 2020-12-11 PROCEDURE — 99239 HOSP IP/OBS DSCHRG MGMT >30: CPT | Mod: 95 | Performed by: CLINICAL NURSE SPECIALIST

## 2020-12-11 RX ADMIN — VENLAFAXINE HYDROCHLORIDE 225 MG: 150 TABLET, EXTENDED RELEASE ORAL at 08:53

## 2020-12-11 RX ADMIN — NICOTINE POLACRILEX 4 MG: 2 GUM, CHEWING BUCCAL at 10:57

## 2020-12-11 RX ADMIN — DOCUSATE SODIUM 100 MG: 100 CAPSULE, LIQUID FILLED ORAL at 08:52

## 2020-12-11 RX ADMIN — NICOTINE POLACRILEX 4 MG: 2 GUM, CHEWING BUCCAL at 12:23

## 2020-12-11 RX ADMIN — BENZTROPINE MESYLATE 1 MG: 1 TABLET ORAL at 08:53

## 2020-12-11 RX ADMIN — LEVOTHYROXINE SODIUM 112 MCG: 112 TABLET ORAL at 08:53

## 2020-12-11 RX ADMIN — NICOTINE POLACRILEX 4 MG: 2 GUM, CHEWING BUCCAL at 08:56

## 2020-12-11 RX ADMIN — OMEPRAZOLE 20 MG: 20 CAPSULE, DELAYED RELEASE ORAL at 08:53

## 2020-12-11 ASSESSMENT — ACTIVITIES OF DAILY LIVING (ADL)
HYGIENE/GROOMING: INDEPENDENT
ORAL_HYGIENE: INDEPENDENT
DRESS: INDEPENDENT
LAUNDRY: WITH SUPERVISION

## 2020-12-11 NOTE — PROGRESS NOTES
Pt participated in dance/movement therapy (DMT) using self-determined then peer-chosen movements exploring a range of physical and emotional qualities.  Group processes moved from individual, to bridging out to social connection & support, then culminated in synchronous shared movement embodying a sense of shared purpose.      Pt mirrored this group process, initially appearing self-focused and more hard in his social engagement, then softening and contributing to peers and the group as a whole.         12/10/20 1015   Dance Movement Therapy   Type of Intervention structured groups   Response participates, initiates socially appropriate   Hours 1

## 2020-12-11 NOTE — PROGRESS NOTES
Work Completed: Attended team. Completed AVS    Discharge plan or goal:  Return to group home with current OP services                Barriers to discharge: None pt is discharging today at 2pm. Group home will p/u

## 2020-12-11 NOTE — PLAN OF CARE
Problem: Psychotic Symptoms  Goal: Psychotic Symptoms  Description: Signs and symptoms of listed problems will be absent or manageable.  Outcome: Improving  Pt has had a good evening with being out in the milieu most of the night. He doesn't participate but he is present so we have observed him most of the evening. He denies SI at this moment.  He is easily redirectable, showered & is eating well.

## 2020-12-11 NOTE — PLAN OF CARE
The patient specific goals include:   Patient will participate in unit programming  Patient will identify triggers and positive coping skills  Patient will take medications as prescribed by physician both for mental health and medical  Patient coached to work on coping packet    The patient identified the following reasons for hospitalization:  Confused  Suicidal    The patient identified the following goals for discharge:      Take meds      Speak with staff if need be

## 2020-12-11 NOTE — PLAN OF CARE
Alert and oriented x 4. Denied being suicidal or thoughts to hurt self or others.Denied any pain or discomfort.Patient discharged  today to a group home. Patient took along prescriptions filled by discharge pharmacy / as well as all personal belongings.Discharge instructions/ current medications reviewed with patient. Copy of discharge instructions given to pt. Questions answered. Patient offered no other concerns at time of discharge.

## 2020-12-11 NOTE — DISCHARGE INSTRUCTIONS
Behavioral Discharge Planning and Instructions      Summary:  You were admitted on 12/5/2020  due to worsening depression and suicidal ideations .  You were treated by Debra Naegele, APRN, CNS and discharged on 12/11/2020 from [facility] to Group Home    Principal Diagnosis:   Schizoaffective disorder, depressive type  Borderline personality disorder  PTSD  Nicotine use disorder    Health Care Follow-up Appointments:   Group Home:   [facility]  [facility] [phone]    Medication Management:  Date/Time: Group home will assist in scheduling follow up appointment    Provider: Marco Campo  Address: [address]  Phone:[phone]  Fax: [phone]    Attend all scheduled appointments with your outpatient providers. Call at least 24 hours in advance if you need to reschedule an appointment to ensure continued access to your outpatient providers.   Major Treatments, Procedures and Findings:  You were provided with: a psychiatric assessment, assessed for medical stability, medication evaluation and/or management and group therapy    Symptoms to Report: feeling more aggressive, increased confusion, losing more sleep, mood getting worse or thoughts of suicide    Early warning signs can include: increased depression or anxiety sleep disturbances increased thoughts or behaviors of suicide or self-harm  increased unusual thinking, such as paranoia or hearing voices    Safety and Wellness:  Take all medicines as directed.  Make no changes unless your doctor suggests them.      Follow treatment recommendations.  Refrain from alcohol and non-prescribed drugs.  Ask your support system to help you reduce your access to items that could harm yourself or others. Items could include:  Firearms  Medicines (both prescribed and over-the-counter)  Knives and other sharp objects  Ropes and like materials  Car keys  If there is a concern for safety, call 911. If there is a concern for safety, call  "911.    Resources:   Crisis Intervention: 610.323.7675 or 864-587-0968 (TTY: 447.604.7066).  Call anytime for help.  National [place] on Mental Illness (www.mn.jorge a.org): 372.877.9792 or 167-105-2488.  National Suicide Prevention Line (www.mentalhealthmn.org): 212-633-MHHB (7103)  [facility] Crisis (COPE) Response - Adult 507 556-4318  Text 4 Life: txt \"LIFE\" to 83807 for immediate support and crisis intervention  Crisis text line: Text \"[place]\" to 207879. Free, confidential, 24/7.    The treatment team has appreciated the opportunity to work with you.     Kamini, please take care and make your recovery a daily recovery.   If you have any questions or concerns our unit number is [phone]    If you would like to obtain any specific documentation regarding your hospitalization after your discharge, contact [place] Release of Information/Medical Records:  [phone]            "

## 2020-12-29 NOTE — PLAN OF CARE
"Patient was checked in for today's appointment with Kerri Pascual PA-C with the help of  Services.    Gilma Lee CMA     Loy Limon is a 69 year old male who is being evaluated via a billable telephone visit.      The patient has been notified of following:     \"This telephone visit will be conducted via a call between you and your physician/provider. We have found that certain health care needs can be provided without the need for a physical exam.  This service lets us provide the care you need with a short phone conversation.  If a prescription is necessary we can send it directly to your pharmacy.  If lab work is needed we can place an order for that and you can then stop by our lab to have the test done at a later time.    Telephone visits are billed at different rates depending on your insurance coverage. During this emergency period, for some insurers they may be billed the same as an in-person visit.  Please reach out to your insurance provider with any questions.    If during the course of the call the physician/provider feels a telephone visit is not appropriate, you will not be charged for this service.\"    Patient has given verbal consent for Telephone visit?  Yes    What phone number would you like to be contacted at? [phone]    How would you like to obtain your AVS? Mail a copy    Phone call duration: *** minutes    {signature options:[id number]}      " "Problem: General Rehab Plan of Care  Goal: Occupational Therapy Goals  The patient and/or their representative will achieve their patient-specific goals related to the plan of care.  The patient-specific goals include:  Pt agreed to attend OT Exercise group today but left the lounge promptly after being instructed to sit in a chair. Affect was anxious. Response seemed to be paranoid in nature. Pt could not be convinced to return and left with a vague statement. \"I don't want to...\" Decision making was impulsive.      "

## 2021-01-03 NOTE — PROGRESS NOTES
07/07/18 1021   Patient Belongings   Did you bring any home meds/supplements to the hospital?  No   Patient Belongings cell phone/electronics;clothing;shoes;wallet;other (see comments)   Disposition of Belongings Locker   Belongings Search Yes   Clothing Search Yes   Second Staff MAXINE Cartwright     In locker labeled with name:  -hat  - cut-off shirt  - Boxers  - Green cargo pants  -Blue shoes  -Lighter  -Phone  -Wallet containing:   - Insurance card   - [place] I.D.   - various receipts   - misc. coins    Sent down to security in envelope #[id number]   Visa ending [phone]   Visa ending [phone]    Pt had no cash, medications or other valuables    A               Admission:  I am responsible for any personal items that are not sent to the safe or pharmacy.  [facility] is not responsible for loss, theft or damage of any property in my possession.    Signature:  _________________________________ Date: _______  Time: _____                                              Staff Signature:  ____________________________ Date: ________  Time: _____      2nd Staff person, if patient is unable/unwilling to sign:    Signature: ________________________________ Date: ________  Time: _____     Discharge:  [facility] has returned all of my personal belongings:    Signature: _________________________________ Date: ________  Time: _____                                          Staff Signature:  ____________________________ Date: ________  Time: _____        Subjective:    Patient seen and examined at bedside, shortness of breath improved. Currently on 2 L via nasal cannula. Still complaining about watery eyes, mild relief with saline eyedrops    Objective:    BP (!) 176/86   Pulse 58   Temp 97.7 °F (36.5 °C) (Oral)   Resp 16   SpO2 94%     Current medications that patient is taking have been reviewed. Heart:  RRR, no murmurs, gallops, or rubs. Lungs:  CTA bilaterally, no wheeze, rales or rhonchi  Abd: bowel sounds present, soft, nontender, nondistended, no masses  Extrem:  No cyanosis or edema    CBC:   Lab Results   Component Value Date    WBC 4.9 01/03/2021    RBC 3.97 01/03/2021    HGB 12.1 01/03/2021    HCT 39.6 01/03/2021    MCV 99.7 01/03/2021    MCH 30.5 01/03/2021    MCHC 30.6 01/03/2021    RDW 14.7 01/03/2021     01/03/2021    MPV 11.5 01/03/2021     BMP:    Lab Results   Component Value Date     01/03/2021    K 5.0 01/03/2021     01/03/2021    CO2 25 01/03/2021    BUN 49 01/03/2021    LABALBU 2.8 01/03/2021    CREATININE 1.2 01/03/2021    CALCIUM 9.7 01/03/2021    GFRAA 52 01/03/2021    LABGLOM 43 01/03/2021    GLUCOSE 103 01/03/2021        Assessment:    Patient Active Problem List   Diagnosis    Myeloma ([address]    Essential hypertension, benign    Localized edema    Thrombocytopenia, unspecified ([facility] [address]    Pneumonia due to COVID-19 virus    Elevated serum creatinine    Essential hypertension    Hypernatremia    Hypermagnesemia    Hypokalemia    Acute respiratory failure with hypoxia and hypercapnia (HCC)       Plan:  1. COVID 19 PNA              Continue with dexamethasone, completed remdesivir              Continue Rocephin and doxycycline, procal 0.51              Wean oxygen as tolerated  2. DVT px - eliquis    Discharge to nursing home.   PT OT evaluation    Please call my cell phone between 8pm-6am [phone]    4:05 PM  1/3/2021
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
## 2021-01-22 NOTE — PLAN OF CARE
Problem: Depressive Symptoms  Goal: Depressive Symptoms  Signs and symptoms of listed problems will be absent or manageable.   1. Mood stability   2. Absence of suicidal ideation/contracts for safety   3. Depressive s/sx resolved  4. Safety plan in place  5. Positive coping skills identified/utilized  6. Medication regiment established/compliance  7. Adequate sleep  8. Housing community support  9. F/u plan in place   Patient present with a flat and blunt affect. He was visible and social on the unit. Patient stated that he felt a lot better after getting wood removed. Patient stated to writer in 1:1 that he hopes to be able to discharge soon but he understands that he need to be here. Patient also stated that he doesn't want to go back to his group home and hopes that the staff here can find him a new one. Patient did request to discharge but staff was able to encourage him to stay. Patient showered and completed ADL's independently. Patient was given 400 mg Abilify maintena injection this shift. Patient has been cooperative and pleasant with staff and peers.        The patient is presenting to the ED requesting COVID-19 screening. The patient is currently asymptomatic and has no other medical complaints at this time. Denies headache,  fever, chills, chest pain, SOB, cough, body aches, nausea/vomiting/diarrhea, or change to sense of smell or taste.  The patient denies any recent travel or known exposure. The patient is presenting to the ED requesting COVID-19 screening. The patient is currently asymptomatic and has no other medical complaints at this time. Denies headache,  fever, chills, chest pain, SOB, cough, body aches, nausea/vomiting/diarrhea, or change to sense of smell or taste.  The patient denies any known exposure, but she did just return from [place] on Monday.

## 2021-04-27 ENCOUNTER — HOSPITAL ENCOUNTER (EMERGENCY)
Facility: CLINIC | Age: 31
Discharge: HOME | End: 2021-04-28
Attending: EMERGENCY MEDICINE | Admitting: EMERGENCY MEDICINE
Payer: COMMERCIAL

## 2021-04-27 DIAGNOSIS — R45.851 SUICIDAL THOUGHTS: ICD-10-CM

## 2021-04-27 PROCEDURE — 80307 DRUG TEST PRSMV CHEM ANLYZR: CPT | Performed by: EMERGENCY MEDICINE

## 2021-04-27 PROCEDURE — 80320 DRUG SCREEN QUANTALCOHOLS: CPT | Performed by: EMERGENCY MEDICINE

## 2021-04-27 PROCEDURE — 99284 EMERGENCY DEPT VISIT MOD MDM: CPT | Performed by: EMERGENCY MEDICINE

## 2021-04-27 PROCEDURE — 99285 EMERGENCY DEPT VISIT HI MDM: CPT | Mod: 25 | Performed by: EMERGENCY MEDICINE

## 2021-04-27 NOTE — LETTER
April 28, 2021      To Whom It May Concern:      Kamini Neal was seen in our Emergency Department today, 04/28/21.  I expect his condition to improve over the next 1-2 days.  He may return to work/school when improved.    Sincerely,        Bull Hameed MD

## 2021-04-28 VITALS
WEIGHT: 235 LBS | BODY MASS INDEX: 33.72 KG/M2 | OXYGEN SATURATION: 100 % | TEMPERATURE: 98.4 F | DIASTOLIC BLOOD PRESSURE: 87 MMHG | RESPIRATION RATE: 16 BRPM | HEART RATE: 77 BPM | SYSTOLIC BLOOD PRESSURE: 132 MMHG

## 2021-04-28 PROCEDURE — 90791 PSYCH DIAGNOSTIC EVALUATION: CPT

## 2021-04-28 ASSESSMENT — ENCOUNTER SYMPTOMS
SHORTNESS OF BREATH: 0
ABDOMINAL PAIN: 0
FEVER: 0

## 2021-04-28 NOTE — ED NOTES
Attempted to call group home, no answer, message left..  Day number [phone], after hours number [phone].

## 2021-04-28 NOTE — PROGRESS NOTES
2:45 AM   called group home and left voicemail to return call to main DEC line number or main ED number.    # for group home  1- #[phone]  2- #[phone]      Plan is for pt to discharge back to group home. Pt declined to schedule any outpatient therapy. Has psychiatrist and [facility] worker.

## 2021-04-28 NOTE — ED PROVIDER NOTES
ED Provider Note  [facility]      History     Chief Complaint   Patient presents with     Suicidal     dropped off by group home staff, reports suicidal thoughts without plans, increased depression over the past few days     The history is provided by the patient.     Kamini Neal is a 30 year old male with medical history significant for depressive disorder, anxiety, schizoaffective disorder, cannabis use disorder who presents to the ED for evaluation of suicidal ideation.  Patient reports suicidal ideation but comes and goes which is worse today.  Patient notes that he was at group home and denies any altercations.  No drug or alcohol use.    Past Medical History  Past Medical History:   Diagnosis Date     Anxiety      Depressive disorder      Schizo affective schizophrenia (H)      Past Surgical History:   Procedure Laterality Date     TONSILLECTOMY       benztropine (COGENTIN) 1 MG tablet  cloZAPine (CLOZARIL) 100 MG tablet  cloZAPine (CLOZARIL) 25 MG tablet  docusate sodium (COLACE) 100 MG capsule  levothyroxine (SYNTHROID/LEVOTHROID) 112 MCG tablet  OLANZapine (ZYPREXA) 10 MG tablet  omeprazole (PRILOSEC) 20 MG DR capsule  paliperidone (INVEGA SUSTENNA) 234 MG/1.5ML ANTHONY  polyethylene glycol (MIRALAX) 17 g packet  prazosin (MINIPRESS) 1 MG capsule  venlafaxine (EFFEXOR-ER) 75 MG TB24 24 hr tablet      Allergies   Allergen Reactions     Haldol [Haloperidol]      Other reaction(s): Tardive Dyskinesia  Torticollis  Torticollis     Family History  Family History   Problem Relation Age of Onset     Mental Illness Maternal Uncle      Mental Illness Maternal Aunt      Glaucoma No family hx of      Macular Degeneration No family hx of      Social History   Social History     Tobacco Use     Smoking status: Current Every Day Smoker     Packs/day: 0.50     Smokeless tobacco: Never Used   Substance Use Topics     Alcohol use: No     Drug use: No      Past medical history, past surgical  history, medications, allergies, family history, and social history were reviewed with the patient. No additional pertinent items.       Review of Systems   Constitutional: Negative for fever.   Respiratory: Negative for shortness of breath.    Cardiovascular: Negative for chest pain.   Gastrointestinal: Negative for abdominal pain.   Psychiatric/Behavioral: Positive for suicidal ideas.   All other systems reviewed and are negative.    A complete review of systems was performed with pertinent positives and negatives noted in the HPI, and all other systems negative.    Physical Exam   BP: 121/81  Pulse: 94  Temp: 98.4  F (36.9  C)  Resp: 16  Weight: 106.6 kg (235 lb)  SpO2: 98 %  Physical Exam  Vitals signs and nursing note reviewed.   Constitutional:       General: He is not in acute distress.     Appearance: He is well-developed.   HENT:      Head: Normocephalic.   Eyes:      Extraocular Movements: Extraocular movements intact.   Neck:      Musculoskeletal: Neck supple.   Pulmonary:      Effort: No respiratory distress.   Musculoskeletal:         General: No deformity.   Skin:     General: Skin is dry.   Neurological:      Mental Status: He is alert.      Comments: Sleepy but answering questions appropriately   Psychiatric:         Behavior: Behavior normal.       ED Course      Procedures         12:21 AM  The patient was seen and examined by  Serjio Alas DO in Room HW01.          Results for orders placed or performed during the hospital encounter of 04/27/21   Drug abuse screen 6 urine (tox)     Status: None   Result Value Ref Range    Amphetamine Qual Urine Negative NEG^Negative    Barbiturates Qual Urine Negative NEG^Negative    Benzodiazepine Qual Urine Negative NEG^Negative    Cannabinoids Qual Urine Negative NEG^Negative    Cocaine Qual Urine Negative NEG^Negative    Ethanol Qual Urine Negative NEG^Negative    Opiates Qualitative Urine Negative NEG^Negative     Medications - No data to display      Assessments & Plan (with Medical Decision Making)   30-year-old male presents with a chief complaint of suicidal ideation from his group home.  Patient does not have any specific plan and is not actively suicidal.  He does have intermittent passive thoughts of killing himself.  He was evaluated by myself as well as the mental health .  He is calm at this stage.  He does say he wants to go back to his group home.  As he is feeling better and not actively suicidal we will send him back to his group home at his request in the morning.    I have reviewed the nursing notes. I have reviewed the findings, diagnosis, plan and need for follow up with the patient.    New Prescriptions    No medications on file       Final diagnoses:   Suicidal thoughts       --  I, Becky Landis, am serving as a trained medical scribe to document services personally performed by Serjio Alas DO, based on the provider's statements to me.     ISerjio DO, was physically present and have reviewed and verified the accuracy of this note documented by Bekcy Landis.    Serjio Alas DO  [facility] EMERGENCY DEPARTMENT  4/27/2021     Serjio Alas DO  04/28/21 0356

## 2021-06-28 ENCOUNTER — HOSPITAL ENCOUNTER (EMERGENCY)
Facility: CLINIC | Age: 31
Discharge: HOME | End: 2021-06-28
Attending: PSYCHIATRY & NEUROLOGY | Admitting: PSYCHIATRY & NEUROLOGY
Payer: COMMERCIAL

## 2021-06-28 VITALS
WEIGHT: 220 LBS | SYSTOLIC BLOOD PRESSURE: 129 MMHG | BODY MASS INDEX: 31.57 KG/M2 | RESPIRATION RATE: 16 BRPM | TEMPERATURE: 98.3 F | HEART RATE: 75 BPM | OXYGEN SATURATION: 100 % | DIASTOLIC BLOOD PRESSURE: 87 MMHG

## 2021-06-28 DIAGNOSIS — F43.10 PTSD (POST-TRAUMATIC STRESS DISORDER): ICD-10-CM

## 2021-06-28 DIAGNOSIS — F25.9 SCHIZOAFFECTIVE DISORDER, UNSPECIFIED TYPE (H): ICD-10-CM

## 2021-06-28 PROCEDURE — 80320 DRUG SCREEN QUANTALCOHOLS: CPT | Performed by: PSYCHIATRY & NEUROLOGY

## 2021-06-28 PROCEDURE — 80307 DRUG TEST PRSMV CHEM ANLYZR: CPT | Performed by: PSYCHIATRY & NEUROLOGY

## 2021-06-28 PROCEDURE — 99283 EMERGENCY DEPT VISIT LOW MDM: CPT | Performed by: PSYCHIATRY & NEUROLOGY

## 2021-06-28 PROCEDURE — 99285 EMERGENCY DEPT VISIT HI MDM: CPT | Mod: 25 | Performed by: PSYCHIATRY & NEUROLOGY

## 2021-06-28 PROCEDURE — 90791 PSYCH DIAGNOSTIC EVALUATION: CPT

## 2021-06-28 ASSESSMENT — ENCOUNTER SYMPTOMS
AGITATION: 0
HALLUCINATIONS: 0
NEUROLOGICAL NEGATIVE: 1
RESPIRATORY NEGATIVE: 1
NERVOUS/ANXIOUS: 1
MUSCULOSKELETAL NEGATIVE: 1
CARDIOVASCULAR NEGATIVE: 1
GASTROINTESTINAL NEGATIVE: 1
HYPERACTIVE: 0
CONSTITUTIONAL NEGATIVE: 1
EYES NEGATIVE: 1

## 2021-06-28 NOTE — ED NOTES
Bed: HW01  Expected date: 6/28/21  Expected time: 5:05 PM  Means of arrival: Ambulance  Comments:  [facility] 29yo male, mental health eval

## 2021-06-29 ENCOUNTER — HOSPITAL ENCOUNTER (INPATIENT)
Facility: CLINIC | Age: 31
LOS: 3 days | Discharge: NURSING, REHAB OR LONG-TERM CARE | End: 2021-07-02
Attending: FAMILY MEDICINE | Admitting: PSYCHIATRY & NEUROLOGY
Payer: COMMERCIAL

## 2021-06-29 DIAGNOSIS — R45.851 SUICIDAL IDEATION: ICD-10-CM

## 2021-06-29 DIAGNOSIS — Z11.52 ENCOUNTER FOR SCREENING LABORATORY TESTING FOR COVID-19 VIRUS: ICD-10-CM

## 2021-06-29 DIAGNOSIS — F43.10 PTSD (POST-TRAUMATIC STRESS DISORDER): ICD-10-CM

## 2021-06-29 DIAGNOSIS — F60.3 BORDERLINE PERSONALITY DISORDER (H): ICD-10-CM

## 2021-06-29 DIAGNOSIS — F25.0 SCHIZOAFFECTIVE DISORDER, BIPOLAR TYPE (H): ICD-10-CM

## 2021-06-29 DIAGNOSIS — E56.9 VITAMIN DEFICIENCY: Primary | ICD-10-CM

## 2021-06-29 LAB
AMPHETAMINES UR QL SCN: NEGATIVE
BARBITURATES UR QL: NEGATIVE
BASOPHILS # BLD AUTO: 0.1 10E9/L (ref 0–0.2)
BASOPHILS NFR BLD AUTO: 0.5 %
BENZODIAZ UR QL: NEGATIVE
CANNABINOIDS UR QL SCN: NEGATIVE
COCAINE UR QL: NEGATIVE
DIFFERENTIAL METHOD BLD: NORMAL
EOSINOPHIL # BLD AUTO: 0 10E9/L (ref 0–0.7)
EOSINOPHIL NFR BLD AUTO: 0.1 %
ETHANOL UR QL SCN: NEGATIVE
IMM GRANULOCYTES # BLD: 0.1 10E9/L (ref 0–0.4)
IMM GRANULOCYTES NFR BLD: 0.5 %
LABORATORY COMMENT REPORT: NORMAL
LYMPHOCYTES # BLD AUTO: 2.6 10E9/L (ref 0.8–5.3)
LYMPHOCYTES NFR BLD AUTO: 28.3 %
MONOCYTES # BLD AUTO: 0.6 10E9/L (ref 0–1.3)
MONOCYTES NFR BLD AUTO: 6.3 %
NEUTROPHILS # BLD AUTO: 5.9 10E9/L (ref 1.6–8.3)
NEUTROPHILS NFR BLD AUTO: 64.3 %
NRBC # BLD AUTO: 0 10*3/UL
NRBC BLD AUTO-RTO: 0 /100
OPIATES UR QL SCN: NEGATIVE
SARS-COV-2 RNA RESP QL NAA+PROBE: NEGATIVE
SPECIMEN SOURCE: NORMAL
WBC # BLD AUTO: 9.1 10E9/L (ref 4–11)

## 2021-06-29 PROCEDURE — 87635 SARS-COV-2 COVID-19 AMP PRB: CPT | Performed by: FAMILY MEDICINE

## 2021-06-29 PROCEDURE — 124N000002 HC R&B MH UMMC

## 2021-06-29 PROCEDURE — 99285 EMERGENCY DEPT VISIT HI MDM: CPT | Performed by: FAMILY MEDICINE

## 2021-06-29 PROCEDURE — 82306 VITAMIN D 25 HYDROXY: CPT

## 2021-06-29 PROCEDURE — 80320 DRUG SCREEN QUANTALCOHOLS: CPT | Performed by: FAMILY MEDICINE

## 2021-06-29 PROCEDURE — 85048 AUTOMATED LEUKOCYTE COUNT: CPT | Performed by: FAMILY MEDICINE

## 2021-06-29 PROCEDURE — 85004 AUTOMATED DIFF WBC COUNT: CPT | Performed by: FAMILY MEDICINE

## 2021-06-29 PROCEDURE — 250N000013 HC RX MED GY IP 250 OP 250 PS 637: Performed by: FAMILY MEDICINE

## 2021-06-29 PROCEDURE — C9803 HOPD COVID-19 SPEC COLLECT: HCPCS | Performed by: FAMILY MEDICINE

## 2021-06-29 PROCEDURE — 80307 DRUG TEST PRSMV CHEM ANLYZR: CPT | Performed by: FAMILY MEDICINE

## 2021-06-29 PROCEDURE — 90791 PSYCH DIAGNOSTIC EVALUATION: CPT

## 2021-06-29 PROCEDURE — 99285 EMERGENCY DEPT VISIT HI MDM: CPT | Mod: 25 | Performed by: FAMILY MEDICINE

## 2021-06-29 RX ORDER — HYDROXYZINE HYDROCHLORIDE 25 MG/1
25 TABLET, FILM COATED ORAL EVERY 4 HOURS PRN
Status: DISCONTINUED | OUTPATIENT
Start: 2021-06-29 | End: 2021-07-02 | Stop reason: HOSPADM

## 2021-06-29 RX ORDER — BENZTROPINE MESYLATE 1 MG/1
1 TABLET ORAL 2 TIMES DAILY
Status: DISCONTINUED | OUTPATIENT
Start: 2021-06-29 | End: 2021-07-02 | Stop reason: HOSPADM

## 2021-06-29 RX ORDER — DOCUSATE SODIUM 100 MG/1
100 CAPSULE, LIQUID FILLED ORAL 2 TIMES DAILY
Status: DISCONTINUED | OUTPATIENT
Start: 2021-06-29 | End: 2021-07-02 | Stop reason: HOSPADM

## 2021-06-29 RX ORDER — CLOZAPINE 100 MG/1
400 TABLET ORAL AT BEDTIME
Status: DISCONTINUED | OUTPATIENT
Start: 2021-06-29 | End: 2021-07-02 | Stop reason: HOSPADM

## 2021-06-29 RX ORDER — OLANZAPINE 10 MG/2ML
10 INJECTION, POWDER, FOR SOLUTION INTRAMUSCULAR 3 TIMES DAILY PRN
Status: DISCONTINUED | OUTPATIENT
Start: 2021-06-29 | End: 2021-07-02 | Stop reason: HOSPADM

## 2021-06-29 RX ORDER — ACETAMINOPHEN 325 MG/1
650 TABLET ORAL EVERY 4 HOURS PRN
Status: DISCONTINUED | OUTPATIENT
Start: 2021-06-29 | End: 2021-07-02 | Stop reason: HOSPADM

## 2021-06-29 RX ORDER — LEVOTHYROXINE SODIUM 112 UG/1
112 TABLET ORAL DAILY
Status: DISCONTINUED | OUTPATIENT
Start: 2021-06-29 | End: 2021-07-02 | Stop reason: HOSPADM

## 2021-06-29 RX ORDER — OLANZAPINE 10 MG/1
10 TABLET ORAL 3 TIMES DAILY PRN
Status: DISCONTINUED | OUTPATIENT
Start: 2021-06-29 | End: 2021-07-02 | Stop reason: HOSPADM

## 2021-06-29 RX ORDER — LANOLIN ALCOHOL/MO/W.PET/CERES
3 CREAM (GRAM) TOPICAL
Status: DISCONTINUED | OUTPATIENT
Start: 2021-06-29 | End: 2021-06-29

## 2021-06-29 RX ORDER — VENLAFAXINE HYDROCHLORIDE 75 MG/1
225 TABLET, EXTENDED RELEASE ORAL
Status: DISCONTINUED | OUTPATIENT
Start: 2021-06-30 | End: 2021-07-02 | Stop reason: HOSPADM

## 2021-06-29 RX ORDER — MAGNESIUM HYDROXIDE/ALUMINUM HYDROXICE/SIMETHICONE 120; 1200; 1200 MG/30ML; MG/30ML; MG/30ML
30 SUSPENSION ORAL EVERY 4 HOURS PRN
Status: DISCONTINUED | OUTPATIENT
Start: 2021-06-29 | End: 2021-07-02 | Stop reason: HOSPADM

## 2021-06-29 RX ORDER — CLOZAPINE 50 MG/1
50 TABLET ORAL AT BEDTIME
Status: DISCONTINUED | OUTPATIENT
Start: 2021-06-29 | End: 2021-07-02 | Stop reason: HOSPADM

## 2021-06-29 RX ORDER — POLYETHYLENE GLYCOL 3350 17 G/17G
17 POWDER, FOR SOLUTION ORAL DAILY PRN
Status: DISCONTINUED | OUTPATIENT
Start: 2021-06-29 | End: 2021-07-02 | Stop reason: HOSPADM

## 2021-06-29 RX ADMIN — BENZTROPINE MESYLATE 1 MG: 1 TABLET ORAL at 20:05

## 2021-06-29 RX ADMIN — NICOTINE POLACRILEX 4 MG: 4 GUM, CHEWING BUCCAL at 22:13

## 2021-06-29 RX ADMIN — CLOZAPINE 400 MG: 100 TABLET ORAL at 23:09

## 2021-06-29 RX ADMIN — Medication 5 MG: at 23:10

## 2021-06-29 RX ADMIN — LEVOTHYROXINE SODIUM 112 MCG: 112 TABLET ORAL at 20:04

## 2021-06-29 RX ADMIN — CLOZAPINE 50 MG: 50 TABLET ORAL at 23:09

## 2021-06-29 RX ADMIN — OMEPRAZOLE 20 MG: 20 CAPSULE, DELAYED RELEASE ORAL at 20:05

## 2021-06-29 ASSESSMENT — ACTIVITIES OF DAILY LIVING (ADL)
LAUNDRY: WITH SUPERVISION
DOING_ERRANDS_INDEPENDENTLY_DIFFICULTY: NO
TOILETING_ISSUES: NO
WALKING_OR_CLIMBING_STAIRS_DIFFICULTY: NO
HYGIENE/GROOMING: INDEPENDENT
WEAR_GLASSES_OR_BLIND: NO
DRESS: SCRUBS (BEHAVIORAL HEALTH)
DIFFICULTY_EATING/SWALLOWING: NO
DIFFICULTY_COMMUNICATING: NO
FALL_HISTORY_WITHIN_LAST_SIX_MONTHS: NO
HEARING_DIFFICULTY_OR_DEAF: NO
ORAL_HYGIENE: INDEPENDENT
DRESSING/BATHING_DIFFICULTY: NO
CONCENTRATING,_REMEMBERING_OR_MAKING_DECISIONS_DIFFICULTY: NO

## 2021-06-29 ASSESSMENT — MIFFLIN-ST. JEOR: SCORE: 2005.01

## 2021-06-29 NOTE — ED NOTES
Bed: HW04  Expected date: 6/29/21  Expected time: 3:50 PM  Means of arrival: Ambulance  Comments:  [facility] [age]yo male, suicdal
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
## 2021-06-29 NOTE — ED PROVIDER NOTES
ED Provider Note  [facility]      History     Chief Complaint   Patient presents with     Suicidal     SI with pan to jump off a Building     HPI  Kamini Neal is a 30 year old male who is here sent in from his group home as he was feeling stressed and overwhelmed earlier today. He has history of schizoaffective disorder and limited coping and low frustration tolerance. He has been seen previously when he was acutely suicidal but he felt better while in the ED and wanted to go home. He is now feeling better and wants to return to his group home. He denies intent. He works full time as a  and has work tomorrow. He has no acute medical concerns. He feels he can return to the ED if he feels unsafe and suicidal again. Patient does not exhibit psychosis. He is in emotional and behavioral control here.    Please see DEC Crisis Assessment on 6/28/21 in Epic for further details.    PERSONAL MEDICAL HISTORY  Past Medical History:   Diagnosis Date     Anxiety      Depressive disorder      Schizo affective schizophrenia (H)      PAST SURGICAL HISTORY  Past Surgical History:   Procedure Laterality Date     TONSILLECTOMY       FAMILY HISTORY  Family History   Problem Relation Age of Onset     Mental Illness Maternal Uncle      Mental Illness Maternal Aunt      Glaucoma No family hx of      Macular Degeneration No family hx of      SOCIAL HISTORY  Social History     Tobacco Use     Smoking status: Current Every Day Smoker     Packs/day: 0.50     Smokeless tobacco: Never Used   Substance Use Topics     Alcohol use: No     MEDICATIONS  No current facility-administered medications for this encounter.      Current Outpatient Medications   Medication     benztropine (COGENTIN) 1 MG tablet     cloZAPine (CLOZARIL) 100 MG tablet     cloZAPine (CLOZARIL) 25 MG tablet     docusate sodium (COLACE) 100 MG capsule     levothyroxine (SYNTHROID/LEVOTHROID) 112 MCG tablet     OLANZapine (ZYPREXA) 10 MG  tablet     omeprazole (PRILOSEC) 20 MG DR capsule     paliperidone (INVEGA SUSTENNA) 234 MG/1.5ML ANTHONY     polyethylene glycol (MIRALAX) 17 g packet     prazosin (MINIPRESS) 1 MG capsule     venlafaxine (EFFEXOR-ER) 75 MG TB24 24 hr tablet     ALLERGIES  Allergies   Allergen Reactions     Haldol [Haloperidol]      Other reaction(s): Tardive Dyskinesia  Torticollis  Torticollis          Review of Systems   Constitutional: Negative.    HENT: Negative.    Eyes: Negative.    Respiratory: Negative.    Cardiovascular: Negative.    Gastrointestinal: Negative.    Genitourinary: Negative.    Musculoskeletal: Negative.    Skin: Negative.    Neurological: Negative.    Psychiatric/Behavioral: Positive for suicidal ideas. Negative for agitation, behavioral problems and hallucinations. The patient is nervous/anxious. The patient is not hyperactive.    All other systems reviewed and are negative.        Physical Exam   BP: 123/84  Pulse: 92  Temp: 98.3  F (36.8  C)  Resp: 16  Weight: 99.8 kg (220 lb)  SpO2: 100 %  Physical Exam  Vitals signs and nursing note reviewed.   HENT:      Head: Normocephalic.   Eyes:      Pupils: Pupils are equal, round, and reactive to light.   Neck:      Musculoskeletal: Normal range of motion.   Pulmonary:      Effort: Pulmonary effort is normal.   Musculoskeletal: Normal range of motion.   Neurological:      General: No focal deficit present.      Mental Status: He is alert.   Psychiatric:         Attention and Perception: Attention and perception normal. He does not perceive auditory or visual hallucinations.         Mood and Affect: Mood and affect normal.         Speech: Speech normal.         Behavior: Behavior normal. Behavior is not agitated, aggressive, hyperactive or combative. Behavior is cooperative.         Thought Content: Thought content normal. Thought content is not paranoid or delusional. Thought content does not include homicidal or suicidal ideation.         Cognition and Memory:  Cognition and memory normal.         Judgment: Judgment normal.         ED Course      Procedures             No results found for any visits on 06/28/21.  Medications - No data to display     Assessments & Plan (with Medical Decision Making)   Patient with history of schizoaffective disorder and PTSD who is here due to feeling suicidal earlier today as he felt overwhelmed. He now feels better and in emotional control and feels safe returning to his group home. They are comfortable with his return. I do not see a need to hold patient here against his will. Patient can be discharged. He is to follow-up established care and services.    I have reviewed the nursing notes. I have reviewed the findings, diagnosis, plan and need for follow up with the patient.    New Prescriptions    No medications on file       Final diagnoses:   Schizoaffective disorder, unspecified type (H)   PTSD (post-traumatic stress disorder)       --  Gamaliel Uribe MD  [facility] EMERGENCY DEPARTMENT  6/28/2021     Gamaliel Uribe MD  06/28/21 2034
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
## 2021-06-29 NOTE — DISCHARGE INSTRUCTIONS
I am glad you are feeling better.  Please follow-up with your established outpatient services and cares for ongoing support

## 2021-06-30 PROCEDURE — 250N000013 HC RX MED GY IP 250 OP 250 PS 637: Performed by: PSYCHIATRY & NEUROLOGY

## 2021-06-30 PROCEDURE — 250N000013 HC RX MED GY IP 250 OP 250 PS 637: Performed by: FAMILY MEDICINE

## 2021-06-30 PROCEDURE — 124N000002 HC R&B MH UMMC

## 2021-06-30 PROCEDURE — 99222 1ST HOSP IP/OBS MODERATE 55: CPT | Mod: AI | Performed by: PSYCHIATRY & NEUROLOGY

## 2021-06-30 RX ADMIN — DOCUSATE SODIUM 100 MG: 100 CAPSULE, LIQUID FILLED ORAL at 19:21

## 2021-06-30 RX ADMIN — NICOTINE POLACRILEX 4 MG: 4 GUM, CHEWING BUCCAL at 14:10

## 2021-06-30 RX ADMIN — BENZTROPINE MESYLATE 1 MG: 1 TABLET ORAL at 08:46

## 2021-06-30 RX ADMIN — VENLAFAXINE HYDROCHLORIDE 225 MG: 75 TABLET, EXTENDED RELEASE ORAL at 08:46

## 2021-06-30 RX ADMIN — Medication 5 MG: at 19:20

## 2021-06-30 RX ADMIN — BENZTROPINE MESYLATE 1 MG: 1 TABLET ORAL at 19:21

## 2021-06-30 RX ADMIN — NICOTINE POLACRILEX 4 MG: 4 GUM, CHEWING BUCCAL at 19:19

## 2021-06-30 RX ADMIN — CLOZAPINE 400 MG: 100 TABLET ORAL at 19:23

## 2021-06-30 RX ADMIN — DOCUSATE SODIUM 100 MG: 100 CAPSULE, LIQUID FILLED ORAL at 08:46

## 2021-06-30 RX ADMIN — CLOZAPINE 50 MG: 50 TABLET ORAL at 19:23

## 2021-06-30 RX ADMIN — PRAZOSIN HYDROCHLORIDE 3 MG: 2 CAPSULE ORAL at 19:21

## 2021-06-30 RX ADMIN — NICOTINE POLACRILEX 4 MG: 4 GUM, CHEWING BUCCAL at 17:31

## 2021-06-30 RX ADMIN — OMEPRAZOLE 20 MG: 20 CAPSULE, DELAYED RELEASE ORAL at 08:46

## 2021-06-30 RX ADMIN — NICOTINE POLACRILEX 4 MG: 4 GUM, CHEWING BUCCAL at 13:04

## 2021-06-30 RX ADMIN — LEVOTHYROXINE SODIUM 112 MCG: 112 TABLET ORAL at 08:48

## 2021-06-30 ASSESSMENT — ACTIVITIES OF DAILY LIVING (ADL)
ORAL_HYGIENE: INDEPENDENT
DRESS: SCRUBS (BEHAVIORAL HEALTH);INDEPENDENT
HYGIENE/GROOMING: INDEPENDENT
LAUNDRY: WITH SUPERVISION

## 2021-06-30 NOTE — PLAN OF CARE
"Patient is a 30year old male admitted due to SI with a plan. He is voluntary. Covid test is negative and drugs screen is negative.  Patient has a history of schizoaffective disorder and PTSD. He lives in a group home and denies having any outpatient services. He reports [facility] and [facility] the only two inpatient hospitals he has visited.  Patient was calm and cooperative with admission process. Vitals are WNL. He denies current SI, SIB, HI or hallucinations and stated \"I will get staff if I need help.\" Patient contracts for safety. He stated he would rather not have a roommate.   BEH admit profile is complete. Patient is on SI precautions.  "

## 2021-06-30 NOTE — PLAN OF CARE
Problem: General Plan of Care (Inpatient Behavioral)  Goal: Team Discussion  Description: Team Plan:  Outcome: No Change  Note: BEHAVIORAL TEAM DISCUSSION    Participants: Doctor Rashad GTZ; Mary PALACIOS; Fawad Whitley RN   Progress: Initial assessment  Anticipated length of stay: 5-7 days  Continued Stay Criteria/Rationale: Voluntary due to suicidal ideations with plan.   Medical/Physical: no acute issues  Precautions:   Behavioral Orders   Procedures    Code 1 - Restrict to Unit    Discontinue 1:1 attendant for suicide risk     Order Specific Question:   I have performed an in person assessment of the patient     Answer:   Based on this assessment the patient no longer requires a one on one attendant at this point in time.     Order Specific Question:   Rationale     Answer:   Patient States able to remain safe in hospital    Routine Programming     As clinically indicated    Self Injury Precaution    Status 15     Every 15 minutes.    Suicide precautions     Patients on Suicide Precautions should have a Combination Diet ordered that includes a Diet selection(s) AND a Behavioral Tray selection for Safe Tray - with utensils, or Safe Tray - NO utensils       Plan: Psychiatric Assessment. Medication Management. Therapeutic Mileu. Individual and group support. Likely discharge back to group home when stable.   Rationale for change in precautions or plan: no change.

## 2021-06-30 NOTE — PLAN OF CARE
"Dicussed with patient his/her Personal Plan of Care.      \"Reasons you are in the hospital;\" The patient identifies the following reasons for current hospitalization:   1. Suicidal ideation  2. PTSD symptoms    \"Goals for Discharge\" The patient identifies the following goals for discharge:  1. Decreased SI  2. Decreased flashbacks and bad thoughts.   "

## 2021-06-30 NOTE — ED NOTES
Handoff report to Hugh, RN at station 10.  Informed of course of ED stay and plan of care.  Hugh  verbalized understanding.

## 2021-06-30 NOTE — PHARMACY-ADMISSION MEDICATION HISTORY
Admission Medication History Completed by Pharmacy    See [facility] Admission Navigator for allergy information, preferred outpatient pharmacy, prior to admission medications and immunization status.     Medication History Sources:     Patient    Sure Scripts    Group Home via phone [phone]    Changes made to PTA medication list (reason):    Added: None    Deleted: None    Changed: None    Additional Information:    Staff from group home went over MAR with me on the phone. She said patient is compliant with his medications and last took his morning ones today and his evening doses last night. Verified clozapine dose was still 450mg at bedtime.    She said the Invega Sustenna 234mg dose was last given 2 weeks ago on 6/17/21.    She was unsure when the patient last had the PRN Zyprexa and PRN Miralax, but said both meds were on the med list.    Prior to Admission medications    Medication Sig Last Dose Taking? Auth Provider   benztropine (COGENTIN) 1 MG tablet Take 1 tablet (1 mg) by mouth 2 times daily 6/29/2021 at am Yes Antoni Rodríguez MD   cloZAPine (CLOZARIL) 100 MG tablet Take 400 mg by mouth At Bedtime Take along with two 25mg tablets for total dose of 450mg at bedtime. 6/28/2021 at pm Yes Unknown, Entered By History   cloZAPine (CLOZARIL) 25 MG tablet Take 50 mg by mouth At Bedtime Take along with four 100 mg tablets for a total dose of 450 mg at bedtime 6/28/2021 at pm Yes Unknown, Entered By History   docusate sodium (COLACE) 100 MG capsule Take 100 mg by mouth 2 times daily  6/29/2021 at am Yes Reported, Patient   levothyroxine (SYNTHROID/LEVOTHROID) 112 MCG tablet Take 112 mcg by mouth daily 6/29/2021 at am Yes Unknown, Entered By History   OLANZapine (ZYPREXA) 10 MG tablet Take 1 tablet (10 mg) by mouth daily as needed (associated with psychosis or nicholas) Past Month at Unknown time Yes Naegele, Debra Ann, APRN CNS   omeprazole (PRILOSEC) 20 MG DR capsule Take 20 mg by mouth daily 6/29/2021 at am Yes  Unknown, Entered By History   paliperidone (INVEGA SUSTENNA) 234 MG/1.5ML ANTHONY Inject 234 mg into the muscle every 28 days 6/17/2021 at Unknown time Yes Unknown, Entered By History   polyethylene glycol (MIRALAX) 17 g packet Take 17 g by mouth daily as needed for constipation Past Month at Unknown time Yes Naegele, Debra Ann, APRN CNS   prazosin (MINIPRESS) 1 MG capsule Take 3 capsules (3 mg) by mouth At Bedtime This product only available from a Compounding Pharmacy. 6/28/2021 at pm Yes Antoni Rodríguez MD   venlafaxine (EFFEXOR-ER) 75 MG TB24 24 hr tablet Take 3 tablets (225 mg) by mouth daily (with breakfast) 6/29/2021 at am Yes Antoni Rodríguez MD       Date completed: 06/29/21    Medication history completed by: Alessia Tello, PharmD, BCPS
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
## 2021-06-30 NOTE — PLAN OF CARE
Shift Summary  Psych  Pt presents very depressed but polite and cooperative. Able to communicate needs. Speech is clear and coherent with good eye contact. Stayed in room all this shift. Pt was encouraged to participate in group activity but declined. Denies being suicidal today, just want to sleep. Insight and judgement to his illness is fair. No evidence of psychosis, paranoid, delusional thoughts or disorganized behavior. Subjectively does not look psychotic.   Prn: nicotine gums  Physical  Pt alert and oriented to self and place. Denies pain. Does not appear to be in any kind of distress or pain. Vital sings WNL (see flow sheet for details). Slept 7.0 hours last night. Good medication compliance is noted. Seems tolerating medications well. Appetite poor. Skipped breakfast but had 25% of lunch. No problem with bowel and bladder per pt.   Prn: none  Continue to monitor pt's status Q 15 minutes and stabilize the patient's symptoms with the use of medications and therapeutic programming.
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
## 2021-06-30 NOTE — PROGRESS NOTES
06/29/21 2142   Patient Belongings   Did you bring any home meds/supplements to the hospital?  No   Patient Belongings locker;sent to security per site process   Patient Belongings Put in Hospital Secure Location (Security or Locker, etc.) cash/credit card;cell phone/electronics;clothing;shoes;watch;wallet   Belongings Search Yes   Clothing Search Yes   Second Staff Kedar SHI and Hugh CHIN RN did the intake search   Comment Katey PINA and Katt FARNSWORTH did the belongings search       Locker: shoes, pants, Tshirt, hat, underwear, belt, phone, apple watch, vape pen, mask, wallet, [place] ID, [employer] Card, Rewards card and insurance card     Security ([id number]): [employer] bank visa *5252, [employer] bank visa debit *8859, [employer] Visa debit *8183, [employer] visa platinum *2185, [employer] quicksilver *[phone]           A               Admission:  I am responsible for any personal items that are not sent to the safe or pharmacy.  [facility] is not responsible for loss, theft or damage of any property in my possession.    Signature:  _________________________________ Date: _______  Time: _____                                              Staff Signature:  ____________________________ Date: ________  Time: _____      2nd Staff person, if patient is unable/unwilling to sign:    Signature: ________________________________ Date: ________  Time: _____     Discharge:  [facility] has returned all of my personal belongings:    Signature: _________________________________ Date: ________  Time: _____                                          Staff Signature:  ____________________________ Date: ________  Time: _____
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
## 2021-06-30 NOTE — PROGRESS NOTES
SPIRITUAL HEALTH SERVICES  SPIRITUAL ASSESSMENT Progress Note  [facility] ([facility]) 10NB       REFERRAL SOURCE: Self initiated  visit, Jainism specific.     DATA: Pt Kamini Neal identifies as Jainism and is of [place] descent.     Kamini was sleeping when I attempted to visit with him on his unit. I left Islamic resources with unit staff to deliver to him when he wakes.     Kamini will receive an English copy of the Holy Quran. I also provided Jeffrey with an Islamic prayer booklet and card with a Prophetic supplication.       PLAN: I will follow up with Kamini Neal for the duration of his stay.     Kirstie Wasserman  Lead Jainism   Pager [phone]    [facility] remains available 24/7 for emergent requests/referrals, either by having the switchboard page the on-call  or by entering an ASAP/STAT consult in Epic (this will also page the on-call ).

## 2021-06-30 NOTE — PROGRESS NOTES
06/30/21 1428   General Information   Has Not Attended OT as of: 06/30/21     Plan: OT staff will meet with pt to review the role of occupational therapy and explain the value of having them involved in their treatment plan including options to meet current needs/self-identified goals. As group attendance is established, Pt will be given self-assessment to inform OT initial assessment.

## 2021-07-01 VITALS
DIASTOLIC BLOOD PRESSURE: 84 MMHG | HEART RATE: 85 BPM | HEIGHT: 70 IN | OXYGEN SATURATION: 100 % | TEMPERATURE: 98.7 F | SYSTOLIC BLOOD PRESSURE: 131 MMHG | WEIGHT: 225.5 LBS | RESPIRATION RATE: 16 BRPM | BODY MASS INDEX: 32.28 KG/M2

## 2021-07-01 PROCEDURE — 250N000013 HC RX MED GY IP 250 OP 250 PS 637: Performed by: FAMILY MEDICINE

## 2021-07-01 PROCEDURE — 99232 SBSQ HOSP IP/OBS MODERATE 35: CPT | Performed by: PSYCHIATRY & NEUROLOGY

## 2021-07-01 PROCEDURE — 250N000013 HC RX MED GY IP 250 OP 250 PS 637: Performed by: PSYCHIATRY & NEUROLOGY

## 2021-07-01 PROCEDURE — 124N000002 HC R&B MH UMMC

## 2021-07-01 RX ADMIN — CLOZAPINE 400 MG: 100 TABLET ORAL at 21:39

## 2021-07-01 RX ADMIN — NICOTINE POLACRILEX 4 MG: 4 GUM, CHEWING BUCCAL at 14:07

## 2021-07-01 RX ADMIN — NICOTINE POLACRILEX 4 MG: 4 GUM, CHEWING BUCCAL at 16:59

## 2021-07-01 RX ADMIN — NICOTINE POLACRILEX 4 MG: 4 GUM, CHEWING BUCCAL at 10:20

## 2021-07-01 RX ADMIN — BENZTROPINE MESYLATE 1 MG: 1 TABLET ORAL at 08:17

## 2021-07-01 RX ADMIN — DOCUSATE SODIUM 100 MG: 100 CAPSULE, LIQUID FILLED ORAL at 21:37

## 2021-07-01 RX ADMIN — Medication 5 MG: at 21:40

## 2021-07-01 RX ADMIN — POLYETHYLENE GLYCOL 3350 17 G: 17 POWDER, FOR SOLUTION ORAL at 19:50

## 2021-07-01 RX ADMIN — PRAZOSIN HYDROCHLORIDE 3 MG: 2 CAPSULE ORAL at 21:40

## 2021-07-01 RX ADMIN — CLOZAPINE 50 MG: 50 TABLET ORAL at 21:41

## 2021-07-01 RX ADMIN — DOCUSATE SODIUM 100 MG: 100 CAPSULE, LIQUID FILLED ORAL at 08:17

## 2021-07-01 RX ADMIN — NICOTINE POLACRILEX 4 MG: 4 GUM, CHEWING BUCCAL at 18:13

## 2021-07-01 RX ADMIN — NICOTINE POLACRILEX 4 MG: 4 GUM, CHEWING BUCCAL at 12:17

## 2021-07-01 RX ADMIN — NICOTINE POLACRILEX 4 MG: 4 GUM, CHEWING BUCCAL at 21:43

## 2021-07-01 RX ADMIN — LEVOTHYROXINE SODIUM 112 MCG: 112 TABLET ORAL at 08:18

## 2021-07-01 RX ADMIN — VENLAFAXINE HYDROCHLORIDE 225 MG: 75 TABLET, EXTENDED RELEASE ORAL at 08:17

## 2021-07-01 RX ADMIN — NICOTINE POLACRILEX 4 MG: 4 GUM, CHEWING BUCCAL at 06:57

## 2021-07-01 RX ADMIN — NICOTINE POLACRILEX 4 MG: 4 GUM, CHEWING BUCCAL at 08:17

## 2021-07-01 RX ADMIN — OMEPRAZOLE 20 MG: 20 CAPSULE, DELAYED RELEASE ORAL at 08:17

## 2021-07-01 RX ADMIN — NICOTINE POLACRILEX 4 MG: 4 GUM, CHEWING BUCCAL at 16:02

## 2021-07-01 RX ADMIN — NICOTINE POLACRILEX 4 MG: 4 GUM, CHEWING BUCCAL at 20:40

## 2021-07-01 RX ADMIN — NICOTINE POLACRILEX 4 MG: 4 GUM, CHEWING BUCCAL at 19:50

## 2021-07-01 RX ADMIN — BENZTROPINE MESYLATE 1 MG: 1 TABLET ORAL at 21:40

## 2021-07-01 RX ADMIN — NICOTINE POLACRILEX 4 MG: 4 GUM, CHEWING BUCCAL at 11:19

## 2021-07-01 ASSESSMENT — ACTIVITIES OF DAILY LIVING (ADL)
HYGIENE/GROOMING: INDEPENDENT
LAUNDRY: WITH SUPERVISION
DRESS: INDEPENDENT
DRESS: SCRUBS (BEHAVIORAL HEALTH)
ORAL_HYGIENE: INDEPENDENT
HYGIENE/GROOMING: INDEPENDENT
ORAL_HYGIENE: INDEPENDENT

## 2021-07-01 ASSESSMENT — MIFFLIN-ST. JEOR: SCORE: 1999.11

## 2021-07-01 NOTE — PLAN OF CARE
NOC Shift Report     Pt in bed at beginning of shift, breathing quiet and unlabored. Pt slept through shift. Pt slept 6.5 hours.      No pt complaints or concerns at this time.      No PRNs given. Will continue to monitor.     Bradley Dacosta RN
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
## 2021-07-01 NOTE — PLAN OF CARE
Assessment/Intervention/Current Symtoms and Care Coordination  AL (writer) met with trx team, provided update, and reviewed pt's chart. CTC called CM,, to provide an update on discharge plan and found out she was the [facility] CM. She provided pt's last known MH CM, Cristóbal Marr at [facility] [phone], contact info. [facility] called the MH CM, who reported that pt's case had been closed about a month ago due to no longer living in [place]; thus pt currently does not have a MH CM. [facility] called [facility] CM to update her about this, who reported she would discuss this further with pt when she meets with him next in the community.     [facility] spoke with Neela,  director, who confirmed that pt could return there and that they could provide transportation if pt discharged during the day. Neela confirmed that if pt was able to contract for safety and engage in safety planning, then they would be open to taking him back soon. She identified that if pt is overly confident that he has no issues with his MH, this likely indicates hypomania and would be concerning. Otherwise they would handle his medication follow-up.     Any med changes or new meds should be e-scripted to [facility], [place] [phone], as per group home's request.     AL met with pt to discuss discharge plan and to review safety planning for when he goes home. Pt was able to engage in safety planing effectively, identifying specific  staff he could talk to, specific copings skills to utilize, and confirming that he will use the new PRN's prescribed if feeling overwhelmed. [facility] discussed recommendation of therapy and  case management. Pt said that he is open to therapy if he was referred to a Niuean speaking therapist and was amenable to being provided the [facility] contact info to follow-up post-discharge to get set up with Case Management. Pt reports feeling ready for discharge and is hopeful he can go home tomorrow. Attending confirmed afterward that pt would  discharge tomorrow if group home was agreeable.    [facility] called  to update them about pt's improvement and presentation.  confirmed that given report pt could return tomorrow and they could pick him up at 1:00pm. Pt was updated and expressed being happy about this.    [facility] worked on referral for Hungarian speaking Therapist.     Discharge Plan or Goal  Pt will be discharged back to group home with outpatient referral for Hungarian speaking therapist, and his group home will arrange for medication management follow-up, as per their request.     Barriers to Discharge   Safe discharge plan, and     Referral Status  Pt is being referred to [facility] or [facility] for therapy (7/1/21)    Legal Status  VOLUNTARY

## 2021-07-01 NOTE — PLAN OF CARE
Pt pleasant and cooperative but not attending groups. Pt rates anxiety at 0/10 and depression 0/10. Pt rates pain at 0/10. Pt reports no SI/HI and contracts for safety. Pt states at his group home he uses video games and watching soccer as coping mechanisms. Pt also stated he will inform  staff if he has any thoughts of self harm. Pt was visible on unit throughout day. Continue current POC.

## 2021-07-01 NOTE — PLAN OF CARE
Problem: Depressive Symptoms  Goal: Depressive Symptoms  Description: Signs and symptoms of listed problems will be absent or manageable.  Outcome: No Change  Flowsheets (Taken 6/30/2021 2159)  Depressive Symptoms Assessed:   affect   thought process  Depressive Symptoms Present:   anxiety   insight  Note: Behavioral  Pt was visible in the milieu a few times this shift but was guarded, withdrawn and isolative. He rates anxiety at 2/10 and depression 0/10. Pt's mood is calm, depressed, hopeless, anxious, labile. Pt affect is blunted and flat. Pt denies SI thoughts or AVH. Pt endorse good appetite. Pt reports wanting to discharge and hopes to discuss his needs with the provider.  Medical  None  Plan  Pt goal is to talk to the provider about leaving. Pt feels he's ready to leave.

## 2021-07-02 LAB — DEPRECATED CALCIDIOL+CALCIFEROL SERPL-MC: 29 UG/L (ref 20–75)

## 2021-07-02 PROCEDURE — 250N000013 HC RX MED GY IP 250 OP 250 PS 637: Performed by: PSYCHIATRY & NEUROLOGY

## 2021-07-02 PROCEDURE — 99239 HOSP IP/OBS DSCHRG MGMT >30: CPT | Performed by: PSYCHIATRY & NEUROLOGY

## 2021-07-02 PROCEDURE — 250N000013 HC RX MED GY IP 250 OP 250 PS 637: Performed by: FAMILY MEDICINE

## 2021-07-02 RX ADMIN — NICOTINE POLACRILEX 4 MG: 4 GUM, CHEWING BUCCAL at 12:48

## 2021-07-02 RX ADMIN — VENLAFAXINE HYDROCHLORIDE 225 MG: 75 TABLET, EXTENDED RELEASE ORAL at 09:00

## 2021-07-02 RX ADMIN — OMEPRAZOLE 20 MG: 20 CAPSULE, DELAYED RELEASE ORAL at 09:00

## 2021-07-02 RX ADMIN — NICOTINE POLACRILEX 4 MG: 4 GUM, CHEWING BUCCAL at 10:21

## 2021-07-02 RX ADMIN — LEVOTHYROXINE SODIUM 112 MCG: 112 TABLET ORAL at 09:00

## 2021-07-02 RX ADMIN — BENZTROPINE MESYLATE 1 MG: 1 TABLET ORAL at 09:00

## 2021-07-02 RX ADMIN — NICOTINE POLACRILEX 4 MG: 4 GUM, CHEWING BUCCAL at 11:23

## 2021-07-02 RX ADMIN — DOCUSATE SODIUM 100 MG: 100 CAPSULE, LIQUID FILLED ORAL at 09:00

## 2021-07-02 NOTE — PLAN OF CARE
Patient is alert and oriented. He is calm and cooperative with staff. He is social with peers. He ate both meals and took his medications as scheduled. He met with the doctor this morning and is discharging back to the . Writer went through discharge instructions with patient and he verbalized understanding of the instructions. He denies SI/SIB/AVH. He denies access to guns. He identifies  staff as his support network.Patient will discharge home with his personal belongings. A copy of AVS sent with patient. No medications sent with patient. Group Home to provide transportation. Patient left the unit accompanied by one staff. He safely transferred into the vehicle to transport to the .

## 2021-07-02 NOTE — DISCHARGE INSTRUCTIONS
Behavioral Discharge Planning and Instructions    Summary: You were admitted on 6/29/2021  due to Depression and Suicidal Ideations.  You were treated by Dr. Chandler Solorzano MD, and discharged on 07/02/21 from [facility] to Group Home    Main Diagnosis:   Schizoaffective disorder, depressive type.  Posttraumatic stress disorder.   Nicotine use disorder.   Historical diagnosis of borderline personality disorder.    Health Care Follow-up:   As discussed with your treatment team you are being discharged back home to your group home, with a follow-up referral to a Tristanian speaking Therapist. Prior to discharge you were able to identify specific group home staff you could talk to if needed, list coping skills (talk to others, read, be present in the moment, play video games, get out of your room) to utilize to deal with your SI thoughts/emotions, and confirmed that you would use the new PRN's when needed. Your group home confirmed you have an upcoming appointment with your medication provider, and they will assist with this so a follow-up appointment for them was not made. Your CADI 's info has been listed bellow for your convenience. You are also being provided the contact info for [facility] to get connected with Mental Health Case Management, which you will need to do independently or with group home staff assistance.     Therapy Referrral, [facility]  You have been referred to their Tristanian speaking therapist who is taking new patients, and he will have an opening in 3-4 weeks. You have been placed in the wait list and [facility] will call you at [phone], to set up an intake appointment when an opening becomes available. You are encouraged to call them beforehand to see if there is any changes or if they can get you in sooner.   Provider: Huong Black  Address: [address] [place] [address]  Phone: [phone]    Alternative Tristanian Speaking Therapist Option  This location has  immediate openings for new clients for their American speaking therapist, but we were unable to connect with the therapist to get you scheduled prior to discharge. If you decide you want to see someone sooner then [facility] has available spots, please reach out to this location to schedule an intake appointment. Currently they are only providing services via tele-health which includes phone and video visit options at this time. If they call CTC after your discharge to set up an appointment, your contact info will be provided to them as per your agreement so they can reach out to you directly.   [facility]  Provider: Subha  Address: [address] [place] [address]  Phone: [phone]    [facility] , oJhn Bergeron  Provider: Tenisha  Phone: [phone]    [facility]  Phone: [phone]  Please call this number, with group home staff assistance if needed, to get set up with Mental Health Case Management.     Attend all scheduled appointments with your outpatient providers. Call at least 24 hours in advance if you need to reschedule an appointment to ensure continued access to your outpatient providers.     Major Treatments, Procedures and Findings:  You were provided with: a psychiatric assessment, assessed for medical stability, medication evaluation and/or management, group therapy and milieu management    Symptoms to Report: feeling more aggressive, increased confusion, losing more sleep, mood getting worse or thoughts of suicide    Early warning signs can include: increased depression or anxiety sleep disturbances increased thoughts or behaviors of suicide or self-harm     Safety and Wellness:  Take all medicines as directed.  Make no changes unless your doctor suggests them.   Follow treatment recommendations.  Refrain from alcohol and non-prescribed drugs.  If there is a concern for safety, call 371.    Resources:   Crisis Intervention: 229.477.1661 or 135-154-3138  "(TTY: 626.678.5728).  Call anytime for help.  National [place] on Mental Illness (www.[place].jorge a.org): 648.123.6682 or 648-855-8934.  National Suicide Prevention Line (www.mentalhealthmn.org): 036-690-RSEN (0607)  Mental Health Consumer/Survivor Network of [place] (www.mhcsn.net): 318.611.2516 or 372-285-8439  Mental Health Association of [place] (www.mentalhealth.org): 833.427.7937 or 384-587-0779  Self- Management and Recovery Training., SMART-- Toll free: 639.865.5565  www.[employer]  [facility] Crisis (COPE) Response - Adult 579 114-1559  Text 4 Life: txt \"LIFE\" to 74879 for immediate support and crisis intervention    General Medication Instructions:   See your medication sheet(s) for instructions.   Take all medicines as directed.  Make no changes unless your doctor suggests them.   Go to all your doctor visits.  Be sure to have all your required lab tests. This way, your medicines can be refilled on time.  Do not use any drugs not prescribed by your doctor.  Avoid alcohol.    Advance Directives:   Scanned document on file with [employer]? No scanned doc  Is document scanned? Pt states no documents  Honoring Choices Your Rights Handout: Informed and given  Was more information offered? Pt declined    The Treatment team has appreciated the opportunity to work with you. If you have any questions or concerns about your recent admission, you can contact the unit which can receive your call 24 hours a day, 7 days a week. They will be able to get in touch with a Provider if needed. The unit number is [phone] .        "

## 2021-07-02 NOTE — PLAN OF CARE
Patient has been calm and cooperative. He is social with staff and some peers but for the most part, he keeps to himself. He presents with a blunted affect but brightens up during some conversations. Patient watched movies for most of the shift. He ate dinner with no concerns and is medication compliant. He denies all mental health symptoms and is looking forward to discharge tomorrow per patient report. RN will continue to monitor.

## 2021-07-02 NOTE — PLAN OF CARE
Assessment/Intervention/Current Symtoms and Care Coordination  AL (writer) met with trx team, provided update, and reviewed pt's chart. CTC provided attending e-script info during team meeting this morning. CTC met with pt to explain discharge plan related to therapy, which pt was fine with, and pt signed ELIZABETH's. Pt declined having any questions/concerns for CTC after talking. [facility] did not hear back from [facility] prior to pt's discharge, so was unable to set up a formal intake appointment. [facility] will provide pt's contact info to them if they contact CTC after pt discharges, as per pt's request. [facility] was informed that pt's meds were not changed and PRN's were discontinued shortly before pt discharged. CTC called group home to update them that no meds were changed and they confirmed that they didn't need anything related to med at discharge then. Pt discharged back home without incident.    Discharge Plan or Goal  Pt will be discharged back to group home with outpatient referral for Barbadian speaking therapist, and his group home will arrange for medication management follow-up, as per their request.      Barriers to Discharge   None.     Referral Status   Pt is being referred to [facility] clinic or [facility] for therapy (7/1/21)     Legal Status  VOLUNTARY
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
## 2021-08-09 ENCOUNTER — HOSPITAL ENCOUNTER (INPATIENT)
Facility: CLINIC | Age: 31
LOS: 6 days | Discharge: NURSING, REHAB OR LONG-TERM CARE | End: 2021-08-16
Attending: EMERGENCY MEDICINE | Admitting: PSYCHIATRY & NEUROLOGY
Payer: COMMERCIAL

## 2021-08-09 ENCOUNTER — TELEPHONE (OUTPATIENT)
Dept: BEHAVIORAL HEALTH | Facility: CLINIC | Age: 31
End: 2021-08-09

## 2021-08-09 DIAGNOSIS — Z11.52 ENCOUNTER FOR SCREENING LABORATORY TESTING FOR COVID-19 VIRUS: ICD-10-CM

## 2021-08-09 DIAGNOSIS — F43.10 PTSD (POST-TRAUMATIC STRESS DISORDER): ICD-10-CM

## 2021-08-09 DIAGNOSIS — K59.03 DRUG-INDUCED CONSTIPATION: ICD-10-CM

## 2021-08-09 DIAGNOSIS — F25.1 SCHIZOAFFECTIVE DISORDER, DEPRESSIVE TYPE (H): Primary | ICD-10-CM

## 2021-08-09 DIAGNOSIS — E56.9 VITAMIN DEFICIENCY: ICD-10-CM

## 2021-08-09 DIAGNOSIS — R45.851 SUICIDAL IDEATION: ICD-10-CM

## 2021-08-09 DIAGNOSIS — F25.0 SCHIZOAFFECTIVE DISORDER, BIPOLAR TYPE (H): ICD-10-CM

## 2021-08-09 DIAGNOSIS — R45.1 AGITATION: ICD-10-CM

## 2021-08-09 DIAGNOSIS — Z86.59 HISTORY OF SCHIZOPHRENIA: ICD-10-CM

## 2021-08-09 LAB
AMPHETAMINES UR QL SCN: NORMAL
BARBITURATES UR QL: NORMAL
BENZODIAZ UR QL: NORMAL
CANNABINOIDS UR QL SCN: NORMAL
COCAINE UR QL: NORMAL
OPIATES UR QL SCN: NORMAL

## 2021-08-09 PROCEDURE — 80307 DRUG TEST PRSMV CHEM ANLYZR: CPT | Performed by: EMERGENCY MEDICINE

## 2021-08-09 PROCEDURE — 99285 EMERGENCY DEPT VISIT HI MDM: CPT | Mod: 25 | Performed by: EMERGENCY MEDICINE

## 2021-08-09 PROCEDURE — 99285 EMERGENCY DEPT VISIT HI MDM: CPT | Performed by: EMERGENCY MEDICINE

## 2021-08-09 PROCEDURE — 250N000013 HC RX MED GY IP 250 OP 250 PS 637: Performed by: EMERGENCY MEDICINE

## 2021-08-09 PROCEDURE — 90791 PSYCH DIAGNOSTIC EVALUATION: CPT

## 2021-08-09 PROCEDURE — 250N000011 HC RX IP 250 OP 636

## 2021-08-09 PROCEDURE — U0005 INFEC AGEN DETEC AMPLI PROBE: HCPCS | Performed by: EMERGENCY MEDICINE

## 2021-08-09 PROCEDURE — C9803 HOPD COVID-19 SPEC COLLECT: HCPCS | Performed by: EMERGENCY MEDICINE

## 2021-08-09 RX ORDER — OLANZAPINE 10 MG/2ML
INJECTION, POWDER, FOR SOLUTION INTRAMUSCULAR
Status: COMPLETED
Start: 2021-08-09 | End: 2021-08-09

## 2021-08-09 RX ORDER — HYDROXYZINE HYDROCHLORIDE 25 MG/1
25 TABLET, FILM COATED ORAL ONCE
Status: COMPLETED | OUTPATIENT
Start: 2021-08-09 | End: 2021-08-09

## 2021-08-09 RX ADMIN — OLANZAPINE 10 MG: 10 INJECTION, POWDER, LYOPHILIZED, FOR SOLUTION INTRAMUSCULAR at 20:29

## 2021-08-09 RX ADMIN — HYDROXYZINE HYDROCHLORIDE 25 MG: 25 TABLET, FILM COATED ORAL at 22:49

## 2021-08-10 PROBLEM — R45.1 AGITATION: Status: ACTIVE | Noted: 2021-08-10

## 2021-08-10 PROBLEM — Z86.59 HISTORY OF SCHIZOPHRENIA: Status: ACTIVE | Noted: 2021-08-10

## 2021-08-10 LAB
BASOPHILS # BLD AUTO: 0 10E3/UL (ref 0–0.2)
BASOPHILS NFR BLD AUTO: 0 %
EOSINOPHIL # BLD AUTO: 0 10E3/UL (ref 0–0.7)
EOSINOPHIL NFR BLD AUTO: 0 %
IMM GRANULOCYTES # BLD: 0 10E3/UL
IMM GRANULOCYTES NFR BLD: 1 %
LYMPHOCYTES # BLD AUTO: 1.9 10E3/UL (ref 0.8–5.3)
LYMPHOCYTES NFR BLD AUTO: 24 %
MONOCYTES # BLD AUTO: 0.6 10E3/UL (ref 0–1.3)
MONOCYTES NFR BLD AUTO: 7 %
NEUTROPHILS # BLD AUTO: 5.3 10E3/UL (ref 1.6–8.3)
NEUTROPHILS NFR BLD AUTO: 68 %
NRBC # BLD AUTO: 0 10E3/UL
NRBC BLD AUTO-RTO: 0 /100
SARS-COV-2 RNA RESP QL NAA+PROBE: NEGATIVE
WBC # BLD AUTO: 7.8 10E3/UL (ref 4–11)

## 2021-08-10 PROCEDURE — 124N000002 HC R&B MH UMMC

## 2021-08-10 PROCEDURE — 250N000013 HC RX MED GY IP 250 OP 250 PS 637: Performed by: PSYCHIATRY & NEUROLOGY

## 2021-08-10 PROCEDURE — 85048 AUTOMATED LEUKOCYTE COUNT: CPT

## 2021-08-10 PROCEDURE — 36415 COLL VENOUS BLD VENIPUNCTURE: CPT

## 2021-08-10 PROCEDURE — 250N000013 HC RX MED GY IP 250 OP 250 PS 637: Performed by: EMERGENCY MEDICINE

## 2021-08-10 PROCEDURE — 99222 1ST HOSP IP/OBS MODERATE 55: CPT | Mod: AI | Performed by: PSYCHIATRY & NEUROLOGY

## 2021-08-10 RX ORDER — OLANZAPINE 10 MG/2ML
10 INJECTION, POWDER, FOR SOLUTION INTRAMUSCULAR
Status: DISCONTINUED | OUTPATIENT
Start: 2021-08-10 | End: 2021-08-16 | Stop reason: HOSPADM

## 2021-08-10 RX ORDER — OLANZAPINE 10 MG/1
10 TABLET ORAL
Status: DISCONTINUED | OUTPATIENT
Start: 2021-08-10 | End: 2021-08-16 | Stop reason: HOSPADM

## 2021-08-10 RX ORDER — CLOZAPINE 50 MG/1
50 TABLET ORAL AT BEDTIME
Status: DISCONTINUED | OUTPATIENT
Start: 2021-08-10 | End: 2021-08-10

## 2021-08-10 RX ORDER — ACETAMINOPHEN 325 MG/1
650 TABLET ORAL EVERY 4 HOURS PRN
Status: DISCONTINUED | OUTPATIENT
Start: 2021-08-10 | End: 2021-08-16 | Stop reason: HOSPADM

## 2021-08-10 RX ORDER — MAGNESIUM HYDROXIDE/ALUMINUM HYDROXICE/SIMETHICONE 120; 1200; 1200 MG/30ML; MG/30ML; MG/30ML
30 SUSPENSION ORAL EVERY 4 HOURS PRN
Status: DISCONTINUED | OUTPATIENT
Start: 2021-08-10 | End: 2021-08-16 | Stop reason: HOSPADM

## 2021-08-10 RX ORDER — POLYETHYLENE GLYCOL 3350 17 G/17G
17 POWDER, FOR SOLUTION ORAL DAILY PRN
Status: DISCONTINUED | OUTPATIENT
Start: 2021-08-10 | End: 2021-08-16 | Stop reason: HOSPADM

## 2021-08-10 RX ORDER — CLOZAPINE 100 MG/1
400 TABLET ORAL AT BEDTIME
Status: DISCONTINUED | OUTPATIENT
Start: 2021-08-10 | End: 2021-08-10

## 2021-08-10 RX ORDER — AMOXICILLIN 250 MG
1 CAPSULE ORAL 2 TIMES DAILY PRN
Status: DISCONTINUED | OUTPATIENT
Start: 2021-08-10 | End: 2021-08-16 | Stop reason: HOSPADM

## 2021-08-10 RX ORDER — OLANZAPINE 20 MG/1
20 TABLET ORAL 2 TIMES DAILY PRN
Status: ON HOLD | COMMUNITY
End: 2021-12-07

## 2021-08-10 RX ORDER — BENZTROPINE MESYLATE 1 MG/1
1 TABLET ORAL 2 TIMES DAILY
Status: DISCONTINUED | OUTPATIENT
Start: 2021-08-10 | End: 2021-08-16 | Stop reason: HOSPADM

## 2021-08-10 RX ORDER — HYDROXYZINE HYDROCHLORIDE 25 MG/1
25 TABLET, FILM COATED ORAL EVERY 4 HOURS PRN
Status: DISCONTINUED | OUTPATIENT
Start: 2021-08-10 | End: 2021-08-16 | Stop reason: HOSPADM

## 2021-08-10 RX ORDER — TRAZODONE HYDROCHLORIDE 50 MG/1
50 TABLET, FILM COATED ORAL
Status: DISCONTINUED | OUTPATIENT
Start: 2021-08-10 | End: 2021-08-16 | Stop reason: HOSPADM

## 2021-08-10 RX ORDER — OLANZAPINE 20 MG/1
20 TABLET ORAL 2 TIMES DAILY PRN
Status: DISCONTINUED | OUTPATIENT
Start: 2021-08-10 | End: 2021-08-16 | Stop reason: HOSPADM

## 2021-08-10 RX ORDER — LEVOTHYROXINE SODIUM 112 UG/1
112 TABLET ORAL DAILY
Status: DISCONTINUED | OUTPATIENT
Start: 2021-08-10 | End: 2021-08-16 | Stop reason: HOSPADM

## 2021-08-10 RX ORDER — DOCUSATE SODIUM 100 MG/1
100 CAPSULE, LIQUID FILLED ORAL 2 TIMES DAILY
Status: DISCONTINUED | OUTPATIENT
Start: 2021-08-10 | End: 2021-08-16 | Stop reason: HOSPADM

## 2021-08-10 RX ADMIN — NICOTINE POLACRILEX 4 MG: 2 GUM, CHEWING BUCCAL at 14:28

## 2021-08-10 RX ADMIN — DOCUSATE SODIUM 100 MG: 100 CAPSULE, LIQUID FILLED ORAL at 08:59

## 2021-08-10 RX ADMIN — OLANZAPINE 10 MG: 10 TABLET, FILM COATED ORAL at 13:25

## 2021-08-10 RX ADMIN — VENLAFAXINE HYDROCHLORIDE 225 MG: 225 TABLET, EXTENDED RELEASE ORAL at 09:01

## 2021-08-10 RX ADMIN — BENZTROPINE MESYLATE 1 MG: 1 TABLET ORAL at 20:35

## 2021-08-10 RX ADMIN — DOCUSATE SODIUM 100 MG: 100 CAPSULE, LIQUID FILLED ORAL at 20:35

## 2021-08-10 RX ADMIN — LEVOTHYROXINE SODIUM 112 MCG: 112 TABLET ORAL at 09:00

## 2021-08-10 RX ADMIN — PRAZOSIN HYDROCHLORIDE 3 MG: 2 CAPSULE ORAL at 20:35

## 2021-08-10 RX ADMIN — NICOTINE POLACRILEX 4 MG: 2 GUM, CHEWING BUCCAL at 12:12

## 2021-08-10 RX ADMIN — NICOTINE POLACRILEX 4 MG: 2 GUM, CHEWING BUCCAL at 20:35

## 2021-08-10 RX ADMIN — NICOTINE POLACRILEX 4 MG: 2 GUM, CHEWING BUCCAL at 15:31

## 2021-08-10 RX ADMIN — CLOZAPINE 450 MG: 100 TABLET ORAL at 20:35

## 2021-08-10 RX ADMIN — OMEPRAZOLE 20 MG: 20 CAPSULE, DELAYED RELEASE ORAL at 08:59

## 2021-08-10 RX ADMIN — NICOTINE POLACRILEX 4 MG: 2 GUM, CHEWING BUCCAL at 16:28

## 2021-08-10 RX ADMIN — TRAZODONE HYDROCHLORIDE 50 MG: 50 TABLET ORAL at 20:35

## 2021-08-10 RX ADMIN — BENZTROPINE MESYLATE 1 MG: 1 TABLET ORAL at 08:59

## 2021-08-10 RX ADMIN — NICOTINE POLACRILEX 4 MG: 2 GUM, CHEWING BUCCAL at 18:07

## 2021-08-10 RX ADMIN — NICOTINE POLACRILEX 4 MG: 2 GUM, CHEWING BUCCAL at 19:12

## 2021-08-10 RX ADMIN — NICOTINE POLACRILEX 4 MG: 2 GUM, CHEWING BUCCAL at 13:25

## 2021-08-10 ASSESSMENT — ACTIVITIES OF DAILY LIVING (ADL)
ORAL_HYGIENE: INDEPENDENT
CONCENTRATING,_REMEMBERING_OR_MAKING_DECISIONS_DIFFICULTY: NO
DRESS: INDEPENDENT
HEARING_DIFFICULTY_OR_DEAF: NO
DIFFICULTY_EATING/SWALLOWING: OTHER (SEE COMMENTS)
WEAR_GLASSES_OR_BLIND: NO
DOING_ERRANDS_INDEPENDENTLY_DIFFICULTY: NO
WALKING_OR_CLIMBING_STAIRS_DIFFICULTY: NO
HYGIENE/GROOMING: INDEPENDENT
TOILETING_ISSUES: NO
DRESS: INDEPENDENT
DRESSING/BATHING_DIFFICULTY: NO
DIFFICULTY_COMMUNICATING: OTHER (SEE COMMENTS)
LAUNDRY: WITH SUPERVISION

## 2021-08-10 ASSESSMENT — MIFFLIN-ST. JEOR: SCORE: 1997.29
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
## 2021-08-10 NOTE — ED NOTES
[facility] ED Mental Health Handoff Note:       Brief HPI:  This is a 31 year old male signed out to me by Dr. Reyes.  See initial ED Provider note for full details of the presentation. Interval history is pertinent for SI and aggressive at .    Home meds reviewed and ordered/administered: Yes    Medically stable for inpatient mental health admission: Yes.    Evaluated by mental health: Yes. The recommendation is for inpatient mental health treatment. Bed search in process    Safety concerns: At the time I received sign out, there were no safety concerns.    Hold Status:  Active Orders   Legal    Health Officer Authority to Detain (ADRIANNA)     Frequency: Effective Now     Start Date/Time: 08/09/21 2038      Number of Occurrences: Until Specified     Order Comments: This patient presented with circumstances that have led me to be reasonably suspicious that the patient is at significant risk of self-harm. The patient's judgment to this situation appears to be impaired. Given the circumstances in which the patient presented, it is likely that the patient is at significant risk of attempting self harm if this situation is not investigated further. I am highly concerned that the patient is mentally ill and currently cannot safely care for oneself. This represents endangerment to the patient's well-being and safety, and I am placing a Health Officer Authority hold upon the patient at this time.              Exam:   Patient Vitals for the past 24 hrs:   BP Temp Temp src Pulse Resp SpO2   08/09/21 2127 108/75 98.8  F (37.1  C) Oral 81 16 100 %           ED Course:    Medications   OLANZapine (zyPREXA) 10 MG injection (10 mg  Given 8/9/21 2029)   hydrOXYzine (ATARAX) tablet 25 mg (25 mg Oral Given 8/9/21 2249)            There were no significant events during my shift.    Patient was signed out to the oncoming provider      Impression:    ICD-10-CM    1. Agitation  R45.1    2. Suicidal ideation  R45.851    3.  History of schizophrenia  Z86.59        Plan:    1. Awaiting inpatient mental health admission/transfer.      RESULTS:   Results for orders placed or performed during the hospital encounter of 08/09/21 (from the past 24 hour(s))   Urine Drugs of Abuse Screen     Status: Normal    Collection Time: 08/09/21  9:47 PM    Narrative    The following orders were created for panel order Urine Drugs of Abuse Screen.  Procedure                               Abnormality         Status                     ---------                               -----------         ------                     Drug abuse screen 1 urin...[[id number]]  Normal              Final result                 Please view results for these tests on the individual orders.   Drug abuse screen 1 urine (ED)     Status: Normal    Collection Time: 08/09/21  9:47 PM   Result Value Ref Range    Amphetamines Urine Screen Negative Screen Negative    Barbiturates Urine Screen Negative Screen Negative    Benzodiazepines Urine Screen Negative Screen Negative    Cannabinoids Urine Screen Negative Screen Negative    Cocaine Urine Screen Negative Screen Negative    Opiates Urine Screen Negative Screen Negative   Asymptomatic COVID-19 Virus (Coronavirus) by PCR *Canceled*     Status: None ()    Collection Time: 08/09/21 10:53 PM    Specimen: Swab    Narrative    The following orders were created for panel order Asymptomatic COVID-19 Virus (Coronavirus) by PCR.  Procedure                               Abnormality         Status                     ---------                               -----------         ------                       Please view results for these tests on the individual orders.   Asymptomatic COVID-19 Virus (Coronavirus) by PCR Oropharynx     Status: Normal    Collection Time: 08/09/21 11:09 PM    Specimen: Oropharynx; Swab    Narrative    The following orders were created for panel order Asymptomatic COVID-19 Virus (Coronavirus) by PCR Oropharynx.  Procedure                                Abnormality         Status                     ---------                               -----------         ------                     SARS-COV2 (COVID-19) Vir...[[id number]]  Normal              Final result                 Please view results for these tests on the individual orders.   SARS-COV2 (COVID-19) Virus RT-PCR     Status: Normal    Collection Time: 08/09/21 11:09 PM    Specimen: Oropharynx; Swab   Result Value Ref Range    SARS CoV2 PCR Negative Negative    Narrative    Testing was performed using the alexsander  SARS-CoV-2 & Influenza A/B Assay on the alexsander  Ya  System.  This test should be ordered for the detection of SARS-COV-2 in individuals who meet SARS-CoV-2 clinical and/or epidemiological criteria. Test performance is unknown in asymptomatic patients.  This test is for in vitro diagnostic use under the FDA EUA for laboratories certified under CLIA to perform moderate and/or high complexity testing. This test has not been FDA cleared or approved.  A negative test does not rule out the presence of PCR inhibitors in the specimen or target RNA in concentration below the limit of detection for the assay. The possibility of a false negative should be considered if the patient's recent exposure or clinical presentation suggests COVID-19.  [facility] are certified under the Clinical Laboratory Improvement Amendments of 1988 (CLIA-88) as qualified to perform moderate and/or high complexity laboratory testing.             MD Saw Aguilar Matthew Joseph, MD  08/10/21 0159

## 2021-08-10 NOTE — PLAN OF CARE
BEHAVIORAL TEAM DISCUSSION    Participants: Dr. Solorzano, Lorena Lambert RN, Derek Alejandre [facility] and [facility]  Progress: New Admit  Anticipated length of stay: 7 days  Continued Stay Criteria/Rationale: Evaluation and assessment for hospitalization  Medical/Physical: None reported  Precautions:   Behavioral Orders   Procedures    Assault precautions    Elopement precautions    Suicide precautions     Patients on Suicide Precautions should have a Combination Diet ordered that includes a Diet selection(s) AND a Behavioral Tray selection for Safe Tray - with utensils, or Safe Tray - NO utensils       Plan: Continue evaluation and assessment for stabilization and aftercare needs.  Rationale for change in precautions or plan: None.

## 2021-08-10 NOTE — PHARMACY-ADMISSION MEDICATION HISTORY
Admission Medication History Completed by Pharmacy    See [facility] Admission Navigator for allergy information, preferred outpatient pharmacy and prior to admission medications.     Medication History Sources:     Prescription fill history ([facility] [place] [facility] [place] for clozapine) via Epic Surescripts data    [facility]  director, Neela Brown ([phone])    Changes made to PTA medication list (reason):    Added: olanzapine prn (per  staff)     Deleted: None    Changed: None    Additional Information:    Writer reviewed PTA med list with  staff which is consistent with fill records.  Only recent medication change was addition of PRN olanzapine. Staff state Rx is written as 20mg PO Q4H PRN agitation, but staff have been giving it to the patient twice daily on a scheduled basis for at least the last 7 days. Staff implement mouth checks after doses since patient has a history of med non-adherence.     Prior to Admission medications    Medication Sig Last Dose     benztropine (COGENTIN) 1 MG tablet Take 1 tablet (1 mg) by mouth 2 times daily 8/9/2021 at x1         cloZAPine (CLOZARIL) 100 MG tablet Take 400 mg by mouth At Bedtime Take along with two 25mg tablets for total dose of 450mg at bedtime.     8/8/2021     cloZAPine (CLOZARIL) 25 MG tablet Take 50 mg by mouth At Bedtime Take along with four 100 mg tablets for a total dose of 450 mg at bedtime     8/8/2021     docusate sodium (COLACE) 100 MG capsule Take 100 mg by mouth 2 times daily      8/9/2021 at x1     levothyroxine (SYNTHROID/LEVOTHROID) 112 MCG tablet     Take 112 mcg by mouth daily 8/9/2021     OLANZapine (ZYPREXA) 20 MG tablet Take 20 mg by mouth 2 times daily as needed (agitation)     8/9/2021 at x1     omeprazole (PRILOSEC) 20 MG DR capsule Take 20 mg by mouth daily     8/9/2021     paliperidone (INVEGA SUSTENNA) 234 MG/1.5ML ANTHONY     Inject 234 mg into the muscle every 28 days 7/19/2021     polyethylene glycol (MIRALAX) 17 g packet  Take 17 g by mouth daily as needed for constipation     Past Month     prazosin (MINIPRESS) 1 MG capsule Take 3 capsules (3 mg) by mouth At Bedtime     8/8/2021     venlafaxine (EFFEXOR-ER) 75 MG TB24 24 hr tablet Take 3 tablets (225 mg) by mouth daily (with breakfast)     8/9/2021       Date completed: 08/10/21    Medication history completed by:   Levi MarcialD., [facility]  Behavioral Health Inpatient Pharmacist  [facility] ([facility])  Phone: *[phone] (Ascom) or [phone]

## 2021-08-10 NOTE — TELEPHONE ENCOUNTER
R:  Patient cleared and ready for behavioral bed placement: Yes     Provider paged @ 12:00am to present for 10/Kholomyansky  Provider called back at 12:08am and accepted for 10/Kholomyansky  Pt placed in unit que @ 1am  Unit called with disposition at 1:01am.  Unit states short staffed, but can take on day shift.   ED Updated @ 1:06am with placement/Dr and to call in am for report
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
## 2021-08-10 NOTE — ED NOTES
Patient out of restraints. Contracts for safety and agrees to plan of care. Patient cooperative, and up using bathroom appropriately. Patient given fluid and nutrition.
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
## 2021-08-10 NOTE — PLAN OF CARE
Pt was admitted from [facility] ED for SI and aggressive behavior. Pt came to unit very flat requesting to return to bed.  Pt was cooperative with admission search but declined admission profile, stating he needs to sleep. Pt states he has SI thoughts with no plan.  Pt attempted to elope in the EMT and ED and elopement precautions placed. Pt does not eat pork. Smokes cigarettes and requested nicotine gum and no patch. Pt signed in voluntary and went to bed. Brief orientation to unit provided.  Pt appears to be no harm to self or others.  Pt was placed on SIO on arrival but was discontinued after  saw him.

## 2021-08-10 NOTE — PHARMACY
Pharmacy Clozapine Continuation Note    Patient's Name: Kamini Neal  Patient's : 1990    Current cloZAPine regimen: 450mg PO HS   Has there been a known interruption in therapy for greater than/equal to 48 hours? No; last dose  per  staff     Recent ANC Value(s) for last 7 days:  Recent labs: (last 7 days)     08/10/21  1320   ANEUTAUTO 5.3       Is the patient enrolled in the cloZAPine REMS program? Yes  Ordering prescriber: Dr. Solorzano  Is the ANC within recommended limits? Yes  Does the patient have any signs or symptoms of infection, including fever? No    Plan:  1. Continue clozapine therapy at 450 mg PO HS.  2. A WBC with differential will be ordered at least weekly, NEXT DUE 2021. ANC values will be entered into the REMS program.      Selina Vicente, Pharm.D., [facility]  Behavioral Health Inpatient Pharmacist  [facility] ([facility])  Phone: *[phone] ([email or web address]) or [phone]

## 2021-08-10 NOTE — PLAN OF CARE
" Assessment    Patient is alert and oriented, calm and cooperative. Patient's affect is blunted and flat. Speech is Normal. Patient's insight is Fair. Patient is Neatly groomed.  Patient has been isolative to his room, withdrawn. The patient voices their needs appropriately. Patient endorses depression and  anxiety 4/10, but denied SI,SIB, racing thoughts,Hallucinations, and HI.  Both suicide assessments were completed. No evidence of delusional thinking or psychotic symptoms observed. Patient wants to talk with the Doctor tomorrow about discharge, he reported that he feels ready.  Patient has  been eating, hydrating, and sleeping adequately. Patient is medication compliant, doesn't need reminders. Medication side effects were not observed or reported this shift. From what writer could assess, patient's skin is intact, free from injuries or open sores. Plan is to continue to monitor patient status q 15 mins, assess response to medications, and maintain the patients safety.    Vital signs are stable  Denied pain    His invega injection was due today but it wasn't verified by pharmacist. Writer called pharmacist and he reported that it's not due till 8/16, so the verification is getting left till tomorrow. After writer got off the phone, patient reported that a nurse came to his group home last week \" either Wed 8/4 or Thur 8/5\" and gave it to him. Writer relayed this information back to pharmacist.    "

## 2021-08-10 NOTE — PROGRESS NOTES
08/10/21 0938   Patient Belongings   Did you bring any home meds/supplements to the hospital?  No   Patient Belongings locker   Patient Belongings Put in Hospital Secure Location (Security or Locker, etc.) cell phone/electronics;clothing;shoes;wallet;watch   Belongings Search Yes   Clothing Search Yes   Second Staff Ji T     Belongings in locker: Orange shirt, black underwear, tan pants, belt, red shoes, socks, apple watch, air pods in a case, phone, pack of cigarettes, wallet with license, metro card and other misc. Papers and cards.     Sent to security: Visa 8338, visa 2185, visa 8860, visa 5252, Visa 8183.    A               Admission:  I am responsible for any personal items that are not sent to the safe or pharmacy.  [facility] is not responsible for loss, theft or damage of any property in my possession.    Signature:  _________________________________ Date: _______  Time: _____                                              Staff Signature:  ____________________________ Date: ________  Time: _____      2nd Staff person, if patient is unable/unwilling to sign:    Signature: ________________________________ Date: ________  Time: _____     Discharge:  [facility] has returned all of my personal belongings:    Signature: _________________________________ Date: ________  Time: _____                                          Staff Signature:  ____________________________ Date: ________  Time: _____

## 2021-08-10 NOTE — ED NOTES
Patient reports to writer that he is having an increased urge to harm himself. Patient requesting more medication. Patient placed back on 1:1. Patient calm at this time. Medications ordered.
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
## 2021-08-10 NOTE — ED NOTES
[facility] group home director Neela Brown, can be reached at [phone].
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
## 2021-08-10 NOTE — PLAN OF CARE
Assessment/Intervention/Current Symtoms and Care Coordination  CTC (writer) met with trx team, provided update, and reviewed pt's chart. CTC completed initial team note. CTC met with pt to introduce themselves and and to conduct initial psychosocial assessment (IPA). Pt reports feeling better, denies current SI, and reported having PTSD flashbacks that he could manage. Pt described his flashbacks as chronic and believes that he can normally manage them. Pt would like a referral for day trx and outpatient referrals prior to discharge. Pt plans on returning to his group home and is planning to take time of work as it has been his primary stressor recently. CTC called pt's group home, leaving a VM requesting a callback about pt being able to return to the group home and get collateral info. CTC did not hear back by end of day.     Discharge Plan or Goal  Pt will discharge back to group home once stable with outpatient referrals.     Barriers to Discharge   Safe discharge plan, ongoing symptom severity (depression, aggression), and medication evaluation/assessment.    Referral Status  TBD    Legal Status  VOLUNTARY

## 2021-08-10 NOTE — PLAN OF CARE
"Pt requested medication for \"bad thoughts\" pt denies hallucinations and states are extremely disturbing to him and making him uncomfortable. PRN zyprexa was ordered and given. Pt was later seen in the lounge and stated it was helpful and then returned to his room.   "
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
## 2021-08-10 NOTE — ED TRIAGE NOTES
Patient BIBA after making suicidal statements to  staff. Patient then began hitting walls and closing doors to keep staff away from him. Patient attempted to leave ambulance enroute and had to be restrained. Patient then attempted to leave again once arrived to ED.

## 2021-08-11 PROCEDURE — 90853 GROUP PSYCHOTHERAPY: CPT

## 2021-08-11 PROCEDURE — 99232 SBSQ HOSP IP/OBS MODERATE 35: CPT | Performed by: PSYCHIATRY & NEUROLOGY

## 2021-08-11 PROCEDURE — 250N000013 HC RX MED GY IP 250 OP 250 PS 637: Performed by: EMERGENCY MEDICINE

## 2021-08-11 PROCEDURE — 124N000002 HC R&B MH UMMC

## 2021-08-11 PROCEDURE — 250N000013 HC RX MED GY IP 250 OP 250 PS 637: Performed by: PSYCHIATRY & NEUROLOGY

## 2021-08-11 RX ORDER — GABAPENTIN 300 MG/1
300 CAPSULE ORAL 3 TIMES DAILY
Status: DISCONTINUED | OUTPATIENT
Start: 2021-08-11 | End: 2021-08-16 | Stop reason: HOSPADM

## 2021-08-11 RX ADMIN — BENZTROPINE MESYLATE 1 MG: 1 TABLET ORAL at 20:11

## 2021-08-11 RX ADMIN — NICOTINE POLACRILEX 4 MG: 2 GUM, CHEWING BUCCAL at 17:12

## 2021-08-11 RX ADMIN — OLANZAPINE 10 MG: 10 TABLET, FILM COATED ORAL at 16:09

## 2021-08-11 RX ADMIN — NICOTINE POLACRILEX 4 MG: 2 GUM, CHEWING BUCCAL at 14:58

## 2021-08-11 RX ADMIN — DOCUSATE SODIUM 100 MG: 100 CAPSULE, LIQUID FILLED ORAL at 20:11

## 2021-08-11 RX ADMIN — NICOTINE POLACRILEX 4 MG: 2 GUM, CHEWING BUCCAL at 18:24

## 2021-08-11 RX ADMIN — VENLAFAXINE HYDROCHLORIDE 225 MG: 225 TABLET, EXTENDED RELEASE ORAL at 08:37

## 2021-08-11 RX ADMIN — DOCUSATE SODIUM AND SENNOSIDES 1 TABLET: 8.6; 5 TABLET ORAL at 08:38

## 2021-08-11 RX ADMIN — NICOTINE POLACRILEX 4 MG: 2 GUM, CHEWING BUCCAL at 13:34

## 2021-08-11 RX ADMIN — PRAZOSIN HYDROCHLORIDE 3 MG: 2 CAPSULE ORAL at 20:11

## 2021-08-11 RX ADMIN — OMEPRAZOLE 20 MG: 20 CAPSULE, DELAYED RELEASE ORAL at 08:37

## 2021-08-11 RX ADMIN — BENZTROPINE MESYLATE 1 MG: 1 TABLET ORAL at 08:37

## 2021-08-11 RX ADMIN — NICOTINE POLACRILEX 4 MG: 2 GUM, CHEWING BUCCAL at 20:10

## 2021-08-11 RX ADMIN — POLYETHYLENE GLYCOL 3350 17 G: 17 POWDER, FOR SOLUTION ORAL at 17:33

## 2021-08-11 RX ADMIN — NICOTINE POLACRILEX 4 MG: 2 GUM, CHEWING BUCCAL at 08:43

## 2021-08-11 RX ADMIN — NICOTINE POLACRILEX 4 MG: 2 GUM, CHEWING BUCCAL at 12:33

## 2021-08-11 RX ADMIN — DOCUSATE SODIUM 100 MG: 100 CAPSULE, LIQUID FILLED ORAL at 08:37

## 2021-08-11 RX ADMIN — LEVOTHYROXINE SODIUM 112 MCG: 112 TABLET ORAL at 08:37

## 2021-08-11 RX ADMIN — CLOZAPINE 450 MG: 100 TABLET ORAL at 20:10

## 2021-08-11 RX ADMIN — GABAPENTIN 300 MG: 300 CAPSULE ORAL at 16:09

## 2021-08-11 RX ADMIN — GABAPENTIN 300 MG: 300 CAPSULE ORAL at 20:11

## 2021-08-11 RX ADMIN — NICOTINE POLACRILEX 4 MG: 2 GUM, CHEWING BUCCAL at 16:09

## 2021-08-11 ASSESSMENT — ACTIVITIES OF DAILY LIVING (ADL)
FALL_HISTORY_WITHIN_LAST_SIX_MONTHS: NO
LAUNDRY: WITH SUPERVISION
HYGIENE/GROOMING: INDEPENDENT
HYGIENE/GROOMING: INDEPENDENT
LAUNDRY: UNABLE TO COMPLETE
DRESS: INDEPENDENT
ORAL_HYGIENE: INDEPENDENT
ORAL_HYGIENE: INDEPENDENT
DRESS: INDEPENDENT

## 2021-08-11 NOTE — PROGRESS NOTES
[facility], [place]   Psychiatric Progress Note        Interim History:   The patient's care was discussed with the treatment team during the daily team meeting and/or staff's chart notes were reviewed.  Staff report patient spent most of time in his room and bed. Reported feeling better and asked if he could be discharged. Denied presence of Suicidal ideation. Patient's group home was contacted by CTC and confirmed that they would take him back, but also informed CTC that patient tends to minimize his symptoms and rushes out of hospital prematurely and, then, gets worse outside in community.    Met with patient: he was seen in his room. Appeared to be somewhat more animated, but still sad. Said, nevertheless, that he was doing better? Denied Auditory hallucinations, Homicidal thoughts. Said that he didn't have any urges for Self injurious behavior and would like to leave. We had long talk about his repetitive and very brief hospitalizations and his requests to leave after spending at hospital only 1 or 2 days. I encouraged Kamini to spend more time at this hospital, promised to review his meds and address depression and anxiety. Kamini agreed to stay.          Medications:       benztropine  1 mg Oral BID     cloZAPine  450 mg Oral At Bedtime     docusate sodium  100 mg Oral BID     gabapentin  300 mg Oral TID     levothyroxine  112 mcg Oral Daily     omeprazole  20 mg Oral Daily     [START ON 8/23/2021] paliperidone  234 mg Intramuscular Q28 Days     prazosin  3 mg Oral At Bedtime     venlafaxine  225 mg Oral Daily with breakfast          Allergies:     Allergies   Allergen Reactions     Haldol [Haloperidol]      Other reaction(s): Tardive Dyskinesia  Torticollis  Torticollis          Labs:   No results found for this or any previous visit (from the past 24 hour(s)).       Psychiatric Examination:     /82   Pulse 75   Temp 98.4  F (36.9  C) (Oral)   Resp 16   Ht 1.778 m  "(5' 10\")   Wt 102.6 kg (226 lb 3.2 oz)   SpO2 100%   BMI 32.46 kg/m    Weight is 226 lbs 3.2 oz  Body mass index is 32.46 kg/m .  Orthostatic Vitals     None          Appearance: dressed in hospital scrubs  Attitude:  cooperative  Eye Contact:  poor   Mood:  sad   Affect:  constricted mobility  Speech:  increased speech latency  Psychomotor Behavior:  no evidence of tardive dyskinesia, dystonia, or tics  Throught Process:  logical and linear  Associations:  no loose associations  Thought Content:  no evidence of suicidal ideation or homicidal ideation and no evidence of psychotic thought  Insight:  partial  Judgement:  fair  Oriented to:  time, person, and place  Attention Span and Concentration:  fair  Recent and Remote Memory:  intact    Clinical Global Impressions  First: 6/4 8/11/2021      Most recent:            Precautions:     Behavioral Orders   Procedures     Assault precautions     Code 1 - Restrict to Unit     Elopement precautions     Routine Programming     As clinically indicated     Status 15     Every 15 minutes.     Suicide precautions     Patients on Suicide Precautions should have a Combination Diet ordered that includes a Diet selection(s) AND a Behavioral Tray selection for Safe Tray - with utensils, or Safe Tray - NO utensils            DIagnoses:     Schizoaffective disorder, depressive type, moderate severity; posttraumatic stress disorder.         Plan:     Will start on scheduled Gabapentin. Will continue the rest of meds unchanged. Will continue to provide support and structure. Expect discharged back to group home within next few days. Will refer to Day Treatment.        "

## 2021-08-11 NOTE — PROGRESS NOTES
SPIRITUAL HEALTH SERVICES  SPIRITUAL ASSESSMENT Progress Note  [facility] ([facility] 10NB       REFERRAL SOURCE: Self initiated  visit, Evangelical specific.     DATA: Pt Kamini Neal identifies as Evangelical and is of [place] descent.     Kamini was sleepy during my encounter with him. He wanted to rest so I briefly introduced myself to him as the Lead Evangelical  and oriented him to [facility]. Kamini is aware of my availability and how to consult [facility] when needs arise.    Kamini has received an Croatian copy of the Holy Quran. I also provided him with an Islamic prayer booklet and card with a Prophetic supplication.       PLAN: I will follow up with Kamini Neal for the duration of his stay.     Kirstie Wasserman  Lead Evangelical   Pager [phone]    [facility] remains available 24/7 for emergent requests/referrals, either by having the switchboard page the on-call  or by entering an ASAP/STAT consult in Epic (this will also page the on-call ).
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
## 2021-08-11 NOTE — PHARMACY
Pharmacy Consult - Invega Sustenna     Pharmacy consult note to assess conditions to be met prior to continuing Invega Sustenna while hospitalized.     The following conditions must be met to dispense Invega Sustenna per hospital policy:   1. Patient has been on Invega Sustenna at least 3 weeks prior to admission.   2. Patient has received both initiation doses.   3. Dose will be administered one week after the next scheduled outpatient maintenance dose is due.   4. The patient must still be hospitalized on the administration date determined by the pharmacy consult (one week after the next scheduled outpatient dose).   5. Order must include  dispense as written.    6. If six months or more have passed since the last maintenance dose, do not restart Invega Sustenna in the hospital, defer to outpatient treatment.     The criteria listed above allow for continuation of Invega Sustenna while this patient is hospitalized.   The last dose of Invega Sustenna 234mg was administered on 7/19/2021 prior to admission (per Pharmacy Admission Medication History progress note on 8/10/2021)   The next dose is due 8/16/2021, and will be scheduled for 8/23/2021 (one week after the next scheduled outpatient dose) as listed above in condition #3.     Please contact unit decentralized pharmacist if you have questions or concerns.    Selina Vicente, Pharm.D., [facility]  Behavioral Health Inpatient Pharmacist  [facility] ([facility])  Phone: *[phone] ([email or web address]) or [phone]

## 2021-08-12 LAB
ALBUMIN SERPL-MCNC: 3.8 G/DL (ref 3.4–5)
ALP SERPL-CCNC: 92 U/L (ref 40–150)
ALT SERPL W P-5'-P-CCNC: 20 U/L (ref 0–70)
ANION GAP SERPL CALCULATED.3IONS-SCNC: 5 MMOL/L (ref 3–14)
AST SERPL W P-5'-P-CCNC: 15 U/L (ref 0–45)
BILIRUB SERPL-MCNC: 0.4 MG/DL (ref 0.2–1.3)
BUN SERPL-MCNC: 12 MG/DL (ref 7–30)
CALCIUM SERPL-MCNC: 9 MG/DL (ref 8.5–10.1)
CHLORIDE BLD-SCNC: 111 MMOL/L (ref 94–109)
CHOLEST SERPL-MCNC: 168 MG/DL
CO2 SERPL-SCNC: 26 MMOL/L (ref 20–32)
CREAT SERPL-MCNC: 0.7 MG/DL (ref 0.66–1.25)
FASTING STATUS PATIENT QL REPORTED: YES
GFR SERPL CREATININE-BSD FRML MDRD: >90 ML/MIN/1.73M2
GLUCOSE BLD-MCNC: 86 MG/DL (ref 70–99)
HDLC SERPL-MCNC: 34 MG/DL
LDLC SERPL CALC-MCNC: 93 MG/DL
NONHDLC SERPL-MCNC: 134 MG/DL
POTASSIUM BLD-SCNC: 3.8 MMOL/L (ref 3.4–5.3)
PROT SERPL-MCNC: 6.9 G/DL (ref 6.8–8.8)
SODIUM SERPL-SCNC: 142 MMOL/L (ref 133–144)
TRIGL SERPL-MCNC: 204 MG/DL
TSH SERPL DL<=0.005 MIU/L-ACNC: 1.68 MU/L (ref 0.4–4)

## 2021-08-12 PROCEDURE — 124N000002 HC R&B MH UMMC

## 2021-08-12 PROCEDURE — 84443 ASSAY THYROID STIM HORMONE: CPT | Performed by: PSYCHIATRY & NEUROLOGY

## 2021-08-12 PROCEDURE — 82040 ASSAY OF SERUM ALBUMIN: CPT | Performed by: PSYCHIATRY & NEUROLOGY

## 2021-08-12 PROCEDURE — 250N000013 HC RX MED GY IP 250 OP 250 PS 637: Performed by: PSYCHIATRY & NEUROLOGY

## 2021-08-12 PROCEDURE — 250N000013 HC RX MED GY IP 250 OP 250 PS 637: Performed by: EMERGENCY MEDICINE

## 2021-08-12 PROCEDURE — 36415 COLL VENOUS BLD VENIPUNCTURE: CPT | Performed by: PSYCHIATRY & NEUROLOGY

## 2021-08-12 PROCEDURE — 82247 BILIRUBIN TOTAL: CPT | Performed by: PSYCHIATRY & NEUROLOGY

## 2021-08-12 PROCEDURE — 99232 SBSQ HOSP IP/OBS MODERATE 35: CPT | Performed by: PSYCHIATRY & NEUROLOGY

## 2021-08-12 PROCEDURE — 80061 LIPID PANEL: CPT | Performed by: PSYCHIATRY & NEUROLOGY

## 2021-08-12 RX ORDER — FLUOXETINE 10 MG/1
10 CAPSULE ORAL DAILY
Status: DISCONTINUED | OUTPATIENT
Start: 2021-08-12 | End: 2021-08-16 | Stop reason: HOSPADM

## 2021-08-12 RX ORDER — MAGNESIUM CARB/ALUMINUM HYDROX 105-160MG
296 TABLET,CHEWABLE ORAL ONCE
Status: COMPLETED | OUTPATIENT
Start: 2021-08-12 | End: 2021-08-12

## 2021-08-12 RX ADMIN — GABAPENTIN 300 MG: 300 CAPSULE ORAL at 08:45

## 2021-08-12 RX ADMIN — OMEPRAZOLE 20 MG: 20 CAPSULE, DELAYED RELEASE ORAL at 08:45

## 2021-08-12 RX ADMIN — POLYETHYLENE GLYCOL 3350 17 G: 17 POWDER, FOR SOLUTION ORAL at 08:48

## 2021-08-12 RX ADMIN — NICOTINE POLACRILEX 4 MG: 2 GUM, CHEWING BUCCAL at 16:23

## 2021-08-12 RX ADMIN — DOCUSATE SODIUM 100 MG: 100 CAPSULE, LIQUID FILLED ORAL at 19:20

## 2021-08-12 RX ADMIN — NICOTINE POLACRILEX 4 MG: 2 GUM, CHEWING BUCCAL at 20:19

## 2021-08-12 RX ADMIN — DOCUSATE SODIUM AND SENNOSIDES 1 TABLET: 8.6; 5 TABLET ORAL at 09:44

## 2021-08-12 RX ADMIN — DOCUSATE SODIUM 100 MG: 100 CAPSULE, LIQUID FILLED ORAL at 08:45

## 2021-08-12 RX ADMIN — NICOTINE POLACRILEX 4 MG: 2 GUM, CHEWING BUCCAL at 14:17

## 2021-08-12 RX ADMIN — NICOTINE POLACRILEX 4 MG: 2 GUM, CHEWING BUCCAL at 12:42

## 2021-08-12 RX ADMIN — GABAPENTIN 300 MG: 300 CAPSULE ORAL at 13:00

## 2021-08-12 RX ADMIN — NICOTINE POLACRILEX 4 MG: 2 GUM, CHEWING BUCCAL at 17:34

## 2021-08-12 RX ADMIN — FLUOXETINE 10 MG: 10 CAPSULE ORAL at 13:00

## 2021-08-12 RX ADMIN — NICOTINE POLACRILEX 4 MG: 2 GUM, CHEWING BUCCAL at 19:20

## 2021-08-12 RX ADMIN — OLANZAPINE 10 MG: 10 TABLET, FILM COATED ORAL at 11:25

## 2021-08-12 RX ADMIN — GABAPENTIN 300 MG: 300 CAPSULE ORAL at 19:20

## 2021-08-12 RX ADMIN — LEVOTHYROXINE SODIUM 112 MCG: 112 TABLET ORAL at 08:45

## 2021-08-12 RX ADMIN — BENZTROPINE MESYLATE 1 MG: 1 TABLET ORAL at 19:20

## 2021-08-12 RX ADMIN — VENLAFAXINE HYDROCHLORIDE 225 MG: 225 TABLET, EXTENDED RELEASE ORAL at 08:45

## 2021-08-12 RX ADMIN — TRAZODONE HYDROCHLORIDE 50 MG: 50 TABLET ORAL at 21:09

## 2021-08-12 RX ADMIN — NICOTINE POLACRILEX 4 MG: 2 GUM, CHEWING BUCCAL at 08:45

## 2021-08-12 RX ADMIN — HYDROXYZINE HYDROCHLORIDE 25 MG: 25 TABLET, FILM COATED ORAL at 21:08

## 2021-08-12 RX ADMIN — NICOTINE POLACRILEX 4 MG: 2 GUM, CHEWING BUCCAL at 21:11

## 2021-08-12 RX ADMIN — PRAZOSIN HYDROCHLORIDE 3 MG: 2 CAPSULE ORAL at 21:08

## 2021-08-12 RX ADMIN — CLOZAPINE 450 MG: 100 TABLET ORAL at 21:08

## 2021-08-12 RX ADMIN — NICOTINE POLACRILEX 4 MG: 2 GUM, CHEWING BUCCAL at 11:26

## 2021-08-12 RX ADMIN — MAGNESIUM CITRATE 296 ML: 1.75 LIQUID ORAL at 13:00

## 2021-08-12 RX ADMIN — NICOTINE POLACRILEX 4 MG: 2 GUM, CHEWING BUCCAL at 10:15

## 2021-08-12 RX ADMIN — BENZTROPINE MESYLATE 1 MG: 1 TABLET ORAL at 08:45

## 2021-08-12 ASSESSMENT — ACTIVITIES OF DAILY LIVING (ADL)
DRESS: INDEPENDENT
HYGIENE/GROOMING: INDEPENDENT
HYGIENE/GROOMING: INDEPENDENT
ORAL_HYGIENE: INDEPENDENT
ORAL_HYGIENE: INDEPENDENT
DRESS: INDEPENDENT

## 2021-08-12 ASSESSMENT — MIFFLIN-ST. JEOR: SCORE: 1984.59

## 2021-08-12 NOTE — ED NOTES
Within an hour after restraint an in person face to face assessment was completed at 8:20, including an evaluation of the patient's immediate reaction to the intervention, behavioral assessment and review/assessment of history, drugs and medications, recent labs, etc., and behavioral condition.  The patient experienced: No adverse physical outcome from seclusion/restraint initiation.  The intervention of restraint or seclusion needs to continue.  MD Eric Barbosa Michelle C, MD  08/11/21 6823
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
## 2021-08-12 NOTE — PLAN OF CARE
"Pt has a flat affect with depressed mood not attending groups. Pt mood was labile. Pt went from depressed and calm to restless, anxious and agitated. Pt was given prn Zyprexa and it was effective. Pt reports constipation from his meds with LBM \"8 days ago\". Abd was soft with BS x's all 4 quads. Pt stated \"yes' when asked if passing flatus. Pt was given prn miralax and pericolace but asked for something stronger because those have not worked. Pt was given a one time order of mag citrate. Pt started Prozac this afternoon. Pt was upset that he will not discharge tomorrow. Pt rates anxiety at 7/10 and depression 0/10. Anxiety decreased to 0/10 after Zyprexa. Pt rates pain at 0/10. Pt reports 0 SI/HI and contracts for safety. Pt was visible on unit during meals and for requests only. Pt was withdrawn and isolative to room most of shift. Continue current POC.    "

## 2021-08-12 NOTE — PROGRESS NOTES
08/11/21 1900   Groups   Details    (Psychotherapy)   Number of patients attending the group: 3  Group Length:  1 hour   Group Therapy Type: Psychotherapy     Summary of Group / Topics Discussed:        The  Psychotherapy group goal is to promote insight to positive choice and change. Group processing is within a supportive and safe environment. Patients will process emotions using verbal group and expressive psychotherapy interventions including visual art/writing interventions.     Group interventions support patients by: fostering self awareness, self efficacy/empowerment and mental health management     Modalities to reach these goals include: positive/solution focused psychology , Expressive Arts Therapies and Mindfulness practices     Subjective -patient report of mood today-good     Objective/ Intervention- Goal of group and Therapeutic modality utilized- Processing about strengths and mindfulness and mindful art making offered     Group Response- engaged     Patient Response- Pt stated his strengths as : kind and doesn't  people. He also said he is good at video games. He said he admires his mother's strengths, he said she is the best mother anyone can have. He was pleasant and cooperative    Jaswinder Griffin, ELIDIAFT, ATR-BC

## 2021-08-12 NOTE — PROGRESS NOTES
[facility], [place]   Psychiatric Progress Note        Interim History:   The patient's care was discussed with the treatment team during the daily team meeting and/or staff's chart notes were reviewed.  Staff report patient spent more time outside of his room Reported feeling better and asked if he could be discharged. Denied presence of Suicidal ideation. Patient's group home was contacted by [facility] and confirmed that they would take him back, but also informed [facility] that patient tends to minimize his symptoms and rushes out of hospital prematurely and, then, gets worse outside in community.    Met with patient: he was seen in his room. Appeared to be somewhat more animated, but still sad. Said, nevertheless, that he was doing better? Denied Auditory hallucinations, Homicidal thoughts. Said that he didn't have any urges for Self injurious behavior and would like to leave. We again had long talk about his repetitive and very brief hospitalizations and his requests to leave after spending at hospital only 1 or 2 days. I suggested to start him on an antidepressant (Prozac). I encouraged Kamini to spend weekend at this hospital, promised to review his meds and address depression and anxiety. Kamini agreed with that plan. His labwork was reviewed.         Medications:       benztropine  1 mg Oral BID     cloZAPine  450 mg Oral At Bedtime     docusate sodium  100 mg Oral BID     FLUoxetine  10 mg Oral Daily     gabapentin  300 mg Oral TID     levothyroxine  112 mcg Oral Daily     omeprazole  20 mg Oral Daily     [START ON 8/23/2021] paliperidone  234 mg Intramuscular Q28 Days     prazosin  3 mg Oral At Bedtime     venlafaxine  225 mg Oral Daily with breakfast          Allergies:     Allergies   Allergen Reactions     Haldol [Haloperidol]      Other reaction(s): Tardive Dyskinesia  Torticollis  Torticollis          Labs:     Recent Results (from the past 24 hour(s))   Comprehensive metabolic  "panel    Collection Time: 08/12/21  8:29 AM   Result Value Ref Range    Sodium 142 133 - 144 mmol/L    Potassium 3.8 3.4 - 5.3 mmol/L    Chloride 111 (H) 94 - 109 mmol/L    Carbon Dioxide (CO2) 26 20 - 32 mmol/L    Anion Gap 5 3 - 14 mmol/L    Urea Nitrogen 12 7 - 30 mg/dL    Creatinine 0.70 0.66 - 1.25 mg/dL    Calcium 9.0 8.5 - 10.1 mg/dL    Glucose 86 70 - 99 mg/dL    Alkaline Phosphatase 92 40 - 150 U/L    AST 15 0 - 45 U/L    ALT 20 0 - 70 U/L    Protein Total 6.9 6.8 - 8.8 g/dL    Albumin 3.8 3.4 - 5.0 g/dL    Bilirubin Total 0.4 0.2 - 1.3 mg/dL    GFR Estimate >90 >60 mL/min/1.73m2   Lipid panel    Collection Time: 08/12/21  8:29 AM   Result Value Ref Range    Cholesterol 168 <200 mg/dL    Triglycerides 204 (H) <150 mg/dL    Direct Measure HDL 34 (L) >=40 mg/dL    LDL Cholesterol Calculated 93 <=100 mg/dL    Non HDL Cholesterol 134 (H) <130 mg/dL    Patient Fasting > 8hrs? Yes    TSH with free T4 reflex and/or T3 as indicated    Collection Time: 08/12/21  8:29 AM   Result Value Ref Range    TSH 1.68 0.40 - 4.00 mU/L          Psychiatric Examination:     /80   Pulse 99   Temp 98.4  F (36.9  C) (Oral)   Resp 16   Ht 1.778 m (5' 10\")   Wt 101.3 kg (223 lb 6.4 oz)   SpO2 99%   BMI 32.05 kg/m    Weight is 223 lbs 6.4 oz  Body mass index is 32.05 kg/m .    Orthostatic Vitals       Most Recent      Sitting Orthostatic /79 08/12 0843    Sitting Orthostatic Pulse (bpm) 96 08/12 0843    Standing Orthostatic BP --  Comment: pt declined 08/12 0843         Appearance: dressed in hospital scrubs  Attitude:  cooperative  Eye Contact: partial  Mood:  sad   Affect:  constricted mobility  Speech:  increased speech latency  Psychomotor Behavior:  no evidence of tardive dyskinesia, dystonia, or tics  Throught Process:  logical and linear  Associations:  no loose associations  Thought Content:  no evidence of suicidal ideation or homicidal ideation and no evidence of psychotic thought  Insight:  " partial  Judgement:  fair  Oriented to:  time, person, and place  Attention Span and Concentration:  fair  Recent and Remote Memory:  intact    Clinical Global Impressions  First: 6/4 8/11/2021      Most recent:            Precautions:     Behavioral Orders   Procedures     Assault precautions     Code 1 - Restrict to Unit     Elopement precautions     Routine Programming     As clinically indicated     Status 15     Every 15 minutes.     Suicide precautions     Patients on Suicide Precautions should have a Combination Diet ordered that includes a Diet selection(s) AND a Behavioral Tray selection for Safe Tray - with utensils, or Safe Tray - NO utensils            DIagnoses:     Schizoaffective disorder, depressive type, moderate severity; posttraumatic stress disorder.         Plan:     Will start on scheduled Fluoxetine. Will continue the rest of meds unchanged. Will continue to provide support and structure. Expect discharged back to group home within next few days. Will refer to Day Treatment.

## 2021-08-12 NOTE — PLAN OF CARE
Work Completed: AL (writer) met with pt to discuss discharge planning, confirming that trx team was going to refer him to day trx and AL was working on setting pt up with outpatient therapy. Pt expressed wanting to discharge tomorrow, and AL explained to pt that pt would need to discuss that with his attending. Furthermore, AL clarified to pt that based on AL's past experience and observations, they did not think pt would discharge tomorrow at this time, but could be wrong. Pt was accepting of this information and declined having any other questions or concerns. AL received a call from pt's group home, who confirmed that pt could return, but expressed wanting pt to stabilize more prior to returning. Additionally, group home staff reported that pt has not been doing well since last admit just over a month ago.     Discharge plan or goal: Pt will discharge back to group home once stabilized with outpatient referrals/follow-up.                 Barriers to discharge: Safe discharge plan, medication evaluation/assessment, and ongoing symptom severity (PTSD flashbacks and depression presentation)

## 2021-08-12 NOTE — PLAN OF CARE
Assessment/Intervention/Current Symtoms and Care Coordination  [facility] (writer) met with trx team, provided update, and reviewed pt's chart. [facility] spoke with pt's group home director, Neela Brown [phone] , who expressed concern that pt is not well and confirmed that pt's PRN's do not appear to be effective. Neela stated that pt has been to the ED twice since his last admission in late June 2021, and is recommending trx team to consider making some medication adjustments as she feels pt is minimizing his symptoms. [facility] is in agreement with Neela that pt likely needs more time in the hospital for further observation and would pass on her concerns to attending.  [facility] reached out to [facility] to inquire about therapy referral from last admission. [facility] took [facility]'s info and stated they would call them back with an update. [facility] spoke with Day Trx intake, who said they could see pt next week on the 18th to assess for Day trx placement. [facility] and day trx will stay in contact about trx/discharge plan to coordinate services. Day treatment got back to [facility] later and said they could see pt tomorrow at 1:00pm. [facility] spoke to [employer] coordinator Gabby, as per pt's request, verifying that pt was in the hospital and that he would need time off work due to being referred to day trx. Gabby confirmed info and requested assistance with helping pt provide them info related to current hospitalization and said pt was aware of this. [facility] agreed to provide documentation after speaking with pt further, to verify that pt was in fact in agreement with it first, and Gabby was fine with this. Gabby carnified that she is working with pt to get him set up with a [employer] psychiatrist as she has noticed that pt's meds are not being as effective now and pt has expressed feeling they are sufficient to her as well. [facility] will follow-up with Gabby tomorrow. [facility] met with pt to provide update and confirmed that pt won't be discharging until next Monday  at the soonest. Pt expressed initially being upset about this, but has come to terms with it and recognizes how it may be helpful for him to stay a little longer.      Discharge Plan or Goal  Pt will discharge back to group home once stable with outpatient referrals.      Barriers to Discharge   Safe discharge plan, ongoing symptom severity (depression, aggression), and medication evaluation/assessment.     Referral Status  Day Trx Intake Assessment (8/12/21), Interview 8/13/21 at 1:00pm     Legal Status  VOLUNTARY

## 2021-08-12 NOTE — PLAN OF CARE
Patient has a calm shift. He was present in the lounge but kept to himself. Patient ate dinner adequately. He came to the medication window to request his medications. He received miralax for constipation this shift. Patient denies SI, SIB, HI or hallucinations this shift and vitals are WNL.

## 2021-08-12 NOTE — PROGRESS NOTES
08/12/21 1500   General Information   Has Not Attended OT as of: 08/12/21     Plan: OT staff will meet with pt to review the role of occupational therapy and explain the value of having them involved in their treatment plan including options to meet current needs/self-identified goals. As group attendance is established, Pt will be given self-assessment to inform OT initial assessment.

## 2021-08-12 NOTE — CONSULTS
Consult acknowledged.    Discussed referral with [facility] as patient lives in a group home. [facility] feels patient is appropriate for group based therapy and patient has access to use computer in private space at his group home to attend.     There are no openings to assess patient this week.  Patient is scheduled with Yvonne ROJAS At 8:30am on 8/18. Patient may be seen sooner if we have any cancellations or no-shows. If patient is to discharge please call Intake at [phone] to schedule an outpatient appointment.           TIM Ochoa, [facility]  [facility]  Phone: [phone]

## 2021-08-13 PROCEDURE — 124N000002 HC R&B MH UMMC

## 2021-08-13 PROCEDURE — 250N000013 HC RX MED GY IP 250 OP 250 PS 637: Performed by: PSYCHIATRY & NEUROLOGY

## 2021-08-13 PROCEDURE — 99232 SBSQ HOSP IP/OBS MODERATE 35: CPT | Performed by: PSYCHIATRY & NEUROLOGY

## 2021-08-13 PROCEDURE — 250N000013 HC RX MED GY IP 250 OP 250 PS 637: Performed by: EMERGENCY MEDICINE

## 2021-08-13 RX ADMIN — VENLAFAXINE HYDROCHLORIDE 225 MG: 225 TABLET, EXTENDED RELEASE ORAL at 08:42

## 2021-08-13 RX ADMIN — GABAPENTIN 300 MG: 300 CAPSULE ORAL at 08:42

## 2021-08-13 RX ADMIN — NICOTINE POLACRILEX 4 MG: 2 GUM, CHEWING BUCCAL at 20:02

## 2021-08-13 RX ADMIN — PRAZOSIN HYDROCHLORIDE 3 MG: 2 CAPSULE ORAL at 21:23

## 2021-08-13 RX ADMIN — POLYETHYLENE GLYCOL 3350 17 G: 17 POWDER, FOR SOLUTION ORAL at 11:08

## 2021-08-13 RX ADMIN — GABAPENTIN 300 MG: 300 CAPSULE ORAL at 21:24

## 2021-08-13 RX ADMIN — NICOTINE POLACRILEX 4 MG: 2 GUM, CHEWING BUCCAL at 08:42

## 2021-08-13 RX ADMIN — LEVOTHYROXINE SODIUM 112 MCG: 112 TABLET ORAL at 08:42

## 2021-08-13 RX ADMIN — BENZTROPINE MESYLATE 1 MG: 1 TABLET ORAL at 08:42

## 2021-08-13 RX ADMIN — GABAPENTIN 300 MG: 300 CAPSULE ORAL at 13:57

## 2021-08-13 RX ADMIN — OMEPRAZOLE 20 MG: 20 CAPSULE, DELAYED RELEASE ORAL at 08:42

## 2021-08-13 RX ADMIN — CLOZAPINE 450 MG: 100 TABLET ORAL at 21:24

## 2021-08-13 RX ADMIN — DOCUSATE SODIUM 100 MG: 100 CAPSULE, LIQUID FILLED ORAL at 08:42

## 2021-08-13 RX ADMIN — NICOTINE POLACRILEX 4 MG: 2 GUM, CHEWING BUCCAL at 17:00

## 2021-08-13 RX ADMIN — BENZTROPINE MESYLATE 1 MG: 1 TABLET ORAL at 21:23

## 2021-08-13 RX ADMIN — NICOTINE POLACRILEX 4 MG: 2 GUM, CHEWING BUCCAL at 11:09

## 2021-08-13 RX ADMIN — NICOTINE POLACRILEX 4 MG: 2 GUM, CHEWING BUCCAL at 12:51

## 2021-08-13 RX ADMIN — DOCUSATE SODIUM 100 MG: 100 CAPSULE, LIQUID FILLED ORAL at 21:23

## 2021-08-13 RX ADMIN — NICOTINE POLACRILEX 4 MG: 2 GUM, CHEWING BUCCAL at 18:06

## 2021-08-13 RX ADMIN — NICOTINE POLACRILEX 4 MG: 2 GUM, CHEWING BUCCAL at 15:42

## 2021-08-13 RX ADMIN — FLUOXETINE 10 MG: 10 CAPSULE ORAL at 08:42

## 2021-08-13 RX ADMIN — NICOTINE POLACRILEX 4 MG: 2 GUM, CHEWING BUCCAL at 13:57

## 2021-08-13 RX ADMIN — NICOTINE POLACRILEX 4 MG: 2 GUM, CHEWING BUCCAL at 21:24

## 2021-08-13 RX ADMIN — DOCUSATE SODIUM AND SENNOSIDES 1 TABLET: 8.6; 5 TABLET ORAL at 08:43

## 2021-08-13 ASSESSMENT — COLUMBIA-SUICIDE SEVERITY RATING SCALE - C-SSRS
ATTEMPT LIFETIME: YES
REASONS FOR IDEATION PAST MONTH: COMPLETELY TO END OR STOP THE PAIN (YOU COULDN'T GO ON LIVING WITH THE PAIN OR HOW YOU WERE FEELING)
1. IN THE PAST MONTH, HAVE YOU WISHED YOU WERE DEAD OR WISHED YOU COULD GO TO SLEEP AND NOT WAKE UP?: NO
TOTAL  NUMBER OF INTERRUPTED ATTEMPTS LIFETIME: NO
6. HAVE YOU EVER DONE ANYTHING, STARTED TO DO ANYTHING, OR PREPARED TO DO ANYTHING TO END YOUR LIFE?: NO
1. IN THE PAST MONTH, HAVE YOU WISHED YOU WERE DEAD OR WISHED YOU COULD GO TO SLEEP AND NOT WAKE UP?: YES
TOTAL  NUMBER OF ABORTED OR SELF INTERRUPTED ATTEMPTS PAST LIFETIME: NO
TOTAL  NUMBER OF ABORTED OR SELF INTERRUPTED ATTEMPTS PAST 3 MONTHS: NO
6. HAVE YOU EVER DONE ANYTHING, STARTED TO DO ANYTHING, OR PREPARED TO DO ANYTHING TO END YOUR LIFE?: NO
5. HAVE YOU STARTED TO WORK OUT OR WORKED OUT THE DETAILS OF HOW TO KILL YOURSELF? DO YOU INTEND TO CARRY OUT THIS PLAN?: NO
TOTAL  NUMBER OF INTERRUPTED ATTEMPTS PAST 3 MONTHS: NO
ATTEMPT PAST THREE MONTHS: NO
4. HAVE YOU HAD THESE THOUGHTS AND HAD SOME INTENTION OF ACTING ON THEM?: NO
2. HAVE YOU ACTUALLY HAD ANY THOUGHTS OF KILLING YOURSELF?: NO

## 2021-08-13 ASSESSMENT — ANXIETY QUESTIONNAIRES
5. BEING SO RESTLESS THAT IT IS HARD TO SIT STILL: NOT AT ALL
1. FEELING NERVOUS, ANXIOUS, OR ON EDGE: SEVERAL DAYS
6. BECOMING EASILY ANNOYED OR IRRITABLE: SEVERAL DAYS
2. NOT BEING ABLE TO STOP OR CONTROL WORRYING: NOT AT ALL
7. FEELING AFRAID AS IF SOMETHING AWFUL MIGHT HAPPEN: MORE THAN HALF THE DAYS
3. WORRYING TOO MUCH ABOUT DIFFERENT THINGS: SEVERAL DAYS
4. TROUBLE RELAXING: MORE THAN HALF THE DAYS
GAD7 TOTAL SCORE: 7

## 2021-08-13 ASSESSMENT — ACTIVITIES OF DAILY LIVING (ADL)
HYGIENE/GROOMING: INDEPENDENT
LAUNDRY: WITH SUPERVISION
ORAL_HYGIENE: INDEPENDENT
DRESS: INDEPENDENT
DRESS: INDEPENDENT
ORAL_HYGIENE: INDEPENDENT
HYGIENE/GROOMING: INDEPENDENT

## 2021-08-13 ASSESSMENT — PATIENT HEALTH QUESTIONNAIRE - PHQ9: SUM OF ALL RESPONSES TO PHQ QUESTIONS 1-9: 10

## 2021-08-13 NOTE — PLAN OF CARE
Assessment/Intervention/Current Symtoms and Care Coordination  [facility] (writer) met with trx team, provided update, and reviewed pt's chart. Pt met with Intake Assessor for Day trx, and was accepted to the program. [facility] provided paperwork for pt to sign that was requested as per intake assessor and [facility] returned signed forms to them. Pt was given his welcome form and indicated he started day trx next week, AVS was updated. [facility] arranged follow-up with therapy and psychiatry as well; AVS was updated. [facility] met with pt to discuss trx/d/c plan at this time, and address any questions or concerns. Pt wanted to take a cab home, but [facility] informed him that typically, if the group home can provide transportation, that is the option the trx team will go with. Pt reported feeling better and is hopeful his new meds will be more effective/helpful. Pt confirmed that he is working with his coordinator Gabby at [employer] to get set up with a new psychiatrist, signing an updated ELIZABETH so form could be shared. [facility] spoke with Gabby at [employer], [phone], to update about the day trx schedule and to provide requested paperwork from yesterday. Gabby will connect with pt post-discharge and his  to coordinate pt's work schedule. [facility] spoke with pt's group home director, Neela Brown [phone] , providing an update on discharge plans and follow-up. Neela confirmed that group home would provide transportation for pt to return home on Monday and would just need about a 2 hour lead time to come get pt. Otherwise Neela had no concerns or questions after talking with [facility]. [facility] met with pt one last time today to update him on the discharge plan for Monday, and pt was accepting of it.     Discharge Plan or Goal  Pt will discharge back to group home once stable with outpatient referrals.      Barriers to Discharge   Safe discharge plan, ongoing symptom severity (depression/PTSD, aggression), and medication evaluation/assessment.     Referral  Status  Day Trx Accepted, starts 8/17/21 (8/13/21)  [facility] (8/13/21)     Legal Status  VOLUNTARY

## 2021-08-13 NOTE — PLAN OF CARE
NOC Shift Report     Pt in bed at beginning of shift, breathing quiet and unlabored. Pt slept through shift. Pt slept 7 hours.      No pt complaints or concerns at this time.      No PRNs given. Will continue to monitor.    Bradley Dacosta RN

## 2021-08-13 NOTE — PLAN OF CARE
Pt has a flat affect with a calm mood not attending groups. Pt was excited to tell writer that he will discharge on Monday. Pt completed day treatment phone assessment this am. Pt is also excited to start day treatment after discharge. Pt rates anxiety at 5/10 and depression 0/10. Pt rates pain at 0/10. Pt reports no SI/HI and contracts for safety. Pt was visible on unit during meals and for requests only. Pt was brighter today but continues to be withdrawn and isolative to his room. Pt noted to have a little social interaction with staff but not with peers. No observed or reported med SE. Pt was med compliant. Continue current POC.

## 2021-08-13 NOTE — CONSULTS
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 to services:    Alcohol Use Disorder   305.00 (F10.10) Mild In sustained remission per history per chart review  5. Provisional Diagnosis:  No other symptoms were reported that would indicate alternate diagnoses.  Should symptoms arise during the course of treatment the diagnoses can be updated at that time.   6. Prognosis: Relieve Acute Symptoms.  7. Likely consequences of symptoms if not treated: If untreated, patient's mental health will likely deteriorate and may require a higher level of care such as rehospitalization  8. Client strengths include:  committed to sobriety, employed, goal-focused, good listener, has a previous history of therapy, insightful, motivated and support of family, friends and providers .     Recommendations:     1. Plan for Safety and Risk Management:   A safety and risk management plan has been developed including: Patient consented to co-developed safety plan.  Safety and risk management plan was completed.  Patient agreed to use safety plan should any safety concerns arise.  A copy was given to the patient..          Report to child / adult protection  "services was NA.     2. ,Patient did not identify Jain, ethnic or cultural issues relevant to therapy at this time     3. Initial Treatment will focus on:    Depressed Mood -   Anxiety -   Functional Impairment at: home and work  Risk Management / Safety Concerns related to: Self-harm ideation and Suicidal ideation.     4. Resources/Service Plan:    services are not indicated.   Modifications to assist communication are not indicated.   Additional disability accommodations are not indicated.      5. Collaboration:   Collaboration / coordination of treatment will be initiated with the following  support professionals: .      6.  Referrals:   The following referral(s) will be initiated: Day Treatment. Next Scheduled Appointment: 8/17/21.     A Release of Information has been obtained for the following: Emergency Contact.    7. TRISHA:    TRISHA:  Discussed the general effects of drugs and alcohol on health and well-being.  Recommendations:  Abstain from all mood altering substances unless prescribed by a physician..  .     8. Records:   These were reviewed at time of assessment.   Information in this assessment was obtained from the medical record and  provided by patient who is a good historian.    Patient will have open access to their mental health medical record.        Provider Name/ Credentials:  TIM Ochoa, [facility]   August 13, 2021                               Outpatient Mental Health Services - Adult    MY COPING PLAN FOR SAFETY    PATIENT'S NAME: Kamini Neal  MRN:   [id number]    SAFETY PLAN:    Step 1: Warning signs / cues (Thoughts, images, mood, situation, behavior) that a crisis may be developing:      Thoughts: \"I can't do this anymore\", \"I just want this to end\" and \"Nothing makes it better\"    Images: flashbacks    Thinking Processes: intrusive thoughts (bothersome, unwanted thoughts that come out of nowhere): thoughts of self harm and passive suicidal " "ideation    Mood: worsening depression, hopelessness, helplessness and intense worry    Behaviors: isolating/withdrawing , not taking care of my responsibilities and sleeping too much    Situations: trauma        Step 2: Coping strategies - Things I can do to take my mind off of my problems without contacting another person (relaxation technique, physical activity):      Distress Tolerance Strategies:   Play video games and listen to music    Physical Activities:  N/A    Focus on helpful thoughts:  \" That I am loved and appreciated\"     Step 3: People and social settings that provide distraction:     Name: Neela Brown () Phone: [phone]     Step 4: Remind myself of people and things that are important to me and worth living for:  \"My mom\"    Step 5: When I am in crisis, I can ask these people to help me use my safety plan:     Name: Neela Brown () Phone: [phone]       Step 6: Making the environment safe:       be around others    Step 7: Professionals or agencies I can contact during a crisis:      Suicide Prevention Lifeline: 8-465-742-MMOZ (7135)    Crisis Text Line Service (available 24 hours a day, 7 days a week): Text [place] to 748427    Call  **CRISIS (080824) from a cell phone to talk to a team of professionals who can help you.    Crisis Services By [facility]: Phone Number:   [place]     [phone]   [place]    [phone]   [place]    [phone]   [place]    [phone]   [place]    [phone]   [place] [phone]   [place]     [phone]       Call 911 or go to my nearest emergency department.     I helped develop this safety plan and agree to use it when needed.  I have been given a copy of this plan.        Today s date:  8/13/2021  Adapted from Safety Plan Template 2008 Marcia Cody and Kai Lipscomb is reprinted with the express permission of the authors.  No portion of the Safety Plan Template may be reproduced without the express, written " permission.  You can contact the authors at [email or web address] or [email or web address]                    LOCUS Worksheet     Name: Kamini Neal MRN: [id number]    : 1990      Gender:  male    PMI:     Provider Name: [facility]    Provider NPI:  [id number]     Actual level of Care Provided:  None    Service(s) receiving or referred to:  Day Treatment    Reason for Variance: For symptom management due to worsening depression, anxiety and decline in functioning      Rating completed by: TIM Ochoa, LICSW       I. Risk of Harm:   2      Low Risk of Harm    II. Functional Status:   3      Moderate Impairment    III. Co-Morbidity:   1      No Co-Morbidity    IV - A. Recovery Environment - Level of Stress:   4      Highly Stress Environment    IV - B. Recovery Environment - Level of Support:   2      Supportive Environment    V. Treatment and Recovery History:   3      Moderate to Equivocal Response to Treatment and Recovery Management    VI. Engagement and Recovery Project:   2      Positive Engagement and Recovery       17 Composite Score    Level of Care Recommendation:   17 to 19       High Intensity Community Based Services
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
## 2021-08-13 NOTE — PLAN OF CARE
Pt has a flat affect with a depressed mood. Pt reports constipation from his meds. Pt was given prn miralax and pericolace this am with no results. Pt was given a one time order of mag citrate also with no results. Pt requested to have a fleets enema and before it was administered pt stated he had a large BM. Pt encouraged to drink plenty of fluids and to walk the halls throughout the day. Pt rates anxiety at 5/10 and depression 0/10. Pt rates pain at 0/10. Pt reports no SI/HI and contracts for safety. Pt denies any hallucinations. Pt was withdrawn and isolative to room most of shift. Pt not social with peers. Pt received trazodone for sleep and vistaril for anxiety at HS. Continue current POC.

## 2021-08-13 NOTE — DISCHARGE INSTRUCTIONS
Behavioral Discharge Planning and Instructions    Summary: You were admitted on 8/9/2021  due to Anxiety, PTSD (Post Tramatic Stress Disorder), Disorganized Thinking/Behaviors and Suicidal Ideations.  You were treated by Dr. Chandler Solorzano MD and discharged on 08/16/21 from [facility] to [facility]    Main Diagnosis: Schizoaffective disorder, depressive type, moderate severity; posttraumatic stress disorder.    Health Care Follow-up:   Therapy Appointment    [facility] Please Fax AVS  Date/Time: 09/29/21 at 4:30pm VIDEO APPOINTMENT  Provider: Joey Foster  Address: [address] [place], [place] [address]  Phone: [phone]  This is NOT through the in-home services you used previously, this is a regular outpatient therapist with experience working with people with similar symptoms. This provider can do phone visits as well, but you first visit will be video. You will receive a video link via your email the day of your appointment to attend. Please call after discharge to verify your email with them and contact them if you have any questions or concerns.     Psychiatry/Med-Management Appointment, [facility] Please Fax AVS  Date/Time: 09/01/21 at 9:30am, PHONE APPOINTMENT  Provider: Dr. Young  Address: [address], [place] [address]  Phone: [phone]  Fax: [phone]    Day Treatment, [facility]  VIDEO APPOINTMENT  Date/Time: 8/17/21 at 9:00am  Address: [address] [place] [address]    Phone: [phone]  This is a remote program at this time, and a staff member from the program will reach out to you via email to attend remotely. Please call them if you have any questions or concerns.    Attend all scheduled appointments with your outpatient providers. Call at least 24 hours in advance if you need to reschedule an appointment to ensure continued access to your outpatient providers.     Major Treatments, Procedures and Findings:  You were provided with: a psychiatric assessment, assessed for medical  "stability, medication evaluation and/or management, group therapy and milieu management    Symptoms to Report: feeling more aggressive, increased confusion, losing more sleep, mood getting worse, thoughts of suicide or having more intense flashbacks    Early warning signs can include: increased depression or anxiety sleep disturbances increased thoughts or behaviors of suicide or self-harm  increased unusual thinking, such as paranoia or hearing voices or having more intense flashbacks    Safety and Wellness:  Take all medicines as directed.  Make no changes unless your doctor suggests them.      Follow treatment recommendations.  Refrain from alcohol and non-prescribed drugs.  If there is a concern for safety, call 911.    Resources:   Crisis Intervention: 194.276.6785 or 029-589-6918 (TTY: 506.750.3674).  Call anytime for help.  National [place] on Mental Illness (www.[place].jorge a.org): 215.532.2256 or 977-674-9415.  Suicide Awareness Voices of Education (SAVE) (www.save.org): 463-187-YYKN (4288)  National Suicide Prevention Line (www.mentalhealthmn.org): 354-141-RGOE (5581)  Mental Health Consumer/Survivor Network of [place] (www.mhcsn.net): 168.115.9380 or 634-105-6506  Mental Health Association of [place] (www.mentalhealth.org): 478.704.3657 or 135-649-7842  [facility] Crisis (COPE) Response - Adult 300 410-3140  Text 4 Life: txt \"LIFE\" to 56216 for immediate support and crisis intervention    General Medication Instructions:   See your medication sheet(s) for instructions.   Take all medicines as directed.  Make no changes unless your doctor suggests them.   Go to all your doctor visits.  Be sure to have all your required lab tests. This way, your medicines can be refilled on time.  Do not use any drugs not prescribed by your doctor.  Avoid alcohol.    Advance Directives:   Scanned document on file with [facility]? No scanned doc  Is document scanned? Pt states no documents  Honoring Choices Your Rights Handout: Informed " and given  Was more information offered? Pt declined    The Treatment team has appreciated the opportunity to work with you. If you have any questions or concerns about your recent admission, you can contact the unit which can receive your call 24 hours a day, 7 days a week. They will be able to get in touch with a Provider if needed. The unit number is [phone] .         FAMILY HISTORY:  Family history of acute myocardial infarction, father-     Sibling  Still living? Unknown  Family history of breast cancer in first degree relative, Age at diagnosis: Age Unknown

## 2021-08-14 PROCEDURE — 250N000013 HC RX MED GY IP 250 OP 250 PS 637: Performed by: PSYCHIATRY & NEUROLOGY

## 2021-08-14 PROCEDURE — 250N000013 HC RX MED GY IP 250 OP 250 PS 637: Performed by: EMERGENCY MEDICINE

## 2021-08-14 PROCEDURE — 124N000002 HC R&B MH UMMC

## 2021-08-14 RX ADMIN — HYDROXYZINE HYDROCHLORIDE 25 MG: 25 TABLET, FILM COATED ORAL at 17:33

## 2021-08-14 RX ADMIN — DOCUSATE SODIUM 100 MG: 100 CAPSULE, LIQUID FILLED ORAL at 08:26

## 2021-08-14 RX ADMIN — BENZTROPINE MESYLATE 1 MG: 1 TABLET ORAL at 19:16

## 2021-08-14 RX ADMIN — DOCUSATE SODIUM AND SENNOSIDES 1 TABLET: 8.6; 5 TABLET ORAL at 08:26

## 2021-08-14 RX ADMIN — GABAPENTIN 300 MG: 300 CAPSULE ORAL at 14:07

## 2021-08-14 RX ADMIN — DOCUSATE SODIUM 100 MG: 100 CAPSULE, LIQUID FILLED ORAL at 19:16

## 2021-08-14 RX ADMIN — GABAPENTIN 300 MG: 300 CAPSULE ORAL at 08:25

## 2021-08-14 RX ADMIN — GABAPENTIN 300 MG: 300 CAPSULE ORAL at 19:16

## 2021-08-14 RX ADMIN — NICOTINE POLACRILEX 4 MG: 2 GUM, CHEWING BUCCAL at 19:16

## 2021-08-14 RX ADMIN — NICOTINE POLACRILEX 4 MG: 2 GUM, CHEWING BUCCAL at 10:05

## 2021-08-14 RX ADMIN — NICOTINE POLACRILEX 4 MG: 2 GUM, CHEWING BUCCAL at 11:19

## 2021-08-14 RX ADMIN — HYDROXYZINE HYDROCHLORIDE 25 MG: 25 TABLET, FILM COATED ORAL at 11:19

## 2021-08-14 RX ADMIN — BENZTROPINE MESYLATE 1 MG: 1 TABLET ORAL at 08:26

## 2021-08-14 RX ADMIN — NICOTINE POLACRILEX 4 MG: 2 GUM, CHEWING BUCCAL at 21:25

## 2021-08-14 RX ADMIN — NICOTINE POLACRILEX 4 MG: 2 GUM, CHEWING BUCCAL at 15:56

## 2021-08-14 RX ADMIN — CLOZAPINE 450 MG: 100 TABLET ORAL at 21:24

## 2021-08-14 RX ADMIN — NICOTINE POLACRILEX 4 MG: 2 GUM, CHEWING BUCCAL at 17:34

## 2021-08-14 RX ADMIN — VENLAFAXINE HYDROCHLORIDE 225 MG: 225 TABLET, EXTENDED RELEASE ORAL at 08:25

## 2021-08-14 RX ADMIN — NICOTINE POLACRILEX 4 MG: 2 GUM, CHEWING BUCCAL at 12:26

## 2021-08-14 RX ADMIN — TRAZODONE HYDROCHLORIDE 50 MG: 50 TABLET ORAL at 21:24

## 2021-08-14 RX ADMIN — NICOTINE POLACRILEX 4 MG: 2 GUM, CHEWING BUCCAL at 07:19

## 2021-08-14 RX ADMIN — LEVOTHYROXINE SODIUM 112 MCG: 112 TABLET ORAL at 08:26

## 2021-08-14 RX ADMIN — NICOTINE POLACRILEX 4 MG: 2 GUM, CHEWING BUCCAL at 08:25

## 2021-08-14 RX ADMIN — POLYETHYLENE GLYCOL 3350 17 G: 17 POWDER, FOR SOLUTION ORAL at 08:25

## 2021-08-14 RX ADMIN — NICOTINE POLACRILEX 4 MG: 2 GUM, CHEWING BUCCAL at 14:07

## 2021-08-14 RX ADMIN — OMEPRAZOLE 20 MG: 20 CAPSULE, DELAYED RELEASE ORAL at 08:25

## 2021-08-14 RX ADMIN — PRAZOSIN HYDROCHLORIDE 3 MG: 2 CAPSULE ORAL at 21:24

## 2021-08-14 RX ADMIN — FLUOXETINE 10 MG: 10 CAPSULE ORAL at 08:26

## 2021-08-14 ASSESSMENT — ACTIVITIES OF DAILY LIVING (ADL)
HYGIENE/GROOMING: INDEPENDENT
ORAL_HYGIENE: INDEPENDENT
DRESS: INDEPENDENT
ORAL_HYGIENE: INDEPENDENT
HYGIENE/GROOMING: INDEPENDENT
DRESS: INDEPENDENT

## 2021-08-14 ASSESSMENT — ANXIETY QUESTIONNAIRES: GAD7 TOTAL SCORE: 7

## 2021-08-14 NOTE — PLAN OF CARE
Pt has a flat affect with a calm mood. Pt rates anxiety at 4/10 and depression 0/10. Pt received vistaril and was effective. Pt rates pain at 0/10. Pt reports no SI/HI and contracts for safety. Pt was more visible on the unit today watching soccer on TV. Pt continues to be withdrawn to self. No observed or reported med SE. Pt was med compliant. Continue current POC.

## 2021-08-14 NOTE — PLAN OF CARE
Patient has been calm and cooperative this shift. He presents with a blunted affect but he brightens up during conversations. He is social with staff but was not observed interacting with any of his peers. Patient is visible in the milieu. He ate dinner adequately and did not attend groups. Patient denies SI, SIB, HI or hallucinations. Patient did not report any anxiety but he did report feeling sad after hearing that his mother was crying as related to his admission. Patient had a visitation appointment but his visitor showed up after 8pm. Books were dropped off by the visitor.vitals are WNL.

## 2021-08-14 NOTE — PLAN OF CARE
Patient appeared asleep for 7hrs during the night shift. He had no behavioral or medical concerns. Patient remains on 15min checks

## 2021-08-15 PROCEDURE — 124N000002 HC R&B MH UMMC

## 2021-08-15 PROCEDURE — 250N000013 HC RX MED GY IP 250 OP 250 PS 637: Performed by: EMERGENCY MEDICINE

## 2021-08-15 PROCEDURE — 250N000013 HC RX MED GY IP 250 OP 250 PS 637: Performed by: PSYCHIATRY & NEUROLOGY

## 2021-08-15 RX ADMIN — OMEPRAZOLE 20 MG: 20 CAPSULE, DELAYED RELEASE ORAL at 08:52

## 2021-08-15 RX ADMIN — NICOTINE POLACRILEX 4 MG: 2 GUM, CHEWING BUCCAL at 10:26

## 2021-08-15 RX ADMIN — HYDROXYZINE HYDROCHLORIDE 25 MG: 25 TABLET, FILM COATED ORAL at 19:00

## 2021-08-15 RX ADMIN — NICOTINE POLACRILEX 4 MG: 2 GUM, CHEWING BUCCAL at 17:29

## 2021-08-15 RX ADMIN — GABAPENTIN 300 MG: 300 CAPSULE ORAL at 08:52

## 2021-08-15 RX ADMIN — GABAPENTIN 300 MG: 300 CAPSULE ORAL at 14:04

## 2021-08-15 RX ADMIN — NICOTINE POLACRILEX 4 MG: 2 GUM, CHEWING BUCCAL at 21:19

## 2021-08-15 RX ADMIN — VENLAFAXINE HYDROCHLORIDE 225 MG: 225 TABLET, EXTENDED RELEASE ORAL at 08:52

## 2021-08-15 RX ADMIN — TRAZODONE HYDROCHLORIDE 50 MG: 50 TABLET ORAL at 21:17

## 2021-08-15 RX ADMIN — CLOZAPINE 450 MG: 100 TABLET ORAL at 21:17

## 2021-08-15 RX ADMIN — DOCUSATE SODIUM 100 MG: 100 CAPSULE, LIQUID FILLED ORAL at 08:52

## 2021-08-15 RX ADMIN — DOCUSATE SODIUM 100 MG: 100 CAPSULE, LIQUID FILLED ORAL at 19:31

## 2021-08-15 RX ADMIN — POLYETHYLENE GLYCOL 3350 17 G: 17 POWDER, FOR SOLUTION ORAL at 19:31

## 2021-08-15 RX ADMIN — BENZTROPINE MESYLATE 1 MG: 1 TABLET ORAL at 08:52

## 2021-08-15 RX ADMIN — NICOTINE POLACRILEX 4 MG: 2 GUM, CHEWING BUCCAL at 19:00

## 2021-08-15 RX ADMIN — NICOTINE POLACRILEX 4 MG: 2 GUM, CHEWING BUCCAL at 12:50

## 2021-08-15 RX ADMIN — NICOTINE POLACRILEX 4 MG: 2 GUM, CHEWING BUCCAL at 20:34

## 2021-08-15 RX ADMIN — LEVOTHYROXINE SODIUM 112 MCG: 112 TABLET ORAL at 08:52

## 2021-08-15 RX ADMIN — GABAPENTIN 300 MG: 300 CAPSULE ORAL at 19:31

## 2021-08-15 RX ADMIN — PRAZOSIN HYDROCHLORIDE 3 MG: 2 CAPSULE ORAL at 21:17

## 2021-08-15 RX ADMIN — NICOTINE POLACRILEX 4 MG: 2 GUM, CHEWING BUCCAL at 14:04

## 2021-08-15 RX ADMIN — FLUOXETINE 10 MG: 10 CAPSULE ORAL at 08:53

## 2021-08-15 RX ADMIN — BENZTROPINE MESYLATE 1 MG: 1 TABLET ORAL at 19:31

## 2021-08-15 RX ADMIN — NICOTINE POLACRILEX 4 MG: 2 GUM, CHEWING BUCCAL at 08:52

## 2021-08-15 ASSESSMENT — ACTIVITIES OF DAILY LIVING (ADL)
HYGIENE/GROOMING: INDEPENDENT
ORAL_HYGIENE: INDEPENDENT
DRESS: INDEPENDENT

## 2021-08-15 NOTE — PLAN OF CARE
Problem: Sleep Disturbance  Goal: Adequate Sleep/Rest  Outcome: Improving     Pt slept approximately 6.75. up to the bathroom x1. No problems identified during the night.

## 2021-08-15 NOTE — PLAN OF CARE
Pt has a flat affect with a calm mood. Pt rates anxiety at 0/10 and depression 0/10. Pt rates pain at 0/10. Pt reports no SI/HI and contracts for safety. Pt was visible on the unit today watching soccer and movies. Pt continues to be withdrawn to self and spent the the afternoon in his room napping. No observed or reported med SE. Pt was med compliant. Pt discussed discharge tomorrow and is ready to go home. Continue current POC.

## 2021-08-15 NOTE — PLAN OF CARE
Pt has a flat affect with a calm mood. Pt rates anxiety at 4/10 and depression 0/10. Pt received vistaril at 1733 and was effective. Pt rates pain at 0/10. Pt reports no SI/HI and contracts for safety. Pt was visible in the milieu watching football and movies. Pt continues to be withdrawn to self and not social with peers. No observed or reported med SE. Pt was med compliant. Continue current POC.

## 2021-08-16 ENCOUNTER — TELEPHONE (OUTPATIENT)
Dept: BEHAVIORAL HEALTH | Facility: CLINIC | Age: 31
End: 2021-08-16

## 2021-08-16 VITALS
HEART RATE: 103 BPM | RESPIRATION RATE: 16 BRPM | SYSTOLIC BLOOD PRESSURE: 125 MMHG | TEMPERATURE: 98.9 F | BODY MASS INDEX: 31.98 KG/M2 | WEIGHT: 223.4 LBS | OXYGEN SATURATION: 100 % | HEIGHT: 70 IN | DIASTOLIC BLOOD PRESSURE: 89 MMHG

## 2021-08-16 PROCEDURE — 250N000013 HC RX MED GY IP 250 OP 250 PS 637: Performed by: EMERGENCY MEDICINE

## 2021-08-16 PROCEDURE — 99239 HOSP IP/OBS DSCHRG MGMT >30: CPT | Performed by: PSYCHIATRY & NEUROLOGY

## 2021-08-16 PROCEDURE — 250N000013 HC RX MED GY IP 250 OP 250 PS 637: Performed by: PSYCHIATRY & NEUROLOGY

## 2021-08-16 RX ORDER — FLUOXETINE 10 MG/1
10 CAPSULE ORAL DAILY
Qty: 30 CAPSULE | Refills: 1 | Status: ON HOLD | OUTPATIENT
Start: 2021-08-17 | End: 2021-12-09

## 2021-08-16 RX ORDER — GABAPENTIN 300 MG/1
300 CAPSULE ORAL 3 TIMES DAILY
Qty: 90 CAPSULE | Refills: 1 | Status: ON HOLD | OUTPATIENT
Start: 2021-08-16 | End: 2022-01-03

## 2021-08-16 RX ORDER — VITAMIN B COMPLEX
25 TABLET ORAL DAILY
Qty: 30 TABLET | Refills: 1 | Status: ON HOLD | OUTPATIENT
Start: 2021-08-16 | End: 2021-12-07

## 2021-08-16 RX ORDER — VENLAFAXINE HYDROCHLORIDE 75 MG/1
225 TABLET, EXTENDED RELEASE ORAL
Qty: 90 TABLET | Refills: 1 | Status: ON HOLD | OUTPATIENT
Start: 2021-08-16 | End: 2021-12-07

## 2021-08-16 RX ORDER — POLYETHYLENE GLYCOL 3350 17 G/17G
17 POWDER, FOR SOLUTION ORAL DAILY PRN
Qty: 30 PACKET | Refills: 1 | Status: SHIPPED | OUTPATIENT
Start: 2021-08-16 | End: 2023-03-28

## 2021-08-16 RX ORDER — PRAZOSIN HYDROCHLORIDE 1 MG/1
3 CAPSULE ORAL AT BEDTIME
Qty: 90 CAPSULE | Refills: 1 | Status: ON HOLD | OUTPATIENT
Start: 2021-08-16 | End: 2021-12-07

## 2021-08-16 RX ADMIN — DOCUSATE SODIUM 100 MG: 100 CAPSULE, LIQUID FILLED ORAL at 08:35

## 2021-08-16 RX ADMIN — LEVOTHYROXINE SODIUM 112 MCG: 112 TABLET ORAL at 08:34

## 2021-08-16 RX ADMIN — OMEPRAZOLE 20 MG: 20 CAPSULE, DELAYED RELEASE ORAL at 08:35

## 2021-08-16 RX ADMIN — VENLAFAXINE HYDROCHLORIDE 225 MG: 225 TABLET, EXTENDED RELEASE ORAL at 08:34

## 2021-08-16 RX ADMIN — NICOTINE POLACRILEX 4 MG: 2 GUM, CHEWING BUCCAL at 12:50

## 2021-08-16 RX ADMIN — GABAPENTIN 300 MG: 300 CAPSULE ORAL at 13:16

## 2021-08-16 RX ADMIN — NICOTINE POLACRILEX 4 MG: 2 GUM, CHEWING BUCCAL at 11:27

## 2021-08-16 RX ADMIN — NICOTINE POLACRILEX 4 MG: 2 GUM, CHEWING BUCCAL at 07:15

## 2021-08-16 RX ADMIN — NICOTINE POLACRILEX 4 MG: 2 GUM, CHEWING BUCCAL at 08:34

## 2021-08-16 RX ADMIN — POLYETHYLENE GLYCOL 3350 17 G: 17 POWDER, FOR SOLUTION ORAL at 08:35

## 2021-08-16 RX ADMIN — FLUOXETINE 10 MG: 10 CAPSULE ORAL at 08:35

## 2021-08-16 RX ADMIN — GABAPENTIN 300 MG: 300 CAPSULE ORAL at 08:35

## 2021-08-16 RX ADMIN — BENZTROPINE MESYLATE 1 MG: 1 TABLET ORAL at 08:35

## 2021-08-16 RX ADMIN — NICOTINE POLACRILEX 4 MG: 2 GUM, CHEWING BUCCAL at 09:51

## 2021-08-16 ASSESSMENT — ANXIETY QUESTIONNAIRES
1. FEELING NERVOUS, ANXIOUS, OR ON EDGE: SEVERAL DAYS
7. FEELING AFRAID AS IF SOMETHING AWFUL MIGHT HAPPEN: SEVERAL DAYS
2. NOT BEING ABLE TO STOP OR CONTROL WORRYING: NEARLY EVERY DAY
6. BECOMING EASILY ANNOYED OR IRRITABLE: SEVERAL DAYS
3. WORRYING TOO MUCH ABOUT DIFFERENT THINGS: MORE THAN HALF THE DAYS
GAD7 TOTAL SCORE: 9
5. BEING SO RESTLESS THAT IT IS HARD TO SIT STILL: NOT AT ALL
IF YOU CHECKED OFF ANY PROBLEMS ON THIS QUESTIONNAIRE, HOW DIFFICULT HAVE THESE PROBLEMS MADE IT FOR YOU TO DO YOUR WORK, TAKE CARE OF THINGS AT HOME, OR GET ALONG WITH OTHER PEOPLE: SOMEWHAT DIFFICULT

## 2021-08-16 ASSESSMENT — PATIENT HEALTH QUESTIONNAIRE - PHQ9
SUM OF ALL RESPONSES TO PHQ QUESTIONS 1-9: 13
5. POOR APPETITE OR OVEREATING: SEVERAL DAYS
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
## 2021-08-16 NOTE — TELEPHONE ENCOUNTER
RN Review of Medical History / Physical Health Screen  Outpatient Mental Health Programs - [facility] Adult Mental Health Day Treatment    PATIENT'S NAME: Kamini Neal  MRN:   [id number]  :   1990  ACCT. NUMBER: [id number]  CURRENT AGE:  31 year old    DATE OF DIAGNOSTIC ASSESSMENT: 2021  DATE OF ADMISSION: 2021     Please see Diagnostic Assessment for additional Medical History.     General Health:   Have you had any exposure to any communicable disease in the past 2-3 weeks? no     Are you aware of safe sex practices? yes       Nutrition:    Are you on a special diet? If yes, please explain:  yes No Pork   Do you have any concerns regarding your nutritional status? If yes, please explain:  no   Have you had any appetite changes in the last 3 months?  No     Have you had any weight loss or weight gain in the last 3 months?  No     Do you have a history of an eating disorder? no   Do you have a history of being in an eating disorder program? no     Patient height and weight recorded by RN in epic flowsheet: no   No; Unable to measure  Because of temporary in-person programmatic suspension due to COVID-19 pandemic, all pt weights and heights will be collected through patient self-report an recorded in physical health screening progress note upon admission to the program.                            Height/Weight Review:  Patient reported height:     5'10   Patient reports weight:  Date last checked:  225lbs  2021   Any referrals/needs identified?             BMI Review:  Was the patient informed of BMI? no              Fall Risk:   Have you had any falls in the past 3 months? no     Do you currently use any assistive devices for mobility?   no      Additional Comments/Assessment: None listed    Per completion of the Medical History / Physical Health Screen, is there a recommendation to see / follow up with a primary care physician/clinic or dentist?    No.      Eber Shore  DANII  8/16/2021

## 2021-08-16 NOTE — PLAN OF CARE
Patient discharged from station 10 to group home at 1326. Pt vebalized understanding of all discharge instructions and medications. Medications prescriptions sent to [facility] in [place]. Pt left with all personal belongings. Patient denies any SI and contracts for safety. Pt escorted psych associate.

## 2021-08-16 NOTE — PLAN OF CARE
Problem: Sleep Disturbance  Goal: Adequate Sleep/Rest  Outcome: Improving   Pt. slept 6.5 hrs during the night.No psych SxS observed.No report of SI/SIB or HI. Pt observed to be breathing normally. The author gave no medications.

## 2021-08-16 NOTE — PLAN OF CARE
Assessment/Intervention/Current Symptoms and Care Coordination  Writer contacted Group Home to discuss discharge today.  They request signed Discharge Summary FAX to [phone],  prescriptions FAX'd to [facility], and 2 hr notification when he will discharge.      Discharge Plan or Goal  Pt to discharge to [facility] with services in the community.      Barriers to Discharge   Needs discharge summary.    Referral Status  Appointments are scheduled and in the AVS.    Legal Status  Voluntary

## 2021-08-16 NOTE — PLAN OF CARE
Shift Summary  Legal status: Voluntary  No new concern. Visible in milieu ,but isolative and withdrawn to self. Watched TV ( Soccer) for a while. Denies SI, depression and anxiety. Mood is calm and affect is flat. Discharging to group home tomorrow.   Denies pain. VSS ( see flow sheet). Reported constipation. Prn Miralax given. No result yet.   Prn: Miralax, nicotine gumes

## 2021-08-17 ENCOUNTER — HOSPITAL ENCOUNTER (OUTPATIENT)
Dept: BEHAVIORAL HEALTH | Facility: CLINIC | Age: 31
End: 2021-08-17
Attending: PSYCHIATRY & NEUROLOGY
Payer: COMMERCIAL

## 2021-08-17 ENCOUNTER — TELEPHONE (OUTPATIENT)
Dept: BEHAVIORAL HEALTH | Facility: CLINIC | Age: 31
End: 2021-08-17

## 2021-08-17 PROCEDURE — G0177 OPPS/PHP; TRAIN & EDUC SERV: HCPCS | Mod: 95

## 2021-08-17 PROCEDURE — 90853 GROUP PSYCHOTHERAPY: CPT | Mod: GT

## 2021-08-17 ASSESSMENT — ANXIETY QUESTIONNAIRES: GAD7 TOTAL SCORE: 9

## 2021-08-17 NOTE — TELEPHONE ENCOUNTER
Called Kamini after he didn't start the 5A Day Treatment group as scheduled today.  Kamini reported mornings are too difficult to attend and he prefers an afternoon group.  Consulted with Day Treatment staff and Kamini will start in the 5B track this afternoon instead.  5B schedule is Tuesday, Wednesday, and Friday from 1-4pm.      Kamini requests an email link be sent to him today to start group.  Gave him the Day Treatment number in case of any questions or difficulties.  Kamini reported he does have icomply downloaded and will try that later in the week.  Will coordinate with 5B team.     Linnea Poole, OTR/L

## 2021-08-17 NOTE — GROUP NOTE
Process Group Note    PATIENT'S NAME: Kamini Neal  MRN:   [id number]  :   1990  ACCT. NUMBER: [id number]  DATE OF SERVICE: 21  START TIME:  1:00 PM  END TIME:  1:50 PM  FACILITATOR: Amaris Acosta LICSW  TOPIC:  Process Group    Diagnoses:  Principal DSM5 Diagnoses  (Sustained by DSM5 Criteria Listed Above):   309.81 (F43.10) Posttraumatic Stress Disorder (includes Posttraumatic Stress Disorder for Children 6 Years and Younger)  Without dissociative symptoms; (F25.1) Schizoaffective disorder, depressive type,   4. 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      Reason for Telemedicine Visit: Services only offered telehealth    Originating Site (Patient Location): Patient's home    Distant Site (Provider Location): [facility] MENTAL HEALTH & ADDICTION SERVICES    Consent:  The patient/guardian has verbally consented to: the potential risks and benefits of telemedicine (video visit) versus in person care; bill my insurance or make self-payment for services provided; and responsibility for payment of non-covered services.     Patient would like the video invitation sent by:  My Chart    Mode of Communication:  Video Conference via Medical Zoom    As the provider I attest to compliance with applicable laws and regulations related to telemedicine.         NUMBER OF PARTICIPANTS: 5          Data:    Session content: At the start of this group, patients were invited to check in by identifying themselves, describing their current emotional status, and identifying issues to address in this group.   Area(s) of treatment focus  "addressed in this session included Symptom Management and Personal Safety.  Pt is welcomed and oriented to group on this his first day in the program. He reports discharge from in yesterday where he was hospitalized for suicidal thinking. Pt reports feeling better overall, but still struggling with anxiety. He states \"I want to learn coping skills\". Denies safety concerns.    Therapeutic Interventions/Treatment Strategies:  Psychotherapist offered support, feedback and validation and reinforced use of skills.    Assessment:    Patient response:   Patient responded to session by accepting feedback, listening, being attentive and accepting support    Possible barriers to participation / learning include: and no barriers identified    Health Issues:   None reported       Substance Use Review:   Substance Use: No active concerns identified.    Mental Status/Behavioral Observations  Appearance:   Appropriate   Eye Contact:   Good   Psychomotor Behavior: Normal   Attitude:   Cooperative   Orientation:   All  Speech   Rate / Production: Normal    Volume:  Normal   Mood:    Anxious   Affect:    Appropriate   Thought Content:   Clear  Thought Form:  Coherent  [place]    Insight:    Fair     Plan:     Safety Plan: No current safety concerns identified.  Recommended that patient call 911 or go to the local ED should there be a change in any of these risk factors.     Barriers to treatment: None identified    Patient Contracts (see media tab):  None    Substance Use: Not addressed in session     Continue or Discharge: Patient will continue in Adult Day Treatment (ADT)  as planned. Patient is likely to benefit from learning and using skills as they work toward the goals identified in their treatment plan.      Amaris Leblanc, Northern Light Mayo HospitalSW  August 17, 2021    "

## 2021-08-17 NOTE — GROUP NOTE
Psychoeducation Group Note    PATIENT'S NAME: Kamini Neal  MRN:   [id number]  :   1990  [id number]  DATE OF SERVICE: 21  START TIME:  3:00 PM  END TIME:  3:50 PM  FACILITATOR: Felix Obrien OTR/L  TOPIC: MH Life Skills Group: Resiliency Development                                    Service Modality:  Video Visit     Telemedicine Visit: The patient's condition can be safely assessed and treated via synchronous audio and visual telemedicine encounter.      Reason for Telemedicine Visit: Services only offered telehealth    Originating Site (Patient Location): Patient's home    Distant Site (Provider Location): Provider Remote Setting- Home Office    Consent:  The patient/guardian has verbally consented to: the potential risks and benefits of telemedicine (video visit) versus in person care; bill my insurance or make self-payment for services provided; and responsibility for payment of non-covered services.     Mode of Communication:  Video Conference via Medical Zoom    As the provider I attest to compliance with applicable laws and regulations related to telemedicine.       [facility] Adult Mental Health Day Treatment  TRACK: 5B    NUMBER OF PARTICIPANTS: 4    Summary of Group / Topics Discussed:  Resiliency Development:  Coping Skills(Wellness Recovery Action Plan): Patients were taught how to identify stressors, signs of stress, coping skills, and prevention strategies for overall stress management.  Patients were given the opportunity to identify both ongoing and acute mental health symptoms and how to effectively manage these symptoms by developing an effective aftercare plan.  Patients increased awareness of community based resources.    Patient Session Goals / Objectives:    Identified how using coping skills can be used for symptom and stress management       Improved awareness of individualed symptoms and stressors and how to effectively cope     Established a  relapse prevention plan to practice these skills in their own environments    Practiced and reflected on how to generalize taught skills to their everyday life          Patient Participation / Response:  Moderately participated, sharing some personal reflections / insights and adequately adequately received / provided feedback with other participants.    Demonstrated understanding of content through handout/group discussion  , Verbalized understanding of content and Patient would benefit from additional opportunities to practice the content to be able to generalize it to their everyday life with increased intentionality, consistency, and efficacy in support of their psychiatric recovery    Treatment Plan:  Patient has a current master individualized treatment plan.  See Epic treatment plan for more information.    Felix Obrien, OTR/L

## 2021-08-18 NOTE — PROGRESS NOTES
Patient Active Problem List   Diagnosis     Depression     Suicidal ideation     Schizoaffective disorder, bipolar type (H)     Schizoaffective disorder, depressive type (H)     Schizoaffective disorder (H)     Suicidal behavior     Suicidal ideations     PTSD (post-traumatic stress disorder)     Agitation     History of schizophrenia     Posttraumatic stress disorder       Current Outpatient Medications:      benztropine (COGENTIN) 1 MG tablet, Take 1 tablet (1 mg) by mouth 2 times daily, Disp: 60 tablet, Rfl: 0     cloZAPine (CLOZARIL) 100 MG tablet, Take 400 mg by mouth At Bedtime Take along with two 25mg tablets for total dose of 450mg at bedtime., Disp: , Rfl:      cloZAPine (CLOZARIL) 25 MG tablet, Take 50 mg by mouth At Bedtime Take along with four 100 mg tablets for a total dose of 450 mg at bedtime, Disp: , Rfl:      docusate sodium (COLACE) 100 MG capsule, Take 100 mg by mouth 2 times daily , Disp: , Rfl:      FLUoxetine (PROZAC) 10 MG capsule, Take 1 capsule (10 mg) by mouth daily, Disp: 30 capsule, Rfl: 1     gabapentin (NEURONTIN) 300 MG capsule, Take 1 capsule (300 mg) by mouth 3 times daily, Disp: 90 capsule, Rfl: 1     levothyroxine (SYNTHROID/LEVOTHROID) 112 MCG tablet, Take 112 mcg by mouth daily, Disp: , Rfl:      OLANZapine (ZYPREXA) 20 MG tablet, Take 20 mg by mouth 2 times daily as needed (agitation), Disp: , Rfl:      omeprazole (PRILOSEC) 20 MG DR capsule, Take 20 mg by mouth daily, Disp: , Rfl:      paliperidone (INVEGA SUSTENNA) 234 MG/1.5ML ANTHONY, Inject 234 mg into the muscle every 28 days, Disp: , Rfl:      polyethylene glycol (MIRALAX) 17 g packet, Take 17 g by mouth daily as needed for constipation, Disp: 30 packet, Rfl: 1     prazosin (MINIPRESS) 1 MG capsule, Take 3 capsules (3 mg) by mouth At Bedtime This product only available from a Compounding Pharmacy., Disp: 90 capsule, Rfl: 1     venlafaxine (EFFEXOR-ER) 75 MG 24 hr tablet, Take 3 tablets (225 mg) by mouth daily (with  breakfast), Disp: 90 tablet, Rfl: 1     Vitamin D3 (CHOLECALCIFEROL) 25 mcg (1000 units) tablet, Take 1 tablet (25 mcg) by mouth daily, Disp: 30 tablet, Rfl: 1  Psychiatry staffing: case discussed  Diagnosis:  As above;  Changing tracks in day treatment, here to stabilize

## 2021-08-18 NOTE — GROUP NOTE
Psychotherapy Group Note    PATIENT'S NAME: Kamini Neal  MRN:   [id number]  :   1990  ACCT. NUMBER: [id number]  DATE OF SERVICE: 21  START TIME:  2:00 PM  END TIME:  2:50 PM  FACILITATOR: Amaris Acosta LICSW  TOPIC: MH EBP Group: Specialty Awareness  [facility] Adult Mental Health Day Treatment  TRACK: 5B                              Service Modality:  Video Visit     Telemedicine Visit: The patient's condition can be safely assessed and treated via synchronous audio and visual telemedicine encounter.      Reason for Telemedicine Visit: Services only offered telehealth    Originating Site (Patient Location): Patient's home    Distant Site (Provider Location): [facility] MENTAL HEALTH & ADDICTION SERVICES    Consent:  The patient/guardian has verbally consented to: the potential risks and benefits of telemedicine (video visit) versus in person care; bill my insurance or make self-payment for services provided; and responsibility for payment of non-covered services.     Patient would like the video invitation sent by:  My Chart    Mode of Communication:  Video Conference via Medical Zoom    As the provider I attest to compliance with applicable laws and regulations related to telemedicine.         NUMBER OF PARTICIPANTS: 5    Summary of Group / Topics Discussed:  Specialty Topics: Hope: The topic of hope was presented in order to help patients better understand the symptoms of hopelessness and how to become more hopeful. Patients discussed their current awareness of the topic and relevance to their functioning. Individual experiences with symptoms and treatment options were also discussed. Patients explored options for ongoing/future treatment and symptom management.      Patient Session Goals / Objectives:    Discussed definition of hopelessness    Discussed how hopelessness impacts functioning    Set a plan to utilize skills to reduce hopelessness        Patient Participation /  Response:  Moderately participated, sharing some personal reflections / insights and adequately adequately received / provided feedback with other participants.    Demonstrated understanding of topics discussed through group discussion and participation and Identified / Expressed readiness to act on skill suggestions discussed in topic    Treatment Plan:  Patient has an initial individualized treatment plan that was created as part of their diagnostic assessment / admission process.  A master individualized treatment plan is in the process of being developed with the patient and multi-disciplinary care team.    SOHAIL WallaceSW

## 2021-08-19 ENCOUNTER — HOSPITAL ENCOUNTER (OUTPATIENT)
Dept: BEHAVIORAL HEALTH | Facility: CLINIC | Age: 31
End: 2021-08-19
Attending: PSYCHIATRY & NEUROLOGY
Payer: COMMERCIAL

## 2021-08-19 PROCEDURE — 90853 GROUP PSYCHOTHERAPY: CPT | Mod: GT | Performed by: PSYCHOLOGIST

## 2021-08-19 PROCEDURE — 90853 GROUP PSYCHOTHERAPY: CPT | Mod: 95 | Performed by: PSYCHOLOGIST

## 2021-08-19 PROCEDURE — G0177 OPPS/PHP; TRAIN & EDUC SERV: HCPCS | Mod: GT

## 2021-08-19 NOTE — GROUP NOTE
Psychotherapy Group Note                                    Service Modality:  Video Visit     Telemedicine Visit: The patient's condition can be safely assessed and treated via synchronous audio and visual telemedicine encounter.      Reason for Telemedicine Visit: Patient has requested telehealth visit, Patient unable to travel, Patient convenience (e.g. access to timely appointments / distance to available provider), Patient lives in a designated Health Professional Shortage Area (HPSA), and Services only offered telehealth    Originating Site (Patient Location): Patient's home    Distant Site (Provider Location): [facility]: [facility], [facility]    Consent:  The patient/guardian has verbally consented to: the potential risks and benefits of telemedicine (video visit) versus in person care; bill my insurance or make self-payment for services provided; and responsibility for payment of non-covered services.     Patient would like the video invitation sent by:  My Chart    Mode of Communication:  Video Conference via Medical Zoom    As the provider I attest to compliance with applicable laws and regulations related to telemedicine.        PATIENT'S NAME: Kamini Neal  MRN:   [id number]  :   1990  ACCT. NUMBER: [id number]  DATE OF SERVICE: 21  START TIME:  2:00 PM  END TIME:  2:50 PM  FACILITATOR: Nicole Harman PsyD  TOPIC:  EBP Group: Specialty Awareness  [facility] Adult Mental Health Day Treatment  TRACK: 6B    NUMBER OF PARTICIPANTS: 7    Summary of Group / Topics Discussed:  Specialty Topics: Hope: The topic of hope was presented in order to help patients better understand the symptoms of hopelessness and how to become more hopeful. Patients discussed their current awareness of the topic and relevance to their functioning. Individual experiences with symptoms and treatment options were also discussed. Patients explored options for ongoing/future treatment and  symptom management.      Patient Session Goals / Objectives:    Discussed definition of hopelessness    Discussed how hopelessness impacts functioning    Set a plan to utilize skills to reduce hopelessness        Patient Participation / Response:  Fully participated with the group by sharing personal reflections / insights and openly received / provided feedback with other participants.    Verbalized understanding of ways to proactively manage illness    Treatment Plan:  Patient has a current master individualized treatment plan.  See Epic treatment plan for more information.    Tawana Márquez Psy., D,  Licensed Clinical Psychologist

## 2021-08-19 NOTE — GROUP NOTE
Psychoeducation Group Note    PATIENT'S NAME: Kamini Neal  MRN:   [id number]  :   1990  ACCT. NUMBER: [id number]  DATE OF SERVICE: 21  START TIME:  3:00 PM  END TIME:  3:50 PM  FACILITATOR: Eber Shore, RN; Melissa Blankenship, RN  TOPIC: MH RN Group: [facility]                                    Service Modality:  Video Visit     Telemedicine Visit: The patient's condition can be safely assessed and treated via synchronous audio and visual telemedicine encounter.      Reason for Telemedicine Visit:  Covid19    Originating Site (Patient Location): Patient's home    Distant Site (Provider Location): Provider Remote Setting- Home Office    Consent:  The patient/guardian has verbally consented to: the potential risks and benefits of telemedicine (video visit) versus in person care; bill my insurance or make self-payment for services provided; and responsibility for payment of non-covered services.     Patient would like the video invitation sent by:  My Chart    Mode of Communication:  Video Conference via Medical Zoom    As the provider I attest to compliance with applicable laws and regulations related to telemedicine.          [facility] Adult Mental Health Day Treatment  TRACK: 6B    NUMBER OF PARTICIPANTS: 7    Summary of Group / Topics Discussed:  Foundations of Health: Sleep: Case study/sleep hygiene: Patients explored the connection between sleep and mental illness. Patients learned about how adequate sleep can improve health, productivity, wellness, quality of life, and safety.     Patient Session Goals / Objectives:  ? Demonstrated understanding of sleep hygiene practices and benefits of sleep  ? Identified sleep hygiene strategies to utilize     Described the connection between sleep disturbances and mental illness        Patient Participation / Response:  Fully participated with the group by sharing personal reflections / insights and openly received / provided feedback  with other participants.    Demonstrated understanding of topics discussed through group discussion and participation    Treatment Plan:  Patient has an initial individualized treatment plan that was created as part of their diagnostic assessment / admission process.  A master individualized treatment plan is in the process of being developed with the patient and multi-disciplinary care team.    Eber Shore RN

## 2021-08-19 NOTE — GROUP NOTE
Process Group Note                                    Service Modality:  Video Visit     Telemedicine Visit: The patient's condition can be safely assessed and treated via synchronous audio and visual telemedicine encounter.      Reason for Telemedicine Visit: Patient has requested telehealth visit, Patient unable to travel, Patient convenience (e.g. access to timely appointments / distance to available provider), Patient lives in a designated Health Professional Shortage Area (HPSA), and Services only offered telehealth    Originating Site (Patient Location): Patient's home    Distant Site (Provider Location): [facility]: [facility], [facility]    Consent:  The patient/guardian has verbally consented to: the potential risks and benefits of telemedicine (video visit) versus in person care; bill my insurance or make self-payment for services provided; and responsibility for payment of non-covered services.     Patient would like the video invitation sent by:  My Chart    Mode of Communication:  Video Conference via Medical Zoom    As the provider I attest to compliance with applicable laws and regulations related to telemedicine.        PATIENT'S NAME: Kamiin Neal  MRN:   [id number]  :   1990  ACCT. NUMBER: [id number]  DATE OF SERVICE: 21  START TIME:  1:00 PM  END TIME:  1:50 PM  FACILITATOR: Nicole Harman PsyD  TOPIC:  Process Group    Diagnoses:  309.81 PTSD  295.70 Schizoaffective Disorder, Depressed Type  300.01 Panic Disorder    Psychiatry: Dr. Andrew Campo: [facility]  Therapy:  PCP:  Dr. Martinez, [facility] Adult Mental Health Day Treatment  TRACK: 6B    NUMBER OF PARTICIPANTS: 7          Data:    Session content: At the start of this group, patients were invited to check in by identifying themselves, describing their current emotional status, and identifying issues to address in this group.   Area(s) of treatment focus  addressed in this session included Symptom Management, Personal Safety and Community Resources/Discharge Planning.  Client reported being safe today.  Reported mood is anxious and depressed.    Goal for today is to attend therapy groups.  The client talked to the group about how he negative thoughts and intrusive thoughts that triggered paranoia and flashbacks of past trauma. He reported being on-leave from his job as a  in the [employer].  He reported problems with panic attacks with pains in his chest, muscle aches, shaking, trembling,  and loss of focus. He reported that he was in the hospital and is adjusting to medications. He stated that his mom is a support for him.       Therapeutic Interventions/Treatment Strategies:  Psychotherapist reinforced use of skills. Treatment modalities used include Cognitive Behavioral Therapy and Dialectical Behavioral Therapy. Interventions include Coping Skills: Reviewed patients current calming practices and discussed a more formal way of practicing and accessing skills, Relapse Prevention: Assisted patient in identifying personal vulnerabilities, thoughts, emotions, and situations that may lead to relapse , Mindfulness: Encouraged a plan to use mindfulness skills in daily life and Symptoms Management: Promoted understanding of their diagnoses and how it impacts their functioning.    Assessment:    Patient response:   Patient responded to session by focusing on goals, being attentive and appearing alert    Possible barriers to participation / learning include: severity of symptoms    Health Issues:   None reported       Substance Use Review:   Substance Use: No active concerns identified.    Mental Status/Behavioral Observations  Appearance:   Appropriate   Eye Contact:   Good   Psychomotor Behavior: Normal   Attitude:   Cooperative   Orientation:   All  Speech   Rate / Production: Normal    Volume:  Normal   Mood:    Anxious  Depressed   Affect:    Constricted    Thought Content:   Rumination and Psychosis reports hallucinations auditory and paranoia  Thought Form:  Coherent  Logical     Insight:    Good     Plan:     Safety Plan: Recommended that patient call 911 or go to the local ED should there be a change in any of these risk factors.     Barriers to treatment: None identified    Patient Contracts (see media tab):  None    Substance Use: Provided encouragement towards sobriety     Continue or Discharge: Patient will continue in Adult Day Treatment (ADT)  as planned. Patient is likely to benefit from learning and using skills as they work toward the goals identified in their treatment plan.      Nicole Harman PsyD  August 19, 2021  Maite Dimas, GIUSEPPE,  Licensed Clinical Psychologist

## 2021-08-23 ENCOUNTER — HOSPITAL ENCOUNTER (OUTPATIENT)
Dept: BEHAVIORAL HEALTH | Facility: CLINIC | Age: 31
End: 2021-08-23
Attending: FAMILY MEDICINE
Payer: COMMERCIAL

## 2021-08-23 PROCEDURE — G0177 OPPS/PHP; TRAIN & EDUC SERV: HCPCS | Mod: 95

## 2021-08-23 PROCEDURE — 90853 GROUP PSYCHOTHERAPY: CPT | Mod: 95 | Performed by: PSYCHOLOGIST

## 2021-08-23 NOTE — GROUP NOTE
Process Group Note                                    Service Modality:  Video Visit     Telemedicine Visit: The patient's condition can be safely assessed and treated via synchronous audio and visual telemedicine encounter.      Reason for Telemedicine Visit: Patient has requested telehealth visit, Patient unable to travel, Patient convenience (e.g. access to timely appointments / distance to available provider), Patient lives in a designated Health Professional Shortage Area (HPSA), and Services only offered telehealth    Originating Site (Patient Location): Patient's home    Distant Site (Provider Location): [facility]: [facility], [facility]    Consent:  The patient/guardian has verbally consented to: the potential risks and benefits of telemedicine (video visit) versus in person care; bill my insurance or make self-payment for services provided; and responsibility for payment of non-covered services.     Patient would like the video invitation sent by:  My Chart    Mode of Communication:  Video Conference via Medical Zoom    As the provider I attest to compliance with applicable laws and regulations related to telemedicine.        PATIENT'S NAME: Kamini Neal  MRN:   [id number]  :   1990  ACCT. NUMBER: [id number]  DATE OF SERVICE: 21  START TIME:  2:00 PM  END TIME:  2:50 PM  FACILITATOR: Nicole Harman PsyD  TOPIC:  Process Group    Diagnoses:  309.81 PTSD  295.70 Schizoaffective Disorder, Depressed Type  300.01 Panic Disorder    Psychiatry: Dr. Andrew Campo: [facility]  Therapy:  PCP:  Dr. Martinez, [facility] Adult Mental Health Day Treatment  TRACK: 6B    NUMBER OF PARTICIPANTS: 8          Data:    Session content: At the start of this group, patients were invited to check in by identifying themselves, describing their current emotional status, and identifying issues to address in this group.   Area(s) of treatment focus  addressed in this session included Symptom Management, Personal Safety and Community Resources/Discharge Planning.  Client reported being safe today.  Reported mood is anxious.   Goal for today is to attend therapy groups.  The client talked to the group about how he had a nice weekend and went with others to [employer], but it was very busy and it caused his anxiety to increase. He reported that he did self-talk to calm down. He reported that he slept 8-9 hours and slept well, and to relax he listens to music. He reported that he felt more paranoia when his anxiety was higher. He reported that he will apply for SSDI tomorrow with his [employer] worker and will give them some hospital records. He talked with others about the process.       Therapeutic Interventions/Treatment Strategies:  Psychotherapist reinforced use of skills.    Assessment:    Patient response:   Patient responded to session by accepting feedback, listening and being attentive    Possible barriers to participation / learning include: severity of symptoms    Health Issues:   None reported       Substance Use Review:   Substance Use: No active concerns identified.    Mental Status/Behavioral Observations  Appearance:   Appropriate   Eye Contact:   Good   Psychomotor Behavior: Normal   Attitude:   Cooperative   Orientation:   All  Speech   Rate / Production: Normal    Volume:  Normal   Mood:    Anxious   Affect:    Constricted   Thought Content:   Rumination  Thought Form:  Coherent  Logical  Psychosis  paranoia  Insight:    Good     Plan:     Safety Plan: Recommended that patient call 911 or go to the local ED should there be a change in any of these risk factors.     Barriers to treatment: None identified    Patient Contracts (see media tab):  None    Substance Use: Provided encouragement towards sobriety     Continue or Discharge: Patient will continue in Adult Day Treatment (ADT)  as planned. Patient is likely to benefit from learning and using skills  as they work toward the goals identified in their treatment plan.      Nicole Harman PsyD  August 23, 2021  Maite Dimas, GIUSEPPE,  Licensed Clinical Psychologist

## 2021-08-23 NOTE — ADDENDUM NOTE
Encounter addended by: Dariel Franklin MD on: 8/23/2021 8:38 AM   Actions taken: Clinical Note Signed

## 2021-08-23 NOTE — GROUP NOTE
Psychoeducation Group Note    PATIENT'S NAME: Kamini Neal  MRN:   [id number]  :   1990  ACCT. NUMBER: [id number]  DATE OF SERVICE: 21  START TIME:  1:00 PM  END TIME:  1:50 PM  FACILITATOR: Felix Obrien OTR/L  TOPIC: MH Life Skills Group: Resiliency Development                                    Service Modality:  Video Visit     Telemedicine Visit: The patient's condition can be safely assessed and treated via synchronous audio and visual telemedicine encounter.      Reason for Telemedicine Visit: Services only offered telehealth    Originating Site (Patient Location): Patient's home    Distant Site (Provider Location): Provider Remote Setting- Home Office    Consent:  The patient/guardian has verbally consented to: the potential risks and benefits of telemedicine (video visit) versus in person care; bill my insurance or make self-payment for services provided; and responsibility for payment of non-covered services.     Mode of Communication:  Video Conference via Medical Zoom    As the provider I attest to compliance with applicable laws and regulations related to telemedicine.       [facility] Day Treatment  TRACK: 6B    NUMBER OF PARTICIPANTS: 8    Summary of Group / Topics Discussed:  Resiliency Development:  Coping Skills( Living  with Psychosis- Managing Paranoia and Disorganized Thinking): Patients were taught how to identify stressors, signs of stress, coping skills, and prevention strategies for overall stress management.  Patients were given the opportunity to identify both ongoing and acute mental health symptoms and how to effectively manage these symptoms by developing an effective aftercare plan.  Patients increased awareness of community based resources.    Patient Session Goals / Objectives:    Identified how using coping skills can be used for symptom and stress management       Improved awareness of individualed symptoms and stressors and how to  effectively cope     Established a relapse prevention plan to practice these skills in their own environments    Practiced and reflected on how to generalize taught skills to their everyday life          Patient Participation / Response:  Moderately participated, sharing some personal reflections / insights and adequately adequately received / provided feedback with other participants.    Demonstrated understanding of content through video/group discussion , Verbalized understanding of content and Patient would benefit from additional opportunities to practice the content to be able to generalize it to their everyday life with increased intentionality, consistency, and efficacy in support of their psychiatric recovery    Treatment Plan:  Patient has a current master individualized treatment plan.  See Epic treatment plan for more information.    Felix Obrien, OTR/L

## 2021-08-23 NOTE — GROUP NOTE
Psychoeducation Group Note    PATIENT'S NAME: Kamini Neal  MRN:   [id number]  :   1990  ACCT. NUMBER: [id number]  DATE OF SERVICE: 21  START TIME:  3:00 PM  END TIME:  3:50 PM  FACILITATOR: Eber Shore RN  TOPIC: [facility]TA RN Group: Health Maintenance                                    Service Modality:  Video Visit     Telemedicine Visit: The patient's condition can be safely assessed and treated via synchronous audio and visual telemedicine encounter.      Reason for Telemedicine Visit:  Covid19    Originating Site (Patient Location): Patient's home    Distant Site (Provider Location): Provider Remote Setting- Home Office    Consent:  The patient/guardian has verbally consented to: the potential risks and benefits of telemedicine (video visit) versus in person care; bill my insurance or make self-payment for services provided; and responsibility for payment of non-covered services.     Patient would like the video invitation sent by:  My Chart    Mode of Communication:  Video Conference via Medical Zoom    As the provider I attest to compliance with applicable laws and regulations related to telemedicine.         [facility] Adult Mental Health Day Treatment  TRACK: 6b    NUMBER OF PARTICIPANTS: 8    Summary of Group / Topics Discussed:  Health Maintenance: Goal Setting: Meaningful goals can bring a sense of direction and purpose in life.  They also highlight our most important values. Patients were assisted by instructor to identify short term goals to promote their mental health recovery and improve overall health and wellness. Patients were educated on SMART goal setting framework as a strategy to increase outcomes and promote success.    Patient Session Goals / Objectives:  ? Explained the key concepts of SMART goal setting framework  ? Identified three goals successfully using SMART goal setting framework  ? Reviewed concept of balance in wellness as it pertains to goal setting         Patient Participation / Response:  Fully participated with the group by sharing personal reflections / insights and openly received / provided feedback with other participants.    Demonstrated understanding of topics discussed through group discussion and participation    Treatment Plan:  Patient has a current master individualized treatment plan.  See Epic treatment plan for more information.    Eber Shore RN

## 2021-08-23 NOTE — PROGRESS NOTES
Patient Active Problem List   Diagnosis     Depression     Suicidal ideation     Schizoaffective disorder, bipolar type (H)     Schizoaffective disorder, depressive type (H)     Schizoaffective disorder (H)     Suicidal behavior     Suicidal ideations     PTSD (post-traumatic stress disorder)     Agitation     History of schizophrenia     Posttraumatic stress disorder       Current Outpatient Medications:      benztropine (COGENTIN) 1 MG tablet, Take 1 tablet (1 mg) by mouth 2 times daily, Disp: 60 tablet, Rfl: 0     cloZAPine (CLOZARIL) 100 MG tablet, Take 400 mg by mouth At Bedtime Take along with two 25mg tablets for total dose of 450mg at bedtime., Disp: , Rfl:      cloZAPine (CLOZARIL) 25 MG tablet, Take 50 mg by mouth At Bedtime Take along with four 100 mg tablets for a total dose of 450 mg at bedtime, Disp: , Rfl:      docusate sodium (COLACE) 100 MG capsule, Take 100 mg by mouth 2 times daily , Disp: , Rfl:      FLUoxetine (PROZAC) 10 MG capsule, Take 1 capsule (10 mg) by mouth daily, Disp: 30 capsule, Rfl: 1     gabapentin (NEURONTIN) 300 MG capsule, Take 1 capsule (300 mg) by mouth 3 times daily, Disp: 90 capsule, Rfl: 1     levothyroxine (SYNTHROID/LEVOTHROID) 112 MCG tablet, Take 112 mcg by mouth daily, Disp: , Rfl:      OLANZapine (ZYPREXA) 20 MG tablet, Take 20 mg by mouth 2 times daily as needed (agitation), Disp: , Rfl:      omeprazole (PRILOSEC) 20 MG DR capsule, Take 20 mg by mouth daily, Disp: , Rfl:      paliperidone (INVEGA SUSTENNA) 234 MG/1.5ML ANTHONY, Inject 234 mg into the muscle every 28 days, Disp: , Rfl:      polyethylene glycol (MIRALAX) 17 g packet, Take 17 g by mouth daily as needed for constipation, Disp: 30 packet, Rfl: 1     prazosin (MINIPRESS) 1 MG capsule, Take 3 capsules (3 mg) by mouth At Bedtime This product only available from a Compounding Pharmacy., Disp: 90 capsule, Rfl: 1     venlafaxine (EFFEXOR-ER) 75 MG 24 hr tablet, Take 3 tablets (225 mg) by mouth daily (with  breakfast), Disp: 90 tablet, Rfl: 1     Vitamin D3 (CHOLECALCIFEROL) 25 mcg (1000 units) tablet, Take 1 tablet (25 mcg) by mouth daily, Disp: 30 tablet, Rfl: 1  Psychiatry staffing: case discussed  Diagnosis:  As above;  The medication list includes 2 or more serotonin affecting agents.  They should be aware of the symptoms of Serotonin Syndrome should it develop  Dealing with symptoms, being more organized.

## 2021-08-23 NOTE — ADDENDUM NOTE
Encounter addended by: Felix Obrien, OTR/L on: 8/23/2021 5:04 PM   Actions taken: Pend clinical note

## 2021-08-23 NOTE — PROGRESS NOTES
Acknowledgement of Current Treatment Plan       I have reviewed my treatment plan with my therapist on 8/23/21.   I agree with the plan as it is written in the electronic health record. (6B)    Name:      Signature:  Kamini Neal Unable to sign due to COVID and Virtual     Dr Dariel Franklin MD  Psychiatrist    Jerry Obrien OTR/L Jerry Obiren OTR/L   Nicole Harman PsyD, LP  Psychotherapist Radha Dimas., D,  Licensed Clinical Psychologist

## 2021-08-26 ENCOUNTER — HOSPITAL ENCOUNTER (OUTPATIENT)
Dept: BEHAVIORAL HEALTH | Facility: CLINIC | Age: 31
End: 2021-08-26
Attending: FAMILY MEDICINE
Payer: COMMERCIAL

## 2021-08-26 PROCEDURE — G0177 OPPS/PHP; TRAIN & EDUC SERV: HCPCS | Mod: 95

## 2021-08-26 PROCEDURE — 90853 GROUP PSYCHOTHERAPY: CPT | Mod: GT | Performed by: PSYCHOLOGIST

## 2021-08-26 NOTE — GROUP NOTE
Psychoeducation Group Note    PATIENT'S NAME: Kamini Neal  MRN:   [id number]  :   1990  ACCT. NUMBER: [id number]  DATE OF SERVICE: 21  START TIME:  2:00 PM  END TIME:  2:50 PM  FACILITATOR: Eber Shore, RN; Melissa Blankenship, RN  TOPIC: MH RN Group: Health Maintenance                                    Service Modality:  Video Visit     Telemedicine Visit: The patient's condition can be safely assessed and treated via synchronous audio and visual telemedicine encounter.      Reason for Telemedicine Visit:  Covid19    Originating Site (Patient Location): Patient's home    Distant Site (Provider Location): Provider Remote Setting- Home Office    Consent:  The patient/guardian has verbally consented to: the potential risks and benefits of telemedicine (video visit) versus in person care; bill my insurance or make self-payment for services provided; and responsibility for payment of non-covered services.     Patient would like the video invitation sent by:  My Chart    Mode of Communication:  Video Conference via Medical Zoom    As the provider I attest to compliance with applicable laws and regulations related to telemedicine.          [facility] Adult Mental Health Day Treatment  TRACK: 6b    NUMBER OF PARTICIPANTS: 6    Summary of Group / Topics Discussed:  Health Maintenance: Weekend planning: Patients were given time to complete a weekend plan of what they will do to promote wellness and sobriety over the weekend when they do not have the structure of group. Patients were encouraged to review progress on their treatment goals and were challenged to identify ways to work toward meeting them. Patients identified and discussed foreseeable barriers to success over the weekend and then developed a plan to overcome them. Patients reviewed their distress coping skills and social support network and discussed this with the group.       Patient Session Goals / Objectives:    ?    Identified  activities to engage in that promote balance in wellness  ?    Distinguished possible barriers to success over the weekend and created a plan to overcome them  ?    Listed distress coping skills and identified social support network to utilize if in crisis during the weekend  Identify things we can do this weekend.          Patient Participation / Response:  Minimally participated, only when prompted / asked.    Verbalized understanding of health maintenance topic    Treatment Plan:  Patient has a current master individualized treatment plan.  See Epic treatment plan for more information.    Eber Shore RN

## 2021-08-26 NOTE — GROUP NOTE
Process Group Note                                    Service Modality:  Video Visit     Telemedicine Visit: The patient's condition can be safely assessed and treated via synchronous audio and visual telemedicine encounter.      Reason for Telemedicine Visit: Patient has requested telehealth visit, Patient unable to travel, Patient convenience (e.g. access to timely appointments / distance to available provider), Patient lives in a designated Health Professional Shortage Area (HPSA), and Services only offered telehealth    Originating Site (Patient Location): Patient's home    Distant Site (Provider Location): [facility]: [facility], [facility]    Consent:  The patient/guardian has verbally consented to: the potential risks and benefits of telemedicine (video visit) versus in person care; bill my insurance or make self-payment for services provided; and responsibility for payment of non-covered services.     Patient would like the video invitation sent by:  My Chart    Mode of Communication:  Video Conference via Medical Zoom    As the provider I attest to compliance with applicable laws and regulations related to telemedicine.        PATIENT'S NAME: Kamini Neal  MRN:   [id number]  :   1990  ACCT. NUMBER: [id number]  DATE OF SERVICE: 21  START TIME:  1:00 PM  END TIME:  1:50 PM  FACILITATOR: Nicole Harman PsyD  TOPIC:  Process Group    Diagnoses:  309.81 PTSD  295.70 Schizoaffective Disorder, Depressed Type  300.01 Panic Disorder    Psychiatry: Dr. Andrew Campo: [facility]  Therapy:  PCP:  Dr. Martinez, [facility] Adult Mental Health Day Treatment  TRACK: 6B    NUMBER OF PARTICIPANTS: 4          Data:    Session content: At the start of this group, patients were invited to check in by identifying themselves, describing their current emotional status, and identifying issues to address in this group.   Area(s) of treatment focus  addressed in this session included Symptom Management, Personal Safety and Community Resources/Discharge Planning.  Client reported being safe today.  Reported mood is ok.  Goal for today is to attend group therapy.  The client talked to the group about how he went back to work 2 days a week and worked 4 hours each day. He reported no panic attacks or problems with flashbacks at work and said that he is taking his medications. He talked with the group about future goals and would like to go to a [employer], and would like to get a therapist. He stated that he will talk to Melissa [place]  about work and his [employer] goal, when she returns from vacation next week.       Therapeutic Interventions/Treatment Strategies:  Psychotherapist reinforced use of skills. Treatment modalities used include Cognitive Behavioral Therapy and Dialectical Behavioral Therapy. Interventions include Coping Skills: Promoted understanding of how and when to apply grounding strategies to reduce distress and increase presence in the moment, Relapse Prevention: Facilitated understanding of effective coping skills in response to triggers for substance use, Mindfulness: Facilitated discussion of when/how to use mindfulness skills to benefit general health, mental health symptoms, and stressors and Symptoms Management: Promoted understanding of their diagnoses and how it impacts their functioning.    Assessment:    Patient response:   Patient responded to session by being attentive, accepting support and appearing alert    Possible barriers to participation / learning include: severity of symptoms    Health Issues:   None reported       Substance Use Review:   Substance Use: No active concerns identified.    Mental Status/Behavioral Observations  Appearance:   Appropriate   Eye Contact:   Good   Psychomotor Behavior: Normal   Attitude:   Cooperative   Orientation:   All  Speech   Rate / Production: Normal     Volume:  Normal   Mood:    Normal  Affect:    Constricted   Thought Content:   Rumination and Psychosis reports hallucinations auditory  Thought Form:  Coherent  Logical     Insight:    Good     Plan:     Safety Plan: Recommended that patient call 911 or go to the local ED should there be a change in any of these risk factors.     Barriers to treatment: None identified    Patient Contracts (see media tab):  None    Substance Use: Provided encouragement towards sobriety     Continue or Discharge: Patient will continue in Adult Day Treatment (ADT)  as planned. Patient is likely to benefit from learning and using skills as they work toward the goals identified in their treatment plan.      Nicole Harman PsyD  August 26, 2021  Maite Dimas, GIUSEPPE,  Licensed Clinical Psychologist

## 2021-08-26 NOTE — GROUP NOTE
Psychoeducation Group Note    PATIENT'S NAME: Kamini Neal  MRN:   [id number]  :   1990  ACCT. NUMBER: [id number]  DATE OF SERVICE: 21  START TIME:  3:00 PM  END TIME:  3:50 PM  FACILITATOR: Melissa Blankenship, RN; Eber Shore RN  TOPIC: MH RN Group: Mental Health Maintenance                                    Service Modality:  Video Visit     Telemedicine Visit: The patient's condition can be safely assessed and treated via synchronous audio and visual telemedicine encounter.      Reason for Telemedicine Visit:  covid19    Originating Site (Patient Location): Patient's home    Distant Site (Provider Location): Provider Remote Setting- Home Office    Consent:  The patient/guardian has verbally consented to: the potential risks and benefits of telemedicine (video visit) versus in person care; bill my insurance or make self-payment for services provided; and responsibility for payment of non-covered services.     Patient would like the video invitation sent by:  My Chart    Mode of Communication:  Video Conference via Medical Zoom    As the provider I attest to compliance with applicable laws and regulations related to telemedicine.          [facility] Adult Mental Health Day Treatment  TRACK: 6B    NUMBER OF PARTICIPANTS: 6    Summary of Group / Topics Discussed:  Mental Health Maintenance:  Mental Health Self-maintenance: Patients went through a worksheet with prompts to be a reference for in the moment reminders of coping skills, social resources, meds, and other self-management tools.  Patient Session Goals / Objectives:  ? Identified the importance of balance in wellness  ? Explained the important aspects of socialization/effective coping strategies  ? Listed ways of improving weak areas in social/coping skills          Patient Participation / Response:  Fully participated with the group by sharing personal reflections / insights and openly received / provided feedback with other  participants.    Demonstrated understanding of topics discussed through group discussion and participation and Identified / Expressed personal readiness to practice skills    Treatment Plan:  Patient has a current master individualized treatment plan.  See Epic treatment plan for more information.    Melissa Blankenship RN

## 2021-08-30 ENCOUNTER — HOSPITAL ENCOUNTER (OUTPATIENT)
Dept: BEHAVIORAL HEALTH | Facility: CLINIC | Age: 31
End: 2021-08-30
Attending: FAMILY MEDICINE
Payer: COMMERCIAL

## 2021-08-30 PROCEDURE — 90853 GROUP PSYCHOTHERAPY: CPT | Mod: GT | Performed by: PSYCHOLOGIST

## 2021-08-30 NOTE — GROUP NOTE
Process Group Note                                    Service Modality:  Video Visit     Telemedicine Visit: The patient's condition can be safely assessed and treated via synchronous audio and visual telemedicine encounter.      Reason for Telemedicine Visit: Patient has requested telehealth visit, Patient unable to travel, Patient convenience (e.g. access to timely appointments / distance to available provider), Patient lives in a designated Health Professional Shortage Area (HPSA), and Services only offered telehealth    Originating Site (Patient Location): Patient's home    Distant Site (Provider Location): [facility]: [facility], [facility]    Consent:  The patient/guardian has verbally consented to: the potential risks and benefits of telemedicine (video visit) versus in person care; bill my insurance or make self-payment for services provided; and responsibility for payment of non-covered services.     Patient would like the video invitation sent by:  My Chart    Mode of Communication:  Video Conference via Medical Zoom    As the provider I attest to compliance with applicable laws and regulations related to telemedicine.        PATIENT'S NAME: Kamini Neal  MRN:   [id number]  :   1990  ACCT. NUMBER: [id number]  DATE OF SERVICE: 21  START TIME:  1:00 PM  END TIME:  1:50 PM  FACILITATOR: Nicole Harman PsyD  TOPIC:  Process Group    Diagnoses:  309.81 PTSD  295.70 Schizoaffective Disorder, Depressed Type  300.01 Panic Disorder    Psychiatry: Dr. Andrew Campo: [facility]  Therapy:  PCP:  Dr. Martinez, [facility] Adult Mental Health Day Treatment  TRACK: 6B    NUMBER OF PARTICIPANTS: 6          Data:    Session content: At the start of this group, patients were invited to check in by identifying themselves, describing their current emotional status, and identifying issues to address in this group.   Area(s) of treatment focus  addressed in this session included Symptom Management, Personal Safety and Community Resources/Discharge Planning.  Client reported being safe today.  Reported mood is ok.   Goal for today is to attend therapy groups. The client talked to the group about how he notices that his depression and anxiety have decreased and that he wants to get an individual therapist and go to [email or web address].  He said he would call his  to update her with the informations. He reported that his focus is better and that his depression and anxiety have decreased, as well as his paranoia and hallucinations.   H reported that he will talk with his .       Therapeutic Interventions/Treatment Strategies:  Psychotherapist reinforced use of skills. Treatment modalities used include Cognitive Behavioral Therapy and Dialectical Behavioral Therapy. Interventions include Coping Skills: Reviewed patients current calming practices and discussed a more formal way of practicing and accessing skills.    Assessment:    Patient response:   Patient responded to session by giving feedback, listening and being attentive    Possible barriers to participation / learning include: severity of symptoms    Health Issues:   None reported       Substance Use Review:   Substance Use: No active concerns identified.    Mental Status/Behavioral Observations  Appearance:   Appropriate   Eye Contact:   Good   Psychomotor Behavior: Normal   Attitude:   Cooperative   Orientation:   All  Speech   Rate / Production: Normal    Volume:  Normal   Mood:    Anxious  Depressed   Affect:    Constricted   Thought Content:   Denies psychosis    Thought Form:  Coherent  Logical     Insight:    Good     Plan:     Safety Plan: Recommended that patient call 911 or go to the local ED should there be a change in any of these risk factors.     Barriers to treatment: None identified    Patient Contracts (see media tab):  None    Substance Use: Provided encouragement  towards sobriety     Continue or Discharge: Patient will continue in Adult Day Treatment (ADT)  as planned. Patient is likely to benefit from learning and using skills as they work toward the goals identified in their treatment plan.      Nicole Harman PsyD  August 30, 2021  Maite Dimas, GIUSEPPE,  Licensed Clinical Psychologist

## 2021-08-31 ENCOUNTER — HOSPITAL ENCOUNTER (OUTPATIENT)
Dept: BEHAVIORAL HEALTH | Facility: CLINIC | Age: 31
End: 2021-08-31
Attending: FAMILY MEDICINE
Payer: COMMERCIAL

## 2021-08-31 PROCEDURE — 90853 GROUP PSYCHOTHERAPY: CPT | Mod: GT | Performed by: PSYCHOLOGIST

## 2021-08-31 PROCEDURE — G0177 OPPS/PHP; TRAIN & EDUC SERV: HCPCS | Mod: GT

## 2021-08-31 NOTE — ADDENDUM NOTE
Encounter addended by: Nicole Harman PsyD on: 8/31/2021 1:44 PM   Actions taken: Clinical Note Signed

## 2021-08-31 NOTE — GROUP NOTE
Psychoeducation Group Note    PATIENT'S NAME: Kamini Neal  MRN:   [id number]  :   1990  [id number]  DATE OF SERVICE: 21  START TIME:  1:00 PM  END TIME:  1:50 PM  FACILITATOR: Felix Obrien OTR/L  TOPIC: MH Life Skills Group: Resiliency Development                                    Service Modality:  Video Visit     Telemedicine Visit: The patient's condition can be safely assessed and treated via synchronous audio and visual telemedicine encounter.      Reason for Telemedicine Visit: Services only offered telehealth    Originating Site (Patient Location): Patient's home    Distant Site (Provider Location): Provider Remote Setting- Home Office    Consent:  The patient/guardian has verbally consented to: the potential risks and benefits of telemedicine (video visit) versus in person care; bill my insurance or make self-payment for services provided; and responsibility for payment of non-covered services.     Mode of Communication:  Video Conference via Medical Zoom    As the provider I attest to compliance with applicable laws and regulations related to telemedicine.       [facility] Adult Mental Health Day Treatment  TRACK: 6B    NUMBER OF PARTICIPANTS: 4    Summary of Group / Topics Discussed:  Resiliency Development:  Coping Skills( Healthy Lifestyle Habits for Mental Wellness): Patients were taught how to identify stressors, signs of stress, coping skills, and prevention strategies for overall stress management.  Patients were given the opportunity to identify both ongoing and acute mental health symptoms and how to effectively manage these symptoms by developing an effective aftercare plan.  Patients increased awareness of community based resources.    Patient Session Goals / Objectives:    Identified how using coping skills can be used for symptom and stress management       Improved awareness of individualed symptoms and stressors and how to effectively cope      Established a relapse prevention plan to practice these skills in their own environments    Practiced and reflected on how to generalize taught skills to their everyday life          Patient Participation / Response:  Fully participated with the group by sharing personal reflections / insights and openly received / provided feedback with other participants.    Demonstrated understanding of content through handout/video/group discussion , Verbalized understanding of content and Patient would benefit from additional opportunities to practice the content to be able to generalize it to their everyday life with increased intentionality, consistency, and efficacy in support of their psychiatric recovery    Treatment Plan:  Patient has a current master individualized treatment plan.  See Epic treatment plan for more information.    Felix Obrien, OTR/L

## 2021-08-31 NOTE — GROUP NOTE
Process Group Note                                    Service Modality:  Video Visit     Telemedicine Visit: The patient's condition can be safely assessed and treated via synchronous audio and visual telemedicine encounter.      Reason for Telemedicine Visit: Patient has requested telehealth visit, Patient unable to travel, Patient convenience (e.g. access to timely appointments / distance to available provider), Patient lives in a designated Health Professional Shortage Area (HPSA), and Services only offered telehealth    Originating Site (Patient Location): Patient's home    Distant Site (Provider Location): [facility]: [facility], [facility]    Consent:  The patient/guardian has verbally consented to: the potential risks and benefits of telemedicine (video visit) versus in person care; bill my insurance or make self-payment for services provided; and responsibility for payment of non-covered services.     Patient would like the video invitation sent by:  My Chart    Mode of Communication:  Video Conference via Medical Zoom    As the provider I attest to compliance with applicable laws and regulations related to telemedicine.        PATIENT'S NAME: Kamini Neal  MRN:   [id number]  :   1990  ACCT. NUMBER: [id number]  DATE OF SERVICE: 21  START TIME:  2:00 PM  END TIME:  2:50 PM  FACILITATOR: Nicole Harman PsyD  TOPIC:  Process Group    Diagnoses:  309.81 PTSD  295.70 Schizoaffective Disorder, Depressed Type  300.01 Panic Disorder    Psychiatry: Dr. Andrew Campo: [facility]  Therapy:  PCP:  Dr. Martinez, [facility]  : [place] Agency: [phone], [phone]    [facility] Adult Mental Health Day Treatment  TRACK: 6B    NUMBER OF PARTICIPANTS: 6          Data:    Session content: At the start of this group, patients were invited to check in by identifying themselves, describing their current emotional status, and  identifying issues to address in this group.   Area(s) of treatment focus addressed in this session included Symptom Management, Personal Safety and Community Resources/Discharge Planning.    Client reported being safe today.  Reported mood is anxious.    Goal for today is to attend therapy groups. The client talked to the group about how he was tired this morning and thought it was from medications. He reported low motivation, and flashbacks of past trauma.       Therapeutic Interventions/Treatment Strategies:  Psychotherapist offered support, feedback and validation. Treatment modalities used include Cognitive Behavioral Therapy and Dialectical Behavioral Therapy. Interventions include Cognitive Restructuring:  Assisted patient in identifying new neutral/positive core beliefs, Coping Skills: Reviewed patients current calming practices and discussed a more formal way of practicing and accessing skills and Relapse Prevention: Facilitated understanding of effective coping skills in response to triggers for substance use.    Assessment:    Patient response:   Patient responded to session by giving feedback, listening and focusing on goals    Possible barriers to participation / learning include: severity of symptoms    Health Issues:   None reported       Substance Use Review:   Substance Use: No active concerns identified.    Mental Status/Behavioral Observations  Appearance:   Appropriate   Eye Contact:   Good   Psychomotor Behavior: Normal   Attitude:   Cooperative   Orientation:   All  Speech   Rate / Production: Normal    Volume:  Normal   Mood:    Anxious  Depressed  Normal  Affect:    Constricted   Thought Content:   Rumination and Psychosis reports hallucinations auditory and paranoia  Thought Form:  Coherent  Logical     Insight:    Good     Plan:     Safety Plan: Recommended that patient call 911 or go to the local ED should there be a change in any of these risk factors.     Barriers to treatment: None  identified    Patient Contracts (see media tab):  None    Substance Use: Provided encouragement towards sobriety     Continue or Discharge: Patient will continue in Adult Day Treatment (ADT)  as planned. Patient is likely to benefit from learning and using skills as they work toward the goals identified in their treatment plan.      Nicole Harman PsyD  August 31, 2021  Maite Dimas, GIUSEPPE,  Licensed Clinical Psychologist

## 2021-09-02 ENCOUNTER — HOSPITAL ENCOUNTER (OUTPATIENT)
Dept: BEHAVIORAL HEALTH | Facility: CLINIC | Age: 31
End: 2021-09-02
Attending: FAMILY MEDICINE
Payer: COMMERCIAL

## 2021-09-02 PROCEDURE — G0177 OPPS/PHP; TRAIN & EDUC SERV: HCPCS | Mod: GT

## 2021-09-02 PROCEDURE — 90853 GROUP PSYCHOTHERAPY: CPT | Mod: GT | Performed by: PSYCHOLOGIST

## 2021-09-02 NOTE — GROUP NOTE
Process Group Note                                    Service Modality:  Video Visit     Telemedicine Visit: The patient's condition can be safely assessed and treated via synchronous audio and visual telemedicine encounter.      Reason for Telemedicine Visit: Patient has requested telehealth visit, Patient unable to travel, Patient convenience (e.g. access to timely appointments / distance to available provider), Patient lives in a designated Health Professional Shortage Area (HPSA), and Services only offered telehealth    Originating Site (Patient Location): Patient's home    Distant Site (Provider Location): [facility]: [facility], [facility]    Consent:  The patient/guardian has verbally consented to: the potential risks and benefits of telemedicine (video visit) versus in person care; bill my insurance or make self-payment for services provided; and responsibility for payment of non-covered services.     Patient would like the video invitation sent by:  My Chart    Mode of Communication:  Video Conference via Medical Zoom    As the provider I attest to compliance with applicable laws and regulations related to telemedicine.        PATIENT'S NAME: Kamini Neal  MRN:   [id number]  :   1990  ACCT. NUMBER: [id number]  DATE OF SERVICE: 21  START TIME:  1:00 PM  END TIME:  1:50 PM  FACILITATOR: Nicole Harman PsyD  TOPIC:  Process Group    Diagnoses:  309.81 PTSD  295.70 Schizoaffective Disorder, Depressed Type  300.01 Panic Disorder    Psychiatry: Dr. Andrew Campo: [facility]  Therapy:  PCP:  Dr. Martinez, [place] [facility]  : [place] Agency: [phone], [phone]      [facility] Adult Mental Health Day Treatment  TRACK: 6B    NUMBER OF PARTICIPANTS: 5          Data:    Session content: At the start of this group, patients were invited to check in by identifying themselves, describing their current emotional status, and  "identifying issues to address in this group.   Area(s) of treatment focus addressed in this session included Symptom Management, Personal Safety and Community Resources/Discharge Planning.  Client reported being safe today.  Reported mood is anxious.   Goal for today is to attend group therapy.  The client talked to the group about how he went to work yesterday and experienced anxiety and some flashbacks, and some paranoia. He reported that he stayed at work and finished. He reported that he has not talked to his  yet. He talked with others about racing heart beat and was encouraged to ask his psychiatrist about this.      Therapeutic Interventions/Treatment Strategies:  Psychotherapist reinforced use of skills. Treatment modalities used include Cognitive Behavioral Therapy and Dialectical Behavioral Therapy. Interventions include Coping Skills: Discussed how the use of intentional \"in the moment\" actions can help reduce distress, Relapse Prevention: Coached on skills to manage factors that contribute to relapse, Mindfulness: Encouraged a plan to use mindfulness skills in daily life and Symptoms Management: Promoted understanding of their diagnoses and how it impacts their functioning.    Assessment:    Patient response:   Patient responded to session by giving feedback, listening and accepting support    Possible barriers to participation / learning include: severity of symptoms    Health Issues:   None reported       Substance Use Review:   Substance Use: No active concerns identified.    Mental Status/Behavioral Observations  Appearance:   Appropriate   Eye Contact:   Good   Psychomotor Behavior: Normal   Attitude:   Cooperative   Orientation:   All  Speech   Rate / Production: Normal    Volume:  Normal   Mood:    Anxious   Affect:    Constricted   Thought Content:   Rumination and Psychosis reports paranoia and delusions  Thought Form:  Coherent  Logical     Insight:    Good     Plan:     Safety Plan: " Recommended that patient call 911 or go to the local ED should there be a change in any of these risk factors.     Barriers to treatment: None identified    Patient Contracts (see media tab):  None    Substance Use: Provided encouragement towards sobriety     Continue or Discharge: Patient will continue in Adult Day Treatment (ADT)  as planned. Patient is likely to benefit from learning and using skills as they work toward the goals identified in their treatment plan.      Nicole Harman PsyD  September 2, 2021  Maite Dimas D,  Licensed Clinical Psychologist

## 2021-09-02 NOTE — ADDENDUM NOTE
Encounter addended by: Nicole Harman PsyD on: 9/2/2021 11:51 AM   Actions taken: Clinical Note Signed

## 2021-09-02 NOTE — GROUP NOTE
Psychoeducation Group Note    PATIENT'S NAME: Kamini Neal  MRN:   [id number]  :   1990  ACCT. NUMBER: [id number]  DATE OF SERVICE: 21  START TIME:  2:00 PM  END TIME:  2:50 PM  FACILITATOR: Eber Shore RN  TOPIC: BALTA RN Group: Mental Health Maintenance                                    Service Modality:  Video Visit     Telemedicine Visit: The patient's condition can be safely assessed and treated via synchronous audio and visual telemedicine encounter.      Reason for Telemedicine Visit:  Covid19    Originating Site (Patient Location): Patient's home    Distant Site (Provider Location): Provider Remote Setting- Home Office    Consent:  The patient/guardian has verbally consented to: the potential risks and benefits of telemedicine (video visit) versus in person care; bill my insurance or make self-payment for services provided; and responsibility for payment of non-covered services.     Patient would like the video invitation sent by:  My Chart    Mode of Communication:  Video Conference via Medical Zoom    As the provider I attest to compliance with applicable laws and regulations related to telemedicine.        [facility] Mental Health Day Treatment  TRACK: 6b    NUMBER OF PARTICIPANTS: 6    Summary of Group / Topics Discussed:  Mental Health Maintenance:  Assessments of Strengths: Patients completed a self-reflection on personal strengths worksheet. The concept of personal strength as it relates to resilience were explored. Patients shared responses with the group and participated in discussion.     Patient Session Goals / Objectives:  ? Patients identified 1-3 qualities they consider a personal strength.  ? Patients understood the concept of personal strengths and the connection it has to resiliency        Patient Participation / Response:  Fully participated with the group by sharing personal reflections / insights and openly received / provided feedback with other  participants.    Demonstrated understanding of topics discussed through group discussion and participation    Treatment Plan:  Patient has a current master individualized treatment plan.  See Epic treatment plan for more information.    Eber Shore RN

## 2021-09-02 NOTE — PROGRESS NOTES
Progress Notes by Nicole Harman PsyD at 8/23/2021  5:03 PM    Author: Nicole Harman PsyD Service: Behavioral Health Author Type: Psychologist   Filed: 8/31/2021  1:44 PM Date of Service: 8/23/2021  5:03 PM Creation Time: 8/23/2021  5:03 PM   Note Type: Progress Notes Status: Attested   : Nicole Harman PsyD (Psychologist) Cosigner: Dariel Franklin MD at 9/1/2021  4:14 PM   Attestation signed by Dariel Franklin MD at 9/1/2021 4:14 PM   Physician Attestation   I agree with the information in this note.     Dariel Franklin         Acknowledgement of Current Treatment Plan         I have reviewed my treatment plan with my therapist on 8/23/21.   I agree with the plan as it is written in the electronic health record. (6B)     Name:                                    Signature:  Kamini Neal Unable to sign due to COVID and Virtual      Dr Dariel Franklin MD  Psychiatrist     Jerry Obrien OTR/OVIDIO Obrien OTR/L   Nicole Harman PsyD,   Psychotherapist Maite Dimas, GIUSEPPE,  Licensed Clinical Psychologist

## 2021-09-07 ENCOUNTER — HOSPITAL ENCOUNTER (OUTPATIENT)
Dept: BEHAVIORAL HEALTH | Facility: CLINIC | Age: 31
End: 2021-09-07
Attending: FAMILY MEDICINE
Payer: COMMERCIAL

## 2021-09-07 PROCEDURE — G0177 OPPS/PHP; TRAIN & EDUC SERV: HCPCS | Mod: 95

## 2021-09-07 PROCEDURE — 90853 GROUP PSYCHOTHERAPY: CPT | Mod: GT | Performed by: PSYCHOLOGIST

## 2021-09-07 NOTE — GROUP NOTE
Process Group Note                                    Service Modality:  Video Visit     Telemedicine Visit: The patient's condition can be safely assessed and treated via synchronous audio and visual telemedicine encounter.      Reason for Telemedicine Visit: Patient has requested telehealth visit, Patient unable to travel, Patient convenience (e.g. access to timely appointments / distance to available provider), Patient lives in a designated Health Professional Shortage Area (HPSA), and Services only offered telehealth    Originating Site (Patient Location): Patient's home    Distant Site (Provider Location): [facility]: [facility], [facility]    Consent:  The patient/guardian has verbally consented to: the potential risks and benefits of telemedicine (video visit) versus in person care; bill my insurance or make self-payment for services provided; and responsibility for payment of non-covered services.     Patient would like the video invitation sent by:  My Chart    Mode of Communication:  Video Conference via Medical Zoom    As the provider I attest to compliance with applicable laws and regulations related to telemedicine.        PATIENT'S NAME: Kamini Neal  MRN:   [id number]  :   1990  ACCT. NUMBER: [id number]  DATE OF SERVICE: 21  START TIME:  1:00 PM  END TIME:  1:50 PM  FACILITATOR: Nicole Harman PsyD  TOPIC:  Process Group    Diagnoses:  309.81 PTSD  295.70 Schizoaffective Disorder, Depressed Type  300.01 Panic Disorder    Psychiatry: Dr. Andrew Campo: [facility]  Therapy:  PCP:  Dr. Martinez, [facility]  : [place] Agency: [phone], [phone]      [facility] Adult Mental Health Day Treatment  TRACK: 6B    NUMBER OF PARTICIPANTS: 8          Data:    Session content: At the start of this group, patients were invited to check in by identifying themselves, describing their current emotional status, and  identifying issues to address in this group.   Area(s) of treatment focus addressed in this session included Symptom Management, Personal Safety and Community Resources/Discharge Planning.  Client reported being safe today.  Reported mood is anxious.    Goal for today is to attend therapy groups. The client talked to the group about how he continues to work, but has had problems with meeting new people and being triggered by his PTSD. He reported that it can cause more anxiety and paranoia.  He reported that he stayed inside his home on the past weekend and helped with chores around the house.  He reported that he meets with his [employer] worker tomorrow, and has an [employer] worker and a .  He stated that he will go to a Social Security meeting on 9/17.      Therapeutic Interventions/Treatment Strategies:  Psychotherapist reinforced use of skills. Treatment modalities used include Cognitive Behavioral Therapy and Dialectical Behavioral Therapy. Interventions include Coping Skills: Promoted understanding of how and when to apply grounding strategies to reduce distress and increase presence in the moment, Relapse Prevention: Coached on skills to manage factors that contribute to relapse and Mindfulness: Encouraged a plan to use mindfulness skills in daily life.    Assessment:    Patient response:   Patient responded to session by focusing on goals, being attentive and appearing alert    Possible barriers to participation / learning include: severity of symptoms    Health Issues:   None reported       Substance Use Review:   Substance Use: No active concerns identified.    Mental Status/Behavioral Observations  Appearance:   Appropriate   Eye Contact:   Good   Psychomotor Behavior: Normal   Attitude:   Cooperative   Orientation:   All  Speech   Rate / Production: Normal    Volume:  Normal   Mood:    Anxious  Depressed   Affect:    Constricted   Thought Content:   Rumination and Psychosis reports paranoia  Thought  Form:  Coherent  Logical     Insight:    Good     Plan:     Safety Plan: Recommended that patient call 911 or go to the local ED should there be a change in any of these risk factors.     Barriers to treatment: None identified    Patient Contracts (see media tab):  None    Substance Use: Provided encouragement towards sobriety     Continue or Discharge: Patient will continue in Adult Day Treatment (ADT)  as planned. Patient is likely to benefit from learning and using skills as they work toward the goals identified in their treatment plan.      Nicole Harman PsyD  September 7, 2021  Radha Dimas., D,  Licensed Clinical Psychologist

## 2021-09-07 NOTE — GROUP NOTE
Psychoeducation Group Note    PATIENT'S NAME: Kamini Neal  MRN:   [id number]  :   1990  ACCT. NUMBER: [id number]  DATE OF SERVICE: 21  START TIME:  1:00 PM  END TIME:  1:50 PM  FACILITATOR: Felix Obrien OTR/L  TOPIC: MH Life Skills Group: Resiliency Development                                    Service Modality:  Video Visit     Telemedicine Visit: The patient's condition can be safely assessed and treated via synchronous audio and visual telemedicine encounter.      Reason for Telemedicine Visit: Services only offered telehealth    Originating Site (Patient Location): Patient's home    Distant Site (Provider Location): Provider Remote Setting- Home Office    Consent:  The patient/guardian has verbally consented to: the potential risks and benefits of telemedicine (video visit) versus in person care; bill my insurance or make self-payment for services provided; and responsibility for payment of non-covered services.     Mode of Communication:  Video Conference via Medical Zoom    As the provider I attest to compliance with applicable laws and regulations related to telemedicine.       [facility] Mental Health Day Treatment  TRACK: 6B    NUMBER OF PARTICIPANTS: 5    Summary of Group / Topics Discussed:  Resiliency Development:  Coping Skills(Ways to Manage Psychosis in Addition to Medications): Patients were taught how to identify stressors, signs of stress, coping skills, and prevention strategies for overall stress management.  Patients were given the opportunity to identify both ongoing and acute mental health symptoms and how to effectively manage these symptoms by developing an effective aftercare plan.  Patients increased awareness of community based resources.    Patient Session Goals / Objectives:    Identified how using coping skills can be used for symptom and stress management       Improved awareness of individualed symptoms and stressors and how to effectively cope      Established a relapse prevention plan to practice these skills in their own environments    Practiced and reflected on how to generalize taught skills to their everyday life          Patient Participation / Response:  Fully participated with the group by sharing personal reflections / insights and openly received / provided feedback with other participants.    Demonstrated understanding of content through handout/video/group discussion , Verbalized understanding of content and Patient would benefit from additional opportunities to practice the content to be able to generalize it to their everyday life with increased intentionality, consistency, and efficacy in support of their psychiatric recovery    Treatment Plan:  Patient has a current master individualized treatment plan.  See Epic treatment plan for more information.    Felix Obrien, OTR/L

## 2021-09-09 ENCOUNTER — TELEPHONE (OUTPATIENT)
Dept: BEHAVIORAL HEALTH | Facility: CLINIC | Age: 31
End: 2021-09-09

## 2021-09-09 ENCOUNTER — HOSPITAL ENCOUNTER (OUTPATIENT)
Dept: BEHAVIORAL HEALTH | Facility: CLINIC | Age: 31
End: 2021-09-09
Attending: FAMILY MEDICINE
Payer: COMMERCIAL

## 2021-09-09 PROCEDURE — 90853 GROUP PSYCHOTHERAPY: CPT | Mod: GT | Performed by: PSYCHOLOGIST

## 2021-09-09 NOTE — GROUP NOTE
Process Group Note                                    Service Modality:  Video Visit     Telemedicine Visit: The patient's condition can be safely assessed and treated via synchronous audio and visual telemedicine encounter.      Reason for Telemedicine Visit: Patient has requested telehealth visit, Patient unable to travel, Patient convenience (e.g. access to timely appointments / distance to available provider), Patient lives in a designated Health Professional Shortage Area (HPSA), and Services only offered telehealth    Originating Site (Patient Location): Patient's home    Distant Site (Provider Location): [facility]: [facility], [facility]    Consent:  The patient/guardian has verbally consented to: the potential risks and benefits of telemedicine (video visit) versus in person care; bill my insurance or make self-payment for services provided; and responsibility for payment of non-covered services.     Patient would like the video invitation sent by:  My Chart    Mode of Communication:  Video Conference via Medical Zoom    As the provider I attest to compliance with applicable laws and regulations related to telemedicine.        PATIENT'S NAME: Kamini Neal  MRN:   [id number]  :   1990  ACCT. NUMBER: [id number]  DATE OF SERVICE: 21  START TIME:  1:00 PM  END TIME:  1:50 PM  FACILITATOR: Nicole Harman PsyD  TOPIC:  Process Group    Diagnoses:  309.81 PTSD  295.70 Schizoaffective Disorder, Depressed Type  300.01 Panic Disorder    Psychiatry: Dr. Andrew Campo: [facility]  Therapy:  PCP:  Dr. Martinez, [facility]  : [place] Agency: [phone], [phone]      [facility] Adult Mental Health Day Treatment  TRACK: 6B    NUMBER OF PARTICIPANTS: 7          Data:    Session content: At the start of this group, patients were invited to check in by identifying themselves, describing their current emotional status, and  "identifying issues to address in this group.   Area(s) of treatment focus addressed in this session included Symptom Management, Personal Safety and Community Resources/Discharge Planning.  Client reported being safe today.  Reported mood is anxious.    Goal for today is to attend therapy groups online. The client talked to the group about how he wants to discharge in October and isn't interested in individual therapy or other programs. He reported problems with flashbacks that caused psychosis and talked to the group about how to manage it. He reported that he slept ok, and talked about anxiety and panic attacks. He reported trembling, shaking, ringing in his ears, and fearfulness.      Therapeutic Interventions/Treatment Strategies:  Psychotherapist reinforced use of skills. Treatment modalities used include Cognitive Behavioral Therapy and Dialectical Behavioral Therapy. Interventions include Coping Skills: Discussed how the use of intentional \"in the moment\" actions can help reduce distress, Relapse Prevention: Coached on skills to manage factors that contribute to relapse and Mindfulness: Encouraged a plan to use mindfulness skills in daily life.    Assessment:    Patient response:   Patient responded to session by giving feedback, listening and being attentive    Possible barriers to participation / learning include: severity of symptoms    Health Issues:   None reported       Substance Use Review:   Substance Use: No active concerns identified.    Mental Status/Behavioral Observations  Appearance:   Appropriate   Eye Contact:   Good   Psychomotor Behavior: Normal   Attitude:   Cooperative   Orientation:   All  Speech   Rate / Production: Normal    Volume:  Normal   Mood:    Anxious  Depressed   Affect:    Constricted   Thought Content:   Rumination and voices  Thought Form:  Coherent  Logical     Insight:    Good     Plan:     Safety Plan: Recommended that patient call 911 or go to the local ED should there " be a change in any of these risk factors.     Barriers to treatment: None identified    Patient Contracts (see media tab):  None    Substance Use: Provided encouragement towards sobriety     Continue or Discharge: Patient will continue in Adult Day Treatment (ADT)  as planned. Patient is likely to benefit from learning and using skills as they work toward the goals identified in their treatment plan.      Nicole Harman PsyD  September 9, 2021  Maite Dimas, GIUSEPPE,  Licensed Clinical Psychologist

## 2021-09-13 ENCOUNTER — HOSPITAL ENCOUNTER (OUTPATIENT)
Dept: BEHAVIORAL HEALTH | Facility: CLINIC | Age: 31
End: 2021-09-13
Attending: FAMILY MEDICINE
Payer: COMMERCIAL

## 2021-09-13 PROCEDURE — G0177 OPPS/PHP; TRAIN & EDUC SERV: HCPCS | Mod: GT

## 2021-09-13 PROCEDURE — 90853 GROUP PSYCHOTHERAPY: CPT | Mod: GT | Performed by: PSYCHOLOGIST

## 2021-09-13 NOTE — GROUP NOTE
Psychoeducation Group Note    PATIENT'S NAME: Kamini Neal  MRN:   [id number]  :   1990  ACCT. NUMBER: [id number]  DATE OF SERVICE: 21  START TIME:  3:00 PM  END TIME:  3:50 PM  FACILITATOR: Felix Obrien OTR/L  TOPIC: MH Life Skills Group: Resiliency Development                                    Service Modality:  Video Visit     Telemedicine Visit: The patient's condition can be safely assessed and treated via synchronous audio and visual telemedicine encounter.      Reason for Telemedicine Visit: Services only offered telehealth    Originating Site (Patient Location): Patient's home    Distant Site (Provider Location): Provider Remote Setting- Home Office    Consent:  The patient/guardian has verbally consented to: the potential risks and benefits of telemedicine (video visit) versus in person care; bill my insurance or make self-payment for services provided; and responsibility for payment of non-covered services.     Mode of Communication:  Video Conference via Medical Zoom    As the provider I attest to compliance with applicable laws and regulations related to telemedicine.       [facility] Mental Health Day Treatment  TRACK: 6B    NUMBER OF PARTICIPANTS: 6    Summary of Group / Topics Discussed:  Resiliency Development:  Coping Skills(Living and Coping with Psychosis- Relapse Prevention): Patients were taught how to identify stressors, signs of stress, coping skills, and prevention strategies for overall stress management.  Patients were given the opportunity to identify both ongoing and acute mental health symptoms and how to effectively manage these symptoms by developing an effective aftercare plan.  Patients increased awareness of community based resources.    Patient Session Goals / Objectives:    Identified how using coping skills can be used for symptom and stress management       Improved awareness of individualed symptoms and stressors and how to effectively cope      Established a relapse prevention plan to practice these skills in their own environments    Practiced and reflected on how to generalize taught skills to their everyday life          Patient Participation / Response:  Moderately participated, sharing some personal reflections / insights and adequately adequately received / provided feedback with other participants.    Demonstrated understanding of content through video/handout/group discussion , Verbalized understanding of content and Patient would benefit from additional opportunities to practice the content to be able to generalize it to their everyday life with increased intentionality, consistency, and efficacy in support of their psychiatric recovery    Treatment Plan:  Patient has a current master individualized treatment plan.  See Epic treatment plan for more information.    Felix Obrien, OTR/L
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
## 2021-09-13 NOTE — GROUP NOTE
Psychoeducation Group Note    PATIENT'S NAME: Kamini Neal  MRN:   [id number]  :   1990  ACCT. NUMBER: [id number]  DATE OF SERVICE: 21  START TIME:  2:00 PM  END TIME:  2:50 PM  FACILITATOR: Melissa Blankenship RN  TOPIC: BALTA RN Group: Health Maintenance                                    Service Modality:  Video Visit     Telemedicine Visit: The patient's condition can be safely assessed and treated via synchronous audio and visual telemedicine encounter.      Reason for Telemedicine Visit:  covid19    Originating Site (Patient Location): Patient's home    Distant Site (Provider Location): Provider Remote Setting- Home Office    Consent:  The patient/guardian has verbally consented to: the potential risks and benefits of telemedicine (video visit) versus in person care; bill my insurance or make self-payment for services provided; and responsibility for payment of non-covered services.     Patient would like the video invitation sent by:  My Chart    Mode of Communication:  Video Conference via Medical Zoom    As the provider I attest to compliance with applicable laws and regulations related to telemedicine.          [facility] Adult Mental Health Day Treatment  TRACK: 6B    NUMBER OF PARTICIPANTS: 8    Summary of Group / Topics Discussed:  Health Maintenance: Goal Setting: Meaningful goals can bring a sense of direction and purpose in life.  They also highlight our most important values. Patients were assisted by instructor to identify short term goals to promote their mental health recovery and improve overall health and wellness. Patients were educated on SMART goal setting framework as a strategy to increase outcomes and promote success.    Patient Session Goals / Objectives:  ? Explained the key concepts of SMART goal setting framework  ? Identified three goals successfully using SMART goal setting framework  ? Reviewed concept of balance in wellness as it pertains to goal setting         Patient Participation / Response:  Fully participated with the group by sharing personal reflections / insights and openly received / provided feedback with other participants.    Demonstrated understanding of topics discussed through group discussion and participation and Identified / Expressed personal readiness to practice skills    Treatment Plan:  Patient has a current master individualized treatment plan.  See Epic treatment plan for more information.    Melissa Blankenship RN
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
## 2021-09-20 ENCOUNTER — HOSPITAL ENCOUNTER (OUTPATIENT)
Dept: BEHAVIORAL HEALTH | Facility: CLINIC | Age: 31
End: 2021-09-20
Attending: FAMILY MEDICINE
Payer: COMMERCIAL

## 2021-09-20 PROCEDURE — 90853 GROUP PSYCHOTHERAPY: CPT | Mod: 95

## 2021-09-20 PROCEDURE — G0177 OPPS/PHP; TRAIN & EDUC SERV: HCPCS | Mod: GT

## 2021-09-20 NOTE — GROUP NOTE
Process Group Note    PATIENT'S NAME: Kamini Neal  MRN:   [id number]  :   1990  ACCT. NUMBER: [id number]  DATE OF SERVICE: 21  START TIME:  1:00 PM  END TIME:  1:50 PM  FACILITATOR: Sadia Marquez LMFT  TOPIC:  Process Group    Diagnoses:  PTSD  Schizoaffective Disorder, Depressed Type  Panic Disorder    [facility] Day Treatment  TRACK: 6B    NUMBER OF PARTICIPANTS: 6                                      Service Modality:  Video Visit     Telemedicine Visit: The patient's condition can be safely assessed and treated via synchronous audio and visual telemedicine encounter.      Reason for Telemedicine Visit: Services only offered telehealth    Originating Site (Patient Location): Patient's home    Distant Site (Provider Location): Provider Remote Setting- Home Office    Consent:  The patient/guardian has verbally consented to: the potential risks and benefits of telemedicine (video visit) versus in person care; bill my insurance or make self-payment for services provided; and responsibility for payment of non-covered services.     Patient would like the video invitation sent by:  My Chart    Mode of Communication:  Video Conference via Medical Zoom    As the provider I attest to compliance with applicable laws and regulations related to telemedicine.                    Data:    Session content: At the start of this group, patients were invited to check in by identifying themselves, describing their current emotional status, and identifying issues to address in this group.   Area(s) of treatment focus addressed in this session included Symptom Management, Personal Safety and Community Resources/Discharge Planning.    Kamini reported feeling good today. Patient identified the following goal(s) to work towards today: finish group today. Patient plans to use the following skills to achieve their goal: staying positive, self-affirmations. Patient anticipates the  following barriers that may interfere with achieving their goal: anxiety, panic, heart beating fast, hard to think clearly. Denies safety concerns, denies chemical use (endorses tobacco use), and reports taking their medications as prescribed. Patient reports feeling proud of/grateful for: physical health. Patient asked for the following supports from the group today: nothing. Participated in a get to know you exercise with group members to build rapport and group cohesion.    Therapeutic Interventions/Treatment Strategies:  Psychotherapist offered support, feedback and validation and reinforced use of skills. Treatment modalities used include Motivational Interviewing, Cognitive Behavioral Therapy and Dialectical Behavioral Therapy. Interventions include Relationship Skills: Discussed strategies to promote healthier understanding of interpersonal relationships.    Assessment:    Patient response:   Patient responded to session by accepting feedback, giving feedback, listening, focusing on goals, being attentive and accepting support    Possible barriers to participation / learning include: and no barriers identified    Health Issues:   None reported       Substance Use Review:   Substance Use: No active concerns identified.    Mental Status/Behavioral Observations  Appearance:   Appropriate   Eye Contact:   Fair   Psychomotor Behavior: Normal   Attitude:   Cooperative   Orientation:   All  Speech   Rate / Production: Normal    Volume:  Normal   Mood:    Normal  Affect:    Appropriate   Thought Content:   Clear  Thought Form:  Coherent  Logical     Insight:    Good  and Fair     Plan:     Safety Plan: Recommended that patient call 911 or go to the local ED should there be a change in any of these risk factors.    Committed to safety and agreed to follow previously developed safety coping plan.     No current safety concerns identified.  Recommended that patient call 911 or go to the local ED should there be a change  in any of these risk factors.     Barriers to treatment: None identified    Patient Contracts (see media tab):  None    Substance Use: Not addressed in session     Continue or Discharge: Patient will continue in Adult Day Treatment (ADT)  as planned. Patient is likely to benefit from learning and using skills as they work toward the goals identified in their treatment plan.      Sadia Marquez, MEHREEN  September 20, 2021

## 2021-09-20 NOTE — GROUP NOTE
Psychoeducation Group Note    PATIENT'S NAME: Kamini Neal  MRN:   [id number]  :   1990  ACCT. NUMBER: [id number]  DATE OF SERVICE: 21  START TIME:  2:00 PM  END TIME:  2:50 PM  FACILITATOR: Melissa Blankenship RN  TOPIC: BALTA RN Group: Health Maintenance                                    Service Modality:  Video Visit     Telemedicine Visit: The patient's condition can be safely assessed and treated via synchronous audio and visual telemedicine encounter.      Reason for Telemedicine Visit:  covid19    Originating Site (Patient Location): Patient's home    Distant Site (Provider Location): Provider Remote Setting- Home Office    Consent:  The patient/guardian has verbally consented to: the potential risks and benefits of telemedicine (video visit) versus in person care; bill my insurance or make self-payment for services provided; and responsibility for payment of non-covered services.     Patient would like the video invitation sent by:  My Chart    Mode of Communication:  Video Conference via Medical Zoom    As the provider I attest to compliance with applicable laws and regulations related to telemedicine.          [facility] Adult Mental Health Day Treatment  TRACK: 6B    NUMBER OF PARTICIPANTS: 6    Summary of Group / Topics Discussed:  Health Maintenance: Goal Setting: Meaningful goals can bring a sense of direction and purpose in life.  They also highlight our most important values. Patients were assisted by instructor to identify short term goals to promote their mental health recovery and improve overall health and wellness. Patients were educated on SMART goal setting framework as a strategy to increase outcomes and promote success.    Patient Session Goals / Objectives:  ? Explained the key concepts of SMART goal setting framework  ? Identified three goals successfully using SMART goal setting framework  ? Reviewed concept of balance in wellness as it pertains to goal setting         Patient Participation / Response:  Fully participated with the group by sharing personal reflections / insights and openly received / provided feedback with other participants.    Demonstrated understanding of topics discussed through group discussion and participation and Identified / Expressed personal readiness to practice skills    Treatment Plan:  Patient has a current master individualized treatment plan.  See Epic treatment plan for more information.    Melissa Blankenship RN

## 2021-09-20 NOTE — GROUP NOTE
Psychoeducation Group Note    PATIENT'S NAME: Kamini Neal  MRN:   [id number]  :   1990  ACCT. NUMBER: [id number]  DATE OF SERVICE: 21  START TIME:  3:00 PM  END TIME:  3:50 PM  FACILITATOR: Felix Obrien OTR/L  TOPIC: MH Life Skills Group: Resiliency Development                                    Service Modality:  Video Visit     Telemedicine Visit: The patient's condition can be safely assessed and treated via synchronous audio and visual telemedicine encounter.      Reason for Telemedicine Visit: Services only offered telehealth    Originating Site (Patient Location): Patient's home    Distant Site (Provider Location): Provider Remote Setting- Home Office    Consent:  The patient/guardian has verbally consented to: the potential risks and benefits of telemedicine (video visit) versus in person care; bill my insurance or make self-payment for services provided; and responsibility for payment of non-covered services.     Mode of Communication:  Video Conference via Medical Zoom    As the provider I attest to compliance with applicable laws and regulations related to telemedicine.       [facility] Mental Health Day Treatment  TRACK: 6B    NUMBER OF PARTICIPANTS: 6    Summary of Group / Topics Discussed:  Resiliency Development:  Coping Skills(Living with Psychosis-Telling others about Your Diagnosis: Patients were taught how to identify stressors, signs of stress, coping skills, and prevention strategies for overall stress management.  Patients were given the opportunity to identify both ongoing and acute mental health symptoms and how to effectively manage these symptoms by developing an effective aftercare plan.  Patients increased awareness of community based resources.    Patient Session Goals / Objectives:    Identified how using coping skills can be used for symptom and stress management       Improved awareness of individualed symptoms and stressors and how to effectively  cope     Established a relapse prevention plan to practice these skills in their own environments    Practiced and reflected on how to generalize taught skills to their everyday life          Patient Participation / Response:  Moderately participated, sharing some personal reflections / insights and adequately adequately received / provided feedback with other participants.    Demonstrated understanding of content through video/group discussion , Verbalized understanding of content and Patient would benefit from additional opportunities to practice the content to be able to generalize it to their everyday life with increased intentionality, consistency, and efficacy in support of their psychiatric recovery    Treatment Plan:  Patient has a current master individualized treatment plan.  See Epic treatment plan for more information.    Felix Obrien, OTR/L

## 2021-09-21 ENCOUNTER — HOSPITAL ENCOUNTER (OUTPATIENT)
Dept: BEHAVIORAL HEALTH | Facility: CLINIC | Age: 31
End: 2021-09-21
Attending: FAMILY MEDICINE
Payer: COMMERCIAL

## 2021-09-21 PROCEDURE — G0177 OPPS/PHP; TRAIN & EDUC SERV: HCPCS | Mod: GT

## 2021-09-21 PROCEDURE — 90853 GROUP PSYCHOTHERAPY: CPT | Mod: 95

## 2021-09-21 NOTE — GROUP NOTE
Process Group Note    PATIENT'S NAME: Kamini Neal  MRN:   [id number]  :   1990  ACCT. NUMBER: [id number]  DATE OF SERVICE: 21  START TIME:  2:00 PM  END TIME:  2:50 PM  FACILITATOR: Sadia Marquez LMFT  TOPIC:  Process Group    Diagnoses:  PTSD  Schizoaffective Disorder, Depressed Type  Panic Disorder    [facility] Day Treatment  TRACK: 6B    NUMBER OF PARTICIPANTS: 7                                      Service Modality:  Video Visit     Telemedicine Visit: The patient's condition can be safely assessed and treated via synchronous audio and visual telemedicine encounter.      Reason for Telemedicine Visit: Services only offered telehealth    Originating Site (Patient Location): Patient's home    Distant Site (Provider Location): Provider Remote Setting- Home Office    Consent:  The patient/guardian has verbally consented to: the potential risks and benefits of telemedicine (video visit) versus in person care; bill my insurance or make self-payment for services provided; and responsibility for payment of non-covered services.     Patient would like the video invitation sent by:  My Chart    Mode of Communication:  Video Conference via Medical Zoom    As the provider I attest to compliance with applicable laws and regulations related to telemedicine.                Data:    Session content: At the start of this group, patients were invited to check in by identifying themselves, describing their current emotional status, and identifying issues to address in this group.   Area(s) of treatment focus addressed in this session included Symptom Management, Personal Safety and Community Resources/Discharge Planning.  Kamini reported feeling  motivated  today. Patient identified the following goal(s) to work towards today: lots of self-care. Patient plans to use the following skills to achieve their goal: self-talk. Patient anticipates the following barriers that may  interfere with achieving their goal: flashbacks, hopes to work towards not isolating. Denies safety concerns, denies chemical use, and reports taking their medications as prescribed. Patient reports feeling proud of/grateful for: my mom. Patient asked for the following supports from the group today:  no.     Therapeutic Interventions/Treatment Strategies:  Psychotherapist offered support, feedback and validation and reinforced use of skills. Treatment modalities used include Motivational Interviewing, Cognitive Behavioral Therapy and Dialectical Behavioral Therapy. Interventions include Coping Skills: Assisted patient in identifying 1-2 healthy distraction skills to reduce overall distress.    Assessment:    Patient response:   Patient responded to session by accepting feedback, giving feedback, listening, focusing on goals, being attentive and accepting support    Possible barriers to participation / learning include: and no barriers identified    Health Issues:   None reported       Substance Use Review:   Substance Use: No active concerns identified.    Mental Status/Behavioral Observations  Appearance:   Appropriate   Eye Contact:   Good   Psychomotor Behavior: Normal   Attitude:   Cooperative   Orientation:   All  Speech   Rate / Production: Normal    Volume:  Normal   Mood:    Normal  Affect:    Appropriate   Thought Content:   Clear  Thought Form:  Coherent  Logical     Insight:    Good  and Fair     Plan:     Safety Plan: No current safety concerns identified.  Recommended that patient call 911 or go to the local ED should there be a change in any of these risk factors.     Barriers to treatment: None identified    Patient Contracts (see media tab):  None    Substance Use: Not addressed in session     Continue or Discharge: Patient will continue in Adult Day Treatment (ADT)  as planned. Patient is likely to benefit from learning and using skills as they work toward the goals identified in their treatment  plan.      Sadia Marquez, FT  September 21, 2021

## 2021-09-21 NOTE — GROUP NOTE
Psychoeducation Group Note    PATIENT'S NAME: Kamini Neal  MRN:   [id number]  :   1990  ACCT. NUMBER: [id number]  DATE OF SERVICE: 21  START TIME:  3:00 PM  END TIME:  3:50 PM  FACILITATOR: Melissa Blankenship RN  TOPIC: MH RN Group: Medication Education and Management                                      Service Modality:  Video Visit     Telemedicine Visit: The patient's condition can be safely assessed and treated via synchronous audio and visual telemedicine encounter.      Reason for Telemedicine Visit:  covid19    Originating Site (Patient Location): Patient's home    Distant Site (Provider Location): Provider Remote Setting- Home Office    Consent:  The patient/guardian has verbally consented to: the potential risks and benefits of telemedicine (video visit) versus in person care; bill my insurance or make self-payment for services provided; and responsibility for payment of non-covered services.     Patient would like the video invitation sent by:  My Chart    Mode of Communication:  Video Conference via Medical Zoom    As the provider I attest to compliance with applicable laws and regulations related to telemedicine.        [facility] Day Treatment  TRACK: 6B    NUMBER OF PARTICIPANTS: 6    Summary of Group / Topics Discussed:  Medication Educations and Management:  Medication Jeopardy: Patients provided education regarding medication safety, antidepressants, side effects, neuroleptics, expected medication outcomes, knowledge of diagnosis, symptoms, and symptom management through an engaging jeopardy-style format.     Patient Session Goals / Objectives:    ? Participated in team-based Jeopardy game  ? Identified strategies for safe use, handling, and disposal of medications  ? Discussed basic aspects of medication safety, side effects, adverse outcomes and contraindications        Patient Participation / Response:  Fully participated with the group by sharing  personal reflections / insights and openly received / provided feedback with other participants.     Demonstrated understanding of topics discussed through group discussion and participation and Identified / Expressed personal readiness to practice skills    Treatment Plan:  Patient has a current master individualized treatment plan.  See Epic treatment plan for more information.    Melissa Blankenship RN

## 2021-09-21 NOTE — GROUP NOTE
Psychoeducation Group Note    PATIENT'S NAME: Kamini Neal  MRN:   [id number]  :   1990  ACCT. NUMBER: [id number]  DATE OF SERVICE: 21  START TIME:  1:00 PM  END TIME:  1:50 PM  FACILITATOR: Felix Obrien OTR/L  TOPIC: MH Life Skills Group: Resiliency Development                                    Service Modality:  Video Visit     Telemedicine Visit: The patient's condition can be safely assessed and treated via synchronous audio and visual telemedicine encounter.      Reason for Telemedicine Visit: Services only offered telehealth    Originating Site (Patient Location): Patient's home    Distant Site (Provider Location): Provider Remote Setting- Home Office    Consent:  The patient/guardian has verbally consented to: the potential risks and benefits of telemedicine (video visit) versus in person care; bill my insurance or make self-payment for services provided; and responsibility for payment of non-covered services.     Mode of Communication:  Video Conference via Medical Zoom    As the provider I attest to compliance with applicable laws and regulations related to telemedicine.       [facility] Adult Mental Health Day Treatment  TRACK: 6B    NUMBER OF PARTICIPANTS: 7    Summary of Group / Topics Discussed:  Resiliency Development:  Coping Skills: Perspectives on Mental Health Recovery: Patients were taught how to identify coping strategies and routines that they can adopt and use for management with focus on a balance between physical, emotional, social and spiritual strategies.    Patient Session Goals / Objectives:    Identified personal definitions of recovery for effectively managing both mental health and substance abuse/abuse symptoms     Improved awareness of the process of recovery and skills and strategies that support this       Established a plan for practice of these skills in their own environments    Practiced and reflected on how to generalize taught skills to their  everyday life        Patient Participation / Response:  Fully participated with the group by sharing personal reflections / insights and openly received / provided feedback with other participants.    Demonstrated understanding of content through handout/video/discussion , Verbalized understanding of content and Patient would benefit from additional opportunities to practice the content to be able to generalize it to their everyday life with increased intentionality, consistency, and efficacy in support of their psychiatric recovery    Treatment Plan:  Patient has a current master individualized treatment plan.  See Epic treatment plan for more information.    Felix Obrien, OTR/L

## 2021-09-23 ENCOUNTER — TELEPHONE (OUTPATIENT)
Dept: BEHAVIORAL HEALTH | Facility: CLINIC | Age: 31
End: 2021-09-23

## 2021-09-23 NOTE — TELEPHONE ENCOUNTER
Absence call- Phone call and message left to check on Kamini Neal  Since Kamini Neal did not attend today.    Nicole Harman, Radha., D,  L.P.      An invite was sent to Jeffrey to join groups.

## 2021-09-27 ENCOUNTER — TELEPHONE (OUTPATIENT)
Dept: BEHAVIORAL HEALTH | Facility: CLINIC | Age: 31
End: 2021-09-27

## 2021-09-27 ENCOUNTER — HOSPITAL ENCOUNTER (OUTPATIENT)
Dept: BEHAVIORAL HEALTH | Facility: CLINIC | Age: 31
End: 2021-09-27
Attending: FAMILY MEDICINE
Payer: COMMERCIAL

## 2021-09-27 PROCEDURE — G0177 OPPS/PHP; TRAIN & EDUC SERV: HCPCS | Mod: 95

## 2021-09-27 NOTE — TELEPHONE ENCOUNTER
Absence call- Phone call and message left to check on Kamini Neal  Since Kamini Neal did not attend today.    Nicole Harman, Radha., D,  L.P.

## 2021-09-27 NOTE — GROUP NOTE
Psychoeducation Group Note    PATIENT'S NAME: Kamini Neal  MRN:   [id number]  :   1990  ACCT. NUMBER: [id number]  DATE OF SERVICE: 21  START TIME:  3:00 PM  END TIME:  3:50 PM  FACILITATOR: Feilx Obrien OTR/L  TOPIC: MH Life Skills Group: Resiliency Development                                    Service Modality:  Video Visit     Telemedicine Visit: The patient's condition can be safely assessed and treated via synchronous audio and visual telemedicine encounter.      Reason for Telemedicine Visit: Services only offered telehealth    Originating Site (Patient Location): Patient's home    Distant Site (Provider Location): Provider Remote Setting- Home Office    Consent:  The patient/guardian has verbally consented to: the potential risks and benefits of telemedicine (video visit) versus in person care; bill my insurance or make self-payment for services provided; and responsibility for payment of non-covered services.     Mode of Communication:  Video Conference via Medical Zoom    As the provider I attest to compliance with applicable laws and regulations related to telemedicine.       [facility] Adult Mental Health Day Treatment  TRACK: 6B    NUMBER OF PARTICIPANTS: 4    Summary of Group / Topics Discussed:  Resiliency Development:  Coping Skills(Treatment Options for Schizophrenia and Schizoaffective): Patients were taught how to identify stressors, signs of stress, coping skills, and prevention strategies for overall stress management.  Patients were given the opportunity to identify both ongoing and acute mental health symptoms and how to effectively manage these symptoms by developing an effective aftercare plan.  Patients increased awareness of community based resources.    Patient Session Goals / Objectives:    Identified how using coping skills can be used for symptom and stress management       Improved awareness of individualed symptoms and stressors and how to effectively  cope     Established a relapse prevention plan to practice these skills in their own environments    Practiced and reflected on how to generalize taught skills to their everyday life          Patient Participation / Response:  Fully participated with the group by sharing personal reflections / insights and openly received / provided feedback with other participants.    Demonstrated understanding of content through video/group discussion , Verbalized understanding of content and Patient would benefit from additional opportunities to practice the content to be able to generalize it to their everyday life with increased intentionality, consistency, and efficacy in support of their psychiatric recovery    Treatment Plan:  Patient has a current master individualized treatment plan.  See Epic treatment plan for more information.    Felix Obrien, OTR/L

## 2021-09-27 NOTE — GROUP NOTE
Psychoeducation Group Note    PATIENT'S NAME: Kamini Neal  MRN:   [id number]  :   1990  ACCT. NUMBER: [id number]  DATE OF SERVICE: 21  START TIME:  2:00 PM  END TIME:  2:50 PM  FACILITATOR: Melissa Blankenship RN  TOPIC: BALTA RN Group: Health Maintenance                                    Service Modality:  Video Visit     Telemedicine Visit: The patient's condition can be safely assessed and treated via synchronous audio and visual telemedicine encounter.      Reason for Telemedicine Visit:  covid19    Originating Site (Patient Location): Patient's home    Distant Site (Provider Location): Provider Remote Setting- Home Office    Consent:  The patient/guardian has verbally consented to: the potential risks and benefits of telemedicine (video visit) versus in person care; bill my insurance or make self-payment for services provided; and responsibility for payment of non-covered services.     Patient would like the video invitation sent by:  My Chart    Mode of Communication:  Video Conference via Medical Zoom    As the provider I attest to compliance with applicable laws and regulations related to telemedicine.          [facility] Adult Mental Health Day Treatment  TRACK: 6B    NUMBER OF PARTICIPANTS: 6    Summary of Group / Topics Discussed:  Health Maintenance: Goal Setting: Meaningful goals can bring a sense of direction and purpose in life.  They also highlight our most important values. Patients were assisted by instructor to identify short term goals to promote their mental health recovery and improve overall health and wellness. Patients were educated on SMART goal setting framework as a strategy to increase outcomes and promote success.    Patient Session Goals / Objectives:  ? Explained the key concepts of SMART goal setting framework  ? Identified three goals successfully using SMART goal setting framework  ? Reviewed concept of balance in wellness as it pertains to goal setting         Patient Participation / Response:  Fully participated with the group by sharing personal reflections / insights and openly received / provided feedback with other participants.    Demonstrated understanding of topics discussed through group discussion and participation and Identified / Expressed personal readiness to practice skills    Treatment Plan:  Patient has a current master individualized treatment plan.  See Epic treatment plan for more information.    Melissa Blankenship RN

## 2021-09-28 ENCOUNTER — HOSPITAL ENCOUNTER (OUTPATIENT)
Dept: BEHAVIORAL HEALTH | Facility: CLINIC | Age: 31
End: 2021-09-28
Attending: FAMILY MEDICINE
Payer: COMMERCIAL

## 2021-09-28 PROCEDURE — G0177 OPPS/PHP; TRAIN & EDUC SERV: HCPCS | Mod: GT

## 2021-09-28 PROCEDURE — 90853 GROUP PSYCHOTHERAPY: CPT | Mod: 95 | Performed by: PSYCHOLOGIST

## 2021-09-28 NOTE — GROUP NOTE
Psychoeducation Group Note    PATIENT'S NAME: Kamini Neal  MRN:   [id number]  :   1990  [id number]  DATE OF SERVICE: 21  START TIME:  3:00 PM  END TIME:  3:50 PM  FACILITATOR: Melissa Blankenship RN  TOPIC: BALTA RN Group: Brain Health                                    Service Modality:  Video Visit     Telemedicine Visit: The patient's condition can be safely assessed and treated via synchronous audio and visual telemedicine encounter.      Reason for Telemedicine Visit:  covid19    Originating Site (Patient Location): Patient's home    Distant Site (Provider Location): Provider Remote Setting- Home Office    Consent:  The patient/guardian has verbally consented to: the potential risks and benefits of telemedicine (video visit) versus in person care; bill my insurance or make self-payment for services provided; and responsibility for payment of non-covered services.     Patient would like the video invitation sent by:  My Chart    Mode of Communication:  Video Conference via Medical Zoom    As the provider I attest to compliance with applicable laws and regulations related to telemedicine.          [facility] Adult Mental Health Day Treatment  TRACK: 6B    NUMBER OF PARTICIPANTS: 5    Summary of Group / Topics Discussed:  Brain Health:  Pathophysiology of Thought Disorders, pt 1: Patients were educated on thought disorder etiology and neuroscience, risk factors, symptoms, and pharmacologic, psychotherapeutic, and complementary treatment options. Patients were guided on a discussion of mental, behavioral, and physical symptoms and shared their symptoms with the group.     Patient Session Goals / Objectives:  ? Described what thought disorders are and identified risk factors   ? Explained how chemical imbalances in the brain can cause symptoms and how medications work to reverse this imbalance   ? Identified and described pharmacologic, psychotherapeutic, and complementary treatment  options        Patient Participation / Response:  Fully participated with the group by sharing personal reflections / insights and openly received / provided feedback with other participants.    Demonstrated understanding of topics discussed through group discussion and participation and Identified / Expressed personal readiness to practice skills    Treatment Plan:  Patient has a current master individualized treatment plan.  See Epic treatment plan for more information.    Melissa Blankenship RN

## 2021-09-28 NOTE — GROUP NOTE
Process Group Note                                    Service Modality:  Video Visit     Telemedicine Visit: The patient's condition can be safely assessed and treated via synchronous audio and visual telemedicine encounter.      Reason for Telemedicine Visit: Patient has requested telehealth visit, Patient unable to travel, Patient convenience (e.g. access to timely appointments / distance to available provider), Patient lives in a designated Health Professional Shortage Area (HPSA), and Services only offered telehealth    Originating Site (Patient Location): Patient's home    Distant Site (Provider Location): [facility]: [facility], [facility]    Consent:  The patient/guardian has verbally consented to: the potential risks and benefits of telemedicine (video visit) versus in person care; bill my insurance or make self-payment for services provided; and responsibility for payment of non-covered services.     Patient would like the video invitation sent by:  My Chart    Mode of Communication:  Video Conference via Medical Zoom    As the provider I attest to compliance with applicable laws and regulations related to telemedicine.        PATIENT'S NAME: Kamini Neal  MRN:   [id number]  :   1990  ACCT. NUMBER: [id number]  DATE OF SERVICE: 21  START TIME:  2:00 PM  END TIME:  2:50 PM  FACILITATOR: Nicole Harman PsyD  TOPIC:  Process Group    Diagnoses:  309.81 PTSD  295.70 Schizoaffective Disorder, Depressed Type  300.01 Panic Disorder    Psychiatry: Dr. Andrew Campo: [facility]  Therapy:  PCP:  Dr. Martinez, [facility]  : [place] Agency: [phone], [phone]  Neela Brown, supervisor of group home:  [phone]      [facility] Adult Mental Health Day Treatment  TRACK: 6B    NUMBER OF PARTICIPANTS: 5          Data:    Session content: At the start of this group, patients were invited to check in by identifying  "themselves, describing their current emotional status, and identifying issues to address in this group.   Area(s) of treatment focus addressed in this session included Symptom Management, Personal Safety and Community Resources/Discharge Planning.  Client reported being safe today.  Reported mood is anxious.    Goal for today is to attend group therapy online. The client talked to the group about how he wants to increase his hours at work and try to work, and would like to try to attend work on Mondays for 4 hours. He reported that he wants to discharge one week later and see if it goes better at work, since he was having flashbacks and panic attacks at his work and leaving early.  He reported that he will talk with his , Neela about this.       Therapeutic Interventions/Treatment Strategies:  Psychotherapist offered support, feedback and validation. Treatment modalities used include Cognitive Behavioral Therapy and Dialectical Behavioral Therapy. Interventions include Coping Skills: Discussed how the use of intentional \"in the moment\" actions can help reduce distress and Reviewed patients current calming practices and discussed a more formal way of practicing and accessing skills, Relapse Prevention: Coached on skills to manage factors that contribute to relapse, Mindfulness: Encouraged a plan to use mindfulness skills in daily life and Symptoms Management: Promoted understanding of their diagnoses and how it impacts their functioning.    Assessment:    Patient response:   Patient responded to session by giving feedback, listening and being attentive    Possible barriers to participation / learning include: severity of symptoms    Health Issues:   None reported       Substance Use Review:   Substance Use: No active concerns identified.    Mental Status/Behavioral Observations  Appearance:   connected by phone today   Eye Contact:   connected by phone today    Psychomotor Behavior: Normal "   Attitude:   Cooperative   Orientation:   All  Speech   Rate / Production: Normal    Volume:  Normal   Mood:    Anxious   Affect:    Constricted   Thought Content:   Rumination and Psychosis reports hallucinations auditory  Thought Form:  Coherent  Logical  flashbacks of past trauma    Insight:    Good     Plan:     Safety Plan: Recommended that patient call 911 or go to the local ED should there be a change in any of these risk factors.     Barriers to treatment: None identified    Patient Contracts (see media tab):  None    Substance Use: Provided encouragement towards sobriety     Continue or Discharge: Patient will continue in Adult Day Treatment (ADT)  as planned. Patient is likely to benefit from learning and using skills as they work toward the goals identified in their treatment plan.      Nicole Harman PsyD  September 28, 2021  Radha Dimas., D,  Licensed Clinical Psychologist

## 2021-09-28 NOTE — GROUP NOTE
Psychoeducation Group Note    PATIENT'S NAME: Kamini Neal  MRN:   [id number]  :   1990  ACCT. NUMBER: [id number]  DATE OF SERVICE: 21  START TIME:  1:00 PM  END TIME:  1:50 PM  FACILITATOR: Felix Obrien OTR/L  TOPIC:  Life Skills Group: Resiliency Development                                    Service Modality:  Video Visit     Telemedicine Visit: The patient's condition can be safely assessed and treated via synchronous audio and visual telemedicine encounter.      Reason for Telemedicine Visit: Services only offered telehealth    Originating Site (Patient Location): Patient's home    Distant Site (Provider Location): Provider Remote Setting- Home Office    Consent:  The patient/guardian has verbally consented to: the potential risks and benefits of telemedicine (video visit) versus in person care; bill my insurance or make self-payment for services provided; and responsibility for payment of non-covered services.     Mode of Communication:  Video Conference via Medical Zoom    As the provider I attest to compliance with applicable laws and regulations related to telemedicine.       [facility] Mental Health Day Treatment  TRACK: 6B    NUMBER OF PARTICIPANTS: 6    Summary of Group / Topics Discussed:  Resiliency Development:  Self-Care, Self-Love and Compassion: Patients were given time for reflection and built self awareness around components of self compassion and how it relates to self esteem and overall functioning.  Patients were also given the opportunity to improve self efficacy, self sufficiency, quality of life and a sense of mastery and competency by identifying what is good and to instill hope. Patients were taught about the importance of self kindness and ways to challenge negative thinking.      Patient Session Goals / Objectives:    Identified personal strengths and qualities about themselves as a way to provide hope and build self-compassion as a way to  effectively manage both mental health and substance abuse/abuse symptoms     Improved awareness of positive qualities and how these contribute to the process of recovery        Established a plan for practice of these skills in their own environments    Practiced and reflected on how to generalize taught skills to their everyday life        Patient Participation / Response:  Fully participated with the group by sharing personal reflections / insights and openly received / provided feedback with other participants.    Demonstrated understanding of content through video/gorup discussion , Verbalized understanding of content and Patient would benefit from additional opportunities to practice the content to be able to generalize it to their everyday life with increased intentionality, consistency, and efficacy in support of their psychiatric recovery    Treatment Plan:  Patient has a current master individualized treatment plan.  See Epic treatment plan for more information.    Felix Obrien, OTR/L

## 2021-09-30 ENCOUNTER — HOSPITAL ENCOUNTER (OUTPATIENT)
Dept: BEHAVIORAL HEALTH | Facility: CLINIC | Age: 31
End: 2021-09-30
Attending: FAMILY MEDICINE
Payer: COMMERCIAL

## 2021-09-30 PROCEDURE — G0177 OPPS/PHP; TRAIN & EDUC SERV: HCPCS | Mod: GT

## 2021-09-30 PROCEDURE — 90853 GROUP PSYCHOTHERAPY: CPT | Mod: 95 | Performed by: PSYCHOLOGIST

## 2021-09-30 NOTE — GROUP NOTE
Psychotherapy Group Note                                    Service Modality:  Video Visit     Telemedicine Visit: The patient's condition can be safely assessed and treated via synchronous audio and visual telemedicine encounter.      Reason for Telemedicine Visit: Patient has requested telehealth visit, Patient unable to travel, Patient convenience (e.g. access to timely appointments / distance to available provider), Patient lives in a designated Health Professional Shortage Area (HPSA), and Services only offered telehealth    Originating Site (Patient Location): Patient's home    Distant Site (Provider Location): [facility]: [facility], [facility]    Consent:  The patient/guardian has verbally consented to: the potential risks and benefits of telemedicine (video visit) versus in person care; bill my insurance or make self-payment for services provided; and responsibility for payment of non-covered services.     Patient would like the video invitation sent by:  My Chart    Mode of Communication:  Video Conference via Medical Zoom    As the provider I attest to compliance with applicable laws and regulations related to telemedicine.        PATIENT'S NAME: Kamini Neal  MRN:   [id number]  :   1990  ACCT. NUMBER: [id number]  DATE OF SERVICE: 21  START TIME:  2:00 PM  END TIME:  2:50 PM  FACILITATOR: Nicole Harman PsyD  TOPIC:  EBP Group: Coping Skills  [facility] Adult Mental Health Day Treatment  TRACK: 6B    NUMBER OF PARTICIPANTS: 5    Summary of Group / Topics Discussed:  Coping Skills: Calming Strategies: Patients discussed and practiced strategies to increase sense of calm in the body.  Reviewed the benefits of calming strategies as well as past / current practices of each member.  Patients identified situations in which using these strategies would reduce stress. They developed the ability to distinguish when these strategies can be useful in their lives for  management and stress and psychological well-being.    Patient Session Goals / Objectives:    Understand the purpose of using calming strategies to reduce stress    Review patients current calming practices and discuss a more formal way of practicing and accessing skills.    Increase ability to decide when to use various calming strategies (e.g. Progressive muscle relaxation, deep breathing, guided imagery, + thoughts).    Choose 1-2 calming strategies to apply during times of distress.        Patient Participation / Response:  Fully participated with the group by sharing personal reflections / insights and openly received / provided feedback with other participants.    Demonstrated knowledge of when to consider using a variety of coping skills in daily life    Treatment Plan:  Patient has a current master individualized treatment plan.  See Epic treatment plan for more information.    Tawana Márquez Psy., D,  Licensed Clinical Psychologist

## 2021-09-30 NOTE — GROUP NOTE
Psychoeducation Group Note    PATIENT'S NAME: Kamini Neal  MRN:   [id number]  :   1990  [id number]  DATE OF SERVICE: 21  START TIME:  3:00 PM  END TIME:  3:50 PM  FACILITATOR: Melissa Blankenship RN  TOPIC: BALTA RN Group: Brain Health                                    Service Modality:  Video Visit     Telemedicine Visit: The patient's condition can be safely assessed and treated via synchronous audio and visual telemedicine encounter.      Reason for Telemedicine Visit:  covid19    Originating Site (Patient Location): Patient's home    Distant Site (Provider Location): Provider Remote Setting- Home Office    Consent:  The patient/guardian has verbally consented to: the potential risks and benefits of telemedicine (video visit) versus in person care; bill my insurance or make self-payment for services provided; and responsibility for payment of non-covered services.     Patient would like the video invitation sent by:  My Chart    Mode of Communication:  Video Conference via Medical Zoom    As the provider I attest to compliance with applicable laws and regulations related to telemedicine.          [facility] Mental Health Day Treatment  TRACK: 6B    NUMBER OF PARTICIPANTS: 5    Summary of Group / Topics Discussed:  Brain Health:  Pathophysiology of thought  Disorders: Patients were educated on mood disorder etiology and neuroscience, risk factors, symptoms, and pharmacologic, psychotherapeutic, and complementary treatment options. Patients were guided on a discussion of mental, behavioral, and physical symptoms and shared their symptoms with the group.     Patient Session Goals / Objectives:  ? Described what thought disorders are and identified risk factors   ? Explained how chemical imbalances in the brain can cause symptoms and how medications work to reverse this imbalance   ? Identified and described pharmacologic, psychotherapeutic, and complementary treatment  options        Patient Participation / Response:  Fully participated with the group by sharing personal reflections / insights and openly received / provided feedback with other participants.    Demonstrated understanding of topics discussed through group discussion and participation and Identified / Expressed personal readiness to practice skills    Treatment Plan:  Patient has a current master individualized treatment plan.  See Epic treatment plan for more information.    Melissa Blankenship RN

## 2021-09-30 NOTE — GROUP NOTE
Process Group Note                                    Service Modality:  Video Visit     Telemedicine Visit: The patient's condition can be safely assessed and treated via synchronous audio and visual telemedicine encounter.      Reason for Telemedicine Visit: Patient has requested telehealth visit, Patient unable to travel, Patient convenience (e.g. access to timely appointments / distance to available provider), Patient lives in a designated Health Professional Shortage Area (HPSA), and Services only offered telehealth    Originating Site (Patient Location): Patient's home    Distant Site (Provider Location): [facility]: [facility], [facility]    Consent:  The patient/guardian has verbally consented to: the potential risks and benefits of telemedicine (video visit) versus in person care; bill my insurance or make self-payment for services provided; and responsibility for payment of non-covered services.     Patient would like the video invitation sent by:  My Chart    Mode of Communication:  Video Conference via Medical Zoom    As the provider I attest to compliance with applicable laws and regulations related to telemedicine.        PATIENT'S NAME: Kamini Neal  MRN:   [id number]  :   1990  ACCT. NUMBER: [id number]  DATE OF SERVICE: 21  START TIME:  1:00 PM  END TIME:  1:50 PM  FACILITATOR: Nicole Harman PsyD  TOPIC:  Process Group    Diagnoses:  309.81 PTSD  295.70 Schizoaffective Disorder, Depressed Type  300.01 Panic Disorder    Psychiatry: Dr. Andrew Campo: [facility]  Therapy:  PCP:  Dr. Martinez, [facility]  : [place] Agency: [phone], [phone]  Neela Brown, supervisor of group home:  [phone]      [facility] Adult Mental Health Day Treatment  TRACK: 6B    NUMBER OF PARTICIPANTS: 5          Data:    Session content: At the start of this group, patients were invited to check in by identifying  themselves, describing their current emotional status, and identifying issues to address in this group.   Area(s) of treatment focus addressed in this session included Symptom Management, Personal Safety and Community Resources/Discharge Planning.      Therapeutic Interventions/Treatment Strategies:  Psychotherapist reinforced use of skills. Treatment modalities used include Cognitive Behavioral Therapy and Dialectical Behavioral Therapy. Interventions include Cognitive Restructuring:  Assisted patient in formulating new neutral/positive alternatives to challenge less helpful / ineffective thoughts, Coping Skills: Assisted patient in identifying 1-2 healthy distraction skills to reduce overall distress and Reviewed patients current calming practices and discussed a more formal way of practicing and accessing skills, Relapse Prevention: Coached on skills to manage factors that contribute to relapse and Mindfulness: Facilitated discussion of when/how to use mindfulness skills to benefit general health, mental health symptoms, and stressors.    Assessment:    Patient response:   Patient responded to session by giving feedback, listening and being attentive    Possible barriers to participation / learning include: severity of symptoms    Health Issues:   None reported       Substance Use Review:   Substance Use: No active concerns identified.    Mental Status/Behavioral Observations  Appearance:   Appropriate   Eye Contact:   Good   Psychomotor Behavior: Normal   Attitude:   Cooperative   Orientation:   All  Speech   Rate / Production: Normal    Volume:  Normal   Mood:    Anxious  Depressed  Sad   Affect:    Constricted   Thought Content:   Rumination  Flashbacks, visual and auditory hallucinations, nightmares  Thought Form:  Coherent  Logical     Insight:    Good     Plan:     Safety Plan: Recommended that patient call 911 or go to the local ED should there be a change in any of these risk factors.     Barriers to  treatment: None identified    Patient Contracts (see media tab):  None    Substance Use: Provided encouragement towards sobriety     Continue or Discharge: Patient will continue in Adult Day Treatment (ADT)  as planned. Patient is likely to benefit from learning and using skills as they work toward the goals identified in their treatment plan.      Nicole Harman PsyD  September 30, 2021  Radha Dimas., GIUSEPPE,  Licensed Clinical Psychologist

## 2021-10-03 ENCOUNTER — HEALTH MAINTENANCE LETTER (OUTPATIENT)
Age: 31
End: 2021-10-03

## 2021-10-04 ENCOUNTER — HOSPITAL ENCOUNTER (OUTPATIENT)
Dept: BEHAVIORAL HEALTH | Facility: CLINIC | Age: 31
End: 2021-10-04
Attending: FAMILY MEDICINE
Payer: COMMERCIAL

## 2021-10-04 PROCEDURE — 90853 GROUP PSYCHOTHERAPY: CPT | Mod: GT | Performed by: PSYCHOLOGIST

## 2021-10-04 NOTE — GROUP NOTE
Process Group Note                                    Service Modality:  Video Visit     Telemedicine Visit: The patient's condition can be safely assessed and treated via synchronous audio and visual telemedicine encounter.      Reason for Telemedicine Visit: Patient has requested telehealth visit, Patient unable to travel, Patient convenience (e.g. access to timely appointments / distance to available provider), Patient lives in a designated Health Professional Shortage Area (HPSA), and Services only offered telehealth    Originating Site (Patient Location): Patient's home    Distant Site (Provider Location): [facility] Hospital: [facility], [facility]    Consent:  The patient/guardian has verbally consented to: the potential risks and benefits of telemedicine (video visit) versus in person care; bill my insurance or make self-payment for services provided; and responsibility for payment of non-covered services.     Patient would like the video invitation sent by:  My Chart    Mode of Communication:  Video Conference via Medical Zoom    As the provider I attest to compliance with applicable laws and regulations related to telemedicine.        PATIENT'S NAME: Kamini Neal  MRN:   [id number]  :   1990  ACCT. NUMBER: [id number]  DATE OF SERVICE: 10/04/21  START TIME:  1:00 PM  END TIME:  1:50 PM  FACILITATOR: Nicole Harman PsyD  TOPIC:  Process Group    Diagnoses:  309.81 PTSD  295.70 Schizoaffective Disorder, Depressed Type  300.01 Panic Disorder    Psychiatry: Dr. Andrew Campo: [facility]  Therapy:  PCP:  Dr. Martinez, [facility]  : [place] Agency: [phone], [phone]  Neela Brown, supervisor of group home:  [phone]      [facility] Adult Mental Health Day Treatment  TRACK: 6B    NUMBER OF PARTICIPANTS: 7          Data:    Session content: At the start of this group, patients were invited to check in by identifying  "themselves, describing their current emotional status, and identifying issues to address in this group.   Area(s) of treatment focus addressed in this session included Symptom Management, Personal Safety and Community Resources/Discharge Planning.  Client reported being safe today.  Reported mood is safe.   Goal for today is to attend therapy groups and talk to  about discharge. The client talked to the group about he did not feel the groups were helpful. He reported not having any supportive people to help him and that family did not want to help him. He stated that he wanted to discharge today, but would talk to Neela, his supervisor at the home about it, and felt he could discharge today or tomorrow, as the group was not helpful. He agreed to talk with his supervisor, Neela about this and tell me what they decided to do. When questioned about his work and his level of paranoia and flashbacks, he reported that he takes a PRN. When asked if he attended work today, he reported no, but gave no answer to why he didn't go today.      Therapeutic Interventions/Treatment Strategies:  Psychotherapist offered support, feedback and validation. Treatment modalities used include Cognitive Behavioral Therapy and Dialectical Behavioral Therapy. Interventions include Cognitive Restructuring:  Facilitated recognition of the connection between negative thoughts and negative core beliefs, Coping Skills: Discussed how the use of intentional \"in the moment\" actions can help reduce distress, Relapse Prevention: Coached on skills to manage factors that contribute to relapse and Mindfulness: Encouraged a plan to use mindfulness skills in daily life.    Assessment:    Patient response:   Patient responded to session by giving feedback, listening and being attentive    Possible barriers to participation / learning include: severity of symptoms    Health Issues:   None reported       Substance Use Review:   Substance Use: No " active concerns identified.    Mental Status/Behavioral Observations  Appearance:   not connected to the camer a  Eye Contact:   not connected to the camer a  Psychomotor Behavior: Normal   Attitude:   Guarded  Suspicious   Orientation:   All  Speech   Rate / Production: Pressured    Volume:  Normal   Mood:    Normal  Affect:    Constricted   Thought Content:   Rumination and Psychosis reports hallucinations auditory and paranoia  Thought Form:  Coherent  Logical  Psychosis    Insight:    Good  and Fair     Plan:     Safety Plan: Recommended that patient call 911 or go to the local ED should there be a change in any of these risk factors.     Barriers to treatment: None identified    Patient Contracts (see media tab):  None    Substance Use: Provided encouragement towards sobriety     Continue or Discharge: Patient will continue in Adult Day Treatment (ADT)  as planned. Patient is likely to benefit from learning and using skills as they work toward the goals identified in their treatment plan.      Nicole Harman PsyD  October 4, 2021  Maite Dimas, GIUSEPPE,  Licensed Clinical Psychologist

## 2021-10-05 ENCOUNTER — TELEPHONE (OUTPATIENT)
Dept: BEHAVIORAL HEALTH | Facility: CLINIC | Age: 31
End: 2021-10-05

## 2021-10-05 NOTE — TELEPHONE ENCOUNTER
Phone call to Neela to coordinate care with group ren and Jeffrey, as he has stated that he wanted to stay longer and discharge on 10/14 and then said he wanted to discharge this week.  She coordinated care with Jeffrey, and he was sleeping.      Radha Dimas., D,  Licensed Clinical Psychologist

## 2021-12-05 ENCOUNTER — HOSPITAL ENCOUNTER (OUTPATIENT)
Facility: CLINIC | Age: 31
Setting detail: OBSERVATION
Discharge: OTHER INSTITUTION | End: 2021-12-07
Attending: EMERGENCY MEDICINE | Admitting: EMERGENCY MEDICINE
Payer: COMMERCIAL

## 2021-12-05 DIAGNOSIS — R45.1 AGITATION: ICD-10-CM

## 2021-12-05 DIAGNOSIS — R44.3 HALLUCINATIONS: ICD-10-CM

## 2021-12-05 DIAGNOSIS — Z20.822 CONTACT WITH AND (SUSPECTED) EXPOSURE TO COVID-19: ICD-10-CM

## 2021-12-05 DIAGNOSIS — F25.0 SCHIZOAFFECTIVE DISORDER, BIPOLAR TYPE (H): ICD-10-CM

## 2021-12-05 DIAGNOSIS — R45.851 SUICIDAL IDEATION: ICD-10-CM

## 2021-12-05 DIAGNOSIS — F43.10 POSTTRAUMATIC STRESS DISORDER: ICD-10-CM

## 2021-12-05 PROCEDURE — G0378 HOSPITAL OBSERVATION PER HR: HCPCS

## 2021-12-05 PROCEDURE — C9803 HOPD COVID-19 SPEC COLLECT: HCPCS | Performed by: EMERGENCY MEDICINE

## 2021-12-05 PROCEDURE — 99285 EMERGENCY DEPT VISIT HI MDM: CPT | Mod: 25 | Performed by: EMERGENCY MEDICINE

## 2021-12-05 PROCEDURE — 96372 THER/PROPH/DIAG INJ SC/IM: CPT | Performed by: EMERGENCY MEDICINE

## 2021-12-05 PROCEDURE — 99291 CRITICAL CARE FIRST HOUR: CPT | Performed by: EMERGENCY MEDICINE

## 2021-12-05 PROCEDURE — 250N000011 HC RX IP 250 OP 636: Performed by: EMERGENCY MEDICINE

## 2021-12-05 RX ORDER — OLANZAPINE 10 MG/2ML
10 INJECTION, POWDER, FOR SOLUTION INTRAMUSCULAR ONCE
Status: COMPLETED | OUTPATIENT
Start: 2021-12-05 | End: 2021-12-05

## 2021-12-05 RX ADMIN — OLANZAPINE 10 MG: 10 INJECTION, POWDER, FOR SOLUTION INTRAMUSCULAR at 20:44

## 2021-12-05 ASSESSMENT — ENCOUNTER SYMPTOMS
FEVER: 0
ABDOMINAL PAIN: 0
HALLUCINATIONS: 1
AGITATION: 1
SHORTNESS OF BREATH: 0

## 2021-12-06 ENCOUNTER — TELEPHONE (OUTPATIENT)
Dept: BEHAVIORAL HEALTH | Facility: CLINIC | Age: 31
End: 2021-12-06

## 2021-12-06 LAB — SARS-COV-2 RNA RESP QL NAA+PROBE: NEGATIVE

## 2021-12-06 PROCEDURE — 90791 PSYCH DIAGNOSTIC EVALUATION: CPT

## 2021-12-06 PROCEDURE — G0378 HOSPITAL OBSERVATION PER HR: HCPCS

## 2021-12-06 PROCEDURE — 250N000013 HC RX MED GY IP 250 OP 250 PS 637: Performed by: EMERGENCY MEDICINE

## 2021-12-06 PROCEDURE — 99225 PR SUBSEQUENT OBSERVATION CARE,LEVEL II: CPT | Performed by: EMERGENCY MEDICINE

## 2021-12-06 PROCEDURE — U0003 INFECTIOUS AGENT DETECTION BY NUCLEIC ACID (DNA OR RNA); SEVERE ACUTE RESPIRATORY SYNDROME CORONAVIRUS 2 (SARS-COV-2) (CORONAVIRUS DISEASE [COVID-19]), AMPLIFIED PROBE TECHNIQUE, MAKING USE OF HIGH THROUGHPUT TECHNOLOGIES AS DESCRIBED BY CMS-2020-01-R: HCPCS | Performed by: EMERGENCY MEDICINE

## 2021-12-06 RX ORDER — LORAZEPAM 1 MG/1
1 TABLET ORAL ONCE
Status: COMPLETED | OUTPATIENT
Start: 2021-12-06 | End: 2021-12-06

## 2021-12-06 RX ORDER — OLANZAPINE 10 MG/1
10 TABLET, ORALLY DISINTEGRATING ORAL ONCE
Status: COMPLETED | OUTPATIENT
Start: 2021-12-06 | End: 2021-12-06

## 2021-12-06 RX ORDER — CLOZAPINE 100 MG/1
400 TABLET ORAL AT BEDTIME
Status: DISCONTINUED | OUTPATIENT
Start: 2021-12-06 | End: 2021-12-06

## 2021-12-06 RX ADMIN — LORAZEPAM 1 MG: 1 TABLET ORAL at 16:48

## 2021-12-06 RX ADMIN — OLANZAPINE 10 MG: 10 TABLET, ORALLY DISINTEGRATING ORAL at 14:13

## 2021-12-06 RX ADMIN — NICOTINE POLACRILEX 4 MG: 2 GUM, CHEWING BUCCAL at 21:16

## 2021-12-06 RX ADMIN — OLANZAPINE 10 MG: 10 TABLET, ORALLY DISINTEGRATING ORAL at 20:46

## 2021-12-06 RX ADMIN — PRAZOSIN HYDROCHLORIDE 3 MG: 2 CAPSULE ORAL at 21:13

## 2021-12-06 RX ADMIN — CLOZAPINE 450 MG: 100 TABLET ORAL at 21:12

## 2021-12-06 RX ADMIN — LORAZEPAM 1 MG: 1 TABLET ORAL at 20:46

## 2021-12-06 NOTE — ED NOTES
Emergency Department Patient Sign-out       Brief HPI:  This is a 31 year old male signed out to me by Dr. Alas .  See initial ED Provider note for details of the presentation.            Significant Events prior to my assuming care: Patient with schizoaffective d/o presenting with hallucinations, escalated and received sedation and restraints.       Exam:   Patient Vitals for the past 24 hrs:   BP Temp Temp src Pulse Resp SpO2   12/05/21 2224 130/87 97.4  F (36.3  C) Oral 78 16 98 %           ED RESULTS:   No results found for this visit on 12/05/21 (from the past 24 hour(s)).    ED MEDICATIONS:   Medications   OLANZapine (zyPREXA) injection 10 mg (10 mg Intramuscular Given 12/5/21 2044)         Impression:    ICD-10-CM    1. Hallucinations  R44.3    2. Suicidal ideation  R45.851    3. Agitation  R45.1        Plan:    Needs assessment when awake and able.        MD Saw Aguilar Matthew Joseph, MD  12/06/21 0051

## 2021-12-06 NOTE — ED NOTES
Bed: HW05  Expected date: 12/5/21  Expected time: 7:43 PM  Means of arrival:   Comments:  Daniel 411, 31 y.o. male, hallucinations and suicidal, yellow, 5 min   no spotting/no abnormal vaginal bleeding

## 2021-12-06 NOTE — TELEPHONE ENCOUNTER
541pm - Intake called ED, spoke w RN regarding potential bed in [place]. Pt okay w placement in [place].   550pm - Melissa, on call provider, accepts   Pt placed in queue     R: 5N/Chris     600pm - unit charge unavail due to code on unit, intake awaiting call back  628pm - unit charge notified, ready for report   635pm - ED charge notified via text page

## 2021-12-06 NOTE — ED PROVIDER NOTES
ED Provider Note  [facility]      History     Chief Complaint   Patient presents with     Hallucinations     acording to paramedic, patient called 911. He c/o auditory hallucination which he has been having for years but his current medication does not work anymore.      Suicidal     per paramedic patient has chronic suicidal thoughts     The history is provided by the patient and medical records.     Kamini Neal is a 31 year old male with a history of schizoaffective disorder bipolar type, PTSD, borderline personality disorder, and alcohol abuse who presents to the ED with auditory hallucinations and suicidal ideation. Patient states he's had auditory hallucinations for multiple years. Today he states he head voices telling him to commit suicide so he called 911. While the patient was waiting for a provider in the ED he attempted to leave, but was brought back by security. Patient states he wants to be discharged.         Past Medical History  Past Medical History:   Diagnosis Date     Anxiety      Depressive disorder      Schizo affective schizophrenia (H)      Past Surgical History:   Procedure Laterality Date     TONSILLECTOMY       benztropine (COGENTIN) 1 MG tablet  cloZAPine (CLOZARIL) 100 MG tablet  cloZAPine (CLOZARIL) 25 MG tablet  docusate sodium (COLACE) 100 MG capsule  FLUoxetine (PROZAC) 10 MG capsule  gabapentin (NEURONTIN) 300 MG capsule  levothyroxine (SYNTHROID/LEVOTHROID) 112 MCG tablet  OLANZapine (ZYPREXA) 20 MG tablet  omeprazole (PRILOSEC) 20 MG DR capsule  paliperidone (INVEGA SUSTENNA) 234 MG/1.5ML ANTHONY  polyethylene glycol (MIRALAX) 17 g packet  prazosin (MINIPRESS) 1 MG capsule  venlafaxine (EFFEXOR-ER) 75 MG 24 hr tablet  Vitamin D3 (CHOLECALCIFEROL) 25 mcg (1000 units) tablet      Allergies   Allergen Reactions     Haldol [Haloperidol]      Other reaction(s): Tardive Dyskinesia  Torticollis  Torticollis     Family History  Family History   Problem  Relation Age of Onset     Mental Illness Maternal Uncle      Mental Illness Maternal Aunt      Glaucoma No family hx of      Macular Degeneration No family hx of      Social History   Social History     Tobacco Use     Smoking status: Current Every Day Smoker     Packs/day: 0.50     Smokeless tobacco: Never Used   Substance Use Topics     Alcohol use: No     Drug use: No      Past medical history, past surgical history, medications, allergies, family history, and social history were reviewed with the patient. No additional pertinent items.       Review of Systems   Constitutional: Negative for fever.   Respiratory: Negative for shortness of breath.    Cardiovascular: Negative for chest pain.   Gastrointestinal: Negative for abdominal pain.   Psychiatric/Behavioral: Positive for agitation, hallucinations and suicidal ideas.   All other systems reviewed and are negative.    A complete review of systems was performed with pertinent positives and negatives noted in the HPI, and all other systems negative.    Physical Exam      Physical Exam  Vitals and nursing note reviewed.   Constitutional:       General: He is not in acute distress.     Appearance: He is well-developed.   HENT:      Head: Normocephalic.   Eyes:      Extraocular Movements: Extraocular movements intact.   Pulmonary:      Effort: No respiratory distress.   Musculoskeletal:         General: No deformity.      Cervical back: Neck supple.   Skin:     General: Skin is dry.   Neurological:      Mental Status: He is alert.      Comments: Oriented   Psychiatric:         Behavior: Behavior normal.         ED Course     7:56 PM  The patient was seen and examined by Serjio Alas DO in Room ED14.   Procedures       Critical Care Addendum    My initial assessment, based on my review of prehospital provider report, review of nursing observations, review of vital signs, focused history and physical exam, established that Kamini Neal has severe agitation,  which requires immediate intervention, and therefore he is critically ill.     After the initial assessment, the care team initiated physical restraints and Gave Zyprexa to provide stabilization care to provide stabilization care. Due to the critical nature of this patient, I reassessed nursing observations, vital signs, physical exam and mental status multiple times prior to his disposition.     Time also spent performing documentation.     Critical care time (excluding teaching time and procedures): 45 minutes.               No results found for any visits on 12/05/21.  Medications   OLANZapine (zyPREXA) injection 10 mg (has no administration in time range)     -----  Observation Addendum  With this Addendum, this ED Provider Note may also serve as an Observation H&P    Observation Initiation Date: Dec 5, 2021    Patient presenting with agitation, suicidal ideation.    A DEC assessment will be completed see separate DEC note from today's date for details on the assessment.    During the initial care period, the patient did require medications for agitation, and did require restraints/seclusion for patient and/or provider safety.     The patient's outpatient medications were not reconciled and ordered.     The patient was found to have a psychiatric condition that would benefit from an observation stay in the emergency department for further psychiatric stabilization and/or coordination of a safe disposition. The observation plan includes serial assessments of psychiatric condition, potential administration of medications if indicated, further disposition pending the patient's psychiatric course during the monitoring period.   -----           Assessments & Plan (with Medical Decision Making)   31-year-old male presents to us with a chief complaint of hallucinations and suicidal ideation.  He called EMS himself.  After he arrived he tried to elope and was placed in seclusion.  He subsequently was hitting his head  against the wall in the seclusion room and was placed in restraints and given Zyprexa.  Patient will be placed in observation status until he wakes up and can be properly evaluated by a mental health care .  Patient care will be signed out to the oncoming physician    I have reviewed the nursing notes. I have reviewed the findings, diagnosis, plan and need for follow up with the patient.    New Prescriptions    No medications on file       Final diagnoses:   Hallucinations   Suicidal ideation   Agitation     I, Linda Levin, am serving as a trained medical scribe to document services personally performed by Serjio Alas DO based on the provider's statements to me on December 5, 2021.  This document has been checked and approved by the attending provider.    I, Serjio Alas DO, was physically present and have reviewed and verified the accuracy of this note documented by Linda Levin medical scribe.          --  Serjio Alas DO  [facility] EMERGENCY DEPARTMENT  12/5/2021     Serjio Alas DO  12/05/21 1019

## 2021-12-06 NOTE — ED PROVIDER NOTES
Emergency Department Patient Sign-out       Brief HPI:  This is a 31 year old male signed out to me by Dr. Spring.  See initial ED Provider note for details of the presentation.          Patient is medically cleared for admission to a Behavioral Health unit.      The patient is not on a hold.   He is voluntary at this point.  Should he decide to leave, he should be placed on a 72-hour hold.    The patient has required Zyprexa for agitation.    Awaiting Mental Health Assessment        Significant Events prior to my assuming care: None      Exam:   Patient Vitals for the past 24 hrs:   BP Temp Temp src Pulse Resp SpO2   12/06/21 0852 97/64 97.8  F (36.6  C) Oral 85 16 98 %   12/05/21 2224 130/87 97.4  F (36.3  C) Oral 78 16 98 %           ED RESULTS:   No results found for this visit on 12/05/21 (from the past 24 hour(s)).    ED MEDICATIONS:   Medications   OLANZapine (zyPREXA) injection 10 mg (10 mg Intramuscular Given 12/5/21 2044)         Impression:    ICD-10-CM    1. Hallucinations  R44.3    2. Suicidal ideation  R45.851    3. Agitation  R45.1        Plan:    Pending studies include urine tox screen and Covid test.    Patient was evaluated by the mental health .  Patient has suicidal ideation and plans will be to place him in an inpatient bed.  Patient is currently voluntary.  He did required an additional dose of Zyprexa as he was becoming more agitated.    Patient is able to be admitted to this facility. Arrangements were made.      MD Mike Mishra David Alan, MD  12/07/21 4582
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
## 2021-12-06 NOTE — ED NOTES
Patient was pounding on the seclusion door, yelling at the psych assoc, and verbalizing desire to leave. MD ordered Zyprexa 10 mg IM. Patient was informed about the medication. Patient put his hand up and made a sign- come and get me or get in. Code green was called to assist with administration of medicine. While waiting for the code team, patient removed his shirt and wrapped it around his neck. Securities went inside the seclusion room and took the shirt from the patient. Code Tiki was paged. He was cooperative at first then he became aggressive. He attempted to bolt out. Securities and code team held him down. Medication given at 2044 and placed on restraints around 2059.
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
## 2021-12-06 NOTE — ED NOTES
Within an hour after restraint an in person face to face assessment was completed at 2110, including an evaluation of the patient's immediate reaction to the intervention, behavioral assessment and review/assessment of history, drugs and medications, recent labs, etc., and behavioral condition.  The patient experienced: No adverse physical outcome from seclusion/restraint initiation.  The intervention of restraint or seclusion needs to continue.     Serjio Alas,   12/05/21 [id number]

## 2021-12-06 NOTE — ED NOTES
Pt is calm now, verbalizes understanding to rest on cart and cooperative with plan of care, restraints removed, given apple juice and sandwich, pt resting calmly, remains on 1 to 1 observation status.
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
## 2021-12-06 NOTE — ED NOTES
Pt pacing in [facility], 1:1 obs. Pt reporting increased anxiety. Writer to discuss with MD.

## 2021-12-07 ENCOUNTER — HOSPITAL ENCOUNTER (INPATIENT)
Facility: HOSPITAL | Age: 31
LOS: 3 days | Discharge: NURSING, REHAB OR LONG-TERM CARE | End: 2021-12-10
Attending: STUDENT IN AN ORGANIZED HEALTH CARE EDUCATION/TRAINING PROGRAM | Admitting: STUDENT IN AN ORGANIZED HEALTH CARE EDUCATION/TRAINING PROGRAM
Payer: COMMERCIAL

## 2021-12-07 VITALS
DIASTOLIC BLOOD PRESSURE: 75 MMHG | SYSTOLIC BLOOD PRESSURE: 138 MMHG | RESPIRATION RATE: 16 BRPM | OXYGEN SATURATION: 100 % | TEMPERATURE: 98.4 F | HEART RATE: 75 BPM

## 2021-12-07 DIAGNOSIS — K11.7 SIALORRHEA: ICD-10-CM

## 2021-12-07 DIAGNOSIS — F25.9 SCHIZOAFFECTIVE DISORDER, UNSPECIFIED TYPE (H): Primary | ICD-10-CM

## 2021-12-07 PROBLEM — Z91.199 NONCOMPLIANCE: Status: ACTIVE | Noted: 2020-05-21

## 2021-12-07 PROBLEM — F12.21 CANNABIS DEPENDENCE IN REMISSION (H): Status: ACTIVE | Noted: 2018-09-27

## 2021-12-07 LAB
ALBUMIN SERPL-MCNC: 3.8 G/DL (ref 3.4–5)
ALP SERPL-CCNC: 96 U/L (ref 40–150)
ALT SERPL W P-5'-P-CCNC: 23 U/L (ref 0–70)
AMPHETAMINES UR QL SCN: NORMAL
ANION GAP SERPL CALCULATED.3IONS-SCNC: 6 MMOL/L (ref 3–14)
AST SERPL W P-5'-P-CCNC: 15 U/L (ref 0–45)
BARBITURATES UR QL: NORMAL
BASOPHILS # BLD AUTO: 0 10E3/UL (ref 0–0.2)
BASOPHILS NFR BLD AUTO: 1 %
BENZODIAZ UR QL: NORMAL
BILIRUB SERPL-MCNC: 0.3 MG/DL (ref 0.2–1.3)
BUN SERPL-MCNC: 11 MG/DL (ref 7–30)
CALCIUM SERPL-MCNC: 9.3 MG/DL (ref 8.5–10.1)
CANNABINOIDS UR QL SCN: NORMAL
CHLORIDE BLD-SCNC: 110 MMOL/L (ref 94–109)
CO2 SERPL-SCNC: 23 MMOL/L (ref 20–32)
COCAINE UR QL: NORMAL
CREAT SERPL-MCNC: 0.68 MG/DL (ref 0.66–1.25)
EOSINOPHIL # BLD AUTO: 0 10E3/UL (ref 0–0.7)
EOSINOPHIL NFR BLD AUTO: 0 %
ERYTHROCYTE [DISTWIDTH] IN BLOOD BY AUTOMATED COUNT: 12.6 % (ref 10–15)
GFR SERPL CREATININE-BSD FRML MDRD: >90 ML/MIN/1.73M2
GLUCOSE BLD-MCNC: 147 MG/DL (ref 70–99)
HCT VFR BLD AUTO: 43 % (ref 40–53)
HGB BLD-MCNC: 15 G/DL (ref 13.3–17.7)
IMM GRANULOCYTES # BLD: 0.1 10E3/UL
IMM GRANULOCYTES NFR BLD: 1 %
LYMPHOCYTES # BLD AUTO: 1.6 10E3/UL (ref 0.8–5.3)
LYMPHOCYTES NFR BLD AUTO: 20 %
MCH RBC QN AUTO: 32.4 PG (ref 26.5–33)
MCHC RBC AUTO-ENTMCNC: 34.9 G/DL (ref 31.5–36.5)
MCV RBC AUTO: 93 FL (ref 78–100)
MONOCYTES # BLD AUTO: 0.5 10E3/UL (ref 0–1.3)
MONOCYTES NFR BLD AUTO: 6 %
NEUTROPHILS # BLD AUTO: 5.9 10E3/UL (ref 1.6–8.3)
NEUTROPHILS NFR BLD AUTO: 72 %
NRBC # BLD AUTO: 0 10E3/UL
NRBC BLD AUTO-RTO: 0 /100
OPIATES UR QL SCN: NORMAL
PLATELET # BLD AUTO: 201 10E3/UL (ref 150–450)
POTASSIUM BLD-SCNC: 3.8 MMOL/L (ref 3.4–5.3)
PROT SERPL-MCNC: 7.3 G/DL (ref 6.8–8.8)
RBC # BLD AUTO: 4.63 10E6/UL (ref 4.4–5.9)
SODIUM SERPL-SCNC: 139 MMOL/L (ref 133–144)
TSH SERPL DL<=0.005 MIU/L-ACNC: 2.17 MU/L (ref 0.4–4)
WBC # BLD AUTO: 8.2 10E3/UL (ref 4–11)

## 2021-12-07 PROCEDURE — 250N000013 HC RX MED GY IP 250 OP 250 PS 637: Performed by: NURSE PRACTITIONER

## 2021-12-07 PROCEDURE — 82040 ASSAY OF SERUM ALBUMIN: CPT | Performed by: NURSE PRACTITIONER

## 2021-12-07 PROCEDURE — 80307 DRUG TEST PRSMV CHEM ANLYZR: CPT | Performed by: EMERGENCY MEDICINE

## 2021-12-07 PROCEDURE — 36415 COLL VENOUS BLD VENIPUNCTURE: CPT | Performed by: NURSE PRACTITIONER

## 2021-12-07 PROCEDURE — 84443 ASSAY THYROID STIM HORMONE: CPT | Performed by: NURSE PRACTITIONER

## 2021-12-07 PROCEDURE — 99222 1ST HOSP IP/OBS MODERATE 55: CPT | Performed by: NURSE PRACTITIONER

## 2021-12-07 PROCEDURE — 93010 ELECTROCARDIOGRAM REPORT: CPT | Performed by: INTERNAL MEDICINE

## 2021-12-07 PROCEDURE — G0378 HOSPITAL OBSERVATION PER HR: HCPCS

## 2021-12-07 PROCEDURE — 85025 COMPLETE CBC W/AUTO DIFF WBC: CPT | Performed by: NURSE PRACTITIONER

## 2021-12-07 PROCEDURE — 124N000001 HC R&B MH

## 2021-12-07 PROCEDURE — 99223 1ST HOSP IP/OBS HIGH 75: CPT | Performed by: NURSE PRACTITIONER

## 2021-12-07 RX ORDER — VENLAFAXINE HYDROCHLORIDE 150 MG/1
300 CAPSULE, EXTENDED RELEASE ORAL DAILY
Status: ON HOLD | COMMUNITY
Start: 2021-11-19 | End: 2022-01-03

## 2021-12-07 RX ORDER — DOCUSATE SODIUM 100 MG/1
100 CAPSULE, LIQUID FILLED ORAL 2 TIMES DAILY PRN
Status: DISCONTINUED | OUTPATIENT
Start: 2021-12-07 | End: 2021-12-07

## 2021-12-07 RX ORDER — BENZTROPINE MESYLATE 0.5 MG/1
0.5 TABLET ORAL 2 TIMES DAILY
Status: DISCONTINUED | OUTPATIENT
Start: 2021-12-07 | End: 2021-12-09

## 2021-12-07 RX ORDER — AMOXICILLIN 250 MG
1 CAPSULE ORAL 2 TIMES DAILY PRN
Status: DISCONTINUED | OUTPATIENT
Start: 2021-12-07 | End: 2021-12-10 | Stop reason: HOSPADM

## 2021-12-07 RX ORDER — GLYCOPYRROLATE 1 MG/1
1 TABLET ORAL 3 TIMES DAILY
Status: DISCONTINUED | OUTPATIENT
Start: 2021-12-07 | End: 2021-12-10 | Stop reason: HOSPADM

## 2021-12-07 RX ORDER — LITHIUM CARBONATE 300 MG/1
300 TABLET, FILM COATED, EXTENDED RELEASE ORAL AT BEDTIME
Status: DISCONTINUED | OUTPATIENT
Start: 2021-12-07 | End: 2021-12-08

## 2021-12-07 RX ORDER — ATROPINE SULFATE 10 MG/ML
3 SOLUTION/ DROPS OPHTHALMIC
Status: ON HOLD | COMMUNITY
Start: 2021-05-31 | End: 2021-12-09

## 2021-12-07 RX ORDER — POLYETHYLENE GLYCOL 3350 17 G/17G
17 POWDER, FOR SOLUTION ORAL DAILY PRN
Status: DISCONTINUED | OUTPATIENT
Start: 2021-12-07 | End: 2021-12-10 | Stop reason: HOSPADM

## 2021-12-07 RX ORDER — LEVOTHYROXINE SODIUM 112 UG/1
112 TABLET ORAL DAILY
Status: DISCONTINUED | OUTPATIENT
Start: 2021-12-07 | End: 2021-12-10 | Stop reason: HOSPADM

## 2021-12-07 RX ORDER — OLANZAPINE 10 MG/1
10 TABLET ORAL DAILY PRN
COMMUNITY
Start: 2021-11-19 | End: 2022-03-15

## 2021-12-07 RX ORDER — VENLAFAXINE HYDROCHLORIDE 150 MG/1
300 CAPSULE, EXTENDED RELEASE ORAL DAILY
Status: DISCONTINUED | OUTPATIENT
Start: 2021-12-07 | End: 2021-12-10 | Stop reason: HOSPADM

## 2021-12-07 RX ORDER — HYDROXYZINE HYDROCHLORIDE 25 MG/1
50 TABLET, FILM COATED ORAL EVERY 4 HOURS PRN
Status: DISCONTINUED | OUTPATIENT
Start: 2021-12-07 | End: 2021-12-10 | Stop reason: HOSPADM

## 2021-12-07 RX ORDER — TRAZODONE HYDROCHLORIDE 50 MG/1
50 TABLET, FILM COATED ORAL
Status: DISCONTINUED | OUTPATIENT
Start: 2021-12-07 | End: 2021-12-07

## 2021-12-07 RX ORDER — MAGNESIUM HYDROXIDE/ALUMINUM HYDROXICE/SIMETHICONE 120; 1200; 1200 MG/30ML; MG/30ML; MG/30ML
30 SUSPENSION ORAL EVERY 4 HOURS PRN
Status: DISCONTINUED | OUTPATIENT
Start: 2021-12-07 | End: 2021-12-10 | Stop reason: HOSPADM

## 2021-12-07 RX ORDER — PANTOPRAZOLE SODIUM 40 MG/1
40 TABLET, DELAYED RELEASE ORAL DAILY
Status: DISCONTINUED | OUTPATIENT
Start: 2021-12-07 | End: 2021-12-10 | Stop reason: HOSPADM

## 2021-12-07 RX ORDER — PRAZOSIN HYDROCHLORIDE 1 MG/1
3 CAPSULE ORAL AT BEDTIME
Status: ON HOLD | COMMUNITY
End: 2022-01-03

## 2021-12-07 RX ORDER — CLOZAPINE 100 MG/1
300 TABLET ORAL AT BEDTIME
Status: ON HOLD | COMMUNITY
End: 2021-12-09

## 2021-12-07 RX ORDER — CLOZAPINE 25 MG/1
50 TABLET ORAL EVERY MORNING
Status: ON HOLD | COMMUNITY
End: 2021-12-09

## 2021-12-07 RX ORDER — GABAPENTIN 300 MG/1
300 CAPSULE ORAL 3 TIMES DAILY
Status: DISCONTINUED | OUTPATIENT
Start: 2021-12-07 | End: 2021-12-10 | Stop reason: HOSPADM

## 2021-12-07 RX ORDER — LANOLIN ALCOHOL/MO/W.PET/CERES
3 CREAM (GRAM) TOPICAL
Status: DISCONTINUED | OUTPATIENT
Start: 2021-12-07 | End: 2021-12-10 | Stop reason: HOSPADM

## 2021-12-07 RX ORDER — DOCUSATE SODIUM 100 MG/1
100 CAPSULE, LIQUID FILLED ORAL 2 TIMES DAILY
Status: DISCONTINUED | OUTPATIENT
Start: 2021-12-07 | End: 2021-12-10 | Stop reason: HOSPADM

## 2021-12-07 RX ORDER — OLANZAPINE 10 MG/1
10 TABLET ORAL 3 TIMES DAILY PRN
Status: DISCONTINUED | OUTPATIENT
Start: 2021-12-07 | End: 2021-12-10 | Stop reason: HOSPADM

## 2021-12-07 RX ORDER — OLANZAPINE 5 MG/1
5 TABLET ORAL 3 TIMES DAILY PRN
Status: DISCONTINUED | OUTPATIENT
Start: 2021-12-07 | End: 2021-12-07

## 2021-12-07 RX ORDER — CLOZAPINE 100 MG/1
100 TABLET ORAL EVERY MORNING
Status: ON HOLD | COMMUNITY
End: 2021-12-09

## 2021-12-07 RX ORDER — ACETAMINOPHEN 325 MG/1
650 TABLET ORAL EVERY 4 HOURS PRN
Status: DISCONTINUED | OUTPATIENT
Start: 2021-12-07 | End: 2021-12-10 | Stop reason: HOSPADM

## 2021-12-07 RX ORDER — PRAZOSIN HYDROCHLORIDE 1 MG/1
3 CAPSULE ORAL AT BEDTIME
Status: DISCONTINUED | OUTPATIENT
Start: 2021-12-07 | End: 2021-12-10 | Stop reason: HOSPADM

## 2021-12-07 RX ORDER — IBUPROFEN 200 MG
400 TABLET ORAL EVERY 6 HOURS PRN
Status: DISCONTINUED | OUTPATIENT
Start: 2021-12-07 | End: 2021-12-07

## 2021-12-07 RX ADMIN — LITHIUM CARBONATE 300 MG: 300 TABLET, EXTENDED RELEASE ORAL at 20:07

## 2021-12-07 RX ADMIN — PANTOPRAZOLE SODIUM 40 MG: 40 TABLET, DELAYED RELEASE ORAL at 14:28

## 2021-12-07 RX ADMIN — HYDROXYZINE HYDROCHLORIDE 50 MG: 25 TABLET, FILM COATED ORAL at 14:28

## 2021-12-07 RX ADMIN — GLYCOPYRROLATE 1 MG: 1 TABLET ORAL at 20:06

## 2021-12-07 RX ADMIN — GABAPENTIN 300 MG: 300 CAPSULE ORAL at 20:07

## 2021-12-07 RX ADMIN — NICOTINE POLACRILEX 2 MG: 2 GUM, CHEWING ORAL at 16:45

## 2021-12-07 RX ADMIN — DOCUSATE SODIUM 100 MG: 100 CAPSULE, LIQUID FILLED ORAL at 20:07

## 2021-12-07 RX ADMIN — BENZTROPINE MESYLATE 0.5 MG: 0.5 TABLET ORAL at 20:07

## 2021-12-07 RX ADMIN — VENLAFAXINE HYDROCHLORIDE 300 MG: 150 CAPSULE, EXTENDED RELEASE ORAL at 16:45

## 2021-12-07 RX ADMIN — Medication 125 MCG: at 14:28

## 2021-12-07 RX ADMIN — PRAZOSIN HYDROCHLORIDE 3 MG: 1 CAPSULE ORAL at 20:06

## 2021-12-07 RX ADMIN — NICOTINE POLACRILEX 2 MG: 2 GUM, CHEWING ORAL at 19:05

## 2021-12-07 RX ADMIN — NICOTINE POLACRILEX 2 MG: 2 GUM, CHEWING ORAL at 14:29

## 2021-12-07 RX ADMIN — NICOTINE POLACRILEX 2 MG: 2 GUM, CHEWING ORAL at 21:39

## 2021-12-07 RX ADMIN — LEVOTHYROXINE SODIUM 112 MCG: 0.11 TABLET ORAL at 14:28

## 2021-12-07 RX ADMIN — CLOZAPINE 450 MG: 50 TABLET ORAL at 19:31

## 2021-12-07 ASSESSMENT — ACTIVITIES OF DAILY LIVING (ADL)
DRESS: SCRUBS (BEHAVIORAL HEALTH)
ORAL_HYGIENE: INDEPENDENT
HYGIENE/GROOMING: INDEPENDENT

## 2021-12-07 NOTE — PROGRESS NOTES
12/07/21 1349   Patient Belongings   Did you bring any home meds/supplements to the hospital?  No   Patient Belongings remains with patient;sent to security per site process   Patient Belongings Remaining with Patient clothing;shoes   Patient Belongings Put in Hospital Secure Location (Security or Locker, etc.) cash/credit card;cell phone/electronics;watch   Belongings Search Yes   Clothing Search Yes   Second Staff Susie   Comment Black rosalind underwear, red art pj pants, dark blue tshirt, 2 books, orange scrub set teal mask, grey moterolla phone, no cracks, light scratches,disposable vape, dark blue nike salvador shoes, black wallet, speedy rewards card, capital one quick silver card, credit one bank card, tcs visa x2 to go aceApiFix pass, drivers license, [employer]cell insurance card, small lighter, matte leather coat   List items sent to safe:  black wallet, speedy rewards card, capital one quick silver card, credit one bank card, tcf visa x2 to go mDialog pass, drivers license, ucare insurance card, grey moterolla phone, no cracks, light scratches    All other belongings put in assigned cubby in belongings room.       I have reviewed my belongings list on admission and verify that it is correct.     Patient signature_______________________________    Second staff witness (if patient unable to sign) ______________________________       I have received all my belongings at discharge.    Patient signature________________________________    Yamileth   12/7/2021  2:02 PM

## 2021-12-07 NOTE — H&P
[facility]    History and Physical  Medical Services       Date of Admission:  12/7/2021  Date of Service (when I saw the patient): 12/07/21    Assessment & Plan     Active Problems:    GERD (gastroesophageal reflux disease)- pt reports he had H.pylori a months ago and took 2 weeks of antibiotics and is on something for GERD. He is unsure of the name of the medication. Denies GERD symptoms currently.     Constipation- pt reports issues with constipation. He takes colace at home. He reports his last BM was Sunday, 12/5. Denies abdominal pain. Encouraged to increase water intake. Pt verbalizes understanding.  Ordered Colace bid and PRN orders for Miralax. Nursing to monitor and consult for new or worsening symptoms. Or if no relief.       Hypothyroidism- last TSH in August was wnl at 1.68. Ordered TSH    ED course- UDS negative, covid negative. No other labs were completed. Ordered CBC, CMP, TSH.     Pt medically stable, no acute medical concerns. Chronic medical problems stable. Will sign off. Please consult for any new medical issues or concerns.       Code Status: Full Code    Qiana Bauer CNP    Primary Care Physician   [facility]    Chief Complaint   Psych evaluation     History is obtained from the patient and medical chart     History of Present Illness   (per ED) Kamini Neal is a 31 year old male with a history of schizoaffective disorder bipolar type, PTSD, borderline personality disorder, and alcohol abuse who presents to the ED with auditory hallucinations and suicidal ideation. Patient states he's had auditory hallucinations for multiple years. Today he states he head voices telling him to commit suicide so he called 911. While the patient was waiting for a provider in the ED he attempted to leave, but was brought back by security. Patient states he wants to be discharged.     Past Medical History    I have reviewed this patient's medical history and updated it with pertinent  information if needed.   Past Medical History:   Diagnosis Date     Anxiety      Depressive disorder      Schizo affective schizophrenia (H)        Past Surgical History   I have reviewed this patient's surgical history and updated it with pertinent information if needed.  Past Surgical History:   Procedure Laterality Date     TONSILLECTOMY         Prior to Admission Medications   Prior to Admission Medications   Prescriptions Last Dose Informant Patient Reported? Taking?   FLUoxetine (PROZAC) 10 MG capsule   No No   Sig: Take 1 capsule (10 mg) by mouth daily   OLANZapine (ZYPREXA) 10 MG tablet   Yes No   Sig: Take 10 mg by mouth daily as needed . Maximum 20 mg/24 hours.   Sodium Fluoride (DENTA 5000 PLUS DT)   Yes Yes   Sig: Brush with a pea sized amount twice daily until gone.   atropine 1 % ophthalmic solution   Yes No   Sig: Place 3 drops under the tongue nightly as needed (excessive salivation)    benztropine (COGENTIN) 1 MG tablet  Pharmacy No No   Sig: Take 1 tablet (1 mg) by mouth 2 times daily   cholecalciferol (VITAMIN D3) 125 mcg (5000 units) capsule   Yes No   Sig: Take 1 capsule by mouth daily   docusate sodium (COLACE) 100 MG capsule   Yes No   Sig: Take 100 mg by mouth 2 times daily    gabapentin (NEURONTIN) 300 MG capsule   No No   Sig: Take 1 capsule (300 mg) by mouth 3 times daily   levothyroxine (SYNTHROID/LEVOTHROID) 112 MCG tablet  Pharmacy Yes No   Sig: Take 112 mcg by mouth daily   omeprazole (PRILOSEC) 20 MG DR capsule  Pharmacy Yes No   Sig: Take 20 mg by mouth daily   paliperidone (INVEGA SUSTENNA) 234 MG/1.5ML ANTHONY   Yes No   Sig: Inject 234 mg into the muscle every 28 days   polyethylene glycol (MIRALAX) 17 g packet   No No   Sig: Take 17 g by mouth daily as needed for constipation   prazosin (MINIPRESS) 1 MG capsule   Yes Yes   Sig: Take 3 mg by mouth At Bedtime   venlafaxine (EFFEXOR-XR) 150 MG 24 hr capsule   Yes No   Sig: Take 300 mg by mouth daily      Facility-Administered  Medications: None     Allergies   Allergies   Allergen Reactions     Haldol [Haloperidol]      Other reaction(s): Tardive Dyskinesia  Torticollis  Torticollis       Social History   I have reviewed this patient's social history and updated it with pertinent information if needed. Kamini Neal  reports that he has been smoking. He has been smoking about 0.50 packs per day. He has never used smokeless tobacco. He reports that he does not drink alcohol and does not use drugs.    Family History   I have reviewed this patient's family history and updated it with pertinent information if needed.   Family History   Problem Relation Age of Onset     Mental Illness Maternal Uncle      Mental Illness Maternal Aunt      Glaucoma No family hx of      Macular Degeneration No family hx of        Review of Systems   CONSTITUTIONAL:  negative  EYES:  negative  HEENT:  negative  RESPIRATORY:  negative  CARDIOVASCULAR:  negative  GASTROINTESTINAL:  negative  GENITOURINARY:  negative  INTEGUMENT/BREAST:  negative  HEMATOLOGIC/LYMPHATIC:  negative  ALLERGIC/IMMUNOLOGIC:  negative  ENDOCRINE:  negative  MUSCULOSKELETAL:  negative  NEUROLOGICAL:  negative    Physical Exam                      Vital Signs with Ranges  Temp:  [98.4  F (36.9  C)] 98.4  F (36.9  C)  Pulse:  [75-84] 75  Resp:  [16-18] 16  BP: (109-138)/(58-75) 138/75  SpO2:  [99 %-100 %] 100 %  0 lbs 0 oz                         Constitutional: awake, alert, cooperative, no apparent distress, and appears stated age. Vitals stable.  Eyes: Lids and lashes normal, pupils equal, round and reactive to light, extra ocular muscles intact, sclera clear, conjunctiva normal  ENT: Normocephalic, without obvious abnormality, atraumatic, external ears without lesions, oral pharynx with moist mucous membranes, no erythema or exudates  Hematologic / Lymphatic: no cervical lymphadenopathy  Respiratory: No increased work of breathing, good air exchange, clear to auscultation bilaterally,  no crackles or wheezing  Cardiovascular: Normal apical impulse, regular rate and rhythm, normal S1 and S2, no S3 or S4, and no murmur noted  GI: normal bowel sounds, soft, non-distended, non-tender, no masses palpated, no hepatosplenomegally  Genitounirinary: deferred  Skin: normal skin color, texture, turgor, no redness, warmth, or swelling and no rashes  Musculoskeletal: There is no redness, warmth, or swelling of the joints.  Full range of motion noted.   Neurologic: Awake, alert, oriented to name, place.  Neuropsychiatric: General: normal, calm and normal eye contact    Data   Data reviewed today:   No lab results found in last 7 days.    No results found for this or any previous visit (from the past 24 hour(s)).

## 2021-12-07 NOTE — ED NOTES
Pt able to contract for safety at this time, verbalizing he feels safe in the hospital and won't attempt to hurt himself. Providing urine sample, 1:1 obs.    Writer to update Irais FOY.
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
## 2021-12-07 NOTE — PLAN OF CARE
Prior to Admission Medication Reconciliation:     Changes made to existing medications:   [] None  [x] Updated strengths and frequencies to most current per MAR    Last times/dates taken verified with patient:  [x] Yes- completed myself: per MAR, phone call to  and ED admin records  [] Prepared PTA medlist for review only. (will not be available to review personally)  [] Did not review with patient. Rx verification only. Review completed by nursing.    [] Nurse completed no changes made (double checked entries)  [] Unable to review with patient at this time:  [] Nurse completed/changes made:     Allergies listed at another location:  []Allergies match allergies listed in Epic  []Other allergies listed:    Allergy review:    []Did not review: reviewed by nursing  []Did not review: pt unable at this time  [x]Patient/MAR verified NKDA: MAR listed NKDA  []Patient/MAR verified current existing allergies: no changes made  []Patient confirmed current existing allergies and new allergies added:    Medication reconciliation sources:   []Patient  []Patient family member/emergency contact: **  [][facility] Report Review  []Epic Chart Review  []Care Everywhere review  []Pharmacy med list: **  []Pharmacy phone call  [x]Outside meds dispense report: see below  [x]Nursing home or Assisted Living MAR: [facility] Contact Neela Brown [phone] last got meds Sunday at 7:30pm (HS)   Name Strength Instructions DATE TAKEN TIME TAKEN Notes   [x] effexor xr  150 mg 2 capsules daily  12/5/21 AM    [x] invega  234 mg/1.5 inj  234 mg q28 days 11/22/21  Last given 11/22/21   [x] Cogentin  1 mg  BID  12/5/21 0800/2000    [x] denta  5000 plus crm  Brush with a pea sized amt BID 12/5/21 AM/BED    [x] Colace  100 mg  BID  12/5/21 0800/2000    [x] Omeprazole  20 mg  Daily  12/5/21 AM    [x] prozac 10 mg  Daily  12/5/21 AM    [x] synthroid  112 mcg  Daily  12/5/21 AM    [x] Vit d  5000 units s Daily  12/5/21 AM    [x]  miralax   17 g daily PRN       [x] Gabapentin  300 mg  TID  12/5/21     [x] clozapine 25 mg +100 mg  150 mg qam  12/5/21 AM    [x] clozapine 100 mg 300 mg at bedtime  12/5/21 HS Last given 450 mg 12/6/21 2112   [x] Prazosin  1 mg  3 mg at bedtime  12/5/21 HS Last given in the ER 12/6 2113     [x] Atropine  1%  3 gtt SL at bedtime PRN excessice salivation      [x] zyprexa  10 mg  Daily prn max 20 mg /24 hr   Last given in the ER 12/6/21 2046       []Other: **    Pharmacy desired at discharge: [facility]    Is patient on coumadin?  [x]No    Requests for consultation by provider or pharmacist:   [] Patient understands why all of their meds were prescribed and how to take them. No questions.   [x] Managing party has no questions.   [] Patient/ managing party has questions about the following:  [] Did not review with patient. Cannot assess.     Fill dates and reported compliancy:  [x] Fill dates coincide with reported compliancy for all/most maintenance meds.   [] Fill dates do not coincide with compliancy with maintenance meds. See notes in PTA medlist and in comments.    [] Fill dates do not coincide with the following medications but pt reports compliancy:  [] Did not review with patient. Cannot assess.     Historian accuracy:  [] Excellent- alert and oriented, understands why meds were prescribed and how to take, able to answer specifics  [] Good- alert and oriented, understands why meds were prescribed and how to take, some confusion   [] Fair- alert and oriented, doesn't know medications without list, cannot answer specifics about medications, but has a decent process for which to take at home  [] Poor- does not know medications, may not have a process to take at home, may be cognitively unable to review at this time  [x]Medication management done by family member or facility, no concerns about historian accuracy.   [] Did not review with patient. Cannot assess.     Medication Management:  [] Manages meds  independently  [] Family member/ other party manages meds/assists:  [x] Meds managed by staff at facility  [] Meds set up by home care, family/other party helps administer  [] Meds set up by home care, self administers  [] Did not review with patient. Cannot assess.     Other medications aside from PTA:  [x] Denies taking any medications aside from those listed in PTA meds  [] Reports taking another medication(s) but cannot recall the name(s)  [] Refuses to say.  [] Did not review with patient. Cannot assess.     Comments: none    Laura Colbert on 12/7/2021 at 1:14 PM       Discrepancies: [x] No []Not Applicable [x]Yes: listed below    Issues completing PTA medication reconciliation:  [] On hold for a long time  [] Waited for a call back  [] Fax didn't come through  [] Fax took a long time  [] Other:    Notifying appropriate party of changes/additions/discrepancies:  []No pertinent changes made, notification not necessary.   [] Notified attending provider via text page/phone call  [] Notified attending provider in person  [] Notified pharmacy  [] Notified nurse  [x] Medications have not been reconciled by a provider yet, notification not necessary  [] Pt is not admitted to floor yet, PTA meds completed before admission.     Medications Prior to Admission   Medication Sig Dispense Refill Last Dose     atropine 1 % ophthalmic solution Place 3 drops under the tongue nightly as needed (excessive salivation)    Unknown at Unknown time     cloZAPine (CLOZARIL) 100 MG tablet Take 100 mg by mouth every morning along with 2 (25 mg) tablets for a total morning dose of 150 mg.   12/5/2021 at AM     cloZAPine (CLOZARIL) 100 MG tablet Take 300 mg by mouth At Bedtime   12/6/2021 at 2113     cloZAPine (CLOZARIL) 25 MG tablet Take 50 mg by mouth every morning along with a 100 mg tablet for a total morning dose of 150 mg.   12/5/2021 at AM     OLANZapine (ZYPREXA) 10 MG tablet Take 10 mg by mouth daily as needed . Maximum 20 mg/24  hours.   12/6/2021 at 2046     polyethylene glycol (MIRALAX) 17 g packet Take 17 g by mouth daily as needed for constipation 30 packet 1 Unknown at Unknown time     prazosin (MINIPRESS) 1 MG capsule Take 3 mg by mouth At Bedtime   12/5/2021 at HS     Sodium Fluoride (DENTA 5000 PLUS DT) Brush with a pea sized amount twice daily until gone.   12/5/2021 at HS     benztropine (COGENTIN) 1 MG tablet Take 1 tablet (1 mg) by mouth 2 times daily 60 tablet 0 12/5/2021 at HS     cholecalciferol (VITAMIN D3) 125 mcg (5000 units) capsule Take 1 capsule by mouth daily   12/5/2021 at AM     docusate sodium (COLACE) 100 MG capsule Take 100 mg by mouth 2 times daily    12/5/2021 at HS     FLUoxetine (PROZAC) 10 MG capsule Take 1 capsule (10 mg) by mouth daily 30 capsule 1 12/5/2021 at AM     gabapentin (NEURONTIN) 300 MG capsule Take 1 capsule (300 mg) by mouth 3 times daily 90 capsule 1 12/5/2021 at HS     levothyroxine (SYNTHROID/LEVOTHROID) 112 MCG tablet Take 112 mcg by mouth daily   12/5/2021 at AM     omeprazole (PRILOSEC) 20 MG DR capsule Take 20 mg by mouth daily   12/5/2021 at AM     paliperidone (INVEGA SUSTENNA) 234 MG/1.5ML ANTHONY Inject 234 mg into the muscle every 28 days   11/22/2021     venlafaxine (EFFEXOR-XR) 150 MG 24 hr capsule Take 300 mg by mouth daily   12/5/2021 at AM           Medication Dispense History (from 12/7/2020 to 12/6/2021)  Expand All  Collapse All    Amoxicillin     Dispensed Days Supply Quantity Provider Pharmacy   AMOXICILLIN 500 MG CAPSULE 11/04/2021 14 56 capsule MARIA TERESA MINA [facility]...           Atropine Sulfate     Dispensed Days Supply Quantity Provider Pharmacy   ATROPINE 1% EYE DROPS 05/31/2021 25 5 each ANA M NOE [facility] B...   ATROPINE 1% EYE DROPS 12/10/2020 25 5 each NAEGELE,DEBRA [facility]...           Benztropine Mesylate     Dispensed Days Supply Quantity Provider Pharmacy   BENZTROPINE MES 1 MG TABLET 11/22/2021 28 56 tablet  LESLIE JADE [facility] B...   BENZTROPINE MES 1 MG TABLET 10/08/2021 [facility]   BENZTROPINE MES 1 MG TABLET 09/10/2021 28 [facility]   BENZTROPINE MES 1 MG TABLET 08/13/2021 28 [facility]   BENZTROPINE MES 1 MG TABLET 07/16/2021 28 [facility]   BENZTROPINE MES 1 MG TABLET 06/17/2021 28 [facility]   BENZTROPINE MES 1 MG TABLET 05/21/2021 28 [facility]   BENZTROPINE MES 1 MG TABLET 04/23/2021 28 56 [facility]   BENZTROPINE MES 1 MG TABLET 03/26/2021 28 56 [facility]   BENZTROPINE MES 1 MG TABLET 02/26/2021 28 [facility]   BENZTROPINE MES 1 MG TABLET 01/29/2021 28 56 [facility]   BENZTROPINE MES 1 MG TABLET 12/31/2020 28 56 [facility]           Cholecalciferol     Dispensed Days Supply Quantity Provider Pharmacy   VITAMIN D3 5,000 UNIT SOFTGEL 11/18/2021 28 28 [facility]   VITAMIN D3 125 MCG CAPSULE 11/04/2021 19 19 [facility]   VITAMIN D3 1,000 UNIT SOFTGEL 10/08/2021 28 28 capsule WALTER ALONZO [facility] B...   VITAMIN D3 1,000 UNIT SOFTGEL 09/10/2021 28 28 capsule JOLIE BUSTILLO [facility] B...   VITAMIN D3 1,000 UNIT SOFTGEL 08/17/2021 14 14 capsule JOLIE BUSTILLO [facility] B...           Clarithromycin     Dispensed Days Supply Quantity Provider Pharmacy   CLARITHROMYCIN 500 MG TABLET 11/04/2021 14 28 tablet MARIA TERESA MINA [facility]...           Docusate Sodium     Dispensed Days Supply Quantity Provider Pharmacy   DOCUSATE SODIUM 100 MG SOFTGEL 11/22/2021 28 56 [facility]   DOCUSATE SODIUM 100 MG SOFTGEL 10/08/2021 28 56 [facility]   DOCUSATE SODIUM 100 MG SOFTGEL 09/10/2021 28 56 [facility]   DOCUSATE SODIUM 100 MG SOFTGEL 08/13/2021 28 56 [facility]   DOCUSATE SODIUM 100 MG SOFTGEL 07/16/2021 28 56 [facility]   DOCUSATE SODIUM 100 MG SOFTGEL 06/17/2021 28 56 [facility]   DOCUSATE SODIUM 100 MG SOFTGEL 05/21/2021 28 56 [facility]   DOCUSATE SODIUM 100 MG SOFTGEL 04/23/2021 28 56 [facility]    MG SOFTGEL 04/23/2021 28 56 [facility]...    MG SOFTGEL 03/26/2021 28 56 [facility]...           FLUoxetine HCl     Dispensed Days Supply Quantity Provider Pharmacy   FLUOXETINE HCL 10 MG CAPSULE 12/03/2021 28 28 [facility]   FLUOXETINE HCL 10 MG CAPSULE 11/05/2021 28 28 [facility]   FLUOXETINE HCL 10 MG CAPSULE 10/08/2021 28 28 [facility]   FLUOXETINE HCL 10 MG CAPSULE 09/15/2021 28 28 [facility]   FLUOXETINE HCL 10 MG CAPSULE 09/01/2021 14 14 [facility]   FLUOXETINE HCL 10 MG CAPSULE 09/01/2021 19 19 [facility]   FLUOXETINE HCL 10 MG CAPSULE 08/17/2021 14 14 capsule IWONAJOLIE [facility] B...           Gabapentin     Dispensed Days Supply Quantity Provider Pharmacy   GABAPENTIN 300 MG CAPSULE 12/03/2021 28 84 [facility]   GABAPENTIN 300 MG CAPSULE 11/05/2021 28 84 capsule [facility]...   GABAPENTIN 300 MG CAPSULE 10/08/2021  28 84 capsule HASMUKHANA M [facility]...   GABAPENTIN 300 MG CAPSULE 09/15/2021 28 84 capsule NOE,[facility] B...   GABAPENTIN 300 MG CAPSULE 09/01/2021 14 42 capsule HASMUKHANA M [facility] B...   GABAPENTIN 300 MG CAPSULE 09/01/2021 19 58 capsule NOEANA M [facility] B...   GABAPENTIN 300 MG CAPSULE 08/17/2021 14 42 capsule JOLIE BUSTILLO [facility] B...           Ibuprofen     Dispensed Days Supply Quantity Provider Pharmacy   IBUPROFEN 600 MG TABLET 05/14/2021 10 30 tablet SHU ELKINS [facility] [facility]...           Levothyroxine Sodium     Dispensed Days Supply Quantity Provider Pharmacy   LEVOTHYROXINE 112 MCG TABLET 12/03/2021 28 [facility]   LEVOTHYROXINE 112 MCG TABLET 11/10/2021 28 28 [facility]   LEVOTHYROXINE 112 MCG TABLET 10/18/2021 28 28 [facility]   LEVOTHYROXINE 112 MCG TABLET 09/28/2021 28 28 [facility]   LEVOTHYROXINE 112 MCG TABLET 08/13/2021 28 28 [facility]   LEVOTHYROXINE 112 MCG TABLET 07/16/2021 28 28 [facility]   LEVOTHYROXINE 112 MCG TABLET 06/17/2021 28 28 [facility]   LEVOTHYROXINE 112 MCG TABLET 05/25/2021 28 28 tablet CLINTONWALTER [facility] [place] B...   LEVOTHYROXINE 112 MCG TABLET 05/05/2021 28 28 [facility]   LEVOTHYROXINE 112 MCG TABLET 04/09/2021 28 28 tablet CLINTONWALTER [facility] B...   LEVOTHYROXINE 112 MCG TABLET 02/26/2021 28 28 tablet RUBEN ENGLE [facility] B...   LEVOTHYROXINE 112 MCG TABLET 01/29/2021 28 28 tablet [facility]   LEVOTHYROXINE 112 MCG TABLET 12/31/2020 28 28 tablet [facility]EN ENGLE [facility] SIL[facility]NA...           OLANZapine     Dispensed Days Supply Quantity Provider  Pharmacy   OLANZAPINE 10 MG TABLET 11/19/2021 30 30 tablet LESLIE JADE [facility]...   OLANZAPINE 20 MG TABLET 07/29/2021 15 15 tablet HASMUKHANA M [facility] B...   OLANZAPINE 20 MG TABLET 06/29/2021 10 15 tablet HASMUKHANA M [facility] B...   OLANZAPINE 10 MG TABLET 12/10/2020 30 30 tablet NAEGELE,DEBRA [facility]...           Omeprazole     Dispensed Days Supply Quantity Provider Pharmacy   OMEPRAZOLE DR 20 MG CAPSULE 11/14/2021 28 28 capsule CL[facility]ON,[facility] B...   OMEPRAZOLE DR 20 MG CAPSULE 11/04/2021 14 14 capsule MARIA TERESA MINA [facility] B...   OMEPRAZOLE DR 20 MG CAPSULE 10/08/2021 28 28 [facility]   OMEPRAZOLE DR 20 MG CAPSULE 09/15/2021 28 28 capsule [place],[facility] B...   OMEPRAZOLE DR 20 MG CAPSULE 08/13/2021 28 28 [facility]   OMEPRAZOLE DR 20 MG CAPSULE 07/16/2021 28 28 capsule [place],[facility] B...   OMEPRAZOLE DR 20 MG CAPSULE 06/17/2021 28 28 capsule [place],[facility] B...   OMEPRAZOLE DR 20 MG CAPSULE 05/21/2021 28 28 capsule [place],[facility] B...   OMEPRAZOLE DR 20 MG CAPSULE 04/29/2021 28 28 capsule [place],[facility] B...   OMEPRAZOLE DR 20 MG CAPSULE 04/09/2021 28 28 [facility]   OMEPRAZOLE DR 20 MG CAPSULE 02/26/2021 28 28 [facility]   OMEPRAZOLE DR 20 MG CAPSULE 01/29/2021 28 28 [facility]...   OMEPRAZOLE DR 20 MG CAPSULE 12/31/2020 28 28 [facility]...   OMEPRAZOLE DR 20 MG CAPSULE 12/11/2020 28 28 capsule [facility]...           Paliperidone Palmitate     Dispensed Days Supply Quantity Provider Pharmacy   INVEGA SUSTENNA 234 MG/1.5 ML 11/15/2021 28 1 Syringe ANA M NOE [facility] - Kit...   [facility] 234  MG/1.5 ML 09/09/2021 28 1 Syringe NOE,ANA M [facility] - Kit...   INVEGA SUSTENNA 234 MG/1.5 ML 08/12/2021 28 1 Syringe NOE,ANA M [facility] - Kit...   INVEGA SUSTENNA 234 MG/1.5 ML 07/15/2021 28 1 Syringe NOE,ANA M [facility] - Kit...   INVEGA SUSTENNA 234 MG/1.5 ML 06/17/2021 28 1 Syringe NOE,ANA M [facility] - Kit...   INVEGA SUSTENNA 234 MG/1.5 ML 05/20/2021 28 1 Syringe NOE,ANA M [facility] - Kit...   INVEGA SUSTENNA 234 MG/1.5 ML 04/22/2021 28 1 Syringe NOE,ANA M [facility] - Kit...   INVEGA SUSTENNA 234 MG/1.5 ML 03/25/2021 28 1 Syringe NOE,ANA M [facility] - Kit...   INVEGA SUSTENNA 234 MG/1.5 ML 02/25/2021 28 1 Syringe NOE,ANA M [facility] - Kit...   INVEGA SUSTENNA 234 MG/1.5 ML 01/28/2021 28 1 Syringe NOE,ANA M [facility] - Kit...   INVEGA SUSTENNA 234 MG/1.5 ML 12/29/2020 28 1 Syringe NOE,ANA M [facility] - Kit...           Polyethylene Glycol 3350     Dispensed Days Supply Quantity Provider Pharmacy   POLYETHYLENE GLYCOL 3350 POWD 08/17/2021 28 28 g [facility]           Prazosin HCl     Dispensed Days Supply Quantity Provider Pharmacy   PRAZOSIN 1 MG CAPSULE 11/17/2021 28 84 capsule LESLIE JADE [facility] B...   PRAZOSIN 2 MG CAPSULE 10/08/2021 28 [facility]   PRAZOSIN 2 MG CAPSULE 09/10/2021 28 56 [facility]   PRAZOSIN 1 MG CAPSULE 09/09/2021 20 20 capsule HASMUKH,[facility] B...   PRAZOSIN 1 MG CAPSULE 08/13/2021 28 84 capsule HASMUKHANA M [facility] B...   PRAZOSIN 1 MG CAPSULE 07/16/2021 28 84 capsule HASMUKHANA M [facility] B...   PRAZOSIN 1 MG CAPSULE 06/17/2021 28 84 capsule HASMUKHANA M [facility] B...   PRAZOSIN 1 MG CAPSULE 05/21/2021 28 84 [facility]...   PRAZOSIN 1 MG CAPSULE 04/23/2021 28 84 capsule  [facility]   PRAZOSIN 1 MG CAPSULE 03/26/2021 28 84 capsule [facility]...   PRAZOSIN 1 MG CAPSULE 02/26/2021 28 84 capsule HASMUKHANA M [facility] B...   PRAZOSIN 1 MG CAPSULE 01/29/2021 28 84 capsule [facility]...   PRAZOSIN 1 MG CAPSULE 12/31/2020 28 84 capsule [facility]...           Venlafaxine HCl     Dispensed Days Supply Quantity Provider Pharmacy   VENLAFAXINE HCL  MG CAP 11/19/2021 28 56 [facility]   VENLAFAXINE HCL ER 75 MG CAP 10/08/2021 28 84 capsule [facility]...   VENLAFAXINE HCL ER 75 MG CAP 09/10/2021 28 84 capsule [facility]...   VENLAFAXINE HCL ER 75 MG CAP 08/13/2021 28 84 capsule [facility]...   VENLAFAXINE HCL ER 75 MG CAP 07/16/2021 28 84 capsule [facility]...   VENLAFAXINE HCL ER 75 MG CAP 06/17/2021 28 84 capsule HASMUKHANA M [facility] B...   VENLAFAXINE HCL ER 75 MG CAP 05/21/2021 28 84 capsule HASMUKHANA M [facility] B...   VENLAFAXINE HCL ER 75 MG CAP 04/23/2021 28 84 capsule [facility]...   VENLAFAXINE HCL ER 75 MG CAP 03/26/2021 28 84 capsule [facility]...   VENLAFAXINE HCL ER 75 MG CAP 02/26/2021 28 84 capsule [facility]...   VENLAFAXINE HCL ER 75 MG CAP 01/29/2021 28 84 capsule [facility]...   VENLAFAXINE HCL ER 75 MG CAP 12/31/2020 28 84 capsule ANA M NOE [facility] B...           Other     Dispensed Days Supply Quantity Provider Pharmacy   STOOL SOFTENER 100 MG SOFTGEL 02/26/2021 28 56 capsule ANA M NOE [facility] B...   STOOL SOFTENER 100 MG SOFTGEL 01/29/2021 28 56 capsule ANA M NOE [facility] B...   STOOL SOFTENER 100 MG SOFTGEL 12/31/2020 28 56 capsule ANA M NOE  [facility]...           cloZAPine     Dispensed Days Supply Quantity Provider Pharmacy   CLOZAPINE 100 MG TABLET 11/19/2021 28 112 tablet ANA M NOE [facility]...   CLOZAPINE 25 MG TABLET 11/19/2021 28 56 tablet HASMUKHANA M [facility]...   CLOZAPINE 100 MG TABLET 10/25/2021 28 112 tablet [facility]...   CLOZAPINE 25 MG TABLET 10/25/2021 28 56 tablet [facility]...   CLOZAPINE 100 MG TABLET 09/24/2021 28 112 tablet [facility]...   CLOZAPINE 25 MG TABLET 09/24/2021 28 56 tablet [facility]...   CLOZAPINE 100 MG TABLET 09/04/2021 28 112 [facility]   CLOZAPINE 25 MG TABLET 09/04/2021 28 56 tablet HASMUKHANA M [facility]...   CLOZAPINE 100 MG TABLET 08/19/2021 21 84 tablet [facility]...   CLOZAPINE 25 MG TABLET 08/19/2021 21 42 tablet [facility]...   CLOZAPINE 100 MG TABLET 07/30/2021 28 112 tablet [facility]...   CLOZAPINE 25 MG TABLET 07/30/2021 28 56 tablet [facility]...   CLOZAPINE 100 MG TABLET 07/02/2021 28 112 tablet HASMUKHANA M [facility]...   CLOZAPINE 25 MG TABLET 07/02/2021 28 56 tablet [facility]...   CLOZAPINE 100 MG TABLET 06/04/2021 28 112 tablet [facility]...   CLOZAPINE 25 MG TABLET 06/04/2021 28 56 tablet [facility] [place]...   CLOZAPINE 100 MG TABLET 05/07/2021 28 112 tablet [facility] [place]...   CLOZAPINE 25 MG TABLET 05/07/2021 28 56 tablet HASMUKHANA M [facility]...   CLOZAPINE 100 MG TABLET 04/09/2021 28 112 tablet ANA M NOE...   CLOZAPINE 25 MG TABLET 04/09/2021 28 56 tablet ANA M NOE...   CLOZAPINE 100 MG TABLET 03/12/2021 28 112 tablet  NOE,[facility]...   CLOZAPINE 25 MG TABLET 03/12/2021 28 56 tablet NOE,[facility] [place]...   CLOZAPINE 100 MG TABLET 02/12/2021 28 112 tablet NOE,[facility] [place]...   CLOZAPINE 25 MG TABLET 02/12/2021 28 56 tablet NOE,[facility] [place]...   CLOZAPINE 100 MG TABLET 01/15/2021 28 112 tablet NOE,[facility] [place]...   CLOZAPINE 25 MG TABLET 01/15/2021 28 56 tablet NOE,[facility] [place]...   CLOZAPINE 100 MG TABLET 12/18/2020 28 112 tablet NOE,[facility] [place]...   CLOZAPINE 25 MG TABLET 12/18/2020 28 56 tablet NOE,[facility] [place]...           Disclaimer    Certain dispenses may not be available or accurate in this report, including over-the-counter medications, low cost prescriptions, prescriptions paid for by the patient or non-participating sources, or errors in insurance claims information. The provider should independently verify medication history with the patient.     External Sources
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
## 2021-12-07 NOTE — ED NOTES
Kamini Neal is reviewed for [facility] service. Will follow and meet with patient/family/care team as able or requested.     Partha Caraballo  [facility], [facility]   [phone]

## 2021-12-07 NOTE — PHARMACY
Pharmacy Clozapine Continuation Note    Patient's Name: Kamini Neal  Patient's : 1990    Current cloZAPine regimen: 450 mg at bedtime  Has there been a known interruption in therapy for greater than/equal to 48 hours which would warrant retitration unknown    Recent ANC Value(s) for last 30 days:  2021: Absolute Neutrophils 5.9 10e3/uL (Ref range: 1.6 - 8.3 10e3/uL)      A REMS Dispense Authorization was obtained from the clozapine REMS prgram? Yes   If no, why was the REMS Dispense Authorization not successful:  A Dispense Rationale was needed to obtain the REMS Dispense Authorization?    Is the ANC (if available) within recommended limits? Yes  Does the patient have any signs or symptoms of infection, including fever? No    Plan:  1. Continue clozapine therapy at 450 mg PO QHS.  2. A WBC with differential will be ordered at least weekly, NEXT DUE 2021. ANC values will be entered into the REMS program.    Ze Childs [facility]  Phone / Pager: [phone]

## 2021-12-07 NOTE — ED NOTES
Writer discussed pt's UDS results with Irais FOY, Rosalia. Pt accepted and can arrive at the accepting facility at 0900.
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
## 2021-12-08 PROCEDURE — 250N000013 HC RX MED GY IP 250 OP 250 PS 637: Performed by: NURSE PRACTITIONER

## 2021-12-08 PROCEDURE — 124N000001 HC R&B MH

## 2021-12-08 PROCEDURE — 99232 SBSQ HOSP IP/OBS MODERATE 35: CPT | Performed by: NURSE PRACTITIONER

## 2021-12-08 RX ORDER — LITHIUM CARBONATE 450 MG
450 TABLET, EXTENDED RELEASE ORAL AT BEDTIME
Status: DISCONTINUED | OUTPATIENT
Start: 2021-12-08 | End: 2021-12-10 | Stop reason: HOSPADM

## 2021-12-08 RX ADMIN — Medication 125 MCG: at 08:14

## 2021-12-08 RX ADMIN — NICOTINE POLACRILEX 2 MG: 2 GUM, CHEWING ORAL at 19:19

## 2021-12-08 RX ADMIN — BENZTROPINE MESYLATE 0.5 MG: 0.5 TABLET ORAL at 20:46

## 2021-12-08 RX ADMIN — LEVOTHYROXINE SODIUM 112 MCG: 0.11 TABLET ORAL at 06:59

## 2021-12-08 RX ADMIN — PRAZOSIN HYDROCHLORIDE 3 MG: 1 CAPSULE ORAL at 20:47

## 2021-12-08 RX ADMIN — DOCUSATE SODIUM 100 MG: 100 CAPSULE, LIQUID FILLED ORAL at 08:13

## 2021-12-08 RX ADMIN — GABAPENTIN 300 MG: 300 CAPSULE ORAL at 13:20

## 2021-12-08 RX ADMIN — OLANZAPINE 10 MG: 10 TABLET, FILM COATED ORAL at 13:20

## 2021-12-08 RX ADMIN — BENZTROPINE MESYLATE 0.5 MG: 0.5 TABLET ORAL at 08:14

## 2021-12-08 RX ADMIN — NICOTINE POLACRILEX 2 MG: 2 GUM, CHEWING ORAL at 17:16

## 2021-12-08 RX ADMIN — GABAPENTIN 300 MG: 300 CAPSULE ORAL at 20:47

## 2021-12-08 RX ADMIN — NICOTINE POLACRILEX 2 MG: 2 GUM, CHEWING ORAL at 21:39

## 2021-12-08 RX ADMIN — PANTOPRAZOLE SODIUM 40 MG: 40 TABLET, DELAYED RELEASE ORAL at 06:59

## 2021-12-08 RX ADMIN — CLOZAPINE 450 MG: 50 TABLET ORAL at 19:16

## 2021-12-08 RX ADMIN — NICOTINE POLACRILEX 2 MG: 2 GUM, CHEWING ORAL at 20:47

## 2021-12-08 RX ADMIN — VENLAFAXINE HYDROCHLORIDE 300 MG: 150 CAPSULE, EXTENDED RELEASE ORAL at 08:13

## 2021-12-08 RX ADMIN — GLYCOPYRROLATE 1 MG: 1 TABLET ORAL at 20:46

## 2021-12-08 RX ADMIN — GABAPENTIN 300 MG: 300 CAPSULE ORAL at 08:14

## 2021-12-08 RX ADMIN — GLYCOPYRROLATE 1 MG: 1 TABLET ORAL at 13:20

## 2021-12-08 RX ADMIN — DOCUSATE SODIUM 100 MG: 100 CAPSULE, LIQUID FILLED ORAL at 20:46

## 2021-12-08 RX ADMIN — GLYCOPYRROLATE 1 MG: 1 TABLET ORAL at 08:14

## 2021-12-08 RX ADMIN — NICOTINE POLACRILEX 2 MG: 2 GUM, CHEWING ORAL at 12:39

## 2021-12-08 RX ADMIN — LITHIUM CARBONATE 450 MG: 450 TABLET, EXTENDED RELEASE ORAL at 20:46

## 2021-12-08 ASSESSMENT — ACTIVITIES OF DAILY LIVING (ADL)
HYGIENE/GROOMING: INDEPENDENT
DRESS: INDEPENDENT;SCRUBS (BEHAVIORAL HEALTH)
ORAL_HYGIENE: INDEPENDENT
LAUNDRY: UNABLE TO COMPLETE

## 2021-12-08 NOTE — PLAN OF CARE
Problem: Behavioral Health Plan of Care  Goal: Plan of Care Review  Recent Flowsheet Documentation  Taken 12/7/2021 1850 by Jamey Garcia RN  Plan of Care Reviewed With: patient  Patient Agreement with Plan of Care: agrees  Goal: Patient-Specific Goal (Individualization)  Description: Cooperative with treatment teams recommendations including medications.   Sleep at least 5-7 hours nightly.  Eat at least 75% meals.   Report to staff if feeling unsafe while in hospital.   Verbalize a future goal upon discharge.   Outcome: Improving  Note: He is awake in the MHICU. He is cooperative with each interaction. He has chronic suicidal thinking. He is agreeable at this time for safe behavior on the unit. He continues with auditory hallucinations that at times direct his behavior. He is spending time watching tv. He did shower this evening. He is maintaining appropriate boundaries with staff and peers.   1930: EKG completed. Results reviewed with Becky DICK NP. Ok to give all scheduled medications.     Goal: Develops/Participates in Therapeutic [place] to Support Successful Transition  Intervention: Foster Therapeutic [place]  Recent Flowsheet Documentation  Taken 12/7/2021 1850 by Jamey Garcia RN  Trust Relationship/Rapport:    care explained    questions answered    questions encouraged    reassurance provided    thoughts/feelings acknowledged     Problem: Violence Risk or Actual  Goal: Anger and Impulse Control  Outcome: Improving  Note: He is maintaining appropriate boundaries with staff and peers. He is maintaining control of his behavior.     Problem: Suicide Risk  Goal: Absence of Self-Harm  Outcome: Improving  Note: He has chronic suicidal thinking. He is agreeable to safe behavior on the unit.     Face to face end of shift report to be communicated to night shift RN.     Jamey Garcia RN  12/7/2021  10:09 PM

## 2021-12-08 NOTE — PROGRESS NOTES
"[facility]  Psychiatric Progress Note      Impression:   This is a 31 year old yo male with schizoaffective disorder who has been having an increase in voices over the past few days. He is a good historian with reasonably good insight and states that when his clozapine dose was changed, his voices increased. He states he has depression even when hallucinations are gone and has frequent suicidal ideation. He was on prozac as well as effexor which clearly are not effective and prozac increases clozapine blood levels . The prozac was stopped today and the effexor could be tapered if lithium shows improvement with his depression. His clozaril dose was changed back to HS tonight and he will start taking it at 7 pm with the hope he will be able to wake up around 9. He feels too sedated the following day if he takes it too late at night. He is not a good candidate for treating sedation with any stype of stimulant due to historical aggression.       Interim History:   Kamini is resting in bed when I see him today. He asks what the \"target date\" for discharge is. Reviewed medication changes, he is aware that Lithium was started yesterday, does ask appropriate questions about medications. He denies any suicidal thoughts today. He reports that the auditory hallucinations are \"better\", though does not elaborate further. States that he slept well last night, has been in bed much of the day so far today. Behavior has been controlled and appropriate on the unit. He reports feeling \"better\" today and would like to get back to his group home by the end of the week.      Educated regarding medication indications, risks, benefits, side effects, contraindications and possible interactions. Verbally expressed understanding.        Diagnoses:   Schizoaffective disorder, depressed type  PTSD, chronic (by history)      Attestation:  Patient has been seen and evaluated by me,  Marianna Leslie NP      The patient's care was " discussed with the treatment team and chart notes were reviewed.            Medications:     Current Facility-Administered Medications Ordered in Epic   Medication Dose Route Frequency Last Rate Last Admin     acetaminophen (TYLENOL) tablet 650 mg  650 mg Oral Q4H PRN         alum & mag hydroxide-simethicone (MAALOX) suspension 30 mL  30 mL Oral Q4H PRN         benztropine (COGENTIN) tablet 0.5 mg  0.5 mg Oral BID   0.5 mg at 12/08/21 0814     cholecalciferol (VITAMIN D3) 125 mcg (5000 units) capsule 125 mcg  125 mcg Oral Daily   125 mcg at 12/08/21 0814     cloZAPine (CLOZARIL) tablet 450 mg  450 mg Oral Daily   450 mg at 12/07/21 1931     docusate sodium (COLACE) capsule 100 mg  100 mg Oral BID   100 mg at 12/08/21 0813     gabapentin (NEURONTIN) capsule 300 mg  300 mg Oral TID   300 mg at 12/08/21 1320     glycopyrrolate (ROBINUL) tablet 1 mg  1 mg Oral TID   1 mg at 12/08/21 1320     hydrOXYzine (ATARAX) tablet 50 mg  50 mg Oral Q4H PRN   50 mg at 12/07/21 1428     levothyroxine (SYNTHROID/LEVOTHROID) tablet 112 mcg  112 mcg Oral Daily   112 mcg at 12/08/21 0659     lithium ER (LITHOBID) CR tablet 300 mg  300 mg Oral At Bedtime   300 mg at 12/07/21 2007     melatonin tablet 3 mg  3 mg Oral At Bedtime PRN         nicotine (NICORETTE) gum 2 mg  2 mg Buccal Q1H PRN   2 mg at 12/08/21 1239     OLANZapine (zyPREXA) tablet 10 mg  10 mg Oral TID PRN   10 mg at 12/08/21 1320     [START ON 12/13/2021] paliperidone (INVEGA SUSTENNA) injection ANTHONY 234 mg  234 mg Intramuscular Q21 Days         pantoprazole (PROTONIX) EC tablet 40 mg  40 mg Oral Daily   40 mg at 12/08/21 0659     polyethylene glycol (MIRALAX) Packet 17 g  17 g Oral Daily PRN         prazosin (MINIPRESS) capsule 3 mg  3 mg Oral At Bedtime   3 mg at 12/07/21 2006     senna-docusate (SENOKOT-S/PERICOLACE) 8.6-50 MG per tablet 1 tablet  1 tablet Oral BID PRN         venlafaxine (EFFEXOR-XR) 24 hr capsule 300 mg  300 mg Oral Daily   300 mg at 12/08/21 0813  "    No current [facility]-ordered outpatient medications on file.            10 point ROS- denies acute concerns       Allergies:     Allergies   Allergen Reactions     Haldol [Haloperidol]      Other reaction(s): Tardive Dyskinesia  Torticollis  Torticollis            Psychiatric Examination:   /80   Pulse 108   Temp 97.8  F (36.6  C) (Temporal)   Resp 16   SpO2 98%   Weight is 0 lbs 0 oz  There is no height or weight on file to calculate BMI.    Appearance:  awake, alert, adequately groomed, dressed in hospital scrubs and appeared as age stated  Attitude:  cooperative, somewhat guarded  Eye Contact:  fair  Mood: \"better\", no irritability noted  Affect:  mood congruent  Speech:  clear, coherent  Psychomotor Behavior:  no evidence of tardive dyskinesia, dystonia, or tics  Thought Process:  linear  Associations:  no loose associations  Thought Content:  no evidence of suicidal ideation or homicidal ideation, reports auditory hallucinations are \"better\" today  Insight:  limited  Judgment:  limited  Oriented to:  time, person, and place  Attention Span and Concentration:  fair  Recent and Remote Memory:  fair  Fund of Knowledge: difficult to assess  Muscle Strength and Tone: normal  Gait and Station: Normal           Labs:   No results found for this or any previous visit (from the past 24 hour(s)).      BEHAVIORAL TEAM DISCUSSION    Progress: Has been cooperative. Tolerating medication changes.     Continued Stay Criteria/Rationale: medication adjustments, monitor for side effects    Medical/Physical: none acute  Precautions:   Falls precaution?: No  Behavioral Orders   Procedures     Code 1 - Restrict to Unit     Elopement precautions     Routine Programming     As clinically indicated     Status 15     Every 15 minutes.     Plan:   Continue Clozaril 450 mg daily  Increase Lithium CR to 450 mg at bedtime  Continue Invega Sustenna  mg every month  Continue Effexor  mg daily  Prozac 10 mg daily stopped " yesterday  Continue Prazosin 3 mg at bedtime  Continue Glycopyrrolate 1 mg TID  Continue Cogentin 0.5 mg BID (recent decrease)- monitor for EPSE    Patient would like to return to group home by the end of the week- will see if we can coordinate this          Rationale for change in precautions or plan:   Treat lability, depression      Participants: AFSANEH Tesfaye, CNP, Dr. Perez, SW, OT, Nursing

## 2021-12-08 NOTE — PLAN OF CARE
Problem: Behavioral Health Plan of Care  Goal: Patient-Specific Goal (Individualization)  Description: Cooperative with treatment teams recommendations including medications.   Sleep at least 5-7 hours nightly.  Eat at least 75% meals.   Report to staff if feeling unsafe while in hospital.   Verbalize a future goal upon discharge.   Outcome: No Change     Problem: Suicide Risk  Goal: Absence of Self-Harm  Outcome: No Change     Problem: Suicidal Behavior  Goal: Suicidal Behavior is Absent or Managed  Outcome: No Change     Problem: Violence Risk or Actual  Goal: Anger and Impulse Control  Outcome: No Change     Face to face shift report received from DANII Concepcion. Rounding completed, pt observed laying in bed in MHICU, appeared to be sleeping.    Patient appeared to be sleeping for approximately 7.25 hours.    Patient had no reported self harm or violence this shift.

## 2021-12-08 NOTE — PLAN OF CARE
NILSA HARMAN RN  12/8/2021  7:57 AM  Face to face shift report received from DANII Lindsey. Rounding completed, pt observed.     0900-Ate 75% of breakfast this morning and went back to bed.  Compliant with morning medication.  Denies pain.  1325-Pt tense and restless.  Zyprexa 10 mg given.  1415-Denies SI, HI, hallucinations, anxiety, depression, and pain.  Med compliant and cooperative with staff.  Pt evaluated by MASSIMO Cabral.  Pt agrees with plan to stay.  Sleep the majority of this shift.       Problem: Behavioral Health Plan of Care  Goal: Patient-Specific Goal (Individualization)  Description: Cooperative with treatment teams recommendations including medications.   Sleep at least 5-7 hours nightly.  Eat at least 75% meals.   Report to staff if feeling unsafe while in hospital.   Verbalize a future goal upon discharge.   Outcome: No Change     Problem: Suicide Risk  Goal: Absence of Self-Harm  Outcome: Improving     Problem: Suicidal Behavior  Goal: Suicidal Behavior is Absent or Managed  Outcome: Improving     Problem: Violence Risk or Actual  Goal: Anger and Impulse Control  Outcome: Improving     Face to face end of shift report communicated to oncoming shift RN.

## 2021-12-09 PROCEDURE — 124N000001 HC R&B MH

## 2021-12-09 PROCEDURE — 250N000013 HC RX MED GY IP 250 OP 250 PS 637: Performed by: NURSE PRACTITIONER

## 2021-12-09 PROCEDURE — 99232 SBSQ HOSP IP/OBS MODERATE 35: CPT | Performed by: NURSE PRACTITIONER

## 2021-12-09 RX ORDER — CLOZAPINE 100 MG/1
400 TABLET ORAL DAILY
Qty: 120 TABLET | Refills: 0 | Status: ON HOLD | OUTPATIENT
Start: 2021-12-09 | End: 2022-01-03

## 2021-12-09 RX ORDER — GLYCOPYRROLATE 1 MG/1
1 TABLET ORAL 3 TIMES DAILY
Qty: 90 TABLET | Refills: 0 | Status: ON HOLD | OUTPATIENT
Start: 2021-12-09 | End: 2022-01-03

## 2021-12-09 RX ORDER — CLOZAPINE 50 MG/1
50 TABLET ORAL DAILY
Qty: 30 TABLET | Refills: 0 | Status: ON HOLD | OUTPATIENT
Start: 2021-12-09 | End: 2022-01-03

## 2021-12-09 RX ORDER — LITHIUM CARBONATE 450 MG
450 TABLET, EXTENDED RELEASE ORAL AT BEDTIME
Qty: 30 TABLET | Refills: 0 | Status: ON HOLD | OUTPATIENT
Start: 2021-12-09 | End: 2022-01-03

## 2021-12-09 RX ADMIN — LITHIUM CARBONATE 450 MG: 450 TABLET, EXTENDED RELEASE ORAL at 20:16

## 2021-12-09 RX ADMIN — NICOTINE POLACRILEX 2 MG: 2 GUM, CHEWING ORAL at 17:30

## 2021-12-09 RX ADMIN — NICOTINE POLACRILEX 2 MG: 2 GUM, CHEWING ORAL at 11:20

## 2021-12-09 RX ADMIN — GABAPENTIN 300 MG: 300 CAPSULE ORAL at 08:39

## 2021-12-09 RX ADMIN — BENZTROPINE MESYLATE 0.5 MG: 0.5 TABLET ORAL at 08:39

## 2021-12-09 RX ADMIN — NICOTINE POLACRILEX 2 MG: 2 GUM, CHEWING ORAL at 19:21

## 2021-12-09 RX ADMIN — LEVOTHYROXINE SODIUM 112 MCG: 0.11 TABLET ORAL at 06:14

## 2021-12-09 RX ADMIN — PANTOPRAZOLE SODIUM 40 MG: 40 TABLET, DELAYED RELEASE ORAL at 06:14

## 2021-12-09 RX ADMIN — GLYCOPYRROLATE 1 MG: 1 TABLET ORAL at 13:59

## 2021-12-09 RX ADMIN — NICOTINE POLACRILEX 2 MG: 2 GUM, CHEWING ORAL at 12:30

## 2021-12-09 RX ADMIN — DOCUSATE SODIUM 100 MG: 100 CAPSULE, LIQUID FILLED ORAL at 20:16

## 2021-12-09 RX ADMIN — GLYCOPYRROLATE 1 MG: 1 TABLET ORAL at 20:16

## 2021-12-09 RX ADMIN — NICOTINE POLACRILEX 2 MG: 2 GUM, CHEWING ORAL at 13:59

## 2021-12-09 RX ADMIN — NICOTINE POLACRILEX 2 MG: 2 GUM, CHEWING ORAL at 10:03

## 2021-12-09 RX ADMIN — NICOTINE POLACRILEX 2 MG: 2 GUM, CHEWING ORAL at 08:23

## 2021-12-09 RX ADMIN — NICOTINE POLACRILEX 2 MG: 2 GUM, CHEWING ORAL at 20:18

## 2021-12-09 RX ADMIN — Medication 125 MCG: at 08:38

## 2021-12-09 RX ADMIN — PRAZOSIN HYDROCHLORIDE 3 MG: 1 CAPSULE ORAL at 20:16

## 2021-12-09 RX ADMIN — GABAPENTIN 300 MG: 300 CAPSULE ORAL at 20:16

## 2021-12-09 RX ADMIN — GABAPENTIN 300 MG: 300 CAPSULE ORAL at 13:59

## 2021-12-09 RX ADMIN — DOCUSATE SODIUM 100 MG: 100 CAPSULE, LIQUID FILLED ORAL at 08:39

## 2021-12-09 RX ADMIN — NICOTINE POLACRILEX 2 MG: 2 GUM, CHEWING ORAL at 15:54

## 2021-12-09 RX ADMIN — GLYCOPYRROLATE 1 MG: 1 TABLET ORAL at 08:39

## 2021-12-09 RX ADMIN — VENLAFAXINE HYDROCHLORIDE 300 MG: 150 CAPSULE, EXTENDED RELEASE ORAL at 08:39

## 2021-12-09 RX ADMIN — CLOZAPINE 450 MG: 50 TABLET ORAL at 19:14

## 2021-12-09 RX ADMIN — HYDROXYZINE HYDROCHLORIDE 50 MG: 25 TABLET, FILM COATED ORAL at 10:03

## 2021-12-09 NOTE — PLAN OF CARE
Problem: Behavioral Health Plan of Care  Goal: Patient-Specific Goal (Individualization)  Description: Cooperative with treatment teams recommendations including medications.   Sleep at least 5-7 hours nightly.  Eat at least 75% meals.   Report to staff if feeling unsafe while in hospital.   Verbalize a future goal upon discharge.   12/8/21: patient able to wean to the open unit as long as behavior remains appropriate. He must maintain appropriate behavior on the unit and be compliant with safety checks when returning to the MHICU.   Outcome: No Change     Problem: Suicide Risk  Goal: Absence of Self-Harm  Outcome: No Change     Problem: Suicidal Behavior  Goal: Suicidal Behavior is Absent or Managed  Outcome: No Change     Problem: Violence Risk or Actual  Goal: Anger and Impulse Control  Outcome: No Change     Face to face shift report received from DANII Concepcion. Rounding completed, pt observed laying in bed in the MHICU, appeared to be sleeping.    Patient appeared to be sleeping for approximately 6 hours.    Patient had no reported violence or self harm this shift.      Patient took medications as ordered.     Face to face report will be communicated to oncoming RN.    Rosalia Vergara RN  12/9/2021  6:56 AM

## 2021-12-09 NOTE — PLAN OF CARE
1:1 time with the patient.  No changes made to the discharge plan at this time.   See the AVS for follow up appointments and recommendations.       SW spoke to the patient and he explaine he is ready for discharge tomorrow. The patient shared he uses the following pharmacy :   [facility]  [email or web address]  [address], [place] [address]  [phone]    SW spoke to [employer]. The updated the SW that the patient has numerous medication management providers including Dr. Campo @ [facility]. They explained that the  Neela Brown [phone] is already aware that she needs to complete a form to show that the other providers have been removed before they can schedule a medication management appointment.     CARLOS left a voicemail for Neela Brown [phone] explaining that she needs to submit a form to [employer] before they can schedule a Medication management appointment for the patient.       CARLOS also left a message with patient's Medication Provider Dr. Cmapo to reach out to the patient to schedule a Medication Management appointment.     CARLOS scheduled a [employer] ride with [facility] for tomorrow 112/10/2021 @ 8:00 am.       CARLOS updated nursing and provider of the patient's transport time tomorrow.

## 2021-12-09 NOTE — PLAN OF CARE
"  Problem: Behavioral Health Plan of Care  Goal: Plan of Care Review  Recent Flowsheet Documentation  Taken 12/8/2021 1919 by Jamey Garcia RN  Plan of Care Reviewed With: patient  Patient Agreement with Plan of Care: agrees  Goal: Patient-Specific Goal (Individualization)  Description: Cooperative with treatment teams recommendations including medications.   Sleep at least 5-7 hours nightly.  Eat at least 75% meals.   Report to staff if feeling unsafe while in hospital.   Verbalize a future goal upon discharge.   12/8/21: patient able to wean to the open unit as long as behavior remains appropriate. He must maintain appropriate behavior on the unit and be compliant with safety checks when returning to the MHICU.   Outcome: Improving  Note: Patient has been awake in the MHICU most of the evening. He has maintained appropriate behavior on the unit. He will be weaning to the open unit for snack and to socialize with others this evening. He is agreeable to safe behavior on the unit. He denies having any suicidal thoughts. He continues with auditory hallucinations. He says they are \"not bad\".  He is taking his medications as prescribed. He has been maintaining appropriate boundaries with staff and peers.     Goal: Develops/Participates in Therapeutic [place] to Support Successful Transition  Intervention: Foster Therapeutic [place]  Recent Flowsheet Documentation  Taken 12/8/2021 1919 by Jamey Garcia RN  Trust Relationship/Rapport:    care explained    questions answered    questions encouraged    reassurance provided    thoughts/feelings acknowledged     Problem: Suicide Risk  Goal: Absence of Self-Harm  Outcome: Improving  Note: He denies having any suicidal thinking. He is agreeable to safe behavior on the unit.     Problem: Violence Risk or Actual  Goal: Anger and Impulse Control  Outcome: Improving  Note: He is maintaining appropriate boundaries with staff and peers.    Face to face end of shift report to be " communicated to night shift RN.     Jamey Garcia RN  12/8/2021  9:27 PM

## 2021-12-09 NOTE — PROGRESS NOTES
"[facility]  Psychiatric Progress Note      Impression:   This is a 31 year old yo male with schizoaffective disorder who has been having an increase in voices over the past few days. He is a good historian with reasonably good insight and states that when his clozapine dose was changed, his voices increased. He states he has depression even when hallucinations are gone and has frequent suicidal ideation. He was on prozac as well as effexor which clearly are not effective and prozac increases clozapine blood levels . The prozac was stopped today and the effexor could be tapered if lithium shows improvement with his depression. His clozaril dose was changed back to HS tonight and he will start taking it at 7 pm with the hope he will be able to wake up around 9. He feels too sedated the following day if he takes it too late at night. He is not a good candidate for treating sedation with any stype of stimulant due to historical aggression.       Interim History:   Kamini is up in the lounge when I see him today. He was moved out of the MHICU today and has been appropriate on the unit. He denies any suicidal thoughts. He asks if he will be able to go back to his group home tomorrow. Discussed that as long as he is feeling safe, that we would plan on discharge back home tomorrow. He denies any side effects from Lithium, though does not want to increase dose tonight. Did review that it would likely be most beneficial to increase to 600 mg and have him follow-up for labs with his outpatient provider, though he feels that current dose is \"enough\" today. If he continues to decline to increase this further, it is unlikely that this will provide much therapeutic benefit. Will continue at this time. He does report sleeping well last night. Does endorse auditory hallucinations though states that they are \"always there\" and are \"better\" than a few days ago.     Educated regarding medication indications, risks, " benefits, side effects, contraindications and possible interactions. Verbally expressed understanding.        Diagnoses:   Schizoaffective disorder, depressed type  PTSD, chronic (by history)      Attestation:  Patient has been seen and evaluated by me,  Marianna Leslie NP      The patient's care was discussed with the treatment team and chart notes were reviewed.            Medications:     Current Facility-Administered Medications Ordered in Epic   Medication Dose Route Frequency Last Rate Last Admin     acetaminophen (TYLENOL) tablet 650 mg  650 mg Oral Q4H PRN         alum & mag hydroxide-simethicone (MAALOX) suspension 30 mL  30 mL Oral Q4H PRN         benztropine (COGENTIN) tablet 0.5 mg  0.5 mg Oral BID   0.5 mg at 12/08/21 0814     cholecalciferol (VITAMIN D3) 125 mcg (5000 units) capsule 125 mcg  125 mcg Oral Daily   125 mcg at 12/08/21 0814     cloZAPine (CLOZARIL) tablet 450 mg  450 mg Oral Daily   450 mg at 12/07/21 1931     docusate sodium (COLACE) capsule 100 mg  100 mg Oral BID   100 mg at 12/08/21 0813     gabapentin (NEURONTIN) capsule 300 mg  300 mg Oral TID   300 mg at 12/08/21 1320     glycopyrrolate (ROBINUL) tablet 1 mg  1 mg Oral TID   1 mg at 12/08/21 1320     hydrOXYzine (ATARAX) tablet 50 mg  50 mg Oral Q4H PRN   50 mg at 12/07/21 1428     levothyroxine (SYNTHROID/LEVOTHROID) tablet 112 mcg  112 mcg Oral Daily   112 mcg at 12/08/21 0659     lithium ER (LITHOBID) CR tablet 300 mg  300 mg Oral At Bedtime   300 mg at 12/07/21 2007     melatonin tablet 3 mg  3 mg Oral At Bedtime PRN         nicotine (NICORETTE) gum 2 mg  2 mg Buccal Q1H PRN   2 mg at 12/08/21 1239     OLANZapine (zyPREXA) tablet 10 mg  10 mg Oral TID PRN   10 mg at 12/08/21 1320     [START ON 12/13/2021] paliperidone (INVEGA SUSTENNA) injection ANTHONY 234 mg  234 mg Intramuscular Q21 Days         pantoprazole (PROTONIX) EC tablet 40 mg  40 mg Oral Daily   40 mg at 12/08/21 0659     polyethylene glycol (MIRALAX) Packet 17 g  17 g  "Oral Daily PRN         prazosin (MINIPRESS) capsule 3 mg  3 mg Oral At Bedtime   3 mg at 12/07/21 2006     senna-docusate (SENOKOT-S/PERICOLACE) 8.6-50 MG per tablet 1 tablet  1 tablet Oral BID PRN         venlafaxine (EFFEXOR-XR) 24 hr capsule 300 mg  300 mg Oral Daily   300 mg at 12/08/21 0813     No current Epic-ordered outpatient medications on file.            10 point ROS- denies acute concerns       Allergies:     Allergies   Allergen Reactions     Haldol [Haloperidol]      Other reaction(s): Tardive Dyskinesia  Torticollis  Torticollis            Psychiatric Examination:   /75 (BP Location: Right arm)   Pulse 95   Temp 99.6  F (37.6  C) (Temporal)   Resp 16   SpO2 99%   Weight is 0 lbs 0 oz  There is no height or weight on file to calculate BMI.    Appearance: awake, alert, dressed in hospital scrubs, casually groomed  Attitude: pleasant, cooperative  Eye Contact:  good  Mood: \"better\"  Affect:  mood congruent  Speech:  clear, coherent  Psychomotor Behavior:  no evidence of tardive dyskinesia, dystonia, or tics  Thought Process:  linear, goal oriented  Associations:  no loose associations  Thought Content:  Denies SI or HI, reports auditory hallucinations are \"better\" today  Insight:  limited  Judgment:  limited  Oriented to:  time, person, and place  Attention Span and Concentration:  fair  Recent and Remote Memory:  fair  Fund of Knowledge: difficult to assess  Muscle Strength and Tone: normal  Gait and Station: Normal           Labs:   No results found for this or any previous visit (from the past 24 hour(s)).      BEHAVIORAL TEAM DISCUSSION    Progress: Has been cooperative. Tolerating medication changes. Looking forward to discharge tomorrow.    Continued Stay Criteria/Rationale: medication adjustments, monitor for side effects    Medical/Physical: none acute  Precautions:   Falls precaution?: No  Behavioral Orders   Procedures     Code 1 - Restrict to Unit     Elopement precautions     " Routine Programming     As clinically indicated     Status 15     Every 15 minutes.     Plan:   Continue Clozaril 450 mg daily  Continue Lithium  mg at bedtime- pt does not want this increased tonight  Continue Invega Sustenna  mg every month  Continue Effexor  mg daily  Continue Prazosin 3 mg at bedtime  Continue Glycopyrrolate 1 mg TID  Stop Cogentin- monitor for EPSE    Plan is for discharge back to group home tomorrow      Rationale for change in precautions or plan:   No changes today, plan for discharge tomorrow      Participants: AFSANEH Tesfaye, CNP, Dr. Perez, SW, OT, Nursing

## 2021-12-09 NOTE — DISCHARGE INSTRUCTIONS
Behavioral Discharge Planning and Instructions    Summary: Kamini Neal is a 31 year old  male who was brought to the Er after he called emergency services reported increased suicida ideation icluding command hallucinations telling him to harm himself.     Main Diagnosis: Schizoaffective disorder, depressed type  ptsd by history        Health Care Follow-up:     [facility]   [address] [place] [address]  [facility]: Neela Brown [phone].  [phone]  [facility]  Dr. Marco Campo  [email or web address]  [address]  [place] [address]  [phone]   Fax: [phone]    [facility]  Medication management: As arranged. SW left message for the provider to contact patient to schedule as no one was available  [address]  [place], [place] [id number] [phone]   [email or web address]    [facility]  Appointment: 12/15/2021 @ 10:00 am with Dr. Keys for post hospital follow up.   [email or web address]  [address], [place], [place] [id number] [phone]    National Suicide Prevention Lifeline   1-677.702.9893      Mental Health Association [facility] Warmline  Adults with a mental illness can talk to someone and get support, 5 p.m. -10 p.m. Monday-Saturday.    Call [phone]    Attend all scheduled appointments with your outpatient providers. Call at least 24 hours in advance if you need to reschedule an appointment to ensure continued access to your outpatient providers.     Major Treatments, Procedures and Findings:  You were provided with: a psychiatric assessment, assessed for medical stability, medication evaluation and/or management, group therapy, family therapy, individual therapy, CD evaluation/assessment, milieu management and medical interventions    Symptoms to Report: feeling more aggressive, increased confusion, losing more sleep, mood getting worse or thoughts of  "suicide    Early warning signs can include: increased depression or anxiety sleep disturbances increased thoughts or behaviors of suicide or self-harm  increased unusual thinking, such as paranoia or hearing voices        Resources:   Crisis Intervention: 831.212.3828 or 324-562-2341 (TTY: 905.807.8502).  Call anytime for help.  National [place] on Mental Illness (www.[place].jorge a.org): 874.279.4616 or 689-089-1165.  [place] Association for Children's Mental Health (www.mac.org): 438.821.7094.  Alcoholics Anonymous (www.alcoholics-anonymous.org): Check your phone book for your local chapter.  Suicide Awareness Voices of Education (SAVE) (www.save.org): 067-023-VCPI (3641)  National Suicide Prevention Line (www.mentalhealthmn.org): 742-555-TZSP (1422)  Mental Health Consumer/Survivor Network of [place] (www.mhcsn.net): 826.342.9939 or 810-481-1125  Mental Health Association of [place] (www.mentalhealth.org): 637.898.9489 or 187-545-3264  Self- Management and Recovery Training., SMART-- Toll free: 930.963.5607  www.[employer].org  [facility] Crisis Response [phone]  [facility] Crisis Response [phone]  [facility] Crisis Response 879-101-8298  [facility] Crisis (COPE) Response - Adult [phone]  [facility] Crisis Response - Adult [phone]  [facility] Rapid Response 075-732-5165  Text 4 Life: txt \"LIFE\" to 86201 for immediate support and crisis intervention  Crisis text line: Text \"[place]\" to 610204. Free, confidential, 24/7.  Crisis Intervention: 754.467.4076 or 255-388-8369. Call anytime for help.   [facility] Mental Health Crisis Team - Child: [phone]  [facility] Mental Health Crisis Response Team - Child: [phone]  [facility] Mental Health Crisis: 7-431-579-0247   [facility] Mental Health Crisis Team:  [phone]  [facility] Mobile Crisis Response Team (CRT):  149.718.2226 or " "[phone]     General Medication Instructions:   See your medication sheet(s) for instructions.   Take all medicines as directed.  Make no changes unless your doctor suggests them.   Go to all your doctor visits.  Be sure to have all your required lab tests. This way, your medicines can be refilled on time.  Do not use any drugs not prescribed by your doctor.  Avoid alcohol.    Advance Directives:   Scanned document on file with [employer]? No scanned doc  Is document scanned? Pt states no documents  Honoring Choices Your Rights Handout: Informed and given  Was more information offered? Pt declined    The Treatment team has appreciated the opportunity to work with you. If you have any questions or concerns about your recent admission, you can contact the unit which can receive your call 24 hours a day, 7 days a week. They will be able to get in touch with a Provider if needed. The unit number is [phone] .      [place] Area:  [facility], Crisis stabilization \A Chronology of [place] [facility]\""- [phone]  [facility] Crisis Line: 1-500.881.5407  [facility]ates For Family Peace: 114-0129  Sexual Assault Program [facility]: 928.624.9695 or 1-805.609.5907  [facility] Battered Women's Program: 1-973.828.4929 Ext: 279       Calls answered Mon-Fri-8:00 am--4:30 pm    [place]:  [facility] for Family Peace: 6-871-973-1110  [facility] - 9-716-654-1357  [facility] first call for help: 5-165-049-2231  [facility] Crisis Center:  (184) 332-3342    [place] Area:  Warm Line: 1-820.840.9728       Calls answered Tuesday--Saturday 4:00 pm--10:00 pm  [facility] Crisis Line - 733.252.1395  [facility] Crisis Stabilization 466-196-6309    [place] Statewide:  [place] Crisis and Referral Services: 1-153.563.6860  National Suicide Prevention Lifeline: 0-449-911-TALK (4438)   - qbt9wkzp- Text \"Life\" to 38668  First Call for Help: 2-1-1  EMA Helpline- 3-496-YBEB-HELP   Crisis Text Line: Text  [place]  to 025036  "

## 2021-12-09 NOTE — PLAN OF CARE
"Face to face shift report received from Rosalia MARK RN.       Problem: Behavioral Health Plan of Care  Goal: Patient-Specific Goal (Individualization)  Description: Cooperative with treatment teams recommendations including medications.   Sleep at least 5-7 hours nightly.  Eat at least 75% meals.   Report to staff if feeling unsafe while in hospital.   Verbalize a future goal upon discharge.   12/8/21: patient able to wean to the open unit as long as behavior remains appropriate. He must maintain appropriate behavior on the unit and be compliant with safety checks when returning to the MHICU.   Outcome: Improving  Note: Weaned to open unit at beginning of shift. Behavior appropriate. Calm, cooperative, and medication compliant. Denies thoughts of harming self or others. Reports auditory hallucinations but reports improvement to these. Pleasant during conversation. Flat affect. Reports feeling ready for discharge tomorrow. Pt reports plan to \"buy the new battlefield game.\" No reports of pain.       Problem: Suicide Risk  Goal: Absence of Self-Harm  Outcome: Improving    Free from self harm or injury this shift.     Problem: Suicidal Behavior  Goal: Suicidal Behavior is Absent or Managed  Outcome: Improving   Free from self harm or injury this shift.     Problem: Violence Risk or Actual  Goal: Anger and Impulse Control  Outcome: Improving   Free from violent and threatening behaviors.    Face to face end of shift report to be communicated to oncoming RN.     "

## 2021-12-10 VITALS
RESPIRATION RATE: 16 BRPM | OXYGEN SATURATION: 98 % | DIASTOLIC BLOOD PRESSURE: 64 MMHG | HEART RATE: 76 BPM | TEMPERATURE: 98.4 F | SYSTOLIC BLOOD PRESSURE: 120 MMHG

## 2021-12-10 PROCEDURE — 250N000013 HC RX MED GY IP 250 OP 250 PS 637: Performed by: NURSE PRACTITIONER

## 2021-12-10 PROCEDURE — 99239 HOSP IP/OBS DSCHRG MGMT >30: CPT | Performed by: NURSE PRACTITIONER

## 2021-12-10 RX ADMIN — GABAPENTIN 300 MG: 300 CAPSULE ORAL at 07:47

## 2021-12-10 RX ADMIN — LEVOTHYROXINE SODIUM 112 MCG: 0.11 TABLET ORAL at 06:19

## 2021-12-10 RX ADMIN — DOCUSATE SODIUM 100 MG: 100 CAPSULE, LIQUID FILLED ORAL at 07:47

## 2021-12-10 RX ADMIN — PANTOPRAZOLE SODIUM 40 MG: 40 TABLET, DELAYED RELEASE ORAL at 06:19

## 2021-12-10 RX ADMIN — NICOTINE POLACRILEX 2 MG: 2 GUM, CHEWING ORAL at 07:50

## 2021-12-10 RX ADMIN — NICOTINE POLACRILEX 2 MG: 2 GUM, CHEWING ORAL at 06:19

## 2021-12-10 RX ADMIN — GLYCOPYRROLATE 1 MG: 1 TABLET ORAL at 07:47

## 2021-12-10 RX ADMIN — Medication 125 MCG: at 07:46

## 2021-12-10 RX ADMIN — VENLAFAXINE HYDROCHLORIDE 300 MG: 150 CAPSULE, EXTENDED RELEASE ORAL at 07:46

## 2021-12-10 NOTE — PLAN OF CARE
Pt is discharging at the recommendation of the treatment team. Pt is discharging back to his group home transported by [facility] . Pt denies having any thoughts of hurting themself or anyone else. Pt denies anxiety or depression. Pt has follow up with His primary Dr. Keys and dr. Campo for Medication Management. The patient is aware to follow up with Dr. Campo to schedule out patient appointment Discharge instructions, including; demographic sheet, psychiatric evaluation, discharge summary, and AVS were faxed to these next level of care providers.

## 2021-12-10 NOTE — PLAN OF CARE
Discharge Note    Patient Discharged to group home on 12/10/2021 8:00 AM via Health plan transportation accompanied by Behavioral Health staff.     Patient informed of discharge instructions in AVS. Patient verbalizes understanding and denies having any questions pertaining to AVS. Patient stable at time of discharge. Patient denies SI, HI, and thoughts of self harm at time of discharge. All personal belongings returned to patient. Discharge prescriptions sent to [facility] in [place], [place] via electronic communication.     Keiry Lyn RN  12/10/2021  8:45 AM

## 2021-12-10 NOTE — PLAN OF CARE
Face to face shift report received from nurse. Rounding completed, pt observed.    Problem: Behavioral Health Plan of Care  Goal: Plan of Care Review  Outcome: Adequate for Discharge     Problem: Behavioral Health Plan of Care  Goal: Patient-Specific Goal (Individualization)  Description: Cooperative with treatment teams recommendations including medications.   Sleep at least 5-7 hours nightly.  Eat at least 75% meals.   Report to staff if feeling unsafe while in hospital.   Verbalize a future goal upon discharge.  Outcome: Adequate for Discharge  Note: Patient was calm and cooperative. Patient was up one during the night. Drank a milk and ate some gram crackers.     Problem: Behavioral Health Plan of Care  Goal: Adheres to Safety Considerations for Self and Others  Outcome: Adequate for Discharge     Problem: Behavioral Health Plan of Care  Goal: Absence of New-Onset Illness or Injury  Outcome: Adequate for Discharge     Problem: Behavioral Health Plan of Care  Goal: Optimized Coping Skills in Response to Life Stressors  Outcome: Adequate for Discharge     Problem: Behavioral Health Plan of Care  Goal: Develops/Participates in Therapeutic [place] to Support Successful Transition  Outcome: Adequate for Discharge     Problem: Suicide Risk  Goal: Absence of Self-Harm  Outcome: Adequate for Discharge     Problem: Suicidal Behavior  Goal: Suicidal Behavior is Absent or Managed  Outcome: Adequate for Discharge     Problem: Violence Risk or Actual  Goal: Anger and Impulse Control  Outcome: Adequate for Discharge     Face to face report will be communicated to oncoming RN.    Remington Zhu RN  12/10/2021  6:28 AM

## 2021-12-10 NOTE — PLAN OF CARE
"  Problem: Behavioral Health Plan of Care  Goal: Plan of Care Review  Recent Flowsheet Documentation  Taken 12/9/2021 1736 by Jamey Garcia RN  Plan of Care Reviewed With: patient  Patient Agreement with Plan of Care: agrees  Goal: Patient-Specific Goal (Individualization)  Description: Cooperative with treatment teams recommendations including medications.   Sleep at least 5-7 hours nightly.  Eat at least 75% meals.   Report to staff if feeling unsafe while in hospital.   Verbalize a future goal upon discharge.  Outcome: Improving  He is up on the unit and is social with others. He is attending groups. He is maintaining appropriate boundaries with staff and peers. He is taking medications as prescribed. He denies hallucinations. He is anticipating discharge tomorrow and is happy about getting back to his group home. Early breakfast and a bag lunch have been ordered for him. He denies feeling suicidal. He continues with \"some\" depression but overall is feeling well. He is eating and sleeping well.    Goal: Develops/Participates in Therapeutic [place] to Support Successful Transition  Intervention: Foster Therapeutic [place]  Recent Flowsheet Documentation  Taken 12/9/2021 1736 by Jamey Garcia RN  Trust Relationship/Rapport:   care explained   questions answered   questions encouraged   reassurance provided   thoughts/feelings acknowledged     Problem: Suicide Risk  Goal: Absence of Self-Harm  Outcome: Improving  He denies having any suicidal thoughts. He is agreeable to safe behavior on the unit.       "

## 2021-12-10 NOTE — DISCHARGE SUMMARY
Psychiatric Discharge Summary    Kamini Neal MRN# [id number]   Age: 31 year old YOB: 1990     Date of Admission:  12/7/2021  Date of Discharge:  12/10/2021  Admitting Physician:  Rah Perez, DO  Discharge Physician:  Marianna Leslie CNP         Event Leading to Hospitalization and Hospital Stay   Admission: (see H&P completed by LEONILA Oro CNP)  Kamini Neal is a 31 year old  male who was brought to the ER after he called emergency services reported increased suicida ideation including command hallucinations telling him to harm himself. I believe he called emergency services from his group home. He was initally in restraints for agitation. It did not appear he was physical towards staff though attempted to tie his pants around his neck and also tried to burn himself with a lighter. He received zyprexa IM. This was on the 5th and he has been in the ER on a voluntary basis, receiving prn medications to alleviate symptoms and arrived to our unit on 12/7/21. When I meet with him he tells me that he believes he has decompensated after his psychiatrist changed his clozaril timing. The dose was not changed but the time of dosing was. He states he was sleeping in too late (until noon) when he took his full dose of clozapine so his psychiatrist changed it to 150 mg in am and 300 mg at night. I'm unsure the date of the change though he does believe this made a difference and he would like it back to night time though does not like to sleep that late. He knows the rest of his medications very well. He takes invega sustenna and states he has been on up to 600 mg of clozapine in the past though was too sedating. He has also been on abilify with no benefit. He states the voices almost always tell him negative things such as he is worthless. They also tell him to harm himself. He states that he has three good day a month where the voices are not present. He receives his shot every 28 days. He  "states he is hospitalized \"every four months\". I ask how his hallucinations are the couple of days prior to his shot being due and he tells the they are significantly worse. He had his shot 14 days ago. He is on both prozac and effexor though has never tried a mood stabilizer. He is quite cooperative and states he is willing to anything that will help with the voices. He states drooling with clozaril has been difficult to deal with and occurs most of the day. It is a bit noticeable. He finds this embarrassing. He states the atropine drops are not effective . Its unclear if he his on cogentin for dystonia from invega vs sialorrhea and he is not sure of this. He has never tried lithium though is willing to try it for his depression. He states \"I always feel like a burden. When someone is being nice to me I think they are doing it because they are only feeling sorry for me.\".      Even when the voices had been gone for periods of time he still is very depressed. He does not feel like he is ever overly goal directed, elated, or grandiose. He does have agitation though states it has always been secondary to paranoia. He has a hard time being in groups beucase he thinks 'people are thinking and saying things they probably arent saying or thinking\".  His father has schizophrenia.     Hospital stay:  Kamini was admitted to the behavioral health unit as a voluntary patient. Clozaril dosing was kept the same, though dose changed to once daily at 7 PM. He reported concern regarding drooling from Clozaril, felt atropine drops were not making any difference. He was started on Glycopyrrolate to see if this would be more beneficial for sialorrhea. Prozac was stopped, this had been a recent addition after his last hospital stay though patient felt it was providing no benefit. He was agreeable to start mood stabilizer, did not believe that he had been on Lithium in the past. This was started, he allowed dose to increase to 450 mg " "daily though did not want this increased any further in the hospital and reported plan to discuss this further with his outpatient provider. If he continues to decline any further increases, it is unlikely this will provide much therapeutic benefit. One other consideration would be to change his Invega Sustenna injection from every 28 days to every 21 days. He did report feeling that hallucinations were worse in the days leading up to his injection.    He denied any further suicidal thoughts or thoughts of wanting to harm himself. He continued to endorse auditory hallucinations, though reported that they were \"better\" than on admission. He was cooperative and behavior appropriate while on the unit. He attended group programming and was visible in the unit milieu. He was requesting to discharge back to his group home by the end of the week. He denied any side effects from medication adjustments and reported feeling safe to go home. Medication changes sent to [facility] in [place]. He will discharge back to his group home today to follow-up with his outpatient providers.           At time of discharge, there is no evidence that patient is in immediate danger of self or others.        DIagnoses:   Schizoaffective disorder, depressed type  PTSD, chronic (by history)           Labs:     Results for orders placed or performed during the hospital encounter of 12/07/21   Comprehensive metabolic panel     Status: Abnormal   Result Value Ref Range    Sodium 139 133 - 144 mmol/L    Potassium 3.8 3.4 - 5.3 mmol/L    Chloride 110 (H) 94 - 109 mmol/L    Carbon Dioxide (CO2) 23 20 - 32 mmol/L    Anion Gap 6 3 - 14 mmol/L    Urea Nitrogen 11 7 - 30 mg/dL    Creatinine 0.68 0.66 - 1.25 mg/dL    Calcium 9.3 8.5 - 10.1 mg/dL    Glucose 147 (H) 70 - 99 mg/dL    Alkaline Phosphatase 96 40 - 150 U/L    AST 15 0 - 45 U/L    ALT 23 0 - 70 U/L    Protein Total 7.3 6.8 - 8.8 g/dL    Albumin 3.8 3.4 - 5.0 g/dL    Bilirubin Total 0.3 " 0.2 - 1.3 mg/dL    GFR Estimate >90 >60 mL/min/1.73m2   TSH with free T4 reflex     Status: Normal   Result Value Ref Range    TSH 2.17 0.40 - 4.00 mU/L   CBC with platelets and differential     Status: None   Result Value Ref Range    WBC Count 8.2 4.0 - 11.0 10e3/uL    RBC Count 4.63 4.40 - 5.90 10e6/uL    Hemoglobin 15.0 13.3 - 17.7 g/dL    Hematocrit 43.0 40.0 - 53.0 %    MCV 93 78 - 100 fL    MCH 32.4 26.5 - 33.0 pg    MCHC 34.9 31.5 - 36.5 g/dL    RDW 12.6 10.0 - 15.0 %    Platelet Count 201 150 - 450 10e3/uL    % Neutrophils 72 %    % Lymphocytes 20 %    % Monocytes 6 %    % Eosinophils 0 %    % Basophils 1 %    % Immature Granulocytes 1 %    NRBCs per 100 WBC 0 <1 /100    Absolute Neutrophils 5.9 1.6 - 8.3 10e3/uL    Absolute Lymphocytes 1.6 0.8 - 5.3 10e3/uL    Absolute Monocytes 0.5 0.0 - 1.3 10e3/uL    Absolute Eosinophils 0.0 0.0 - 0.7 10e3/uL    Absolute Basophils 0.0 0.0 - 0.2 10e3/uL    Absolute Immature Granulocytes 0.1 <=0.4 10e3/uL    Absolute NRBCs 0.0 10e3/uL   CBC with Platelets & Differential     Status: None    Narrative    The following orders were created for panel order CBC with Platelets & Differential.  Procedure                               Abnormality         Status                     ---------                               -----------         ------                     CBC with platelets and d...[[id number]]                      Final result                 Please view results for these tests on the individual orders.     Results for orders placed or performed during the hospital encounter of 12/05/21   Asymptomatic COVID-19 Virus (Coronavirus) by PCR Oropharynx     Status: Normal     Specimen: Oropharynx; Swab   Result Value Ref Range     SARS CoV2 PCR Negative Negative     Narrative     Testing was performed using the [employer]ander  SARS-CoV-2 & Influenza A/B Assay on the alex[employer]ander  Ya  System.  This test should be ordered for the detection of SARS-COV-2 in individuals who meet SARS-CoV-2  clinical and/or epidemiological criteria. Test performance is unknown in asymptomatic patients.  This test is for in vitro diagnostic use under the FDA EUA for laboratories certified under CLIA to perform moderate and/or high complexity testing. This test has not been FDA cleared or approved.  A negative test does not rule out the presence of PCR inhibitors in the specimen or target RNA in concentration below the limit of detection for the assay. The possibility of a false negative should be considered if the patient's recent exposure or clinical presentation suggests COVID-19.  [facility] are certified under the Clinical Laboratory Improvement Amendments of 1988 (CLIA-88) as qualified to perform moderate and/or high complexity laboratory testing.   Drug abuse screen 1 urine (ED)     Status: Normal   Result Value Ref Range     Amphetamines Urine Screen Negative Screen Negative     Barbiturates Urine Screen Negative Screen Negative     Benzodiazepines Urine Screen Negative Screen Negative     Cannabinoids Urine Screen Negative Screen Negative     Cocaine Urine Screen Negative Screen Negative     Opiates Urine Screen Negative Screen Negative   Urine Drugs of Abuse Screen     Status: Normal     Narrative     The following orders were created for panel order Urine Drugs of Abuse Screen.  Procedure                               Abnormality         Status                     ---------                               -----------         ------                     Drug abuse screen 1 urin...[[id number]]  Normal              Final result                  Please view results for these tests on the individual orders.               Discharge Medications:     Discharge Medication List as of 12/10/2021  6:25 AM      START taking these medications    Details   glycopyrrolate (ROBINUL) 1 MG tablet Take 1 tablet (1 mg) by mouth 3 times daily, Disp-90 tablet, R-0, E-Prescribe      lithium (ESKALITH CR/LITHOBID) 450  MG CR tablet Take 1 tablet (450 mg) by mouth At Bedtime, Disp-30 tablet, R-0, E-Prescribe         CONTINUE these medications which have CHANGED    Details   !! cloZAPine (CLOZARIL) 100 MG tablet Take 4 tablets (400 mg) by mouth daily at 7 PM along with 50 mg tablet for total daily dose 450 mg, Disp-120 tablet, R-0, E-Prescribe      !! cloZAPine (CLOZARIL) 50 MG tablet Take 1 tablet (50 mg) by mouth daily at 7 PM along with (4) 100 mg tablets for total daily dose 450 mg, Disp-30 tablet, R-0, E-Prescribe       !! - Potential duplicate medications found. Please discuss with provider.      CONTINUE these medications which have NOT CHANGED    Details   OLANZapine (ZYPREXA) 10 MG tablet Take 10 mg by mouth daily as needed . Maximum 20 mg/24 hours., Historical      polyethylene glycol (MIRALAX) 17 g packet Take 17 g by mouth daily as needed for constipation, Disp-30 packet, R-1, E-Prescribe      prazosin (MINIPRESS) 1 MG capsule Take 3 mg by mouth At Bedtime, Historical      Sodium Fluoride (DENTA 5000 PLUS DT) Brush with a pea sized amount twice daily until gone., Historical      cholecalciferol (VITAMIN D3) 125 mcg (5000 units) capsule Take 1 capsule by mouth daily, Historical      docusate sodium (COLACE) 100 MG capsule Take 100 mg by mouth 2 times daily , Historical      gabapentin (NEURONTIN) 300 MG capsule Take 1 capsule (300 mg) by mouth 3 times daily, Disp-90 capsule, R-1, E-Prescribe      levothyroxine (SYNTHROID/LEVOTHROID) 112 MCG tablet Take 112 mcg by mouth daily, Historical      omeprazole (PRILOSEC) 20 MG DR capsule Take 20 mg by mouth daily, Historical      paliperidone (INVEGA SUSTENNA) 234 MG/1.5ML ANTHONY Inject 234 mg into the muscle every 28 days, Historical      venlafaxine (EFFEXOR-XR) 150 MG 24 hr capsule Take 300 mg by mouth daily, Historical         STOP taking these medications       atropine 1 % ophthalmic solution Comments:   Reason for Stopping: stopped        benztropine (COGENTIN) 1 MG tablet  Comments:   Reason for Stopping: stopped        FLUoxetine (PROZAC) 10 MG capsule Comments:   Reason for Stopping: stopped                Justification for dual anti-psychotic use: admitted on scheduled dual neuroleptics. Historically he has required multiple neuroleptics to treat and control psychotic symptoms. He denied side effects and will continue working with his outpt provider for monitoring.       Psychiatric Examination:   Appearance:  awake, alert, adequately groomed and appeared as age stated  Attitude:  cooperative  Eye Contact:  fair  Mood:  better  Affect:  mood congruent  Speech:  clear, coherent  Psychomotor Behavior:  no evidence of tardive dyskinesia, dystonia, or tics  Thought Process:  linear and goal oriented  Associations:  no loose associations  Thought Content:  no evidence of suicidal ideation or homicidal ideation, reports improvement in auditory hallucinations  Insight:  fair  Judgment:  fair, impulsive  Oriented to:  time, person, and place  Attention Span and Concentration:  fair  Recent and Remote Memory:  fair  Fund of Knowledge: appropriate for education  Muscle Strength and Tone: normal  Gait and Station: Normal       Discharge Plan:   Health Care Follow-up: discharge today back to group home     [facility]   [address] [place] [address]  [facility] group home: Neela Brown [phone].  [phone]  [facility]  Dr. Marco Campo  [email or web address]  [address]  [place] [address] [phone]   Fax: [phone]     [facility]  Medication management: As arranged. SW left message for the provider to contact patient to schedule as no one was available  [address]  [place], [place] [address] [phone]   [email or web address]     [facility]  Appointment: 12/15/2021 @ 10:00 am with Dr. Keys for post hospital follow up.   [email or web address]  [address],  [place] [address]  [phone]          Attestation:  The patient has been seen and evaluated by Marianna sesay NP         Discharge Services Provided:    40 minutes spent on discharge services, including:  Final examination of patient.  Review and discussion of Hospital stay.  Instructions for continued outpatient care/goals.  Preparation of discharge records.  Preparation of medications refills and new prescriptions.

## 2021-12-13 ENCOUNTER — HOSPITAL ENCOUNTER (EMERGENCY)
Facility: CLINIC | Age: 31
Discharge: HOME | End: 2021-12-14
Attending: EMERGENCY MEDICINE | Admitting: EMERGENCY MEDICINE
Payer: COMMERCIAL

## 2021-12-13 DIAGNOSIS — R45.851 SUICIDAL THOUGHTS: ICD-10-CM

## 2021-12-13 DIAGNOSIS — F39 MOOD DISORDER (H): ICD-10-CM

## 2021-12-13 PROCEDURE — 99284 EMERGENCY DEPT VISIT MOD MDM: CPT | Performed by: EMERGENCY MEDICINE

## 2021-12-13 PROCEDURE — 99291 CRITICAL CARE FIRST HOUR: CPT | Mod: 25 | Performed by: EMERGENCY MEDICINE

## 2021-12-13 PROCEDURE — 99292 CRITICAL CARE ADDL 30 MIN: CPT | Performed by: EMERGENCY MEDICINE

## 2021-12-13 RX ORDER — OLANZAPINE 10 MG/1
10 TABLET, ORALLY DISINTEGRATING ORAL ONCE
Status: COMPLETED | OUTPATIENT
Start: 2021-12-13 | End: 2021-12-14

## 2021-12-14 VITALS
DIASTOLIC BLOOD PRESSURE: 97 MMHG | RESPIRATION RATE: 16 BRPM | TEMPERATURE: 99.1 F | HEART RATE: 106 BPM | SYSTOLIC BLOOD PRESSURE: 131 MMHG | OXYGEN SATURATION: 97 %

## 2021-12-14 PROCEDURE — 90791 PSYCH DIAGNOSTIC EVALUATION: CPT

## 2021-12-14 PROCEDURE — 80307 DRUG TEST PRSMV CHEM ANLYZR: CPT | Performed by: EMERGENCY MEDICINE

## 2021-12-14 PROCEDURE — 250N000013 HC RX MED GY IP 250 OP 250 PS 637: Performed by: EMERGENCY MEDICINE

## 2021-12-14 RX ADMIN — OLANZAPINE 10 MG: 10 TABLET, ORALLY DISINTEGRATING ORAL at 00:00

## 2021-12-14 NOTE — ED PROVIDER NOTES
Patient received in sign-out from prior attending     Kamini Neal is a 31 year old who presenting to the ED for SI.  Pt has been seen before for SI.    Hx of PTSD, bipolar, schizoeffective. etoh abuse, among others.     Pt from group home, last night felt SI and came to ed for eval.  He had no specific plan.  While in ED became agitated, required medication and seclusion     At time of SO, plan is for [facility] eval and dispo per their recs. Okay to discharge if so.       [facility] saw pt, he has no specific plan, and this am denies SI or HI. With assessment.  Can go back to .     He has a psychiatric Provider and [facility]. Has had meds recently adjusted.   Has pcp apt tomorrow.   Works part time.  Can return to all these activities and is well resourced.  Pt asking for discharge and appropriate for this.       BP (!) 131/97   Pulse 106   Temp 99.1  F (37.3  C) (Oral)   Resp 16   SpO2 97%     Patient has been HDS without issue since sign-out received.     - Patient agrees to our plan and is ready and eager for discharge. Care plan, follow up plan, and reasons to return immediately to the ED were dicussed in detail and summarized as noted in the discharge instructions.           Constantino Iglesias MD  12/14/21 [id number]

## 2021-12-14 NOTE — ED NOTES
Seclusion discontinued, patient calm and resting in room, agreed to refrain from harming himself and staff.

## 2021-12-14 NOTE — ED PROVIDER NOTES
[facility] [place] [facility] ([facility])  12/13/21    History     Chief Complaint   Patient presents with     Suicidal     lives at a  and PD has been to the  twice today. Pt voicing SI. Pt received Resperidone 2mg SL en route at 2148.     HPI  Kamini Neal is a 31 year old male with PMH significant for schizoaffective disorder, PTSD, depressive disorder, borderline personality disorder, alcohol abuse, anxiety, GERD, and hypothyroidism who presents to the Emergency Department for evaluation of suicidal ideation.  Patient cannot elaborate on how long he has been having suicidal thoughts.  He denies any specific plan.  He states that he took his meds at the group home.  Patient apparently had police called out to the group home twice today due to his behavior.  Denies any homicidal ideation.  He denies any physical complaints at this time.  Denies any fevers chills chest pain cough shortness of breath abdominal pain nausea vomiting diarrhea dysuria.    Per review of the chart, patient was just admitted from 12/7-12/10 for suicidal ideation and command hallucinations.  In the ED before admission patient did attempt to tie his pants around his neck and burned himself with a lighter, requiring Zyprexa IM.  Patient had medications adjusted during admission with improvement and was discharged to group home.    Past Medical History  Past Medical History:   Diagnosis Date     Anxiety      Depressive disorder      Schizo affective schizophrenia (H)      Past Surgical History:   Procedure Laterality Date     TONSILLECTOMY       cholecalciferol (VITAMIN D3) 125 mcg (5000 units) capsule  cloZAPine (CLOZARIL) 100 MG tablet  cloZAPine (CLOZARIL) 50 MG tablet  docusate sodium (COLACE) 100 MG capsule  gabapentin (NEURONTIN) 300 MG capsule  glycopyrrolate (ROBINUL) 1 MG tablet  levothyroxine (SYNTHROID/LEVOTHROID) 112 MCG tablet  lithium (ESKALITH CR/LITHOBID) 450 MG CR tablet  OLANZapine (ZYPREXA) 10 MG  tablet  omeprazole (PRILOSEC) 20 MG DR capsule  paliperidone (INVEGA SUSTENNA) 234 MG/1.5ML ANTHONY  polyethylene glycol (MIRALAX) 17 g packet  prazosin (MINIPRESS) 1 MG capsule  Sodium Fluoride (DENTA 5000 PLUS DT)  venlafaxine (EFFEXOR-XR) 150 MG 24 hr capsule      Allergies   Allergen Reactions     Haldol [Haloperidol]      Other reaction(s): Tardive Dyskinesia  Torticollis  Torticollis     Family History  Family History   Problem Relation Age of Onset     Mental Illness Maternal Uncle      Mental Illness Maternal Aunt      Glaucoma No family hx of      Macular Degeneration No family hx of      Social History   Social History     Tobacco Use     Smoking status: Current Every Day Smoker     Packs/day: 0.50     Smokeless tobacco: Never Used   Substance Use Topics     Alcohol use: No     Drug use: No      Past medical history, past surgical history, medications, allergies, family history, and social history were reviewed with the patient. No additional pertinent items.       Review of Systems  A complete review of systems was performed with pertinent positives and negatives noted in the HPI, and all other systems negative.    Physical Exam   BP: (!) 131/97  Pulse: 106  Temp: 99.1  F (37.3  C)  Resp: 16  SpO2: 97 %  Physical Exam  Constitutional:       Appearance: Normal appearance. He is obese.   HENT:      Head: Normocephalic and atraumatic.      Mouth/Throat:      Mouth: Mucous membranes are moist.   Eyes:      Extraocular Movements: Extraocular movements intact.      Pupils: Pupils are equal, round, and reactive to light.   Cardiovascular:      Rate and Rhythm: Normal rate and regular rhythm.      Pulses: Normal pulses.   Pulmonary:      Effort: Pulmonary effort is normal.      Breath sounds: Normal breath sounds.   Abdominal:      General: Abdomen is flat.      Palpations: Abdomen is soft.      Tenderness: There is no abdominal tenderness. There is no guarding or rebound.   Musculoskeletal:         General: Normal  range of motion.      Cervical back: Normal range of motion and neck supple.   Neurological:      General: No focal deficit present.      Mental Status: He is alert and oriented to person, place, and time.        ED Course      Procedures         Mental Health Risk Assessment      PSS-3    Date and Time Over the past 2 weeks have you felt down, depressed, or hopeless? Over the past 2 weeks have you had thoughts of killing yourself? Have you ever attempted to kill yourself? When did this last happen? User   12/13/21 2221 yes yes yes more than 6 months ago TL      C-SSRS ([place])    Date and Time Q1 Wished to be Dead (Past Month) Q2 Suicidal Thoughts (Past Month) Q3 Suicidal Thought Method Q4 Suicidal Intent without Specific Plan Q5 Suicide Intent with Specific Plan Q6 Suicide Behavior (Lifetime) Within the Past 3 Months? RETIRED: Level of Risk per Screen Screening Not Complete User   12/13/21 2238 yes yes yes yes yes no -- -- -- RS              Suicide assessment completed by mental health (D.E.C., LCSW, etc.)       Results for orders placed or performed during the hospital encounter of 12/13/21   Drug abuse screen 1 urine (ED)     Status: Normal   Result Value Ref Range    Amphetamines Urine Screen Negative Screen Negative    Barbiturates Urine Screen Negative Screen Negative    Benzodiazepines Urine Screen Negative Screen Negative    Cannabinoids Urine Screen Negative Screen Negative    Cocaine Urine Screen Negative Screen Negative    Opiates Urine Screen Negative Screen Negative   Urine Drugs of Abuse Screen     Status: Normal    Narrative    The following orders were created for panel order Urine Drugs of Abuse Screen.  Procedure                               Abnormality         Status                     ---------                               -----------         ------                     Drug abuse screen 1 urin...[[id number]]  Normal              Final result                 Please view results for these  tests on the individual orders.     Medications   OLANZapine zydis (zyPREXA) ODT tab 10 mg (10 mg Oral Given 12/14/21 0000)        Assessments & Plan (with Medical Decision Making)   Patient arrived for suicidal ideation.  While awaiting further assessment by DEC the patient became more agitated and aggressive and threatening staff throwing chairs.  He was offered oral Zyprexa which he willingly took but also had to be placed in seclusion for patient and staff safety.  On reassessment patient states that he will remain calm and will not harm himself or others or damage property patient will be taken off seclusion.  Patient is awaiting behavioral assessment.  Patient signed out to evening provider to follow-up on behavioral assessment for disposition.    I have reviewed the nursing notes. I have reviewed the findings, diagnosis, plan and need for follow up with the patient.    New Prescriptions    No medications on file       Final diagnoses:   Mood disorder (H)     IShorty, am serving as a trained medical scribe to document services personally performed by Elder Tavares MD, based on the provider's statements to me.     Elder VARGAS MD, was physically present and have reviewed and verified the accuracy of this note documented by Shorty Calderon.    --  Elder Tavares MD  [facility] EMERGENCY DEPARTMENT  12/13/2021     Elder Tavares MD  12/14/21 [id number]

## 2021-12-14 NOTE — ED NOTES
Patient became restless in the [place], pacing, trying to get out the locked doors by rooms 16[address] and b. Patient was redirectable for writer, but when left alone began to escalate again.  Per security pt picked up a chair and tried to throw it at staff. Code 21 called. Write offered him oral zyprexa to help him calm down, pt agreed. Order obtained and patient took the zyprexa. When writer walked away to get pt a soda he came at staff with fists up. Security was able to restrain pt and escort him to rm 14 for seclusion, pt was searched and 1:1 at door. Pt informed of why he's in seclusion and that it will be discontinued when he stops threatening staff and calms down.

## 2021-12-14 NOTE — ED NOTES
[facility]: [phone]  [address] [place] [address]  Per group home it is appropriate to send him home in a taxi, they request patient calls him once he is leaving the hospital.

## 2021-12-14 NOTE — ED NOTES
Within an hour after restraint an in person face to face assessment was completed at 0100, including an evaluation of the patient's immediate reaction to the intervention, behavioral assessment and review/assessment of history, drugs and medications, recent labs, etc., and behavioral condition.  The patient experienced: No adverse physical outcome from seclusion/restraint initiation.  The intervention of restraint or seclusion needs to terminate.     Elder Tavares MD  12/14/21 0106

## 2021-12-14 NOTE — ED NOTES
"Pt told sitter he needed to use the bathroom and speak to his nurse. Writer spoke with patient, patient stated that \"I'm stable, let him out.\" Explained to him that we can get him a urinal, but we're going to give the medication time to work, pt then started hitting the window, writer told him with these types of behaviors we are unable to let him out at this time.   "

## 2021-12-14 NOTE — DISCHARGE INSTRUCTIONS
"Please continue to take all your regular medications.  Please keep your primary care doctor appointment tomorrow as well as all upcoming future appointments.    Please continue taking all your regular medications and keep all follow-up appointments as scheduled.    If you have thoughts of hurting yourself or others return to the emergency department immediately or call 911.  You are always welcome to return for help and for evaluation.    Aftercare Plan    Follow up with my establish care team includes:      [facility]   [address] [place] [id number]  [facility] group home: Neela Brown [phone].  [phone]  [facility] Supervisor Neela Brown [phone]    Primary Care Provider: [facility]  Appointment: 12/15/2021 @ 10:00 am with Dr. Keys for post hospital follow up.   [email or web address]  [address], [place] [address]  [phone]    Psychiatrist: [facility] [place] [place]  Dr. Marco Campo  [email or web address]  [address]  [place] [address]  [phone]   Fax: [phone]  Group [place] staff to assist in confirming follow up appointment and transport    [facility]  [address] [place] [address]   [phone]   [email or web address]    uses the following pharmacy :   [facility]  [email or web address]  [address] [facility] [address], [place] [address]  [phone]  [facility] staff to assist in confirming follow up appointments for blood draws        If I am feeling unsafe or I am in a crisis, I will:   Call [facility] Mobile Crisis [phone]  Call [place] [facility] Warm Line  Peer to peer support  Monday thru Saturday, 12 pm to 10 pm  [phone] or 1.461.774.9197  Text \"Support\" to 89555  Contact my established care providers   Call the National Suicide Prevention Lifeline: 268.421.7071   Go to the nearest emergency room   Call 911     Warning signs " that I or other people might notice when a crisis is developing for me: feeling agitated, not wanting to talk to people at group home, feeling more sad    Things I am able to do on my own to cope or help me feel better:   Take a shower, take a walk, have a tea or coffee with group home staff, go to work, call [facility] and as for new assignments    Things that I am able to do with others to cope or help me better:   have a tea or coffee with group home staff, go to work, call [employer] and as for new assignments  Watch movies    Things I can use or do for distraction: TV, work, nature    Changes I can make to support my mental health and wellness:   Lifestyle Adjustment:   1. Adjust your lifestyle to get enough sleep, relaxation, exercise and good nutrition.  Continue to develop healthy coping skills to decrease stress and promote a healthy  lifestyle.  2. Abstain from all substances of abuse.  3. Take medications as prescribed.  Please work with your doctor to discuss any concerns you have with your medications or side effects you may be experiencing.  4. Follow up with appointments as scheduled.      General Medication Instructions:   1. See your medication sheet(s) for instructions.   2. Take all medicines as directed.  Make no changes unless your doctor suggests them.   3. Go to all your doctor visits.  4. Be sure to have all your required lab tests. This way, your medicines can be refilled on time.  5. Do not use any drugs not prescribed by your doctor.    People in my life that I can ask for help: Group home staff, OP providers    Your [facility] hospital has a mental health crisis team you can call 24/7: [facility] Mobile Crisis  [phone] (adults)  [phone] (children)    Other things that are important when I m in crisis: I am resilient    Additional resources and information:     Crisis Lines  Crisis Text Line  Text 524341  You will be connected with a trained live crisis counselor to provide  "support.    National Hope Line  1.800.SUICIDE [8759012]      Community Resources  Fast Tracker  Linking people to mental health and substance use disorder resources  [employer].org     [place] [facility] Warm Line  Peer to peer support  Monday thru Saturday, 12 pm to 10 pm  366.332.2789 or 0.763.228.8277  Text \"Support\" to 29809    National [place] on Mental Illness (EMA)  255.695.8627 or 1.888.EMA.HELPS    Mental Health Apps  My3  https://my3app.org/    VirtualHopeBox  [email or web address]/apps/virtual-hope-box/              "

## 2021-12-14 NOTE — ED NOTES
Pt was seen approaching writer and 2 Security guards with a chair in hand. Pt was told to put chair down several times. Pt flinched with chair in hand towards staff but eventually released chair and walked towards [address] hallway. Code 21 called for safety.
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
## 2021-12-26 ENCOUNTER — HOSPITAL ENCOUNTER (EMERGENCY)
Facility: CLINIC | Age: 31
Discharge: NURSING, REHAB OR LONG-TERM CARE | End: 2021-12-28
Attending: STUDENT IN AN ORGANIZED HEALTH CARE EDUCATION/TRAINING PROGRAM | Admitting: STUDENT IN AN ORGANIZED HEALTH CARE EDUCATION/TRAINING PROGRAM
Payer: COMMERCIAL

## 2021-12-26 DIAGNOSIS — F43.10 POSTTRAUMATIC STRESS DISORDER: ICD-10-CM

## 2021-12-26 DIAGNOSIS — R44.3 HALLUCINATIONS: ICD-10-CM

## 2021-12-26 DIAGNOSIS — F25.0 SCHIZOAFFECTIVE DISORDER, BIPOLAR TYPE (H): ICD-10-CM

## 2021-12-26 DIAGNOSIS — Z11.52 ENCOUNTER FOR SCREENING LABORATORY TESTING FOR SEVERE ACUTE RESPIRATORY SYNDROME CORONAVIRUS 2 (SARS-COV-2): ICD-10-CM

## 2021-12-26 LAB
ALBUMIN SERPL-MCNC: 4.1 G/DL (ref 3.4–5)
ALP SERPL-CCNC: 111 U/L (ref 40–150)
ALT SERPL W P-5'-P-CCNC: 23 U/L (ref 0–70)
AMPHETAMINES UR QL SCN: NORMAL
ANION GAP SERPL CALCULATED.3IONS-SCNC: 1 MMOL/L (ref 3–14)
APAP SERPL-MCNC: <2 MG/L (ref 10–30)
AST SERPL W P-5'-P-CCNC: 17 U/L (ref 0–45)
BARBITURATES UR QL: NORMAL
BASOPHILS # BLD AUTO: 0.1 10E3/UL (ref 0–0.2)
BASOPHILS NFR BLD AUTO: 1 %
BENZODIAZ UR QL: NORMAL
BILIRUB SERPL-MCNC: 0.2 MG/DL (ref 0.2–1.3)
BUN SERPL-MCNC: 6 MG/DL (ref 7–30)
CALCIUM SERPL-MCNC: 9.6 MG/DL (ref 8.5–10.1)
CANNABINOIDS UR QL SCN: NORMAL
CHLORIDE BLD-SCNC: 111 MMOL/L (ref 94–109)
CO2 SERPL-SCNC: 29 MMOL/L (ref 20–32)
COCAINE UR QL: NORMAL
CREAT SERPL-MCNC: 0.69 MG/DL (ref 0.66–1.25)
EOSINOPHIL # BLD AUTO: 0 10E3/UL (ref 0–0.7)
EOSINOPHIL NFR BLD AUTO: 0 %
ERYTHROCYTE [DISTWIDTH] IN BLOOD BY AUTOMATED COUNT: 12.1 % (ref 10–15)
ETHANOL SERPL-MCNC: <0.01 G/DL
GFR SERPL CREATININE-BSD FRML MDRD: >90 ML/MIN/1.73M2
GLUCOSE BLD-MCNC: 89 MG/DL (ref 70–99)
HCT VFR BLD AUTO: 45 % (ref 40–53)
HGB BLD-MCNC: 15.2 G/DL (ref 13.3–17.7)
IMM GRANULOCYTES # BLD: 0.1 10E3/UL
IMM GRANULOCYTES NFR BLD: 1 %
LACTATE SERPL-SCNC: 1.3 MMOL/L (ref 0.7–2)
LYMPHOCYTES # BLD AUTO: 2 10E3/UL (ref 0.8–5.3)
LYMPHOCYTES NFR BLD AUTO: 21 %
MCH RBC QN AUTO: 31.4 PG (ref 26.5–33)
MCHC RBC AUTO-ENTMCNC: 33.8 G/DL (ref 31.5–36.5)
MCV RBC AUTO: 93 FL (ref 78–100)
MONOCYTES # BLD AUTO: 0.8 10E3/UL (ref 0–1.3)
MONOCYTES NFR BLD AUTO: 8 %
NEUTROPHILS # BLD AUTO: 6.8 10E3/UL (ref 1.6–8.3)
NEUTROPHILS NFR BLD AUTO: 69 %
NRBC # BLD AUTO: 0 10E3/UL
NRBC BLD AUTO-RTO: 0 /100
OPIATES UR QL SCN: NORMAL
PLATELET # BLD AUTO: 253 10E3/UL (ref 150–450)
POTASSIUM BLD-SCNC: 3.9 MMOL/L (ref 3.4–5.3)
PROT SERPL-MCNC: 7.7 G/DL (ref 6.8–8.8)
RBC # BLD AUTO: 4.84 10E6/UL (ref 4.4–5.9)
SALICYLATES SERPL-MCNC: <2 MG/DL
SODIUM SERPL-SCNC: 141 MMOL/L (ref 133–144)
WBC # BLD AUTO: 9.6 10E3/UL (ref 4–11)

## 2021-12-26 PROCEDURE — 83605 ASSAY OF LACTIC ACID: CPT | Performed by: STUDENT IN AN ORGANIZED HEALTH CARE EDUCATION/TRAINING PROGRAM

## 2021-12-26 PROCEDURE — 250N000013 HC RX MED GY IP 250 OP 250 PS 637: Performed by: STUDENT IN AN ORGANIZED HEALTH CARE EDUCATION/TRAINING PROGRAM

## 2021-12-26 PROCEDURE — 80179 DRUG ASSAY SALICYLATE: CPT | Performed by: STUDENT IN AN ORGANIZED HEALTH CARE EDUCATION/TRAINING PROGRAM

## 2021-12-26 PROCEDURE — 36415 COLL VENOUS BLD VENIPUNCTURE: CPT | Performed by: STUDENT IN AN ORGANIZED HEALTH CARE EDUCATION/TRAINING PROGRAM

## 2021-12-26 PROCEDURE — 85004 AUTOMATED DIFF WBC COUNT: CPT | Performed by: STUDENT IN AN ORGANIZED HEALTH CARE EDUCATION/TRAINING PROGRAM

## 2021-12-26 PROCEDURE — 99284 EMERGENCY DEPT VISIT MOD MDM: CPT | Performed by: STUDENT IN AN ORGANIZED HEALTH CARE EDUCATION/TRAINING PROGRAM

## 2021-12-26 PROCEDURE — 82077 ASSAY SPEC XCP UR&BREATH IA: CPT | Performed by: STUDENT IN AN ORGANIZED HEALTH CARE EDUCATION/TRAINING PROGRAM

## 2021-12-26 PROCEDURE — 99285 EMERGENCY DEPT VISIT HI MDM: CPT | Mod: 25

## 2021-12-26 PROCEDURE — 80307 DRUG TEST PRSMV CHEM ANLYZR: CPT | Performed by: STUDENT IN AN ORGANIZED HEALTH CARE EDUCATION/TRAINING PROGRAM

## 2021-12-26 PROCEDURE — 80053 COMPREHEN METABOLIC PANEL: CPT | Performed by: STUDENT IN AN ORGANIZED HEALTH CARE EDUCATION/TRAINING PROGRAM

## 2021-12-26 PROCEDURE — 80143 DRUG ASSAY ACETAMINOPHEN: CPT | Performed by: STUDENT IN AN ORGANIZED HEALTH CARE EDUCATION/TRAINING PROGRAM

## 2021-12-26 PROCEDURE — U0003 INFECTIOUS AGENT DETECTION BY NUCLEIC ACID (DNA OR RNA); SEVERE ACUTE RESPIRATORY SYNDROME CORONAVIRUS 2 (SARS-COV-2) (CORONAVIRUS DISEASE [COVID-19]), AMPLIFIED PROBE TECHNIQUE, MAKING USE OF HIGH THROUGHPUT TECHNOLOGIES AS DESCRIBED BY CMS-2020-01-R: HCPCS | Performed by: STUDENT IN AN ORGANIZED HEALTH CARE EDUCATION/TRAINING PROGRAM

## 2021-12-26 PROCEDURE — C9803 HOPD COVID-19 SPEC COLLECT: HCPCS

## 2021-12-26 PROCEDURE — 90791 PSYCH DIAGNOSTIC EVALUATION: CPT

## 2021-12-26 RX ORDER — OLANZAPINE 10 MG/1
10 TABLET, ORALLY DISINTEGRATING ORAL ONCE
Status: COMPLETED | OUTPATIENT
Start: 2021-12-26 | End: 2021-12-26

## 2021-12-26 RX ADMIN — NICOTINE POLACRILEX 4 MG: 2 GUM, CHEWING BUCCAL at 21:34

## 2021-12-26 RX ADMIN — OLANZAPINE 10 MG: 10 TABLET, ORALLY DISINTEGRATING ORAL at 23:13

## 2021-12-26 ASSESSMENT — ENCOUNTER SYMPTOMS
RHINORRHEA: 0
NECK PAIN: 0
HALLUCINATIONS: 1
COUGH: 0
ABDOMINAL PAIN: 0
VOMITING: 0
EYE PAIN: 0
EYE DISCHARGE: 0
NUMBNESS: 0
ABDOMINAL DISTENTION: 0
HEADACHES: 0
BACK PAIN: 0
WEAKNESS: 0
CHILLS: 0
DIARRHEA: 0
NAUSEA: 0
SORE THROAT: 0
WOUND: 0
HEMATURIA: 0
FEVER: 0
WHEEZING: 0
SHORTNESS OF BREATH: 0
DIZZINESS: 0

## 2021-12-27 ENCOUNTER — TELEPHONE (OUTPATIENT)
Dept: BEHAVIORAL HEALTH | Facility: CLINIC | Age: 31
End: 2021-12-27
Payer: COMMERCIAL

## 2021-12-27 LAB — SARS-COV-2 RNA RESP QL NAA+PROBE: NEGATIVE

## 2021-12-27 PROCEDURE — 250N000013 HC RX MED GY IP 250 OP 250 PS 637: Performed by: FAMILY MEDICINE

## 2021-12-27 PROCEDURE — 250N000013 HC RX MED GY IP 250 OP 250 PS 637: Performed by: EMERGENCY MEDICINE

## 2021-12-27 RX ORDER — DOCUSATE SODIUM 100 MG/1
100 CAPSULE, LIQUID FILLED ORAL 2 TIMES DAILY
Status: DISCONTINUED | OUTPATIENT
Start: 2021-12-27 | End: 2021-12-28 | Stop reason: HOSPADM

## 2021-12-27 RX ORDER — OLANZAPINE 10 MG/1
10 TABLET, ORALLY DISINTEGRATING ORAL ONCE
Status: COMPLETED | OUTPATIENT
Start: 2021-12-27 | End: 2021-12-27

## 2021-12-27 RX ORDER — VENLAFAXINE HYDROCHLORIDE 150 MG/1
300 CAPSULE, EXTENDED RELEASE ORAL DAILY
Status: DISCONTINUED | OUTPATIENT
Start: 2021-12-27 | End: 2021-12-28 | Stop reason: HOSPADM

## 2021-12-27 RX ORDER — LEVOTHYROXINE SODIUM 112 UG/1
112 TABLET ORAL DAILY
Status: DISCONTINUED | OUTPATIENT
Start: 2021-12-27 | End: 2021-12-28 | Stop reason: HOSPADM

## 2021-12-27 RX ORDER — LITHIUM CARBONATE 450 MG
450 TABLET, EXTENDED RELEASE ORAL AT BEDTIME
Status: DISCONTINUED | OUTPATIENT
Start: 2021-12-27 | End: 2021-12-28 | Stop reason: HOSPADM

## 2021-12-27 RX ORDER — GABAPENTIN 300 MG/1
300 CAPSULE ORAL 3 TIMES DAILY
Status: DISCONTINUED | OUTPATIENT
Start: 2021-12-27 | End: 2021-12-28 | Stop reason: HOSPADM

## 2021-12-27 RX ORDER — CLOZAPINE 100 MG/1
400 TABLET ORAL AT BEDTIME
Status: DISCONTINUED | OUTPATIENT
Start: 2021-12-27 | End: 2021-12-27

## 2021-12-27 RX ADMIN — LITHIUM CARBONATE 450 MG: 450 TABLET, EXTENDED RELEASE ORAL at 22:14

## 2021-12-27 RX ADMIN — LEVOTHYROXINE SODIUM 112 MCG: 112 TABLET ORAL at 09:23

## 2021-12-27 RX ADMIN — OLANZAPINE 10 MG: 10 TABLET, ORALLY DISINTEGRATING ORAL at 00:18

## 2021-12-27 RX ADMIN — PRAZOSIN HYDROCHLORIDE 3 MG: 2 CAPSULE ORAL at 22:13

## 2021-12-27 RX ADMIN — CLOZAPINE 450 MG: 100 TABLET ORAL at 22:14

## 2021-12-27 RX ADMIN — GABAPENTIN 300 MG: 300 CAPSULE ORAL at 14:49

## 2021-12-27 RX ADMIN — Medication 125 MCG: at 09:23

## 2021-12-27 RX ADMIN — DOCUSATE SODIUM 100 MG: 100 CAPSULE, LIQUID FILLED ORAL at 07:46

## 2021-12-27 RX ADMIN — NICOTINE POLACRILEX 4 MG: 2 GUM, CHEWING BUCCAL at 18:30

## 2021-12-27 RX ADMIN — VENLAFAXINE HYDROCHLORIDE 300 MG: 150 CAPSULE, EXTENDED RELEASE ORAL at 09:23

## 2021-12-27 RX ADMIN — DOCUSATE SODIUM 100 MG: 100 CAPSULE, LIQUID FILLED ORAL at 20:24

## 2021-12-27 RX ADMIN — GABAPENTIN 300 MG: 300 CAPSULE ORAL at 20:24

## 2021-12-27 RX ADMIN — GABAPENTIN 300 MG: 300 CAPSULE ORAL at 07:46

## 2021-12-27 RX ADMIN — NICOTINE POLACRILEX 4 MG: 2 GUM, CHEWING BUCCAL at 01:41

## 2021-12-27 RX ADMIN — NICOTINE POLACRILEX 4 MG: 2 GUM, CHEWING BUCCAL at 22:22

## 2021-12-27 NOTE — ED TRIAGE NOTES
Patient states he's hearing voices, telling him they're coming for him. Pt states he's been struggling the last couple of months and would like his medications reevaluated.

## 2021-12-27 NOTE — ED NOTES
[facility] ED Mental Health Handoff Note:       Brief HPI:  This is a 31 year old male signed out to me by Dr. Spring.  See initial ED Provider note for full details of the presentation.  The patient is a 31-year-old male with a past medical history of anxiety, depression, PTSD, bipolar disorder, schizoaffective disorder, alcohol abuse who presented with auditory hallucinations and suicidal ideation.  He is awaiting inpatient mental health admission.  H is currently voluntary, but likely holdable if patient elects to leave.    Home meds reviewed and ordered/administered: Yes    Medically stable for inpatient mental health admission: Yes.    Evaluated by mental health: Yes. The recommendation is for inpatient mental health treatment. Bed search in process    Safety concerns: At the time I received sign out, there were no safety concerns.    Hold Status:  Active Orders   N/A            Exam:   Patient Vitals for the past 24 hrs:   BP Temp Temp src Pulse Resp SpO2   12/26/21 2317 110/75 97.6  F (36.4  C) Oral 84 18 100 %   12/26/21 1947 (!) 139/94 97.3  F (36.3  C) Oral 97 18 100 %             ED Course:    Medications   cholecalciferol (VITAMIN D3) 125 mcg (5000 units) capsule 125 mcg (has no administration in time range)   cloZAPine (CLOZARIL) tablet 400 mg ( Oral Canceled Entry 12/27/21 0142)   docusate sodium (COLACE) capsule 100 mg (has no administration in time range)   gabapentin (NEURONTIN) capsule 300 mg (has no administration in time range)   levothyroxine (SYNTHROID/LEVOTHROID) tablet 112 mcg (has no administration in time range)   lithium (ESKALITH CR/LITHOBID) CR tablet 450 mg ( Oral Canceled Entry 12/27/21 0142)   prazosin (MINIPRESS) capsule 3 mg ( Oral Canceled Entry 12/27/21 0142)   venlafaxine (EFFEXOR-XR) 24 hr capsule 300 mg (has no administration in time range)   nicotine (NICORETTE) gum 4 mg (4 mg Buccal Given 12/26/21 2134)   OLANZapine zydis (zyPREXA) ODT tab 10 mg (10 mg Oral Given  12/26/21 6009)   OLANZapine zydis (zyPREXA) ODT tab 10 mg (10 mg Oral Given 12/27/21 0018)   nicotine (NICORETTE) gum 4 mg (4 mg Buccal Given 12/27/21 0141)            There were no significant events during my shift.          Impression:    ICD-10-CM    1. Schizoaffective disorder, bipolar type (H)  F25.0    2. Hallucinations  R44.3        Plan:    1. Awaiting inpatient mental health admission/transfer.      RESULTS:   Results for orders placed or performed during the hospital encounter of 12/26/21 (from the past 24 hour(s))   Urine Drugs of Abuse Screen     Status: Normal    Collection Time: 12/26/21  7:46 PM    Narrative    The following orders were created for panel order Urine Drugs of Abuse Screen.  Procedure                               Abnormality         Status                     ---------                               -----------         ------                     Drug abuse screen 1 urin...[[id number]]  Normal              Final result                 Please view results for these tests on the individual orders.   Drug abuse screen 1 urine (ED)     Status: Normal    Collection Time: 12/26/21  7:46 PM   Result Value Ref Range    Amphetamines Urine Screen Negative Screen Negative    Barbiturates Urine Screen Negative Screen Negative    Benzodiazepines Urine Screen Negative Screen Negative    Cannabinoids Urine Screen Negative Screen Negative    Cocaine Urine Screen Negative Screen Negative    Opiates Urine Screen Negative Screen Negative   CBC with platelets differential     Status: None    Collection Time: 12/26/21  8:48 PM    Narrative    The following orders were created for panel order CBC with platelets differential.  Procedure                               Abnormality         Status                     ---------                               -----------         ------                     CBC with platelets and d...[[id number]]                      Final result                 Please view results for  these tests on the individual orders.   Comprehensive metabolic panel     Status: Abnormal    Collection Time: 12/26/21  8:48 PM   Result Value Ref Range    Sodium 141 133 - 144 mmol/L    Potassium 3.9 3.4 - 5.3 mmol/L    Chloride 111 (H) 94 - 109 mmol/L    Carbon Dioxide (CO2) 29 20 - 32 mmol/L    Anion Gap 1 (L) 3 - 14 mmol/L    Urea Nitrogen 6 (L) 7 - 30 mg/dL    Creatinine 0.69 0.66 - 1.25 mg/dL    Calcium 9.6 8.5 - 10.1 mg/dL    Glucose 89 70 - 99 mg/dL    Alkaline Phosphatase 111 40 - 150 U/L    AST 17 0 - 45 U/L    ALT 23 0 - 70 U/L    Protein Total 7.7 6.8 - 8.8 g/dL    Albumin 4.1 3.4 - 5.0 g/dL    Bilirubin Total 0.2 0.2 - 1.3 mg/dL    GFR Estimate >90 >60 mL/min/1.73m2   Ethyl Alcohol Level     Status: Normal    Collection Time: 12/26/21  8:48 PM   Result Value Ref Range    Alcohol ethyl <0.01 <=0.01 g/dL   Lactic acid whole blood     Status: Normal    Collection Time: 12/26/21  8:48 PM   Result Value Ref Range    Lactic Acid 1.3 0.7 - 2.0 mmol/L   Acetaminophen level     Status: Abnormal    Collection Time: 12/26/21  8:48 PM   Result Value Ref Range    Acetaminophen <2 (L) 10 - 30 mg/L   Salicylate level     Status: Normal    Collection Time: 12/26/21  8:48 PM   Result Value Ref Range    Salicylate <2 <20 mg/dL   CBC with platelets and differential     Status: None    Collection Time: 12/26/21  8:48 PM   Result Value Ref Range    WBC Count 9.6 4.0 - 11.0 10e3/uL    RBC Count 4.84 4.40 - 5.90 10e6/uL    Hemoglobin 15.2 13.3 - 17.7 g/dL    Hematocrit 45.0 40.0 - 53.0 %    MCV 93 78 - 100 fL    MCH 31.4 26.5 - 33.0 pg    MCHC 33.8 31.5 - 36.5 g/dL    RDW 12.1 10.0 - 15.0 %    Platelet Count 253 150 - 450 10e3/uL    % Neutrophils 69 %    % Lymphocytes 21 %    % Monocytes 8 %    % Eosinophils 0 %    % Basophils 1 %    % Immature Granulocytes 1 %    NRBCs per 100 WBC 0 <1 /100    Absolute Neutrophils 6.8 1.6 - 8.3 10e3/uL    Absolute Lymphocytes 2.0 0.8 - 5.3 10e3/uL    Absolute Monocytes 0.8 0.0 - 1.3 10e3/uL     Absolute Eosinophils 0.0 0.0 - 0.7 10e3/uL    Absolute Basophils 0.1 0.0 - 0.2 10e3/uL    Absolute Immature Granulocytes 0.1 <=0.4 10e3/uL    Absolute NRBCs 0.0 10e3/uL   Asymptomatic COVID-19 Virus (Coronavirus) by PCR Nasopharyngeal     Status: Normal    Collection Time: 12/26/21  9:36 PM    Specimen: Nasopharyngeal; Swab   Result Value Ref Range    SARS CoV2 PCR Negative Negative    Narrative    Testing was performed using the alexsander  SARS-CoV-2 & Influenza A/B Assay on the alexsander  Ya  System.  This test should be ordered for the detection of SARS-COV-2 in individuals who meet SARS-CoV-2 clinical and/or epidemiological criteria. Test performance is unknown in asymptomatic patients.  This test is for in vitro diagnostic use under the FDA EUA for laboratories certified under CLIA to perform moderate and/or high complexity testing. This test has not been FDA cleared or approved.  A negative test does not rule out the presence of PCR inhibitors in the specimen or target RNA in concentration below the limit of detection for the assay. The possibility of a false negative should be considered if the patient's recent exposure or clinical presentation suggests COVID-19.  [facility] are certified under the Clinical Laboratory Improvement Amendments of 1988 (CLIA-88) as qualified to perform moderate and/or high complexity laboratory testing.             MD Eric Barbosa Michelle C, MD  12/27/21 0737
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
## 2021-12-27 NOTE — PHARMACY-ADMISSION MEDICATION HISTORY
Admission Medication History Completed by Pharmacy    See [facility] Admission Navigator for allergy information, preferred outpatient pharmacy, prior to admission medications and immunization status.     Medication history sources:  [facility], Discharge Summary 12/10/21, patient interview, [facility] ( [phone])    Pertinent changes made to PTA medication list:  Added: N/A  Deleted: N/A  Changed: N/A    Additional medication history information:   - Spoke with  RN [facility] - she was unable to send a MAR to confirm last clozapine and Invega Sustenna doses. She states  is only Bah[place]ian speaking so I would not be able to get it from them. Interviewed patient who states he had his clozapine last night and does not know the last injection date (last filled 12/6/21 through [facility]).    Prior to Admission medications    Medication Sig Last Dose Taking? Auth Provider   cholecalciferol (VITAMIN D3) 125 mcg (5000 units) capsule Take 1 capsule by mouth daily 12/26/2021 Yes Reported, Patient   cloZAPine (CLOZARIL) 100 MG tablet Take 4 tablets (400 mg) by mouth daily at 7 PM along with 50 mg tablet for total daily dose 450 mg 12/26/2021 Yes Marianna Leslie NP   cloZAPine (CLOZARIL) 50 MG tablet Take 1 tablet (50 mg) by mouth daily at 7 PM along with (4) 100 mg tablets for total daily dose 450 mg 12/26/2021 Yes Marianna Leslie NP   docusate sodium (COLACE) 100 MG capsule Take 100 mg by mouth 2 times daily  12/26/2021 Yes Reported, Patient   gabapentin (NEURONTIN) 300 MG capsule Take 1 capsule (300 mg) by mouth 3 times daily 12/26/2021 Yes Chandler Solorzano MD   glycopyrrolate (ROBINUL) 1 MG tablet Take 1 tablet (1 mg) by mouth 3 times daily 12/26/2021 Yes Marianna Leslie NP   levothyroxine (SYNTHROID/LEVOTHROID) 112 MCG tablet Take 112 mcg by mouth daily 12/26/2021 Yes Unknown, Entered By History   lithium (ESKALITH CR/LITHOBID) 450 MG CR tablet Take 1 tablet (450 mg) by mouth At Bedtime 12/26/2021 Yes Marianna Leslie NP    OLANZapine (ZYPREXA) 10 MG tablet Take 10 mg by mouth daily as needed . Maximum 20 mg/24 hours. 12/26/2021 Yes Reported, Patient   omeprazole (PRILOSEC) 20 MG DR capsule Take 20 mg by mouth daily 12/26/2021 Yes Unknown, Entered By History   paliperidone (INVEGA SUSTENNA) 234 MG/1.5ML ANTHONY Inject 234 mg into the muscle every 28 days  Yes Unknown, Entered By History   polyethylene glycol (MIRALAX) 17 g packet Take 17 g by mouth daily as needed for constipation PRN Yes Chandler Solorzano MD   prazosin (MINIPRESS) 1 MG capsule Take 3 mg by mouth At Bedtime 12/26/2021 Yes Reported, Patient   Sodium Fluoride (DENTA 5000 PLUS DT) Brush with a pea sized amount twice daily until gone. Unknown Yes Reported, Patient   venlafaxine (EFFEXOR-XR) 150 MG 24 hr capsule Take 300 mg by mouth daily 12/26/2021 Yes Reported, Patient     Date completed: 12/27/21    Medication history completed by:   Constantino Casillas, PharmD, BCPS  [facility]  Emergency Department: Ascom *[phone]

## 2021-12-27 NOTE — ED NOTES
"Around shift change patient verbalized 'not feeling good' when asked to clarify he stated he wants to hurt himself. Given zyprexa, pt agreed to safe behavior and to give time for medication to work.   @~0000, patient got up from the gurney and began pacing the halls, stating again he \"doesn't feel well,\" writer asked what we could due to make him more comfortable, pt would randomly try to hit his head on the wall, was redirectable, provider notified, patient given a second dose of zyprexa.   Upon follow up pt stated the medication helped.   "

## 2021-12-27 NOTE — ED NOTES
Bed: HW01  Expected date: 12/26/21  Expected time: 7:24 PM  Means of arrival:   Comments:  H448  31 M  Auditory Hallucinations

## 2021-12-27 NOTE — ED NOTES
[facility] ED Mental Health Handoff Note:       Brief HPI:  This is a 31 year old male signed out to me by Dr. Hilliard.  See initial ED Provider note for full details of the presentation. Interval history is pertinent for hallucinations commanding to self emolate.    Home meds reviewed and ordered/administered: Yes    Medically stable for inpatient mental health admission: Yes.    Evaluated by mental health: Yes. The recommendation is for inpatient mental health treatment. Bed search in process    Safety concerns: At the time I received sign out, the patient required medications for agitation and is now more calm.    Hold Status:  Active Orders   N/A            Exam:   Patient Vitals for the past 24 hrs:   BP Temp Temp src Pulse Resp SpO2   12/26/21 2317 110/75 97.6  F (36.4  C) Oral 84 18 100 %   12/26/21 1947 (!) 139/94 97.3  F (36.3  C) Oral 97 18 100 %           ED Course:    Medications   cholecalciferol (VITAMIN D3) 125 mcg (5000 units) capsule 125 mcg (has no administration in time range)   cloZAPine (CLOZARIL) tablet 400 mg (has no administration in time range)   docusate sodium (COLACE) capsule 100 mg (has no administration in time range)   gabapentin (NEURONTIN) capsule 300 mg (has no administration in time range)   levothyroxine (SYNTHROID/LEVOTHROID) tablet 112 mcg (has no administration in time range)   lithium (ESKALITH CR/LITHOBID) CR tablet 450 mg (has no administration in time range)   prazosin (MINIPRESS) capsule 3 mg (has no administration in time range)   venlafaxine (EFFEXOR-XR) 24 hr capsule 300 mg (has no administration in time range)   OLANZapine zydis (zyPREXA) ODT tab 10 mg (has no administration in time range)   nicotine (NICORETTE) gum 4 mg (4 mg Buccal Given 12/26/21 2134)   OLANZapine zydis (zyPREXA) ODT tab 10 mg (10 mg Oral Given 12/26/21 2313)            There were no significant events during my shift.    Patient was signed out to the oncoming provider      Impression:     ICD-10-CM    1. Schizoaffective disorder, bipolar type (H)  F25.0    2. Hallucinations  R44.3        Plan:    1. Awaiting inpatient mental health admission/transfer.      RESULTS:   Results for orders placed or performed during the hospital encounter of 12/26/21 (from the past 24 hour(s))   Urine Drugs of Abuse Screen     Status: Normal    Collection Time: 12/26/21  7:46 PM    Narrative    The following orders were created for panel order Urine Drugs of Abuse Screen.  Procedure                               Abnormality         Status                     ---------                               -----------         ------                     Drug abuse screen 1 urin...[[id number]]  Normal              Final result                 Please view results for these tests on the individual orders.   Drug abuse screen 1 urine (ED)     Status: Normal    Collection Time: 12/26/21  7:46 PM   Result Value Ref Range    Amphetamines Urine Screen Negative Screen Negative    Barbiturates Urine Screen Negative Screen Negative    Benzodiazepines Urine Screen Negative Screen Negative    Cannabinoids Urine Screen Negative Screen Negative    Cocaine Urine Screen Negative Screen Negative    Opiates Urine Screen Negative Screen Negative   CBC with platelets differential     Status: None    Collection Time: 12/26/21  8:48 PM    Narrative    The following orders were created for panel order CBC with platelets differential.  Procedure                               Abnormality         Status                     ---------                               -----------         ------                     CBC with platelets and d...[[id number]]                      Final result                 Please view results for these tests on the individual orders.   Comprehensive metabolic panel     Status: Abnormal    Collection Time: 12/26/21  8:48 PM   Result Value Ref Range    Sodium 141 133 - 144 mmol/L    Potassium 3.9 3.4 - 5.3 mmol/L    Chloride 111 (H) 94  - 109 mmol/L    Carbon Dioxide (CO2) 29 20 - 32 mmol/L    Anion Gap 1 (L) 3 - 14 mmol/L    Urea Nitrogen 6 (L) 7 - 30 mg/dL    Creatinine 0.69 0.66 - 1.25 mg/dL    Calcium 9.6 8.5 - 10.1 mg/dL    Glucose 89 70 - 99 mg/dL    Alkaline Phosphatase 111 40 - 150 U/L    AST 17 0 - 45 U/L    ALT 23 0 - 70 U/L    Protein Total 7.7 6.8 - 8.8 g/dL    Albumin 4.1 3.4 - 5.0 g/dL    Bilirubin Total 0.2 0.2 - 1.3 mg/dL    GFR Estimate >90 >60 mL/min/1.73m2   Ethyl Alcohol Level     Status: Normal    Collection Time: 12/26/21  8:48 PM   Result Value Ref Range    Alcohol ethyl <0.01 <=0.01 g/dL   Lactic acid whole blood     Status: Normal    Collection Time: 12/26/21  8:48 PM   Result Value Ref Range    Lactic Acid 1.3 0.7 - 2.0 mmol/L   Acetaminophen level     Status: Abnormal    Collection Time: 12/26/21  8:48 PM   Result Value Ref Range    Acetaminophen <2 (L) 10 - 30 mg/L   Salicylate level     Status: Normal    Collection Time: 12/26/21  8:48 PM   Result Value Ref Range    Salicylate <2 <20 mg/dL   CBC with platelets and differential     Status: None    Collection Time: 12/26/21  8:48 PM   Result Value Ref Range    WBC Count 9.6 4.0 - 11.0 10e3/uL    RBC Count 4.84 4.40 - 5.90 10e6/uL    Hemoglobin 15.2 13.3 - 17.7 g/dL    Hematocrit 45.0 40.0 - 53.0 %    MCV 93 78 - 100 fL    MCH 31.4 26.5 - 33.0 pg    MCHC 33.8 31.5 - 36.5 g/dL    RDW 12.1 10.0 - 15.0 %    Platelet Count 253 150 - 450 10e3/uL    % Neutrophils 69 %    % Lymphocytes 21 %    % Monocytes 8 %    % Eosinophils 0 %    % Basophils 1 %    % Immature Granulocytes 1 %    NRBCs per 100 WBC 0 <1 /100    Absolute Neutrophils 6.8 1.6 - 8.3 10e3/uL    Absolute Lymphocytes 2.0 0.8 - 5.3 10e3/uL    Absolute Monocytes 0.8 0.0 - 1.3 10e3/uL    Absolute Eosinophils 0.0 0.0 - 0.7 10e3/uL    Absolute Basophils 0.1 0.0 - 0.2 10e3/uL    Absolute Immature Granulocytes 0.1 <=0.4 10e3/uL    Absolute NRBCs 0.0 10e3/uL             MD Saw Aguilar Matthew  MD Constantino  12/27/21 0017

## 2021-12-27 NOTE — ED PROVIDER NOTES
[facility] EMERGENCY DEPARTMENT ([facility])    12/26/21    HW01  7:44 PM  History     Chief Complaint   Patient presents with     Hallucinations     Auditory hallucinations     The history is provided by the patient and medical records.     Kamini Neal is a 31 year old male with a past medical history significant for anxiety, depression, PTSD, bipolar disorder, schizoaffective disorder, and alcohol abuse who presents to the ED for an evaluation of auditory hallucinations.  Patient reports that he is hearing voices.  They are telling him to kill himself and that they are coming after him.  Patient reports suicidal ideation with no plan.  Denies alcohol or drug use.    Per chart review patient has been in the ED for suicidal ideation and hallucinations multiple times in the past.  With this most recent ED visit on 12/13/2021 here at [facility].  She presented with SI with no specific plan.  Patient was evaluated by a mental health  and was discharged back to his group home.    Past Medical History  Past Medical History:   Diagnosis Date     Anxiety      Depressive disorder      Schizo affective schizophrenia (H)      Past Surgical History:   Procedure Laterality Date     TONSILLECTOMY       cholecalciferol (VITAMIN D3) 125 mcg (5000 units) capsule  cloZAPine (CLOZARIL) 100 MG tablet  cloZAPine (CLOZARIL) 50 MG tablet  docusate sodium (COLACE) 100 MG capsule  gabapentin (NEURONTIN) 300 MG capsule  glycopyrrolate (ROBINUL) 1 MG tablet  levothyroxine (SYNTHROID/LEVOTHROID) 112 MCG tablet  lithium (ESKALITH CR/LITHOBID) 450 MG CR tablet  OLANZapine (ZYPREXA) 10 MG tablet  omeprazole (PRILOSEC) 20 MG DR capsule  polyethylene glycol (MIRALAX) 17 g packet  prazosin (MINIPRESS) 1 MG capsule  venlafaxine (EFFEXOR-XR) 150 MG 24 hr capsule  paliperidone (INVEGA SUSTENNA) 234 MG/1.5ML ANTHONY  Sodium Fluoride (DENTA 5000 PLUS DT)      Allergies   Allergen Reactions     Haldol [Haloperidol]      Other reaction(s):  Tardive Dyskinesia  Torticollis  Torticollis     Family History  Family History   Problem Relation Age of Onset     Mental Illness Maternal Uncle      Mental Illness Maternal Aunt      Glaucoma No family hx of      Macular Degeneration No family hx of      Social History   Social History     Tobacco Use     Smoking status: Current Every Day Smoker     Packs/day: 0.50     Smokeless tobacco: Never Used   Substance Use Topics     Alcohol use: No     Drug use: No      Past medical history, past surgical history, medications, allergies, family history, and social history were reviewed with the patient. No additional pertinent items.       Review of Systems   Constitutional: Negative for chills and fever.   HENT: Negative for congestion, rhinorrhea and sore throat.    Eyes: Negative for pain and discharge.   Respiratory: Negative for cough, shortness of breath and wheezing.    Cardiovascular: Negative for chest pain and leg swelling.   Gastrointestinal: Negative for abdominal distention, abdominal pain, diarrhea, nausea and vomiting.   Genitourinary: Negative for hematuria and urgency.   Musculoskeletal: Negative for back pain and neck pain.   Skin: Negative for rash and wound.   Neurological: Negative for dizziness, weakness, numbness and headaches.   Psychiatric/Behavioral: Positive for hallucinations and suicidal ideas.     A complete review of systems was performed with pertinent positives and negatives noted in the HPI, and all other systems negative.    Physical Exam   BP: (!) 139/94  Pulse: 97  Temp: 97.3  F (36.3  C)  Resp: 18  SpO2: 100 %  Physical Exam  Constitutional:       General: He is awake. He is not in acute distress.     Appearance: Normal appearance.   HENT:      Head: Normocephalic and atraumatic.      Nose: Nose normal.      Mouth/Throat:      Mouth: Mucous membranes are dry.      Pharynx: Oropharynx is clear.   Eyes:      General: Lids are normal.      Extraocular Movements: Extraocular movements  intact.      Conjunctiva/sclera: Conjunctivae normal.      Pupils: Pupils are equal, round, and reactive to light.   Cardiovascular:      Rate and Rhythm: Normal rate and regular rhythm.      Pulses: Normal pulses.      Heart sounds: Normal heart sounds. No murmur heard.  No friction rub. No gallop.    Pulmonary:      Effort: Pulmonary effort is normal. No respiratory distress.      Breath sounds: Normal breath sounds. No stridor. No wheezing or rhonchi.   Abdominal:      General: Abdomen is flat.      Palpations: Abdomen is soft.   Musculoskeletal:         General: Normal range of motion.      Cervical back: Full passive range of motion without pain, normal range of motion and neck supple.   Skin:     General: Skin is warm and dry.      Capillary Refill: Capillary refill takes less than 2 seconds.   Neurological:      General: No focal deficit present.      Mental Status: He is alert and oriented to person, place, and time. Mental status is at baseline.   Psychiatric:         Attention and Perception: Attention normal.         Mood and Affect: Mood normal.         Behavior: Behavior normal. Behavior is cooperative.       ED Course      Procedures              Results for orders placed or performed during the hospital encounter of 12/26/21   Drug abuse screen 1 urine (ED)     Status: Normal   Result Value Ref Range    Amphetamines Urine Screen Negative Screen Negative    Barbiturates Urine Screen Negative Screen Negative    Benzodiazepines Urine Screen Negative Screen Negative    Cannabinoids Urine Screen Negative Screen Negative    Cocaine Urine Screen Negative Screen Negative    Opiates Urine Screen Negative Screen Negative   Comprehensive metabolic panel     Status: Abnormal (Preliminary result)   Result Value Ref Range    Sodium 141 133 - 144 mmol/L    Potassium 3.9 3.4 - 5.3 mmol/L    Chloride 111 (H) 94 - 109 mmol/L    Carbon Dioxide (CO2)      Anion Gap      Urea Nitrogen      Creatinine      Calcium       Glucose      Alkaline Phosphatase      AST      ALT      Protein Total      Albumin      Bilirubin Total      GFR Estimate     Lactic acid whole blood     Status: Normal   Result Value Ref Range    Lactic Acid 1.3 0.7 - 2.0 mmol/L   Salicylate level     Status: Normal   Result Value Ref Range    Salicylate <2 <20 mg/dL   CBC with platelets and differential     Status: None   Result Value Ref Range    WBC Count 9.6 4.0 - 11.0 10e3/uL    RBC Count 4.84 4.40 - 5.90 10e6/uL    Hemoglobin 15.2 13.3 - 17.7 g/dL    Hematocrit 45.0 40.0 - 53.0 %    MCV 93 78 - 100 fL    MCH 31.4 26.5 - 33.0 pg    MCHC 33.8 31.5 - 36.5 g/dL    RDW 12.1 10.0 - 15.0 %    Platelet Count 253 150 - 450 10e3/uL    % Neutrophils 69 %    % Lymphocytes 21 %    % Monocytes 8 %    % Eosinophils 0 %    % Basophils 1 %    % Immature Granulocytes 1 %    NRBCs per 100 WBC 0 <1 /100    Absolute Neutrophils 6.8 1.6 - 8.3 10e3/uL    Absolute Lymphocytes 2.0 0.8 - 5.3 10e3/uL    Absolute Monocytes 0.8 0.0 - 1.3 10e3/uL    Absolute Eosinophils 0.0 0.0 - 0.7 10e3/uL    Absolute Basophils 0.1 0.0 - 0.2 10e3/uL    Absolute Immature Granulocytes 0.1 <=0.4 10e3/uL    Absolute NRBCs 0.0 10e3/uL   Urine Drugs of Abuse Screen     Status: Normal    Narrative    The following orders were created for panel order Urine Drugs of Abuse Screen.  Procedure                               Abnormality         Status                     ---------                               -----------         ------                     Drug abuse screen 1 urin...[[id number]]  Normal              Final result                 Please view results for these tests on the individual orders.   CBC with platelets differential     Status: None    Narrative    The following orders were created for panel order CBC with platelets differential.  Procedure                               Abnormality         Status                     ---------                               -----------         ------                      CBC with platelets and d...[[id number]]                      Final result                 Please view results for these tests on the individual orders.     Medications   nicotine (NICORETTE) gum 4 mg (4 mg Buccal Given 12/26/21 2134)        Assessments & Plan (with Medical Decision Making)   This is a 31 year old male who comes to the ED for evaluation of hallucinations, patient has auditory hallucinations, has commands coming from a voice instructing him to kill himself, has had previous thoughts of wanting to light himself on fire. At present, given his history and my examination I do not feel any acute emergency medical condition is present and contributing to the symptoms of concern today. Considerations included delirium, infection, metabolic and endocrine abnormalities, medication effect, substance abuse or withdrawal or cute neurological process. The patient does qualify for a psychiatric hold, and is currently voluntary.   Medical screening exam has been completed, and no additional work up is needed and the patient is medically cleared for psychiatric evaluation. Disposition pending psychiatry recommendations.     Blood work is unremarkable, normal renal function, no noted anemia, no noted acidosis, noted leukocytosis.    Mental health  concurs patient would benefit from inpatient admission, patient will be admitted for further care in a psychiatric unit.    Patient signed out to Dr. Spring for further care at the conclusion of my shift for further care, while he awaits admission.    I have reviewed the nursing notes. I have reviewed the findings, diagnosis, plan and need for follow up with the patient.    New Prescriptions    No medications on file       Final diagnoses:   Schizoaffective disorder, bipolar type (H)   Hallucinations       --  I, Amaris Orta, am serving as a trained medical scribe to document services personally performed by Mumtaz Hilliard MD, based on the provider's  statements to me.     I, Mumtaz Hilliard MD, was physically present and have reviewed and verified the accuracy of this note documented by Amaris Orta.    Mumtaz Hilliard MD  [facility] EMERGENCY DEPARTMENT  12/26/2021     Mumtaz Hilliard MD  12/26/21 2655

## 2021-12-27 NOTE — ED NOTES
Kamini Neal  December 26, 2021  SAFE Note    Critical Safety Issues:       Current Suicidal Ideation/Self-Injurious Concerns/Methods: Burning      Current or Historical Inappropriate Sexual Behavior: No      Current or Historical Aggression/Homicidal Ideation: Access to Weapons      Triggers: auditory hallucinations/commands    Updated care team: Yes:     For additional details see full LMHP assessment.       Zohreh Mathew MSW

## 2021-12-27 NOTE — PHARMACY
Pharmacy Clozapine Continuation Note    Patient's Name: Kamini Neal  Patient's : 1990    Current cloZAPine regimen: 450 mg HS  Has there been a known interruption in therapy for greater than/equal to 48 hours which would warrant retitration No    Recent ANC Value(s) for last 30 days:  2021: Absolute Neutrophils 5.9 10e3/uL (Ref range: 1.6 - 8.3 10e3/uL)  2021: Absolute Neutrophils 6.8 10e3/uL (Ref range: 1.6 - 8.3 10e3/uL)      A REMS Dispense Authorization was obtained from the clozapine REMS prgram? Yes   A Dispense Rationale was needed to obtain the REMS Dispense Authorization? No   Is the ANC (if available) within recommended limits? Yes  Does the patient have any signs or symptoms of infection, including fever? No    Plan:  1. Continue clozapine therapy at 450 mg HS.  2. A WBC with differential will be ordered at least weekly, NEXT DUE 2022. ANC values will be entered into the REMS program.    Constantino Casillas, PharmD, BCPS  [facility]  Emergency Department: Ascom *[phone]

## 2021-12-27 NOTE — ED NOTES
Kamini Neal is reviewed for [facility] Extended [facility] service, due to being in the hospital for over 12 hours awaiting an inpatient mental health bed. Will follow and meet with patient/family/care team as able or requested.     MEHREEN Valdes  [facility], [facility] Extended Care   [phone] [id number] Writer attempted to meet with Kamini and he reported he was too tired and didn't want to talk.  Writer encouraged him to let staff know if he feels like talking later.    4038, 1960 Writer did speak with Florentino, nurse, to coordinate care.

## 2021-12-28 ENCOUNTER — HOSPITAL ENCOUNTER (INPATIENT)
Facility: CLINIC | Age: 31
LOS: 6 days | Discharge: NURSING, REHAB OR LONG-TERM CARE | End: 2022-01-03
Attending: PSYCHIATRY & NEUROLOGY | Admitting: PSYCHIATRY & NEUROLOGY
Payer: COMMERCIAL

## 2021-12-28 VITALS
OXYGEN SATURATION: 99 % | DIASTOLIC BLOOD PRESSURE: 68 MMHG | RESPIRATION RATE: 16 BRPM | TEMPERATURE: 98.3 F | SYSTOLIC BLOOD PRESSURE: 106 MMHG | HEART RATE: 85 BPM

## 2021-12-28 DIAGNOSIS — R45.851 SUICIDAL IDEATION: ICD-10-CM

## 2021-12-28 DIAGNOSIS — E03.8 OTHER SPECIFIED HYPOTHYROIDISM: ICD-10-CM

## 2021-12-28 DIAGNOSIS — F43.10 POSTTRAUMATIC STRESS DISORDER: ICD-10-CM

## 2021-12-28 DIAGNOSIS — K11.7 SIALORRHEA: ICD-10-CM

## 2021-12-28 DIAGNOSIS — F43.10 PTSD (POST-TRAUMATIC STRESS DISORDER): ICD-10-CM

## 2021-12-28 DIAGNOSIS — E55.9 VITAMIN D DEFICIENCY: ICD-10-CM

## 2021-12-28 DIAGNOSIS — K21.9 GASTROESOPHAGEAL REFLUX DISEASE, UNSPECIFIED WHETHER ESOPHAGITIS PRESENT: ICD-10-CM

## 2021-12-28 DIAGNOSIS — F25.1 SCHIZOAFFECTIVE DISORDER, DEPRESSIVE TYPE (H): Primary | ICD-10-CM

## 2021-12-28 DIAGNOSIS — R45.851 SUICIDAL IDEATIONS: ICD-10-CM

## 2021-12-28 PROCEDURE — 250N000013 HC RX MED GY IP 250 OP 250 PS 637: Performed by: NURSE PRACTITIONER

## 2021-12-28 PROCEDURE — 250N000013 HC RX MED GY IP 250 OP 250 PS 637: Performed by: PSYCHIATRY & NEUROLOGY

## 2021-12-28 PROCEDURE — 99223 1ST HOSP IP/OBS HIGH 75: CPT | Performed by: NURSE PRACTITIONER

## 2021-12-28 PROCEDURE — 128N000001 HC R&B CD/MH ADULT

## 2021-12-28 RX ORDER — CLOZAPINE 100 MG/1
400 TABLET ORAL DAILY
Status: DISCONTINUED | OUTPATIENT
Start: 2021-12-28 | End: 2021-12-28

## 2021-12-28 RX ORDER — POLYETHYLENE GLYCOL 3350 17 G/17G
17 POWDER, FOR SOLUTION ORAL DAILY PRN
Status: DISCONTINUED | OUTPATIENT
Start: 2021-12-28 | End: 2022-01-03 | Stop reason: HOSPADM

## 2021-12-28 RX ORDER — PRAZOSIN HYDROCHLORIDE 1 MG/1
3 CAPSULE ORAL AT BEDTIME
Status: DISCONTINUED | OUTPATIENT
Start: 2021-12-28 | End: 2022-01-03 | Stop reason: HOSPADM

## 2021-12-28 RX ORDER — CLOZAPINE 25 MG/1
50 TABLET ORAL DAILY
Status: DISCONTINUED | OUTPATIENT
Start: 2021-12-28 | End: 2021-12-28

## 2021-12-28 RX ORDER — LITHIUM CARBONATE 300 MG/1
300 TABLET, FILM COATED, EXTENDED RELEASE ORAL AT BEDTIME
Status: DISCONTINUED | OUTPATIENT
Start: 2021-12-28 | End: 2021-12-30

## 2021-12-28 RX ORDER — DOCUSATE SODIUM 100 MG/1
100 CAPSULE, LIQUID FILLED ORAL 2 TIMES DAILY
Status: DISCONTINUED | OUTPATIENT
Start: 2021-12-28 | End: 2022-01-03 | Stop reason: HOSPADM

## 2021-12-28 RX ORDER — LITHIUM CARBONATE 450 MG
450 TABLET, EXTENDED RELEASE ORAL AT BEDTIME
Status: DISCONTINUED | OUTPATIENT
Start: 2021-12-28 | End: 2021-12-28

## 2021-12-28 RX ORDER — LEVOTHYROXINE SODIUM 112 UG/1
112 TABLET ORAL DAILY
Status: DISCONTINUED | OUTPATIENT
Start: 2021-12-28 | End: 2022-01-03 | Stop reason: HOSPADM

## 2021-12-28 RX ORDER — VENLAFAXINE HYDROCHLORIDE 150 MG/1
300 CAPSULE, EXTENDED RELEASE ORAL DAILY
Status: DISCONTINUED | OUTPATIENT
Start: 2021-12-28 | End: 2022-01-03 | Stop reason: HOSPADM

## 2021-12-28 RX ORDER — GABAPENTIN 300 MG/1
300 CAPSULE ORAL 3 TIMES DAILY
Status: DISCONTINUED | OUTPATIENT
Start: 2021-12-28 | End: 2022-01-03 | Stop reason: HOSPADM

## 2021-12-28 RX ORDER — PANTOPRAZOLE SODIUM 40 MG/1
40 TABLET, DELAYED RELEASE ORAL DAILY
Status: DISCONTINUED | OUTPATIENT
Start: 2021-12-28 | End: 2022-01-03 | Stop reason: HOSPADM

## 2021-12-28 RX ORDER — GLYCOPYRROLATE 1 MG/1
1 TABLET ORAL 3 TIMES DAILY
Status: DISCONTINUED | OUTPATIENT
Start: 2021-12-28 | End: 2022-01-03 | Stop reason: HOSPADM

## 2021-12-28 RX ORDER — OLANZAPINE 10 MG/1
10 TABLET ORAL DAILY PRN
Status: DISCONTINUED | OUTPATIENT
Start: 2021-12-28 | End: 2022-01-03 | Stop reason: HOSPADM

## 2021-12-28 RX ADMIN — GLYCOPYRROLATE 1 MG: 1 TABLET ORAL at 13:21

## 2021-12-28 RX ADMIN — GABAPENTIN 300 MG: 300 CAPSULE ORAL at 13:21

## 2021-12-28 RX ADMIN — LEVOTHYROXINE SODIUM 112 MCG: 112 TABLET ORAL at 09:41

## 2021-12-28 RX ADMIN — PANTOPRAZOLE SODIUM 40 MG: 40 TABLET, DELAYED RELEASE ORAL at 09:41

## 2021-12-28 RX ADMIN — NICOTINE POLACRILEX 2 MG: 2 GUM, CHEWING BUCCAL at 17:11

## 2021-12-28 RX ADMIN — GABAPENTIN 300 MG: 300 CAPSULE ORAL at 09:40

## 2021-12-28 RX ADMIN — PRAZOSIN HYDROCHLORIDE 3 MG: 1 CAPSULE ORAL at 20:32

## 2021-12-28 RX ADMIN — GLYCOPYRROLATE 1 MG: 1 TABLET ORAL at 20:32

## 2021-12-28 RX ADMIN — NICOTINE POLACRILEX 2 MG: 2 GUM, CHEWING BUCCAL at 09:52

## 2021-12-28 RX ADMIN — DOCUSATE SODIUM 100 MG: 100 CAPSULE, LIQUID FILLED ORAL at 20:32

## 2021-12-28 RX ADMIN — LITHIUM CARBONATE 300 MG: 300 TABLET, FILM COATED, EXTENDED RELEASE ORAL at 20:32

## 2021-12-28 RX ADMIN — VENLAFAXINE HYDROCHLORIDE 300 MG: 150 CAPSULE, EXTENDED RELEASE ORAL at 09:40

## 2021-12-28 RX ADMIN — CHOLECALCIFEROL TAB 125 MCG (5000 UNIT) 125 MCG: 125 TAB at 09:41

## 2021-12-28 RX ADMIN — NICOTINE POLACRILEX 2 MG: 2 GUM, CHEWING BUCCAL at 19:10

## 2021-12-28 RX ADMIN — DOCUSATE SODIUM 100 MG: 100 CAPSULE, LIQUID FILLED ORAL at 09:40

## 2021-12-28 RX ADMIN — GLYCOPYRROLATE 1 MG: 1 TABLET ORAL at 09:41

## 2021-12-28 RX ADMIN — NICOTINE POLACRILEX 2 MG: 2 GUM, CHEWING BUCCAL at 13:21

## 2021-12-28 RX ADMIN — NICOTINE POLACRILEX 2 MG: 2 GUM, CHEWING BUCCAL at 20:32

## 2021-12-28 RX ADMIN — GABAPENTIN 300 MG: 300 CAPSULE ORAL at 20:32

## 2021-12-28 RX ADMIN — CLOZAPINE 450 MG: 100 TABLET ORAL at 20:32

## 2021-12-28 RX ADMIN — NICOTINE POLACRILEX 2 MG: 2 GUM, CHEWING BUCCAL at 21:32

## 2021-12-28 ASSESSMENT — ACTIVITIES OF DAILY LIVING (ADL)
HYGIENE/GROOMING: INDEPENDENT
ORAL_HYGIENE: INDEPENDENT
DRESS: INDEPENDENT
LAUNDRY: WITH SUPERVISION
DRESS: INDEPENDENT
LAUNDRY: WITH SUPERVISION
HYGIENE/GROOMING: INDEPENDENT
HYGIENE/GROOMING: INDEPENDENT
ORAL_HYGIENE: INDEPENDENT
ORAL_HYGIENE: INDEPENDENT
DRESS: INDEPENDENT

## 2021-12-28 ASSESSMENT — MIFFLIN-ST. JEOR: SCORE: 2036.75

## 2021-12-28 NOTE — TELEPHONE ENCOUNTER
R: 6:34PM- Dr. Chavez accepts for 4500/[place]. Unit is pending a transfer to [facility] so once that happens then unit charge will call ED for report.      ED: [phone]  Unit 4500: [phone].    ED staff notified.
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
## 2021-12-28 NOTE — PLAN OF CARE
Problem: Behavioral Health Plan of Care  Goal: Plan of Care Review  12/28/2021 0336 by Juan Brooks, RN  Outcome: Improving  12/28/2021 0227 by Juan Brooks, RN  Outcome: Improving  Flowsheets (Taken 12/28/2021 0209)  Plan of Care Reviewed With: patient  Patient Agreement with Plan of Care: agrees  Goal: Adheres to Safety Considerations for Self and Others  Outcome: Improving  Goal: Optimized Coping Skills in Response to Life Stressors  Outcome: Improving  Goal: Develops/Participates in Therapeutic [place] to Support Successful Transition  Outcome: Improving     Problem: Suicidal Behavior  Goal: Suicidal Behavior is Absent or Managed  Outcome: Improving   Pt arrived on the unit via stretcher accompanied by medics. He is alert and oriented, able to make needs known. He denies pain or discomfort this time. He endorsed anxiety and rated at 4/10, depression 6/10, but denied the remainder of psych symptoms and contracted for safety. Apparently pt lives in a group home in [place]. He stated that he called 911 because he was hearing voices which were telling him to hurt himself and that they are after him. So he wanted to be evaluated in the ED. That while in the ED, he was having suicidal ideation, but with no plan.  Oriented pt to room, unit , and the unit routine. Pt is pleasant with a flat and blunted affect. He is calm and cooperative with the admission process.  Will continues with same plan of care.
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
## 2021-12-28 NOTE — PROGRESS NOTES
12/28/21 1153   Engagement   Intervention Group   Topic Detail OT: Self-care for healthy routine building, fostering self-esteem, emotional and physical wellness   Attendance Did not attend

## 2021-12-28 NOTE — PHARMACY-ADMISSION MEDICATION HISTORY
Admission medication history completed at [facility]. Please see Pharmacy - Admission Medication History note from 12/27/2021.

## 2021-12-28 NOTE — PLAN OF CARE
"  Problem: Behavioral Health Plan of Care  Goal: Patient-Specific Goal (Individualization)  Outcome: Improving  Note: Shift Summery:  Patient appears calm but withdrawn. Was out for breakfast late. However, he ate 100% of his meal.     Patient's Stated Goal for Shift:  \"less isolative\"    Goal Status:  In Process       Patient appears calm but withdrawn. Was out for breakfast late because he refused to get out of bed despite multiple attempts. However, when he finally came out he ate 100% of his meal. He reported anxiety 3 and depression 4. Patient endorsed SI with no plan. He denied HI/SIB or hallucinations. He contracted for safety. He was compliant with his morning medications. Denied pain. Patient was asking if his group home knows that he is here. Patient was reassured. Patient appeared cooperative and able to make needs known.   "

## 2021-12-28 NOTE — PLAN OF CARE
Problem: Suicidal Behavior  Goal: Suicidal Behavior is Absent or Managed  12/28/2021 1708 by Yajaira Patel, RN  Outcome: Improving  12/28/2021 1000 by Yajaira Patel, RN  Outcome: Improving     Patient briefly out in the lounge at early part of the shift and retires immediately after dinner. Was compliant with vital signs check only sitting. Refused orthostatic BP check. Denied pain. Endorsed SI without plan. Anxiety 3 and depression 4. Patient remains isolative in his room.

## 2021-12-29 PROCEDURE — 128N000001 HC R&B CD/MH ADULT

## 2021-12-29 PROCEDURE — 99231 SBSQ HOSP IP/OBS SF/LOW 25: CPT | Performed by: NURSE PRACTITIONER

## 2021-12-29 PROCEDURE — 250N000013 HC RX MED GY IP 250 OP 250 PS 637: Performed by: PSYCHIATRY & NEUROLOGY

## 2021-12-29 PROCEDURE — 250N000013 HC RX MED GY IP 250 OP 250 PS 637: Performed by: NURSE PRACTITIONER

## 2021-12-29 RX ORDER — CLOZAPINE 100 MG/1
500 TABLET ORAL AT BEDTIME
Status: DISCONTINUED | OUTPATIENT
Start: 2021-12-29 | End: 2022-01-03 | Stop reason: HOSPADM

## 2021-12-29 RX ADMIN — NICOTINE POLACRILEX 2 MG: 2 GUM, CHEWING BUCCAL at 21:18

## 2021-12-29 RX ADMIN — VENLAFAXINE HYDROCHLORIDE 300 MG: 150 CAPSULE, EXTENDED RELEASE ORAL at 09:18

## 2021-12-29 RX ADMIN — GLYCOPYRROLATE 1 MG: 1 TABLET ORAL at 20:13

## 2021-12-29 RX ADMIN — NICOTINE POLACRILEX 2 MG: 2 GUM, CHEWING BUCCAL at 09:18

## 2021-12-29 RX ADMIN — GABAPENTIN 300 MG: 300 CAPSULE ORAL at 09:18

## 2021-12-29 RX ADMIN — DOCUSATE SODIUM 100 MG: 100 CAPSULE, LIQUID FILLED ORAL at 09:18

## 2021-12-29 RX ADMIN — NICOTINE POLACRILEX 2 MG: 2 GUM, CHEWING BUCCAL at 15:47

## 2021-12-29 RX ADMIN — CLOZAPINE 500 MG: 100 TABLET ORAL at 20:12

## 2021-12-29 RX ADMIN — CHOLECALCIFEROL TAB 125 MCG (5000 UNIT) 125 MCG: 125 TAB at 09:18

## 2021-12-29 RX ADMIN — GABAPENTIN 300 MG: 300 CAPSULE ORAL at 13:17

## 2021-12-29 RX ADMIN — PRAZOSIN HYDROCHLORIDE 3 MG: 1 CAPSULE ORAL at 20:13

## 2021-12-29 RX ADMIN — NICOTINE POLACRILEX 2 MG: 2 GUM, CHEWING BUCCAL at 17:00

## 2021-12-29 RX ADMIN — DOCUSATE SODIUM 100 MG: 100 CAPSULE, LIQUID FILLED ORAL at 20:13

## 2021-12-29 RX ADMIN — LITHIUM CARBONATE 300 MG: 300 TABLET, FILM COATED, EXTENDED RELEASE ORAL at 20:13

## 2021-12-29 RX ADMIN — GABAPENTIN 300 MG: 300 CAPSULE ORAL at 20:12

## 2021-12-29 RX ADMIN — GLYCOPYRROLATE 1 MG: 1 TABLET ORAL at 09:18

## 2021-12-29 RX ADMIN — NICOTINE POLACRILEX 2 MG: 2 GUM, CHEWING BUCCAL at 19:11

## 2021-12-29 RX ADMIN — GLYCOPYRROLATE 1 MG: 1 TABLET ORAL at 13:17

## 2021-12-29 RX ADMIN — NICOTINE POLACRILEX 2 MG: 2 GUM, CHEWING BUCCAL at 18:01

## 2021-12-29 RX ADMIN — PANTOPRAZOLE SODIUM 40 MG: 40 TABLET, DELAYED RELEASE ORAL at 09:18

## 2021-12-29 RX ADMIN — NICOTINE POLACRILEX 2 MG: 2 GUM, CHEWING BUCCAL at 20:12

## 2021-12-29 RX ADMIN — LEVOTHYROXINE SODIUM 112 MCG: 112 TABLET ORAL at 09:18

## 2021-12-29 ASSESSMENT — ACTIVITIES OF DAILY LIVING (ADL)
DRESS: INDEPENDENT
LAUNDRY: WITH SUPERVISION
ORAL_HYGIENE: INDEPENDENT
HYGIENE/GROOMING: INDEPENDENT
HYGIENE/GROOMING: SHOWER;INDEPENDENT
ORAL_HYGIENE: INDEPENDENT
LAUNDRY: WITH SUPERVISION
DRESS: INDEPENDENT

## 2021-12-29 NOTE — PROGRESS NOTES
Patient on suicide precautions, no concerns noted this shift. 15 minute safety checks performed per unit policy. PRN nicotine gum given at 2032 and 2132.    Sadia Cervantes RN

## 2021-12-29 NOTE — PROGRESS NOTES
12/29/21 0915   Engagement   Intervention Group   Topic Detail OT: Education on physical and social wellness and interactive social activity (Exercise Dice & Chat Pack) to increase concentration, taking turns, attention to task, task follow through, coping with stress, symptom management, social engagement, physical wellness, and social wellness   Attendance Did not attend   Reason for Not Attending Refused     Did not attend after face to face invite.

## 2021-12-29 NOTE — PLAN OF CARE
Problem: Behavioral Health Plan of Care  Goal: Absence of New-Onset Illness or Injury  12/29/2021 1633 by Yajaira Patel RN  Outcome: Improving  12/29/2021 0945 by Yajaira Patel RN  Outcome: Improving     Problem: Suicidal Behavior  Goal: Suicidal Behavior is Absent or Managed  12/29/2021 1633 by Yajaira Patel RN  Outcome: Improving  12/29/2021 0945 by Yajaira Patel RN  Outcome: Improving     Patient pleasant and calm. Easily redirectable. Missed lunch due to day time sleeping. Came out at the beginning of the shift requesting to shower. Patient assisted and he showered. Denied anxiety and depression. Denied SI/HI hallucinations. Patient was compliant with vital signs check. Behavior remains appropriate. Patient is looking forward to discharge on Friday 12/31. He demonstrates good appetite. He was medication compliant.

## 2021-12-29 NOTE — PLAN OF CARE
Problem: Suicidal Behavior  Goal: Suicidal Behavior is Absent or Managed  Outcome: Improving     Pt voices no c/o pain. Pt slept greater than 5.5 hours. Safety checks completed q 15 minutes per unit policy. Pt on suicide precautions, no concerns noted. He is requesting Ensure drinks, note left for provider.     Sadia Cervantes RN

## 2021-12-29 NOTE — PROGRESS NOTES
12/29/21 1433   Engagement   Intervention Group   Topic Coping with stress;Symptom management   Topic Detail creative expression: Femo for fine motor challenge, coping skill development, healthy leisure engagement   Attendance Did not attend   Reason for Not Attending Refused

## 2021-12-29 NOTE — PLAN OF CARE
"Assessment/Intervention/Current Symptoms and Care Coordination  Writer received a call back from patient's group home director, Neela Brown ([phone]). She confirmed that patient is certainly welcome to return once he feels he is stabilized enough. She noted that Kamini gets easily triggered by his relationship with his mother, whom he speaks with every day on the phone. She lives in [facility], and according to Neela she projects unrealistic expectations onto Kamini in terms of providing her with financial support (saying \"you really disappointed me\" when he can't afford to send her $400/month). Neela also noted that Kamini has a lot of insight into his mental health and is really concerned about discharging from the hospital before he is ready (he was hospitalized earlier this month, but discharged after a few days and immediately decompensated again). Writer attempted to meet with the patient to check in on two occassions; both of which patient was in bed underneath the blankets and not responsive to writer's prompts.    Discharge Plan or Goal  Back to group home with outpatient supports    Barriers to Discharge   Stabilize symptoms, adjust meds, coordinate outpatient care    Referral Status  None at this time    Pt Contacts  Outpatient Psychiatrist: Marco Campo at [facility], [phone]  [facility] Group Home Director: Neela Brown, [phone]  CADI CM: Tenisha at [facility], [phone]  Job Coordinator with [employer]: Gabby [phone]    Legal Status  Voluntary    Anai Zamarripa [facility], 12/29/2021, 11:22 AM     "

## 2021-12-29 NOTE — PROGRESS NOTES
"PSYCHIATRY  PROGRESS NOTE     DATE OF SERVICE   12/29/2021         CHIEF COMPLAINT   \"I' doing better\"       SUBJECTIVE   Nursing reports: Patient isolative to his room for the most past of the shift. Came outside for meals. Denies all psych symtoms     reports: I have reviewed patient with the  earlier today and the plan is for patient to remain in the hospital for further stabilization. Per , patient's group home director is concerned about patient discharging back to the group home before he is ready.       OBJECTIVE   Chart reviewed and patient assessed. Patient patient was seen and evaluated in his room while lying in bed resting. Upon patient interview, patient reports he is doing much better compared to when he first got here. Patient stated the voices are almost gone. He continues to endorse passive suicidal thoughts, though denies plans or intent. Patient also reminded writer about his plan to stop taking lithium as he makes him tired all the time. Writer told patient that Lithium taper has been started already, though in a gradual manner as sudden discontinuation may result in severe adverse effects. Clozaril titrated to 500 mg at bed time to continue targeting SI and residual psychosis. Writer encouraged patient to spend more time outside his room.            MEDICATIONS   Medications:  Scheduled Meds:    cloZAPine  450 mg Oral At Bedtime     docusate sodium  100 mg Oral BID     gabapentin  300 mg Oral TID     glycopyrrolate  1 mg Oral TID     levothyroxine  112 mcg Oral Daily     lithium  300 mg Oral At Bedtime     pantoprazole  40 mg Oral Daily     prazosin  3 mg Oral At Bedtime     venlafaxine  300 mg Oral Daily     Vitamin D3  125 mcg Oral Daily     Continuous Infusions:  PRN Meds:.nicotine, OLANZapine, polyethylene glycol    Medication adherence issues: MS Med Adherence Y/N: No  Medication side effects: MEDICATION SIDE EFFECTS: no side effects reported  Benefit: Yes / " "No: Yes       ROS   A comprehensive review of system was negative, except noted in HPI       MENTAL STATUS EXAM   Vitals: /70 (BP Location: Left arm, Patient Position: Sitting)   Pulse 101   Temp 97.5  F (36.4  C) (Oral)   Resp 18   Ht 1.778 m (5' 10\")   Wt 106.5 kg (234 lb 14.4 oz)   SpO2 98%   BMI 33.70 kg/m      Appearance:  Casually groomed  Mood:  \"okay\"  Affect: full range  was congruent to speech Mood congruent, intensity is normal and reactive  Suicidal Ideation: denies absent  Homicidal Ideation: PRESENT / ABSENT: absent   Thought process: linear and goal oriented   Thought content: significant for paranoid ideation.   Fund of Knowledge: Average  Attention/Concentration: Fair  Language ability:  Intact  Memory:  Immediate recall intact, Short-term memory intact and Long-term memory intact  Insight:  fair.  Judgement: fair  Orientation: Yes, x4  Psychomotor Behavior: denies tardive dyskinesia, dystonia or tics  Muscle Strength and Tone: MuscleStrength: Normal  Gait and Station: Normal       LABS   personally reviewed.     No results found for: PHENYTOIN, PHENOBARB, VALPROATE, CBMZ       DIAGNOSIS   Principal Problem:    Schizoaffective disorder, depressive type (H)    Active Problem List:  Patient Active Problem List   Diagnosis     Depression     Suicidal ideation     Schizoaffective disorder, bipolar type (H)     Schizoaffective disorder, depressive type (H)     Schizoaffective disorder (H)     Suicidal behavior     Suicidal ideations     PTSD (post-traumatic stress disorder)     Agitation     History of schizophrenia     Posttraumatic stress disorder     Hallucinations     Alcohol abuse, episodic     Borderline personality disorder (H)     Cannabis dependence in remission (H)     Cannabis use disorder, moderate, in sustained remission (H)     GERD (gastroesophageal reflux disease)     High risk medication use     Hypothyroidism     Noncompliance     PPD positive, treated     TB lung, latent "     Major depression     Mood change          PLAN   1. Ongoing education given regarding diagnostic and treatment options with risks, benefits and alternatives and adequate verbalization of understanding.  2. Medication: Effexor 300 mg daily                         Gabapentin 300 mg TID                         Clozaril 500 mg at bedtime                         Lithium 300 mg at bedtime                         Prazosin 3 mg at bedtime                           3. Hospitalist consult: Hospitalist to see patient as needed  4. Legal: Voluntary  5.  to follow regarding collecting and reviewing collateral information, referrals and disposition planning    Risk Assessment: [facility] RISK ASSESSMENT: Patient able to contract for safety    Coordination of Care:   Treatment Plan reviewed and physician signed, Care discussed with Care/Treatment Team Members, Chart reviewed and Patient seen      Re-Certification I certify that the inpatient psychiatric facility services furnished since the previous certification were, and continue to be, medically necessary for, either, treatment which could reasonably be expected to improve the patient s condition or diagnostic study and that the hospital records indicate that the services furnished were, either, intensive treatment services, admission and related services necessary for diagnostic study, or equivalent services.     I certify that the patient continues to need, on a daily basis, active treatment furnished directly by or requiring the supervision of inpatient psychiatric facility personnel.   I estimate 3-5 days of hospitalization is necessary for proper treatment of the patient. My plans for post-hospital care for this patient are  group [facility]     AFSANEH Mckeon CNP    -     12/29/2021     -     8:45 AM    Total time  15 minutes with > 50%spent on coordination of cares and psycho-education.    This note was created with help of Dragon dictation system.  Grammatical / typing errors are not intentional.    AFSANEH cMkeon CNP

## 2021-12-30 LAB — LITHIUM SERPL-SCNC: 0.3 MMOL/L

## 2021-12-30 PROCEDURE — 250N000013 HC RX MED GY IP 250 OP 250 PS 637: Performed by: NURSE PRACTITIONER

## 2021-12-30 PROCEDURE — 99231 SBSQ HOSP IP/OBS SF/LOW 25: CPT | Performed by: NURSE PRACTITIONER

## 2021-12-30 PROCEDURE — 80178 ASSAY OF LITHIUM: CPT | Performed by: NURSE PRACTITIONER

## 2021-12-30 PROCEDURE — 250N000013 HC RX MED GY IP 250 OP 250 PS 637: Performed by: PSYCHIATRY & NEUROLOGY

## 2021-12-30 PROCEDURE — 36415 COLL VENOUS BLD VENIPUNCTURE: CPT | Performed by: NURSE PRACTITIONER

## 2021-12-30 PROCEDURE — 128N000001 HC R&B CD/MH ADULT

## 2021-12-30 RX ADMIN — GABAPENTIN 300 MG: 300 CAPSULE ORAL at 20:19

## 2021-12-30 RX ADMIN — NICOTINE POLACRILEX 2 MG: 2 GUM, CHEWING BUCCAL at 17:07

## 2021-12-30 RX ADMIN — BREXPIPRAZOLE 0.5 MG: 0.5 TABLET ORAL at 17:03

## 2021-12-30 RX ADMIN — LEVOTHYROXINE SODIUM 112 MCG: 112 TABLET ORAL at 08:37

## 2021-12-30 RX ADMIN — GABAPENTIN 300 MG: 300 CAPSULE ORAL at 08:36

## 2021-12-30 RX ADMIN — GLYCOPYRROLATE 1 MG: 1 TABLET ORAL at 08:37

## 2021-12-30 RX ADMIN — GABAPENTIN 300 MG: 300 CAPSULE ORAL at 14:18

## 2021-12-30 RX ADMIN — VENLAFAXINE HYDROCHLORIDE 300 MG: 150 CAPSULE, EXTENDED RELEASE ORAL at 08:36

## 2021-12-30 RX ADMIN — NICOTINE POLACRILEX 2 MG: 2 GUM, CHEWING BUCCAL at 20:35

## 2021-12-30 RX ADMIN — PRAZOSIN HYDROCHLORIDE 3 MG: 1 CAPSULE ORAL at 20:19

## 2021-12-30 RX ADMIN — GLYCOPYRROLATE 1 MG: 1 TABLET ORAL at 20:19

## 2021-12-30 RX ADMIN — CHOLECALCIFEROL TAB 125 MCG (5000 UNIT) 125 MCG: 125 TAB at 08:37

## 2021-12-30 RX ADMIN — DOCUSATE SODIUM 100 MG: 100 CAPSULE, LIQUID FILLED ORAL at 08:37

## 2021-12-30 RX ADMIN — NICOTINE POLACRILEX 2 MG: 2 GUM, CHEWING BUCCAL at 14:20

## 2021-12-30 RX ADMIN — NICOTINE POLACRILEX 2 MG: 2 GUM, CHEWING BUCCAL at 15:37

## 2021-12-30 RX ADMIN — CLOZAPINE 500 MG: 100 TABLET ORAL at 20:18

## 2021-12-30 RX ADMIN — GLYCOPYRROLATE 1 MG: 1 TABLET ORAL at 14:18

## 2021-12-30 RX ADMIN — PANTOPRAZOLE SODIUM 40 MG: 40 TABLET, DELAYED RELEASE ORAL at 08:36

## 2021-12-30 RX ADMIN — DOCUSATE SODIUM 100 MG: 100 CAPSULE, LIQUID FILLED ORAL at 20:19

## 2021-12-30 RX ADMIN — NICOTINE POLACRILEX 2 MG: 2 GUM, CHEWING BUCCAL at 18:44

## 2021-12-30 RX ADMIN — NICOTINE POLACRILEX 2 MG: 2 GUM, CHEWING BUCCAL at 22:06

## 2021-12-30 RX ADMIN — NICOTINE POLACRILEX 2 MG: 2 GUM, CHEWING BUCCAL at 08:45

## 2021-12-30 RX ADMIN — NICOTINE POLACRILEX 2 MG: 2 GUM, CHEWING BUCCAL at 12:33

## 2021-12-30 ASSESSMENT — ACTIVITIES OF DAILY LIVING (ADL)
DRESS: INDEPENDENT
HYGIENE/GROOMING: SHOWER
LAUNDRY: WITH SUPERVISION
ORAL_HYGIENE: INDEPENDENT

## 2021-12-30 NOTE — PLAN OF CARE
"Assessment/Intervention/Current Symptoms and Care Coordination  Writer met with the patient to check in and consulted with the provider during treatment team meeting. Patient continues to isolate to his room and spend most of his time sleeping. He reported that his mood is \"good\" and that his auditory hallucinations are \"almost gone\". He requested writer reach out to Gabby, his job coordinator through [employer], to discuss his plan to return to work. Pt demonstrated insight into the fact that he may need to take some time off work to continue resting and healing. Writer left Gabby a voicemail requesting a call back to discuss patient's discharge plans. Consulted with provider and tentatively planning for a discharge on Monday, 01/03, if patient continues stabilizing.    Discharge Plan or Goal  Back to group [facility] with outpatient supports    Barriers to Discharge   Stabilize symptoms, adjust meds, coordinate outpatient care    Referral Status  Outpatient psychiatry appointment with Marco Campo scheduled for 01/06 at 9:30am    Pt Contacts  Outpatient Psychiatrist: Marco Campo at [facility], [phone]  [facility] Director: Neela Brown, [phone]  [facility] CM: Tenisha at [facility], [phone]  Job Coordinator with [employer]: Gabby, [phone]    Legal Status  Voluntary    Anai Zamarripa [facility], 12/30/2021, 11:22 AM     "

## 2021-12-30 NOTE — PROGRESS NOTES
Occupational Therapy   AIDET    Patient was introduced to the role of occupational therapy and oriented to the process of occupational therapy services on the unit, including function of groups. Patient was given the Occupational Therapy Questionnaire to complete. Patient set up with coping skills list and lavender oil. Pt also interested in getting a stress ball. OT will follow up with assessment and address any questions, needs, or concerns.    Therapist: Kady Park, OTR  12/30/2021

## 2021-12-30 NOTE — PLAN OF CARE
Problem: Behavioral Health Plan of Care  Goal: Plan of Care Review  Outcome: Improving  Flowsheets  Taken 12/30/2021 1449  Plan of Care Reviewed With: patient  Outcome Summary: Patient has been in his room most of the shift. He endorses auditory hallucinations but states the medication is helping. He reports the medications are making him tired. Denies SI, HI, SIB, Contracted for safety. He stated he talked to provider and will be discharged on Monday. He is happy about that. No behavior issues. Contracted for safety and will continue to monitor  Progress: improving  Patient Agreement with Plan of Care: agrees  Taken 12/30/2021 0900  Plan of Care Reviewed With: patient  Patient Agreement with Plan of Care: disagrees (describe)     Problem: Behavioral Health Plan of Care  Goal: Adheres to Safety Considerations for Self and Others  Intervention: Develop and Maintain Individualized Safety Plan  Recent Flowsheet Documentation  Taken 12/30/2021 0900 by Huong Ro RN  Safety Measures: safety plan reviewed     Problem: Behavioral Health Plan of Care  Goal: Absence of New-Onset Illness or Injury  Intervention: Identify and Manage Fall Risk  Recent Flowsheet Documentation  Taken 12/30/2021 0900 by Huong Ro RN  Safety Measures: safety plan reviewed     Problem: Behavioral Health Plan of Care  Goal: Optimized Coping Skills in Response to Life Stressors  Intervention: Promote Effective Coping Strategies  Recent Flowsheet Documentation  Taken 12/30/2021 0900 by Huong Ro RN  Supportive Measures:   active listening utilized   decision-making supported   journaling promoted   positive reinforcement provided   relaxation techniques promoted   self-care encouraged   verbalization of feelings encouraged   self-responsibility promoted     Problem: Behavioral Health Plan of Care  Goal: Develops/Participates in Therapeutic [place] to Support Successful Transition  Intervention: Foster Therapeutic [place]  Recent  Flowsheet Documentation  Taken 12/30/2021 0900 by Huong Ro RN  Trust Relationship/Rapport:   care explained   emotional support provided   empathic listening provided   questions encouraged   reassurance provided   thoughts/feelings acknowledged     Problem: Suicidal Behavior  Goal: Suicidal Behavior is Absent or Managed  Outcome: Improving  Flowsheets (Taken 12/30/2021 1449)  Mutually Determined Action Steps (Suicidal Behavior Absent/Managed): identifies crisis plan   Patient  has been isolative due to medication changes. Pleasant and cooperative. Took all medications as prescribed. Ate all meals and snacks. Contracted for safety and will continue to monitor

## 2021-12-30 NOTE — PLAN OF CARE
Problem: Behavioral Health Plan of Care  Goal: Patient-Specific Goal (Individualization)  Outcome: Improving  Flowsheets (Taken 12/29/2021 1121)  Patient Vulnerabilities:   adverse childhood experience(s)   family/relationship conflict  Patient-Specific Goals (Include Timeframe): Stabilize, get medications adjusted  Patient Personal Strengths:   community support   stable living environment   resilient   insight into illness/situation     BEHAVIORAL TEAM DISCUSSION    Participants: Provider: Rodriguez KEEN RN: Kaycee MOORE, : WESLY OT: Rosangela LEW  Progress: Pt is progressing  Anticipated length of stay: Possible discharge Monday, 01/03  Continued Stay Criteria/Rationale: Stabilize symptoms and manage med adjustments  Medical/Physical: None noted  Precautions:   Behavioral Orders   Procedures    Code 1 - Restrict to Unit    Routine Programming     As clinically indicated    Status 15     Every 15 minutes.    Suicide precautions     Patients on Suicide Precautions should have a Combination Diet ordered that includes a Diet selection(s) AND a Behavioral Tray selection for Safe Tray - with utensils, or Safe Tray - NO utensils       Plan: Return to group home with outpatient mental health supports  Rationale for change in precautions or plan: N/A

## 2021-12-30 NOTE — PLAN OF CARE
Problem: Behavioral Health Plan of Care  Goal: Plan of Care Review  Outcome: Improving     Problem: Suicidal Behavior  Goal: Suicidal Behavior is Absent or Managed  Outcome: Improving  Flowsheets (Taken 12/30/2021 0054)  Mutually Determined Action Steps (Suicidal Behavior Absent/Managed): verbalizes safety check rationale   Pt had an uneventful night, noted sleeping well during 15 minutes safety checks. Pt does not appear to be in acute distress. No behavior. Pt up for snacks and ice water.  Pt denies SI/HI, SIB and all other symptoms of psychosis.

## 2021-12-30 NOTE — PROGRESS NOTES
"PSYCHIATRY  PROGRESS NOTE     DATE OF SERVICE   12/30/2021         CHIEF COMPLAINT   \"am I leaving tomorrow?\"       SUBJECTIVE   Nursing reports: Patient isolative to his room for the most past of the shift. Came outside for meals. Denies all psych symtoms     reports: I have reviewed patient with the  earlier today during the IDT meeting and the plan remains for patient to discharge back to his group home early next week he symptoms continue to improve.      OBJECTIVE   Chart reviewed and patient assessed. Patient patient was seen and evaluated in the comfort room by himself. Upon patient interview, patient inquired if he is going to be discharged tomorrow, saying he is doing much better. Patient stated voices are not there anymore as he denied suicidal ideation. Writer encouraged patient to stay over the weekend for further stabilization and medication management and possible discharge on Monday if symptoms continue to improve. Patient agreeable to stay through the weekend. Patient continued to report tiredness related to taking lithium. Patient's serum lithium came back to be 0.3. Writer finally discontinued Lithium after tapering due to intolerability and poor efficacy. Patient started on rexulti 0.5 mg daily as an adjunctive therapy to Effexor targeting residual depression. Patient denied suicidal and homicidal thoughts. No overt psychosis noted during this assessment       MEDICATIONS   Medications:  Scheduled Meds:    brexpiprazole  0.5 mg Oral Daily     cloZAPine  500 mg Oral At Bedtime     docusate sodium  100 mg Oral BID     gabapentin  300 mg Oral TID     glycopyrrolate  1 mg Oral TID     levothyroxine  112 mcg Oral Daily     lithium  300 mg Oral At Bedtime     pantoprazole  40 mg Oral Daily     prazosin  3 mg Oral At Bedtime     venlafaxine  300 mg Oral Daily     Vitamin D3  125 mcg Oral Daily     Continuous Infusions:  PRN Meds:.nicotine, OLANZapine, polyethylene " "glycol    Medication adherence issues: MS Med Adherence Y/N: No  Medication side effects: MEDICATION SIDE EFFECTS: no side effects reported  Benefit: Yes / No: Yes       ROS   A comprehensive review of system was negative, except noted in HPI       MENTAL STATUS EXAM   Vitals: BP (!) 129/92   Pulse 94   Temp 97.5  F (36.4  C) (Oral)   Resp 18   Ht 1.778 m (5' 10\")   Wt 106.5 kg (234 lb 14.4 oz)   SpO2 99%   BMI 33.70 kg/m      Appearance:  Casually groomed  Mood:  \"okay\"  Affect: full range  was congruent to speech Mood congruent, intensity is normal and reactive  Suicidal Ideation: denies absent  Homicidal Ideation: PRESENT / ABSENT: absent   Thought process: linear and goal oriented   Thought content: significant for paranoid ideation.   Fund of Knowledge: Average  Attention/Concentration: Fair  Language ability:  Intact  Memory:  Immediate recall intact, Short-term memory intact and Long-term memory intact  Insight:  fair.  Judgement: fair  Orientation: Yes, x4  Psychomotor Behavior: denies tardive dyskinesia, dystonia or tics  Muscle Strength and Tone: MuscleStrength: Normal  Gait and Station: Normal       LABS   personally reviewed.     No results found for: PHENYTOIN, PHENOBARB, VALPROATE, CBMZ       DIAGNOSIS   Principal Problem:    Schizoaffective disorder, depressive type (H)    Active Problem List:  Patient Active Problem List   Diagnosis     Depression     Suicidal ideation     Schizoaffective disorder, bipolar type (H)     Schizoaffective disorder, depressive type (H)     Schizoaffective disorder (H)     Suicidal behavior     Suicidal ideations     PTSD (post-traumatic stress disorder)     Agitation     History of schizophrenia     Posttraumatic stress disorder     Hallucinations     Alcohol abuse, episodic     Borderline personality disorder (H)     Cannabis dependence in remission (H)     Cannabis use disorder, moderate, in sustained remission (H)     GERD (gastroesophageal reflux disease)     " High risk medication use     Hypothyroidism     Noncompliance     PPD positive, treated     TB lung, latent     Major depression     Mood change          PLAN   1. Ongoing education given regarding diagnostic and treatment options with risks, benefits and alternatives and adequate verbalization of understanding.  2. Medication: Effexor 300 mg daily                         Gabapentin 300 mg TID                         Clozaril 500 mg at bedtime                         Discontinued Lithium 300 mg at bedtime                         Prazosin 3 mg at bedtime                         Rexulti 0.5 mg daily                           3. Hospitalist consult: Hospitalist to see patient as needed  4. Legal: Voluntary  5.  to follow regarding collecting and reviewing collateral information, referrals and disposition planning    Risk Assessment: [facility] RISK ASSESSMENT: Patient able to contract for safety    Coordination of Care:   Treatment Plan reviewed and physician signed, Care discussed with Care/Treatment Team Members, Chart reviewed and Patient seen      Re-Certification I certify that the inpatient psychiatric facility services furnished since the previous certification were, and continue to be, medically necessary for, either, treatment which could reasonably be expected to improve the patient s condition or diagnostic study and that the hospital records indicate that the services furnished were, either, intensive treatment services, admission and related services necessary for diagnostic study, or equivalent services.     I certify that the patient continues to need, on a daily basis, active treatment furnished directly by or requiring the supervision of inpatient psychiatric facility personnel.   I estimate 3-5 days of hospitalization is necessary for proper treatment of the patient. My plans for post-hospital care for this patient are  group [place]     AFSANEH Mckeon CNP    -     12/30/2021     -      4:28 PM    Total time  15 minutes with > 50%spent on coordination of cares and psycho-education.    This note was created with help of Dragon dictation system. Grammatical / typing errors are not intentional.    AFSANEH Mckeon CNP

## 2021-12-30 NOTE — PROGRESS NOTES
12/30/21 1500   Engagement   Intervention Group   Topic Detail OT: Chair Yoga to promote relaxation, coping skills, breathwork, gross motor movement, and self-awareness.    Attendance Did not attend   Reason for Not Attending Refused

## 2021-12-30 NOTE — PROGRESS NOTES
12/30/21 1138   Engagement   Intervention Group   Topic Detail Beading for creativity, relaxation, planning, sequencing, leisure and socializing   Attendance Did not attend

## 2021-12-31 PROCEDURE — 250N000013 HC RX MED GY IP 250 OP 250 PS 637: Performed by: NURSE PRACTITIONER

## 2021-12-31 PROCEDURE — 250N000013 HC RX MED GY IP 250 OP 250 PS 637: Performed by: PSYCHIATRY & NEUROLOGY

## 2021-12-31 PROCEDURE — 128N000001 HC R&B CD/MH ADULT

## 2021-12-31 PROCEDURE — 99231 SBSQ HOSP IP/OBS SF/LOW 25: CPT | Performed by: NURSE PRACTITIONER

## 2021-12-31 RX ADMIN — GABAPENTIN 300 MG: 300 CAPSULE ORAL at 20:12

## 2021-12-31 RX ADMIN — NICOTINE POLACRILEX 2 MG: 2 GUM, CHEWING BUCCAL at 08:21

## 2021-12-31 RX ADMIN — CHOLECALCIFEROL TAB 125 MCG (5000 UNIT) 125 MCG: 125 TAB at 08:19

## 2021-12-31 RX ADMIN — LEVOTHYROXINE SODIUM 112 MCG: 112 TABLET ORAL at 08:15

## 2021-12-31 RX ADMIN — PRAZOSIN HYDROCHLORIDE 3 MG: 1 CAPSULE ORAL at 20:12

## 2021-12-31 RX ADMIN — VENLAFAXINE HYDROCHLORIDE 300 MG: 150 CAPSULE, EXTENDED RELEASE ORAL at 08:14

## 2021-12-31 RX ADMIN — NICOTINE POLACRILEX 2 MG: 2 GUM, CHEWING BUCCAL at 15:33

## 2021-12-31 RX ADMIN — CLOZAPINE 500 MG: 100 TABLET ORAL at 20:12

## 2021-12-31 RX ADMIN — PANTOPRAZOLE SODIUM 40 MG: 40 TABLET, DELAYED RELEASE ORAL at 08:15

## 2021-12-31 RX ADMIN — NICOTINE POLACRILEX 2 MG: 2 GUM, CHEWING BUCCAL at 20:17

## 2021-12-31 RX ADMIN — NICOTINE POLACRILEX 2 MG: 2 GUM, CHEWING BUCCAL at 21:26

## 2021-12-31 RX ADMIN — GLYCOPYRROLATE 1 MG: 1 TABLET ORAL at 08:15

## 2021-12-31 RX ADMIN — NICOTINE POLACRILEX 2 MG: 2 GUM, CHEWING BUCCAL at 17:09

## 2021-12-31 RX ADMIN — BREXPIPRAZOLE 0.5 MG: 0.5 TABLET ORAL at 08:15

## 2021-12-31 RX ADMIN — GLYCOPYRROLATE 1 MG: 1 TABLET ORAL at 13:20

## 2021-12-31 RX ADMIN — NICOTINE POLACRILEX 2 MG: 2 GUM, CHEWING BUCCAL at 14:19

## 2021-12-31 RX ADMIN — GLYCOPYRROLATE 1 MG: 1 TABLET ORAL at 20:12

## 2021-12-31 RX ADMIN — GABAPENTIN 300 MG: 300 CAPSULE ORAL at 13:20

## 2021-12-31 RX ADMIN — DOCUSATE SODIUM 100 MG: 100 CAPSULE, LIQUID FILLED ORAL at 08:15

## 2021-12-31 RX ADMIN — NICOTINE POLACRILEX 2 MG: 2 GUM, CHEWING BUCCAL at 18:19

## 2021-12-31 RX ADMIN — GABAPENTIN 300 MG: 300 CAPSULE ORAL at 08:15

## 2021-12-31 RX ADMIN — DOCUSATE SODIUM 100 MG: 100 CAPSULE, LIQUID FILLED ORAL at 20:11

## 2021-12-31 ASSESSMENT — ACTIVITIES OF DAILY LIVING (ADL)
ORAL_HYGIENE: INDEPENDENT
HYGIENE/GROOMING: INDEPENDENT
HYGIENE/GROOMING: INDEPENDENT
ORAL_HYGIENE: INDEPENDENT
DRESS: INDEPENDENT

## 2021-12-31 NOTE — PLAN OF CARE
Occupational Therapy     Care plan initiated for patient who admitted on 12/28/21. Per pt and consistent with chart review, pt expecting discharge on Monday 1/4/21. Occupational therapy will invite patient to group and encourage active participation. Formal assessment to be completed next week if pt does not discharge as planned on 1/4/21. Pt in agreement with this plan. Encouraged pt to spend time outside of room this afternoon, pt receptive to this.

## 2021-12-31 NOTE — PLAN OF CARE
Problem: Behavioral Health Plan of Care  Goal: Adheres to Safety Considerations for Self and Others  Outcome: Improving     Problem: Suicidal Behavior  Goal: Suicidal Behavior is Absent or Managed  Outcome: Improving     Pt was visible on the unit, watching TV but did not interact with peers. Pt denies pain, anxiety, depression, SI, HI, and all psych symptoms. Pt had a flat affect, ate snacks, contracted for safety and was medication compliant. PRN Nicorette gum given with HS meds. Will continue to monitor.

## 2021-12-31 NOTE — PLAN OF CARE
Problem: Behavioral Health Plan of Care  Goal: Adheres to Safety Considerations for Self and Others  Outcome: Improving     Problem: Suicidal Behavior  Goal: Suicidal Behavior is Absent or Managed  Outcome: Improving     Problem: Sleep Disturbance  Goal: Adequate Sleep/Rest  Outcome: Improving   Pt had an uneventful night, slept for 7 hrs this shift. No PRN given this shift and no significant event. Will continue to monitor.

## 2021-12-31 NOTE — PROGRESS NOTES
Patient currently denied all psych symptoms and denied pain. He was out for breakfast and ate with good appetite. He is planning on attending 1 or 2 groups today. Minimal interactions with peers. Patient is medication compliant.

## 2021-12-31 NOTE — PROGRESS NOTES
12/31/21 1000   Engagement   Intervention Group   Topic Detail OT: Music Bingo to promote healthy coping skills, leisure, communication, attention, and opportunity for positive social interactions.    Attendance Did not attend

## 2021-12-31 NOTE — PROGRESS NOTES
"PSYCHIATRY  PROGRESS NOTE     DATE OF SERVICE   12/31/2021         CHIEF COMPLAINT   \"I'm doing great\"       SUBJECTIVE   Nursing reports: Patient isolative to his room for the most past of the shift. Came outside for meals. Denies all psych symtoms     reports: I have reviewed patient with the  earlier today and the plan remains for patient to discharge back to his group home early next week he symptoms continue to improve.      OBJECTIVE   Chart reviewed and patient assessed. Patient patient was seen and evaluated in the comfort room by himself. Upon patient interview, patient reports he is doing great, saying he has not experienced any tiredness since the discontinuation of his Lithium. Patient states he is looking forward to going back to his group home on Monday. He denies suicidal and homicidal thoughts. He also denies auditory and visual hallucinations. Rexulti increased to 1 mg daily to continue targeting depression, anxiety and cognition.      MEDICATIONS   Medications:  Scheduled Meds:    brexpiprazole  0.5 mg Oral Daily     cloZAPine  500 mg Oral At Bedtime     docusate sodium  100 mg Oral BID     gabapentin  300 mg Oral TID     glycopyrrolate  1 mg Oral TID     levothyroxine  112 mcg Oral Daily     pantoprazole  40 mg Oral Daily     prazosin  3 mg Oral At Bedtime     venlafaxine  300 mg Oral Daily     Vitamin D3  125 mcg Oral Daily     Continuous Infusions:  PRN Meds:.nicotine, OLANZapine, polyethylene glycol    Medication adherence issues: MS Med Adherence Y/N: No  Medication side effects: MEDICATION SIDE EFFECTS: no side effects reported  Benefit: Yes / No: Yes       ROS   A comprehensive review of system was negative, except noted in HPI       MENTAL STATUS EXAM   Vitals: /88 (BP Location: Right arm, Patient Position: Sitting)   Pulse 97   Temp 98.9  F (37.2  C) (Oral)   Resp 16   Ht 1.778 m (5' 10\")   Wt 106.5 kg (234 lb 14.4 oz)   SpO2 96%   BMI 33.70 kg/m  " "    Appearance:  Casually groomed  Mood:  \"okay\"  Affect: full range  was congruent to speech Mood congruent, intensity is normal and reactive  Suicidal Ideation: denies absent  Homicidal Ideation: PRESENT / ABSENT: absent   Thought process: linear and goal oriented   Thought content: significant for paranoid ideation.   Fund of Knowledge: Average  Attention/Concentration: Fair  Language ability:  Intact  Memory:  Immediate recall intact, Short-term memory intact and Long-term memory intact  Insight:  fair.  Judgement: fair  Orientation: Yes, x4  Psychomotor Behavior: denies tardive dyskinesia, dystonia or tics  Muscle Strength and Tone: MuscleStrength: Normal  Gait and Station: Normal       LABS   personally reviewed.     No results found for: PHENYTOIN, PHENOBARB, VALPROATE, CBMZ       DIAGNOSIS   Principal Problem:    Schizoaffective disorder, depressive type (H)    Active Problem List:  Patient Active Problem List   Diagnosis     Depression     Suicidal ideation     Schizoaffective disorder, bipolar type (H)     Schizoaffective disorder, depressive type (H)     Schizoaffective disorder (H)     Suicidal behavior     Suicidal ideations     PTSD (post-traumatic stress disorder)     Agitation     History of schizophrenia     Posttraumatic stress disorder     Hallucinations     Alcohol abuse, episodic     Borderline personality disorder (H)     Cannabis dependence in remission (H)     Cannabis use disorder, moderate, in sustained remission (H)     GERD (gastroesophageal reflux disease)     High risk medication use     Hypothyroidism     Noncompliance     PPD positive, treated     TB lung, latent     Major depression     Mood change          PLAN   1. Ongoing education given regarding diagnostic and treatment options with risks, benefits and alternatives and adequate verbalization of understanding.  2. Medication: Effexor 300 mg daily                         Gabapentin 300 mg TID                         Clozaril 500 " mg at bedtime                         Discontinued Lithium 300 mg at bedtime                         Prazosin 3 mg at bedtime                         Rexulti 1 mg daily                           3. Hospitalist consult: Hospitalist to see patient as needed  4. Legal: Voluntary  5.  to follow regarding collecting and reviewing collateral information, referrals and disposition planning    Risk Assessment: [facility] RISK ASSESSMENT: Patient able to contract for safety    Coordination of Care:   Treatment Plan reviewed and physician signed, Care discussed with Care/Treatment Team Members, Chart reviewed and Patient seen      Re-Certification I certify that the inpatient psychiatric facility services furnished since the previous certification were, and continue to be, medically necessary for, either, treatment which could reasonably be expected to improve the patient s condition or diagnostic study and that the hospital records indicate that the services furnished were, either, intensive treatment services, admission and related services necessary for diagnostic study, or equivalent services.     I certify that the patient continues to need, on a daily basis, active treatment furnished directly by or requiring the supervision of inpatient psychiatric facility personnel.   I estimate 3-5 days of hospitalization is necessary for proper treatment of the patient. My plans for post-hospital care for this patient are  group home     AFSANEH Mckeon CNP    -     12/31/2021     -     1:39 PM    Total time  15 minutes with > 50%spent on coordination of cares and psycho-education.    This note was created with help of Dragon dictation system. Grammatical / typing errors are not intentional.    AFSANEH Mckeon CNP

## 2022-01-01 PROCEDURE — 128N000001 HC R&B CD/MH ADULT

## 2022-01-01 PROCEDURE — 250N000013 HC RX MED GY IP 250 OP 250 PS 637: Performed by: PSYCHIATRY & NEUROLOGY

## 2022-01-01 PROCEDURE — 250N000013 HC RX MED GY IP 250 OP 250 PS 637: Performed by: NURSE PRACTITIONER

## 2022-01-01 RX ADMIN — DOCUSATE SODIUM 100 MG: 100 CAPSULE, LIQUID FILLED ORAL at 08:13

## 2022-01-01 RX ADMIN — NICOTINE POLACRILEX 2 MG: 2 GUM, CHEWING BUCCAL at 16:34

## 2022-01-01 RX ADMIN — NICOTINE POLACRILEX 2 MG: 2 GUM, CHEWING BUCCAL at 15:12

## 2022-01-01 RX ADMIN — NICOTINE POLACRILEX 2 MG: 2 GUM, CHEWING BUCCAL at 12:53

## 2022-01-01 RX ADMIN — DOCUSATE SODIUM 100 MG: 100 CAPSULE, LIQUID FILLED ORAL at 20:15

## 2022-01-01 RX ADMIN — BREXPIPRAZOLE 1 MG: 1 TABLET ORAL at 08:13

## 2022-01-01 RX ADMIN — GABAPENTIN 300 MG: 300 CAPSULE ORAL at 20:15

## 2022-01-01 RX ADMIN — NICOTINE POLACRILEX 2 MG: 2 GUM, CHEWING BUCCAL at 17:44

## 2022-01-01 RX ADMIN — GLYCOPYRROLATE 1 MG: 1 TABLET ORAL at 08:14

## 2022-01-01 RX ADMIN — GABAPENTIN 300 MG: 300 CAPSULE ORAL at 16:34

## 2022-01-01 RX ADMIN — CLOZAPINE 500 MG: 100 TABLET ORAL at 20:16

## 2022-01-01 RX ADMIN — GLYCOPYRROLATE 1 MG: 1 TABLET ORAL at 16:38

## 2022-01-01 RX ADMIN — GABAPENTIN 300 MG: 300 CAPSULE ORAL at 08:13

## 2022-01-01 RX ADMIN — GLYCOPYRROLATE 1 MG: 1 TABLET ORAL at 20:15

## 2022-01-01 RX ADMIN — NICOTINE POLACRILEX 2 MG: 2 GUM, CHEWING BUCCAL at 08:42

## 2022-01-01 RX ADMIN — OLANZAPINE 10 MG: 10 TABLET, FILM COATED ORAL at 14:00

## 2022-01-01 RX ADMIN — NICOTINE POLACRILEX 2 MG: 2 GUM, CHEWING BUCCAL at 20:15

## 2022-01-01 RX ADMIN — PRAZOSIN HYDROCHLORIDE 3 MG: 1 CAPSULE ORAL at 20:15

## 2022-01-01 RX ADMIN — VENLAFAXINE HYDROCHLORIDE 300 MG: 150 CAPSULE, EXTENDED RELEASE ORAL at 08:13

## 2022-01-01 RX ADMIN — PANTOPRAZOLE SODIUM 40 MG: 40 TABLET, DELAYED RELEASE ORAL at 08:13

## 2022-01-01 RX ADMIN — NICOTINE POLACRILEX 2 MG: 2 GUM, CHEWING BUCCAL at 14:00

## 2022-01-01 RX ADMIN — LEVOTHYROXINE SODIUM 112 MCG: 112 TABLET ORAL at 08:13

## 2022-01-01 RX ADMIN — CHOLECALCIFEROL TAB 125 MCG (5000 UNIT) 125 MCG: 125 TAB at 08:15

## 2022-01-01 ASSESSMENT — ACTIVITIES OF DAILY LIVING (ADL)
DRESS: INDEPENDENT
ORAL_HYGIENE: INDEPENDENT
HYGIENE/GROOMING: INDEPENDENT
LAUNDRY: WITH SUPERVISION

## 2022-01-01 NOTE — PROGRESS NOTES
01/01/22 1018   Engagement   Intervention Group   Topic Detail OT: Positive affirmations don for concentration, insight development, and social participation   Attendance Did not attend

## 2022-01-01 NOTE — PLAN OF CARE
Problem: Behavioral Health Plan of Care  Goal: Adheres to Safety Considerations for Self and Others  Outcome: No Change     Problem: Behavioral Health Plan of Care  Goal: Develops/Participates in Therapeutic [place] to Support Successful Transition  Outcome: No Change     Problem: Sleep Disturbance  Goal: Adequate Sleep/Rest  Outcome: No Change     Pt remained in bed all shift, no needs or concerns overnight. Continues on Suicide precautions and 15 minute safety checks per protocol.

## 2022-01-01 NOTE — PLAN OF CARE
Problem: Behavioral Health Plan of Care  Goal: Plan of Care Review  Outcome: Improving  Flowsheets (Taken 12/31/2021 1600)  Plan of Care Reviewed With: patient  Patient Agreement with Plan of Care: agrees  Goal: Develops/Participates in Therapeutic [place] to Support Successful Transition  Outcome: Improving     Problem: Suicidal Behavior  Goal: Suicidal Behavior is Absent or Managed  Outcome: Improving   Patient endorses both anxiety and depression and rates at 3/10 and 4/10 respectively, but denies pain, rest of psych symptoms, and contracts for safety. Up and visible on the unit the majority of the shift, but seem not to engage with peers. Pt is pleasant with a flat affect. He remains calm, cooperative and medication compliant. Will continue with same plan of care.

## 2022-01-02 PROCEDURE — 250N000013 HC RX MED GY IP 250 OP 250 PS 637: Performed by: PSYCHIATRY & NEUROLOGY

## 2022-01-02 PROCEDURE — 128N000001 HC R&B CD/MH ADULT

## 2022-01-02 PROCEDURE — 250N000013 HC RX MED GY IP 250 OP 250 PS 637: Performed by: NURSE PRACTITIONER

## 2022-01-02 RX ADMIN — BREXPIPRAZOLE 1 MG: 1 TABLET ORAL at 08:05

## 2022-01-02 RX ADMIN — NICOTINE POLACRILEX 2 MG: 2 GUM, CHEWING BUCCAL at 17:18

## 2022-01-02 RX ADMIN — GLYCOPYRROLATE 1 MG: 1 TABLET ORAL at 20:57

## 2022-01-02 RX ADMIN — CLOZAPINE 500 MG: 100 TABLET ORAL at 20:57

## 2022-01-02 RX ADMIN — NICOTINE POLACRILEX 2 MG: 2 GUM, CHEWING BUCCAL at 09:36

## 2022-01-02 RX ADMIN — GABAPENTIN 300 MG: 300 CAPSULE ORAL at 20:57

## 2022-01-02 RX ADMIN — VENLAFAXINE HYDROCHLORIDE 300 MG: 150 CAPSULE, EXTENDED RELEASE ORAL at 08:05

## 2022-01-02 RX ADMIN — NICOTINE POLACRILEX 2 MG: 2 GUM, CHEWING BUCCAL at 20:57

## 2022-01-02 RX ADMIN — DOCUSATE SODIUM 100 MG: 100 CAPSULE, LIQUID FILLED ORAL at 20:57

## 2022-01-02 RX ADMIN — NICOTINE POLACRILEX 2 MG: 2 GUM, CHEWING BUCCAL at 22:26

## 2022-01-02 RX ADMIN — GABAPENTIN 300 MG: 300 CAPSULE ORAL at 08:06

## 2022-01-02 RX ADMIN — DOCUSATE SODIUM 100 MG: 100 CAPSULE, LIQUID FILLED ORAL at 08:05

## 2022-01-02 RX ADMIN — PRAZOSIN HYDROCHLORIDE 3 MG: 1 CAPSULE ORAL at 20:56

## 2022-01-02 RX ADMIN — NICOTINE POLACRILEX 2 MG: 2 GUM, CHEWING BUCCAL at 08:09

## 2022-01-02 RX ADMIN — NICOTINE POLACRILEX 2 MG: 2 GUM, CHEWING BUCCAL at 15:14

## 2022-01-02 RX ADMIN — PANTOPRAZOLE SODIUM 40 MG: 40 TABLET, DELAYED RELEASE ORAL at 08:05

## 2022-01-02 RX ADMIN — GLYCOPYRROLATE 1 MG: 1 TABLET ORAL at 08:05

## 2022-01-02 RX ADMIN — NICOTINE POLACRILEX 2 MG: 2 GUM, CHEWING BUCCAL at 18:27

## 2022-01-02 RX ADMIN — GABAPENTIN 300 MG: 300 CAPSULE ORAL at 14:19

## 2022-01-02 RX ADMIN — GLYCOPYRROLATE 1 MG: 1 TABLET ORAL at 14:19

## 2022-01-02 RX ADMIN — CHOLECALCIFEROL TAB 125 MCG (5000 UNIT) 125 MCG: 125 TAB at 08:06

## 2022-01-02 RX ADMIN — LEVOTHYROXINE SODIUM 112 MCG: 112 TABLET ORAL at 08:05

## 2022-01-02 ASSESSMENT — ACTIVITIES OF DAILY LIVING (ADL)
LAUNDRY: WITH SUPERVISION
HYGIENE/GROOMING: INDEPENDENT
DRESS: INDEPENDENT
ORAL_HYGIENE: INDEPENDENT

## 2022-01-02 NOTE — PLAN OF CARE
Problem: Behavioral Health Plan of Care  Goal: Adheres to Safety Considerations for Self and Others  1/2/2022 1638 by Yajaira Patel RN  Outcome: Improving  1/2/2022 1015 by Yajaira Patel RN  Outcome: Improving  Intervention: Develop and Maintain Individualized Safety Plan  Recent Flowsheet Documentation  Taken 1/2/2022 1622 by Yajaira Patel RN  Safety Measures: safety rounds completed  Taken 1/2/2022 0900 by Yajaira Patel RN  Safety Measures: safety rounds completed     Patient calm and cooperative. Briefly visible this shift. No behavior issues. Compliant with his medications. Reports minimal anxiety and depression. He denied SI/HI or hallucinations. Looking forward to discharging on 1/3/22.

## 2022-01-02 NOTE — PROGRESS NOTES
01/02/22 1157   Engagement   Intervention Group   Topic Detail OT: Scratch art for creative expression, concentration, relaxation training, and coping with symptoms through distraction   Attendance Did not attend

## 2022-01-02 NOTE — PLAN OF CARE
"  Problem: Behavioral Health Plan of Care  Goal: Patient-Specific Goal (Individualization)  Outcome: Improving  Note: Shift Summery:  Patient visible during breakfast. Mood observed to be flat and blunted.     Patient's Stated Goal for Shift:  \"Attend group\"    Goal Status:  In Process      Patient visible during breakfast. Mood observed to be flat and blunted. Minimal interactions with select peer noted. Watched television briefly and had partial community meeting attendance. Reported anxiety and depression 2. Patient denied SI/HI or hallucinations. He was compliant with medications. No behavior outburst noted. Demonstrates good appetite.      "

## 2022-01-02 NOTE — PLAN OF CARE
Problem: Behavioral Health Plan of Care  Goal: Plan of Care Review  1/1/2022 1934 by Catalina Hawkins, RN  Outcome: Improving  1/1/2022 0852 by Catalina Hawkins RN  Outcome: Improving     Problem: Suicidal Behavior  Goal: Suicidal Behavior is Absent or Managed  1/1/2022 1934 by Catalina Hawkins, RN  Outcome: Improving  1/1/2022 0852 by Catalina Hawkins, RN  Outcome: Improving     Problem: Sleep Disturbance  Goal: Adequate Sleep/Rest  Outcome: Improving

## 2022-01-02 NOTE — PLAN OF CARE
Problem: Behavioral Health Plan of Care  Goal: Adheres to Safety Considerations for Self and Others  Outcome: No Change     Problem: Behavioral Health Plan of Care  Goal: Optimized Coping Skills in Response to Life Stressors  Outcome: No Change     Problem: Sleep Disturbance  Goal: Adequate Sleep/Rest  Outcome: No Change     Problem: Suicidal Behavior  Goal: Suicidal Behavior is Absent or Managed  Outcome: No Change    Pt was up for midnight snack x1 and then remained resting in bed all shift thereafter. Hours Slept: 7  No Significant Events overnight,  No Prns Given. Remains on suicide precautions.

## 2022-01-03 VITALS
WEIGHT: 234.9 LBS | SYSTOLIC BLOOD PRESSURE: 120 MMHG | HEART RATE: 97 BPM | BODY MASS INDEX: 33.63 KG/M2 | HEIGHT: 70 IN | DIASTOLIC BLOOD PRESSURE: 79 MMHG | RESPIRATION RATE: 16 BRPM | OXYGEN SATURATION: 98 % | TEMPERATURE: 98.3 F

## 2022-01-03 LAB
BASOPHILS # BLD AUTO: 0 10E3/UL (ref 0–0.2)
BASOPHILS NFR BLD AUTO: 1 %
EOSINOPHIL # BLD AUTO: 0 10E3/UL (ref 0–0.7)
EOSINOPHIL NFR BLD AUTO: 0 %
ERYTHROCYTE [DISTWIDTH] IN BLOOD BY AUTOMATED COUNT: 12.1 % (ref 10–15)
HCT VFR BLD AUTO: 40.7 % (ref 40–53)
HGB BLD-MCNC: 14.2 G/DL (ref 13.3–17.7)
IMM GRANULOCYTES # BLD: 0.1 10E3/UL
IMM GRANULOCYTES NFR BLD: 1 %
LYMPHOCYTES # BLD AUTO: 2.3 10E3/UL (ref 0.8–5.3)
LYMPHOCYTES NFR BLD AUTO: 28 %
MCH RBC QN AUTO: 32.4 PG (ref 26.5–33)
MCHC RBC AUTO-ENTMCNC: 34.9 G/DL (ref 31.5–36.5)
MCV RBC AUTO: 93 FL (ref 78–100)
MONOCYTES # BLD AUTO: 0.7 10E3/UL (ref 0–1.3)
MONOCYTES NFR BLD AUTO: 9 %
NEUTROPHILS # BLD AUTO: 5 10E3/UL (ref 1.6–8.3)
NEUTROPHILS NFR BLD AUTO: 61 %
NRBC # BLD AUTO: 0 10E3/UL
NRBC BLD AUTO-RTO: 0 /100
PLATELET # BLD AUTO: 234 10E3/UL (ref 150–450)
RBC # BLD AUTO: 4.38 10E6/UL (ref 4.4–5.9)
WBC # BLD AUTO: 8.2 10E3/UL (ref 4–11)

## 2022-01-03 PROCEDURE — 99239 HOSP IP/OBS DSCHRG MGMT >30: CPT | Performed by: NURSE PRACTITIONER

## 2022-01-03 PROCEDURE — 250N000013 HC RX MED GY IP 250 OP 250 PS 637: Performed by: NURSE PRACTITIONER

## 2022-01-03 PROCEDURE — 250N000013 HC RX MED GY IP 250 OP 250 PS 637: Performed by: PSYCHIATRY & NEUROLOGY

## 2022-01-03 PROCEDURE — 85025 COMPLETE CBC W/AUTO DIFF WBC: CPT | Performed by: PSYCHIATRY & NEUROLOGY

## 2022-01-03 PROCEDURE — 36415 COLL VENOUS BLD VENIPUNCTURE: CPT | Performed by: PSYCHIATRY & NEUROLOGY

## 2022-01-03 RX ORDER — PANTOPRAZOLE SODIUM 40 MG/1
40 TABLET, DELAYED RELEASE ORAL DAILY
Qty: 30 TABLET | Refills: 0 | Status: SHIPPED | OUTPATIENT
Start: 2022-01-04 | End: 2022-03-15

## 2022-01-03 RX ORDER — CLOZAPINE 100 MG/1
500 TABLET ORAL DAILY
Qty: 15 TABLET | Refills: 0 | Status: ON HOLD | OUTPATIENT
Start: 2022-01-03 | End: 2022-03-15

## 2022-01-03 RX ORDER — GLYCOPYRROLATE 1 MG/1
1 TABLET ORAL 3 TIMES DAILY
Qty: 90 TABLET | Refills: 0 | Status: SHIPPED | OUTPATIENT
Start: 2022-01-03 | End: 2022-08-11

## 2022-01-03 RX ORDER — LEVOTHYROXINE SODIUM 112 UG/1
112 TABLET ORAL DAILY
Qty: 30 TABLET | Refills: 0 | Status: ON HOLD | OUTPATIENT
Start: 2022-01-03 | End: 2024-07-02

## 2022-01-03 RX ORDER — PRAZOSIN HYDROCHLORIDE 1 MG/1
3 CAPSULE ORAL AT BEDTIME
Qty: 90 CAPSULE | Refills: 0 | Status: SHIPPED | OUTPATIENT
Start: 2022-01-03 | End: 2022-03-15

## 2022-01-03 RX ORDER — VENLAFAXINE HYDROCHLORIDE 150 MG/1
300 CAPSULE, EXTENDED RELEASE ORAL DAILY
Qty: 60 CAPSULE | Refills: 0 | Status: SHIPPED | OUTPATIENT
Start: 2022-01-04 | End: 2022-03-15

## 2022-01-03 RX ORDER — CLOZAPINE 100 MG/1
500 TABLET ORAL AT BEDTIME
Qty: 150 TABLET | Refills: 0 | Status: SHIPPED | OUTPATIENT
Start: 2022-01-03 | End: 2022-01-03

## 2022-01-03 RX ORDER — CLOZAPINE 100 MG/1
500 TABLET ORAL AT BEDTIME
Qty: 135 TABLET | Refills: 0 | Status: SHIPPED | OUTPATIENT
Start: 2022-01-03 | End: 2022-01-03

## 2022-01-03 RX ORDER — GABAPENTIN 300 MG/1
300 CAPSULE ORAL 3 TIMES DAILY
Qty: 90 CAPSULE | Refills: 1 | Status: ON HOLD | OUTPATIENT
Start: 2022-01-03 | End: 2022-05-02

## 2022-01-03 RX ADMIN — LEVOTHYROXINE SODIUM 112 MCG: 112 TABLET ORAL at 07:55

## 2022-01-03 RX ADMIN — GABAPENTIN 300 MG: 300 CAPSULE ORAL at 13:50

## 2022-01-03 RX ADMIN — VENLAFAXINE HYDROCHLORIDE 300 MG: 150 CAPSULE, EXTENDED RELEASE ORAL at 07:54

## 2022-01-03 RX ADMIN — GABAPENTIN 300 MG: 300 CAPSULE ORAL at 07:56

## 2022-01-03 RX ADMIN — GLYCOPYRROLATE 1 MG: 1 TABLET ORAL at 13:50

## 2022-01-03 RX ADMIN — CHOLECALCIFEROL TAB 125 MCG (5000 UNIT) 125 MCG: 125 TAB at 07:54

## 2022-01-03 RX ADMIN — NICOTINE POLACRILEX 2 MG: 2 GUM, CHEWING BUCCAL at 10:11

## 2022-01-03 RX ADMIN — PANTOPRAZOLE SODIUM 40 MG: 40 TABLET, DELAYED RELEASE ORAL at 07:55

## 2022-01-03 RX ADMIN — GLYCOPYRROLATE 1 MG: 1 TABLET ORAL at 07:54

## 2022-01-03 RX ADMIN — NICOTINE POLACRILEX 2 MG: 2 GUM, CHEWING BUCCAL at 08:44

## 2022-01-03 RX ADMIN — DOCUSATE SODIUM 100 MG: 100 CAPSULE, LIQUID FILLED ORAL at 07:55

## 2022-01-03 RX ADMIN — NICOTINE POLACRILEX 2 MG: 2 GUM, CHEWING BUCCAL at 12:16

## 2022-01-03 RX ADMIN — NICOTINE POLACRILEX 2 MG: 2 GUM, CHEWING BUCCAL at 13:55

## 2022-01-03 RX ADMIN — BREXPIPRAZOLE 1 MG: 1 TABLET ORAL at 07:55

## 2022-01-03 ASSESSMENT — ACTIVITIES OF DAILY LIVING (ADL)
DRESS: INDEPENDENT
LAUNDRY: WITH SUPERVISION
ORAL_HYGIENE: INDEPENDENT
HYGIENE/GROOMING: INDEPENDENT

## 2022-01-03 NOTE — PROGRESS NOTES
Patient was isolative to his room but came out for snacks and ate 100%. Affect flat and blunted but was pleasant. Had a shower this shift. Denied pain. Rated anxiety 3/10. Denied depression and all other psych symptoms. Contracted for safety. Cooperative and med compliant. Prn Nicotine gum was given this shift. No other concerns or behavior noted at this time. Will continue to monitor and will assist if need arise.

## 2022-01-03 NOTE — PLAN OF CARE
Problem: Suicidal Behavior  Goal: Suicidal Behavior is Absent or Managed  Outcome: Improving     Problem: Sleep Disturbance  Goal: Adequate Sleep/Rest  Outcome: Improving     Pt had an uneventful night. He slept most of the night. No signs or symptoms of pain or discomfort noted during 15 minutes safety checks. Pt still on suicidal precaution. No suicidal behavior noted this shift. Pt had more than 8 hrs of sleep. Tentative discharge date is today, Monday, 01/03/22 per CARLOS's notes.

## 2022-01-03 NOTE — DISCHARGE INSTRUCTIONS
Behavioral Discharge Planning and Instructions    Summary: You were admitted on 12/28/2021 due to psychosis.  You were treated by Rodriguez Cervantes and discharged on 01/03/2022 from [facility] to Group Home    Main Diagnosis: Schizoaffective Disorder, depressive type    Health Care Follow-up:     PCP Appointment  Appointment Date/Time: Tuesday, January 4th at 11:30am     Psychiatrist: Dr. Pérez     Address: [facility] [address], [place] [address]  Phone Number: [phone]  Appointment will be done via telehealth - virtual appointment, not in person.    Psychiatry Appointment  Appointment Date/Time: Thursday, January 6th at 9:30am     Psychiatrist: Marco Campo MD     Address: [address] [place] [address]  Phone Number: [phone]     Attend all scheduled appointments with your outpatient providers. Call at least 24 hours in advance if you need to reschedule an appointment to ensure continued access to your outpatient providers.     Major Treatments, Procedures and Findings:  You were provided with: a psychiatric assessment, assessed for medical stability, medication evaluation and/or management and milieu management    Symptoms to Report: feeling more aggressive, increased confusion, losing more sleep, mood getting worse or thoughts of suicide    Early warning signs can include: increased depression or anxiety sleep disturbances increased thoughts or behaviors of suicide or self-harm  increased unusual thinking, such as paranoia or hearing voices    Safety and Wellness:  Take all medicines as directed.  Make no changes unless your doctor suggests them.      Follow treatment recommendations.  Refrain from alcohol and non-prescribed drugs.  If there is a concern for safety, call 911.    Resources:   Crisis Intervention: 880.133.3880 or 237-152-5753 (TTY: 489.526.2747).  Call anytime for help.  [place] on Mental Illness (www.[place].jorge a.org): 978.877.9508 or  "305.969.2135.  [place] Association for Children's Mental Health (www.mac.org): 219.637.7356.  Alcoholics Anonymous (www.alcoholics-anonymous.org): Check your phone book for your local chapter.  Suicide Awareness Voices of Education (SAVE) (www.save.org): 610-415-RFOP (3590)  National Suicide Prevention Line (www.mentalhealthmn.org): 124-877-MSOY (3451)  Mental Health Consumer/Survivor Network of [place] (www.mhcsn.net): 911.897.9944 or 846-622-3416  Mental Health Association of [place] (www.mentalhealth.org): 351.605.5277 or 545-955-4685  Self- Management and Recovery Training., SMART-- Toll free: 938.681.6259  www.[employer]  [facility] Crisis Response 180 893-1594  [facility] Crisis Response [phone]  [facility] Crisis Response [phone]  [facility] Crisis (COPE) Response - Adult 926 588-2456  [facility] Crisis Response - Adult [phone]  [facility] Rapid Response 524-620-0474  Text 4 Life: txt \"LIFE\" to 85932 for immediate support and crisis intervention  Crisis text line: Text \"[place]\" to 315550. Free, confidential, 24/7.  Crisis Intervention: 790.999.9617 or 299-817-0019. Call anytime for help.   [facility] Mental Health Crisis Team - Child: [phone]  [facility] Mental Health Crisis Response Team - Child: [phone]  [facility] Mental Health Crisis: 6-725-948-5523   [facility] Mental Health Crisis Team:  [phone]  [facility] Crisis Response Team (CRT):  [phone] or [phone]     General Medication Instructions:   See your medication sheet(s) for instructions.   Take all medicines as directed.  Make no changes unless your doctor suggests them.   Go to all your doctor visits.  Be sure to have all your required lab tests. This way, your medicines can be refilled on time.  Do not use any drugs not prescribed by your doctor.  Avoid alcohol.    Advance Directives: "   Scanned document on file with [facility]? Minor-N/A  Is document scanned? Minor-N/A  Honoring Choices Your Rights Handout: Minor - N/A  Was more information offered? Minor-N/A    The Treatment team has appreciated the opportunity to work with you. If you have any questions or concerns about your recent admission, you can contact the unit which can receive your call 24 hours a day, 7 days a week. They will be able to get in touch with a Provider if needed. The unit number is [phone].

## 2022-01-03 NOTE — PLAN OF CARE
Discharge plan or goal: Return to group home with outpatient mental health support    Today's Plan: Kamini is returning to his group home this afternoon, 01/03. He is denying psych symptoms and reports the voices have gone away. Writer talked with patient about how when he was first admitted, he said he wanted to be careful to not discharge too soon before he is ready. Patient acknowledges this and confirms he is ready to go home. He has a follow up psychiatry appointment scheduled for 01/06. Writer called [facility], where pt receives his Clozaril, and will fax over relevant bloodwork and other necessary documents. Pt confirmed with Neela, the , that patient is able to return today. Writer to fax the psychiatric discharge summary to the group home and to the outpatient psychiatrist, Dr. Campo.    Pt has the following outpatient appointment(s) scheduled:    Psychiatry Appointment  Appointment Date/Time: Thursday, January 6th at 9:30am     Psychiatrist: Marco Campo MD     Address: [address] [place] [address]  Phone Number: [phone]     Pt has the following professional community support(s): Group home staff, psychiatrist, PCP, CADI , and job coordinator through [employer]    Legal Status: Voluntary    Diagnosis/Procedure:   Patient Active Problem List   Diagnosis     Depression     Suicidal ideation     Schizoaffective disorder, bipolar type (H)     Schizoaffective disorder, depressive type (H)     Schizoaffective disorder (H)     Suicidal behavior     Suicidal ideations     PTSD (post-traumatic stress disorder)     Agitation     History of schizophrenia     Posttraumatic stress disorder     Hallucinations     Alcohol abuse, episodic     Borderline personality disorder (H)     Cannabis dependence in remission (H)     Cannabis use disorder, moderate, in sustained remission (H)     GERD (gastroesophageal reflux disease)     High risk medication use      Hypothyroidism     Noncompliance     PPD positive, treated     TB lung, latent     Major depression     Mood change       Care Rounds Attendance:   [facility]   RN   Charge RN   OT/TR  MD/CNP    Anai Zamarripa [facility], 1/3/2022, 2:57 PM

## 2022-01-03 NOTE — PLAN OF CARE
Problem: Behavioral Health Plan of Care  Goal: Plan of Care Review  Recent Flowsheet Documentation  Taken 1/3/2022 0800 by Vera Conteh, RN  Plan of Care Reviewed With: patient  Patient Agreement with Plan of Care: agrees     Problem: Behavioral Health Plan of Care  Goal: Adheres to Safety Considerations for Self and Others  Intervention: Develop and Maintain Individualized Safety Plan  Recent Flowsheet Documentation  Taken 1/3/2022 0800 by Vera Conteh, RN  Safety Measures: safety rounds completed     Patient is flat and blunt. No complaints of pain. Out in the milieu for meals, walking in the hallways but usually isolates to his room. Patient denies anxiety and depression. Denies SI, SIB, HI, hallucinations and all other psych symptoms. No complaints of pain or discomfort. Contract for safety. Medication compliant. PRN nicotine given.

## 2022-01-03 NOTE — PLAN OF CARE
Patient has his discharge instructions. No further questions or concerns. Ayesha walked patient to the cab. Patient has all of his belongings. Patient left stable and safe.

## 2022-01-03 NOTE — PROGRESS NOTES
01/03/22 1400   Engagement   Intervention Group   Topic Detail OT: Manual skills (painting wooden words) for concentration, sequencing, direction-following, creative expression, coping through distraction, and relaxation.    Attendance Did not attend

## 2022-01-03 NOTE — PLAN OF CARE
Problem: Behavioral Health Plan of Care  Goal: Patient-Specific Goal (Individualization)  Outcome: Improving  Flowsheets (Taken 12/29/2021 1121)  Patient Vulnerabilities:   adverse childhood experience(s)   family/relationship conflict  Patient-Specific Goals (Include Timeframe): Stabilize, get medications adjusted  Patient Personal Strengths:   community support   stable living environment   resilient   insight into illness/situation     BEHAVIORAL TEAM DISCUSSION    Participants: Provider: Rodriguez KEEN, RN: Gail MOORE, : WESLY OT: Halle GARNER  Progress: Pt is progressing and adequate for discharge  Anticipated length of stay: Discharge today, 01/03  Continued Stay Criteria/Rationale: None  Medical/Physical: None noted  Precautions:   Behavioral Orders   Procedures    Code 1 - Restrict to Unit    Routine Programming     As clinically indicated    Status 15     Every 15 minutes.    Suicide precautions     Patients on Suicide Precautions should have a Combination Diet ordered that includes a Diet selection(s) AND a Behavioral Tray selection for Safe Tray - with utensils, or Safe Tray - NO utensils       Plan: Return to group home with outpatient mental health supports  Rationale for change in precautions or plan: N/A
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
## 2022-01-04 NOTE — PLAN OF CARE
Problem: Relapse Prevention  Goal: Engage in OT Group  Description: Engage in 1 OT group activity over the weekend that supports recovery  Outcome: Completed   Occupational Therapy Discharge Note    Patient Name:  Kamini PINK Alaaristeoluisa    D: Refer to daily doc flowsheets for details of progress toward goals.  A: Improvement seen in presence in the milieu.   P: Patient discharging to group home with outpatient mental health supports. Discontinue OT Care Plan goals and interventions.    Date: 1/4/2022  Time: 2:33 PM

## 2022-01-31 NOTE — PROGRESS NOTES
"Rn initiated that pt get on SIO. While writer was on SIO with pt writer noticed pt taking pillow case off pillow and pt stated \" I want to kill my self\" . Pt wrapped pillow case around neck tighly.  Writer called out for help and charge nurse called for a code 21. Pt let go after 20 seconds and cooperated with staff. No R/S needed.   " Skyri[facility] Counseling: I discussed with the patient the risks of risankizumab-rzaa including but not limited to immunosuppression, and serious infections.  The patient understands that monitoring is required including a PPD at baseline and must alert us or the primary physician if symptoms of infection or other concerning signs are noted.

## 2022-03-14 ENCOUNTER — HOSPITAL ENCOUNTER (EMERGENCY)
Facility: CLINIC | Age: 32
Discharge: OTHER INSTITUTION | End: 2022-03-15
Attending: EMERGENCY MEDICINE | Admitting: EMERGENCY MEDICINE
Payer: COMMERCIAL

## 2022-03-14 DIAGNOSIS — R45.851 SUICIDAL IDEATION: ICD-10-CM

## 2022-03-14 DIAGNOSIS — Z20.822 COVID-19 RULED OUT BY LABORATORY TESTING: ICD-10-CM

## 2022-03-14 PROCEDURE — 99285 EMERGENCY DEPT VISIT HI MDM: CPT | Mod: 25 | Performed by: EMERGENCY MEDICINE

## 2022-03-14 PROCEDURE — 80307 DRUG TEST PRSMV CHEM ANLYZR: CPT | Performed by: EMERGENCY MEDICINE

## 2022-03-14 PROCEDURE — 99285 EMERGENCY DEPT VISIT HI MDM: CPT | Performed by: EMERGENCY MEDICINE

## 2022-03-14 PROCEDURE — C9803 HOPD COVID-19 SPEC COLLECT: HCPCS | Performed by: EMERGENCY MEDICINE

## 2022-03-15 ENCOUNTER — HOSPITAL ENCOUNTER (INPATIENT)
Facility: HOSPITAL | Age: 32
LOS: 3 days | Discharge: NURSING, REHAB OR LONG-TERM CARE | End: 2022-03-18
Attending: STUDENT IN AN ORGANIZED HEALTH CARE EDUCATION/TRAINING PROGRAM | Admitting: STUDENT IN AN ORGANIZED HEALTH CARE EDUCATION/TRAINING PROGRAM
Payer: COMMERCIAL

## 2022-03-15 VITALS
OXYGEN SATURATION: 99 % | SYSTOLIC BLOOD PRESSURE: 100 MMHG | HEART RATE: 103 BPM | HEIGHT: 70 IN | TEMPERATURE: 98.2 F | WEIGHT: 230 LBS | BODY MASS INDEX: 32.93 KG/M2 | RESPIRATION RATE: 18 BRPM | DIASTOLIC BLOOD PRESSURE: 78 MMHG

## 2022-03-15 DIAGNOSIS — F25.0 SCHIZOAFFECTIVE DISORDER, BIPOLAR TYPE (H): Primary | ICD-10-CM

## 2022-03-15 PROBLEM — R45.89 SUICIDAL BEHAVIOR: Status: ACTIVE | Noted: 2018-04-13

## 2022-03-15 LAB
AMPHETAMINES UR QL SCN: NORMAL
BARBITURATES UR QL: NORMAL
BENZODIAZ UR QL: NORMAL
CANNABINOIDS UR QL SCN: NORMAL
COCAINE UR QL: NORMAL
OPIATES UR QL SCN: NORMAL
SARS-COV-2 RNA RESP QL NAA+PROBE: NEGATIVE

## 2022-03-15 PROCEDURE — 250N000013 HC RX MED GY IP 250 OP 250 PS 637: Performed by: NURSE PRACTITIONER

## 2022-03-15 PROCEDURE — 90791 PSYCH DIAGNOSTIC EVALUATION: CPT

## 2022-03-15 PROCEDURE — 99222 1ST HOSP IP/OBS MODERATE 55: CPT | Performed by: NURSE PRACTITIONER

## 2022-03-15 PROCEDURE — 124N000001 HC R&B MH

## 2022-03-15 PROCEDURE — U0003 INFECTIOUS AGENT DETECTION BY NUCLEIC ACID (DNA OR RNA); SEVERE ACUTE RESPIRATORY SYNDROME CORONAVIRUS 2 (SARS-COV-2) (CORONAVIRUS DISEASE [COVID-19]), AMPLIFIED PROBE TECHNIQUE, MAKING USE OF HIGH THROUGHPUT TECHNOLOGIES AS DESCRIBED BY CMS-2020-01-R: HCPCS | Performed by: EMERGENCY MEDICINE

## 2022-03-15 PROCEDURE — 99223 1ST HOSP IP/OBS HIGH 75: CPT | Performed by: NURSE PRACTITIONER

## 2022-03-15 PROCEDURE — 250N000013 HC RX MED GY IP 250 OP 250 PS 637: Performed by: EMERGENCY MEDICINE

## 2022-03-15 RX ORDER — VENLAFAXINE HYDROCHLORIDE 150 MG/1
300 CAPSULE, EXTENDED RELEASE ORAL DAILY
Status: DISCONTINUED | OUTPATIENT
Start: 2022-03-15 | End: 2022-03-15

## 2022-03-15 RX ORDER — MAGNESIUM HYDROXIDE/ALUMINUM HYDROXICE/SIMETHICONE 120; 1200; 1200 MG/30ML; MG/30ML; MG/30ML
30 SUSPENSION ORAL EVERY 4 HOURS PRN
Status: DISCONTINUED | OUTPATIENT
Start: 2022-03-15 | End: 2022-03-18 | Stop reason: HOSPADM

## 2022-03-15 RX ORDER — FLUOXETINE 10 MG/1
10 CAPSULE ORAL EVERY MORNING
Status: ON HOLD | COMMUNITY
Start: 2022-02-28 | End: 2022-05-02

## 2022-03-15 RX ORDER — PANTOPRAZOLE SODIUM 40 MG/1
40 TABLET, DELAYED RELEASE ORAL DAILY
Status: DISCONTINUED | OUTPATIENT
Start: 2022-03-15 | End: 2022-03-18 | Stop reason: HOSPADM

## 2022-03-15 RX ORDER — BENZTROPINE MESYLATE 1 MG/1
1 TABLET ORAL 2 TIMES DAILY
Status: ON HOLD | COMMUNITY
Start: 2022-02-28 | End: 2024-07-02

## 2022-03-15 RX ORDER — OLANZAPINE 5 MG/1
10 TABLET ORAL DAILY PRN
Status: DISCONTINUED | OUTPATIENT
Start: 2022-03-15 | End: 2022-03-15 | Stop reason: HOSPADM

## 2022-03-15 RX ORDER — DIPHENHYDRAMINE HYDROCHLORIDE AND LIDOCAINE HYDROCHLORIDE AND ALUMINUM HYDROXIDE AND MAGNESIUM HYDRO
10 KIT EVERY 6 HOURS PRN
Status: DISCONTINUED | OUTPATIENT
Start: 2022-03-15 | End: 2022-03-18 | Stop reason: HOSPADM

## 2022-03-15 RX ORDER — PRAZOSIN HYDROCHLORIDE 1 MG/1
4 CAPSULE ORAL AT BEDTIME
Status: DISCONTINUED | OUTPATIENT
Start: 2022-03-15 | End: 2022-03-18 | Stop reason: HOSPADM

## 2022-03-15 RX ORDER — CLOZAPINE 100 MG/1
500 TABLET ORAL AT BEDTIME
Status: ON HOLD | COMMUNITY
End: 2022-05-02

## 2022-03-15 RX ORDER — POLYETHYLENE GLYCOL 3350 17 G/17G
17 POWDER, FOR SOLUTION ORAL DAILY PRN
Status: DISCONTINUED | OUTPATIENT
Start: 2022-03-15 | End: 2022-03-18 | Stop reason: HOSPADM

## 2022-03-15 RX ORDER — OLANZAPINE 10 MG/1
10 TABLET, ORALLY DISINTEGRATING ORAL ONCE
Status: COMPLETED | OUTPATIENT
Start: 2022-03-15 | End: 2022-03-15

## 2022-03-15 RX ORDER — OLANZAPINE 10 MG/1
10 TABLET ORAL 3 TIMES DAILY PRN
Status: DISCONTINUED | OUTPATIENT
Start: 2022-03-15 | End: 2022-03-18 | Stop reason: HOSPADM

## 2022-03-15 RX ORDER — NICOTINE 21 MG/24HR
1 PATCH, TRANSDERMAL 24 HOURS TRANSDERMAL DAILY
Status: DISCONTINUED | OUTPATIENT
Start: 2022-03-15 | End: 2022-03-18 | Stop reason: HOSPADM

## 2022-03-15 RX ORDER — CLOZAPINE 100 MG/1
500 TABLET ORAL AT BEDTIME
Status: DISCONTINUED | OUTPATIENT
Start: 2022-03-15 | End: 2022-03-18 | Stop reason: HOSPADM

## 2022-03-15 RX ORDER — PRAZOSIN HYDROCHLORIDE 2 MG/1
4 CAPSULE ORAL AT BEDTIME
Status: ON HOLD | COMMUNITY
Start: 2022-02-28 | End: 2024-07-02

## 2022-03-15 RX ORDER — ACETAMINOPHEN 325 MG/1
650 TABLET ORAL EVERY 4 HOURS PRN
Status: DISCONTINUED | OUTPATIENT
Start: 2022-03-15 | End: 2022-03-18 | Stop reason: HOSPADM

## 2022-03-15 RX ORDER — FLUOXETINE 10 MG/1
10 CAPSULE ORAL EVERY MORNING
Status: DISCONTINUED | OUTPATIENT
Start: 2022-03-16 | End: 2022-03-18 | Stop reason: HOSPADM

## 2022-03-15 RX ORDER — GABAPENTIN 300 MG/1
300 CAPSULE ORAL 3 TIMES DAILY
Status: DISCONTINUED | OUTPATIENT
Start: 2022-03-15 | End: 2022-03-18 | Stop reason: HOSPADM

## 2022-03-15 RX ORDER — OLANZAPINE 20 MG/1
5-10 TABLET ORAL PRN
COMMUNITY
Start: 2022-01-18 | End: 2024-06-25

## 2022-03-15 RX ORDER — GLYCOPYRROLATE 1 MG/1
1 TABLET ORAL 3 TIMES DAILY
Status: DISCONTINUED | OUTPATIENT
Start: 2022-03-15 | End: 2022-03-15

## 2022-03-15 RX ORDER — OLANZAPINE 10 MG/2ML
10 INJECTION, POWDER, FOR SOLUTION INTRAMUSCULAR 3 TIMES DAILY PRN
Status: DISCONTINUED | OUTPATIENT
Start: 2022-03-15 | End: 2022-03-18 | Stop reason: HOSPADM

## 2022-03-15 RX ORDER — DOCUSATE SODIUM 100 MG/1
100 CAPSULE, LIQUID FILLED ORAL 2 TIMES DAILY
Status: DISCONTINUED | OUTPATIENT
Start: 2022-03-15 | End: 2022-03-18 | Stop reason: HOSPADM

## 2022-03-15 RX ORDER — BENZTROPINE MESYLATE 1 MG/1
1 TABLET ORAL 2 TIMES DAILY
Status: DISCONTINUED | OUTPATIENT
Start: 2022-03-15 | End: 2022-03-18 | Stop reason: HOSPADM

## 2022-03-15 RX ORDER — PANTOPRAZOLE SODIUM 40 MG/1
40 TABLET, DELAYED RELEASE ORAL DAILY
Status: DISCONTINUED | OUTPATIENT
Start: 2022-03-15 | End: 2022-03-15

## 2022-03-15 RX ORDER — CLOZAPINE 100 MG/1
500 TABLET ORAL DAILY
Status: DISCONTINUED | OUTPATIENT
Start: 2022-03-15 | End: 2022-03-15

## 2022-03-15 RX ORDER — LEVOTHYROXINE SODIUM 112 UG/1
112 TABLET ORAL DAILY
Status: DISCONTINUED | OUTPATIENT
Start: 2022-03-15 | End: 2022-03-18 | Stop reason: HOSPADM

## 2022-03-15 RX ORDER — LANOLIN ALCOHOL/MO/W.PET/CERES
3 CREAM (GRAM) TOPICAL
Status: DISCONTINUED | OUTPATIENT
Start: 2022-03-15 | End: 2022-03-18 | Stop reason: HOSPADM

## 2022-03-15 RX ORDER — VENLAFAXINE HYDROCHLORIDE 75 MG/1
225 CAPSULE, EXTENDED RELEASE ORAL DAILY
COMMUNITY
Start: 2022-02-28 | End: 2023-10-10

## 2022-03-15 RX ORDER — GABAPENTIN 100 MG/1
100 CAPSULE ORAL EVERY 6 HOURS PRN
Status: DISCONTINUED | OUTPATIENT
Start: 2022-03-15 | End: 2022-03-18 | Stop reason: HOSPADM

## 2022-03-15 RX ORDER — GABAPENTIN 300 MG/1
300 CAPSULE ORAL 3 TIMES DAILY
Status: DISCONTINUED | OUTPATIENT
Start: 2022-03-15 | End: 2022-03-15

## 2022-03-15 RX ORDER — POLYETHYLENE GLYCOL 3350 17 G/17G
17 POWDER, FOR SOLUTION ORAL DAILY PRN
Status: DISCONTINUED | OUTPATIENT
Start: 2022-03-15 | End: 2022-03-15 | Stop reason: HOSPADM

## 2022-03-15 RX ORDER — VITAMIN B COMPLEX
125 TABLET ORAL DAILY
Status: DISCONTINUED | OUTPATIENT
Start: 2022-03-15 | End: 2022-03-15

## 2022-03-15 RX ORDER — LEVOTHYROXINE SODIUM 112 UG/1
112 TABLET ORAL DAILY
Status: DISCONTINUED | OUTPATIENT
Start: 2022-03-15 | End: 2022-03-15

## 2022-03-15 RX ORDER — DOCUSATE SODIUM 100 MG/1
100 CAPSULE, LIQUID FILLED ORAL 2 TIMES DAILY
Status: DISCONTINUED | OUTPATIENT
Start: 2022-03-15 | End: 2022-03-15

## 2022-03-15 RX ORDER — GLYCOPYRROLATE 1 MG/1
1 TABLET ORAL 3 TIMES DAILY
Status: DISCONTINUED | OUTPATIENT
Start: 2022-03-15 | End: 2022-03-18 | Stop reason: HOSPADM

## 2022-03-15 RX ADMIN — GABAPENTIN 300 MG: 300 CAPSULE ORAL at 14:40

## 2022-03-15 RX ADMIN — NICOTINE POLACRILEX 4 MG: 4 GUM, CHEWING ORAL at 16:21

## 2022-03-15 RX ADMIN — NICOTINE POLACRILEX 4 MG: 4 GUM, CHEWING ORAL at 20:53

## 2022-03-15 RX ADMIN — LEVOTHYROXINE SODIUM 112 MCG: 0.11 TABLET ORAL at 12:35

## 2022-03-15 RX ADMIN — OLANZAPINE 10 MG: 10 TABLET, ORALLY DISINTEGRATING ORAL at 00:48

## 2022-03-15 RX ADMIN — GLYCOPYRROLATE 1 MG: 1 TABLET ORAL at 20:31

## 2022-03-15 RX ADMIN — CLOZAPINE 500 MG: 100 TABLET ORAL at 20:31

## 2022-03-15 RX ADMIN — GLYCOPYRROLATE 1 MG: 1 TABLET ORAL at 14:39

## 2022-03-15 RX ADMIN — VENLAFAXINE HYDROCHLORIDE 225 MG: 150 CAPSULE, EXTENDED RELEASE ORAL at 14:39

## 2022-03-15 RX ADMIN — NICOTINE POLACRILEX 4 MG: 4 GUM, CHEWING ORAL at 15:00

## 2022-03-15 RX ADMIN — OLANZAPINE 10 MG: 10 TABLET, FILM COATED ORAL at 22:25

## 2022-03-15 RX ADMIN — OLANZAPINE 10 MG: 10 TABLET, FILM COATED ORAL at 16:28

## 2022-03-15 RX ADMIN — PRAZOSIN HYDROCHLORIDE 4 MG: 1 CAPSULE ORAL at 20:31

## 2022-03-15 RX ADMIN — GABAPENTIN 300 MG: 300 CAPSULE ORAL at 20:31

## 2022-03-15 RX ADMIN — NICOTINE POLACRILEX 4 MG: 4 GUM, CHEWING ORAL at 17:31

## 2022-03-15 RX ADMIN — DOCUSATE SODIUM 100 MG: 100 CAPSULE, LIQUID FILLED ORAL at 20:31

## 2022-03-15 RX ADMIN — NICOTINE POLACRILEX 4 MG: 4 GUM, CHEWING ORAL at 22:04

## 2022-03-15 RX ADMIN — Medication 125 MCG: at 14:39

## 2022-03-15 RX ADMIN — NICOTINE POLACRILEX 4 MG: 4 GUM, CHEWING ORAL at 19:48

## 2022-03-15 RX ADMIN — NICOTINE POLACRILEX 4 MG: 4 GUM, CHEWING ORAL at 18:39

## 2022-03-15 RX ADMIN — PANTOPRAZOLE SODIUM 40 MG: 40 TABLET, DELAYED RELEASE ORAL at 12:35

## 2022-03-15 RX ADMIN — NICOTINE POLACRILEX 4 MG: 4 GUM, CHEWING ORAL at 12:36

## 2022-03-15 RX ADMIN — NICOTINE POLACRILEX 4 MG: 4 GUM, CHEWING ORAL at 13:55

## 2022-03-15 RX ADMIN — BENZTROPINE MESYLATE 1 MG: 1 TABLET ORAL at 20:31

## 2022-03-15 ASSESSMENT — ACTIVITIES OF DAILY LIVING (ADL)
DRESS: SCRUBS (BEHAVIORAL HEALTH);INDEPENDENT
HYGIENE/GROOMING: INDEPENDENT
DIFFICULTY_EATING/SWALLOWING: NO
DRESSING/BATHING_DIFFICULTY: NO
DOING_ERRANDS_INDEPENDENTLY_DIFFICULTY: NO
VISION_MANAGEMENT: PT WEARS GLASSES
FALL_HISTORY_WITHIN_LAST_SIX_MONTHS: NO
ORAL_HYGIENE: INDEPENDENT
WEAR_GLASSES_OR_BLIND: YES
TOILETING_ISSUES: NO
CONCENTRATING,_REMEMBERING_OR_MAKING_DECISIONS_DIFFICULTY: NO
LAUNDRY: UNABLE TO COMPLETE
LAUNDRY: UNABLE TO COMPLETE
WALKING_OR_CLIMBING_STAIRS_DIFFICULTY: NO
HYGIENE/GROOMING: INDEPENDENT
ORAL_HYGIENE: INDEPENDENT
DRESS: SCRUBS (BEHAVIORAL HEALTH)
DIFFICULTY_COMMUNICATING: NO
HEARING_DIFFICULTY_OR_DEAF: NO
CHANGE_IN_FUNCTIONAL_STATUS_SINCE_ONSET_OF_CURRENT_ILLNESS/INJURY: NO

## 2022-03-15 NOTE — H&P
"Psychiatric Eval/H&P    Patient Name: Kamini Neal   YOB: 1990  Age: 31 year old  [id number]    Primary Physician: [facility], [facility]   Completed by LAURIE MckenzieMASSIMO   [facility]    Primary psychiatrist/NP Dr. Campo last saw on 1/12/22            CC: \"The voices are getting really bad\"    HPI   Kamini Neal is a 31 year old  male who told staff at his group home that he was having an increase in command hallucinations telling him to harm himself.  He was having increasing suicidal ideation due to the negative nature of his hallucinations.  He states the hallucinations have been increasing significantly over the past week.  He tells me that the hallucinations have been significant for the past week and the suicidal ideation has been increasing over the past week though reported to the emergency room it has been much longer than this in the past 2 months have been very difficult for him.  He does not appear to be preoccupied by hallucinations when I meet with him.  He is easy to engage with in conversation.  He makes good eye contact and he is not delayed.  His affect is quite restricted.  He does tell me that he is tired and that Clozaril does make him quite tired through the day.  I worked with him the last time he was on our unit just a few months ago.  During that time we discussed changing his Invega sustain a shot every 21 days instead of every 28 days as it was appearing he was having an increase in symptoms towards the time his injection was due.  He has never had GeneSight testing though could potentially be a rapid metabolizer.  He states that he does not have any side effects from Invega.  He states he prefers not to go up on the Clozaril dose though will go up if the change in Invega is not helpful.  He states that the clozapine makes him very sedated.  He states he was on up to 800 mg of clozapine in 2012 and was able to stay out of the hospital for " "close to 2 years.  He states that he had a difficult time getting out of bed and cannot \"function in life at all\" on this dose.  He states he is willing to go up to 600 mg if necessary to decrease the hallucinations.  He states that he would be safe while he is on our unit.  I did review his psychiatrist's documentation.  He sees Dr. Campo from [facility] regularly.  Last psychiatric appointment, he was struggling with thoughts of self-harm and suicidal ideation.  It appears that the clozapine was increased to 500 mg at this time.   At that time he was also on , fluoxetine 10 mg, Effexor 225 mg and Invega 234 mg every month.          Past Psychiatric History:   Per EMR patient was admitted most recently at [facility] from 12/28/21-1/3/22 with similar concern of worsening auditory command type   at [facility] on 12/5/12/7/21 and then at [facility] from 12/7-12/10/21      Social History:     This information taken from my previous history and physical from December 2021.  There have been no changes.  He still resides in the same group home which is quite new for him this year though does enjoy living at this group home and enjoys the staff.        [place] were  though . Dad has schizophrenia. Mother lives in [place] and they speak regularly. He is his own gaurdian and voluntarily lives in the group home with the hopes of getting back into his own apartment some day. He likes the staff and other residents at his group home. He did have one year of college though mental health symptoms worsened at age 22. Never . No children.       Chemical Use History: Historical marijuana years ago     Family Psychiatric History: Father has schizophrenia     Medical History and ROS    Prior to Admission medications    Medication Sig Start Date End Date Taking? Authorizing Provider   benztropine (COGENTIN) 1 MG tablet Take 1 mg by mouth 2 times daily  2/28/22  Yes " "Reported, Patient   cholecalciferol (VITAMIN D3) 125 mcg (5000 units) capsule Take 125 mcg by mouth daily  3/10/22  Yes Reported, Patient   cloZAPine (CLOZARIL) 100 MG tablet Take 500 mg by mouth At Bedtime   Yes Reported, Patient   docusate sodium (COLACE) 100 MG capsule Take 100 mg by mouth 2 times daily    Yes Reported, Patient   FLUoxetine (PROZAC) 10 MG capsule Take 10 mg by mouth every morning  2/28/22  Yes Reported, Patient   gabapentin (NEURONTIN) 300 MG capsule Take 1 capsule (300 mg) by mouth 3 times daily 1/3/22  Yes Doyle Cervantes APRN CNP   glycopyrrolate (ROBINUL) 1 MG tablet Take 1 tablet (1 mg) by mouth 3 times daily 1/3/22  Yes Doyle Cervantes APRN CNP   levothyroxine (SYNTHROID/LEVOTHROID) 112 MCG tablet Take 1 tablet (112 mcg) by mouth daily 1/3/22  Yes Doyle Cervantes APRN CNP   OLANZapine (ZYPREXA) 20 MG tablet Take 10 mg by mouth 3 times daily as needed (agitation/psychosis/self harm)  1/18/22  Yes Reported, Patient   omeprazole (PRILOSEC) 20 MG DR capsule Take 20 mg by mouth daily  3/10/22  Yes Reported, Patient   polyethylene glycol (MIRALAX) 17 g packet Take 17 g by mouth daily as needed for constipation 8/16/21  Yes Chandler Solorzano MD   prazosin (MINIPRESS) 2 MG capsule Take 4 mg by mouth At Bedtime  2/28/22  Yes Reported, Patient   venlafaxine (EFFEXOR-XR) 75 MG 24 hr capsule Take 225 mg by mouth daily  2/28/22  Yes Reported, Patient   paliperidone (INVEGA SUSTENNA) 234 MG/1.5ML ANTHONY Inject 1.5 mLs (234 mg) into the muscle every 28 days Next dose due on January 19, 2022 1/19/22   Doyle Cervantes APRN CNP     Allergies   Allergen Reactions     Haloperidol Other (See Comments)     Other reaction(s): Tardive Dyskinesia  Torticollis  Torticollis  Stiff Neck  Torticollis; reports \"stiff neck.\"       Past Medical History:   Diagnosis Date     Anxiety      Depressive disorder      Schizo affective schizophrenia (H)      Past Surgical History:   Procedure Laterality Date     " "TONSILLECTOMY         Past Psychiatric Medications Tried multiple medications tried in the past and failed including most neuroleptics    Physical Exam/ROS:     Please refer to the physical exam and review of systems completed by bhavana sanabria   on 3/15/22. No Further issues of concern noted           MSE/PSYCH  PSYCHIATRIC EXAM  /97 (BP Location: Right arm)   Pulse 88   Temp 97.2  F (36.2  C) (Tympanic)   Resp 18   Ht 1.778 m (5' 10\")   Wt 107.6 kg (237 lb 3.2 oz)   SpO2 98%   BMI 34.03 kg/m    MSE/PSYCH  PSYCHIATRIC EXAM  /97 (BP Location: Right arm)   Pulse 88   Temp 97.2  F (36.2  C) (Tympanic)   Resp 18   Ht 1.778 m (5' 10\")   Wt 107.6 kg (237 lb 3.2 oz)   SpO2 98%   BMI 34.03 kg/m    -Appearance/Behavior: flat apathetic  -Motor: intact  -Gait: intact  -Abnormal involuntary movements: none  -Mood: Restricted  -Affect: Blunted  -Speech: regular though monotone  -Thought process/associations: Logical and Goal directed.  -Thought content: Does have auditory hallucinations command in nature telling him to harm himself.  Thought processes not disorganized  -Perceptual disturbances: Does have command hallucinations..              -Suicidal/Homicidal Ideation: passive suicidal thoughts will states would not harm himself here  -Judgment: poor  -Insight: Fair  *Orientation: time, place and person.  *Memory: intact  *Attention: fair  *Language: fluent, no aphasias, able to repeat phrases and name objects. Vocab intact.  *Fund of information: appropriate for education  *Cognitive functioning estimate: 0 - independent.           Labs: Labs prior to admission were not obtained.  I will obtain a CBC CMP today tomorrow morning along with the clozapine blood level.     Assessment/Impression: This is a 31 year old yo male with schizoaffective disorder who has had multiple hospitalizations this year on a voluntary basis.  He has a diagnosis of schizoaffective disorder as well as PTSD and borderline " personality disorder there is some concern that his current hallucinations could be secondary to borderline personality disorder versus psychosis.  He does appear to have an increase in symptoms closer to the time his Invega Sustenna injection is due.  I did notice this on his last admission and we discussed having his injection changed to every 21 days though this was not done on his discharge and he was still administered it every 30 days.  This could possibly make a difference as he could be a rapid metabolizer and require injection every 21 days.  He would prefer to try this first prior to increasing clozapine.  He has been on higher doses of clozapine in the past which was very effective though extremely sedating for him.  Educated regarding medication indications, risks, benefits, side effects, contraindications and possible interactions. Verbally expressed understanding.     DX:      Schizoaffective disorder bipolar type  Borderline personality disorder  Posttraumatic stress disorder      Plan:     Labs Needed:    CMP CBC and clozapine level tomorrow morning      Med Changes:    -Today he will receive Invega Sustenna and I will change to every 21 days.  I did try to contact the group home to see if they are noticing more symptoms occurring closer to the time of his injection being due though no one answered and I did leave a message on their answering machine regarding this.    -Clozapine trough blood level will be drawn tomorrow.  I believe he is still smoking cigarettes and we will need to discuss this with him after reviewing his clozapine level as it could dramatically decrease his levels when he returns home.      DC Planning:      Likely back to group home once stable.     Anticipated length of stay 3-5 days
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
## 2022-03-15 NOTE — PLAN OF CARE
Problem: Behavioral Health Plan of Care  Goal: Patient-Specific Goal (Individualization)  Description: Pt will sleep 6-8 hours per night.   Pt will eat at least 50% of meals.   Pt will attend 50% of groups  Outcome: Ongoing, Progressing   Goal Outcome Evaluation:

## 2022-03-15 NOTE — PLAN OF CARE
"  Problem: Behavioral Health Plan of Care  Goal: Patient-Specific Goal (Individualization)  Description: Pt will sleep 6-8 hours per night.   Pt will eat at least 50% of meals.   Pt will attend 50% of groups    Patient has been out on the open unit, sitting in the lounge. Affect is flat, mood is calm. He admits to anxiety and agitation, and hallucinations. States \"yeah, I'm having voices\", but does not elaborate further. States this causes him anxiety. He requested and accepted Zyprexa 10 mg at 1628. He states he's also experiencing depression. He received his Invega injection in his right deltoid with no issues, states \"I've been on Invega for a long time now\". He's requesting to have his Nicotine gum switched to two 2 mg pieces. Has been appropriate with staff and peers.     2225-patient in bed, reports to staff that he feels like harming himself and his voices are worse, writer offered Zyprexa 10 mg. He tells writer he has no support system, and that his family doesn't understand, then states \"I will come find you if I feel like harming myself\". He went back to bed.   Face to face end of shift report communicated to oncoming RN.     Keiry Lyn RN  3/15/2022  10:35 PM    Outcome: Ongoing, Progressing     Problem: Thought Process Alteration  Goal: Optimal Thought Clarity  Description: Pt will be able to have a reality oriented conversation by discharge.   Pt will report a decrease in hallucinations.  Pt will identify 2 coping skills to use when having hallucinations.     Patient is able to have a reality based conversation, but does admit to \"voices\".   Outcome: Ongoing, Progressing                   "

## 2022-03-15 NOTE — LETTER
[address] [address]  Phone: [phone]  Fax: [phone]    22    Kamini Neal  [address] [place] [phone]        To whom it may concern:         Kamini Neal ( 89) was hospitalized at [facility] from 3/15/22 until 3/18/22. He is able to return to work without restrictions. Thank you.          Sincerely,      Marianna Leslie, CNP

## 2022-03-15 NOTE — PROGRESS NOTES
03/15/22 1140   Patient Belongings   Did you bring any home meds/supplements to the hospital?  No   Patient Belongings remains with patient;locker;sent to security per site process   Patient Belongings Remaining with Patient glasses   Patient Belongings Put in Hospital Secure Location (Security or Locker, etc.) cash/credit card;cell phone/electronics;watch;wallet   Belongings Search Yes   Clothing Search Yes   Second Staff Shante A   Comment baseball cap, rosalind underwear, t shirt, jeans, socks, mask, rosalind shoes, Black Jacket, lighter, 3.12 monies (3-ones)(1-dime)(2-pennies)   List items sent to safe: samsung black phone, samsung watch, black wallet, W2 form, [place] card, access pass card, [employer] angel debit card, [employer] membership card, [employer] insurance card.    All other belongings put in assigned cubby in belongings room.       I have reviewed my belongings list on admission and verify that it is correct.     Patient signature_______________________________    Second staff witness (if patient unable to sign) ______________________________       I have received all my belongings at discharge.    Patient signature________________________________    Rox  3/15/2022  11:46 AM
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
## 2022-03-15 NOTE — ED NOTES
Patient endorsing anxiety and still feeling suicidal. Offered medication to help him calm down and patient agreed.

## 2022-03-15 NOTE — CARE PLAN
Prior to Admission Medication Reconciliation:     Medications added:   [] None  [x] As listed below:    Prazosin    prozac    cogentin    Medications deleted:   [] None  [x] As listed below:    Pantoprazole- switched to prilosec very recently    rexulti- not taking currently per staff    Medications marked for review/removal by attending:  [x] None  [] As listed below:    Changes made to existing medications:   [] None  [x] Updated strengths and frequencies to most current  [] As listed below:      Last times/dates taken verified with patient:  [x] Yes- completed myself  [] Prepared PTA medlist for review only. (will not be available to review personally)  [] Did not review with patient. Rx verification only. Review completed by nursing.    [] Nurse completed no changes made (double checked entries)  [] Unable to review with patient at this time:  [] Nurse completed/changes made:     Allergies listed at another location:  []Allergies match allergies listed in Epic  []Other allergies listed:    Allergy review:    [x]Did not review: reviewed by nursing  []Did not review: pt unable at this time  []Patient/MAR verified NKDA  []Patient/MAR verified current existing allergies: no changes made  []Patient confirmed current existing allergies and new allergies added:    Medication reconciliation sources:   []Patient  []Patient family member/emergency contact: **  [][facility]  []Epic Chart Review  [x]Care Everywhere review  []Pharmacy med list: **  []Pharmacy phone call  [x]Outside meds dispense report: see below  [x]Nursing home or Assisted Living MAR: see MAR below, also called staff at [facility] to confirm meds that were stopped and get last invega injection  Group home #'s:     [phone] ([place])    [phone]  []Other: **    Pharmacy desired at discharge: [place]    Is patient on coumadin?  [x]No      Requests for consultation by provider or pharmacist:   [] Patient understands why all of  their meds were prescribed and how to take them. No questions.   [x] Managing party has no questions.   [] Patient/ managing party has questions about the following:  [] Did not review with patient. Cannot assess.     Fill dates and reported compliancy:  [x] Fill dates coincide with reported compliancy for all/most maintenance meds.   [] Fill dates do not coincide with compliancy with maintenance meds. See notes in PTA medlist and in comments.    [] Fill dates do not coincide with the following medications but pt reports compliancy:  [] Did not review with patient. Cannot assess.     Historian accuracy:  [] Excellent- alert and oriented, understands why meds were prescribed and how to take, able to answer specifics  [] Good- alert and oriented, understands why meds were prescribed and how to take, some confusion   [] Fair- alert and oriented, doesn't know medications without list, cannot answer specifics about medications, but has a decent process for which to take at home  [] Poor- does not know medications, may not have a process to take at home, may be cognitively unable to review at this time  [x]Medication management done by family member or facility, no concerns about historian accuracy.   [] Did not review with patient. Cannot assess.     Medication Management:  [] Manages meds independently  [] Family member/ other party manages meds/assists:  [x] Meds managed by staff at facility  [] Meds set up by home care, family/other party helps administer  [] Meds set up by home care, self administers  [] Did not review with patient. Cannot assess.     Other medications aside from PTA:  [x] Denies taking any medications aside from those listed in PTA meds  [] Reports taking another medication(s) but cannot recall the name(s)  [] Refuses to say.  [] Did not review with patient. Cannot assess.     Comments: none    Laura Colbert on 3/15/2022 at 11:47 AM       Discrepancies: [x] No []Not Applicable []Yes: listed  below    Issues completing PTA medication reconciliation:  [] On hold for a long time  [] Waited for a call back  [] Fax didn't come through  [] Fax took a long time  [] Other:    Notifying appropriate party of changes/additions/discrepancies:  []No pertinent changes made, notification not necessary.   [] Notified attending provider via text page/phone call  [] Notified attending provider in person  [] Notified pharmacy  [] Notified nurse  [x] Medications have not been reconciled by a provider yet, notification not necessary  [] Pt is not admitted to floor yet, PTA meds completed before admission.   Medications Prior to Admission   Medication Sig Dispense Refill Last Dose     benztropine (COGENTIN) 1 MG tablet Take 1 mg by mouth 2 times daily    3/14/2022 at 2000     cholecalciferol (VITAMIN D3) 125 mcg (5000 units) capsule Take 125 mcg by mouth daily    3/14/2022 at 0800     cloZAPine (CLOZARIL) 100 MG tablet Take 500 mg by mouth At Bedtime   3/14/2022 at 2000     docusate sodium (COLACE) 100 MG capsule Take 100 mg by mouth 2 times daily    3/14/2022 at 2000     FLUoxetine (PROZAC) 10 MG capsule Take 10 mg by mouth every morning    3/14/2022 at 0800     gabapentin (NEURONTIN) 300 MG capsule Take 1 capsule (300 mg) by mouth 3 times daily 90 capsule 1 3/14/2022 at 2000     glycopyrrolate (ROBINUL) 1 MG tablet Take 1 tablet (1 mg) by mouth 3 times daily 90 tablet 0 3/14/2022 at 2000     levothyroxine (SYNTHROID/LEVOTHROID) 112 MCG tablet Take 1 tablet (112 mcg) by mouth daily 30 tablet 0 3/14/2022 at am     OLANZapine (ZYPREXA) 20 MG tablet Take 10 mg by mouth 3 times daily as needed (agitation/psychosis/self harm)    Unknown at Unknown time     omeprazole (PRILOSEC) 20 MG DR capsule Take 20 mg by mouth daily    3/14/2022 at AM     polyethylene glycol (MIRALAX) 17 g packet Take 17 g by mouth daily as needed for constipation 30 packet 1 Unknown at Unknown time     prazosin (MINIPRESS) 2 MG capsule Take 4 mg by mouth At  Bedtime    3/14/2022 at 2000     venlafaxine (EFFEXOR-XR) 75 MG 24 hr capsule Take 225 mg by mouth daily    3/14/2022 at 0800     paliperidone (INVEGA SUSTENNA) 234 MG/1.5ML ANTHONY Inject 1.5 mLs (234 mg) into the muscle every 28 days Next dose due on January 19, 2022 1.5 mL 0 2/21/2022                       Medication Dispense History (from 3/15/2021 to 3/15/2022)  Expand All  Collapse All    Amoxicillin     Dispensed Days Supply Quantity Provider Pharmacy   AMOXICILLIN 500 MG CAPSULE 11/04/2021 14 56 capsule MARIA TERESA MINA [facility] B..[place]           Atropine Sulfate     Dispensed Days Supply Quantity Provider Pharmacy   ATROPINE 1% EYE DROPS 05/31/2021 25 5 [facility]           Benztropine Mesylate     Dispensed Days Supply Quantity Provider Pharmacy   BENZTROPINE MES 1 MG TABLET 02/28/2022 28 56 tablet [facility]   BENZTROPINE MES 1 MG TABLET 01/28/2022 28 56 [facility]   BENZTROPINE MES 1 MG TABLET 01/03/2022 28 [facility]   BENZTROPINE MES 1 MG TABLET 12/13/2021 28 [facility]   BENZTROPINE MES 1 MG TABLET 11/22/2021 28 56 [facility]   BENZTROPINE MES 1 MG TABLET 10/08/2021 28 56 [facility]   BENZTROPINE MES 1 MG TABLET 09/10/2021 28 56 [facility]...   BENZTROPINE MES 1 MG TABLET 08/13/2021 28 56 [facility]...   BENZTROPINE MES 1 MG TABLET 07/16/2021 28 56 tablet [facility]...   BENZTROPINE MES 1 MG TABLET 06/17/2021 28 56 [facility]   BENZTROPINE MES 1 MG TABLET 05/21/2021 28 56 tablet HASMUKHANA M [facility] B...   BENZTROPINE MES 1 MG TABLET 04/23/2021 28 56 tablet HASMUKHANA M [facility] B...   BENZTROPINE MES 1  MG TABLET 03/26/2021 28 56 tablet HASMUKHANA M [facility] [place] B...           Cholecalciferol     Dispensed Days Supply Quantity Provider Pharmacy   VITAMIN D3 5,000 UNIT SOFTGEL 03/10/2022 28 28 capsule WALTER ALONZO [facility] [place] B...   VITAMIN D3 125 MCG CAPSULE 01/28/2022 28 28 [facility]   VITAMIN D3 5,000 UNIT SOFTGEL 12/31/2021 28 28 [facility]   VITAMIN D3 5,000 UNIT SOFTGEL 12/08/2021 28 28 capsule TOLESSA,[facility] B...   VITAMIN D3 5,000 UNIT SOFTGEL 11/18/2021 28 28 [facility]...   VITAMIN D3 125 MCG CAPSULE 11/04/2021 19 19 [facility]...   VITAMIN D3 1,000 UNIT SOFTGEL 10/08/2021 28 28 capsule WALTER ALONZO [facility] [place] B...   VITAMIN D3 1,000 UNIT SOFTGEL 09/10/2021 28 28 [facility]...   VITAMIN D3 1,000 UNIT SOFTGEL 08/17/2021 14 14 capsule [facility]...           Clarithromycin     Dispensed Days Supply Quantity Provider Pharmacy   CLARITHROMYCIN 500 MG TABLET 11/04/2021 14 28 tablet [facility]...           Docusate Sodium     Dispensed Days Supply Quantity Provider Pharmacy   DOCUSATE SODIUM 100 MG SOFTGEL 02/28/2022 28 56 capsule HASMUKHANA M [facility] [place] B...   DOCUSATE SODIUM 100 MG SOFTGEL 01/28/2022 28 56 capsule HASMUKHANA M [facility] [place] B...   DOCUSATE SODIUM 100 MG SOFTGEL 01/03/2022 28 56 [facility]   DOCUSATE SODIUM 100 MG SOFTGEL 12/13/2021 28 56 [facility]...   DOCUSATE SODIUM 100 MG SOFTGEL 11/22/2021 28 56 capsule LESLIE JADE [facility] [place]...   DOCUSATE SODIUM 100 MG SOFTGEL 10/08/2021 28 56 capsule ANA M NOE [facility] [place]...   DOCUSATE SODIUM 100 MG SOFTGEL 09/10/2021 28 56 capsule ANA M NOE  [facility] B...   DOCUSATE SODIUM 100 MG SOFTGEL 08/13/2021 28 [facility]   DOCUSATE SODIUM 100 MG SOFTGEL 07/16/2021 28 56 [facility]...   DOCUSATE SODIUM 100 MG SOFTGEL 06/17/2021 28 56 [facility]...   DOCUSATE SODIUM 100 MG SOFTGEL 05/21/2021 28 56 [facility]...   DOCUSATE SODIUM 100 MG SOFTGEL 04/23/2021 28 56 [facility]...    MG SOFTGEL 04/23/2021 28 56 [facility]...    MG SOFTGEL 03/26/2021 28 56 [facility]...           FLUoxetine HCl     Dispensed Days Supply Quantity Provider Pharmacy   FLUOXETINE HCL 10 MG CAPSULE 02/28/2022 28 28 [facility]...   FLUOXETINE HCL 10 MG CAPSULE 02/03/2022 28 28 [facility]...   FLUOXETINE HCL 10 MG CAPSULE 01/12/2022 28 28 [facility]...   FLUOXETINE HCL 10 MG CAPSULE 12/03/2021 28 28 [facility]...   FLUOXETINE HCL 10 MG CAPSULE 11/05/2021 28 28 [facility]...   FLUOXETINE HCL 10 MG CAPSULE 10/08/2021 28 28 [facility]...   FLUOXETINE HCL 10 MG CAPSULE 09/15/2021 28 28 [facility]...   FLUOXETINE HCL 10 MG CAPSULE 09/01/2021 14 14 [facility]...   FLUOXETINE HCL 10 MG CAPSULE 09/01/2021 19 19 [facility]...   FLUOXETINE HCL 10 MG CAPSULE 08/17/2021 14 14 capsule [facility]...           Gabapentin     Dispensed Days Supply Quantity Provider Pharmacy   GABAPENTIN 300 MG CAPSULE 02/28/2022 28 84 capsule [facility]   GABAPENTIN 300 MG CAPSULE 01/28/2022 28 84 capsule  NOE,[facility] B...   GABAPENTIN 300 MG CAPSULE 12/31/2021 28 84 [facility]...   GABAPENTIN 300 MG CAPSULE 12/03/2021 28 84 capsule KALIE,[facility] B...   GABAPENTIN 300 MG CAPSULE 11/05/2021 28 84 capsule KALIE,[facility] B...   GABAPENTIN 300 MG CAPSULE 10/08/2021 28 84 capsule [facility]...   GABAPENTIN 300 MG CAPSULE 09/15/2021 28 84 [facility]...   GABAPENTIN 300 MG CAPSULE 09/01/2021 14 42 capsule [facility]...   GABAPENTIN 300 MG CAPSULE 09/01/2021 19 58 capsule [facility]...   GABAPENTIN 300 MG CAPSULE 08/17/2021 14 42 capsule JOLIE BUSTILLO [facility] B...           Glycopyrrolate     Dispensed Days Supply Quantity Provider Pharmacy   GLYCOPYRROLATE 1 MG TABLET 02/08/2022 28 84 [facility]...   GLYCOPYRROLATE 1 MG TABLET 01/04/2022 21 63 tablet [facility]...   GLYCOPYRROLATE 1 MG TABLET 12/17/2021 21 63 [facility]...   GLYCOPYRROLATE 1 MG TABLET 12/13/2021 8 24 tablet JAYDENDYLAN [facility] B...           Ibuprofen     Dispensed Days Supply Quantity Provider Pharmacy   IBUPROFEN 600 MG TABLET 05/14/2021 10 30 tablet SHU ELKINS [facility] B...           Levothyroxine Sodium     Dispensed Days Supply Quantity Provider Pharmacy   LEVOTHYROXINE 112 MCG TABLET 03/10/2022 28 28 tablet [facility]...   LEVOTHYROXINE 112 MCG TABLET 01/28/2022 28 28 tablet ELIZABETH[facility]...   LEVOTHYROXINE 112 MCG TABLET 12/31/2021 28 28 tablet [facility]...   LEVOTHYROXINE 112 MCG TABLET 12/03/2021 28 28 tablet WALTER ALONZO [facility]..   LEVOTHYROXINE 112 MCG TABLET 11/10/2021 28 28 tablet WALTER ALONZO  [facility] B...   LEVOTHYROXINE 112 MCG TABLET 10/18/2021 28 28 [facility]   LEVOTHYROXINE 112 MCG TABLET 09/28/2021 28 28 tablet CLINTON,[facility] B...   LEVOTHYROXINE 112 MCG TABLET 08/13/2021 28 28 [facility]...   LEVOTHYROXINE 112 MCG TABLET 07/16/2021 28 28 tablet [facility]...   LEVOTHYROXINE 112 MCG TABLET 06/17/2021 28 28 tablet CLINTON,[facility] B...   LEVOTHYROXINE 112 MCG TABLET 05/25/2021 28 28 [facility]   LEVOTHYROXINE 112 MCG TABLET 05/05/2021 28 28 tablet [facility]...   LEVOTHYROXINE 112 MCG TABLET 04/09/2021 28 28 tablet [facility]...           Lithium Carbonate     Dispensed Days Supply Quantity Provider Pharmacy   LITHIUM CARBONATE  MG TB 12/20/2021 21 21 [facility]...   LITHIUM CARBONATE  MG TB 12/13/2021 9 9 tablet [facility]...           OLANZapine     Dispensed Days Supply Quantity Provider Pharmacy   OLANZAPINE 20 MG TABLET 01/18/2022 30 30 tablet [facility]...   OLANZAPINE 10 MG TABLET 11/19/2021 30 30 tablet LESLIE JADE [facility] B...   OLANZAPINE 20 MG TABLET 07/29/2021 15 15 tablet [facility]...   OLANZAPINE 20 MG TABLET 06/29/2021 10 15 tablet [facility]...           Omeprazole     Dispensed Days Supply Quantity Provider Pharmacy   OMEPRAZOLE DR 20 MG CAPSULE 03/10/2022 28 28 capsule [facility]   OMEPRAZOLE DR 20 MG CAPSULE 02/15/2022 14 28 [facility]   OMEPRAZOLE DR 20 MG CAPSULE 12/08/2021 28 28 [facility] TRACY.   OMEPRAZOLE  20 MG CAPSULE 11/14/2021 28 28 capsule CLINTONWALTER [facility] MIRA   OMEPRAZOLE  20 MG CAPSULE 11/04/2021  14 14 capsule MARIA TERESA MINA [facility] B...   OMEPRAZOLE DR 20 MG CAPSULE 10/08/2021 28 28 capsule CLINTON,[facility] B...   OMEPRAZOLE DR 20 MG CAPSULE 09/15/2021 28 28 capsule CLINTON,[facility] B...   OMEPRAZOLE DR 20 MG CAPSULE 08/13/2021 28 28 capsule CLINTON,[facility] B...   OMEPRAZOLE DR 20 MG CAPSULE 07/16/2021 28 28 capsule CLINTON,[facility] B...   OMEPRAZOLE DR 20 MG CAPSULE 06/17/2021 28 28 capsule CLINTON,[facility] B...   OMEPRAZOLE DR 20 MG CAPSULE 05/21/2021 28 28 capsule CLINTON,[facility] B...   OMEPRAZOLE DR 20 MG CAPSULE 04/29/2021 28 28 capsule CLINTON,[facility] B...   OMEPRAZOLE DR 20 MG CAPSULE 04/09/2021 28 28 capsule CLINTON,[facility] B...           Paliperidone Palmitate     Dispensed Days Supply Quantity Provider Pharmacy   INVEGA SUSTENNA 234 MG/1.5 ML 02/10/2022 28 1 Syringe NOE,[facility]...   INVEGA SUSTENNA 234 MG/1.5 ML 01/18/2022 28 1 Syringe NOE,[facility] - Kit...   INVEGA SUSTENNA 234 MG/1.5 ML 12/27/2021 28 1 Syringe NOE,NAA M [facility] - Kit...   INVEGA SUSTENNA 234 MG/1.5 ML 12/06/2021 28 1 Syringe NOE,ANA M [facility] - Kit...   INVEGA SUSTENNA 234 MG/1.5 ML 11/15/2021 28 1 Syringe NOE,ANA M [facility] - Kit...   INVEGA SUSTENNA 234 MG/1.5 ML 09/09/2021 28 1 Syringe [facility]   INVEGA SUSTENNA 234 MG/1.5 ML 08/12/2021 28 1 Syringe NOE,ANA M [facility] - Kit...   INVEGA SUSTENNA 234 MG/1.5 ML 07/15/2021 28 1 Syringe NOE,ANA M [facility] - Kit...   INVEGA SUSTENNA 234 MG/1.5 ML 06/17/2021 28 1 Syringe NOE,ANA M [facility] - Kit...   INVEGA SUSTENNA 234 MG/1.5 ML 05/20/2021 28 1 Syringe NOE,ANA M [facility] - Kit...   INVEGA SUSTENNA 234 MG/1.5 ML 04/22/2021 28 1 Syringe NOE,ANA M [facility] - Kit...   INVEGA SUSTENNA 234  MG/1.5 ML 03/25/2021 28 1 Syringe [facility]...           Pantoprazole Sodium     Dispensed Days Supply Quantity Provider Pharmacy   PANTOPRAZOLE SOD DR 40 MG TAB 02/08/2022 28 28 tablet WALTER ALONZO [facility] B...   PANTOPRAZOLE SOD DR 40 MG TAB 01/18/2022 16 16 tablet [facility]...   PANTOPRAZOLE SOD DR 40 MG TAB 01/04/2022 14 14 tablet ELIZABETH,[facility] B...           Polyethylene Glycol 3350     Dispensed Days Supply Quantity Provider Pharmacy   POLYETHYLENE GLYCOL 3350 POWD 08/17/2021 28 28 g IWONAJOLIE [facility] B...           Prazosin HCl     Dispensed Days Supply Quantity Provider Pharmacy   PRAZOSIN 2 MG CAPSULE 02/28/2022 28 56 [facility]   PRAZOSIN 2 MG CAPSULE 02/06/2022 28 56 [facility]...   PRAZOSIN 2 MG CAPSULE 01/17/2022 28 56 [facility]...   PRAZOSIN 1 MG CAPSULE 12/08/2021 28 84 capsule [facility] B...   PRAZOSIN 1 MG CAPSULE 11/17/2021 28 84 capsule [facility] B...   PRAZOSIN 2 MG CAPSULE 10/08/2021 28 56 [facility]...   PRAZOSIN 2 MG CAPSULE 09/10/2021 28 56 capsule [facility] B...   PRAZOSIN 1 MG CAPSULE 09/09/2021 20 20 [facility]...   PRAZOSIN 1 MG CAPSULE 08/13/2021 28 84 [facility]...   PRAZOSIN 1 MG CAPSULE 07/16/2021 28 84 [facility]...   PRAZOSIN 1 MG CAPSULE 06/17/2021 28 84 [facility]...   PRAZOSIN 1 MG CAPSULE 05/21/2021 28 84 capsule ANA M NOE [facility] B...   PRAZOSIN 1 MG CAPSULE 04/23/2021 28 84 capsule HASMUKHANA M [facility] SILVANA...   PRAZOSIN 1 MG CAPSULE 03/26/2021 28 84 capsule HASMUKHANA M [facility] SILVANA...            Venlafaxine HCl     Dispensed Days Supply Quantity Provider Pharmacy   VENLAFAXINE HCL ER 75 MG CAP 02/28/2022 28 84 [facility]   VENLAFAXINE HCL ER 75 MG CAP 02/03/2022 28 84 [facility]...   VENLAFAXINE HCL ER 75 MG CAP 01/12/2022 28 84 [facility]...   VENLAFAXINE HCL  MG CAP 01/04/2022 14 28 capsule ELIZABETH,NACHO [facility] B...   VENLAFAXINE HCL  MG CAP 11/19/2021 28 56 capsule KALIELESLIE [facility] B...   VENLAFAXINE HCL ER 75 MG CAP 10/08/2021 28 84 [facility]...   VENLAFAXINE HCL ER 75 MG CAP 09/10/2021 28 84 [facility]...   VENLAFAXINE HCL ER 75 MG CAP 08/13/2021 28 84 [facility]...   VENLAFAXINE HCL ER 75 MG CAP 07/16/2021 28 84 [facility]   VENLAFAXINE HCL ER 75 MG CAP 06/17/2021 28 84 [facility]...   VENLAFAXINE HCL ER 75 MG CAP 05/21/2021 28 84 [facility]...   VENLAFAXINE HCL ER 75 MG CAP 04/23/2021 28 84 [facility]...   VENLAFAXINE HCL ER 75 MG CAP 03/26/2021 28 84 [facility]...           cloZAPine     Dispensed Days Supply Quantity Provider Pharmacy   CLOZAPINE 100 MG TABLET 03/11/2022 28 140 [facility]   CLOZAPINE 100 MG TABLET 02/11/2022 28 140 tablet [facility]...   CLOZAPINE 100 MG TABLET 01/15/2022 28 140 [facility]...   CLOZAPINE 50 MG TABLET 01/04/2022 28 28 tablet NACHO JOHNSON [facility]...   CLOZAPINE 100 MG TABLET 12/17/2021 28 112 tablet ANA M NOE [facility]...   CLOZAPINE 25 MG TABLET 12/17/2021 28 56 tablet ANA M NOE [facility] Maryann...   CLOZAPINE 100  MG TABLET 11/19/2021 28 112 tablet NOE,ANA M [facility]- Maryann...   CLOZAPINE 25 MG TABLET 11/19/2021 28 56 tablet NOEANA M [facility]- [place]...   CLOZAPINE 100 MG TABLET 10/25/2021 28 112 [facility] [place]...   CLOZAPINE 25 MG TABLET 10/25/2021 28 56 tablet [facility] [place]...   CLOZAPINE 100 MG TABLET 09/24/2021 28 112 tablet [facility] [place]...   CLOZAPINE 25 MG TABLET 09/24/2021 28 56 tablet [facility] [place]...   CLOZAPINE 100 MG TABLET 09/04/2021 28 112 tablet NOE,ANA M [facility] [place]ann...   CLOZAPINE 25 MG TABLET 09/04/2021 28 56 tablet NOEANA M [facility]- [place]...   CLOZAPINE 100 MG TABLET 08/19/2021 21 84 tablet HASMUKHANA M [facility] [place]ann...   CLOZAPINE 25 MG TABLET 08/19/2021 21 42 tablet NOEANA M [facility]- [place]...   CLOZAPINE 100 MG TABLET 07/30/2021 28 112 tablet NOEANA M [facility]- [place]...   CLOZAPINE 25 MG TABLET 07/30/2021 28 56 tablet NOEANA M [facility]- [place]...   CLOZAPINE 100 MG TABLET 07/02/2021 28 112 tablet NOEANA M [facility]- [place]...   CLOZAPINE 25 MG TABLET 07/02/2021 28 56 tablet HASMUKHANA M [facility]- [place]...   CLOZAPINE 100 MG TABLET 06/04/2021 28 112 tablet HASMUKHANA M [facility]- [place]ann...   CLOZAPINE 25 MG TABLET 06/04/2021 28 56 tablet NOE,[facility] [place]ann...   CLOZAPINE 100 MG TABLET 05/07/2021 28 112 tablet HASMUKHANA [facility] [place]...   CLOZAPINE 25 MG TABLET 05/07/2021 28 56 tablet [facility] [place]...   CLOZAPINE 100 MG TABLET 04/09/2021 28 112 tablet NOE,ANA M [facility]- [place]ann...   CLOZAPINE 25 MG TABLET 04/09/2021 28 56 tablet NOE,ANA M [facility]...           Disclaimer    Certain dispenses may not be available or accurate in this report, including over-the-counter medications, low cost prescriptions, prescriptions paid  for by the patient or non-participating sources, or errors in insurance claims information. The provider should independently verify medication history with the patient.    External Sources

## 2022-03-15 NOTE — ED NOTES
Sign out note: Kamini Neal is a 31 year old male was signed out to me by Dr Spring at 0700 am.  Please see initial dictation for full details and history.  Patient has schizoaffective disorder bipolar type, PTSD and substance abuse as well as generalized anxiety.  He presented to the emergency department last night for suicidal ideation.  He was evaluated by the behavioral  and plans were made to admit the patient to the hospital.  There are currently no beds available here at [facility].  Plan at time of sign out is to transfer the patient to [facility] for management.       Course in the ED:  Patient transferred to [facility] at approximately 0745 am.      This note was created in part by the use of Dragon voice recognition dictation system. Inadvertent grammatical errors and typographical errors may still exist.  MD Hue Nieves Alda L, MD  03/15/22 2480

## 2022-03-15 NOTE — H&P
"[facility]    History and Physical  Medical Services       Date of Admission:  3/15/2022  Date of Service (when I saw the patient): 03/15/22    Assessment & Plan     Principal Problem:    Suicidal behavior    Active Medical Problems:    GERD (gastroesophageal reflux disease)- denies GERD symptoms. Home dose ordered.       Hypothyroidism- last TSH on 12/7/21 wnl at 2.17. Pt reports he takes his synthroid daily. Home dose 112 mcg ordered.     Poor dentition- pt reports he has \"bad teeth\"\". He states he had a filling come out on the lower right molar. He denies any pain. No abscess noted. Will order magic mouthwash ordered.     covid negative.     Pt medically stable, no acute medical concerns. Chronic medical problems stable. Will sign off. Please consult for any new medical issues or concerns.         Code Status: Full Code    Qiana Bauer CNP    Primary Care Physician   [facility]    Chief Complaint   Psych evaluation     History is obtained from the patient and medical chart     History of Present Illness   (per ED) Kamini Neal is a 31 year old male w/ PMH of GERD, hypothyroidism, latent TB, cannabis use disorder, schizoaffective disorder- bipolar type, PTSD, alcohol use disorder, and generalized anxiety disorder, who presents to the Emergency Department via EMS for suicidal ideation. Patient reports that they have felt suicidal for the last 2 months and over the last 3 weeks has been getting worse. Presents here seeking treatment and help.  States that he has not been to our facility before.  He has been feeling increasingly dysphoric with intrusive thoughts and auditory hallucinations.  He states that he has had thoughts of harming himself via self-immolation.  Has not taken any specific actions toward this.  Feels that is necessary for him to be admitted in order to feel safe.  Tells me that he is feeling hungry.    Past Medical History    I have reviewed this patient's medical " history and updated it with pertinent information if needed.   Past Medical History:   Diagnosis Date     Anxiety      Depressive disorder      Schizo affective schizophrenia (H)        Past Surgical History   I have reviewed this patient's surgical history and updated it with pertinent information if needed.  Past Surgical History:   Procedure Laterality Date     TONSILLECTOMY         Prior to Admission Medications   Prior to Admission Medications   Prescriptions Last Dose Informant Patient Reported? Taking?   FLUoxetine (PROZAC) 10 MG capsule 3/14/2022 at 0800  Yes Yes   Sig: Take 10 mg by mouth every morning    OLANZapine (ZYPREXA) 20 MG tablet Unknown at Unknown time  Yes Yes   Sig: Take 10 mg by mouth 3 times daily as needed (agitation/psychosis/self harm)    benztropine (COGENTIN) 1 MG tablet 3/14/2022 at 2000  Yes Yes   Sig: Take 1 mg by mouth 2 times daily    cholecalciferol (VITAMIN D3) 125 mcg (5000 units) capsule 3/14/2022 at 0800  Yes Yes   Sig: Take 125 mcg by mouth daily    cloZAPine (CLOZARIL) 100 MG tablet 3/14/2022 at 2000  Yes Yes   Sig: Take 500 mg by mouth At Bedtime   docusate sodium (COLACE) 100 MG capsule 3/14/2022 at 2000  Yes Yes   Sig: Take 100 mg by mouth 2 times daily    gabapentin (NEURONTIN) 300 MG capsule 3/14/2022 at 2000  No Yes   Sig: Take 1 capsule (300 mg) by mouth 3 times daily   glycopyrrolate (ROBINUL) 1 MG tablet 3/14/2022 at 2000  No Yes   Sig: Take 1 tablet (1 mg) by mouth 3 times daily   levothyroxine (SYNTHROID/LEVOTHROID) 112 MCG tablet 3/14/2022 at am  No Yes   Sig: Take 1 tablet (112 mcg) by mouth daily   omeprazole (PRILOSEC) 20 MG DR capsule 3/14/2022 at AM  Yes Yes   Sig: Take 20 mg by mouth daily    paliperidone (INVEGA SUSTENNA) 234 MG/1.5ML ANTHONY 2/21/2022  No No   Sig: Inject 1.5 mLs (234 mg) into the muscle every 28 days Next dose due on January 19, 2022   polyethylene glycol (MIRALAX) 17 g packet Unknown at Unknown time  No Yes   Sig: Take 17 g by mouth daily as  "needed for constipation   prazosin (MINIPRESS) 2 MG capsule 3/14/2022 at 2000  Yes Yes   Sig: Take 4 mg by mouth At Bedtime    venlafaxine (EFFEXOR-XR) 75 MG 24 hr capsule 3/14/2022 at 0800  Yes Yes   Sig: Take 225 mg by mouth daily       Facility-Administered Medications: None     Allergies   Allergies   Allergen Reactions     Haloperidol Other (See Comments)     Other reaction(s): Tardive Dyskinesia  Torticollis  Torticollis  Stiff Neck  Torticollis; reports \"stiff neck.\"         Social History   I have reviewed this patient's social history and updated it with pertinent information if needed. Kamini PINK Alaaristeoluisa  reports that he has been smoking vaping device. He has been smoking about 0.50 packs per day. He has never used smokeless tobacco. He reports that he does not drink alcohol and does not use drugs.    Family History   I have reviewed this patient's family history and updated it with pertinent information if needed.   Family History   Problem Relation Age of Onset     Mental Illness Maternal Uncle      Mental Illness Maternal Aunt      Glaucoma No family hx of      Macular Degeneration No family hx of        Review of Systems   CONSTITUTIONAL:  negative  EYES:  negative  HEENT:  negative  RESPIRATORY:  negative  CARDIOVASCULAR:  negative  GASTROINTESTINAL:  negative  GENITOURINARY:  negative  INTEGUMENT/BREAST:  negative  HEMATOLOGIC/LYMPHATIC:  negative  ALLERGIC/IMMUNOLOGIC:  negative  ENDOCRINE:  negative  MUSCULOSKELETAL:  negative  NEUROLOGICAL:  negative    Physical Exam   Temp: 97.2  F (36.2  C) Temp src: Tympanic BP: 124/97 Pulse: 88   Resp: 18 SpO2: 98 % O2 Device: None (Room air)    Vital Signs with Ranges  Temp:  [97.2  F (36.2  C)-98.5  F (36.9  C)] 97.2  F (36.2  C)  Pulse:  [] 88  Resp:  [18] 18  BP: (100-124)/(78-97) 124/97  SpO2:  [98 %-99 %] 98 %  237 lbs 3.2 oz    Constitutional: awake, alert, cooperative, no apparent distress, and appears stated age, vitals stable   Eyes: Lids and " lashes normal, pupils equal, round and reactive to light, extra ocular muscles intact, sclera clear, conjunctiva normal  ENT: Normocephalic, without obvious abnormality, atraumatic, external ears without lesions, oral pharynx with moist mucous membranes, no erythema or exudates, gums normal and poor dentition.  Hematologic / Lymphatic: no cervical lymphadenopathy  Respiratory: No increased work of breathing, good air exchange, clear to auscultation bilaterally, no crackles or wheezing  Cardiovascular: Normal apical impulse, regular rate and rhythm, normal S1 and S2, no S3 or S4, and no murmur noted  GI: normal bowel sounds, soft, non-distended, non-tender, no masses palpated, no hepatosplenomegally  Genitounirinary: deferred  Skin: normal skin color, texture, turgor, no redness, warmth, or swelling and no rashes  Musculoskeletal: There is no redness, warmth, or swelling of the joints.  Full range of motion noted.    Neurologic: Awake, alert, oriented to name, place and time.    Neuropsychiatric: General: restricted affected and normal eye contact    Data   Data reviewed today:   No lab results found in last 7 days.    No results found for this or any previous visit (from the past 24 hour(s)).
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
## 2022-03-15 NOTE — ED PROVIDER NOTES
"  History     Chief Complaint   Patient presents with     Suicidal     HPI  Kamini Neal is a 31 year old male w/ PMH of GERD, hypothyroidism, latent TB, cannabis use disorder, schizoaffective disorder- bipolar type, PTSD, alcohol use disorder, and generalized anxiety disorder, who presents to the Emergency Department via EMS for suicidal ideation. Patient reports that they have felt suicidal for the last 2 months and over the last 3 weeks has been getting worse. Presents here seeking treatment and help.  States that he has not been to our facility before.  He has been feeling increasingly dysphoric with intrusive thoughts and auditory hallucinations.  He states that he has had thoughts of harming himself via self-immolation.  Has not taken any specific actions toward this.  Feels that is necessary for him to be admitted in order to feel safe.  Tells me that he is feeling hungry.      Per EMR patient was admitted most recently at [facility] from 12/28/21-1/3/22 with similar concern of worsening auditory command type hallucinations that were instructing him to hurt himself.  Stated that these voices were telling him \"I am coming for you\".  He was also hearing messages from the TV commanding him to hurt himself again by burning himself.  Had been hearing these voices for about 5 months at that point and become more intense over the few days prior.  He had prior hospitalizations at [facility] on 12/5/12/7/21 and then at [facility] from 12/7-12/10/21 for similar and suicide attempt via self-immolation 3-4 years ago.  He was on Clozaril therapy at that time and was asking to be titrated up.  He was also on PTA lithium for 50 mg, venlafaxine 300 mg daily, gabapentin 200 mg 3 times daily and paliperidone 254 mg.  Patient requested lithium discontinued due to low efficacy and poor tolerability, which was tapered.  Was discharged to group home and was denying psych symptoms and reporting voices had " improved at time of discharge.  Follow-up was scheduled with psychiatry on 1/6.      PAST MEDICAL HISTORY:   Past Medical History:   Diagnosis Date     Anxiety      Depressive disorder      Schizo affective schizophrenia (H)        PAST SURGICAL HISTORY:   Past Surgical History:   Procedure Laterality Date     TONSILLECTOMY         Past medical history, past surgical history, medications, and allergies were reviewed with the patient. Additional pertinent items: None    FAMILY HISTORY:   Family History   Problem Relation Age of Onset     Mental Illness Maternal Uncle      Mental Illness Maternal Aunt      Glaucoma No family hx of      Macular Degeneration No family hx of        SOCIAL HISTORY:   Social History     Tobacco Use     Smoking status: Current Every Day Smoker     Packs/day: 0.50     Types: Vaping Device     Smokeless tobacco: Never Used   Substance Use Topics     Alcohol use: No       Social history was reviewed with the patient. Additional pertinent items: None    Patient's Medications   New Prescriptions    No medications on file   Previous Medications    BREXPIPRAZOLE (REXULTI) 1 MG TABLET    Take 1 tablet (1 mg) by mouth daily    CLOZAPINE (CLOZARIL) 100 MG TABLET    Take 5 tablets (500 mg) by mouth daily    DOCUSATE SODIUM (COLACE) 100 MG CAPSULE    Take 100 mg by mouth 2 times daily     GABAPENTIN (NEURONTIN) 300 MG CAPSULE    Take 1 capsule (300 mg) by mouth 3 times daily    GLYCOPYRROLATE (ROBINUL) 1 MG TABLET    Take 1 tablet (1 mg) by mouth 3 times daily    LEVOTHYROXINE (SYNTHROID/LEVOTHROID) 112 MCG TABLET    Take 1 tablet (112 mcg) by mouth daily    OLANZAPINE (ZYPREXA) 10 MG TABLET    Take 10 mg by mouth daily as needed . Maximum 20 mg/24 hours.    PALIPERIDONE (INVEGA SUSTENNA) 234 MG/1.5ML ANTHONY    Inject 1.5 mLs (234 mg) into the muscle every 28 days Next dose due on January 19, 2022    PANTOPRAZOLE (PROTONIX) 40 MG EC TABLET    Take 1 tablet (40 mg) by mouth daily    POLYETHYLENE GLYCOL  "(MIRALAX) 17 G PACKET    Take 17 g by mouth daily as needed for constipation    PRAZOSIN (MINIPRESS) 1 MG CAPSULE    Take 3 capsules (3 mg) by mouth At Bedtime    SODIUM FLUORIDE (DENTA 5000 PLUS DT)    Brush with a pea sized amount twice daily until gone.    VENLAFAXINE (EFFEXOR-XR) 150 MG 24 HR CAPSULE    Take 2 capsules (300 mg) by mouth daily    VITAMIN D3 (CHOLECALCIFEROL) 125 MCG (5000 UT) TABLET    Take 1 tablet (125 mcg) by mouth daily   Modified Medications    No medications on file   Discontinued Medications    No medications on file          Allergies   Allergen Reactions     Haloperidol Other (See Comments)     Other reaction(s): Tardive Dyskinesia  Torticollis  Torticollis  Stiff Neck  Torticollis; reports \"stiff neck.\"         A complete review of systems was performed with pertinent positives and negatives noted in the HPI, and all other systems negative.    Physical Exam   BP: 117/82  Pulse: 103  Temp: 98.5  F (36.9  C)  Resp: 18  Height: 177.8 cm (5' 10\")  Weight: 104.3 kg (230 lb)  SpO2: 99 %      Physical Exam   Constitutional: oriented to person, place, and time. appears well-developed and well-nourished.   HENT:   Head: Normocephalic and atraumatic.   Neck: Normal range of motion.   Pulmonary/Chest: Effort normal. No respiratory distress.   Cardiac: No murmurs, rubs, gallops. RRR.  Abdominal: Abdomen soft, nontender, nondistended. No rebound tenderness.  MSK: Long bones without deformity or evidence of trauma  Neurological: alert and oriented to person, place, and time.   Skin: Skin is warm and dry.   Psychiatric: poor eye contact. Flat affect    ED Course        Procedures              Results for orders placed or performed during the hospital encounter of 03/14/22 (from the past 24 hour(s))   Urine Drugs of Abuse Screen    Narrative    The following orders were created for panel order Urine Drugs of Abuse Screen.  Procedure                               Abnormality         Status               "       ---------                               -----------         ------                     Drug abuse screen 1 urin...[[id number]]  Normal              Final result                 Please view results for these tests on the individual orders.   Drug abuse screen 1 urine (ED)   Result Value Ref Range    Amphetamines Urine Screen Negative Screen Negative    Barbiturates Urine Screen Negative Screen Negative    Benzodiazepines Urine Screen Negative Screen Negative    Cannabinoids Urine Screen Negative Screen Negative    Cocaine Urine Screen Negative Screen Negative    Opiates Urine Screen Negative Screen Negative     Medications - No data to display     Assessments & Plan (with Medical Decision Making)   MDM  Patient here with SI. Patient has had attempts in past. He is voicing concrete plans. Plan for admission at this point. Patient holdable if wants to leave.    I have reviewed the nursing notes.    I have reviewed the findings, diagnosis, plan and need for follow up with the patient.    New Prescriptions    No medications on file       Final diagnoses:   Suicidal ideation       3/14/2022   [facility] EMERGENCY DEPARTMENT Jose Serna MD, MD  03/15/22 0148

## 2022-03-16 LAB
ALBUMIN SERPL-MCNC: 3.6 G/DL (ref 3.4–5)
ALP SERPL-CCNC: 89 U/L (ref 40–150)
ALT SERPL W P-5'-P-CCNC: 22 U/L (ref 0–70)
ANION GAP SERPL CALCULATED.3IONS-SCNC: 4 MMOL/L (ref 3–14)
AST SERPL W P-5'-P-CCNC: 11 U/L (ref 0–45)
BASOPHILS # BLD AUTO: 0 10E3/UL (ref 0–0.2)
BASOPHILS NFR BLD AUTO: 1 %
BILIRUB SERPL-MCNC: 0.4 MG/DL (ref 0.2–1.3)
BUN SERPL-MCNC: 13 MG/DL (ref 7–30)
CALCIUM SERPL-MCNC: 9.1 MG/DL (ref 8.5–10.1)
CHLORIDE BLD-SCNC: 110 MMOL/L (ref 94–109)
CO2 SERPL-SCNC: 27 MMOL/L (ref 20–32)
CREAT SERPL-MCNC: 0.77 MG/DL (ref 0.66–1.25)
EOSINOPHIL # BLD AUTO: 0 10E3/UL (ref 0–0.7)
EOSINOPHIL NFR BLD AUTO: 0 %
ERYTHROCYTE [DISTWIDTH] IN BLOOD BY AUTOMATED COUNT: 12.6 % (ref 10–15)
GFR SERPL CREATININE-BSD FRML MDRD: >90 ML/MIN/1.73M2
GLUCOSE BLD-MCNC: 99 MG/DL (ref 70–99)
HCT VFR BLD AUTO: 43.6 % (ref 40–53)
HGB BLD-MCNC: 14.6 G/DL (ref 13.3–17.7)
IMM GRANULOCYTES # BLD: 0 10E3/UL
IMM GRANULOCYTES NFR BLD: 0 %
LYMPHOCYTES # BLD AUTO: 2.2 10E3/UL (ref 0.8–5.3)
LYMPHOCYTES NFR BLD AUTO: 28 %
MCH RBC QN AUTO: 31.7 PG (ref 26.5–33)
MCHC RBC AUTO-ENTMCNC: 33.5 G/DL (ref 31.5–36.5)
MCV RBC AUTO: 95 FL (ref 78–100)
MONOCYTES # BLD AUTO: 0.6 10E3/UL (ref 0–1.3)
MONOCYTES NFR BLD AUTO: 8 %
NEUTROPHILS # BLD AUTO: 4.9 10E3/UL (ref 1.6–8.3)
NEUTROPHILS NFR BLD AUTO: 63 %
NRBC # BLD AUTO: 0 10E3/UL
NRBC BLD AUTO-RTO: 0 /100
PLATELET # BLD AUTO: 219 10E3/UL (ref 150–450)
POTASSIUM BLD-SCNC: 4 MMOL/L (ref 3.4–5.3)
PROT SERPL-MCNC: 6.6 G/DL (ref 6.8–8.8)
RBC # BLD AUTO: 4.6 10E6/UL (ref 4.4–5.9)
SODIUM SERPL-SCNC: 141 MMOL/L (ref 133–144)
WBC # BLD AUTO: 7.7 10E3/UL (ref 4–11)

## 2022-03-16 PROCEDURE — 124N000001 HC R&B MH

## 2022-03-16 PROCEDURE — 80159 DRUG ASSAY CLOZAPINE: CPT | Performed by: NURSE PRACTITIONER

## 2022-03-16 PROCEDURE — 80053 COMPREHEN METABOLIC PANEL: CPT | Performed by: NURSE PRACTITIONER

## 2022-03-16 PROCEDURE — 250N000013 HC RX MED GY IP 250 OP 250 PS 637: Performed by: NURSE PRACTITIONER

## 2022-03-16 PROCEDURE — 85025 COMPLETE CBC W/AUTO DIFF WBC: CPT | Performed by: NURSE PRACTITIONER

## 2022-03-16 PROCEDURE — 36415 COLL VENOUS BLD VENIPUNCTURE: CPT | Performed by: NURSE PRACTITIONER

## 2022-03-16 PROCEDURE — 99232 SBSQ HOSP IP/OBS MODERATE 35: CPT | Performed by: NURSE PRACTITIONER

## 2022-03-16 RX ADMIN — LEVOTHYROXINE SODIUM 112 MCG: 0.11 TABLET ORAL at 10:32

## 2022-03-16 RX ADMIN — NICOTINE POLACRILEX 4 MG: 4 GUM, CHEWING ORAL at 10:44

## 2022-03-16 RX ADMIN — DOCUSATE SODIUM 100 MG: 100 CAPSULE, LIQUID FILLED ORAL at 10:34

## 2022-03-16 RX ADMIN — NICOTINE POLACRILEX 4 MG: 2 GUM, CHEWING BUCCAL at 16:04

## 2022-03-16 RX ADMIN — GABAPENTIN 300 MG: 300 CAPSULE ORAL at 13:40

## 2022-03-16 RX ADMIN — OLANZAPINE 10 MG: 10 TABLET, FILM COATED ORAL at 16:04

## 2022-03-16 RX ADMIN — PRAZOSIN HYDROCHLORIDE 4 MG: 1 CAPSULE ORAL at 20:18

## 2022-03-16 RX ADMIN — PANTOPRAZOLE SODIUM 40 MG: 40 TABLET, DELAYED RELEASE ORAL at 10:33

## 2022-03-16 RX ADMIN — NICOTINE POLACRILEX 4 MG: 4 GUM, CHEWING ORAL at 12:54

## 2022-03-16 RX ADMIN — GLYCOPYRROLATE 1 MG: 1 TABLET ORAL at 10:34

## 2022-03-16 RX ADMIN — GLYCOPYRROLATE 1 MG: 1 TABLET ORAL at 13:38

## 2022-03-16 RX ADMIN — DOCUSATE SODIUM 100 MG: 100 CAPSULE, LIQUID FILLED ORAL at 20:19

## 2022-03-16 RX ADMIN — BENZTROPINE MESYLATE 1 MG: 1 TABLET ORAL at 20:20

## 2022-03-16 RX ADMIN — NICOTINE POLACRILEX 4 MG: 2 GUM, CHEWING BUCCAL at 19:21

## 2022-03-16 RX ADMIN — GLYCOPYRROLATE 1 MG: 1 TABLET ORAL at 20:19

## 2022-03-16 RX ADMIN — CLOZAPINE 500 MG: 100 TABLET ORAL at 20:18

## 2022-03-16 RX ADMIN — VENLAFAXINE HYDROCHLORIDE 225 MG: 150 CAPSULE, EXTENDED RELEASE ORAL at 10:33

## 2022-03-16 RX ADMIN — BENZTROPINE MESYLATE 1 MG: 1 TABLET ORAL at 10:33

## 2022-03-16 RX ADMIN — Medication 125 MCG: at 10:34

## 2022-03-16 RX ADMIN — GABAPENTIN 300 MG: 300 CAPSULE ORAL at 20:19

## 2022-03-16 RX ADMIN — NICOTINE POLACRILEX 4 MG: 2 GUM, CHEWING BUCCAL at 15:07

## 2022-03-16 RX ADMIN — NICOTINE POLACRILEX 4 MG: 2 GUM, CHEWING BUCCAL at 18:00

## 2022-03-16 RX ADMIN — FLUOXETINE 10 MG: 10 CAPSULE ORAL at 10:35

## 2022-03-16 RX ADMIN — GABAPENTIN 300 MG: 300 CAPSULE ORAL at 10:32

## 2022-03-16 RX ADMIN — NICOTINE POLACRILEX 4 MG: 2 GUM, CHEWING BUCCAL at 21:23

## 2022-03-16 RX ADMIN — NICOTINE POLACRILEX 2 MG: 2 GUM, CHEWING BUCCAL at 20:22

## 2022-03-16 RX ADMIN — NICOTINE 1 PATCH: 21 PATCH, EXTENDED RELEASE TRANSDERMAL at 10:35

## 2022-03-16 RX ADMIN — NICOTINE POLACRILEX 4 MG: 4 GUM, CHEWING ORAL at 13:41

## 2022-03-16 ASSESSMENT — ACTIVITIES OF DAILY LIVING (ADL)
HYGIENE/GROOMING: INDEPENDENT
LAUNDRY: UNABLE TO COMPLETE
DRESS: SCRUBS (BEHAVIORAL HEALTH)
ORAL_HYGIENE: INDEPENDENT
DRESS: SCRUBS (BEHAVIORAL HEALTH);INDEPENDENT
ORAL_HYGIENE: INDEPENDENT
HYGIENE/GROOMING: INDEPENDENT

## 2022-03-16 NOTE — PLAN OF CARE
Problem: Behavioral Health Plan of Care  Goal: Patient-Specific Goal (Individualization)  Description: Pt will sleep 6-8 hours per night.   Pt will eat at least 50% of meals.   Pt will attend 50% of groups  Outcome: Ongoing, Progressing     VSS, denies pain. Endorses auditory hallucinations that tell derogatory things about self and encourage self harm. Pt agrees to contact staff if feeling unsafe. Flat affect withdrawn and isolative.   Complaints of his medications making him feel over sedated. Up for lunch with much encouragement.   Cooperative with nursing assessment and medications.    Problem: Thought Process Alteration  Goal: Optimal Thought Clarity  Description: Pt will be able to have a reality oriented conversation by discharge.   Pt will report a decrease in hallucinations.  Pt will identify 2 coping skills to use when having hallucinations.   Outcome: Ongoing, Progressing   Goal Outcome Evaluation:    Plan of Care Reviewed With: patient      Face to face end of shift report communicated to oncoming shift.     Rosalia Gonzáles RN  3/16/2022  12:47 PM

## 2022-03-16 NOTE — PLAN OF CARE
"  Problem: Behavioral Health Plan of Care  Goal: Patient-Specific Goal (Individualization)  Description: Pt will sleep 6-8 hours per night.   Pt will eat at least 50% of meals.   Pt will attend 50% of groups  Outcome: Ongoing, Progressing  Note: 15:40: Received end of shift report from Rosalia DALLAS RN. Pt out in [facility] visiting with peers upon arrival.     16:00: PRN olanzapine 10 mg given for worsening auditory hallucinations--     17:00: Effective prn relief noted.   17:20: T.O. R. B: Regular diet, okay for double portions per Marianna MAGANA CNP.    20:30: Pt out in [facility] area majority of shift, no group participation this afternoon, states \"zyprexa works but only like half hr\" Requests for MD excuse when discharged. Denies SI/HI, AH are minimal. Compliant with HS medication administration, frequent requests for nicotine gum via out shift.    Face to face end of shift report to be  communicated to on-coming NOC shift .     Rosangela Yeager RN  3/16/2022  9:01 PM             Problem: Thought Process Alteration  Goal: Optimal Thought Clarity  Description: Pt will be able to have a reality oriented conversation by discharge.   Pt will report a decrease in hallucinations.  Pt will identify 2 coping skills to use when having hallucinations.   Outcome: Ongoing, Progressing   Goal Outcome Evaluation:                      "

## 2022-03-16 NOTE — PLAN OF CARE
Face to face report received from Keiry FOY. Pt. Observed.     Problem: Behavioral Health Plan of Care  Goal: Patient-Specific Goal (Individualization)  Description: Pt will sleep 6-8 hours per night.   Pt will eat at least 50% of meals.   Pt will attend 50% of groups  Outcome: Ongoing, Progressing  Pt has been in bed with eyes closed and regular respirations x 7 hours this noc shift. 15 minute and PRN checks all night. No complaints offered. Will continue to monitor.       Face to face end of shift report to be communicated to oncoming RN.     Candy Smith RN  3/16/2022

## 2022-03-17 PROCEDURE — 250N000013 HC RX MED GY IP 250 OP 250 PS 637: Performed by: NURSE PRACTITIONER

## 2022-03-17 PROCEDURE — 124N000001 HC R&B MH

## 2022-03-17 PROCEDURE — 99231 SBSQ HOSP IP/OBS SF/LOW 25: CPT | Performed by: NURSE PRACTITIONER

## 2022-03-17 RX ADMIN — GLYCOPYRROLATE 1 MG: 1 TABLET ORAL at 20:24

## 2022-03-17 RX ADMIN — LEVOTHYROXINE SODIUM 112 MCG: 0.11 TABLET ORAL at 08:42

## 2022-03-17 RX ADMIN — VENLAFAXINE HYDROCHLORIDE 225 MG: 150 CAPSULE, EXTENDED RELEASE ORAL at 08:40

## 2022-03-17 RX ADMIN — NICOTINE POLACRILEX 4 MG: 2 GUM, CHEWING BUCCAL at 20:25

## 2022-03-17 RX ADMIN — DOCUSATE SODIUM 100 MG: 100 CAPSULE, LIQUID FILLED ORAL at 20:15

## 2022-03-17 RX ADMIN — BENZTROPINE MESYLATE 1 MG: 1 TABLET ORAL at 08:41

## 2022-03-17 RX ADMIN — OLANZAPINE 10 MG: 10 TABLET, FILM COATED ORAL at 13:36

## 2022-03-17 RX ADMIN — BENZTROPINE MESYLATE 1 MG: 1 TABLET ORAL at 20:14

## 2022-03-17 RX ADMIN — NICOTINE POLACRILEX 4 MG: 2 GUM, CHEWING BUCCAL at 14:55

## 2022-03-17 RX ADMIN — GABAPENTIN 300 MG: 300 CAPSULE ORAL at 13:36

## 2022-03-17 RX ADMIN — CLOZAPINE 500 MG: 100 TABLET ORAL at 20:15

## 2022-03-17 RX ADMIN — FLUOXETINE 10 MG: 10 CAPSULE ORAL at 08:41

## 2022-03-17 RX ADMIN — GLYCOPYRROLATE 1 MG: 1 TABLET ORAL at 08:40

## 2022-03-17 RX ADMIN — NICOTINE POLACRILEX 4 MG: 2 GUM, CHEWING BUCCAL at 19:24

## 2022-03-17 RX ADMIN — GLYCOPYRROLATE 1 MG: 1 TABLET ORAL at 13:36

## 2022-03-17 RX ADMIN — NICOTINE POLACRILEX 4 MG: 2 GUM, CHEWING BUCCAL at 12:13

## 2022-03-17 RX ADMIN — NICOTINE POLACRILEX 4 MG: 2 GUM, CHEWING BUCCAL at 13:36

## 2022-03-17 RX ADMIN — DOCUSATE SODIUM 100 MG: 100 CAPSULE, LIQUID FILLED ORAL at 08:40

## 2022-03-17 RX ADMIN — GABAPENTIN 300 MG: 300 CAPSULE ORAL at 20:14

## 2022-03-17 RX ADMIN — NICOTINE 1 PATCH: 21 PATCH, EXTENDED RELEASE TRANSDERMAL at 08:42

## 2022-03-17 RX ADMIN — Medication 125 MCG: at 08:41

## 2022-03-17 RX ADMIN — NICOTINE POLACRILEX 4 MG: 2 GUM, CHEWING BUCCAL at 16:07

## 2022-03-17 RX ADMIN — NICOTINE POLACRILEX 4 MG: 2 GUM, CHEWING BUCCAL at 17:15

## 2022-03-17 RX ADMIN — PANTOPRAZOLE SODIUM 40 MG: 40 TABLET, DELAYED RELEASE ORAL at 08:41

## 2022-03-17 RX ADMIN — PRAZOSIN HYDROCHLORIDE 4 MG: 1 CAPSULE ORAL at 20:15

## 2022-03-17 RX ADMIN — GABAPENTIN 300 MG: 300 CAPSULE ORAL at 08:41

## 2022-03-17 RX ADMIN — NICOTINE POLACRILEX 4 MG: 2 GUM, CHEWING BUCCAL at 18:06

## 2022-03-17 ASSESSMENT — ACTIVITIES OF DAILY LIVING (ADL)
DRESS: INDEPENDENT;SCRUBS (BEHAVIORAL HEALTH)
LAUNDRY: UNABLE TO COMPLETE
LAUNDRY: UNABLE TO COMPLETE
ORAL_HYGIENE: INDEPENDENT
DRESS: INDEPENDENT
HYGIENE/GROOMING: INDEPENDENT
ORAL_HYGIENE: INDEPENDENT
HYGIENE/GROOMING: INDEPENDENT

## 2022-03-17 NOTE — PROGRESS NOTES
"[facility]  Psychiatric Progress Note      Impression:   This is a 31 year old yo male with schizoaffective disorder who has had multiple hospitalizations this year on a voluntary basis.  He has a diagnosis of schizoaffective disorder as well as PTSD and borderline personality disorder there is some concern that his current hallucinations could be secondary to borderline personality disorder versus psychosis.  He does appear to have an increase in symptoms closer to the time his Invega Sustenna injection is due.  I did notice this on his last admission and we discussed having his injection changed to every 21 days though this was not done on his discharge and he was still administered it every 30 days.  This could possibly make a difference as he could be a rapid metabolizer and require injection every 21 days.  He would prefer to try this first prior to increasing clozapine.  He has been on higher doses of clozapine in the past which was very effective though extremely sedating for him.        Interim History:   Kamini is up in the lounge when I see him today. He reports that he is feeling \"better\" though feels somewhat tired. He denies any suicidal thoughts. He reports that the voices are \"getting better\". Reviewed the change to his Invega, which he is in agreement with. Reports that he has been sleeping well, maybe too much. Denies any side effects from medications. He states that \"its boring here\", though does not wish to participate in groups when encouraged.    Educated regarding medication indications, risks, benefits, side effects, contraindications and possible interactions. Verbally expressed understanding.        Diagnoses:     Schizoaffective disorder bipolar type  Borderline personality disorder  Posttraumatic stress disorder      Attestation:  Patient has been seen and evaluated by me,  Marianna Leslie NP      The patient's care was discussed with the treatment team and chart notes were " reviewed.            Medications:     Current Facility-Administered Medications Ordered in Epic   Medication Dose Route Frequency Last Rate Last Admin     acetaminophen (TYLENOL) tablet 650 mg  650 mg Oral Q4H PRN         alum & mag hydroxide-simethicone (MAALOX) suspension 30 mL  30 mL Oral Q4H PRN         benztropine (COGENTIN) tablet 1 mg  1 mg Oral BID   1 mg at 03/16/22 2020     cholecalciferol (VITAMIN D3) 125 mcg (5000 units) capsule 125 mcg  125 mcg Oral Daily   125 mcg at 03/16/22 1034     cloZAPine (CLOZARIL) tablet 500 mg  500 mg Oral At Bedtime   500 mg at 03/16/22 2018     docusate sodium (COLACE) capsule 100 mg  100 mg Oral BID   100 mg at 03/16/22 2019     FLUoxetine (PROzac) capsule 10 mg  10 mg Oral QAM   10 mg at 03/16/22 1035     gabapentin (NEURONTIN) capsule 100 mg  100 mg Oral Q6H PRN         gabapentin (NEURONTIN) capsule 300 mg  300 mg Oral TID   300 mg at 03/16/22 2019     glycopyrrolate (ROBINUL) half-tab 1 mg  1 mg Oral TID   1 mg at 03/16/22 2019     levothyroxine (SYNTHROID/LEVOTHROID) tablet 112 mcg  112 mcg Oral Daily   112 mcg at 03/16/22 1032     magic mouthwash suspension (diphenhydramine, lidocaine, aluminum-magnesium & simethicone)  10 mL Swish & Swallow Q6H PRN         melatonin tablet 3 mg  3 mg Oral At Bedtime PRN         nicotine (NICODERM CQ) 21 MG/24HR 24 hr patch 1 patch  1 patch Transdermal Daily   1 patch at 03/16/22 1035     nicotine (NICORETTE) gum 2-4 mg  2-4 mg Buccal Q1H PRN   2 mg at 03/16/22 2022     nicotine Patch in Place   Transdermal Q8H         OLANZapine (zyPREXA) tablet 10 mg  10 mg Oral TID PRN   10 mg at 03/16/22 1604    Or     OLANZapine (zyPREXA) injection 10 mg  10 mg Intramuscular TID PRN         paliperidone (INVEGA SUSTENNA) injection ANTHONY 234 mg  234 mg Intramuscular Q21 Days   234 mg at 03/15/22 1621     pantoprazole (PROTONIX) EC tablet 40 mg  40 mg Oral Daily   40 mg at 03/16/22 1033     polyethylene glycol (MIRALAX) Packet 17 g  17 g Oral Daily  "PRN         prazosin (MINIPRESS) capsule 4 mg  4 mg Oral At Bedtime   4 mg at 03/16/22 2018     venlafaxine (EFFEXOR-XR) 24 hr capsule 225 mg  225 mg Oral Daily   225 mg at 03/16/22 1033     No current Epic-ordered outpatient medications on file.            10 point ROS- denies issues today       Allergies:     Allergies   Allergen Reactions     Haloperidol Other (See Comments)     Other reaction(s): Tardive Dyskinesia  Torticollis  Torticollis  Stiff Neck  Torticollis; reports \"stiff neck.\"              Psychiatric Examination:   /93   Pulse 99   Temp 97.7  F (36.5  C) (Temporal)   Resp 18   Ht 1.778 m (5' 10\")   Wt 107.6 kg (237 lb 3.2 oz)   SpO2 98%   BMI 34.03 kg/m    Weight is 237 lbs 3.2 oz  Body mass index is 34.03 kg/m .    Appearance:  awake, alert, adequately groomed, dressed in hospital scrubs and appeared as age stated  Attitude:  cooperative  Eye Contact:  fair  Mood:  restricted  Affect: blunted  Speech:  clear, coherent, monotone  Psychomotor Behavior:  no evidence of tardive dyskinesia, dystonia, or tics  Thought Process:  logical and goal oriented  Associations:  no loose associations  Thought Content:  no evidence of suicidal ideation or homicidal ideation and auditory hallucinations present  Insight:  fair  Judgment:  limited  Oriented to:  time, person, and place  Attention Span and Concentration:  intact  Recent and Remote Memory:  fair  Fund of Knowledge: appropriate  Muscle Strength and Tone: normal  Gait and Station: Normal           Labs:     Results for orders placed or performed during the hospital encounter of 03/15/22 (from the past 24 hour(s))   CBC with Platelets & Differential    Narrative    The following orders were created for panel order CBC with Platelets & Differential.  Procedure                               Abnormality         Status                     ---------                               -----------         ------                     CBC with platelets and " carmel.[[id number]]                      Final result                 Please view results for these tests on the individual orders.   Comprehensive metabolic panel   Result Value Ref Range    Sodium 141 133 - 144 mmol/L    Potassium 4.0 3.4 - 5.3 mmol/L    Chloride 110 (H) 94 - 109 mmol/L    Carbon Dioxide (CO2) 27 20 - 32 mmol/L    Anion Gap 4 3 - 14 mmol/L    Urea Nitrogen 13 7 - 30 mg/dL    Creatinine 0.77 0.66 - 1.25 mg/dL    Calcium 9.1 8.5 - 10.1 mg/dL    Glucose 99 70 - 99 mg/dL    Alkaline Phosphatase 89 40 - 150 U/L    AST 11 0 - 45 U/L    ALT 22 0 - 70 U/L    Protein Total 6.6 (L) 6.8 - 8.8 g/dL    Albumin 3.6 3.4 - 5.0 g/dL    Bilirubin Total 0.4 0.2 - 1.3 mg/dL    GFR Estimate >90 >60 mL/min/1.73m2   CBC with platelets and differential   Result Value Ref Range    WBC Count 7.7 4.0 - 11.0 10e3/uL    RBC Count 4.60 4.40 - 5.90 10e6/uL    Hemoglobin 14.6 13.3 - 17.7 g/dL    Hematocrit 43.6 40.0 - 53.0 %    MCV 95 78 - 100 fL    MCH 31.7 26.5 - 33.0 pg    MCHC 33.5 31.5 - 36.5 g/dL    RDW 12.6 10.0 - 15.0 %    Platelet Count 219 150 - 450 10e3/uL    % Neutrophils 63 %    % Lymphocytes 28 %    % Monocytes 8 %    % Eosinophils 0 %    % Basophils 1 %    % Immature Granulocytes 0 %    NRBCs per 100 WBC 0 <1 /100    Absolute Neutrophils 4.9 1.6 - 8.3 10e3/uL    Absolute Lymphocytes 2.2 0.8 - 5.3 10e3/uL    Absolute Monocytes 0.6 0.0 - 1.3 10e3/uL    Absolute Eosinophils 0.0 0.0 - 0.7 10e3/uL    Absolute Basophils 0.0 0.0 - 0.2 10e3/uL    Absolute Immature Granulocytes 0.0 <=0.4 10e3/uL    Absolute NRBCs 0.0 10e3/uL         BEHAVIORAL TEAM DISCUSSION    Progress: Fair. Reports decrease in AH and denies SI today.    Continued Stay Criteria/Rationale: adjust medications and treat symptoms as indicated.    Medical/Physical: none acute  Precautions:   Falls precaution?: No  Behavioral Orders   Procedures     Code 1 - Restrict to Unit     Routine Programming     As clinically indicated     Status 15     Every 15 minutes.      Plan:   Continue Clozaril 500 mg at bedtime  Continue Invega Sustenna 234 mg IM every 21 days (given 3/15)  Continue Gabapentin 300 mg TID  Continue Prozac 10 mg daily  Continue Prazosin 4 mg at bedtime  Continue Effexor  mg daily  Clozapine level pending    Likely discharge back to  early next week        Rationale for change in precautions or plan:   No changes today, patient improving      Participants: Marianna Leslie, APRN, CNP, Dr. Perez, SW, OT, Nursing

## 2022-03-17 NOTE — PLAN OF CARE
Problem: Behavioral Health Plan of Care  Goal: Patient-Specific Goal (Individualization)  Description: Pt will sleep 6-8 hours per night.   Pt will eat at least 50% of meals.   Pt will attend 50% of groups  Outcome: Ongoing, Progressing    Face to face end of shift report received from evening shift RN. Rounding completed. Pt observed in own bed, with eyes closed, in right side-lying position, and with rested breathing. Will continue to monitor. Face to face end of shift report to be given to day shift RN.     Pt slept approximately 7.75 hours.      Problem: Thought Process Alteration  Goal: Optimal Thought Clarity  Description: Pt will be able to have a reality oriented conversation by discharge.   Pt will report a decrease in hallucinations.  Pt will identify 2 coping skills to use when having hallucinations.   Outcome: Ongoing, Progressing -- No new concerns noted as pt slept this shift.

## 2022-03-17 NOTE — PHARMACY
Credited patient account for Invega Sustenna injection due to inpatient free trial program.    Twila Casillas, PharmD on 3/17/2022

## 2022-03-17 NOTE — PLAN OF CARE
NILSA HARMAN RN  3/17/2022  7:38 AM  Face to face shift report received from DANII Herrera. Rounding completed, pt observed.     1350-Pt attended group this afternoon and has been in the lounge since lunch.  Requested and given xyprexa @1336 for anxiety and auditory command hallucinations.        1035-isolative and withdrawn to room.  Sleeping all morning.  Did wake up for meds and assessment.  Continues to endorse auditory command hallucinations.  Denies SI, HI, anxiety, depression, and pain.       Problem: Behavioral Health Plan of Care  Goal: Patient-Specific Goal (Individualization)  Description: Pt will sleep 6-8 hours per night.   Pt will eat at least 50% of meals.   Pt will attend 50% of groups  Outcome: Ongoing, Progressing     Problem: Thought Process Alteration  Goal: Optimal Thought Clarity  Description: Pt will be able to have a reality oriented conversation by discharge.   Pt will report a decrease in hallucinations.  Pt will identify 2 coping skills to use when having hallucinations.   Outcome: Ongoing, Progressing     Face to face end of shift report communicated to oncoming shift RN.

## 2022-03-17 NOTE — DISCHARGE INSTRUCTIONS
Behavioral Discharge Planning and Instructions    Summary: You were admitted on 3/15/2022  due to Psychotic Symptomology and Suicidal Ideations.  You were treated by the treatment team and discharged on 3/17/2022  from  to Group Home    Main Diagnosis:   Schizoaffective disorder bipolar type  Borderline personality disorder  Posttraumatic stress disorder    Health Care Follow-up:     [facility]   Main Contact- Neela Brown [phone]  [address] [place] [address]  [facility]: Neela Brown [phone].  [phone]  [facility]  Psychiatry- Dr. Marco Campo- May 25th @ 10:30am- via telephone  DISCUS Exam- appt to check side effects of medications- April 20th @ 10:40- in person  [email or web address]  [address]  [place] [address]  [phone]   Fax: [phone]     [facility]  PCP- Dr. Keys- as arranged  [email or web address]  [address] [place] [address]  [phone]    [facility]  Offers Adult Therapy- call to arranged individual thearpy  *You can also complete intake forms online*  [address] [place] [address]  Phone Number: [phone]    Fax Number:[phone]  Open:   Monday- Thursday: 8:30am - 9:00pm  Friday: 8:30am - 5:00pm  Saturday:8:30am - 4:30pm        Attend all scheduled appointments with your outpatient providers. Call at least 24 hours in advance if you need to reschedule an appointment to ensure continued access to your outpatient providers.     Major Treatments, Procedures and Findings:  You were provided with: a psychiatric assessment, assessed for medical stability, medication evaluation and/or management and group therapy    Symptoms to Report: feeling more aggressive, increased confusion, losing more sleep, mood getting worse or thoughts of suicide    Early warning signs can include: increased depression or anxiety sleep disturbances increased  "thoughts or behaviors of suicide or self-harm  increased unusual thinking, such as paranoia or hearing voices    Safety and Wellness:  Take all medicines as directed.  Make no changes unless your doctor suggests them.      Follow treatment recommendations.  Refrain from alcohol and non-prescribed drugs.  Ask your support system to help you reduce your access to items that could harm yourself or others. If there is a concern for safety, call 911.    Resources:   Crisis Intervention: 724.874.3070 or 769-498-5631 (TTY: 790.470.6972).  Call anytime for help.  National [place] on Mental Illness (www.[place] a.org): 819.427.1016 or 750-269-2526.  Alcoholics Anonymous (www.alcoholics-anonymous.org): Check your phone book for your local chapter.  Suicide Awareness Voices of Education (SAVE) (www.save.org): 945-512-HQJX (3527)  National Suicide Prevention Line (www.mentalhealthmn.org): 557-741-LDGY (1973)  Mental Health Consumer/Survivor Network of [place] (www.mhcsn.net): 539.599.4343 or 920-416-7587  Mental Health Association of [place] (www.mentalhealth.org): 397.300.4029 or 048-793-3304  Self- Management and Recovery Training., SMART-- Toll free: 838.996.6125  www.[employer].YCharts  Text 4 Life: txt \"LIFE\" to 46106 for immediate support and crisis intervention  Crisis text line: Text \"[place]\" to 524212. Free, confidential, 24/7.  Crisis Intervention: 241.967.2181 or 596-426-3846. Call anytime for help.     General Medication Instructions:   See your medication sheet(s) for instructions.   Take all medicines as directed.  Make no changes unless your doctor suggests them.   Go to all your doctor visits.  Be sure to have all your required lab tests. This way, your medicines can be refilled on time.  Do not use any drugs not prescribed by your doctor.  Avoid alcohol.    Advance Directives:   Scanned document on file with [facility]? No scanned doc  Is document scanned? Pt states no documents  Honoring Choices Your Rights Handout: Informed " and given  Was more information offered? Pt declined    The Treatment team has appreciated the opportunity to work with you. If you have any questions or concerns about your recent admission, you can contact the unit which can receive your call 24 hours a day, 7 days a week. They will be able to get in touch with a Provider if needed. The unit number is [phone] .

## 2022-03-17 NOTE — PLAN OF CARE
Problem: Behavioral Health Plan of Care  Goal: Patient-Specific Goal (Individualization)  Description: Pt will sleep 6-8 hours per night.   Pt will eat at least 50% of meals.   Pt will attend 50% of groups  Outcome: Ongoing, Progressing     Problem: Thought Process Alteration  Goal: Optimal Thought Clarity  Description: Pt will be able to have a reality oriented conversation by discharge.   Pt will report a decrease in hallucinations.  Pt will identify 2 coping skills to use when having hallucinations.   Outcome: Ongoing, Progressing     Problem: Suicidal Behavior  Goal: Suicidal Behavior is Absent or Managed  Outcome: Ongoing, Progressing    Pt denies SI/HI, AH/HV, and depression. Pt rates anxiety 3/10. Pt rates pain 2/10 in back, Pt declines medication.  Pt is using appropriate behavior with staff and peers. Pt has no concerns with appetite. Pt is medication compliant.        Face to face report will be communicated to oncoming RN.    Mercedes Garcia RN  3/17/2022

## 2022-03-17 NOTE — PLAN OF CARE
Called and left a message with director of [facility], Neela Brown to inform about pt's planned discharge for tomorrow.    Pt is discharging at the recommendation of the treatment team. Pt is discharging to [facility] transported by [employer] volunteer  via [facility] at 11am. Pt denies having any thoughts of hurting themself or anyone else. Pt has follow up with [facility] for psychiatry and for a DISCUS exam . Discharge instructions, including; demographic sheet, psychiatric evaluation, discharge summary, and AVS were faxed to these next level of care providers.

## 2022-03-18 VITALS
OXYGEN SATURATION: 95 % | HEART RATE: 76 BPM | WEIGHT: 237.2 LBS | SYSTOLIC BLOOD PRESSURE: 95 MMHG | TEMPERATURE: 97.8 F | BODY MASS INDEX: 33.96 KG/M2 | RESPIRATION RATE: 16 BRPM | HEIGHT: 70 IN | DIASTOLIC BLOOD PRESSURE: 57 MMHG

## 2022-03-18 PROCEDURE — 99239 HOSP IP/OBS DSCHRG MGMT >30: CPT | Performed by: NURSE PRACTITIONER

## 2022-03-18 PROCEDURE — 250N000013 HC RX MED GY IP 250 OP 250 PS 637: Performed by: NURSE PRACTITIONER

## 2022-03-18 RX ADMIN — Medication 125 MCG: at 08:16

## 2022-03-18 RX ADMIN — GABAPENTIN 300 MG: 300 CAPSULE ORAL at 08:13

## 2022-03-18 RX ADMIN — NICOTINE 1 PATCH: 21 PATCH, EXTENDED RELEASE TRANSDERMAL at 08:12

## 2022-03-18 RX ADMIN — NICOTINE POLACRILEX 2 MG: 2 GUM, CHEWING BUCCAL at 09:37

## 2022-03-18 RX ADMIN — BENZTROPINE MESYLATE 1 MG: 1 TABLET ORAL at 08:16

## 2022-03-18 RX ADMIN — LEVOTHYROXINE SODIUM 112 MCG: 0.11 TABLET ORAL at 08:16

## 2022-03-18 RX ADMIN — VENLAFAXINE HYDROCHLORIDE 225 MG: 150 CAPSULE, EXTENDED RELEASE ORAL at 08:14

## 2022-03-18 RX ADMIN — DOCUSATE SODIUM 100 MG: 100 CAPSULE, LIQUID FILLED ORAL at 08:15

## 2022-03-18 RX ADMIN — FLUOXETINE 10 MG: 10 CAPSULE ORAL at 08:15

## 2022-03-18 RX ADMIN — PANTOPRAZOLE SODIUM 40 MG: 40 TABLET, DELAYED RELEASE ORAL at 08:15

## 2022-03-18 RX ADMIN — GLYCOPYRROLATE 1 MG: 1 TABLET ORAL at 08:15

## 2022-03-18 NOTE — PROGRESS NOTES
"[facility]  Psychiatric Progress Note      Impression:   This is a 31 year old yo male with schizoaffective disorder who has had multiple hospitalizations this year on a voluntary basis.  He has a diagnosis of schizoaffective disorder as well as PTSD and borderline personality disorder there is some concern that his current hallucinations could be secondary to borderline personality disorder versus psychosis.  He does appear to have an increase in symptoms closer to the time his Invega Sustenna injection is due.  I did notice this on his last admission and we discussed having his injection changed to every 21 days though this was not done on his discharge and he was still administered it every 30 days.  This could possibly make a difference as he could be a rapid metabolizer and require injection every 21 days.  He would prefer to try this first prior to increasing clozapine.  He has been on higher doses of clozapine in the past which was very effective though extremely sedating for him.        Interim History:   Kamini is up in the lounge when I see him today. He asks when he will be able to discharge home. He reports that he is feeling \"very good\" and believes that the auditory hallucinations are back to a manageable level. He notes that they are usually always present, though at times will become more intrusive. This has improved since admission. He denies any suicidal thoughts or thoughts of self-harm. Tolerated his Invega injection yesterday. Will see if a discharge plan can be coordinated with the group home for tomorrow.    Educated regarding medication indications, risks, benefits, side effects, contraindications and possible interactions. Verbally expressed understanding.        Diagnoses:     Schizoaffective disorder bipolar type  Borderline personality disorder  Posttraumatic stress disorder      Attestation:  Patient has been seen and evaluated by me,  Marianna Leslie NP      The " patient's care was discussed with the treatment team and chart notes were reviewed.            Medications:     Current Facility-Administered Medications Ordered in Epic   Medication Dose Route Frequency Last Rate Last Admin     acetaminophen (TYLENOL) tablet 650 mg  650 mg Oral Q4H PRN         alum & mag hydroxide-simethicone (MAALOX) suspension 30 mL  30 mL Oral Q4H PRN         benztropine (COGENTIN) tablet 1 mg  1 mg Oral BID   1 mg at 03/16/22 2020     cholecalciferol (VITAMIN D3) 125 mcg (5000 units) capsule 125 mcg  125 mcg Oral Daily   125 mcg at 03/16/22 1034     cloZAPine (CLOZARIL) tablet 500 mg  500 mg Oral At Bedtime   500 mg at 03/16/22 2018     docusate sodium (COLACE) capsule 100 mg  100 mg Oral BID   100 mg at 03/16/22 2019     FLUoxetine (PROzac) capsule 10 mg  10 mg Oral QAM   10 mg at 03/16/22 1035     gabapentin (NEURONTIN) capsule 100 mg  100 mg Oral Q6H PRN         gabapentin (NEURONTIN) capsule 300 mg  300 mg Oral TID   300 mg at 03/16/22 2019     glycopyrrolate (ROBINUL) half-tab 1 mg  1 mg Oral TID   1 mg at 03/16/22 2019     levothyroxine (SYNTHROID/LEVOTHROID) tablet 112 mcg  112 mcg Oral Daily   112 mcg at 03/16/22 1032     magic mouthwash suspension (diphenhydramine, lidocaine, aluminum-magnesium & simethicone)  10 mL Swish & Swallow Q6H PRN         melatonin tablet 3 mg  3 mg Oral At Bedtime PRN         nicotine (NICODERM CQ) 21 MG/24HR 24 hr patch 1 patch  1 patch Transdermal Daily   1 patch at 03/16/22 1035     nicotine (NICORETTE) gum 2-4 mg  2-4 mg Buccal Q1H PRN   2 mg at 03/16/22 2022     nicotine Patch in Place   Transdermal Q8H         OLANZapine (zyPREXA) tablet 10 mg  10 mg Oral TID PRN   10 mg at 03/16/22 1604    Or     OLANZapine (zyPREXA) injection 10 mg  10 mg Intramuscular TID PRN         paliperidone (INVEGA SUSTENNA) injection ANTHONY 234 mg  234 mg Intramuscular Q21 Days   234 mg at 03/15/22 1621     pantoprazole (PROTONIX) EC tablet 40 mg  40 mg Oral Daily   40 mg at  "03/16/22 1033     polyethylene glycol (MIRALAX) Packet 17 g  17 g Oral Daily PRN         prazosin (MINIPRESS) capsule 4 mg  4 mg Oral At Bedtime   4 mg at 03/16/22 2018     venlafaxine (EFFEXOR-XR) 24 hr capsule 225 mg  225 mg Oral Daily   225 mg at 03/16/22 1033     No current Epic-ordered outpatient medications on file.            10 point ROS- denies issues today       Allergies:     Allergies   Allergen Reactions     Haloperidol Other (See Comments)     Other reaction(s): Tardive Dyskinesia  Torticollis  Torticollis  Stiff Neck  Torticollis; reports \"stiff neck.\"              Psychiatric Examination:   /88   Pulse 91   Temp 96.8  F (36  C) (Temporal)   Resp 16   Ht 1.778 m (5' 10\")   Wt 107.6 kg (237 lb 3.2 oz)   SpO2 98%   BMI 34.03 kg/m    Weight is 237 lbs 3.2 oz  Body mass index is 34.03 kg/m .    Appearance:  awake, alert, adequately groomed, dressed in hospital scrubs and appeared as age stated  Attitude:  cooperative, pleasant  Eye Contact:  fair  Mood: \"very good\"  Affect: blunted  Speech:  clear, coherent, monotone  Psychomotor Behavior:  no evidence of tardive dyskinesia, dystonia, or tics  Thought Process:  logical and goal oriented  Associations:  no loose associations  Thought Content:  no evidence of suicidal ideation or homicidal ideation and auditory hallucinations present though improved  Insight:  fair  Judgment:  limited  Oriented to:  time, person, and place  Attention Span and Concentration:  intact  Recent and Remote Memory:  fair  Fund of Knowledge: appropriate for education  Muscle Strength and Tone: normal  Gait and Station: Normal           Labs:     No results found for this or any previous visit (from the past 24 hour(s)).      BEHAVIORAL TEAM DISCUSSION    Progress: Fair. Reports decrease in AH and denies SI today. Feels ready for discharge soon.    Continued Stay Criteria/Rationale: adjust medications and treat symptoms as indicated.    Medical/Physical: none " acute  Precautions:   Falls precaution?: No  Behavioral Orders   Procedures     Code 1 - Restrict to Unit     Routine Programming     As clinically indicated     Status 15     Every 15 minutes.     Plan:   Continue Clozaril 500 mg at bedtime  Continue Invega Sustenna 234 mg IM every 21 days (given 3/15)  Continue Gabapentin 300 mg TID  Continue Prozac 10 mg daily  Continue Prazosin 4 mg at bedtime  Continue Effexor  mg daily  Clozapine level pending    Likely discharge back to  tomorrow or early next week        Rationale for change in precautions or plan:   No changes today, patient improving      Participants: AFSANEH Tesfaye, CNP, Dr. Perez, SW, OT, Nursing

## 2022-03-18 NOTE — DISCHARGE SUMMARY
[facility] Psychiatric Discharge Summary    Kamini Neal MRN# [id number]   Age: 31 year old YOB: 1990     Date of Admission:  3/15/2022  Date of Discharge:  3/18/2022  Admitting Physician:  Rah Perez, DO  Discharge Physician:  Marianna Leslie CNP         Event Leading to Hospitalization:   Kamini Neal is a 31 year old  male who told staff at his group home that he was having an increase in command hallucinations telling him to harm himself.  He was having increasing suicidal ideation due to the negative nature of his hallucinations.  He states the hallucinations have been increasing significantly over the past week.  He tells me that the hallucinations have been significant for the past week and the suicidal ideation has been increasing over the past week though reported to the emergency room it has been much longer than this in the past 2 months have been very difficult for him.  He does not appear to be preoccupied by hallucinations when I meet with him.  He is easy to engage with in conversation.  He makes good eye contact and he is not delayed.  His affect is quite restricted.  He does tell me that he is tired and that Clozaril does make him quite tired through the day.  I worked with him the last time he was on our unit just a few months ago.  During that time we discussed changing his Invega sustain a shot every 21 days instead of every 28 days as it was appearing he was having an increase in symptoms towards the time his injection was due.  He has never had GeneSight testing though could potentially be a rapid metabolizer.  He states that he does not have any side effects from Invega.  He states he prefers not to go up on the Clozaril dose though will go up if the change in Invega is not helpful.  He states that the clozapine makes him very sedated.  He states he was on up to 800 mg of clozapine in 2012 and was able to stay out of the hospital for close to 2 years.  He  "states that he had a difficult time getting out of bed and cannot \"function in life at all\" on this dose.  He states he is willing to go up to 600 mg if necessary to decrease the hallucinations.  He states that he would be safe while he is on our unit.  I did review his psychiatrist's documentation.  He sees [facility]. Campo from [facility].  Last psychiatric appointment, he was struggling with thoughts of self-harm and suicidal ideation.  It appears that the clozapine was increased to 500 mg at this time.   At that time he was also on , fluoxetine 10 mg, Effexor 225 mg and Invega 234 mg every month.    See Admission note by Sarah Oro CNP for additional details.          Diagnoses:   Schizoaffective disorder bipolar type  Borderline personality disorder  Posttraumatic stress disorder         Consults:     No consults ordered         Hospital Course:     Legal status: None  Voluntary    Patient was admitted to unit 5S. The patient was placed under 15 minute checks to ensure patient safety. The patient participated in unit programming and groups. The following changes to the patient's prior to admission medications were made:  Invega Sustenna injection was changed from every 28 days to every 21 days to target command auditory hallucinations. Patient was experiencing an increase in symptoms in the days leading up to his next injection. Due to sedation concern, patient was hesitant to consider further increases in Clozapine. He was agreeable to adjusting Invega. He did report a decrease in auditory hallucinations over the course of his stay. He denied any further suicidal thoughts or thoughts of harming self. Patient felt ready for discharge back to his group home, which has been arranged for later today.       With these changes the patient noticed improvement in their symptoms and felt sufficiently ready for discharge. As a result, Kamini Neal was discharged back to his group home. At the time of discharge, " Kamini Neal was determined to not be a danger to self or others. At the current time of discharge, the patient does not meet criteria for involuntary hospitalization. On the day of discharge, the patient reports that they do not have suicidal or homicidal ideation. Steps taken to minimize risk include: assessing patient s behavior and thought process daily during hospital stay, discharging patient with adequate plan for follow up for mental and physical health and discussing safety plan of returning to the hospital should the patient ever have thoughts of harming themselves or others. Therefore, based on all available evidence including the factors cited above, the patient does not appear to be at imminent risk for self-harm, and is appropriate for outpatient level of care. However, if patient uses substances or is medication non-adherent, their risk of decompensation and SI will be elevated. This was discussed with the patient.         Discharge Medications:     Discharge Medication List as of 3/18/2022  9:44 AM      CONTINUE these medications which have CHANGED    Details   paliperidone (INVEGA SUSTENNA) 234 MG/1.5ML NATHONY Inject 1.5 mLs (234 mg) into the muscle every 21 days, Disp-1.5 mL, R-1, E-PrescribeLast dose received inpatient on 3/15. Next due 4/5         CONTINUE these medications which have NOT CHANGED    Details   benztropine (COGENTIN) 1 MG tablet Take 1 mg by mouth 2 times daily , Historical      cholecalciferol (VITAMIN D3) 125 mcg (5000 units) capsule Take 125 mcg by mouth daily , Historical      cloZAPine (CLOZARIL) 100 MG tablet Take 500 mg by mouth At Bedtime, Historical      docusate sodium (COLACE) 100 MG capsule Take 100 mg by mouth 2 times daily , Historical      FLUoxetine (PROZAC) 10 MG capsule Take 10 mg by mouth every morning , Historical      gabapentin (NEURONTIN) 300 MG capsule Take 1 capsule (300 mg) by mouth 3 times daily, Disp-90 capsule, R-1, E-Prescribe      glycopyrrolate  "(ROBINUL) 1 MG tablet Take 1 tablet (1 mg) by mouth 3 times daily, Disp-90 tablet, R-0, E-Prescribe      levothyroxine (SYNTHROID/LEVOTHROID) 112 MCG tablet Take 1 tablet (112 mcg) by mouth daily, Disp-30 tablet, R-0, E-Prescribe      OLANZapine (ZYPREXA) 20 MG tablet Take 10 mg by mouth 3 times daily as needed (agitation/psychosis/self harm) , Historical      omeprazole (PRILOSEC) 20 MG DR capsule Take 20 mg by mouth daily , Historical      polyethylene glycol (MIRALAX) 17 g packet Take 17 g by mouth daily as needed for constipation, Disp-30 packet, R-1, E-Prescribe      prazosin (MINIPRESS) 2 MG capsule Take 4 mg by mouth At Bedtime , Historical      venlafaxine (EFFEXOR-XR) 75 MG 24 hr capsule Take 225 mg by mouth daily , Historical             Reason for two or more neuroleptics: Patient admitted on several neuroleptics, Clozapine and Invega Sustenna STEVENSON that are scheduled and Zyprexa as needed for hallucinations or agitation. In review, patient has historically required more than one neuroleptic for adequate symptom reduction and has tolerated. Patient will continue to follow with outpatient provider for medication and side effect review.         Psychiatric Examination:     Temp: 97.8  F (36.6  C) Temp src: Temporal BP: 95/57 Pulse: 76   Resp: 16 SpO2: 95 % O2 Device: None (Room air)      Appearance: Alert, oriented, dressed in hospital scrubs, appears stated age   Attitude: Cooperative   Eye Contact: fair  Mood: \"Fine\"  Affect: somewhat blunted  Speech: Normal rate and rhythm, monotone  Psychomotor Behavior: No tremor, rigidity, or psychomotor abnormality   Thought Process: Logical, goal directed   Associations: No loose associations   Thought Content: Denies SI or plan. No SIB. Reports improvement in auditory hallucinations since admission.  Insight: fair  Judgment: limited  Oriented to: Person, place, and time  Attention Span and Concentration: Intact  Recent and Remote Memory: fair  Language: English with " appropriate syntax and vocabulary  Fund of Knowledge: Average  Muscle Strength and Tone: Grossly normal  Gait and Station: Grossly normal        Labs:     Results for orders placed or performed during the hospital encounter of 03/15/22   Comprehensive metabolic panel     Status: Abnormal   Result Value Ref Range    Sodium 141 133 - 144 mmol/L    Potassium 4.0 3.4 - 5.3 mmol/L    Chloride 110 (H) 94 - 109 mmol/L    Carbon Dioxide (CO2) 27 20 - 32 mmol/L    Anion Gap 4 3 - 14 mmol/L    Urea Nitrogen 13 7 - 30 mg/dL    Creatinine 0.77 0.66 - 1.25 mg/dL    Calcium 9.1 8.5 - 10.1 mg/dL    Glucose 99 70 - 99 mg/dL    Alkaline Phosphatase 89 40 - 150 U/L    AST 11 0 - 45 U/L    ALT 22 0 - 70 U/L    Protein Total 6.6 (L) 6.8 - 8.8 g/dL    Albumin 3.6 3.4 - 5.0 g/dL    Bilirubin Total 0.4 0.2 - 1.3 mg/dL    GFR Estimate >90 >60 mL/min/1.73m2   CBC with platelets and differential     Status: None   Result Value Ref Range    WBC Count 7.7 4.0 - 11.0 10e3/uL    RBC Count 4.60 4.40 - 5.90 10e6/uL    Hemoglobin 14.6 13.3 - 17.7 g/dL    Hematocrit 43.6 40.0 - 53.0 %    MCV 95 78 - 100 fL    MCH 31.7 26.5 - 33.0 pg    MCHC 33.5 31.5 - 36.5 g/dL    RDW 12.6 10.0 - 15.0 %    Platelet Count 219 150 - 450 10e3/uL    % Neutrophils 63 %    % Lymphocytes 28 %    % Monocytes 8 %    % Eosinophils 0 %    % Basophils 1 %    % Immature Granulocytes 0 %    NRBCs per 100 WBC 0 <1 /100    Absolute Neutrophils 4.9 1.6 - 8.3 10e3/uL    Absolute Lymphocytes 2.2 0.8 - 5.3 10e3/uL    Absolute Monocytes 0.6 0.0 - 1.3 10e3/uL    Absolute Eosinophils 0.0 0.0 - 0.7 10e3/uL    Absolute Basophils 0.0 0.0 - 0.2 10e3/uL    Absolute Immature Granulocytes 0.0 <=0.4 10e3/uL    Absolute NRBCs 0.0 10e3/uL   CBC with Platelets & Differential     Status: None    Narrative    The following orders were created for panel order CBC with Platelets & Differential.  Procedure                               Abnormality         Status                     ---------                                -----------         ------                     CBC with platelets and d...[[id number]]                      Final result                 Please view results for these tests on the individual orders.     Clozapine level still pending at time of discharge        Discharge Plan:     Patient is being discharged to home with the following appointments as detailed below.     Health Care Follow-up:      [facility]   Main Contact- Neela Brown [phone]  [address] [place] [address]  [facility] group home: Neela Brown [phone].  [phone]  [facility]  Psychiatry- Dr. Marco Campo- May 25th @ 10:30am- via telephone  DISCUS Exam- appt to check side effects of medications- April 20th @ 10:40- in person  [email or web address]  [address]  [place] [address]  [phone]   Fax: [phone]     [facility]  PCP- Dr. Keys- as arranged  [email or web address]  [facility] [place] [address]  [phone]     [facility]  Offers Adult Therapy- call to arranged individual thearpy  *You can also complete intake forms online*  [address] [place] [address]  Phone Number: [phone]    Fax Number:[phone]  Open:   Monday- Thursday: 8:30am - 9:00pm  Friday: 8:30am - 5:00pm  Saturday:8:30am - 4:30pm        Discharge Services Provided:     40 minutes spent on discharge services, including:  Final examination of patient.  Review and discussion of Hospital stay.  Instructions for continued outpatient care/goals.  Preparation of discharge records.  Preparation of medications refills and new prescriptions.  Preparation of Applicable referral forms.         Attestation     Patient has been seen and evaluated by:    Marianna Leslie NP

## 2022-03-18 NOTE — PLAN OF CARE
Discharge Note    Ese Heaton RN  3/18/2022  10:57 AM    Patient Discharged to home on 3/18/2022 10:52 AM via [facility] accompanied by staff.     Patient informed of discharge instructions in AVS. patient verbalizes understanding and denies having any questions pertaining to AVS. Patient stable at time of discharge. Patient denies SI, HI, and thoughts of self harm at time of discharge. All personal belongings returned to patient. Discharge prescriptions sent to [facility]  via electronic communication. Psych evaluation, history and physical, AVS, and discharge summary faxed to next level of care by .     Pt. Provided with work note and had no concerns at discharge.   Nurse to Nurse communication attempted  to Pt. Group home. No answer left message. Reporting a change in mediaction  [facility].           Face to face shift report received from Nurse. Rounding completed, pt observed.             Problem: Behavioral Health Plan of Care  Goal: Patient-Specific Goal (Individualization)  Description: Pt will sleep 6-8 hours per night.   Pt will eat at least 50% of meals.   Pt will attend 50% of groups  Outcome: Ongoing, Progressing     Observed Pt. Laying in bed / Eyes Closed / Noted Breathing without difficulty.     Pt. Consumed Breakfast >50%. Pt. Anticipating discharge today at 1100 am.      Pt. Compliant to medications.

## 2022-03-19 LAB
CLOZAPINE SERPL-MCNC: 1307 NG/ML
CLOZAPINE+NOR SERPL-MCNC: 1935 NG/ML
CNO SERPL-MCNC: 148 NG/ML
NORCLOZAPINE SERPL-MCNC: 480 NG/ML

## 2022-04-24 ENCOUNTER — TELEPHONE (OUTPATIENT)
Dept: BEHAVIORAL HEALTH | Facility: CLINIC | Age: 32
End: 2022-04-24
Payer: COMMERCIAL

## 2022-04-24 ENCOUNTER — HOSPITAL ENCOUNTER (EMERGENCY)
Facility: CLINIC | Age: 32
Discharge: HOME | End: 2022-04-25
Attending: EMERGENCY MEDICINE | Admitting: EMERGENCY MEDICINE
Payer: COMMERCIAL

## 2022-04-24 DIAGNOSIS — R45.851 SUICIDAL IDEATION: ICD-10-CM

## 2022-04-24 DIAGNOSIS — F20.9 SCHIZOPHRENIA, UNSPECIFIED TYPE (H): ICD-10-CM

## 2022-04-24 LAB
AMPHETAMINES UR QL SCN: NORMAL
BARBITURATES UR QL: NORMAL
BASOPHILS # BLD AUTO: 0 10E3/UL (ref 0–0.2)
BASOPHILS NFR BLD AUTO: 0 %
BENZODIAZ UR QL: NORMAL
CANNABINOIDS UR QL SCN: NORMAL
COCAINE UR QL: NORMAL
EOSINOPHIL # BLD AUTO: 0 10E3/UL (ref 0–0.7)
EOSINOPHIL NFR BLD AUTO: 0 %
ERYTHROCYTE [DISTWIDTH] IN BLOOD BY AUTOMATED COUNT: 12.3 % (ref 10–15)
HCT VFR BLD AUTO: 41.6 % (ref 40–53)
HGB BLD-MCNC: 14.2 G/DL (ref 13.3–17.7)
IMM GRANULOCYTES # BLD: 0.1 10E3/UL
IMM GRANULOCYTES NFR BLD: 1 %
LYMPHOCYTES # BLD AUTO: 2.2 10E3/UL (ref 0.8–5.3)
LYMPHOCYTES NFR BLD AUTO: 24 %
MCH RBC QN AUTO: 31.8 PG (ref 26.5–33)
MCHC RBC AUTO-ENTMCNC: 34.1 G/DL (ref 31.5–36.5)
MCV RBC AUTO: 93 FL (ref 78–100)
MONOCYTES # BLD AUTO: 0.8 10E3/UL (ref 0–1.3)
MONOCYTES NFR BLD AUTO: 8 %
NEUTROPHILS # BLD AUTO: 6 10E3/UL (ref 1.6–8.3)
NEUTROPHILS NFR BLD AUTO: 67 %
NRBC # BLD AUTO: 0 10E3/UL
NRBC BLD AUTO-RTO: 0 /100
OPIATES UR QL SCN: NORMAL
PLATELET # BLD AUTO: 213 10E3/UL (ref 150–450)
RBC # BLD AUTO: 4.47 10E6/UL (ref 4.4–5.9)
SARS-COV-2 RNA RESP QL NAA+PROBE: NEGATIVE
WBC # BLD AUTO: 9.1 10E3/UL (ref 4–11)

## 2022-04-24 PROCEDURE — U0005 INFEC AGEN DETEC AMPLI PROBE: HCPCS | Performed by: EMERGENCY MEDICINE

## 2022-04-24 PROCEDURE — 85025 COMPLETE CBC W/AUTO DIFF WBC: CPT | Performed by: EMERGENCY MEDICINE

## 2022-04-24 PROCEDURE — 99285 EMERGENCY DEPT VISIT HI MDM: CPT | Mod: 25

## 2022-04-24 PROCEDURE — 250N000013 HC RX MED GY IP 250 OP 250 PS 637: Performed by: EMERGENCY MEDICINE

## 2022-04-24 PROCEDURE — 90791 PSYCH DIAGNOSTIC EVALUATION: CPT

## 2022-04-24 PROCEDURE — 36415 COLL VENOUS BLD VENIPUNCTURE: CPT | Performed by: EMERGENCY MEDICINE

## 2022-04-24 PROCEDURE — 80307 DRUG TEST PRSMV CHEM ANLYZR: CPT | Performed by: EMERGENCY MEDICINE

## 2022-04-24 PROCEDURE — 99285 EMERGENCY DEPT VISIT HI MDM: CPT | Performed by: EMERGENCY MEDICINE

## 2022-04-24 RX ORDER — CLOZAPINE 100 MG/1
500 TABLET ORAL AT BEDTIME
Status: DISCONTINUED | OUTPATIENT
Start: 2022-04-24 | End: 2022-04-25 | Stop reason: HOSPADM

## 2022-04-24 RX ORDER — LANOLIN ALCOHOL/MO/W.PET/CERES
3 CREAM (GRAM) TOPICAL
Status: DISCONTINUED | OUTPATIENT
Start: 2022-04-24 | End: 2022-04-25 | Stop reason: HOSPADM

## 2022-04-24 RX ORDER — LEVOTHYROXINE SODIUM 112 UG/1
112 TABLET ORAL DAILY
Status: DISCONTINUED | OUTPATIENT
Start: 2022-04-25 | End: 2022-04-25 | Stop reason: HOSPADM

## 2022-04-24 RX ORDER — GLYCOPYRROLATE 1 MG/1
1 TABLET ORAL 3 TIMES DAILY
Status: DISCONTINUED | OUTPATIENT
Start: 2022-04-24 | End: 2022-04-25 | Stop reason: HOSPADM

## 2022-04-24 RX ORDER — DOCUSATE SODIUM 100 MG/1
100 CAPSULE, LIQUID FILLED ORAL 2 TIMES DAILY
Status: DISCONTINUED | OUTPATIENT
Start: 2022-04-24 | End: 2022-04-25 | Stop reason: HOSPADM

## 2022-04-24 RX ORDER — PRAZOSIN HYDROCHLORIDE 2 MG/1
4 CAPSULE ORAL AT BEDTIME
Status: DISCONTINUED | OUTPATIENT
Start: 2022-04-24 | End: 2022-04-25 | Stop reason: HOSPADM

## 2022-04-24 RX ORDER — BENZTROPINE MESYLATE 1 MG/1
1 TABLET ORAL 2 TIMES DAILY
Status: DISCONTINUED | OUTPATIENT
Start: 2022-04-24 | End: 2022-04-25 | Stop reason: HOSPADM

## 2022-04-24 RX ORDER — PANTOPRAZOLE SODIUM 40 MG/1
40 TABLET, DELAYED RELEASE ORAL DAILY
Status: DISCONTINUED | OUTPATIENT
Start: 2022-04-25 | End: 2022-04-25 | Stop reason: HOSPADM

## 2022-04-24 RX ORDER — GABAPENTIN 300 MG/1
300 CAPSULE ORAL 3 TIMES DAILY
Status: DISCONTINUED | OUTPATIENT
Start: 2022-04-24 | End: 2022-04-25 | Stop reason: HOSPADM

## 2022-04-24 RX ORDER — POLYETHYLENE GLYCOL 3350 17 G/17G
17 POWDER, FOR SOLUTION ORAL DAILY PRN
Status: DISCONTINUED | OUTPATIENT
Start: 2022-04-24 | End: 2022-04-25 | Stop reason: HOSPADM

## 2022-04-24 RX ORDER — HYDROXYZINE HYDROCHLORIDE 50 MG/1
50 TABLET, FILM COATED ORAL ONCE
Status: COMPLETED | OUTPATIENT
Start: 2022-04-24 | End: 2022-04-24

## 2022-04-24 RX ORDER — FLUOXETINE 10 MG/1
10 CAPSULE ORAL EVERY MORNING
Status: DISCONTINUED | OUTPATIENT
Start: 2022-04-25 | End: 2022-04-25 | Stop reason: HOSPADM

## 2022-04-24 RX ORDER — ACETAMINOPHEN 325 MG/1
650 TABLET ORAL ONCE
Status: COMPLETED | OUTPATIENT
Start: 2022-04-24 | End: 2022-04-24

## 2022-04-24 RX ADMIN — PRAZOSIN HYDROCHLORIDE 4 MG: 2 CAPSULE ORAL at 22:12

## 2022-04-24 RX ADMIN — DOCUSATE SODIUM 100 MG: 100 CAPSULE, LIQUID FILLED ORAL at 22:12

## 2022-04-24 RX ADMIN — CLOZAPINE 500 MG: 100 TABLET ORAL at 22:12

## 2022-04-24 RX ADMIN — ACETAMINOPHEN 650 MG: 325 TABLET ORAL at 21:03

## 2022-04-24 RX ADMIN — GLYCOPYRROLATE 1 MG: 1 TABLET ORAL at 22:12

## 2022-04-24 RX ADMIN — GABAPENTIN 300 MG: 300 CAPSULE ORAL at 22:12

## 2022-04-24 RX ADMIN — NICOTINE POLACRILEX 2 MG: 2 GUM, CHEWING BUCCAL at 21:03

## 2022-04-24 RX ADMIN — BENZTROPINE MESYLATE 1 MG: 1 TABLET ORAL at 22:12

## 2022-04-24 RX ADMIN — NICOTINE POLACRILEX 2 MG: 2 GUM, CHEWING BUCCAL at 23:22

## 2022-04-24 RX ADMIN — HYDROXYZINE HYDROCHLORIDE 50 MG: 50 TABLET, FILM COATED ORAL at 18:32

## 2022-04-24 ASSESSMENT — ENCOUNTER SYMPTOMS
DIARRHEA: 0
ABDOMINAL PAIN: 0
COUGH: 0
SORE THROAT: 0
NAUSEA: 0
VOMITING: 0
ARTHRALGIAS: 0
CONFUSION: 0
SHORTNESS OF BREATH: 0
DIFFICULTY URINATING: 0
CHILLS: 0
HALLUCINATIONS: 1
HEADACHES: 0
DYSPHORIC MOOD: 1
FEVER: 0
COLOR CHANGE: 0
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
## 2022-04-24 NOTE — ED NOTES
Bed: HW03  Expected date: 4/24/22  Expected time: 5:07 PM  Means of arrival: Ambulance  [place]  [facility] [place] [facility] 30[place]
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
## 2022-04-24 NOTE — ED NOTES
AIDET: done    Security Search: done    Belongings: 1 BAG  Cellphone , NO Wallet      >w/pt: cellphone, clothes    >locker: shoes, jacket

## 2022-04-24 NOTE — ED TRIAGE NOTES
Pt MARTINA Sanchez [address]. Pt called EMS due hearing voices. Pt having suicidal thoughts with no plan. Pt told EMS that coming to the hospital is therapeutic for him. Blood sugar and vitals stable per EMS.

## 2022-04-25 ENCOUNTER — TELEPHONE (OUTPATIENT)
Dept: BEHAVIORAL HEALTH | Facility: CLINIC | Age: 32
End: 2022-04-25
Payer: COMMERCIAL

## 2022-04-25 ENCOUNTER — TELEPHONE (OUTPATIENT)
Dept: BEHAVIORAL HEALTH | Facility: CLINIC | Age: 32
End: 2022-04-25

## 2022-04-25 ENCOUNTER — HOSPITAL ENCOUNTER (INPATIENT)
Facility: CLINIC | Age: 32
LOS: 7 days | Discharge: NURSING, REHAB OR LONG-TERM CARE | End: 2022-05-02
Attending: PSYCHIATRY & NEUROLOGY | Admitting: PSYCHIATRY & NEUROLOGY
Payer: COMMERCIAL

## 2022-04-25 VITALS
HEART RATE: 90 BPM | DIASTOLIC BLOOD PRESSURE: 83 MMHG | OXYGEN SATURATION: 100 % | RESPIRATION RATE: 18 BRPM | SYSTOLIC BLOOD PRESSURE: 116 MMHG | TEMPERATURE: 98.6 F

## 2022-04-25 DIAGNOSIS — F25.0 SCHIZOAFFECTIVE DISORDER, BIPOLAR TYPE (H): ICD-10-CM

## 2022-04-25 DIAGNOSIS — F25.1 SCHIZOAFFECTIVE DISORDER, DEPRESSIVE TYPE (H): Primary | ICD-10-CM

## 2022-04-25 PROBLEM — R45.86 MOOD CHANGE: Status: ACTIVE | Noted: 2021-12-28

## 2022-04-25 PROCEDURE — 250N000013 HC RX MED GY IP 250 OP 250 PS 637: Performed by: EMERGENCY MEDICINE

## 2022-04-25 PROCEDURE — 250N000013 HC RX MED GY IP 250 OP 250 PS 637: Performed by: PSYCHIATRY & NEUROLOGY

## 2022-04-25 PROCEDURE — 128N000001 HC R&B CD/MH ADULT

## 2022-04-25 RX ORDER — OLANZAPINE 10 MG/1
10 TABLET ORAL 3 TIMES DAILY PRN
Status: DISCONTINUED | OUTPATIENT
Start: 2022-04-25 | End: 2022-05-02 | Stop reason: HOSPADM

## 2022-04-25 RX ORDER — PANTOPRAZOLE SODIUM 40 MG/1
40 TABLET, DELAYED RELEASE ORAL DAILY
Status: DISCONTINUED | OUTPATIENT
Start: 2022-04-26 | End: 2022-05-02 | Stop reason: HOSPADM

## 2022-04-25 RX ORDER — HYDROXYZINE HYDROCHLORIDE 25 MG/1
25 TABLET, FILM COATED ORAL EVERY 4 HOURS PRN
Status: DISCONTINUED | OUTPATIENT
Start: 2022-04-25 | End: 2022-05-02 | Stop reason: HOSPADM

## 2022-04-25 RX ORDER — OLANZAPINE 10 MG/1
10 TABLET, ORALLY DISINTEGRATING ORAL 2 TIMES DAILY PRN
Status: DISCONTINUED | OUTPATIENT
Start: 2022-04-25 | End: 2022-04-25 | Stop reason: HOSPADM

## 2022-04-25 RX ORDER — GABAPENTIN 300 MG/1
300 CAPSULE ORAL 3 TIMES DAILY
Status: DISCONTINUED | OUTPATIENT
Start: 2022-04-25 | End: 2022-04-26

## 2022-04-25 RX ORDER — BENZTROPINE MESYLATE 1 MG/1
1 TABLET ORAL 2 TIMES DAILY
Status: DISCONTINUED | OUTPATIENT
Start: 2022-04-25 | End: 2022-05-02 | Stop reason: HOSPADM

## 2022-04-25 RX ORDER — PRAZOSIN HYDROCHLORIDE 1 MG/1
4 CAPSULE ORAL AT BEDTIME
Status: DISCONTINUED | OUTPATIENT
Start: 2022-04-25 | End: 2022-05-02 | Stop reason: HOSPADM

## 2022-04-25 RX ORDER — FLUOXETINE 10 MG/1
10 CAPSULE ORAL EVERY MORNING
Status: DISCONTINUED | OUTPATIENT
Start: 2022-04-26 | End: 2022-04-26

## 2022-04-25 RX ORDER — AMOXICILLIN 250 MG
1 CAPSULE ORAL 2 TIMES DAILY PRN
Status: DISCONTINUED | OUTPATIENT
Start: 2022-04-25 | End: 2022-05-02 | Stop reason: HOSPADM

## 2022-04-25 RX ORDER — LEVOTHYROXINE SODIUM 112 UG/1
112 TABLET ORAL DAILY
Status: DISCONTINUED | OUTPATIENT
Start: 2022-04-26 | End: 2022-05-02 | Stop reason: HOSPADM

## 2022-04-25 RX ORDER — GLYCOPYRROLATE 1 MG/1
1 TABLET ORAL 3 TIMES DAILY
Status: DISCONTINUED | OUTPATIENT
Start: 2022-04-25 | End: 2022-05-02 | Stop reason: HOSPADM

## 2022-04-25 RX ORDER — MAGNESIUM HYDROXIDE/ALUMINUM HYDROXICE/SIMETHICONE 120; 1200; 1200 MG/30ML; MG/30ML; MG/30ML
30 SUSPENSION ORAL EVERY 4 HOURS PRN
Status: DISCONTINUED | OUTPATIENT
Start: 2022-04-25 | End: 2022-05-02 | Stop reason: HOSPADM

## 2022-04-25 RX ORDER — ACETAMINOPHEN 325 MG/1
650 TABLET ORAL EVERY 4 HOURS PRN
Status: DISCONTINUED | OUTPATIENT
Start: 2022-04-25 | End: 2022-05-02 | Stop reason: HOSPADM

## 2022-04-25 RX ORDER — TRAZODONE HYDROCHLORIDE 50 MG/1
50 TABLET, FILM COATED ORAL
Status: DISCONTINUED | OUTPATIENT
Start: 2022-04-25 | End: 2022-05-02 | Stop reason: HOSPADM

## 2022-04-25 RX ORDER — POLYETHYLENE GLYCOL 3350 17 G/17G
17 POWDER, FOR SOLUTION ORAL DAILY PRN
Status: DISCONTINUED | OUTPATIENT
Start: 2022-04-25 | End: 2022-05-02 | Stop reason: HOSPADM

## 2022-04-25 RX ORDER — DOCUSATE SODIUM 100 MG/1
100 CAPSULE, LIQUID FILLED ORAL 2 TIMES DAILY
Status: DISCONTINUED | OUTPATIENT
Start: 2022-04-25 | End: 2022-05-02 | Stop reason: HOSPADM

## 2022-04-25 RX ORDER — OLANZAPINE 10 MG/2ML
10 INJECTION, POWDER, FOR SOLUTION INTRAMUSCULAR 3 TIMES DAILY PRN
Status: DISCONTINUED | OUTPATIENT
Start: 2022-04-25 | End: 2022-05-02 | Stop reason: HOSPADM

## 2022-04-25 RX ORDER — CLOZAPINE 100 MG/1
500 TABLET ORAL AT BEDTIME
Status: DISCONTINUED | OUTPATIENT
Start: 2022-04-25 | End: 2022-04-26

## 2022-04-25 RX ADMIN — BENZTROPINE MESYLATE 1 MG: 1 TABLET ORAL at 09:08

## 2022-04-25 RX ADMIN — PRAZOSIN HYDROCHLORIDE 4 MG: 1 CAPSULE ORAL at 22:04

## 2022-04-25 RX ADMIN — NICOTINE POLACRILEX 2 MG: 2 GUM, CHEWING BUCCAL at 09:21

## 2022-04-25 RX ADMIN — FLUOXETINE 10 MG: 10 CAPSULE ORAL at 09:09

## 2022-04-25 RX ADMIN — DOCUSATE SODIUM 100 MG: 100 CAPSULE, LIQUID FILLED ORAL at 09:09

## 2022-04-25 RX ADMIN — NICOTINE POLACRILEX 2 MG: 2 GUM, CHEWING BUCCAL at 17:01

## 2022-04-25 RX ADMIN — GABAPENTIN 300 MG: 300 CAPSULE ORAL at 15:40

## 2022-04-25 RX ADMIN — NICOTINE POLACRILEX 2 MG: 2 GUM, CHEWING BUCCAL at 12:14

## 2022-04-25 RX ADMIN — Medication 125 MCG: at 09:08

## 2022-04-25 RX ADMIN — GLYCOPYRROLATE 1 MG: 1 TABLET ORAL at 09:52

## 2022-04-25 RX ADMIN — OLANZAPINE 10 MG: 10 TABLET, ORALLY DISINTEGRATING ORAL at 00:24

## 2022-04-25 RX ADMIN — CLOZAPINE 500 MG: 100 TABLET ORAL at 22:04

## 2022-04-25 RX ADMIN — GLYCOPYRROLATE 1 MG: 1 TABLET ORAL at 22:05

## 2022-04-25 RX ADMIN — GABAPENTIN 300 MG: 300 CAPSULE ORAL at 22:03

## 2022-04-25 RX ADMIN — LEVOTHYROXINE SODIUM 112 MCG: 112 TABLET ORAL at 09:10

## 2022-04-25 RX ADMIN — NICOTINE POLACRILEX 4 MG: 4 GUM, CHEWING ORAL at 15:40

## 2022-04-25 RX ADMIN — VENLAFAXINE HYDROCHLORIDE 225 MG: 150 CAPSULE, EXTENDED RELEASE ORAL at 09:08

## 2022-04-25 RX ADMIN — DOCUSATE SODIUM 100 MG: 100 CAPSULE, LIQUID FILLED ORAL at 22:03

## 2022-04-25 RX ADMIN — HYDROXYZINE HYDROCHLORIDE 25 MG: 25 TABLET ORAL at 22:04

## 2022-04-25 RX ADMIN — TRAZODONE HYDROCHLORIDE 50 MG: 50 TABLET ORAL at 22:04

## 2022-04-25 RX ADMIN — GABAPENTIN 300 MG: 300 CAPSULE ORAL at 09:09

## 2022-04-25 RX ADMIN — GLYCOPYRROLATE 1 MG: 1 TABLET ORAL at 15:40

## 2022-04-25 RX ADMIN — PANTOPRAZOLE SODIUM 40 MG: 40 TABLET, DELAYED RELEASE ORAL at 09:09

## 2022-04-25 RX ADMIN — BENZTROPINE MESYLATE 1 MG: 1 TABLET ORAL at 22:04

## 2022-04-25 NOTE — TELEPHONE ENCOUNTER
R:  Patient cleared and ready for behavioral bed placement: Yes   Intake called Dr Chavez @ 1:51pm to present for 4500/Camacho  Dr Chavez accepted pt for 4500/[facility]acho @ 1:55pm  Pt placed in unit que @ 2:18pm and charge called with disposition  [facility] ED Updated with placement @ 4:20 and report to occur @ 4:30 pm

## 2022-04-25 NOTE — ED NOTES
[facility] ED Mental Health Handoff Note:       Brief HPI:  This is a 31 year old male signed out to me by Dr. Iglesias.  See initial ED Provider note for full details of the presentation. Interval history is pertinent for none.    Home meds reviewed and ordered/administered: Yes    Medically stable for inpatient mental health admission: Yes.    Evaluated by mental health: Yes. The recommendation is for inpatient mental health treatment. Bed search in process    Safety concerns: At the time I received sign out, there were no safety concerns.    Hold Status:  Active Orders   N/A            Exam:   Patient Vitals for the past 24 hrs:   BP Temp Temp src Pulse Resp SpO2   04/25/22 0923 100/67 98.4  F (36.9  C) Oral 74 16 98 %   04/25/22 0008 119/84 98.8  F (37.1  C) Oral 88 16 98 %   04/24/22 1730 122/86 98.6  F (37  C) Oral 102 16 100 %           ED Course:    Medications   nicotine (NICORETTE) gum 2 mg (2 mg Buccal Given 4/25/22 1214)   benztropine (COGENTIN) tablet 1 mg (1 mg Oral Given 4/25/22 0908)   cholecalciferol (VITAMIN D3) 125 mcg (5000 units) capsule 125 mcg (125 mcg Oral Given 4/25/22 0908)   cloZAPine (CLOZARIL) tablet 500 mg (500 mg Oral Given 4/24/22 2212)   docusate sodium (COLACE) capsule 100 mg (100 mg Oral Given 4/25/22 0909)   FLUoxetine (PROzac) capsule 10 mg (10 mg Oral Given 4/25/22 0909)   gabapentin (NEURONTIN) capsule 300 mg (300 mg Oral Given 4/25/22 1540)   glycopyrrolate (ROBINUL) tablet 1 mg (1 mg Oral Given 4/25/22 1540)   levothyroxine (SYNTHROID/LEVOTHROID) tablet 112 mcg (112 mcg Oral Given 4/25/22 0910)   pantoprazole (PROTONIX) EC tablet 40 mg (40 mg Oral Given 4/25/22 0909)   polyethylene glycol (MIRALAX) Packet 17 g (has no administration in time range)   prazosin (MINIPRESS) capsule 4 mg (4 mg Oral Given 4/24/22 2212)   venlafaxine (EFFEXOR-XR) 24 hr capsule 225 mg (225 mg Oral Given 4/25/22 0908)   melatonin tablet 3 mg (has no administration in time range)   OLANZapine zydis  (zyPREXA) ODT tab 10 mg (10 mg Oral Given 4/25/22 0024)   hydrOXYzine (ATARAX) tablet 50 mg (50 mg Oral Given 4/24/22 1832)   acetaminophen (TYLENOL) tablet 650 mg (650 mg Oral Given 4/24/22 2103)   nicotine polacrilex (NICORETTE) gum 4 mg (4 mg Buccal Given 4/25/22 1540)            There were no significant events during my shift.    Patient was signed out to the oncoming provider, Dr. Cope      Impression:    ICD-10-CM    1. Suicidal ideation  R45.851    2. Schizophrenia, unspecified type (H)  F20.9        Plan:    1. Awaiting inpatient mental health admission/transfer.      RESULTS:   Results for orders placed or performed during the hospital encounter of 04/24/22 (from the past 24 hour(s))   Asymptomatic COVID-19 Virus (Coronavirus) by PCR Nasopharyngeal     Status: Normal    Collection Time: 04/24/22  9:07 PM    Specimen: Nasopharyngeal; Swab   Result Value Ref Range    SARS CoV2 PCR Negative Negative    Narrative    Testing was performed using the [employer]  SARS-CoV-2 & Influenza A/B Assay on the [employer]  Ya  System.  This test should be ordered for the detection of SARS-COV-2 in individuals who meet SARS-CoV-2 clinical and/or epidemiological criteria. Test performance is unknown in asymptomatic patients.  This test is for in vitro diagnostic use under the FDA EUA for laboratories certified under CLIA to perform moderate and/or high complexity testing. This test has not been FDA cleared or approved.  A negative test does not rule out the presence of PCR inhibitors in the specimen or target RNA in concentration below the limit of detection for the assay. The possibility of a false negative should be considered if the patient's recent exposure or clinical presentation suggests COVID-19.  [facility] are certified under the Clinical Laboratory Improvement Amendments of 1988 (CLIA-88) as qualified to perform moderate and/or high complexity laboratory testing.   Urine Drugs of Abuse Screen      Status: Normal    Collection Time: 04/24/22 11:18 PM    Narrative    The following orders were created for panel order Urine Drugs of Abuse Screen.  Procedure                               Abnormality         Status                     ---------                               -----------         ------                     Drug abuse screen 1 urin...[[id number]]  Normal              Final result                 Please view results for these tests on the individual orders.   Drug abuse screen 1 urine (ED)     Status: Normal    Collection Time: 04/24/22 11:18 PM   Result Value Ref Range    Amphetamines Urine Screen Negative Screen Negative    Barbiturates Urine Screen Negative Screen Negative    Benzodiazepines Urine Screen Negative Screen Negative    Cannabinoids Urine Screen Negative Screen Negative    Cocaine Urine Screen Negative Screen Negative    Opiates Urine Screen Negative Screen Negative   CBC with Platelets & Differential     Status: None    Collection Time: 04/24/22 11:33 PM    Narrative    The following orders were created for panel order CBC with Platelets & Differential.  Procedure                               Abnormality         Status                     ---------                               -----------         ------                     CBC with platelets and d...[[id number]]                      Final result                 Please view results for these tests on the individual orders.   CBC with platelets and differential     Status: None    Collection Time: 04/24/22 11:33 PM   Result Value Ref Range    WBC Count 9.1 4.0 - 11.0 10e3/uL    RBC Count 4.47 4.40 - 5.90 10e6/uL    Hemoglobin 14.2 13.3 - 17.7 g/dL    Hematocrit 41.6 40.0 - 53.0 %    MCV 93 78 - 100 fL    MCH 31.8 26.5 - 33.0 pg    MCHC 34.1 31.5 - 36.5 g/dL    RDW 12.3 10.0 - 15.0 %    Platelet Count 213 150 - 450 10e3/uL    % Neutrophils 67 %    % Lymphocytes 24 %    % Monocytes 8 %    % Eosinophils 0 %    % Basophils 0 %    % Immature  Granulocytes 1 %    NRBCs per 100 WBC 0 <1 /100    Absolute Neutrophils 6.0 1.6 - 8.3 10e3/uL    Absolute Lymphocytes 2.2 0.8 - 5.3 10e3/uL    Absolute Monocytes 0.8 0.0 - 1.3 10e3/uL    Absolute Eosinophils 0.0 0.0 - 0.7 10e3/uL    Absolute Basophils 0.0 0.0 - 0.2 10e3/uL    Absolute Immature Granulocytes 0.1 <=0.4 10e3/uL    Absolute NRBCs 0.0 10e3/uL             MD Kassy Nguyen Ashley A, MD  04/25/22 [id number]
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
## 2022-04-25 NOTE — PHARMACY-ADMISSION MEDICATION HISTORY
"  Admission Medication History Completed by Pharmacy    See [facility] Admission Navigator for allergy information, preferred outpatient pharmacy, prior to admission medications and immunization status.     Medication History Sources:     [facility] (fill history), [facility], and group home [facility] (via telephone - [phone])  [facility] - confirmed patient receives 4-week compliance pill packs    Changes made to PTA medication list (reason):    Added: None    Deleted: None    Changed: None    Additional Information:    Medication adherence - good adherence per RN, confidently reporting \"he takes his meds\". Unclear if patient has been cheeking/discarding medications after med checks. Marked approximate last doses per discussion with RN.    Malika Carter - RN reports patient is due for next dose on 4/28/22, last dose was given 4/7/22 prior to admission.    Prior to Admission medications    Medication Sig Last Dose Taking? Auth Provider   benztropine (COGENTIN) 1 MG tablet Take 1 mg by mouth 2 times daily  4/23/2022 Yes Reported, Patient   cholecalciferol (VITAMIN D3) 125 mcg (5000 units) capsule Take 125 mcg by mouth daily  4/23/2022 Yes Reported, Patient   cloZAPine (CLOZARIL) 100 MG tablet Take 500 mg by mouth At Bedtime 4/23/2022 Yes Reported, Patient   docusate sodium (COLACE) 100 MG capsule Take 100 mg by mouth 2 times daily  4/23/2022 Yes Reported, Patient   FLUoxetine (PROZAC) 10 MG capsule Take 10 mg by mouth every morning  4/23/2022 Yes Reported, Patient   gabapentin (NEURONTIN) 300 MG capsule Take 1 capsule (300 mg) by mouth 3 times daily 4/23/2022 Yes Doyle Cervantes APRN CNP   glycopyrrolate (ROBINUL) 1 MG tablet Take 1 tablet (1 mg) by mouth 3 times daily 4/23/2022 at PRN Yes Doyle Cervantes APRN CNP   levothyroxine (SYNTHROID/LEVOTHROID) 112 MCG tablet Take 1 tablet (112 mcg) by mouth daily 4/23/2022 Yes Doyle Cervantes APRN CNP   OLANZapine (ZYPREXA) 20 MG tablet Take 10 mg by " mouth 3 times daily as needed (agitation/psychosis/self harm)   at PRN Yes Reported, Patient   omeprazole (PRILOSEC) 20 MG DR capsule Take 20 mg by mouth daily  4/23/2022 Yes Reported, Patient   paliperidone (INVEGA SUSTENNA) 234 MG/1.5ML ANTHONY Inject 1.5 mLs (234 mg) into the muscle every 21 days 4/7/2022 Yes Marianna Leslie, MASSIMO   polyethylene glycol (MIRALAX) 17 g packet Take 17 g by mouth daily as needed for constipation  at PRN Yes Chandler Solorzano MD   prazosin (MINIPRESS) 2 MG capsule Take 4 mg by mouth At Bedtime  4/23/2022 Yes Reported, Patient   venlafaxine (EFFEXOR-XR) 75 MG 24 hr capsule Take 225 mg by mouth daily  4/23/2022 Yes Reported, Patient     Date completed: 04/25/22    Medication history completed by:     Nicollette McMann, [place]  [facility]  Emergency Department: Ascom *[id number]

## 2022-04-25 NOTE — TELEPHONE ENCOUNTER
R:  Per chart, [place]. Bed search update @ 01:09:    [facility] @ cap  [facility] @ cap  [facility]: @ cap per website  [facility]:@ cap per website  [facility]: @ cap per website  [facility]: @ cap per website  [facility]: @ cap per website  [facility]: @ cap per website    Pt remains on work list until appropriate placement is available

## 2022-04-25 NOTE — ED NOTES
Pt kept asking for a bed, has been informed multiple times that there is no room available as of this time
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
## 2022-04-26 PROBLEM — F17.200 NICOTINE USE DISORDER: Chronic | Status: ACTIVE | Noted: 2022-04-26

## 2022-04-26 PROBLEM — F43.10 POSTTRAUMATIC STRESS DISORDER: Status: ACTIVE | Noted: 2021-08-17

## 2022-04-26 PROBLEM — F43.12 CHRONIC POST-TRAUMATIC STRESS DISORDER (PTSD): Chronic | Status: ACTIVE | Noted: 2021-08-17

## 2022-04-26 PROBLEM — Z79.899 HIGH RISK MEDICATION USE: Status: ACTIVE | Noted: 2017-06-06

## 2022-04-26 PROBLEM — F25.1 SCHIZOAFFECTIVE DISORDER, DEPRESSIVE TYPE (H): Status: ACTIVE | Noted: 2017-10-09

## 2022-04-26 PROCEDURE — 250N000013 HC RX MED GY IP 250 OP 250 PS 637: Performed by: PSYCHIATRY & NEUROLOGY

## 2022-04-26 PROCEDURE — 99223 1ST HOSP IP/OBS HIGH 75: CPT | Performed by: PSYCHIATRY & NEUROLOGY

## 2022-04-26 PROCEDURE — 128N000001 HC R&B CD/MH ADULT

## 2022-04-26 RX ORDER — PALIPERIDONE 3 MG/1
3 TABLET, EXTENDED RELEASE ORAL DAILY
Status: COMPLETED | OUTPATIENT
Start: 2022-04-26 | End: 2022-04-26

## 2022-04-26 RX ORDER — PALIPERIDONE 6 MG/1
6 TABLET, EXTENDED RELEASE ORAL DAILY
Status: DISCONTINUED | OUTPATIENT
Start: 2022-04-27 | End: 2022-04-26

## 2022-04-26 RX ORDER — CLOZAPINE 100 MG/1
300 TABLET ORAL AT BEDTIME
Status: DISCONTINUED | OUTPATIENT
Start: 2022-05-02 | End: 2022-04-29

## 2022-04-26 RX ORDER — CLOZAPINE 100 MG/1
500 TABLET ORAL AT BEDTIME
Status: DISCONTINUED | OUTPATIENT
Start: 2022-05-06 | End: 2022-04-29 | Stop reason: DRUGHIGH

## 2022-04-26 RX ORDER — CLOZAPINE 100 MG/1
200 TABLET ORAL AT BEDTIME
Status: DISCONTINUED | OUTPATIENT
Start: 2022-04-30 | End: 2022-04-29

## 2022-04-26 RX ORDER — PALIPERIDONE 6 MG/1
6 TABLET, EXTENDED RELEASE ORAL DAILY
Status: DISCONTINUED | OUTPATIENT
Start: 2022-04-27 | End: 2022-04-29

## 2022-04-26 RX ORDER — CLOZAPINE 25 MG/1
50 TABLET ORAL AT BEDTIME
Status: COMPLETED | OUTPATIENT
Start: 2022-04-27 | End: 2022-04-27

## 2022-04-26 RX ORDER — CLOZAPINE 25 MG/1
25 TABLET ORAL AT BEDTIME
Status: COMPLETED | OUTPATIENT
Start: 2022-04-26 | End: 2022-04-26

## 2022-04-26 RX ORDER — CLOZAPINE 100 MG/1
400 TABLET ORAL AT BEDTIME
Status: DISCONTINUED | OUTPATIENT
Start: 2022-05-04 | End: 2022-04-29

## 2022-04-26 RX ORDER — CLOZAPINE 100 MG/1
100 TABLET ORAL AT BEDTIME
Status: COMPLETED | OUTPATIENT
Start: 2022-04-28 | End: 2022-04-28

## 2022-04-26 RX ORDER — GABAPENTIN 400 MG/1
400 CAPSULE ORAL 3 TIMES DAILY
Status: DISCONTINUED | OUTPATIENT
Start: 2022-04-26 | End: 2022-05-02 | Stop reason: HOSPADM

## 2022-04-26 RX ADMIN — DOCUSATE SODIUM 100 MG: 100 CAPSULE, LIQUID FILLED ORAL at 21:00

## 2022-04-26 RX ADMIN — NICOTINE POLACRILEX 4 MG: 4 GUM, CHEWING ORAL at 21:51

## 2022-04-26 RX ADMIN — PANTOPRAZOLE SODIUM 40 MG: 40 TABLET, DELAYED RELEASE ORAL at 09:07

## 2022-04-26 RX ADMIN — NICOTINE POLACRILEX 4 MG: 4 GUM, CHEWING ORAL at 14:34

## 2022-04-26 RX ADMIN — CLOZAPINE 25 MG: 25 TABLET ORAL at 21:00

## 2022-04-26 RX ADMIN — CHOLECALCIFEROL TAB 125 MCG (5000 UNIT) 125 MCG: 125 TAB at 09:06

## 2022-04-26 RX ADMIN — DOCUSATE SODIUM 100 MG: 100 CAPSULE, LIQUID FILLED ORAL at 09:07

## 2022-04-26 RX ADMIN — SENNOSIDES AND DOCUSATE SODIUM 1 TABLET: 50; 8.6 TABLET ORAL at 21:05

## 2022-04-26 RX ADMIN — GABAPENTIN 300 MG: 300 CAPSULE ORAL at 09:07

## 2022-04-26 RX ADMIN — NICOTINE POLACRILEX 4 MG: 4 GUM, CHEWING ORAL at 19:14

## 2022-04-26 RX ADMIN — GLYCOPYRROLATE 1 MG: 1 TABLET ORAL at 13:24

## 2022-04-26 RX ADMIN — LEVOTHYROXINE SODIUM 112 MCG: 112 TABLET ORAL at 09:06

## 2022-04-26 RX ADMIN — FLUOXETINE 10 MG: 10 CAPSULE ORAL at 09:07

## 2022-04-26 RX ADMIN — BENZTROPINE MESYLATE 1 MG: 1 TABLET ORAL at 09:07

## 2022-04-26 RX ADMIN — NICOTINE POLACRILEX 4 MG: 4 GUM, CHEWING ORAL at 20:37

## 2022-04-26 RX ADMIN — GABAPENTIN 400 MG: 400 CAPSULE ORAL at 21:00

## 2022-04-26 RX ADMIN — GLYCOPYRROLATE 1 MG: 1 TABLET ORAL at 09:07

## 2022-04-26 RX ADMIN — VENLAFAXINE HYDROCHLORIDE 225 MG: 150 CAPSULE, EXTENDED RELEASE ORAL at 09:06

## 2022-04-26 RX ADMIN — NICOTINE POLACRILEX 4 MG: 4 GUM, CHEWING ORAL at 17:19

## 2022-04-26 RX ADMIN — GLYCOPYRROLATE 1 MG: 1 TABLET ORAL at 21:01

## 2022-04-26 RX ADMIN — PRAZOSIN HYDROCHLORIDE 4 MG: 1 CAPSULE ORAL at 21:00

## 2022-04-26 RX ADMIN — BENZTROPINE MESYLATE 1 MG: 1 TABLET ORAL at 21:00

## 2022-04-26 RX ADMIN — PALIPERIDONE 3 MG: 3 TABLET, EXTENDED RELEASE ORAL at 14:34

## 2022-04-26 RX ADMIN — GABAPENTIN 300 MG: 300 CAPSULE ORAL at 13:24

## 2022-04-26 ASSESSMENT — ACTIVITIES OF DAILY LIVING (ADL)
DRESS: INDEPENDENT
HYGIENE/GROOMING: INDEPENDENT
LAUNDRY: WITH SUPERVISION
HYGIENE/GROOMING: INDEPENDENT
DRESS: INDEPENDENT
ORAL_HYGIENE: INDEPENDENT
ORAL_HYGIENE: INDEPENDENT

## 2022-04-26 NOTE — PROGRESS NOTES
Pt arrived the the unit at 2040 via [place] [facility] and pt able to ambulate independently. Pt alert and oriented x4 and able to communicate needs. Down to gown completed with charge nurse. Pt expressed anxiety 4/10 and depression 3/10 and prn atarax administered and pt sleeping after assessment. Pt also ate 100% of lunch box. Pt maintained flat affect and cooperated with medications and admission assessment. Pt denies pain and hallucination and endorsed suicide ideation with a plan to hang himself and pt contracted for safety. Consulted with charge nurse for 1:1 but charge nurse stated since he contracted for safety, there is no need for 1:1,

## 2022-04-26 NOTE — PHARMACY-ADMISSION MEDICATION HISTORY
Admission medication history completed at [facility]. Please see Pharmacy - Admission Medication History note from 04/25/2022.    Suhail Palmer RPH on 4/25/2022 at 8:49 PM

## 2022-04-26 NOTE — PHARMACY-CONSULT NOTE
Pharmacy consulted by Caio Camacho MD to assist with a rapid titration to PTA dose of 500 mg at bedtime and associated monitoring.    Weekly ANCs will be checked and the following titration has been ordered:     mg of Clozapine   4/26/2022 25   4/27/2022 50   4/28/2022 100   4/29/2022 150   4/30/2022 200   5/1/2022 250   5/2/2022 300   5/3/2022 350   5/4/2022 400   5/5/2022 450   5/6/2022 500     Please review the titration when able and ask pharmacy any questions.     Suhail Palmer RPH on 4/26/2022 at 4:26 PM

## 2022-04-26 NOTE — PLAN OF CARE
Goal Outcome Evaluation:    Plan of Care Reviewed With: patient        Problem: Behavioral Health Plan of Care  Goal: Adheres to Safety Considerations for Self and Others  Outcome: Ongoing, Progressing  Intervention: Develop and Maintain Individualized Safety Plan  Recent Flowsheet Documentation  Taken 4/26/2022 0900 by Yajaira Patel RN  Safety Measures: safety rounds completed     Problem: Suicide Risk  Goal: Absence of Self-Harm  Outcome: Ongoing, Progressing     Patient endorsed SI with no plans. Endorsed auditory hallucination telling him to kill himself. Patient contracted for safety. Reported anxiety and depression 3. Patient in bed sleeping and slept through breakfast. He refused to to come out to eat. Was compliant with vital signs check sitting but refused standing. Patient had his morning medications without any difficulty. Will continue to maintain safety.  Patient came out for lunch with lots of encouragement. Requesting for Nicotine gum being a pack/day smoker. MD notified for order. Order obtained same administered. Patient remained on the unit after lunch. No suicidal behavior noted.
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
## 2022-04-26 NOTE — PLAN OF CARE
Problem: Behavioral Health Plan of Care  Goal: Plan of Care Review  Recent Flowsheet Documentation  Taken 4/26/2022 1729 by Margaret Shabazz RN  Plan of Care Reviewed With: patient  Patient Agreement with Plan of Care: agrees  Goal: Adheres to Safety Considerations for Self and Others  Outcome: Ongoing, Progressing  Intervention: Develop and Maintain Individualized Safety Plan  Recent Flowsheet Documentation  Taken 4/26/2022 1729 by Margaret Shabazz, RN  Safety Measures: safety rounds completed  Goal: Absence of New-Onset Illness or Injury  Outcome: Ongoing, Progressing  Intervention: Identify and Manage Fall Risk  Recent Flowsheet Documentation  Taken 4/26/2022 1729 by Margaret Shabazz RN  Safety Measures: safety rounds completed  Goal: Optimal Comfort and Wellbeing  Outcome: Ongoing, Progressing   Goal Outcome Evaluation:    Plan of Care Reviewed With: patient      Patient was in his room in bed until dinner time. He was compliant with VS and ate his dinner. He denies any depression and rated anxiety at 4/10 and declined any prn. He denies SI,HI and contracts for safety.

## 2022-04-26 NOTE — PLAN OF CARE
Problem: Behavioral Health Plan of Care  Goal: Absence of New-Onset Illness or Injury  Outcome: Ongoing, Progressing  Intervention: Identify and Manage Fall Risk  Recent Flowsheet Documentation  Taken 4/26/2022 0047 by Kaleb Nava, RN  Safety Measures: safety rounds completed  Goal: Develops/Participates in Therapeutic [place] to Support Successful Transition  Outcome: Ongoing, Progressing  Intervention: Foster Therapeutic [place]  Recent Flowsheet Documentation  Taken 4/25/2022 2200 by Kaleb Nava, RN  Trust Relationship/Rapport:    care explained    empathic listening provided    thoughts/feelings acknowledged     Problem: Suicide Risk  Goal: Absence of Self-Harm  Outcome: Ongoing, Progressing  Intervention: Promote Psychosocial Wellbeing  Recent Flowsheet Documentation  Taken 4/25/2022 2200 by Kaleb Nava, RN  Family/Support System Care:    self-care encouraged    support provided   Goal Outcome Evaluation:    Plan of Care Reviewed With: patient      Pt slept all night and no behavior outburst observed or reported and safety checks completed per protocol.

## 2022-04-27 PROCEDURE — 250N000013 HC RX MED GY IP 250 OP 250 PS 637: Performed by: PSYCHIATRY & NEUROLOGY

## 2022-04-27 PROCEDURE — 99406 BEHAV CHNG SMOKING 3-10 MIN: CPT | Performed by: PSYCHIATRY & NEUROLOGY

## 2022-04-27 PROCEDURE — 99233 SBSQ HOSP IP/OBS HIGH 50: CPT | Performed by: PSYCHIATRY & NEUROLOGY

## 2022-04-27 PROCEDURE — 128N000001 HC R&B CD/MH ADULT

## 2022-04-27 RX ADMIN — NICOTINE POLACRILEX 4 MG: 4 GUM, CHEWING ORAL at 13:08

## 2022-04-27 RX ADMIN — PANTOPRAZOLE SODIUM 40 MG: 40 TABLET, DELAYED RELEASE ORAL at 08:21

## 2022-04-27 RX ADMIN — CHOLECALCIFEROL TAB 125 MCG (5000 UNIT) 125 MCG: 125 TAB at 08:22

## 2022-04-27 RX ADMIN — GLYCOPYRROLATE 1 MG: 1 TABLET ORAL at 13:08

## 2022-04-27 RX ADMIN — PALIPERIDONE 6 MG: 6 TABLET, EXTENDED RELEASE ORAL at 08:21

## 2022-04-27 RX ADMIN — NICOTINE POLACRILEX 4 MG: 4 GUM, CHEWING ORAL at 22:14

## 2022-04-27 RX ADMIN — NICOTINE POLACRILEX 4 MG: 4 GUM, CHEWING ORAL at 17:22

## 2022-04-27 RX ADMIN — GABAPENTIN 400 MG: 400 CAPSULE ORAL at 22:05

## 2022-04-27 RX ADMIN — NICOTINE POLACRILEX 4 MG: 4 GUM, CHEWING ORAL at 08:22

## 2022-04-27 RX ADMIN — CLOZAPINE 50 MG: 25 TABLET ORAL at 22:05

## 2022-04-27 RX ADMIN — NICOTINE POLACRILEX 4 MG: 4 GUM, CHEWING ORAL at 20:42

## 2022-04-27 RX ADMIN — HYDROXYZINE HYDROCHLORIDE 25 MG: 25 TABLET ORAL at 20:41

## 2022-04-27 RX ADMIN — GLYCOPYRROLATE 1 MG: 1 TABLET ORAL at 08:21

## 2022-04-27 RX ADMIN — GLYCOPYRROLATE 1 MG: 1 TABLET ORAL at 22:13

## 2022-04-27 RX ADMIN — NICOTINE POLACRILEX 4 MG: 4 GUM, CHEWING ORAL at 15:55

## 2022-04-27 RX ADMIN — DOCUSATE SODIUM 100 MG: 100 CAPSULE, LIQUID FILLED ORAL at 08:21

## 2022-04-27 RX ADMIN — TRAZODONE HYDROCHLORIDE 50 MG: 50 TABLET ORAL at 22:13

## 2022-04-27 RX ADMIN — VENLAFAXINE HYDROCHLORIDE 225 MG: 150 CAPSULE, EXTENDED RELEASE ORAL at 08:21

## 2022-04-27 RX ADMIN — GABAPENTIN 400 MG: 400 CAPSULE ORAL at 13:08

## 2022-04-27 RX ADMIN — GABAPENTIN 400 MG: 400 CAPSULE ORAL at 08:21

## 2022-04-27 RX ADMIN — DOCUSATE SODIUM 100 MG: 100 CAPSULE, LIQUID FILLED ORAL at 22:05

## 2022-04-27 RX ADMIN — PRAZOSIN HYDROCHLORIDE 4 MG: 1 CAPSULE ORAL at 22:05

## 2022-04-27 RX ADMIN — LEVOTHYROXINE SODIUM 112 MCG: 112 TABLET ORAL at 08:21

## 2022-04-27 RX ADMIN — BENZTROPINE MESYLATE 1 MG: 1 TABLET ORAL at 22:13

## 2022-04-27 RX ADMIN — NICOTINE POLACRILEX 4 MG: 4 GUM, CHEWING ORAL at 11:26

## 2022-04-27 RX ADMIN — BENZTROPINE MESYLATE 1 MG: 1 TABLET ORAL at 08:21

## 2022-04-27 ASSESSMENT — ACTIVITIES OF DAILY LIVING (ADL)
HYGIENE/GROOMING: INDEPENDENT
ORAL_HYGIENE: INDEPENDENT
LAUNDRY: WITH SUPERVISION
DRESS: SCRUBS (BEHAVIORAL HEALTH)
DRESS: INDEPENDENT
LAUNDRY: WITH SUPERVISION
ORAL_HYGIENE: INDEPENDENT
HYGIENE/GROOMING: INDEPENDENT

## 2022-04-27 NOTE — PROGRESS NOTES
04/27/22 0915   Engagement   Intervention Group   Topic Detail OT: Education on physical and cognitive wellness with interactive social activities (Exercise dice and Name 5) to increase concentration, focus, attention to task/detail, memory recall, coping with stress, health distraction engagement, social wellness, physical wellness, and cognitive wellness   Attendance Did not attend

## 2022-04-27 NOTE — PROGRESS NOTES
04/27/22 1315   Engagement   Intervention Group   Topic Detail OT: Education on project planning with creative hands on endeavor (wall hangings) to increase concentration, focus, attention to task/detail, planning, problem solving, creative expression, symptom management, coping with stress, healthy leisure engagement, healthy distraction engagement, and social engagement   Attendance Did not attend   Reason for Not Attending Refused     Did not attend after face to face invite.

## 2022-04-27 NOTE — PLAN OF CARE
"   04/27/22 1100   Therapy Assessment   Patient Presentation Per H&P: \"This is a 31 year old male with history of schizoaffective disorder, PTSD and prior hospitalizations to include past hospitalization at this facility in December 2021 and more recent hospitalization at [facility] from 3/15 through 3/18/2022 of similar presentation.  Now, presenting with signs and symptoms of decompensation of command out of hallucination, chronic with statements to kill self.  Occurring in the setting of self discontinuation of medications of at least 4 days time as well as behaviors of cheeking.  Admit is voluntary.\" Care plan initiated for pt who admitted on 4/25/22. Patient may benefit from group and independent exploration of coping skills and leisure interests for symptom and stress management, exploration of and practice using relapse prevention strategies, and opportunities for: Self-care skills, social interaction skills, self-management skills and ADL/work/leisure/processing skills training. Patient will continue to be invited to groups and team will encourage individual coping skill exploration.   Clinical Impression   Beh OT Plan for Next Session Engage in 1 OT group activity this review period to support recovery.     "

## 2022-04-27 NOTE — PLAN OF CARE
Assessment/Intervention/Current Symtoms and Care Coordination  Writer met with patient and consulted with psychiatrist. The patient seems agreeable to staying in the hospital for medication adjustments. Pt signed ROIs for his group home, psychiatrist in the community and . Writer observed the patient out of his room more today. No further questions/concerns at this time.    Discharge Plan or Goal  Return to group home with outpatient supports    Barriers to Discharge   Symptom Stabilization, Medication Management, Care Coordination    Referral Status  Patient has community providers    Legal Status  Voluntary    TIM [facility], 4/27/2022, 12:08 PM

## 2022-04-27 NOTE — PROGRESS NOTES
"PSYCHIATRY  PROGRESS NOTE     DATE OF SERVICE   04/27/2022       CHIEF COMPLAINT   \"Monday is a Adventism holiday.\"       SUBJECTIVE   Nursing reports patient is pleasant and calm.  Anxiety depression rated at 3.  Denies suicide and homicidal ideation, endorses command auditory hallucination to kill self.  Monitor for safety.  Medication compliant.  No behaviors.     intake in progress.    Pharmacist coordinated as being titration.  PTA dose of clozapine 500 mg at bedtime.  Since dose titration of clozapine, increased frequency of Invega Sustenna performed on every 28 days to every 21 days in March 2022.  Consideration to modify target dose of clozapine from 500 mg to a lower dosage to minimize daytime sedation.  Current titration schedule is as follows:    mg of Clozapine   4/26/2022 25   4/27/2022 50   4/28/2022 100   4/29/2022 150   4/30/2022 200   5/1/2022 250   5/2/2022 300   5/3/2022 350   5/4/2022 400   5/5/2022 450   5/6/2022 500          OBJECTIVE   Upon patient interview, patient interview performed on unit 4500 directly.  Patient somnolent, but arousable on approach.  Evaluation effort to review intake assessment and associated treatment plan in detail.    Patient admitted from [facility] after presentation on 4/24.  Brought in from the group home due to symptoms of psychosis to include voices to kill self.  Associated statements of suicidal ideation, without plan.  Presentation similar to prior hospitalization.  Reportedly, patient had not been taking medications for at least 4 days prior to presentation to include clozapine 500 mg at bedtime.  Not taking medication due to sedation.  Clasping titrated during last hospitalization at this facility in December 2021.  Since last hospitalization, repeat hospitalization at outside facility with increased frequency of Invega Sustenna to further target symptoms without titrating Clozapine due to issues of sedation.    Treatment plan involved " restart of PTA medications.  Required restarting clozapine by titration as per pharmacist schedule.  Target dose set at 500 mg, however, will monitor for indication to reset target dose secondary to new schedule of Invega Sustenna from every 28 days to every 21 days.  For acute stabilization, oral neuroleptic of STEVENSON provided for approximately 1 week's time.  Additional changes include discontinuation of fluoxetine as documented in PTA medications and intentions to titrate venlafaxine, given past titration at this facility.    Today, patient reports adherence to treatment plan as discussed in detail to include efforts to restart clozapine by titration.  Patient aware of titration schedule.  Request effort to complete titration schedule from the community, once indicated.  Specifically, effort to complete titration schedule as of Monday, secondary to Sabianism holiday on same date.  Patient hopeful to discharge back to group home in order to celebrate.    Validated patient concerns of Sabianist tradition.  At same time, needing to coordinate cares with a group home and monitor clinical response to medication management.  Patient provides understanding.    Medication compliant.  Tolerating medication management as coordinated in detail.  Continues to present with daytime somnolence, despite dosing of clozapine.  Presents with low mood and anxiety.  Negative symptoms of withdrawn behavior and ambivalence.  Report of out of hallucination, chronic, command content to kill self.  Denies suicide and homicide ideation.  Specifically, no plan or intent to hurt self.  Crisis and relapse plan reviewed in detail.     continues to follow.  Effort to review ability to return back to group home by or on Monday as per patient request.    Otherwise, offers no further questions, concerns and/or expectations.       MEDICATIONS   Medications:  Scheduled Meds:    benztropine  1 mg Oral BID     cloZAPine  50 mg Oral At Bedtime  "   Followed by     [START ON 4/28/2022] cloZAPine  100 mg Oral At Bedtime    Followed by     [START ON 4/29/2022] cloZAPine  150 mg Oral At Bedtime    Followed by     [START ON 4/30/2022] cloZAPine  200 mg Oral At Bedtime    Followed by     [START ON 5/1/2022] cloZAPine  250 mg Oral At Bedtime    Followed by     [START ON 5/2/2022] cloZAPine  300 mg Oral At Bedtime    Followed by     [START ON 5/3/2022] cloZAPine  350 mg Oral At Bedtime    Followed by     [START ON 5/4/2022] cloZAPine  400 mg Oral At Bedtime    Followed by     [START ON 5/5/2022] cloZAPine  450 mg Oral At Bedtime    Followed by     [START ON 5/6/2022] cloZAPine  500 mg Oral At Bedtime     docusate sodium  100 mg Oral BID     gabapentin  400 mg Oral TID     glycopyrrolate  1 mg Oral TID     levothyroxine  112 mcg Oral Daily     [START ON 4/28/2022] paliperidone  234 mg Intramuscular Q21 Days     paliperidone  6 mg Oral Daily     pantoprazole  40 mg Oral Daily     prazosin  4 mg Oral At Bedtime     venlafaxine  225 mg Oral Daily     Vitamin D3  125 mcg Oral Daily     Continuous Infusions:  PRN Meds:.acetaminophen, alum & mag hydroxide-simethicone, hydrOXYzine, nicotine polacrilex, OLANZapine **OR** OLANZapine, polyethylene glycol, senna-docusate, traZODone    Medication adherence issues: MS Med Adherence Y/N: Yes, Other  Medication side effects: MEDICATION SIDE EFFECTS: no side effects reported  Benefit: Yes / No: Yes       ROS   A comprehensive review of systems was negative.       MENTAL STATUS EXAM   Vitals: /82   Pulse 90   Temp 98.9  F (37.2  C) (Oral)   Resp 17   SpO2 97%     Appearance:  Cooperative  Mood:  Mood: \"Better\"  Affect: blunted  was congruent to speech  Suicidal Ideation: PRESENT / ABSENT: absent   Homicidal Ideation: PRESENT / ABSENT: absent   Thought process: [place]   Thought content: denies suicidal and violent ideation.   Fund of Knowledge: Sufficient  Attention/Concentration: Poor  Language ability:  Intact  Memory: " Sufficient  Insight:  fair.  Judgement: adequate for safety  Orientation: Yes, x4  Psychomotor Behavior: slowed    Muscle Strength and Tone: MuscleStrength: Normal  Gait and Station: In bed       LABS   personally reviewed.     No results found for: PHENYTOIN, PHENOBARB, VALPROATE, CBMZ       DIAGNOSIS   Principal Problem:    Schizoaffective disorder, depressive type (H)    Active Problem List:  Patient Active Problem List   Diagnosis     Depression     Suicidal ideation     Schizoaffective disorder, bipolar type (H)     Schizoaffective disorder, depressive type (H)     Schizoaffective disorder (H)     Suicidal behavior     Suicidal ideations     PTSD (post-traumatic stress disorder)     Agitation     History of schizophrenia     Chronic post-traumatic stress disorder (PTSD)     Hallucinations     Alcohol abuse, episodic     Borderline personality disorder (H)     Cannabis dependence in remission (H)     Cannabis use disorder, moderate, in sustained remission (H)     GERD (gastroesophageal reflux disease)     High risk medication use     Hypothyroidism     Noncompliance     PPD positive, treated     TB lung, latent     Major depression     Mood change     Nicotine use disorder        PLAN   1. Ongoing education given regarding diagnostic and treatment options with risks, benefits and alternatives and adequate verbalization of understanding.  2. Admitted voluntarily.    4/26: Agreed to stay voluntarily to coordinate cares and medication management.    Precautions in place.  3. Medications: 4/25/2022: PTA medications reviewed.    Clozapine: 4/26: Restarted PTA medication through pharmacy consult toward PTA dose of 500 mg at bedtime with indication of psychosis.  Needing reach titration due to noncompliance of at least 4 days time.  May require lower maintenance dose secondary to more recent medication change of increased frequency of STEVENSON.  4/27: Given increase frequency of STEVENSON, consider a lower target dose and PTA  dose of 500 mg at bedtime in an effort to minimize side effect of daytime sedation.    Cogentin: Continued PTA medication for side effect management.    Invega Sustenna: Continued PTA long-acting injectable every 21 days or mood, psychosis and treatment nonadherence.  Therapy frequency increased at last hospitalization in March 2022.    Gabapentin: Continued PTA medication with dose titration for augmentation.    Effexor XR: Continued PTA medication for depression and anxiety.  As last hospitalization at this facility in December 2021, dose titrated to 300 mg at bedtime.  Anticipate future titration to reconcile dosage.    Prazosin: Continued PTA medication for trauma related insomnia, with parameters.    Fluoxetine: Discontinued PTA medication in anticipation of future titration of venlafaxine.  4. Medications:  Hospital    Invega: 4/26: Performed time-limited addition of oral neuroleptic to STEVENSON during titration of clozapine for acute symptom management.  5. Consultations:    Hospitalist to follow as needed.    Pharmacist: 4/27: Consult reviewed for clozapine titration.  6. Structure and Supervision    Unit 4500.    Barriers to Discharge: Psychosis  7.   is following in regards to collecting and reviewing collateral information, referrals and disposition planning.    Legal: Voluntary    Referrals: TBD    Care Coordination: , group home    Placement: Group home    Anticipated Discharge:  Next week to correlate with clozapine titration.  Alternatively, review efforts to discharge on Monday as patient request for Zoroastrianism holiday.  8. Further treatment programming to be determined throughout the hospital course.        Risk Assessment: IP MHAC RISK ASSESSMENT: Patient on precautions    Coordination of Care:   Treatment Plan reviewed and physician signed, Care discussed with Care/Treatment Team Members, Chart reviewed and Patient seen      Re-Certification I certify that the inpatient  psychiatric facility services furnished since the previous certification were, and continue to be, medically necessary for, either, treatment which could reasonably be expected to improve the patient s condition or diagnostic study and that the hospital records indicate that the services furnished were, either, intensive treatment services, admission and related services necessary for diagnostic study, or equivalent services.     I certify that the patient continues to need, on a daily basis, active treatment furnished directly by or requiring the supervision of inpatient psychiatric facility personnel.   I estimate 5-7 days of hospitalization is necessary for proper treatment of the patient. My plans for post-hospital care for this patient are  half-way     Caio Camacho MD    -     04/27/2022  -     3:22 PM    Total time  35 minutes with > 50%spent on coordination of cares and psycho-education.    Nicotine cessation education: 3 minutes.  Education provided regarding nicotine cessation and medication management.  Medication management provided to include nicotine gum and patch for efforts of sobriety.  Further discussion of attending group programming for stress management.  Discussed efforts of sobriety.    This note was created with help of Dragon dictation system. Grammatical / typing errors are not intentional.    Caio Camacho MD

## 2022-04-27 NOTE — PROGRESS NOTES
Pt slept through the shift without any problems, checked patient very 15 minutes, he remained safe. Will continue to monitor patient.

## 2022-04-27 NOTE — PLAN OF CARE
Goal Outcome Evaluation:    Plan of Care Reviewed With: patient        Problem: Behavioral Health Plan of Care  Goal: Adheres to Safety Considerations for Self and Others  Outcome: Ongoing, Progressing  Intervention: Develop and Maintain Individualized Safety Plan  Recent Flowsheet Documentation  Taken 4/27/2022 1000 by Yajaira Patel RN  Safety Measures: safety rounds completed     Problem: Behavioral Health Plan of Care  Goal: Optimized Coping Skills in Response to Life Stressors  Outcome: Ongoing, Progressing        Patient pleasant and calm. Isolative in his room most of the time. Comes out for meals. Reported anxiety and depression at 3. Denied SI/HI but endorsed command auditory hallucination to kill himself. Patient contracted for safety. He was compliant with his medications. No behavior issues noted. Avoids social interactions and did not attend group activity.

## 2022-04-28 PROCEDURE — 250N000013 HC RX MED GY IP 250 OP 250 PS 637: Performed by: PSYCHIATRY & NEUROLOGY

## 2022-04-28 PROCEDURE — 128N000001 HC R&B CD/MH ADULT

## 2022-04-28 PROCEDURE — 99233 SBSQ HOSP IP/OBS HIGH 50: CPT | Performed by: PSYCHIATRY & NEUROLOGY

## 2022-04-28 RX ADMIN — NICOTINE POLACRILEX 4 MG: 4 GUM, CHEWING ORAL at 12:38

## 2022-04-28 RX ADMIN — NICOTINE POLACRILEX 4 MG: 4 GUM, CHEWING ORAL at 08:26

## 2022-04-28 RX ADMIN — BENZTROPINE MESYLATE 1 MG: 1 TABLET ORAL at 20:59

## 2022-04-28 RX ADMIN — CHOLECALCIFEROL TAB 125 MCG (5000 UNIT) 125 MCG: 125 TAB at 08:25

## 2022-04-28 RX ADMIN — NICOTINE POLACRILEX 4 MG: 4 GUM, CHEWING ORAL at 17:39

## 2022-04-28 RX ADMIN — NICOTINE POLACRILEX 4 MG: 4 GUM, CHEWING ORAL at 13:38

## 2022-04-28 RX ADMIN — DOCUSATE SODIUM 100 MG: 100 CAPSULE, LIQUID FILLED ORAL at 08:27

## 2022-04-28 RX ADMIN — BENZTROPINE MESYLATE 1 MG: 1 TABLET ORAL at 08:27

## 2022-04-28 RX ADMIN — GABAPENTIN 400 MG: 400 CAPSULE ORAL at 13:38

## 2022-04-28 RX ADMIN — PRAZOSIN HYDROCHLORIDE 4 MG: 1 CAPSULE ORAL at 20:58

## 2022-04-28 RX ADMIN — CLOZAPINE 100 MG: 100 TABLET ORAL at 20:59

## 2022-04-28 RX ADMIN — GLYCOPYRROLATE 1 MG: 1 TABLET ORAL at 13:38

## 2022-04-28 RX ADMIN — GLYCOPYRROLATE 1 MG: 1 TABLET ORAL at 21:04

## 2022-04-28 RX ADMIN — HYDROXYZINE HYDROCHLORIDE 25 MG: 25 TABLET ORAL at 17:39

## 2022-04-28 RX ADMIN — NICOTINE POLACRILEX 4 MG: 4 GUM, CHEWING ORAL at 15:12

## 2022-04-28 RX ADMIN — GABAPENTIN 400 MG: 400 CAPSULE ORAL at 20:58

## 2022-04-28 RX ADMIN — LEVOTHYROXINE SODIUM 112 MCG: 112 TABLET ORAL at 07:54

## 2022-04-28 RX ADMIN — PANTOPRAZOLE SODIUM 40 MG: 40 TABLET, DELAYED RELEASE ORAL at 08:27

## 2022-04-28 RX ADMIN — OLANZAPINE 10 MG: 10 TABLET, FILM COATED ORAL at 15:12

## 2022-04-28 RX ADMIN — DOCUSATE SODIUM 100 MG: 100 CAPSULE, LIQUID FILLED ORAL at 20:59

## 2022-04-28 RX ADMIN — GABAPENTIN 400 MG: 400 CAPSULE ORAL at 08:27

## 2022-04-28 RX ADMIN — NICOTINE POLACRILEX 4 MG: 4 GUM, CHEWING ORAL at 21:00

## 2022-04-28 RX ADMIN — VENLAFAXINE HYDROCHLORIDE 225 MG: 150 CAPSULE, EXTENDED RELEASE ORAL at 08:26

## 2022-04-28 RX ADMIN — PALIPERIDONE 6 MG: 6 TABLET, EXTENDED RELEASE ORAL at 08:26

## 2022-04-28 ASSESSMENT — ACTIVITIES OF DAILY LIVING (ADL)
ORAL_HYGIENE: INDEPENDENT
LAUNDRY: WITH SUPERVISION
HYGIENE/GROOMING: INDEPENDENT
DRESS: INDEPENDENT

## 2022-04-28 NOTE — PROGRESS NOTES
"PSYCHIATRY  PROGRESS NOTE     DATE OF SERVICE   04/28/2022       CHIEF COMPLAINT   \"What is my injection?\"       SUBJECTIVE   Nursing reports patient complained of voices.  Given as needed Zyprexa as per patient request.     reports efforts of coordinating follow-up appointments.  Message left with the community: Psychiatrist,  and group home.  No return calls.  Patient isolating to her room.    Patient discussed in detail in treatment team.       OBJECTIVE   Upon patient interview, patient review performed on unit 4500 directly.  Patient isolating to her room.  Evaluation effort to review significant events since last visit.    At last visit, patient seen for post intake assessment.  Effort to review events of admission and treatment plan.  Agreeing to treatment plan of restarting PTA clozapine and Invega Sustenna along with oral neuroleptic for acute stabilization.  Intentions to restart PTA clozapine toward PTA dosage, but may not require full doses secondary to recent dosing adjustments with STEVENSON.  Patient requested opportunity to discharge to group home on or by Monday, secondary to holiday.    Today, patient requests update of treatment.  Informed patient of injection scheduled on today's date.  In combination with injection, or neuroleptic to continue during titration of clozapine after reintroduction required secondary to noncompliance with medication prior to admission.  Discussed monitoring dosing schedule and may not require the need to target 500 mg at bedtime, secondary to sedation issues.  Patient is in agreement.    Medication compliant.  Progressing with mood and anxiety.  Ongoing negative symptoms of apathy and ambivalence with care is provided in the community.  Continued report of voices, chronic, command content to kill self.  No statements of suicide or homicide ideation with plan or intent.  Contract for safety.  Dysphoric mood, without anxiety.    Denies physical " issues that are new or worsening.  Encourage patient to engage with programming as offered in the milieu.  Monitoring ADL impairment of increased weight, decreased energy.  Intact appetite.     attempting to coordinate cares with community supports without success.  Effort to verify patient's ability to return to group home and to continue stabilization in the community with follow-up.  Discussed effort with patient and needing to complete prior to disposition.    Otherwise, offers no further questions, concerns and/or expectations.       MEDICATIONS   Medications:  Scheduled Meds:    benztropine  1 mg Oral BID     cloZAPine  100 mg Oral At Bedtime    Followed by     [START ON 4/29/2022] cloZAPine  150 mg Oral At Bedtime    Followed by     [START ON 4/30/2022] cloZAPine  200 mg Oral At Bedtime    Followed by     [START ON 5/1/2022] cloZAPine  250 mg Oral At Bedtime    Followed by     [START ON 5/2/2022] cloZAPine  300 mg Oral At Bedtime    Followed by     [START ON 5/3/2022] cloZAPine  350 mg Oral At Bedtime    Followed by     [START ON 5/4/2022] cloZAPine  400 mg Oral At Bedtime    Followed by     [START ON 5/5/2022] cloZAPine  450 mg Oral At Bedtime    Followed by     [START ON 5/6/2022] cloZAPine  500 mg Oral At Bedtime     docusate sodium  100 mg Oral BID     gabapentin  400 mg Oral TID     glycopyrrolate  1 mg Oral TID     levothyroxine  112 mcg Oral Daily     paliperidone  234 mg Intramuscular Q21 Days     paliperidone  6 mg Oral Daily     pantoprazole  40 mg Oral Daily     prazosin  4 mg Oral At Bedtime     venlafaxine  225 mg Oral Daily     Vitamin D3  125 mcg Oral Daily     Continuous Infusions:  PRN Meds:.acetaminophen, alum & mag hydroxide-simethicone, hydrOXYzine, nicotine polacrilex, OLANZapine **OR** OLANZapine, polyethylene glycol, senna-docusate, traZODone    Medication adherence issues: MS Med Adherence Y/N: Yes, Other  Medication side effects: MEDICATION SIDE EFFECTS: no side effects  "reported  Benefit: Yes / No: Yes       ROS   A comprehensive review of systems was negative.       MENTAL STATUS EXAM   Vitals: /74 (BP Location: Left arm, Patient Position: Sitting, Cuff Size: Adult Large)   Pulse 100   Temp 98.3  F (36.8  C) (Oral)   Resp 19   SpO2 99%     Appearance:  Cooperative  Mood:  Mood: \"Better\"  Affect: blunted  was congruent to speech  Suicidal Ideation: PRESENT / ABSENT: absent   Homicidal Ideation: PRESENT / ABSENT: absent   Thought process: [place]   Thought content: denies suicidal and violent ideation.   Fund of Knowledge: Sufficient  Attention/Concentration: Poor  Language ability:  Intact  Memory: Sufficient  Insight:  fair.  Judgement: adequate for safety  Orientation: Yes, x4  Psychomotor Behavior: slowed    Muscle Strength and Tone: MuscleStrength: Normal  Gait and Station: In bed       LABS   personally reviewed.     No results found for: PHENYTOIN, PHENOBARB, VALPROATE, CBMZ       DIAGNOSIS   Principal Problem:    Schizoaffective disorder, depressive type (H)    Active Problem List:  Patient Active Problem List   Diagnosis     Depression     Suicidal ideation     Schizoaffective disorder, bipolar type (H)     Schizoaffective disorder, depressive type (H)     Schizoaffective disorder (H)     Suicidal behavior     Suicidal ideations     PTSD (post-traumatic stress disorder)     Agitation     History of schizophrenia     Chronic post-traumatic stress disorder (PTSD)     Hallucinations     Alcohol abuse, episodic     Borderline personality disorder (H)     Cannabis dependence in remission (H)     Cannabis use disorder, moderate, in sustained remission (H)     GERD (gastroesophageal reflux disease)     High risk medication use     Hypothyroidism     Noncompliance     PPD positive, treated     TB lung, latent     Major depression     Mood change     Nicotine use disorder        PLAN   1. Ongoing education given regarding diagnostic and treatment options with risks, " benefits and alternatives and adequate verbalization of understanding.  2. Admitted voluntarily.    4/26: Agreed to stay voluntarily to coordinate cares and medication management.    Precautions in place.  3. Medications: 4/25/2022: PTA medications reviewed.    Clozapine: 4/26: Restarted PTA medication through pharmacy consult toward PTA dose of 500 mg at bedtime with indication of psychosis.  Needing reach titration due to noncompliance of at least 4 days time.  May require lower maintenance dose secondary to more recent medication change of increased frequency of STEVENSON.  4/27: Given increase frequency of STEVENSON, consider a lower target dose and PTA dose of 500 mg at bedtime in an effort to minimize side effect of daytime sedation.    Cogentin: Continued PTA medication for side effect management.    Invega Sustenna: Continued PTA long-acting injectable every 21 days or mood, psychosis and treatment nonadherence.  Therapy frequency increased at last hospitalization in March 2022.    Gabapentin: Continued PTA medication with dose titration for augmentation.    Effexor XR: Continued PTA medication for depression and anxiety.  As last hospitalization at this facility in December 2021, dose titrated to 300 mg at bedtime.  Anticipate future titration to reconcile dosage.    Prazosin: Continued PTA medication for trauma related insomnia, with parameters.    Fluoxetine: Discontinued PTA medication in anticipation of future titration of venlafaxine.  4. Medications:  Hospital    Invega: 4/26: Performed time-limited addition of oral neuroleptic to STEVENSON during titration of clozapine for acute symptom management.  4/28: Date of every 21-day maintenance injection.  5. Consultations:    Hospitalist to follow as needed.    Pharmacist: 4/27: Consult reviewed for clozapine titration.  6. Structure and Supervision    Unit 4500.    Barriers to Discharge: Psychosis  7.   is following in regards to collecting and reviewing  collateral information, referrals and disposition planning.    Legal: Voluntary    Referrals: TBD    Care Coordination: , group home, psychiatrist    Placement: Group home    Anticipated Discharge:  Next week to correlate with clozapine titration.  Alternatively, review efforts to discharge on Monday as patient request for Muslim holiday.  8. Further treatment programming to be determined throughout the hospital course.        Risk Assessment:  MHAC RISK ASSESSMENT: Patient on precautions    Coordination of Care:   Treatment Plan reviewed and physician signed, Care discussed with Care/Treatment Team Members, Chart reviewed and Patient seen      Re-Certification I certify that the inpatient psychiatric facility services furnished since the previous certification were, and continue to be, medically necessary for, either, treatment which could reasonably be expected to improve the patient s condition or diagnostic study and that the hospital records indicate that the services furnished were, either, intensive treatment services, admission and related services necessary for diagnostic study, or equivalent services.     I certify that the patient continues to need, on a daily basis, active treatment furnished directly by or requiring the supervision of inpatient psychiatric facility personnel.   I estimate 5-7 days of hospitalization is necessary for proper treatment of the patient. My plans for post-hospital care for this patient are  USP     Caio Camacho MD    -     04/28/2022  -     11:35 AM    Total time  35 minutes with > 50%spent on coordination of cares and psycho-education.        This note was created with help of Dragon dictation system. Grammatical / typing errors are not intentional.    Caio Camacho MD

## 2022-04-28 NOTE — PLAN OF CARE
04/28/22 1011   Individualization/Patient Specific Goals   Patient Personal Strengths insight into illness/situation;community support;resilient;stable living environment   Patient Vulnerabilities adverse childhood experience(s)   Patient-Specific Goals (Include Timeframe) Medication Management and return to group home approx 5-10 days   Interprofessional Rounds   Participants CTC;psychiatrist;OT;nursing;pharmacy   Team Discussion   Participants Guillermo Cox, Rosangela Schmitz, Kady FORRESTER   Progress progressing   Anticipated length of stay 5-10 days   Continued Stay Criteria/Rationale Symptom Stabilization, Medication Management, Care coordination   Medical/Physical No significant events   Plan Medication Management and return to group home approx 5-10 days   Anticipated Discharge Disposition group home   PRECAUTIONS AND SAFETY    Behavioral Orders   Procedures    Cheeking Precautions (behavioral units)    Code 1 - Restrict to Unit    Routine Programming     As clinically indicated    Status 15     Every 15 minutes.    Suicide precautions     Patients on Suicide Precautions should have a Combination Diet ordered that includes a Diet selection(s) AND a Behavioral Tray selection for Safe Tray - with utensils, or Safe Tray - NO utensils         Safety  Safety WDL: WDL  Patient Location: patient room, other  Observed Behavior: calm  Observed Behavior (Comment): sleeping  Safety Measures: safety rounds completed  Diversional Activity: television  Suicidality: Status 15  Assault: status 15

## 2022-04-28 NOTE — DISCHARGE INSTRUCTIONS
Appointment Type: Psychiatry  Provider: Dr. Marco Campo  Date & Time: May 25, 2022 at 10:30am  Clinic:[facility]  [address] [place] [address]   Phone: [phone]  FAX: [phone]  Note: This is a phone appointment.    Your  is Tenisha  Agency: [facility] [address] [id number] [phone]    Your group home is located at:  [facility]  [address] [place] [id number] [phone]

## 2022-04-28 NOTE — PROGRESS NOTES
04/28/22 1400   Engagement   Intervention Group   Topic Detail OT: Creative expressions (wall hangings day 2) to increase concentration, focus, attention to task/detail, planning, problem solving, creative expression, symptom management, coping with stress, healthy leisure engagement, healthy distraction engagement, and social engagement   Attendance Attended   Patient Response Demonstrated understanding of materials provided;Accepted feedback   Concentrated on Task 15 - 30 min   Cognition Distractible   Social/Behavioral Cooperative   Thought Content [place]   Goals addressed in session today Pt initiated activity, though only participated for ~15 minutes before stating he needed to get nicotine. Did return towards end of group but left after writer informed him no song requests today.

## 2022-04-28 NOTE — PROGRESS NOTES
Zyprexa prn given per pt request at 1512. Pt denies hallucinations, requests for anxiety.    Paloma Marie RN

## 2022-04-28 NOTE — PLAN OF CARE
Goal Outcome Evaluation: Encourage groups.      Problem: Behavioral Health Plan of Care  Goal: Adheres to Safety Considerations for Self and Others  Outcome: Ongoing, Progressing     Problem: Behavioral Health Plan of Care  Goal: Absence of New-Onset Illness or Injury  Outcome: Ongoing, Progressing       Pt is mostly isolative at this time. Affect is pleasant, polite.    Room checks/sweeps performed per unit routine.    Paloma Marie RN

## 2022-04-28 NOTE — PROGRESS NOTES
Pt is pleasant and cooperative. Mostly in his room between meals. Denies symptoms. Received Invega injection today. Pt denies SI.    Paloma Marie RN

## 2022-04-28 NOTE — PLAN OF CARE
Assessment/Intervention/Current Symtoms and Care Coordination  Writer is working on followup appointments with providers in the community. Left messages with providers in the community: psychiatrist,  and group home. No return phone calls as of this afternoon. Per Chart:  Pt slept greater than 6.5 hours last night. Pt attend OT and participated for approximately 15 minutes. Patient pleasant and calm. Isolative in his room most of the time. Comes out for meals.    Discharge Plan or Goal  Return to group home with outpatient supports     Barriers to Discharge   Symptom Stabilization, Medication Management, Care Coordination     Referral Status  Patient has community providers     Legal Status  Voluntary

## 2022-04-28 NOTE — PLAN OF CARE
Problem: Suicide Risk  Goal: Absence of Self-Harm  Outcome: Ongoing, Progressing     Pt voices no c/o pain. As of 0600, pt has slept greater than 6.5 hours this shift. Safety checks completed q 15 minutes per unit policy. Pt on cheeking and suicide precautions, no concerns noted.

## 2022-04-29 PROBLEM — R45.1 AGITATION: Status: RESOLVED | Noted: 2021-08-10 | Resolved: 2022-04-29

## 2022-04-29 PROBLEM — R45.851 SUICIDAL IDEATIONS: Status: RESOLVED | Noted: 2019-08-29 | Resolved: 2022-04-29

## 2022-04-29 PROBLEM — R45.851 SUICIDAL IDEATION: Status: RESOLVED | Noted: 2017-09-14 | Resolved: 2022-04-29

## 2022-04-29 PROCEDURE — 250N000013 HC RX MED GY IP 250 OP 250 PS 637: Performed by: PSYCHIATRY & NEUROLOGY

## 2022-04-29 PROCEDURE — 99233 SBSQ HOSP IP/OBS HIGH 50: CPT | Performed by: PSYCHIATRY & NEUROLOGY

## 2022-04-29 PROCEDURE — 128N000001 HC R&B CD/MH ADULT

## 2022-04-29 RX ORDER — PALIPERIDONE 6 MG/1
6 TABLET, EXTENDED RELEASE ORAL DAILY
Status: COMPLETED | OUTPATIENT
Start: 2022-04-30 | End: 2022-05-02

## 2022-04-29 RX ORDER — CLOZAPINE 100 MG/1
400 TABLET ORAL AT BEDTIME
Status: DISCONTINUED | OUTPATIENT
Start: 2022-05-04 | End: 2022-05-02 | Stop reason: HOSPADM

## 2022-04-29 RX ORDER — CLOZAPINE 100 MG/1
300 TABLET ORAL AT BEDTIME
Status: DISCONTINUED | OUTPATIENT
Start: 2022-05-02 | End: 2022-05-02 | Stop reason: HOSPADM

## 2022-04-29 RX ORDER — CLOZAPINE 100 MG/1
200 TABLET ORAL AT BEDTIME
Status: COMPLETED | OUTPATIENT
Start: 2022-04-30 | End: 2022-04-30

## 2022-04-29 RX ADMIN — GABAPENTIN 400 MG: 400 CAPSULE ORAL at 08:22

## 2022-04-29 RX ADMIN — NICOTINE POLACRILEX 4 MG: 4 GUM, CHEWING ORAL at 20:49

## 2022-04-29 RX ADMIN — NICOTINE POLACRILEX 4 MG: 4 GUM, CHEWING ORAL at 10:06

## 2022-04-29 RX ADMIN — PALIPERIDONE 6 MG: 6 TABLET, EXTENDED RELEASE ORAL at 08:22

## 2022-04-29 RX ADMIN — CHOLECALCIFEROL TAB 125 MCG (5000 UNIT) 125 MCG: 125 TAB at 08:22

## 2022-04-29 RX ADMIN — DOCUSATE SODIUM 100 MG: 100 CAPSULE, LIQUID FILLED ORAL at 21:21

## 2022-04-29 RX ADMIN — GLYCOPYRROLATE 1 MG: 1 TABLET ORAL at 08:22

## 2022-04-29 RX ADMIN — PANTOPRAZOLE SODIUM 40 MG: 40 TABLET, DELAYED RELEASE ORAL at 08:22

## 2022-04-29 RX ADMIN — GABAPENTIN 400 MG: 400 CAPSULE ORAL at 13:47

## 2022-04-29 RX ADMIN — GLYCOPYRROLATE 1 MG: 1 TABLET ORAL at 21:26

## 2022-04-29 RX ADMIN — PRAZOSIN HYDROCHLORIDE 4 MG: 1 CAPSULE ORAL at 21:21

## 2022-04-29 RX ADMIN — CLOZAPINE 150 MG: 100 TABLET ORAL at 21:20

## 2022-04-29 RX ADMIN — GLYCOPYRROLATE 1 MG: 1 TABLET ORAL at 13:47

## 2022-04-29 RX ADMIN — NICOTINE POLACRILEX 4 MG: 4 GUM, CHEWING ORAL at 08:45

## 2022-04-29 RX ADMIN — NICOTINE POLACRILEX 4 MG: 4 GUM, CHEWING ORAL at 17:45

## 2022-04-29 RX ADMIN — GABAPENTIN 400 MG: 400 CAPSULE ORAL at 21:22

## 2022-04-29 RX ADMIN — NICOTINE POLACRILEX 4 MG: 4 GUM, CHEWING ORAL at 12:45

## 2022-04-29 RX ADMIN — NICOTINE POLACRILEX 4 MG: 4 GUM, CHEWING ORAL at 19:01

## 2022-04-29 RX ADMIN — BENZTROPINE MESYLATE 1 MG: 1 TABLET ORAL at 08:22

## 2022-04-29 RX ADMIN — LEVOTHYROXINE SODIUM 112 MCG: 112 TABLET ORAL at 08:22

## 2022-04-29 RX ADMIN — BENZTROPINE MESYLATE 1 MG: 1 TABLET ORAL at 21:20

## 2022-04-29 RX ADMIN — VENLAFAXINE HYDROCHLORIDE 225 MG: 150 CAPSULE, EXTENDED RELEASE ORAL at 08:22

## 2022-04-29 RX ADMIN — NICOTINE POLACRILEX 4 MG: 4 GUM, CHEWING ORAL at 16:19

## 2022-04-29 RX ADMIN — DOCUSATE SODIUM 100 MG: 100 CAPSULE, LIQUID FILLED ORAL at 08:22

## 2022-04-29 ASSESSMENT — ACTIVITIES OF DAILY LIVING (ADL)
HYGIENE/GROOMING: INDEPENDENT
DRESS: INDEPENDENT
LAUNDRY: WITH SUPERVISION
ORAL_HYGIENE: INDEPENDENT
DRESS: INDEPENDENT
LAUNDRY: WITH SUPERVISION
HYGIENE/GROOMING: INDEPENDENT
ORAL_HYGIENE: INDEPENDENT

## 2022-04-29 NOTE — PLAN OF CARE
"Assessment/Intervention/Current Symtoms and Care Coordination  Writer met with the patient and consulted with psychiatrist. The patient would like to return home on Monday to engage in group home activities for the Religion holiday. Due to coordination of care with [facility], discharge may need to be in the afternoon. Patient is receptive to this.  Writer connected with group home regarding tentative discharge for Monday 5/2. They would like discharge paperwork sent to them and to Pharmacy: [facility] in [place].  Writer confirmed the following appointment with psychiatry in the community:  Provider:Dr. Marco Campo  Date & Time: May 25, 2022 at 10:30am  Clinic:[facility]  Writer updated CM:Tenisha with [facility]. She was aware that patient had been struggling with \"not liking\" Clozaril. She wants copy of discharge paperwork faxed to her.    Discharge Plan or Goal  Return to group home with outpatient supports     Barriers to Discharge   Symptom Stabilization, Medication Management, Care Coordination     Referral Status  Patient has community providers     Contacts:  MHCM:Tenisha with [facility], [phone] FAX: [phone]  [facility] Group home: D  Legal Status  Voluntary  "

## 2022-04-29 NOTE — PROGRESS NOTES
Patient appeared to rest well throughout duration of shift, no reports of pain or discomfort, no precautionary behaviors noted or reported, no prn's adm or requested, slept approx 7 hours thus far, 15 minute safety checks performed per protocol, will continue to monitor.

## 2022-04-29 NOTE — PROGRESS NOTES
"PSYCHIATRY  PROGRESS NOTE     DATE OF SERVICE   04/29/2022       CHIEF COMPLAINT   \"What is my injection?\"       SUBJECTIVE   Nursing reports patient is calm and cooperative.  Anxiety rated at 5.  Denies depression.  Denies suicide and homicide ideation.  Received Invega sustained injection yesterday.     reports patient requesting discharge on Monday back to group home to engage with [employer] holiday.  Care is coordinated with pharmacy, group home and  of tentative discharge.       OBJECTIVE   Upon patient interview, patient interview performed on unit 4500 directly.  Patient is cooperative on approach.  Evaluation effort to review significant events since last visit.    At previous visits, did not offer questions or concerns and requested opportunity to discharge on or by Monday in an effort to attend Druze holiday.    Today, patient requesting update of efforts to proceed with disposition.  Nursing reports symptom progression with mood, anxiety and no further symptoms to include psychosis.  Denies suicide or homicide ideation.   reports group home's ability to accept patient on Monday with requests of clozapine information be sent to patient's pharmacy.  Understanding of dosing adjustments to be made.    Medication compliant.  Tolerating medication management.  Discussed efforts of minimizing side effect of daytime sedation by adjusting goal dose of clozapine.  At last hospitalization, Invega Sustenna frequency decreased from every 20 days to every 21 days, without addressing clozapine dosing.  Symptom improvement noted, but side effects remained of sedation.  Patient has history of favorable response to Invega of efficacy and tolerability.  Agrees to dose reduction of clozapine, with effort of monitoring symptoms of decompensation.  Progressing with mood and anxiety.  Denies psychosis.  Denies suicide and homicide ideation.  Crisis and relapse plan reviewed.  " Medications reviewed in detail and reconciled for anticipated discharge.  Pharmacist repeat consult placed to inform of change of clozapine dose and anticipated discharge.    Denies new or worsening physical complaints.  Improved ADLs of sleep, energy and appetite.  Encourage group programming as offered in the milieu.     coordinating cares, collecting and reviewing collateral information and assisting with disposition.  Follow-up appointments scheduled and patient able to return to group home on Monday.    Otherwise, offers no further questions, concerns and/or expectations.       MEDICATIONS   Medications:  Scheduled Meds:    benztropine  1 mg Oral BID     cloZAPine  150 mg Oral At Bedtime    Followed by     [START ON 4/30/2022] cloZAPine  200 mg Oral At Bedtime    Followed by     [START ON 5/1/2022] cloZAPine  250 mg Oral At Bedtime    Followed by     [START ON 5/2/2022] cloZAPine  300 mg Oral At Bedtime    Followed by     [START ON 5/3/2022] cloZAPine  350 mg Oral At Bedtime    Followed by     [START ON 5/4/2022] cloZAPine  400 mg Oral At Bedtime    Followed by     [START ON 5/5/2022] cloZAPine  450 mg Oral At Bedtime    Followed by     [START ON 5/6/2022] cloZAPine  500 mg Oral At Bedtime     docusate sodium  100 mg Oral BID     gabapentin  400 mg Oral TID     glycopyrrolate  1 mg Oral TID     levothyroxine  112 mcg Oral Daily     paliperidone  234 mg Intramuscular Q21 Days     paliperidone  6 mg Oral Daily     pantoprazole  40 mg Oral Daily     prazosin  4 mg Oral At Bedtime     venlafaxine  225 mg Oral Daily     Vitamin D3  125 mcg Oral Daily     Continuous Infusions:  PRN Meds:.acetaminophen, alum & mag hydroxide-simethicone, hydrOXYzine, nicotine polacrilex, OLANZapine **OR** OLANZapine, polyethylene glycol, senna-docusate, traZODone    Medication adherence issues: MS Med Adherence Y/N: Yes, Other  Medication side effects: MEDICATION SIDE EFFECTS: no side effects reported  Benefit: Yes / No:  "Yes       ROS   A comprehensive review of systems was negative.       MENTAL STATUS EXAM   Vitals: /76 (BP Location: Left arm, Patient Position: Sitting)   Pulse 86   Temp 97.9  F (36.6  C) (Oral)   Resp 16   SpO2 98%     Appearance:  Cooperative  Mood:  Mood: \"Good\"  Affect: blunted  was congruent to speech  Suicidal Ideation: PRESENT / ABSENT: absent   Homicidal Ideation: PRESENT / ABSENT: absent   Thought process: [place]   Thought content: denies suicidal and violent ideation.   Fund of Knowledge: Sufficient  Attention/Concentration: Poor  Language ability:  Intact  Memory: Sufficient  Insight:  fair.  Judgement: adequate for safety  Orientation: Yes, x4  Psychomotor Behavior: slowed    Muscle Strength and Tone: MuscleStrength: Normal  Gait and Station: Normal       LABS   personally reviewed.     No results found for: PHENYTOIN, PHENOBARB, VALPROATE, CBMZ       DIAGNOSIS   Principal Problem:    Schizoaffective disorder, depressive type (H)    Active Problem List:  Patient Active Problem List   Diagnosis     Depression     Suicidal ideation     Schizoaffective disorder, bipolar type (H)     Schizoaffective disorder, depressive type (H)     Schizoaffective disorder (H)     Suicidal behavior     Suicidal ideations     PTSD (post-traumatic stress disorder)     Agitation     History of schizophrenia     Chronic post-traumatic stress disorder (PTSD)     Hallucinations     Alcohol abuse, episodic     Borderline personality disorder (H)     Cannabis dependence in remission (H)     Cannabis use disorder, moderate, in sustained remission (H)     GERD (gastroesophageal reflux disease)     High risk medication use     Hypothyroidism     Noncompliance     PPD positive, treated     TB lung, latent     Major depression     Mood change     Nicotine use disorder        PLAN   1. Ongoing education given regarding diagnostic and treatment options with risks, benefits and alternatives and adequate verbalization of " understanding.  2. Admitted voluntarily.    4/26: Agreed to stay voluntarily to coordinate cares and medication management.    Precautions in place.  3. Medications: 4/25/2022: PTA medications reviewed.    Clozapine: 4/26: Restarted PTA medication through pharmacy consult toward PTA dose of 500 mg at bedtime with indication of psychosis.  Needing reach titration due to noncompliance of at least 4 days time.  May require lower maintenance dose secondary to more recent medication change of increased frequency of STEVENSON.  4/27: Given increase frequency of STEVENSON, consider a lower target dose and PTA dose of 500 mg at bedtime in an effort to minimize side effect of daytime sedation.  4/29: Modify scheduled clozapine goal dose from  mg at bedtime to 400 mg at bedtime, secondary to increased frequency of STEVENSON.    Cogentin: Continued PTA medication for side effect management.    Invega Sustenna: Continued PTA long-acting injectable every 21 days or mood, psychosis and treatment nonadherence.  Therapy frequency increased at last hospitalization in March 2022.    Gabapentin: Continued PTA medication with dose titration for augmentation.    Effexor XR: Continued PTA medication for depression and anxiety.  As last hospitalization at this facility in December 2021, dose titrated to 300 mg at bedtime.  Anticipate future titration to reconcile dosage.    Prazosin: Continued PTA medication for trauma related insomnia, with parameters.    Fluoxetine: Discontinued PTA medication in anticipation of future titration of venlafaxine.    Medications reviewed in detail and reconciled for anticipated discharge.  4. Medications:  Hospital    Invega: 4/26: Performed time-limited addition of oral neuroleptic to STEVENSON during titration of clozapine for acute symptom management.  4/28: Date of every 21-day maintenance injection.  5. Consultations:    Hospitalist to follow as needed.    Pharmacist: 4/27: Consult reviewed for clozapine titration.   4/29: Modify clozapine goal dose from 500 mg at bedtime to 400 mg at bedtime, continue monitoring as indicated and anticipate discharge on Monday.  6. Structure and Supervision    Unit 4500.    Barriers to Discharge: Psychosis  7.   is following in regards to collecting and reviewing collateral information, referrals and disposition planning.    Legal: Voluntary    Referrals: TBD    Care Coordination: , group home, psychiatrist    Placement: Group home    Anticipated Discharge:  Monday for Protestant holiday.  8. Further treatment programming to be determined throughout the hospital course.        Risk Assessment: [facility] RISK ASSESSMENT: Patient on precautions    Coordination of Care:   Treatment Plan reviewed and physician signed, Care discussed with Care/Treatment Team Members, Chart reviewed and Patient seen      Re-Certification I certify that the inpatient psychiatric facility services furnished since the previous certification were, and continue to be, medically necessary for, either, treatment which could reasonably be expected to improve the patient s condition or diagnostic study and that the hospital records indicate that the services furnished were, either, intensive treatment services, admission and related services necessary for diagnostic study, or equivalent services.     I certify that the patient continues to need, on a daily basis, active treatment furnished directly by or requiring the supervision of inpatient psychiatric facility personnel.   I estimate 5-7 days of hospitalization is necessary for proper treatment of the patient. My plans for post-hospital care for this patient are  retirement     Caio Camacho MD    -     04/29/2022  -     1:14 PM    Total time  35 minutes with > 50%spent on coordination of cares and psycho-education.        This note was created with help of Dragon dictation system. Grammatical / typing errors are not intentional.    Caio HARE  MD Janice

## 2022-04-29 NOTE — PLAN OF CARE
Problem: Suicide Risk  Goal: Absence of Self-Harm  Outcome: Ongoing, Progressing     Problem: Suicidal Behavior  Goal: Suicidal Behavior is Absent or Managed  Outcome: Ongoing, Progressing     Problem: Pain Acute  Goal: Acceptable Pain Control and Functional Ability  Outcome: Ongoing, Progressing  Intervention: Prevent or Manage Pain  Recent Flowsheet Documentation  Taken 4/29/2022 0912 by Mignon Arias RN  Medication Review/Management: medications reviewed     Problem: Activity and Energy Impairment (Anxiety Signs/Symptoms)  Goal: Optimized Energy Level (Anxiety Signs/Symptoms)  Outcome: Ongoing, Progressing  Intervention: Optimize Energy Level  Recent Flowsheet Documentation  Taken 4/29/2022 0912 by Mignon Arias RN  Diversional Activity: television  Activity (Behavioral Health):   activity encouraged   up ad eddie     Problem: Cognitive Impairment (Anxiety Signs/Symptoms)  Goal: Optimized Cognitive Function (Anxiety Signs/Symptoms)  Outcome: Ongoing, Progressing     Problem: Mood Impairment (Anxiety Signs/Symptoms)  Goal: Improved Mood Symptoms (Anxiety Signs/Symptoms)  Outcome: Ongoing, Progressing   Goal Outcome Evaluation:    Plan of Care Reviewed With: patient   Pt calm and pleasant on approach. Intermittently seen on the unit, ate 100% both meals with good fluids intake. Denied pain, anxiety, depression, SI/HI and contracted for safety.  Declined group activities when invited, stated he was not interested.  Staff will continue to encourage. Will continue plan of care.

## 2022-04-29 NOTE — PLAN OF CARE
Problem: Behavioral Health Plan of Care  Goal: Adheres to Safety Considerations for Self and Others  Outcome: Ongoing, Progressing  Goal: Team Discussion  Outcome: Ongoing, Progressing   Goal Outcome Evaluation:

## 2022-04-29 NOTE — PLAN OF CARE
Problem: Behavioral Health Plan of Care  Goal: Plan of Care Review  Outcome: Ongoing, Progressing  Flowsheets (Taken 4/28/2022 1600)  Plan of Care Reviewed With: patient  Patient Agreement with Plan of Care: agrees     Problem: Suicide Risk  Goal: Absence of Self-Harm  Outcome: Ongoing, Progressing   Goal Outcome Evaluation:      Pt visible on the unit but isolative to self.  Calm pleasant and cooperative. Rated 5/10 anxiety. Denied depression, SI/HI and all other psych symptom. No c/o pain. Contracted for safety and medication compliant. Received Invega injection today.              Plan of Care Reviewed With: patient

## 2022-04-30 PROCEDURE — 128N000001 HC R&B CD/MH ADULT

## 2022-04-30 PROCEDURE — 250N000013 HC RX MED GY IP 250 OP 250 PS 637: Performed by: PSYCHIATRY & NEUROLOGY

## 2022-04-30 RX ADMIN — GLYCOPYRROLATE 1 MG: 1 TABLET ORAL at 21:40

## 2022-04-30 RX ADMIN — PANTOPRAZOLE SODIUM 40 MG: 40 TABLET, DELAYED RELEASE ORAL at 08:41

## 2022-04-30 RX ADMIN — VENLAFAXINE HYDROCHLORIDE 225 MG: 150 CAPSULE, EXTENDED RELEASE ORAL at 08:41

## 2022-04-30 RX ADMIN — POLYETHYLENE GLYCOL 3350 17 G: 17 POWDER, FOR SOLUTION ORAL at 16:17

## 2022-04-30 RX ADMIN — LEVOTHYROXINE SODIUM 112 MCG: 112 TABLET ORAL at 08:41

## 2022-04-30 RX ADMIN — NICOTINE POLACRILEX 4 MG: 4 GUM, CHEWING ORAL at 12:06

## 2022-04-30 RX ADMIN — BENZTROPINE MESYLATE 1 MG: 1 TABLET ORAL at 21:40

## 2022-04-30 RX ADMIN — DOCUSATE SODIUM 100 MG: 100 CAPSULE, LIQUID FILLED ORAL at 21:40

## 2022-04-30 RX ADMIN — DOCUSATE SODIUM 100 MG: 100 CAPSULE, LIQUID FILLED ORAL at 08:41

## 2022-04-30 RX ADMIN — NICOTINE POLACRILEX 4 MG: 4 GUM, CHEWING ORAL at 21:43

## 2022-04-30 RX ADMIN — PALIPERIDONE 6 MG: 6 TABLET, EXTENDED RELEASE ORAL at 08:41

## 2022-04-30 RX ADMIN — NICOTINE POLACRILEX 4 MG: 4 GUM, CHEWING ORAL at 19:16

## 2022-04-30 RX ADMIN — PRAZOSIN HYDROCHLORIDE 4 MG: 1 CAPSULE ORAL at 21:40

## 2022-04-30 RX ADMIN — NICOTINE POLACRILEX 4 MG: 4 GUM, CHEWING ORAL at 08:46

## 2022-04-30 RX ADMIN — BENZTROPINE MESYLATE 1 MG: 1 TABLET ORAL at 08:41

## 2022-04-30 RX ADMIN — SENNOSIDES AND DOCUSATE SODIUM 1 TABLET: 50; 8.6 TABLET ORAL at 10:12

## 2022-04-30 RX ADMIN — GABAPENTIN 400 MG: 400 CAPSULE ORAL at 21:40

## 2022-04-30 RX ADMIN — NICOTINE POLACRILEX 4 MG: 4 GUM, CHEWING ORAL at 16:59

## 2022-04-30 RX ADMIN — GLYCOPYRROLATE 1 MG: 1 TABLET ORAL at 08:41

## 2022-04-30 RX ADMIN — CLOZAPINE 200 MG: 100 TABLET ORAL at 21:40

## 2022-04-30 RX ADMIN — GABAPENTIN 400 MG: 400 CAPSULE ORAL at 15:54

## 2022-04-30 RX ADMIN — GABAPENTIN 400 MG: 400 CAPSULE ORAL at 08:41

## 2022-04-30 RX ADMIN — NICOTINE POLACRILEX 4 MG: 4 GUM, CHEWING ORAL at 10:12

## 2022-04-30 RX ADMIN — GLYCOPYRROLATE 1 MG: 1 TABLET ORAL at 15:54

## 2022-04-30 RX ADMIN — CHOLECALCIFEROL TAB 125 MCG (5000 UNIT) 125 MCG: 125 TAB at 08:45

## 2022-04-30 RX ADMIN — NICOTINE POLACRILEX 4 MG: 4 GUM, CHEWING ORAL at 20:17

## 2022-04-30 ASSESSMENT — ACTIVITIES OF DAILY LIVING (ADL)
DRESS: INDEPENDENT
LAUNDRY: WITH SUPERVISION
DRESS: INDEPENDENT
LAUNDRY: WITH SUPERVISION
HYGIENE/GROOMING: INDEPENDENT
ORAL_HYGIENE: INDEPENDENT
ORAL_HYGIENE: INDEPENDENT
HYGIENE/GROOMING: INDEPENDENT

## 2022-04-30 NOTE — PLAN OF CARE
Problem: Suicide Risk  Goal: Absence of Self-Harm  Outcome: Ongoing, Progressing     Problem: Pain Acute  Goal: Acceptable Pain Control and Functional Ability  Outcome: Ongoing, Progressing  Intervention: Prevent or Manage Pain  Recent Flowsheet Documentation  Taken 4/30/2022 1000 by Mignon Arias RN  Medication Review/Management: medications reviewed     Problem: Activity and Energy Impairment (Anxiety Signs/Symptoms)  Goal: Optimized Energy Level (Anxiety Signs/Symptoms)  Outcome: Ongoing, Progressing  Intervention: Optimize Energy Level  Recent Flowsheet Documentation  Taken 4/30/2022 1000 by Mignon Arias RN  Activity (Behavioral Health):   activity encouraged   up ad eddie   Goal Outcome Evaluation:    Plan of Care Reviewed With: patient    Pt calm and pleasant all shift. visible and engaged with selected peers, Denies pain and all psych symptoms also contracted for safety. Prn senna given for constipation, no reported result at this time.  Ate 100% both meals, was encourage to increase fluids intake

## 2022-04-30 NOTE — PLAN OF CARE
Problem: Suicide Risk  Goal: Absence of Self-Harm  Outcome: Ongoing, Progressing  Intervention: Promote Psychosocial Wellbeing  Recent Flowsheet Documentation  Taken 4/30/2022 0137 by Theresa Alves RN  Sleep/Rest Enhancement: comfort measures     Problem: Sleep Impairment (Anxiety Signs/Symptoms)  Goal: Improved Sleep (Anxiety Signs/Symptoms)  Outcome: Ongoing, Progressing     Problem: Behavioral Health Plan of Care  Goal: Adheres to Safety Considerations for Self and Others  Intervention: Develop and Maintain Individualized Safety Plan  Recent Flowsheet Documentation  Taken 4/30/2022 0137 by Theresa Alves RN  Safety Measures:    environmental rounds completed    safety rounds completed   Goal Outcome Evaluation:          Patient was observed sleeping for most part of the night. Safety checks completed per unit policy. He continues on suicide and cheeking precautions, no related behaviors noted. He had 7 hrs of sleep.

## 2022-04-30 NOTE — PLAN OF CARE
"  Problem: Behavioral Health Plan of Care  Goal: Plan of Care Review  Outcome: Ongoing, Progressing  Flowsheets  Taken 4/29/2022 1952  Plan of Care Reviewed With: patient  Overall Patient Progress: improving  Patient Agreement with Plan of Care: agrees  Taken 4/29/2022 1915  Plan of Care Reviewed With: patient  Patient Agreement with Plan of Care: agrees  Goal: Patient-Specific Goal (Individualization)  Outcome: Ongoing, Progressing  Flowsheets (Taken 4/29/2022 1952)  Patient Vulnerabilities: lacks insight into illness  Patient Personal Strengths:    positive attitude    self-awareness    independent living skills    medication/treatment adherence    expressive of needs  Goal: Adheres to Safety Considerations for Self and Others  Outcome: Ongoing, Progressing  Flowsheets (Taken 4/29/2022 1952)  Adheres to Safety Considerations for Self and Others: making progress toward outcome  Intervention: Develop and Maintain Individualized Safety Plan  Recent Flowsheet Documentation  Taken 4/29/2022 1915 by Sirena Joshua RN  Safety Measures: safety rounds completed  Goal: Absence of New-Onset Illness or Injury  Outcome: Ongoing, Progressing  Intervention: Identify and Manage Fall Risk  Recent Flowsheet Documentation  Taken 4/29/2022 1915 by Sirena Joshua RN  Safety Measures: safety rounds completed   Goal Outcome Evaluation:    Plan of Care Reviewed With: patient     Overall Patient Progress: improving       Pt  in bed during first hour of this shift resting. Refused to come out for group. Pt  became visible on milieu  before evening  meal/ remained visible for ret of shift. Pt observed to intermittently walk  on hallways, and watch TV. Frequent requests for nicotine gum. Pt denies all psyche symptoms, the confidently adding \" I am okay and I am going home on Monday. Pt cooperative/ compliant with cares including medication administration. No suicide behavior noted this shift, continues on suicide and cheeking precaution " monitoring.

## 2022-04-30 NOTE — PLAN OF CARE
04/30/22 1250   Group Therapy Session   Group Attendance refused to attend group session   Time Session Began 1000   Time Session Ended 1100   Total Time patient participated (minutes) 0   Total # Attendees 6   Group Type psychoeducation   Group Session Detail Living CBT-[facility] [employer]

## 2022-05-01 LAB
BASOPHILS # BLD AUTO: 0.1 10E3/UL (ref 0–0.2)
BASOPHILS NFR BLD AUTO: 1 %
EOSINOPHIL # BLD AUTO: 0 10E3/UL (ref 0–0.7)
EOSINOPHIL NFR BLD AUTO: 0 %
ERYTHROCYTE [DISTWIDTH] IN BLOOD BY AUTOMATED COUNT: 12.3 % (ref 10–15)
HCT VFR BLD AUTO: 40.3 % (ref 40–53)
HGB BLD-MCNC: 14 G/DL (ref 13.3–17.7)
IMM GRANULOCYTES # BLD: 0.1 10E3/UL
IMM GRANULOCYTES NFR BLD: 1 %
LYMPHOCYTES # BLD AUTO: 1.9 10E3/UL (ref 0.8–5.3)
LYMPHOCYTES NFR BLD AUTO: 28 %
MCH RBC QN AUTO: 31.7 PG (ref 26.5–33)
MCHC RBC AUTO-ENTMCNC: 34.7 G/DL (ref 31.5–36.5)
MCV RBC AUTO: 91 FL (ref 78–100)
MONOCYTES # BLD AUTO: 0.5 10E3/UL (ref 0–1.3)
MONOCYTES NFR BLD AUTO: 8 %
NEUTROPHILS # BLD AUTO: 4.4 10E3/UL (ref 1.6–8.3)
NEUTROPHILS NFR BLD AUTO: 62 %
NRBC # BLD AUTO: 0 10E3/UL
NRBC BLD AUTO-RTO: 0 /100
PLATELET # BLD AUTO: 185 10E3/UL (ref 150–450)
RBC # BLD AUTO: 4.42 10E6/UL (ref 4.4–5.9)
WBC # BLD AUTO: 6.9 10E3/UL (ref 4–11)

## 2022-05-01 PROCEDURE — 36415 COLL VENOUS BLD VENIPUNCTURE: CPT | Performed by: PSYCHIATRY & NEUROLOGY

## 2022-05-01 PROCEDURE — 85025 COMPLETE CBC W/AUTO DIFF WBC: CPT | Performed by: PSYCHIATRY & NEUROLOGY

## 2022-05-01 PROCEDURE — 99233 SBSQ HOSP IP/OBS HIGH 50: CPT | Performed by: PSYCHIATRY & NEUROLOGY

## 2022-05-01 PROCEDURE — 250N000013 HC RX MED GY IP 250 OP 250 PS 637: Performed by: PSYCHIATRY & NEUROLOGY

## 2022-05-01 PROCEDURE — 128N000001 HC R&B CD/MH ADULT

## 2022-05-01 RX ADMIN — BENZTROPINE MESYLATE 1 MG: 1 TABLET ORAL at 20:52

## 2022-05-01 RX ADMIN — NICOTINE POLACRILEX 4 MG: 4 GUM, CHEWING ORAL at 12:41

## 2022-05-01 RX ADMIN — GLYCOPYRROLATE 1 MG: 1 TABLET ORAL at 20:50

## 2022-05-01 RX ADMIN — GLYCOPYRROLATE 1 MG: 1 TABLET ORAL at 14:58

## 2022-05-01 RX ADMIN — CLOZAPINE 250 MG: 25 TABLET ORAL at 20:55

## 2022-05-01 RX ADMIN — PALIPERIDONE 6 MG: 6 TABLET, EXTENDED RELEASE ORAL at 10:45

## 2022-05-01 RX ADMIN — GLYCOPYRROLATE 1 MG: 1 TABLET ORAL at 10:46

## 2022-05-01 RX ADMIN — BENZTROPINE MESYLATE 1 MG: 1 TABLET ORAL at 10:46

## 2022-05-01 RX ADMIN — NICOTINE POLACRILEX 4 MG: 4 GUM, CHEWING ORAL at 06:44

## 2022-05-01 RX ADMIN — DOCUSATE SODIUM 100 MG: 100 CAPSULE, LIQUID FILLED ORAL at 20:52

## 2022-05-01 RX ADMIN — LEVOTHYROXINE SODIUM 112 MCG: 112 TABLET ORAL at 10:46

## 2022-05-01 RX ADMIN — PANTOPRAZOLE SODIUM 40 MG: 40 TABLET, DELAYED RELEASE ORAL at 10:46

## 2022-05-01 RX ADMIN — VENLAFAXINE HYDROCHLORIDE 225 MG: 150 CAPSULE, EXTENDED RELEASE ORAL at 10:44

## 2022-05-01 RX ADMIN — NICOTINE POLACRILEX 4 MG: 4 GUM, CHEWING ORAL at 17:12

## 2022-05-01 RX ADMIN — PRAZOSIN HYDROCHLORIDE 4 MG: 1 CAPSULE ORAL at 20:51

## 2022-05-01 RX ADMIN — NICOTINE POLACRILEX 4 MG: 4 GUM, CHEWING ORAL at 10:45

## 2022-05-01 RX ADMIN — DOCUSATE SODIUM 100 MG: 100 CAPSULE, LIQUID FILLED ORAL at 10:46

## 2022-05-01 RX ADMIN — NICOTINE POLACRILEX 4 MG: 4 GUM, CHEWING ORAL at 20:54

## 2022-05-01 RX ADMIN — HYDROXYZINE HYDROCHLORIDE 25 MG: 25 TABLET ORAL at 17:12

## 2022-05-01 RX ADMIN — CHOLECALCIFEROL TAB 125 MCG (5000 UNIT) 125 MCG: 125 TAB at 10:45

## 2022-05-01 RX ADMIN — GABAPENTIN 400 MG: 400 CAPSULE ORAL at 14:58

## 2022-05-01 RX ADMIN — GABAPENTIN 400 MG: 400 CAPSULE ORAL at 20:52

## 2022-05-01 RX ADMIN — GABAPENTIN 400 MG: 400 CAPSULE ORAL at 10:45

## 2022-05-01 ASSESSMENT — ACTIVITIES OF DAILY LIVING (ADL)
LAUNDRY: WITH SUPERVISION
ORAL_HYGIENE: INDEPENDENT
ORAL_HYGIENE: INDEPENDENT
HYGIENE/GROOMING: INDEPENDENT
HYGIENE/GROOMING: INDEPENDENT
DRESS: INDEPENDENT
DRESS: INDEPENDENT
LAUNDRY: WITH SUPERVISION

## 2022-05-01 NOTE — PLAN OF CARE
Problem: Behavioral Health Plan of Care  Goal: Plan of Care Review  Outcome: Ongoing, Progressing  Goal: Adheres to Safety Considerations for Self and Others  Intervention: Develop and Maintain Individualized Safety Plan  Recent Flowsheet Documentation  Taken 5/1/2022 0301 by Soraida Vargas RN  Safety Measures: safety rounds completed  Goal: Absence of New-Onset Illness or Injury  Intervention: Identify and Manage Fall Risk  Recent Flowsheet Documentation  Taken 5/1/2022 0301 by Soraida Vargas RN  Safety Measures: safety rounds completed     Problem: Suicidal Behavior  Goal: Suicidal Behavior is Absent or Managed  Outcome: Ongoing, Progressing   Goal Outcome Evaluation:    Pt appeared sleeping for about   7 hours this shift.  No distress noted or reported this shift  Pt continue on 15 minutes checks   for safety  No pain or discomfort noted or reported  No SI/HI/SIB noted or reported this shift  No  inappropriate behaviors  noted or reported this shift  Pt is on cheeking and suicide precautions. No relative issues  noted or reported this shift.   Will continue to monitor and assist as needed.

## 2022-05-01 NOTE — PLAN OF CARE
Problem: Suicide Risk  Goal: Absence of Self-Harm  4/30/2022 2135 by Mignon Arias RN  Outcome: Ongoing, Progressing  4/30/2022 1441 by Mignon Arias RN  Outcome: Ongoing, Progressing     Problem: Suicidal Behavior  Goal: Suicidal Behavior is Absent or Managed  Outcome: Ongoing, Progressing     Problem: Pain Acute  Goal: Acceptable Pain Control and Functional Ability  4/30/2022 2135 by Mignon Arias RN  Outcome: Ongoing, Progressing  4/30/2022 1441 by Mignon Arias RN  Outcome: Ongoing, Progressing  Intervention: Prevent or Manage Pain  Recent Flowsheet Documentation  Taken 4/30/2022 1634 by Mignon Arias RN  Medication Review/Management: medications reviewed  Taken 4/30/2022 1000 by Mignon Arias RN  Medication Review/Management: medications reviewed     Problem: Activity and Energy Impairment (Anxiety Signs/Symptoms)  Goal: Optimized Energy Level (Anxiety Signs/Symptoms)  4/30/2022 2135 by Mignon Arias RN  Outcome: Ongoing, Progressing  4/30/2022 1441 by Mignon Arias RN  Outcome: Ongoing, Progressing  Intervention: Optimize Energy Level  Recent Flowsheet Documentation  Taken 4/30/2022 1634 by Mignon Arias RN  Activity (Behavioral Health):   activity encouraged   up ad eddie  Taken 4/30/2022 1000 by Mignon Arias RN  Activity (Behavioral Health):   activity encouraged   up ad eddie   Goal Outcome Evaluation:    Plan of Care Reviewed With: patient     Pt calm and pleasant all shift. visible and engaged, watched TV in lounge with peers.  Denied pain and all psych symptoms and contracted for safety. Prn Miralax given for constipation with good result.  Ate 100% dinner and snack, no behaviors. Will continue plan of care.

## 2022-05-01 NOTE — PROGRESS NOTES
05/01/22 1054   Engagement   Intervention Group   Topic Detail OT: Creative expressions (wall hangings day 4) to increase concentration, focus, attention to task/detail, planning, problem solving, creative expression, symptom management, coping with stress, healthy leisure engagement, healthy distraction engagement, and social engagement   Attendance Did not attend   Reason for Not Attending Refused  (in-person invite)

## 2022-05-01 NOTE — PROGRESS NOTES
"PSYCHIATRY  PROGRESS NOTE     DATE OF SERVICE   05/01/2022       CHIEF COMPLAINT   \"I am doing good\"       SUBJECTIVE   Nursing reports patient has calm and pleasant.  Visible and engaged.  Denies pain and psychiatric symptoms.  Contract for safety.     previously reported patient requesting discharge on Monday back to group home to engage with [employer] holiday.  Care is coordinated with pharmacy, group home and  of tentative discharge.       OBJECTIVE   Upon patient interview, patient interview performed on unit 4500 directly.  Patient is cooperative on approach.  Evaluation effort to review significant events since last visit.    At last visit, patient requested update of efforts to proceed with disposition as previously requested.  Consistent with nursing report, patient demonstrating improvement leading to efforts of coordinating cares with .  Patient able to return back to group home facility on Monday, with efforts of care coordination for disposition.    Today, patient verifies efforts of progressing.  Does not offer questions or concerns.  Reports continued symptom improvement of mood, anxiety and psychosis.  Felt to be secondary to medication management as coordinated.    Medication compliant.  Tolerating medications.  Denies excess daytime somnolence.  Progressing with mood and anxiety.  No behaviors.  Denies psychosis.  Denies suicide and homicide ideation.  Consents to change her treatment plan to include reduction of goal dose of clozapine, given increased frequency of Invega Sustenna.  Patient reports blood drawn prior to assessment for routine clozapine monitoring.  Medication reviewed in detail and reconciled for anticipated discharge.    Denies physical issues that are new or worsening.  Demonstrating improvement of ADLs of sleep, energy and appetite.  Encouraged patient to attend group programming as offered in the milieu.     is following.  " "Evaluation effort to coordinate anticipated discharge tomorrow and follow-up in the community for psychiatry and routine blood monitoring.    Otherwise, offers no further questions, concerns and/or expectations.       MEDICATIONS   Medications:  Scheduled Meds:    benztropine  1 mg Oral BID     [START ON 5/4/2022] cloZAPine  400 mg Oral At Bedtime    Followed by     cloZAPine  250 mg Oral At Bedtime    Followed by     [START ON 5/2/2022] cloZAPine  300 mg Oral At Bedtime    Followed by     [START ON 5/3/2022] cloZAPine  350 mg Oral At Bedtime     docusate sodium  100 mg Oral BID     gabapentin  400 mg Oral TID     glycopyrrolate  1 mg Oral TID     levothyroxine  112 mcg Oral Daily     paliperidone  234 mg Intramuscular Q21 Days     paliperidone  6 mg Oral Daily     pantoprazole  40 mg Oral Daily     prazosin  4 mg Oral At Bedtime     venlafaxine  225 mg Oral Daily     Vitamin D3  125 mcg Oral Daily     Continuous Infusions:  PRN Meds:.acetaminophen, alum & mag hydroxide-simethicone, hydrOXYzine, nicotine polacrilex, OLANZapine **OR** OLANZapine, polyethylene glycol, senna-docusate, traZODone    Medication adherence issues: MS Med Adherence Y/N: Yes, Other  Medication side effects: MEDICATION SIDE EFFECTS: no side effects reported  Benefit: Yes / No: Yes       ROS   A comprehensive review of systems was negative.       MENTAL STATUS EXAM   Vitals: /67   Pulse 96   Temp 98.2  F (36.8  C) (Oral)   Resp 18   SpO2 99%     Appearance:  Cooperative  Mood:  Mood: \"Good\"  Affect: blunted  was congruent to speech  Suicidal Ideation: PRESENT / ABSENT: absent   Homicidal Ideation: PRESENT / ABSENT: absent   Thought process: [place]   Thought content: denies suicidal and violent ideation.   Fund of Knowledge: Sufficient  Attention/Concentration: Poor  Language ability:  Intact  Memory: Sufficient  Insight:  fair.  Judgement: adequate for safety  Orientation: Yes, x4  Psychomotor Behavior: slowed    Muscle Strength and " Tone: MuscleStrength: Normal  Gait and Station: Normal       LABS   personally reviewed.     No results found for: PHENYTOIN, PHENOBARB, VALPROATE, CBMZ       DIAGNOSIS   Principal Problem:    Schizoaffective disorder, depressive type (H)    Active Problem List:  Patient Active Problem List   Diagnosis     Depression     Schizoaffective disorder, bipolar type (H)     Schizoaffective disorder, depressive type (H)     Schizoaffective disorder (H)     Suicidal behavior     PTSD (post-traumatic stress disorder)     History of schizophrenia     Chronic post-traumatic stress disorder (PTSD)     Hallucinations     Alcohol abuse, episodic     Borderline personality disorder (H)     Cannabis dependence in remission (H)     Cannabis use disorder, moderate, in sustained remission (H)     GERD (gastroesophageal reflux disease)     High risk medication use     Hypothyroidism     Noncompliance     PPD positive, treated     TB lung, latent     Major depression     Mood change     Nicotine use disorder        PLAN   1. Ongoing education given regarding diagnostic and treatment options with risks, benefits and alternatives and adequate verbalization of understanding.  2. Admitted voluntarily.    4/26: Agreed to stay voluntarily to coordinate cares and medication management.    Precautions in place.  3. Medications: 4/25/2022: PTA medications reviewed.    Clozapine: 4/26: Restarted PTA medication through pharmacy consult toward PTA dose of 500 mg at bedtime with indication of psychosis.  Needing reach titration due to noncompliance of at least 4 days time.  May require lower maintenance dose secondary to more recent medication change of increased frequency of STEVENSON.  4/27: Given increase frequency of STEVENSON, consider a lower target dose and PTA dose of 500 mg at bedtime in an effort to minimize side effect of daytime sedation.  4/29: Modify scheduled clozapine goal dose from  mg at bedtime to 400 mg at bedtime, secondary to  increased frequency of STEVENSON.    Cogentin: Continued PTA medication for side effect management.    Invega Sustenna: Continued PTA long-acting injectable every 21 days or mood, psychosis and treatment nonadherence.  Therapy frequency increased at last hospitalization in March 2022.    Gabapentin: Continued PTA medication with dose titration for augmentation.    Effexor XR: Continued PTA medication for depression and anxiety.  As last hospitalization at this facility in December 2021, dose titrated to 300 mg at bedtime.    5/1: Demonstrating benefit at current dosage of 225 mg daily.    Prazosin: Continued PTA medication for trauma related insomnia, with parameters.    Fluoxetine: Discontinued PTA medication in anticipation of future titration of venlafaxine.  4. Medications:  Hospital    Invega: 4/26: Performed time-limited addition of oral neuroleptic to STEVENSON during titration of clozapine for acute symptom management.  4/28: Date of every 21-day maintenance injection.    Medication reviewed in detail and reconciled for anticipated discharge.  5. Consultations:    Hospitalist to follow as needed.    Pharmacist: 4/27: Consult reviewed for clozapine titration.  4/29: Modify clozapine goal dose from 500 mg at bedtime to 400 mg at bedtime, continue monitoring as indicated and anticipate discharge on Monday.  6. Structure and Supervision    Unit 4500.    Barriers to Discharge: Psychosis  7.   is following in regards to collecting and reviewing collateral information, referrals and disposition planning.    Legal: Voluntary    Referrals: TBD    Care Coordination: , group home, psychiatrist    Placement: Group home    Anticipated Discharge:  Monday for Congregational holiday.  8. Further treatment programming to be determined throughout the hospital course.        Risk Assessment: IP MHAC RISK ASSESSMENT: Patient on precautions    Coordination of Care:   Treatment Plan reviewed and physician signed, Care  discussed with Care/Treatment Team Members, Chart reviewed and Patient seen      Re-Certification I certify that the inpatient psychiatric facility services furnished since the previous certification were, and continue to be, medically necessary for, either, treatment which could reasonably be expected to improve the patient s condition or diagnostic study and that the hospital records indicate that the services furnished were, either, intensive treatment services, admission and related services necessary for diagnostic study, or equivalent services.     I certify that the patient continues to need, on a daily basis, active treatment furnished directly by or requiring the supervision of inpatient psychiatric facility personnel.   I estimate 5-7 days of hospitalization is necessary for proper treatment of the patient. My plans for post-hospital care for this patient are  prison     Caio Camacho MD    -     05/01/2022  -     12:22 PM    Total time  35 minutes with > 50%spent on coordination of cares and psycho-education.        This note was created with help of Dragon dictation system. Grammatical / typing errors are not intentional.    Caio Camacho MD

## 2022-05-01 NOTE — PLAN OF CARE
Problem: Suicide Risk  Goal: Absence of Self-Harm  Outcome: Ongoing, Progressing     Problem: Suicidal Behavior  Goal: Suicidal Behavior is Absent or Managed  Outcome: Ongoing, Progressing     Problem: Pain Acute  Goal: Acceptable Pain Control and Functional Ability  Outcome: Ongoing, Progressing  Intervention: Prevent or Manage Pain  Recent Flowsheet Documentation  Taken 5/1/2022 1246 by Mignon Arias RN  Medication Review/Management: medications reviewed     Problem: Activity and Energy Impairment (Anxiety Signs/Symptoms)  Goal: Optimized Energy Level (Anxiety Signs/Symptoms)  Outcome: Ongoing, Progressing  Intervention: Optimize Energy Level  Recent Flowsheet Documentation  Taken 5/1/2022 1246 by Mignon Arias RN  Diversional Activity: television  Activity (Behavioral Health): activity encouraged   Goal Outcome Evaluation:    Plan of Care Reviewed With: patient    Pt slept until about 10:30 this morning, stated he was tired. Patient was out for lunch with encouragement , spent some time in the lounge watching TV then back to his room. Denied pain and all psych symptoms and contracted for safety. Nicotine gum given multiple times, no behavior observed.  Medication compliant. Will continue plan of care.

## 2022-05-02 VITALS
HEART RATE: 99 BPM | SYSTOLIC BLOOD PRESSURE: 116 MMHG | DIASTOLIC BLOOD PRESSURE: 68 MMHG | RESPIRATION RATE: 16 BRPM | OXYGEN SATURATION: 97 % | TEMPERATURE: 98.2 F

## 2022-05-02 PROCEDURE — 250N000013 HC RX MED GY IP 250 OP 250 PS 637: Performed by: PSYCHIATRY & NEUROLOGY

## 2022-05-02 PROCEDURE — 99239 HOSP IP/OBS DSCHRG MGMT >30: CPT | Performed by: PSYCHIATRY & NEUROLOGY

## 2022-05-02 RX ORDER — CLOZAPINE 200 MG/1
400 TABLET ORAL AT BEDTIME
Qty: 56 TABLET | Refills: 0 | Status: SHIPPED | OUTPATIENT
Start: 2022-05-04 | End: 2023-10-10

## 2022-05-02 RX ORDER — CLOZAPINE 50 MG/1
350 TABLET ORAL AT BEDTIME
Qty: 7 TABLET | Refills: 0 | Status: SHIPPED | OUTPATIENT
Start: 2022-05-03 | End: 2022-08-11

## 2022-05-02 RX ORDER — CLOZAPINE 100 MG/1
300 TABLET ORAL AT BEDTIME
Qty: 3 TABLET | Refills: 0 | Status: SHIPPED | OUTPATIENT
Start: 2022-05-02 | End: 2022-08-11

## 2022-05-02 RX ORDER — GABAPENTIN 400 MG/1
400 CAPSULE ORAL 3 TIMES DAILY
Qty: 90 CAPSULE | Refills: 0 | Status: SHIPPED | OUTPATIENT
Start: 2022-05-02 | End: 2023-10-10

## 2022-05-02 RX ADMIN — BENZTROPINE MESYLATE 1 MG: 1 TABLET ORAL at 08:40

## 2022-05-02 RX ADMIN — SENNOSIDES AND DOCUSATE SODIUM 1 TABLET: 50; 8.6 TABLET ORAL at 09:39

## 2022-05-02 RX ADMIN — NICOTINE POLACRILEX 4 MG: 4 GUM, CHEWING ORAL at 06:33

## 2022-05-02 RX ADMIN — LEVOTHYROXINE SODIUM 112 MCG: 112 TABLET ORAL at 08:39

## 2022-05-02 RX ADMIN — POLYETHYLENE GLYCOL 3350 17 G: 17 POWDER, FOR SOLUTION ORAL at 09:39

## 2022-05-02 RX ADMIN — NICOTINE POLACRILEX 4 MG: 4 GUM, CHEWING ORAL at 08:40

## 2022-05-02 RX ADMIN — VENLAFAXINE HYDROCHLORIDE 225 MG: 150 CAPSULE, EXTENDED RELEASE ORAL at 08:39

## 2022-05-02 RX ADMIN — GABAPENTIN 400 MG: 400 CAPSULE ORAL at 08:39

## 2022-05-02 RX ADMIN — GLYCOPYRROLATE 1 MG: 1 TABLET ORAL at 08:39

## 2022-05-02 RX ADMIN — NICOTINE POLACRILEX 4 MG: 4 GUM, CHEWING ORAL at 12:13

## 2022-05-02 RX ADMIN — CHOLECALCIFEROL TAB 125 MCG (5000 UNIT) 125 MCG: 125 TAB at 08:39

## 2022-05-02 RX ADMIN — PALIPERIDONE 6 MG: 6 TABLET, EXTENDED RELEASE ORAL at 08:39

## 2022-05-02 RX ADMIN — NICOTINE POLACRILEX 4 MG: 4 GUM, CHEWING ORAL at 09:40

## 2022-05-02 RX ADMIN — PANTOPRAZOLE SODIUM 40 MG: 40 TABLET, DELAYED RELEASE ORAL at 08:39

## 2022-05-02 RX ADMIN — DOCUSATE SODIUM 100 MG: 100 CAPSULE, LIQUID FILLED ORAL at 08:39

## 2022-05-02 NOTE — PLAN OF CARE
Discharge plan or goal: Return to group [place] with outpatient supports    Today's Plan: Writer met with patient and consulted with psychiatrist during team meeting. Patient reports being ready for discharge. Writer coordinated discharge with [facility] and [facility]. Writer faxed Medication, Lab, and Monitoring Orders to [place]. Writer faxed discharge documents to Group home and .  Pt has the following outpatient appointment(s) scheduled:   Appointment Type: Psychiatry  Provider: Dr. Marco Campo  Date & Time: May 25, 2022 at 10:30am  Clinic:[address] [address]   Phone: [phone]  FAX: [phone]  Note: This is a phone appointment.    Pt has the following professional community support(s):   Pt's   is Tenisha  Agency: [employer]  [address] [place], [place] [id number]   [phone]    Legal Status: Voluntary    Diagnosis/Procedure:   Patient Active Problem List   Diagnosis     Depression     Schizoaffective disorder, bipolar type (H)     Schizoaffective disorder, depressive type (H)     Schizoaffective disorder (H)     Suicidal behavior     PTSD (post-traumatic stress disorder)     History of schizophrenia     Chronic post-traumatic stress disorder (PTSD)     Hallucinations     Alcohol abuse, episodic     Borderline personality disorder (H)     Cannabis dependence in remission (H)     Cannabis use disorder, moderate, in sustained remission (H)     GERD (gastroesophageal reflux disease)     High risk medication use     Hypothyroidism     Noncompliance     PPD positive, treated     TB lung, latent     Major depression     Mood change     Nicotine use disorder       Care Rounds Attendance:   CM   RN   Charge RN   OT/TR  MD/CNP    TIM Richards [facility], 5/2/2022, 1:30 PM

## 2022-05-02 NOTE — PLAN OF CARE
Problem: Behavioral Health Plan of Care  Goal: Plan of Care Review  Recent Flowsheet Documentation  Taken 5/1/2022 1700 by Antonia Devine RN  Plan of Care Reviewed With: patient  Patient Agreement with Plan of Care: agrees     Problem: Behavioral Health Plan of Care  Goal: Adheres to Safety Considerations for Self and Others  Outcome: Ongoing, Progressing     Problem: Behavioral Health Plan of Care  Goal: Optimized Coping Skills in Response to Life Stressors  Intervention: Promote Effective Coping Strategies  Recent Flowsheet Documentation  Taken 5/1/2022 1700 by Antonia Devine RN  Supportive Measures:   active listening utilized   self-care encouraged     Problem: Suicide Risk  Goal: Absence of Self-Harm  Intervention: Promote Psychosocial Wellbeing  Recent Flowsheet Documentation  Taken 5/1/2022 1700 by Antonia Devine RN  Supportive Measures:   active listening utilized   self-care encouraged  Sleep/Rest Enhancement: comfort measures     Problem: Pain Acute  Goal: Acceptable Pain Control and Functional Ability  Intervention: Prevent or Manage Pain  Recent Flowsheet Documentation  Taken 5/1/2022 1700 by Antonia Devine RN  Sensory Stimulation Regulation: quiet environment promoted  Sleep/Rest Enhancement: comfort measures     Problem: Pain Acute  Goal: Acceptable Pain Control and Functional Ability  Intervention: Optimize Psychosocial Wellbeing  Recent Flowsheet Documentation  Taken 5/1/2022 1700 by Antonia Devine RN  Supportive Measures:   active listening utilized   self-care encouraged     Problem: Activity and Energy Impairment (Anxiety Signs/Symptoms)  Goal: Optimized Energy Level (Anxiety Signs/Symptoms)  Intervention: Optimize Energy Level  Recent Flowsheet Documentation  Taken 5/1/2022 1700 by Antonia Devine RN  Activity (Behavioral Health): activity encouraged     Problem: Mood Impairment (Anxiety Signs/Symptoms)  Goal: Improved Mood Symptoms (Anxiety Signs/Symptoms)  Intervention:  Optimize Emotion and Mood  Recent Flowsheet Documentation  Taken 5/1/2022 1700 by Antonia Devine RN  Supportive Measures:   active listening utilized   self-care encouraged   Goal Outcome Evaluation:    Plan of Care Reviewed With: patient     Pt denies psych symptoms except anxiety that he rated at 4/10 and contracts for safety.  No behavior observed.  Pt was medication compliant.  Pt spent the most part of the evening in his room. He was sometimes reading.  There was no S.I behavior observed.  Oral intake was good.

## 2022-05-02 NOTE — PLAN OF CARE
Occupational Therapy Discharge Note    Patient Name:  Kamini Neal      Problem: Relapse Prevention  Goal: Engage in OT Group  Description: Engage in 1 OT group activity this review period to support recovery.  Outcome: Adequate for Care Transition       D: Refer to daily doc flowsheets for details of progress toward goals.    A: Improvement seen in reduction of severity of MH sx's.    P: Patient discharging to home/group home as previous with outpatient supports as directed by inpatient provider and social work.  Discontinue OT Care Plan goals and interventions.      Huong New OTR/L  Date: 5/2/2022  Time: 2:53 PM

## 2022-05-02 NOTE — PLAN OF CARE
Problem: Behavioral Health Plan of Care  Goal: Optimized Coping Skills in Response to Life Stressors  5/2/2022 1018 by Yajaira Patel RN  Outcome: Adequate for Care Transition  5/2/2022 1017 by Yajaira Patel RN  Outcome: Adequate for Care Transition  5/2/2022 1016 by Yajaira Patel RN  Outcome: Ongoing, Progressing  Intervention: Promote Effective Coping Strategies  Recent Flowsheet Documentation  Taken 5/2/2022 1000 by Yajaira Patel RN  Supportive Measures: active listening utilized   Goal Outcome Evaluation:    Plan of Care Reviewed With: patient      Patient visible on the unit. Appears bright and interactive with peers and staff. Patient had all his breakfast. Was compliant with his morning medications. He denied anxiety and depression. He denied SI/HI and hallucinations. No cheeking of medications noted. Reported no bowel movement x 6 days. PRN Miralax and Senna administered. Patient encouraged to drink deisy fluid and walk around instead of lying down most of the time. Patient elated about discharge plan for today. Will wait for discharge orders.

## 2022-05-02 NOTE — PROGRESS NOTES
05/02/22 1100   Engagement   Intervention Group   Topic Detail OT: Directive Movement group to promote wellness, movement/physical exericse, daily living skills, sequencing, focus, direction following, healthy coping skill development, and socialization.   Attendance Attended   Patient Response Demonstrated understanding of materials provided   Concentrated on Task < 5 min   Cognition Goal-directed   Mood/Affect Content   Social/Behavioral Cooperative   Thought Content Reality oriented   Goals addressed in session today Pt joined group about alf through and stayed for ~3-5 minutes. He participated for the short duration he was present and then left group despite encouragement from staff and peers to stay.

## 2022-05-02 NOTE — PROGRESS NOTES
Discharge orders received. Discharge instructions read with patient. Patient verbalized understanding of discharge instructions. All his belongings returned to him. transportation called for him. Patient left the unit 1250 accompanied by a Tech to catch the cab.

## 2022-05-02 NOTE — PLAN OF CARE
Problem: Behavioral Health Plan of Care  Goal: Plan of Care Review  Outcome: Ongoing, Progressing     Problem: Suicide Risk  Goal: Absence of Self-Harm  Outcome: Ongoing, Progressing     Problem: Suicidal Behavior  Goal: Suicidal Behavior is Absent or Managed  Outcome: Ongoing, Progressing   Goal Outcome Evaluation:    Pt appeared sleeping for  more than 7 hours this shift  Safety checks completed per protocol  No pain or discomfort noted or verbalized  No precautionary behaviors observed or reported

## 2022-05-02 NOTE — PLAN OF CARE
05/02/22 1003   Individualization/Patient Specific Goals   Patient Personal Strengths positive attitude;self-awareness;independent living skills;medication/treatment adherence;expressive of needs   Patient Vulnerabilities other (see comments)  (Has been noncompliant in the community with medication)   Patient-Specific Goals (Include Timeframe) Medication Management and return to group home , discharge 5/2/2022   Interprofessional Rounds   Participants CTC;psychiatrist;OT;nursing;pharmacy   Team Discussion   Participants Guillermo Sinclair, Huong Schmitz, Selin GARNER   Progress progressing   Anticipated length of stay Discharge 5/2/2022   Continued Stay Criteria/Rationale discharge 5/2/2022   Medical/Physical No significant events   Plan Discharge back to group home with outpatient supports, discharge 5/2/2022   Anticipated Discharge Disposition group home   PRECAUTIONS AND SAFETY    Behavioral Orders   Procedures    Cheeking Precautions (behavioral units)    Code 1 - Restrict to Unit    Routine Programming     As clinically indicated    Status 15     Every 15 minutes.    Suicide precautions     Patients on Suicide Precautions should have a Combination Diet ordered that includes a Diet selection(s) AND a Behavioral Tray selection for Safe Tray - with utensils, or Safe Tray - NO utensils         Safety  Safety WDL: WDL  Patient Location: patient room, own  Observed Behavior: calm  Observed Behavior (Comment): sleeping  Safety Measures: safety rounds completed  Diversional Activity: television  Suicidality: Status 15  Assault: status 15          Patient contacted via phone (Name &  verified)     Patient was made aware of information below and verbalized understanding using repeat back method. Patient thanked staff for calling and had no other questions and or concerns.     Patient adjusted office visit to V-Visit    Dr. Cifuentes: JOANIE

## 2022-06-13 ENCOUNTER — HOSPITAL ENCOUNTER (EMERGENCY)
Facility: CLINIC | Age: 32
Discharge: HOME | End: 2022-06-14
Attending: EMERGENCY MEDICINE | Admitting: EMERGENCY MEDICINE
Payer: COMMERCIAL

## 2022-06-13 VITALS
HEART RATE: 98 BPM | DIASTOLIC BLOOD PRESSURE: 81 MMHG | TEMPERATURE: 97.8 F | SYSTOLIC BLOOD PRESSURE: 122 MMHG | RESPIRATION RATE: 17 BRPM | OXYGEN SATURATION: 99 %

## 2022-06-13 DIAGNOSIS — F25.1 SCHIZOAFFECTIVE DISORDER, DEPRESSIVE TYPE (H): ICD-10-CM

## 2022-06-13 PROCEDURE — 99284 EMERGENCY DEPT VISIT MOD MDM: CPT | Performed by: EMERGENCY MEDICINE

## 2022-06-13 PROCEDURE — 99285 EMERGENCY DEPT VISIT HI MDM: CPT | Mod: 25 | Performed by: EMERGENCY MEDICINE

## 2022-06-13 PROCEDURE — 80307 DRUG TEST PRSMV CHEM ANLYZR: CPT | Performed by: EMERGENCY MEDICINE

## 2022-06-13 PROCEDURE — 90791 PSYCH DIAGNOSTIC EVALUATION: CPT

## 2022-06-13 ASSESSMENT — ENCOUNTER SYMPTOMS
SLEEP DISTURBANCE: 0
HALLUCINATIONS: 1
HYPERACTIVE: 0
NERVOUS/ANXIOUS: 0
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
## 2022-06-14 NOTE — DISCHARGE INSTRUCTIONS
Discharge home with your group home staff.    Continue working with your current outpatient providers.         Aftercare Plan  If I am feeling unsafe or I am in a crisis, I will:   Contact my established care providers   Call the [place] Suicide Prevention Lifeline: [phone]   Go to the nearest emergency room   Call [phone]     Warning signs that I or other people might notice when a crisis is developing for me:   -Commanding voices  -Feeling as though others are watching or speaking about me  -Ideations     Things I am able to do on my own to cope or help me feel better:   -Playing Video Games  -Reading Books     Things that I am able to do with others to cope or help me feel better:   -Speak with group home staff    Things I can use or do for distraction:  Try the [id number] game:       Name 5 things you can see in the room with you       Name 4 things you can feel ( clothing on your back  or   fan on your skin )        Name 3 things you can hear right now ( people talking ,  birds  or  tv )        Name 2 things you can smell right now (or, 2 things you like the smell of)        Name 1 good thing about yourself     Create A Safe Place       Image a safe place -- it can be a real or imaginary place:        What do you see -- especially colors?        What sounds do you hear?        What sensations do you feel?        What smells do you smell?        What people or animals would you want in your safe place?        Imagine a protective bubble, wall or boundary around your safe place.        Imagine a door or gate with a guard at your safe place.        Image a lock and key to your safe place and only you can unlock it.       You can draw or make a collage that represents your safe place.        Choose a souvenir of your safe place -- a color, an object, a song.        Keep your image of your safe place so you can come back to it when you need to    Grounding Techniques:       Try to notice where you are, your  "surroundings including the people, the sounds like the TV or radio.       Concentrate on your breathing. Take a deep cleansing breath from your diaphragm. Count the breaths as you exhale. Make sure you breath slowly.       Hold something that you find comforting, for some it may be a stuffed animal or a blanket. Notice how it feels in your hands. Is it hard or soft?       During a non-crisis time make a list of positive affirmations. Print them out and keep them handy for times of intense anxiety. At those times, read them aloud.     Change your body Temperature to change your autonomic nervous system        Use Ice Water to calm yourself down FAST        Splash ice water on your face, or hold an ice pack on your face      -Practice Urge Surfing      This is a mindfulness technique that can be used to help reduce impulsive behaviors. Take several big deep breaths and \"ride the wave\" before you act upon negative thoughts or strong reactions.      1. Identify the Physical Sensation in the Body. Stop for a few minutes and be mindful of your physical responses to your urge. You can close your eyes ...  2. Focus on the Sensations. 3. Notice Breathing. 4. Refocus on Your Body. 5 Stay Curious and Present.      -I will commit to 30 minutes of self care daily - this can be as simple as taking a shower, going for a walk, cooking a meal, reading, writing, watching something funny, cleaning your home, or evening looking up affirmations.     -I will practice square breathing when I begin to feel anxious - in breath through the nose for the count of 4 and the first line on the square. Out breath through the mouth for the count of 4 for the second line of the square. Repeat to complete the square. Repeat the square as many times as needed.     - I will use distraction skills of: going for walks, watching TV, spending time outside, calling a friend or family member, creating a playlist, write a hand written letter to a loved one, " or listening to a pod cast.    -Download a meditation john and spend 15-20 minutes per day mediating/relaxing. Some apps to download include: Calm, Headspace and Insight Timer. All 3 of these apps have free version     Changes I can make to support my mental health and wellness:   -Take Medications daily as prescribed, remove direct access to medications  -Have increased observation in the home  -Continue with one on one staff when able.   -Safety plan in the home, remove access to lighters, sharp objects, loose items such as sheets in the bedroom   -Schedule a therapy appointment as soon as possible and increase to weekly sessions  -Follow up with Psychiatrist  -Think about starting a group therapy such as a Day Treatment    -Come back to the Emergency Department with any new or worsening symptoms   -Use community resources, including hotline numbers, [facility] crisis and support meetings     People in my life that I can ask for help:   -  -Neela  -Group Home Staff  -Established Providers     Your [facility] has a mental health crisis team you can call 24/7: [facility] Crisis  [phone] (adults)  [phone] (children)    Other things that are important when I m in crisis:   [place] crisis line @ **CRISIS (**903078) or by texting  [place]  to 149336.      Crisis Intervention: 450.634.8044 or 138-052-6191 (TTY: 686.516.8285). Call anytime for help.     National [place] [employer] Mental Illness (www.[place].jorge a.org): 335.226.3153 or 967-555-6059.      Appointment information and/or additional resources available to me:   -Please schedule with therapist as soon as possible  -Please follow up with psychiatrist as soon as possible      Please think about obtaining a PHP/ Day Treatment evaluation (information below pulled from [facility] Website)    [facility]     Call [phone]     [facility] and [facility] offer full-service, outpatient treatment programs for  adults of any age who are facing depression, anxiety, or other mental health concerns. Our experienced, compassionate caregivers help patients find the treatment options that will work best for their unique situation.    We start with a diagnostic assessment to determine which of our programs will best meet the patient s needs. Following admission, our team develops a personalized treatment plan.    Our group-based programs provide:   A doctor or a certified nurse practitioner for consultation   A multidisciplinary care team of licensed professionals, including a psychotherapist, occupational therapist, and a nurse  A therapeutic environment where patients feel supported by peers who share the desire to regain stability and find peace of mind   A focus on symptom stabilization, stress reduction, wellness and improved day-to-day function  A discharge plan to support the recovery process     Adult Day Treatment - Program for individuals who are newly diagnosed or those who are experiencing an increase in symptoms from prolonged illness. Ten specialty tracks designed to meet individual needs of group members.  3 days a week, 9:00 a.m. - 12:00 p.m. or 1:00 - 4:00 p.m.    Partial Hospitalization - Program for individuals experiencing a significant change in their ability to function due to symptoms of depression and anxiety. Designed for those new to mental health services to reduce likelihood of hospitalization through intensive programming and psychiatric care.   5 days a week, 9:00 a.m. - 3:00 p.m.; Every other Tuesday, 1:00 - 6:00 p.m.    Crisis Lines  Crisis Text Line  Text 171292  You will be connected with a trained live crisis counselor to provide support.    Por espanol, texto  CORINE a 186330 o texto a 442-AYUDAME en [facility] Hope Line  1.800.SUICIDE [9500099]      Community Resources  Fast Tracker  Linking people to mental health and substance use disorder resources  fasttrackermn.org  "    [facility] Warm Line  Peer to peer support  Monday thru Saturday, 12 pm to 10 pm  244.331.1210 or 8.230.861.1566  Text \"Support\" to 89802    National [place] on Mental Illness (EMA)  588.175.0874 or 1.888.EMA.HELPS        Mental Health Apps  My3  https://Ecofoot.Priceonomics/    VirtualHopeBox  https://fanatix/apps/virtual-hope-box/      Additional Information  Today you were seen by a licensed mental health professional through Triage and Transition services, [facility] ([facility])  for a crisis assessment in the Emergency Department at [facility].  It is recommended that you follow up with your established providers (psychiatrist, mental health therapist, and/or primary care doctor - as relevant) as soon as possible. Coordinators from [facility] will be calling you in the next 24-48 hours to ensure that you have the resources you need.  You can also contact [facility] coordinators directly at [phone]. You may have been scheduled for or offered an appointment with a mental health provider. [facility] maintains an extensive network of licensed behavioral health providers to connect patients with the services they need.  We do not charge providers a fee to participate in our referral network.  We match patients with providers based on a patient's specific needs, insurance coverage, and location.  Our first effort will be to refer you to a provider within your care system, and will utilize providers outside your care system as needed.        "

## 2022-06-14 NOTE — ED TRIAGE NOTES
Triage Assessment     Row Name 06/13/22 2121       Triage Assessment (Adult)    Airway WDL WDL       Respiratory WDL    Respiratory WDL WDL       Skin Circulation/Temperature WDL    Skin Circulation/Temperature WDL X       Cardiac WDL    Cardiac WDL WDL       Peripheral/Neurovascular WDL    Peripheral Neurovascular WDL WDL

## 2022-06-14 NOTE — CONSULTS
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 your eyes ...  2. Focus on the Sensations. 3. Notice Breathing. 4. Refocus on Your Body. 5 Stay Curious and Present.      -I will commit to 30 minutes of self care daily - this can be as simple as taking a shower, going for a walk, cooking a meal, reading, writing, watching something funny, cleaning your home, or evening looking up affirmations.     -I will practice square breathing when I begin to feel anxious - in breath through the nose for the count of 4 and the first line on the square. Out breath through the mouth for the count of 4 for the second line of the square. Repeat to complete the square. Repeat the square as many times as needed.     - I will use distraction skills of: going for walks, watching TV, spending time outside, calling a friend or family member, creating a playlist, write a hand written letter to a loved one, or listening to a pod cast.    -Download a meditation john and spend 15-20 minutes per day mediating/relaxing. Some apps to download include: Calm, Headspace and Insight Timer. All 3 of these apps have free version     Changes I can make to support my mental health and wellness:   -Take Medications daily " as prescribed  -Have increased observation in the home  -Continue with one on one staff when able.   -Safety plan in the home, remove access to lighters, sharp objects, loose items such as sheets in the bedroom   -Schedule a therapy appointment as soon as possible and increase to weekly sessions  -Follow up with Psychiatrist  -Think about starting a group therapy such as a Day Treatment    -Come back to the Emergency Department with any new or worsening symptoms   -Use community resources, including hotline numbers, [facility] crisis and support meetings     People in my life that I can ask for help:   -Mother  -Neela  -Group Home Staff  -Established Providers     Your [facility] has a mental health crisis team you can call 24/7: [facility] Mobile Crisis  [phone] (adults)  [phone] (children)    Other things that are important when I m in crisis:   [place] crisis line @ **CRISIS (**742625) or by texting  [place]  to 379779.      Crisis Intervention: 797.894.2436 or 908-420-4886 (TTY: 271.314.1442). Call anytime for help.     National [place] [employer] Mental Illness (www.[place].jorge a.org): 444.263.4034 or 308-684-5755.      Appointment information and/or additional resources available to me:   -Please schedule with therapist as soon as possible  -Please follow up with psychiatrist as soon as possible      Please think about obtaining a PHP/ Day Treatment evaluation (information below pulled from [place] Website)    [place]     Call [phone]     [facility] offer full-service, outpatient treatment programs for adults of any age who are facing depression, anxiety, or other mental health concerns. Our experienced, compassionate caregivers help patients find the treatment options that will work best for their unique situation.    We start with a diagnostic assessment to determine which of our programs will best meet the patient s needs. Following  "admission, our team develops a personalized treatment plan.    Our group-based programs provide:   A doctor or a certified nurse practitioner for consultation   A multidisciplinary care team of licensed professionals, including a psychotherapist, occupational therapist, and a nurse  A therapeutic environment where patients feel supported by peers who share the desire to regain stability and find peace of mind   A focus on symptom stabilization, stress reduction, wellness and improved day-to-day function  A discharge plan to support the recovery process     Adult Day Treatment - Program for individuals who are newly diagnosed or those who are experiencing an increase in symptoms from prolonged illness. Ten specialty tracks designed to meet individual needs of group members.  3 days a week, 9:00 a.m. - 12:00 p.m. or 1:00 - 4:00 p.m.    Partial Hospitalization - Program for individuals experiencing a significant change in their ability to function due to symptoms of depression and anxiety. Designed for those new to mental health services to reduce likelihood of hospitalization through intensive programming and psychiatric care.   5 days a week, 9:00 a.m. - 3:00 p.m.; Every other Tuesday, 1:00 - 6:00 p.m.    Crisis Lines  Crisis Text Line  Text 686396  You will be connected with a trained live crisis counselor to provide support.    Por espanol, texto  CORINE a 087329 o texto a 442-AYUDAME en [facility] Line  1.800.SUICIDE [6891428]      Community Resources  Fast Tracker  Linking people to mental health and substance use disorder resources  fasttrackermn.org     [place] Mental Health Warm Line  Peer to peer support  Monday thru Saturday, 12 pm to 10 pm  696.711.2381 or 8.073.043.8753  Text \"Support\" to 71792    National [place] on Mental Illness (EMA)  881.278.1115 or 1.888.EMA.HELPS        Mental Health Apps  My3  https://Ask Ziggy.org/    [employer]  " https://[employer]/apps/virtual-hope-box/      Additional Information  Today you were seen by a licensed mental health professional through Triage and Transition services, [facility])  for a crisis assessment in the Emergency Department at [facility].  It is recommended that you follow up with your established providers (psychiatrist, mental health therapist, and/or primary care doctor - as relevant) as soon as possible. Coordinators from [facility] will be calling you in the next 24-48 hours to ensure that you have the resources you need.  You can also contact [facility] coordinators directly at [phone]. You may have been scheduled for or offered an appointment with a mental health provider. [facility] maintains an extensive network of licensed behavioral health providers to connect patients with the services they need.  We do not charge providers a fee to participate in our referral network.  We match patients with providers based on a patient's specific needs, insurance coverage, and location.  Our first effort will be to refer you to a provider within your care system, and will utilize providers outside your care system as needed.

## 2022-06-15 ENCOUNTER — PATIENT OUTREACH (OUTPATIENT)
Dept: CARE COORDINATION | Facility: CLINIC | Age: 32
End: 2022-06-15
Payer: COMMERCIAL

## 2022-06-15 DIAGNOSIS — Z71.89 OTHER SPECIFIED COUNSELING: ICD-10-CM

## 2022-06-15 NOTE — PROGRESS NOTES
Clinic Care Coordination Contact  [facility]/Voicemail       Clinical Data: Care Coordinator Outreach  Outreach attempted x 1.  Left message on patient's voicemail with call back information and requested return call.  Plan: Care Coordinator will try to reach patient again in 1-2 business days.    SAMANTHA Murphy  Connected Care Resource Center  [facility]     *Connected Care Resource Team does NOT follow patient ongoing. Referrals are identified based on internal discharge reports and the outreach is to ensure patient has an understanding of their discharge instructions.

## 2022-06-16 NOTE — PROGRESS NOTES
Clinic Care Coordination Contact    Background: Care Coordination referral placed from [facility] discharge report for reason of patient meeting criteria for a TCM outreach call by [facility] team.    Assessment: Upon chart review, [facility] Team member will cancel/close the referral for TCM outreach due to reason below:    Patient has discharged to a Group home, Memory Care or Nursing Home    Plan: Care Coordination referral for TCM outreach canceled.    SAMANTHA Paiz  Connected Care Resource [place], [facility]

## 2022-06-29 ENCOUNTER — HOSPITAL ENCOUNTER (EMERGENCY)
Facility: CLINIC | Age: 32
Discharge: HOME | End: 2022-06-30
Attending: PSYCHIATRY & NEUROLOGY | Admitting: PSYCHIATRY & NEUROLOGY
Payer: COMMERCIAL

## 2022-06-29 DIAGNOSIS — F25.1 SCHIZOAFFECTIVE DISORDER, DEPRESSIVE TYPE (H): ICD-10-CM

## 2022-06-29 PROCEDURE — 250N000013 HC RX MED GY IP 250 OP 250 PS 637

## 2022-06-29 PROCEDURE — 99285 EMERGENCY DEPT VISIT HI MDM: CPT | Mod: 25 | Performed by: PSYCHIATRY & NEUROLOGY

## 2022-06-29 PROCEDURE — 99284 EMERGENCY DEPT VISIT MOD MDM: CPT | Performed by: PSYCHIATRY & NEUROLOGY

## 2022-06-29 PROCEDURE — 80307 DRUG TEST PRSMV CHEM ANLYZR: CPT | Performed by: PSYCHIATRY & NEUROLOGY

## 2022-06-29 PROCEDURE — 85025 COMPLETE CBC W/AUTO DIFF WBC: CPT | Performed by: PSYCHIATRY & NEUROLOGY

## 2022-06-29 PROCEDURE — 90791 PSYCH DIAGNOSTIC EVALUATION: CPT

## 2022-06-29 PROCEDURE — 36415 COLL VENOUS BLD VENIPUNCTURE: CPT | Performed by: PSYCHIATRY & NEUROLOGY

## 2022-06-29 RX ORDER — OLANZAPINE 5 MG/1
TABLET, ORALLY DISINTEGRATING ORAL
Status: COMPLETED
Start: 2022-06-29 | End: 2022-06-29

## 2022-06-29 RX ORDER — CLOZAPINE 100 MG/1
400 TABLET ORAL AT BEDTIME
Status: DISCONTINUED | OUTPATIENT
Start: 2022-06-30 | End: 2022-06-30 | Stop reason: HOSPADM

## 2022-06-29 RX ORDER — OLANZAPINE 10 MG/1
10 TABLET, ORALLY DISINTEGRATING ORAL ONCE
Status: COMPLETED | OUTPATIENT
Start: 2022-06-29 | End: 2022-06-29

## 2022-06-29 RX ADMIN — OLANZAPINE 10 MG: 10 TABLET, ORALLY DISINTEGRATING ORAL at 23:32

## 2022-06-29 RX ADMIN — OLANZAPINE 10 MG: 5 TABLET, ORALLY DISINTEGRATING ORAL at 23:32

## 2022-06-29 ASSESSMENT — ENCOUNTER SYMPTOMS
NEUROLOGICAL NEGATIVE: 1
DECREASED CONCENTRATION: 1
RESPIRATORY NEGATIVE: 1
HYPERACTIVE: 0
MUSCULOSKELETAL NEGATIVE: 1
CARDIOVASCULAR NEGATIVE: 1
NERVOUS/ANXIOUS: 1
CONSTITUTIONAL NEGATIVE: 1
GASTROINTESTINAL NEGATIVE: 1

## 2022-06-30 VITALS
SYSTOLIC BLOOD PRESSURE: 106 MMHG | TEMPERATURE: 98.3 F | OXYGEN SATURATION: 99 % | RESPIRATION RATE: 18 BRPM | DIASTOLIC BLOOD PRESSURE: 73 MMHG | HEART RATE: 67 BPM

## 2022-06-30 LAB
BASOPHILS # BLD AUTO: 0 10E3/UL (ref 0–0.2)
BASOPHILS NFR BLD AUTO: 1 %
EOSINOPHIL # BLD AUTO: 0 10E3/UL (ref 0–0.7)
EOSINOPHIL NFR BLD AUTO: 0 %
ERYTHROCYTE [DISTWIDTH] IN BLOOD BY AUTOMATED COUNT: 12.6 % (ref 10–15)
HCT VFR BLD AUTO: 39.3 % (ref 40–53)
HGB BLD-MCNC: 13.6 G/DL (ref 13.3–17.7)
IMM GRANULOCYTES # BLD: 0.1 10E3/UL
IMM GRANULOCYTES NFR BLD: 1 %
LYMPHOCYTES # BLD AUTO: 2.2 10E3/UL (ref 0.8–5.3)
LYMPHOCYTES NFR BLD AUTO: 29 %
MCH RBC QN AUTO: 31.9 PG (ref 26.5–33)
MCHC RBC AUTO-ENTMCNC: 34.6 G/DL (ref 31.5–36.5)
MCV RBC AUTO: 92 FL (ref 78–100)
MONOCYTES # BLD AUTO: 0.7 10E3/UL (ref 0–1.3)
MONOCYTES NFR BLD AUTO: 10 %
NEUTROPHILS # BLD AUTO: 4.6 10E3/UL (ref 1.6–8.3)
NEUTROPHILS NFR BLD AUTO: 59 %
NRBC # BLD AUTO: 0 10E3/UL
NRBC BLD AUTO-RTO: 0 /100
PLATELET # BLD AUTO: 208 10E3/UL (ref 150–450)
RBC # BLD AUTO: 4.26 10E6/UL (ref 4.4–5.9)
WBC # BLD AUTO: 7.6 10E3/UL (ref 4–11)

## 2022-06-30 NOTE — ED NOTES
Writer spoke with  staff, accepting patient back. Requesting a ride, they do not have staff to  the patient.
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
## 2022-06-30 NOTE — ED NOTES
Patient discharged all instructions and safety plan reviewed.  staff notified patient is on the way.

## 2022-06-30 NOTE — ED NOTES
Pt starting banging his head against the wall and tried to leave after speaking with the , redirected. Dr. Uribe here and started banging his head again, verbal order received for Zyprexa 10 mg, pt willing to take that. Pt continues to be on 1:1 attendant. Pt calm and lying on bed in [place].

## 2022-06-30 NOTE — DISCHARGE INSTRUCTIONS
Date: Tuesday, 7/5/2022  Time: 3:00 pm - 4:00 pm  Provider: Hallie Lam  Location: [facility], [address] [place] [address]  Phone: [phone]  Type: Teletherapy  For all appointments: you will be sent information to fill out the intake paperwork online. Please fill the paperwork prior to your appointment. For telehealth appointments: you will also be sent a zoom link to attend the appointment remotely.      Aftercare Plan  If I am feeling unsafe or I am in a crisis, I will:   Contact my established care providers   Call the National Suicide Prevention Lifeline: 648.600.1284   Go to the nearest emergency room   Call 146     Warning signs that I or other people might notice when a crisis is developing for me: increased thoughts of harming myself or others, acting on these thoughts    Things I am able to do on my own or with others to cope or help me feel better: talk to group home staff, talk to family, talk to therapist once matched, go for a walk, play PixSpree, practice TIPP skills listed below    Changes I can make to support my mental health and wellness: attend individual therapy, talk to my loved ones and care providers about my mental health symptoms, adhere to treatment recommendations, continue to take medications as prescribed and follow up with outpatient care providers.     People in my life that I can ask for help: group home staff, family, therapist, psychiatrist.     Your [facility] has a mental health crisis team you can call 24/7: [facility] Mobile Crisis  [phone] (adults)  [phone] (children)      Crisis Lines  Crisis Text Line  Text 292611  You will be connected with a trained live crisis counselor to provide support.    Por espanol, texto  CORINE a 283132 o texto a 442-AYUDAME en WhatsApp    The Damien Project (LGBTQ Youth Crisis Line)  1.281.106.7677  text START to 482-761      Community Resources  Fast Tracker  Linking people to mental health and substance  "use disorder resources  [employer].STEERads     [place] Mental Health Warm Line  Peer to peer support  Monday thru Saturday, 12 pm to 10 pm  537.324.9128 or 4.537.353.3019  Text \"Support\" to 62272    National [place] on Mental Illness (EMA)  027.885.9484 or 1.888.EMA.HELPS      Mental Health Apps  My3  https://CVN Networks.STEERads/    VirtualRocketboomeBox  https://Invidio.org/apps/virtual-hope-box/      Crisis Lines  Crisis Text Line  Text 043423  You will be connected with a trained live crisis counselor to provide support.    Por espanol, texto  CORINE a 561621 o texto a 442-AYUDAME en WhatsApp    National Hope Line  1.800.SUICIDE [5935400]      Community Resources  Fast Tracker  Linking people to mental health and substance use disorder resources  Smart Wire Grid     [place] Mental Health Warm Line  Peer to peer support  Monday thru Saturday, 12 pm to 10 pm  074.585.7164 or 2.725.890.0200  Text \"Support\" to 69619    National [place] on Mental Illness (EMA)  119.659.8319 or 1.888.EMA.HELPS      Mental Health Apps  My3  https://Moto Europa/    Geeksphone  https://Invidio.org/apps/virtual-hope-box/      Additional Information  Today you were seen by a licensed mental health professional through Triage and Transition services, [facility] ([facility])  for a crisis assessment in the Emergency Department at [facility].  It is recommended that you follow up with your established providers (psychiatrist, mental health therapist, and/or primary care doctor - as relevant) as soon as possible. Coordinators from [facility] will be calling you in the next 24-48 hours to ensure that you have the resources you need.  You can also contact [facility] coordinators directly at [phone]. You may have been scheduled for or offered an appointment with a mental health provider. [facility] maintains an extensive network of licensed behavioral health providers to connect patients with the services they need.  " We do not charge providers a fee to participate in our referral network.  We match patients with providers based on a patient's specific needs, insurance coverage, and location.  Our first effort will be to refer you to a provider within your care system, and will utilize providers outside your care system as needed.      Reduce Extreme Emotion  QUICKLY:  Changing Your Body Chemistry      T:  Change your body Temperature to change your autonomic nervous system   Use Ice Water to calm yourself down FAST   Put your face in a bowl of ice water (this is the best way; have the person keep his/her face in ice water for 30-45 seconds - initial research is showing that the longer s/he can hold her/his face in the water, the better the response), or   Splash ice water on your face, or hold an ice pack on your face      I:  Intensely exercise to calm down a body revved up by emotion   Examples: running, walking fast, jumping, playing basketball, weight lifting, swimming, calisthenics, etc.   Engage in exercises that DO NOT include violent behaviors. Exercises that utilize violent behaviors tend to function as  behavioral rehearsal,  and rather than calming the person down, may actually  rev  the person up more, increasing the likelihood of violence, and lessening the likelihood that they will  burn off  energy     P:  Progressively relax your muscles   Starting with your hands, moving to your forearms, upper arms, shoulders, neck, forehead, eyes, cheeks and lips, tongue and teeth, chest, upper back, stomach, buttocks, thighs, calves, ankles, feet   Tense (10 seconds,   of the way), then relax each muscle (all the way)   Notice the tension   Notice the difference when relaxed (by tensing first, and then relaxing, you are able to get a more thorough relaxation than by simply relaxing)     P: Paced breathing to relax   The standard technique is to begin with counting the number of steps one takes for a typical inhale, then  counting the steps one takes for a typical exhale, and then lengthening the amount of steps for the exhalation by one or two steps.  OR  Repeat this pattern for 1-2 minutes  Inhale for four (4) seconds   Exhale for six (6) to eight (8) seconds   Research demonstrated that one can change one's overall level of anxiety by doing this exercise for even a few minutes per day

## 2022-06-30 NOTE — ED NOTES
"[facility] Crisis Reassessment      Kamini Neal was reassessed  for the following reasons: follow up and support with additional aftercare planning. Pt was first seen on 6.30.2022 by LINDA Johnston; see the initial assessment note for details.      Patient Presentation    Initial ED presentation details:   Per initial assessment:  \"Pt was orientated in all spheres. Pt noted historically having auditory hallucinations that are commanding though denied them active at the time of encounter. Pt was calm and cooperative. Patient denied SIB, HI, or substance use. Patient was open to safety planning in regards to coping skills, having increased observation in the home, receiving a referral for lgbtq-informed therapy and following up with his psychiatrist.\"    Per Providers note on 6.29.2022 by Dr. Uribe:  \"Patient feels that he is at baseline and no longer feels unsafe returning to his group home. He however became upset when he was not allowed to leave right away and was banging his head against the wall. The behavior ensured that he can not be released yet and was offered a dose of Zyprexa. Patient will also be given his HS clozapine. He now will likely need to be monitored overnight and can be discharged when he can consistently show that he can maintain emotional and behavioral control for the duration of his stay in the ED.\"     Current patient presentation:   Pt was tired and remained laying down through out assessment however, was able to appropriately engage. Pt initially stated that he did not feel safe returning to his group home because he wants the voices to stop however, with further discussion and time spent practicing coping skills Pt was open and actively engaged in safety planning.     Changes observed since initial assessment:   Pt was calm and cooperative. Patient denied SIB, HI, or substance use. Patient was open to safety planning in regards to coping skills and outpatient services.      Risk of " Harm    Is the patient experiencing current suicidal ideation: Yes. Thoughts to kill self with no plan or intent    Does the patient have thoughts of harming others? No      Mental Status Exam   Affect: Appropriate   Appearance: Appropriate    Attention Span/Concentration: Attentive?    Eye Contact: Variable   Fund of Knowledge: Appropriate    Language /Speech Content: Fluent   Language /Speech Volume: Soft    Language /Speech Rate/Productions: Normal    Recent Memory: Intact   Remote Memory: Intact   Mood: Normal    Orientation to Person: Yes    Orientation to Place: Yes   Orientation to Time of Day: Yes    Orientation to Date: Yes    Situation (Do they understand why they are here?): Yes    Psychomotor Behavior: Normal    Thought Content: Hallucinations   Thought Form: Intact       Additional Collateral Information   Review of epic records.   Bedside nurse Karina Black connected with group home staff and schedule transport.       Therapeutic Intervention  The following therapeutic methodologies were employed when working with the patient: Establishing rapport, Active listening, Assess dimensions of crisis, Brief Supportive Therapy and Safety planning. Patient response to intervention: pt was receptive       Disposition  Recommended disposition: Group Home: D/C to care of group home and follow up with outpatient appointments      Reviewed case and recommendations with attending provider. Attending Name: Dr. Swann.   Writer spoke with charge nurse Chloe who report she was informed pt could discharge by provider and transport was scheduled.     Attending concurs with disposition: Yes      Patient concurs with disposition: Yes      Final disposition: Group home: [facility] .       Clinical Substantiation of Recommendations  A this time the pt is not presenting as an acute risk to self or others due to the following factors: Denied SI intent at the end of assessment, able to contract for  safety, willing and wanting to discharge back to group home, willing and wanting to accept referral for therapy, denying any HI, SIB, or substance use, and receiving 1:1s on his group home.   Pt actively reviewed and added to safety plan created last night. A new therapy appointment was scheduled as pt would not be home in time to engage in appointment scheduled yesterday.   New appointment scheduled and antonio signed:  Date: Tuesday, 7/5/2022  Time: 3:00 pm - 4:00 pm  Provider: Hallie Lam  Location: [facility], [address] [place] [address]  Phone: [phone]  Type: Teletherapy      Assessment Details  Total duration spent on the patient case in minutes: .50 hrs     CPT code(s) utilized: 30154 - Psychotherapy (with patient) - 30 (16-37*) min       SUZANNE Rajput     Aftercare Plan  If I am feeling unsafe or I am in a crisis, I will:   Contact my established care providers   Call the National Suicide Prevention Lifeline: 807.651.6804   Go to the nearest emergency room   Call 500      Warning signs that I or other people might notice when a crisis is developing for me: increased thoughts of harming myself or others, acting on these thoughts     Things I am able to do on my own or with others to cope or help me feel better: talk to group home staff, talk to family, talk to therapist once matched, go for a walk, play TapHome, practice TIPP skills listed below     Changes I can make to support my mental health and wellness: attend individual therapy, talk to my loved ones and care providers about my mental health symptoms, adhere to treatment recommendations, continue to take medications as prescribed and follow up with outpatient care providers.      People in my life that I can ask for help: group home staff, family, therapist, psychiatrist.      Your [facility] has a mental health crisis team you can call 24/7: [facility] Mobile Crisis  [phone] (adults)  [phone]  "(children)        Crisis Lines  Crisis Text Line  Text 604385  You will be connected with a trained live crisis counselor to provide support.     Por eliza, texto  CORINE a 262072 o texto a 442-AYUDAME en WhatsApp     The Damien Project (LGBTQ Youth Crisis Line)  1.695.282.5967  text START to 672-678        Community Resources  Fast Tracker  Linking people to mental health and substance use disorder resources  FREECULTR.Homevv.com      [place] Mental Health Warm Line  Peer to peer support  Monday thru Saturday, 12 pm to 10 pm  948.655.9190 or 1.811.896.1974  Text \"Support\" to 93921     National [place] on Mental Illness (EMA)  458.842.6343 or 1.888.EMA.HELPS        Mental Health Apps  My3  https://ActiveGift/     GATHER & SAVE  https://Sotera Wireless.Homevv.com/apps/virtual-hope-box/        Crisis Lines  Crisis Text Line  Text 050809  You will be connected with a trained live crisis counselor to provide support.     Por eliza, texto  CORINE a 684641 o texto a 442-AYUDAME en WhatsApp     National Hope Line  1.800.SUICIDE [1726777]        Community Resources  Fast Tracker  Linking people to mental health and substance use disorder resources  FREECULTR.Homevv.com      [place] Mental Health Warm Line  Peer to peer support  Monday thru Saturday, 12 pm to 10 pm  520.985.1354 or 5.746.717.9103  Text \"Support\" to 94361     National [place] on Mental Illness (EMA)  416.201.6147 or 1.888.EMA.HELPS        Mental Health Apps  My3  https://Local Marketers.Homevv.com/     GATHER & SAVE  https://Sotera Wireless.org/apps/virtual-hope-box/        Additional Information  Today you were seen by a licensed mental health professional through Triage and Transition services, [facility] ([facility])  for a crisis assessment in the Emergency Department at [facility].  It is recommended that you follow up with your established providers (psychiatrist, mental health therapist, and/or primary care doctor - as relevant) " as soon as possible. Coordinators from [facility] will be calling you in the next 24-48 hours to ensure that you have the resources you need.  You can also contact [facility] coordinators directly at [phone]. You may have been scheduled for or offered an appointment with a mental health provider. [facility] maintains an extensive network of licensed behavioral health providers to connect patients with the services they need.  We do not charge providers a fee to participate in our referral network.  We match patients with providers based on a patient's specific needs, insurance coverage, and location.  Our first effort will be to refer you to a provider within your care system, and will utilize providers outside your care system as needed.       Reduce Extreme Emotion  QUICKLY:  Changing Your Body Chemistry      T:  Change your body Temperature to change your autonomic nervous system     Use Ice Water to calm yourself down FAST     Put your face in a bowl of ice water (this is the best way; have the person keep his/her face in ice water for 30-45 seconds - initial research is showing that the longer s/he can hold her/his face in the water, the better the response), or     Splash ice water on your face, or hold an ice pack on your face      I:  Intensely exercise to calm down a body revved up by emotion     Examples: running, walking fast, jumping, playing basketball, weight lifting, swimming, calisthenics, etc.     Engage in exercises that DO NOT include violent behaviors. Exercises that utilize violent behaviors tend to function as  behavioral rehearsal,  and rather than calming the person down, may actually  rev  the person up more, increasing the likelihood of violence, and lessening the likelihood that they will  burn off  energy     P:  Progressively relax your muscles     Starting with your hands, moving to your forearms, upper arms, shoulders, neck, forehead, eyes, cheeks and lips, tongue and teeth, chest, upper back,  stomach, buttocks, thighs, calves, ankles, feet     Tense (10 seconds,   of the way), then relax each muscle (all the way)     Notice the tension     Notice the difference when relaxed (by tensing first, and then relaxing, you are able to get a more thorough relaxation than by simply relaxing)      P: Paced breathing to relax     The standard technique is to begin with counting the number of steps one takes for a typical inhale, then counting the steps one takes for a typical exhale, and then lengthening the amount of steps for the exhalation by one or two steps.  OR    Repeat this pattern for 1-2 minutes    Inhale for four (4) seconds     Exhale for six (6) to eight (8) seconds     Research demonstrated that one can change one's overall level of anxiety by doing this exercise for even a few minutes per day

## 2022-06-30 NOTE — ED NOTES
[facility] ED Mental Health Handoff Note:       Brief HPI:  This is a 31 year old male signed out to me by Dr. Uribe.  See initial ED Provider note for full details of the presentation. Patient with schizoaffective disorder. Lives in group home. SI worsens when upset. He came in suicidal but feelings resolved soon after arrival. Somewhat agitated, given clozaril and olanzapine PO. Likely discharge to group home in AM.     Home meds reviewed and ordered/administered: No    Medically stable for inpatient mental health admission: Yes.    Evaluated by mental health: yes. Plan for monitoring overnight and likely discharge back to group home in the morning if no acute events during the monitoring period.     Safety concerns: At the time I received sign out, there were no safety concerns.    Hold Status:  Active Orders   N/A       Exam:   Patient Vitals for the past 24 hrs:   BP Temp Temp src Pulse Resp SpO2   06/29/22 2218 120/65 99.1  F (37.3  C) Oral 97 18 100 %       ED Course:    Medications   cloZAPine (CLOZARIL) tablet 400 mg (has no administration in time range)   OLANZapine zydis (zyPREXA) ODT tab 10 mg (10 mg Oral Given 6/29/22 2332)            There were no significant events during my shift. Patient remained calm.     Patient was signed out to the oncoming provider, Dr. Swann with plan for return to group home once they can accept him back.       Impression:    ICD-10-CM    1. Schizoaffective disorder, depressive type (H)  F25.1        Plan:    plan for return to group home once they can accept him back.       RESULTS:   Results for orders placed or performed during the hospital encounter of 06/29/22 (from the past 24 hour(s))   Urine Drugs of Abuse Screen     Status: Normal    Collection Time: 06/29/22 11:02 PM    Narrative    The following orders were created for panel order Urine Drugs of Abuse Screen.  Procedure                               Abnormality         Status                     ---------                                -----------         ------                     Drug abuse screen 1 urin...[[id number]]  Normal              Final result                 Please view results for these tests on the individual orders.   Drug abuse screen 1 urine (ED)     Status: Normal    Collection Time: 06/29/22 11:02 PM   Result Value Ref Range    Amphetamines Urine Screen Negative Screen Negative    Barbiturates Urine Screen Negative Screen Negative    Benzodiazepines Urine Screen Negative Screen Negative    Cannabinoids Urine Screen Negative Screen Negative    Cocaine Urine Screen Negative Screen Negative    Opiates Urine Screen Negative Screen Negative   CBC with platelets differential     Status: Abnormal    Collection Time: 06/29/22 11:55 PM    Narrative    The following orders were created for panel order CBC with platelets differential.  Procedure                               Abnormality         Status                     ---------                               -----------         ------                     CBC with platelets and d...[[id number]]  Abnormal            Final result                 Please view results for these tests on the individual orders.   CBC with platelets and differential     Status: Abnormal    Collection Time: 06/29/22 11:55 PM   Result Value Ref Range    WBC Count 7.6 4.0 - 11.0 10e3/uL    RBC Count 4.26 (L) 4.40 - 5.90 10e6/uL    Hemoglobin 13.6 13.3 - 17.7 g/dL    Hematocrit 39.3 (L) 40.0 - 53.0 %    MCV 92 78 - 100 fL    MCH 31.9 26.5 - 33.0 pg    MCHC 34.6 31.5 - 36.5 g/dL    RDW 12.6 10.0 - 15.0 %    Platelet Count 208 150 - 450 10e3/uL    % Neutrophils 59 %    % Lymphocytes 29 %    % Monocytes 10 %    % Eosinophils 0 %    % Basophils 1 %    % Immature Granulocytes 1 %    NRBCs per 100 WBC 0 <1 /100    Absolute Neutrophils 4.6 1.6 - 8.3 10e3/uL    Absolute Lymphocytes 2.2 0.8 - 5.3 10e3/uL    Absolute Monocytes 0.7 0.0 - 1.3 10e3/uL    Absolute Eosinophils 0.0 0.0 - 0.7 10e3/uL     Absolute Basophils 0.0 0.0 - 0.2 10e3/uL    Absolute Immature Granulocytes 0.1 <=0.4 10e3/uL    Absolute NRBCs 0.0 10e3/uL             MD Og Roche Barrett Parker, MD  06/30/22 1012

## 2022-06-30 NOTE — ED NOTES
Bed: HW03  Expected date:   Expected time:   Means of arrival:   Comments:  Daniel 423 32 y/o M SI

## 2022-06-30 NOTE — ED NOTES
Sign out note: Kamini Neal is a 31 year old male was signed out to me by Dr. Foley at 0700 am.  Please see initial dictation for full details and history.  Patient has schizoaffective disorder and presents from a group home.  He was evaluated during the evening shift yesterday and plans were made to discharge the patient back to the group home.  Plan at time of sign out is discharge the patient back to the group home..       Course in the ED:  The patient had an uneventful morning shift and was discharged back to the group home by ambulance at approximately 11:15 AM.      This note was created in part by the use of Dragon voice recognition dictation system. Inadvertent grammatical errors and typographical errors may still exist.  MD Hue Nieves Alda L, MD  06/30/22 7599

## 2022-06-30 NOTE — ED PROVIDER NOTES
ED Provider Note  [facility]      History     Chief Complaint   Patient presents with     Suicidal     EMS reports coming from ,  suicidal thoughts for a week and hearing voices which is chronic. Pt reports he was feeling safe today suicidal thoughts with plans, staff called.     HPI  Kamini Neal is a 31 year old male who is here coming from his group where he reports feeling suicidal as he felt distress. Patient has schizoaffective disorder and today was having breakthrough SI. He has history of being seen here previously. He usually feels better after spending time in the ED and speaking with the therapist. He was already allegedly starting to feel better when the  saw him and he explained his anguish about his struggles with coming out as dunn and feels it is in conflict with his culture and he will be stigmatized. He was interested in seeing a therapist to work through this. He was feeling safe and ready to go back to his group home. The group home was comfortable with his return but it is too late to have staff pick him up so he will need ambulance transportation.    Patient started to again feel distress when he was not allowed to leave when he wanted to. He then started to to bang his head against the wall but was easily redirected. He agreed to take a dose of Zyprexa.    Patient does not appear psychotic.    Please see DEC Crisis Assessment on 6/29/22 in Epic for further details.    PERSONAL MEDICAL HISTORY  Past Medical History:   Diagnosis Date     Anxiety      Depressive disorder      Schizo affective schizophrenia (H)      PAST SURGICAL HISTORY  Past Surgical History:   Procedure Laterality Date     TONSILLECTOMY       FAMILY HISTORY  Family History   Problem Relation Age of Onset     Mental Illness Maternal Uncle      Mental Illness Maternal Aunt      Glaucoma No family hx of      Macular Degeneration No family hx of      SOCIAL HISTORY  Social History  "    Tobacco Use     Smoking status: Current Every Day Smoker     Packs/day: 0.50     Types: Vaping Device     Smokeless tobacco: Never Used   Substance Use Topics     Alcohol use: No     MEDICATIONS  No current facility-administered medications for this encounter.     Current Outpatient Medications   Medication     benztropine (COGENTIN) 1 MG tablet     cholecalciferol (VITAMIN D3) 125 mcg (5000 units) capsule     cloZAPine (CLOZARIL) 100 MG tablet     cloZAPine (CLOZARIL) 200 MG tablet     cloZAPine (CLOZARIL) 50 MG tablet     docusate sodium (COLACE) 100 MG capsule     gabapentin (NEURONTIN) 400 MG capsule     glycopyrrolate (ROBINUL) 1 MG tablet     levothyroxine (SYNTHROID/LEVOTHROID) 112 MCG tablet     OLANZapine (ZYPREXA) 20 MG tablet     omeprazole (PRILOSEC) 20 MG DR capsule     paliperidone (INVEGA SUSTENNA) 234 MG/1.5ML ANTHONY     polyethylene glycol (MIRALAX) 17 g packet     prazosin (MINIPRESS) 2 MG capsule     venlafaxine (EFFEXOR-XR) 75 MG 24 hr capsule     ALLERGIES  Allergies   Allergen Reactions     Haloperidol Other (See Comments)     Other reaction(s): Tardive Dyskinesia  Torticollis  Torticollis  Stiff Neck  Torticollis; reports \"stiff neck.\"             Review of Systems   Constitutional: Negative.    HENT: Negative.    Respiratory: Negative.    Cardiovascular: Negative.    Gastrointestinal: Negative.    Genitourinary: Negative.    Musculoskeletal: Negative.    Skin: Negative.    Neurological: Negative.    Psychiatric/Behavioral: Positive for decreased concentration and suicidal ideas. The patient is nervous/anxious. The patient is not hyperactive.    All other systems reviewed and are negative.        Physical Exam   BP: 120/65  Pulse: 97  Temp: 99.1  F (37.3  C)  Resp: 18  SpO2: 100 %  Physical Exam  Vitals and nursing note reviewed.   HENT:      Head: Normocephalic.   Eyes:      Pupils: Pupils are equal, round, and reactive to light.   Pulmonary:      Effort: Pulmonary effort is normal. "   Musculoskeletal:         General: Normal range of motion.      Cervical back: Normal range of motion.   Neurological:      General: No focal deficit present.      Mental Status: He is alert.   Psychiatric:         Attention and Perception: Attention and perception normal. He does not perceive auditory or visual hallucinations.         Mood and Affect: Mood normal. Affect is blunt and inappropriate.         Speech: Speech normal.         Behavior: Behavior normal. Behavior is not agitated, aggressive, hyperactive or combative. Behavior is cooperative.         Thought Content: Thought content normal. Thought content is not paranoid or delusional. Thought content does not include homicidal or suicidal ideation.         Cognition and Memory: Cognition and memory normal.         Judgment: Judgment is impulsive and inappropriate.         ED Course      Procedures         Mental Health Risk Assessment      PSS-3    Date and Time Over the past 2 weeks have you felt down, depressed, or hopeless? Over the past 2 weeks have you had thoughts of killing yourself? Have you ever attempted to kill yourself? When did this last happen? User   06/29/22 2213 yes yes yes more than 6 months ago TL      C-SSRS ([place])    Date and Time Q1 Wished to be Dead (Past Month) Q2 Suicidal Thoughts (Past Month) Q3 Suicidal Thought Method Q4 Suicidal Intent without Specific Plan Q5 Suicide Intent with Specific Plan Q6 Suicide Behavior (Lifetime) Within the Past 3 Months? RETIRED: Level of Risk per Screen Screening Not Complete User   06/29/22 2213 yes yes yes no yes yes -- -- -- TL              Suicide assessment completed by mental health (D.E.C., LCSW, etc.)       Results for orders placed or performed during the hospital encounter of 06/29/22   Drug abuse screen 1 urine (ED)     Status: Normal   Result Value Ref Range    Amphetamines Urine Screen Negative Screen Negative    Barbiturates Urine Screen Negative Screen Negative     Benzodiazepines Urine Screen Negative Screen Negative    Cannabinoids Urine Screen Negative Screen Negative    Cocaine Urine Screen Negative Screen Negative    Opiates Urine Screen Negative Screen Negative   Urine Drugs of Abuse Screen     Status: Normal    Narrative    The following orders were created for panel order Urine Drugs of Abuse Screen.  Procedure                               Abnormality         Status                     ---------                               -----------         ------                     Drug abuse screen 1 urin...[[id number]]  Normal              Final result                 Please view results for these tests on the individual orders.     Medications   OLANZapine zydis (zyPREXA) ODT tab 10 mg (10 mg Oral Given 6/29/22 5533)        Assessments & Plan (with Medical Decision Making)   Patient is here for a mental health assessment as he has concerns for feeling suicidal. Patient has schizoaffective disorder and at baseline has chronic SI. He has limited coping and has ongoing stress of dealing with the stigma of identifying himself as dunn in his culture. He feels he should be seeing a therapist to work through these negative thoughts. Patient feels that he is at baseline and no longer feels unsafe returning to his group home. He however became upset when he was not allowed to leave right away and was banging his head against the wall. The behavior ensured that he can not be released yet and was offered a dose of Zyprexa. Patient will also be given his HS clozapine. He now will likely need to be monitored overnight and can be discharged when he can consistently show that he can maintain emotional and behavioral control for the duration of his stay in the ED.    I have reviewed the nursing notes. I have reviewed the findings, diagnosis, plan and need for follow up with the patient.    New Prescriptions    No medications on file       Final diagnoses:   Schizoaffective disorder, depressive  type (H)       --  Gamaliel Uribe MD  [facility] EMERGENCY DEPARTMENT  6/29/2022     Gamaliel Uribe MD  06/29/22 8010

## 2022-08-10 ENCOUNTER — HOSPITAL ENCOUNTER (EMERGENCY)
Facility: CLINIC | Age: 32
Discharge: ANOTHER HOSPITAL | End: 2022-08-12
Attending: EMERGENCY MEDICINE | Admitting: EMERGENCY MEDICINE
Payer: COMMERCIAL

## 2022-08-10 DIAGNOSIS — R45.851 SUICIDE IDEATION: ICD-10-CM

## 2022-08-10 PROCEDURE — 99284 EMERGENCY DEPT VISIT MOD MDM: CPT | Performed by: EMERGENCY MEDICINE

## 2022-08-10 PROCEDURE — 250N000011 HC RX IP 250 OP 636

## 2022-08-10 PROCEDURE — 90791 PSYCH DIAGNOSTIC EVALUATION: CPT

## 2022-08-10 PROCEDURE — 80307 DRUG TEST PRSMV CHEM ANLYZR: CPT | Performed by: EMERGENCY MEDICINE

## 2022-08-10 PROCEDURE — 96372 THER/PROPH/DIAG INJ SC/IM: CPT

## 2022-08-10 PROCEDURE — 99285 EMERGENCY DEPT VISIT HI MDM: CPT | Mod: 25 | Performed by: EMERGENCY MEDICINE

## 2022-08-10 RX ORDER — OLANZAPINE 10 MG/2ML
10 INJECTION, POWDER, FOR SOLUTION INTRAMUSCULAR ONCE
Status: COMPLETED | OUTPATIENT
Start: 2022-08-10 | End: 2022-08-10

## 2022-08-10 RX ADMIN — OLANZAPINE 10 MG: 10 INJECTION, POWDER, LYOPHILIZED, FOR SOLUTION INTRAMUSCULAR at 21:06

## 2022-08-10 ASSESSMENT — ACTIVITIES OF DAILY LIVING (ADL)
ADLS_ACUITY_SCORE: 37
ADLS_ACUITY_SCORE: 37
ADLS_ACUITY_SCORE: 35

## 2022-08-10 NOTE — ED TRIAGE NOTES
Patient presents today after taking a sip of a chemical while he was at work Patient was brought in by co-worker. Patient reports not being suicidal; he says he does not know why he drank the chemical. Patient says he used to have a mental health problem. Patient denies history of suicidal behavior.      Triage Assessment     Row Name 08/10/22 1844       Triage Assessment (Adult)    Airway WDL WDL       Respiratory WDL    Respiratory WDL WDL       Skin Circulation/Temperature WDL    Skin Circulation/Temperature WDL WDL       Cardiac WDL    Cardiac WDL WDL       Peripheral/Neurovascular WDL    Peripheral Neurovascular WDL WDL       Cognitive/Neuro/Behavioral WDL    Cognitive/Neuro/Behavioral WDL WDL

## 2022-08-11 ENCOUNTER — TELEPHONE (OUTPATIENT)
Dept: BEHAVIORAL HEALTH | Facility: CLINIC | Age: 32
End: 2022-08-11

## 2022-08-11 ENCOUNTER — HOSPITAL ENCOUNTER (INPATIENT)
Age: 32
End: 2022-08-11
Attending: STUDENT IN AN ORGANIZED HEALTH CARE EDUCATION/TRAINING PROGRAM | Admitting: STUDENT IN AN ORGANIZED HEALTH CARE EDUCATION/TRAINING PROGRAM
Payer: COMMERCIAL

## 2022-08-11 LAB
ALBUMIN SERPL-MCNC: 3.9 G/DL (ref 3.4–5)
ALP SERPL-CCNC: 100 U/L (ref 40–150)
ALT SERPL W P-5'-P-CCNC: 21 U/L (ref 0–70)
ANION GAP SERPL CALCULATED.3IONS-SCNC: 5 MMOL/L (ref 3–14)
AST SERPL W P-5'-P-CCNC: 18 U/L (ref 0–45)
BASOPHILS # BLD AUTO: 0 10E3/UL (ref 0–0.2)
BASOPHILS NFR BLD AUTO: 1 %
BILIRUB SERPL-MCNC: 0.4 MG/DL (ref 0.2–1.3)
BUN SERPL-MCNC: 7 MG/DL (ref 7–30)
CALCIUM SERPL-MCNC: 9 MG/DL (ref 8.5–10.1)
CHLORIDE BLD-SCNC: 112 MMOL/L (ref 94–109)
CO2 SERPL-SCNC: 27 MMOL/L (ref 20–32)
CREAT SERPL-MCNC: 0.72 MG/DL (ref 0.66–1.25)
EOSINOPHIL # BLD AUTO: 0 10E3/UL (ref 0–0.7)
EOSINOPHIL NFR BLD AUTO: 0 %
ERYTHROCYTE [DISTWIDTH] IN BLOOD BY AUTOMATED COUNT: 12.5 % (ref 10–15)
GFR SERPL CREATININE-BSD FRML MDRD: >90 ML/MIN/1.73M2
GLUCOSE BLD-MCNC: 93 MG/DL (ref 70–99)
HCT VFR BLD AUTO: 38.9 % (ref 40–53)
HGB BLD-MCNC: 13.6 G/DL (ref 13.3–17.7)
IMM GRANULOCYTES # BLD: 0.1 10E3/UL
IMM GRANULOCYTES NFR BLD: 1 %
LYMPHOCYTES # BLD AUTO: 2.4 10E3/UL (ref 0.8–5.3)
LYMPHOCYTES NFR BLD AUTO: 29 %
MCH RBC QN AUTO: 32 PG (ref 26.5–33)
MCHC RBC AUTO-ENTMCNC: 35 G/DL (ref 31.5–36.5)
MCV RBC AUTO: 92 FL (ref 78–100)
MONOCYTES # BLD AUTO: 0.8 10E3/UL (ref 0–1.3)
MONOCYTES NFR BLD AUTO: 9 %
NEUTROPHILS # BLD AUTO: 5.2 10E3/UL (ref 1.6–8.3)
NEUTROPHILS NFR BLD AUTO: 60 %
NRBC # BLD AUTO: 0 10E3/UL
NRBC BLD AUTO-RTO: 0 /100
PLATELET # BLD AUTO: 217 10E3/UL (ref 150–450)
POTASSIUM BLD-SCNC: 3.7 MMOL/L (ref 3.4–5.3)
PROT SERPL-MCNC: 7 G/DL (ref 6.8–8.8)
RBC # BLD AUTO: 4.25 10E6/UL (ref 4.4–5.9)
SARS-COV-2 RNA RESP QL NAA+PROBE: NEGATIVE
SODIUM SERPL-SCNC: 144 MMOL/L (ref 133–144)
WBC # BLD AUTO: 8.5 10E3/UL (ref 4–11)

## 2022-08-11 PROCEDURE — 250N000013 HC RX MED GY IP 250 OP 250 PS 637: Performed by: EMERGENCY MEDICINE

## 2022-08-11 PROCEDURE — 80053 COMPREHEN METABOLIC PANEL: CPT | Performed by: EMERGENCY MEDICINE

## 2022-08-11 PROCEDURE — 36415 COLL VENOUS BLD VENIPUNCTURE: CPT | Performed by: EMERGENCY MEDICINE

## 2022-08-11 PROCEDURE — C9803 HOPD COVID-19 SPEC COLLECT: HCPCS | Performed by: EMERGENCY MEDICINE

## 2022-08-11 PROCEDURE — 85025 COMPLETE CBC W/AUTO DIFF WBC: CPT | Performed by: EMERGENCY MEDICINE

## 2022-08-11 PROCEDURE — U0003 INFECTIOUS AGENT DETECTION BY NUCLEIC ACID (DNA OR RNA); SEVERE ACUTE RESPIRATORY SYNDROME CORONAVIRUS 2 (SARS-COV-2) (CORONAVIRUS DISEASE [COVID-19]), AMPLIFIED PROBE TECHNIQUE, MAKING USE OF HIGH THROUGHPUT TECHNOLOGIES AS DESCRIBED BY CMS-2020-01-R: HCPCS | Performed by: EMERGENCY MEDICINE

## 2022-08-11 RX ORDER — DOCUSATE SODIUM 100 MG/1
100 CAPSULE, LIQUID FILLED ORAL 2 TIMES DAILY
Status: DISCONTINUED | OUTPATIENT
Start: 2022-08-11 | End: 2022-08-12 | Stop reason: HOSPADM

## 2022-08-11 RX ORDER — CLOZAPINE 50 MG/1
400 TABLET ORAL AT BEDTIME
Status: DISCONTINUED | OUTPATIENT
Start: 2022-08-11 | End: 2022-08-12 | Stop reason: HOSPADM

## 2022-08-11 RX ORDER — OLANZAPINE 10 MG/1
20 TABLET ORAL DAILY PRN
Status: DISCONTINUED | OUTPATIENT
Start: 2022-08-11 | End: 2022-08-12 | Stop reason: HOSPADM

## 2022-08-11 RX ORDER — BENZTROPINE MESYLATE 1 MG/1
1 TABLET ORAL 2 TIMES DAILY
Status: DISCONTINUED | OUTPATIENT
Start: 2022-08-11 | End: 2022-08-12 | Stop reason: HOSPADM

## 2022-08-11 RX ORDER — PRAZOSIN HYDROCHLORIDE 2 MG/1
4 CAPSULE ORAL AT BEDTIME
Status: DISCONTINUED | OUTPATIENT
Start: 2022-08-11 | End: 2022-08-12 | Stop reason: HOSPADM

## 2022-08-11 RX ORDER — POLYETHYLENE GLYCOL 3350 17 G/17G
17 POWDER, FOR SOLUTION ORAL DAILY PRN
Status: DISCONTINUED | OUTPATIENT
Start: 2022-08-11 | End: 2022-08-12 | Stop reason: HOSPADM

## 2022-08-11 RX ORDER — LEVOTHYROXINE SODIUM 112 UG/1
112 TABLET ORAL DAILY
Status: DISCONTINUED | OUTPATIENT
Start: 2022-08-12 | End: 2022-08-12 | Stop reason: HOSPADM

## 2022-08-11 RX ADMIN — OMEPRAZOLE 20 MG: 20 CAPSULE, DELAYED RELEASE ORAL at 21:05

## 2022-08-11 RX ADMIN — PRAZOSIN HYDROCHLORIDE 4 MG: 2 CAPSULE ORAL at 22:06

## 2022-08-11 RX ADMIN — DOCUSATE SODIUM 100 MG: 100 CAPSULE, LIQUID FILLED ORAL at 21:05

## 2022-08-11 RX ADMIN — BENZTROPINE MESYLATE 1 MG: 1 TABLET ORAL at 21:05

## 2022-08-11 RX ADMIN — GABAPENTIN 400 MG: 300 CAPSULE ORAL at 21:05

## 2022-08-11 RX ADMIN — NICOTINE POLACRILEX 2 MG: 2 GUM, CHEWING BUCCAL at 19:38

## 2022-08-11 RX ADMIN — NICOTINE POLACRILEX 2 MG: 2 GUM, CHEWING BUCCAL at 21:09

## 2022-08-11 ASSESSMENT — ACTIVITIES OF DAILY LIVING (ADL)
ADLS_ACUITY_SCORE: 37

## 2022-08-11 NOTE — TELEPHONE ENCOUNTER
Patient cleared and ready for behavioral bed placement: Yes   S: 02:18 DEC call, 30/M, [place] ED, Post SA    B: BIB coworker after pt ingested a cleaning chemical w/ the intent to end his life around 5PM. Initial DEC assessment recommended pt discharge back to  in AM as pt denied SI at that time. However pt was reassessed as he is now reporting SI w/ a plan to jump off a bridge. Pt attempted to walk out of the ED, resulting in a code 21 and seclusion.  staff reports that he has been med-compliant. Pt denied any known triggers for SA. Hx of schizoaffective d/o. No acute medical concerns. Medically cleared, eating, drinking, ambulating indep    A: 72 HH  Anywhere    Covid test neg  UDS negative    R: 5N/S / [place] / April  06:05 - Left message for on call, April. Left VM and awaiting callback. 06:42 -  April accepts for 5N/S. Placed pt in queue. 06:46 - Notified 5S, however there are no MICU beds available. 06:49 - Called April who will review chart to determine if pt needs MICU bed and will update intake on plan of care. Will pass to oncoming shift to follow.    Awaiting callback from April

## 2022-08-11 NOTE — ED NOTES
Update    Pt with past history of psychosis and suicidal behavior seen earlier by colleague for Behavioral Assessment following ingestion of chemical substance at work which he denied was a suicide attempt.  Behavior in ED was in control, risk appeared to have returned to baseline.  Pt resides in Group Home and plan was initially discharge back tonight, however following collateral from group home who had continued concerns about pt's safety, was revised to allow overnight monitoring with planned discharge in the morning.      Pt subsequently informed the physician that he was suicidal, that if discharged he would jump off a bridge to kill himself and then attempted to elope from the ED.  Code called, pt required physical and chemical restraint, placed on 72 hour hold for inpatient admission.

## 2022-08-11 NOTE — ED PROVIDER NOTES
ED Provider Note  [facility]      History     Chief Complaint   Patient presents with     Ingestion     Patient presents today after taking a sip of a chemical while he was at work Patient was brought in by co-worker. Patient reports not being suicidal; he says he does not know why he drank the chemical. Patient says he used to have a mental health problem. Patient denies history of suicidal behavior.      HPI  Kamini Neal is a 32 year old male who presents to the ED after taking one sip from a PNC cleaning bottle. Patient interview proved difficult with patient giving conflicting answers and not being truthful with staff. At 5:20pm, while he was working as a  in the [employer], he wanted to hurt himself because of an event that happened earlier that he didn't wish to disclose. He then drank a sip of a PNC cleaning bottle and called his group home to tell them what happened and they had him visit the ED. Initially, he told triage, the nurse, and myself that he denies this being an attempt to harm himself but he later endorsed SI with intent and plan to jump off a bridge after we discussed plans for discharge. His group home also stated they want him to be psychiatrically cleared of SI before he returns. Poison control was contacted and they stated this substance was not worrisome and was just a mucosal irritant. He denies any nausea, vomiting, diarrhea, abdominal pain, or vision changes. He did endorse one vertiginous event while in the waiting room that lasted 2 minutes while he was sitting but none since then. He denies any recent substance use and hasn't consumed any substances before or after the event except for a prn Olanzapine.     He has an extensive psychiatric history. He was previously diagnosed with Schizoaffective Disorder Bipolar Type, BPD, PTSD, Alcohol and Cannabis use disorder. He takes both Invega Sustenna  234mg IM [id number], Zyprexa, and Clozapine.   He  "has two prior ED visits for breakthrough SI on 6/29 and 6/13. He has an outpatient Psychiatrist Dr. Marco Campo with [facility] but no Therapist. His most recent psychiatric hospitalization was on 4/25/22 for command hallucinations telling him to kill himself.     Patient stated he doesn't use any substances. He initially stated he wasn't drinking any alcohol but he then stated he drank a couple beers on the weekend. Patient denied AVH.     Past Medical History  Past Medical History:   Diagnosis Date     Anxiety      Depressive disorder      Schizo affective schizophrenia (H)      Substance abuse (H)      Past Surgical History:   Procedure Laterality Date     TONSILLECTOMY       benztropine (COGENTIN) 1 MG tablet  cholecalciferol (VITAMIN D3) 125 mcg (5000 units) capsule  docusate sodium (COLACE) 100 MG capsule  gabapentin (NEURONTIN) 400 MG capsule  levothyroxine (SYNTHROID/LEVOTHROID) 112 MCG tablet  OLANZapine (ZYPREXA) 20 MG tablet  omeprazole (PRILOSEC) 20 MG DR capsule  polyethylene glycol (MIRALAX) 17 g packet  prazosin (MINIPRESS) 2 MG capsule  venlafaxine (EFFEXOR-XR) 75 MG 24 hr capsule  cloZAPine (CLOZARIL) 200 MG tablet  paliperidone (INVEGA SUSTENNA) 234 MG/1.5ML ANTHONY      Allergies   Allergen Reactions     Haloperidol Other (See Comments)     Other reaction(s): Tardive Dyskinesia  Torticollis  Torticollis  Stiff Neck  Torticollis; reports \"stiff neck.\"       Family History  Family History   Problem Relation Age of Onset     Mental Illness Maternal Uncle      Mental Illness Maternal Aunt      Glaucoma No family hx of      Macular Degeneration No family hx of      Social History   Social History     Tobacco Use     Smoking status: Current Every Day Smoker     Packs/day: 0.50     Types: Vaping Device     Smokeless tobacco: Never Used   Substance Use Topics     Alcohol use: No     Drug use: No      Past medical history, past surgical history, medications, allergies, family history, and social " "history were reviewed with the patient. No additional pertinent items.       Review of Systems  A complete review of systems was performed with pertinent positives and negatives noted in the HPI, and all other systems negative.    Physical Exam   BP: 125/88  Pulse: 99  Temp: 98.6  F (37  C)  Resp: 18  Height: 177.8 cm (5' 10\")  Weight: 113.4 kg (250 lb)  SpO2: 100 %  Physical Exam  Vitals reviewed.   Constitutional:       Appearance: He is obese.   HENT:      Head: Normocephalic.      Mouth/Throat:      Mouth: Mucous membranes are moist.      Comments: Poor dentition  Eyes:      Extraocular Movements: Extraocular movements intact.   Cardiovascular:      Rate and Rhythm: Normal rate and regular rhythm.      Heart sounds: Normal heart sounds.   Pulmonary:      Effort: Pulmonary effort is normal.      Breath sounds: Normal breath sounds.   Abdominal:      Palpations: Abdomen is soft.      Tenderness: There is no abdominal tenderness.   Musculoskeletal:         General: Normal range of motion.   Skin:     General: Skin is warm and dry.   Neurological:      General: No focal deficit present.      Mental Status: He is alert and oriented to person, place, and time.   Psychiatric:      Comments: agitated         ED Course      Procedures               Results for orders placed or performed during the hospital encounter of 08/10/22   Drug abuse screen 1 urine (ED)     Status: Normal   Result Value Ref Range    Amphetamines Urine Screen Negative Screen Negative    Barbiturates Urine Screen Negative Screen Negative    Benzodiazepines Urine Screen Negative Screen Negative    Cannabinoids Urine Screen Negative Screen Negative    Cocaine Urine Screen Negative Screen Negative    Opiates Urine Screen Negative Screen Negative   Comprehensive metabolic panel     Status: Abnormal   Result Value Ref Range    Sodium 144 133 - 144 mmol/L    Potassium 3.7 3.4 - 5.3 mmol/L    Chloride 112 (H) 94 - 109 mmol/L    Carbon Dioxide (CO2) 27 20 - " 32 mmol/L    Anion Gap 5 3 - 14 mmol/L    Urea Nitrogen 7 7 - 30 mg/dL    Creatinine 0.72 0.66 - 1.25 mg/dL    Calcium 9.0 8.5 - 10.1 mg/dL    Glucose 93 70 - 99 mg/dL    Alkaline Phosphatase 100 40 - 150 U/L    AST 18 0 - 45 U/L    ALT 21 0 - 70 U/L    Protein Total 7.0 6.8 - 8.8 g/dL    Albumin 3.9 3.4 - 5.0 g/dL    Bilirubin Total 0.4 0.2 - 1.3 mg/dL    GFR Estimate >90 >60 mL/min/1.73m2   Asymptomatic COVID-19 Virus (Coronavirus) by PCR Nasopharyngeal     Status: Normal    Specimen: Nasopharyngeal; Swab   Result Value Ref Range    SARS CoV2 PCR Negative Negative    Narrative    Testing was performed using the alexsander  SARS-CoV-2 & Influenza A/B Assay on the alexsander  Ya  System.  This test should be ordered for the detection of SARS-COV-2 in individuals who meet SARS-CoV-2 clinical and/or epidemiological criteria. Test performance is unknown in asymptomatic patients.  This test is for in vitro diagnostic use under the FDA EUA for laboratories certified under CLIA to perform moderate and/or high complexity testing. This test has not been FDA cleared or approved.  A negative test does not rule out the presence of PCR inhibitors in the specimen or target RNA in concentration below the limit of detection for the assay. The possibility of a false negative should be considered if the patient's recent exposure or clinical presentation suggests COVID-19.  [facility] are certified under the Clinical Laboratory Improvement Amendments of 1988 (CLIA-88) as qualified to perform moderate and/or high complexity laboratory testing.   CBC with platelets and differential     Status: Abnormal   Result Value Ref Range    WBC Count 8.5 4.0 - 11.0 10e3/uL    RBC Count 4.25 (L) 4.40 - 5.90 10e6/uL    Hemoglobin 13.6 13.3 - 17.7 g/dL    Hematocrit 38.9 (L) 40.0 - 53.0 %    MCV 92 78 - 100 fL    MCH 32.0 26.5 - 33.0 pg    MCHC 35.0 31.5 - 36.5 g/dL    RDW 12.5 10.0 - 15.0 %    Platelet Count 217 150 - 450 10e3/uL    %  Neutrophils 60 %    % Lymphocytes 29 %    % Monocytes 9 %    % Eosinophils 0 %    % Basophils 1 %    % Immature Granulocytes 1 %    NRBCs per 100 WBC 0 <1 /100    Absolute Neutrophils 5.2 1.6 - 8.3 10e3/uL    Absolute Lymphocytes 2.4 0.8 - 5.3 10e3/uL    Absolute Monocytes 0.8 0.0 - 1.3 10e3/uL    Absolute Eosinophils 0.0 0.0 - 0.7 10e3/uL    Absolute Basophils 0.0 0.0 - 0.2 10e3/uL    Absolute Immature Granulocytes 0.1 <=0.4 10e3/uL    Absolute NRBCs 0.0 10e3/uL   Urine Drugs of Abuse Screen     Status: Normal    Narrative    The following orders were created for panel order Urine Drugs of Abuse Screen.  Procedure                               Abnormality         Status                     ---------                               -----------         ------                     Drug abuse screen 1 urin...[[id number]]  Normal              Final result                 Please view results for these tests on the individual orders.   CBC with platelets differential     Status: Abnormal    Narrative    The following orders were created for panel order CBC with platelets differential.  Procedure                               Abnormality         Status                     ---------                               -----------         ------                     CBC with platelets and d...[[id number]]  Abnormal            Final result                 Please view results for these tests on the individual orders.     Medications   OLANZapine (zyPREXA) injection 10 mg (10 mg Intramuscular Given 8/10/22 2106)   nicotine (NICORETTE) gum 2 mg (2 mg Buccal Given 8/11/22 1938)   nicotine (NICORETTE) gum 2 mg (2 mg Buccal Given 8/11/22 2109)   nicotine (NICORETTE) gum 2 mg (2 mg Buccal Given 8/12/22 0906)   nicotine (NICORETTE) gum 2 mg (2 mg Buccal Given 8/12/22 1101)        Assessments & Plan (with Medical Decision Making)   Kamini Neal is a 32 year old male who presents to the ED after taking one sip of a PNC cleaning  bottle.    During his ED course, patient proved to be a poor historian due to not being truthful with staff. He initially wasn't endorsing SI. We consulted poison control who stated patient may endorse GI upset from the ingestion of the cleaning supply but no other concerning signs and Urine drug screen was normal. Once he was medically cleared and we were preparing for discharge, patient started saying he did have SI with intent and plan to jump off a bridge. Patient was asked if he wanted to sleep the night in the ED and be reassessed in the morning, but he demanded that if he wasn't admitted then he wanted to be discharged. When asked further questions about why he wanted to be admitted, he stated that he was previously admitted this year and it was therapeutically beneficial because it was safe and quiet. When asked to elaborate on how it differs from the group home he said it felt safer. When asked if the patient felt safe in the group home and if he had any social connections, he initially stated he did but then he was worried he might hurt himself if he leaves so he didn't feel safe enough to return. Because of this he called his  who then told staff that they wanted him to be psychiatrically cleared of no SI before being discharged back into their care. When asked what caused his recent depressive state, patient stated something happened over the past few days but he didn't want to elaborate any further. After some further probing, patient became agitated and started demanding to be discharged. He then left the exam room and started running to the exit. Patient was subsequently restrained then placed on a 72 hour hold due to his comments about SI with intent and plan and given Olanzapine 10mg IM. He was then placed into seclusion. He is currently awaiting assessment from the Behavioral Health team.     Once the decision was made for a behavioral health assessment, we ordered admission labs  to medically clear the patient.     I have reviewed the nursing notes. I have reviewed the findings, diagnosis, plan and need for follow up with the patient.  --    ED Attending Physician Attestation    I Gautam Ortiz MD, cared for this patient with the Resident. I have performed a history and physical examination of the patient and discussed management with the resident. I reviewed the resident's documentation above and agree with the documented findings and plan of care.    Summary of HPI, PE, ED Course   Patient is a 32 year old male evaluated in the emergency department for suicidal ideation, he drank a small volume of a cleaning solution that would be an irritant but nothing more.  He vacillated back-and-forth between being suicidal and not suicidal.  He likely has a history of chronic suicidality but he ultimately told us that if he is discharged he will jump off a bridge.  He then attempted to walk out of the department and a code 21 was called and he was placed on a 72-hour hold.  Report was called to intake by the psychiatry resident working in the emergency department.. Exam notable for suicidal ideation.. ED course notable for requiring parenteral olanzapine after attempt to elope. After the completion of care in the emergency department, the patient was admitted to inpatient.    Critical Care & Procedures  Not applicable.    Medical Decision Making  The medical record was reviewed and interpreted.  Current labs reviewed and interpreted.      Gautam Ortiz MD  Emergency Medicine           Discharge Medication List as of 8/12/2022  4:00 PM          Final diagnoses:   Suicide ideation     Tejas Jang DO  PGY-1 Resident  --  Gautam Ortiz  [facility] EMERGENCY DEPARTMENT  8/10/2022     Gautam Ortiz MD  08/20/22 0800

## 2022-08-11 NOTE — TELEPHONE ENCOUNTER
Per ED RN, DEC extended care reviewing if patient can discharge back to [facility]. Called [facility] and awaiting status update. Pt has been calm, cooperative and denies SI in ED per RN.

## 2022-08-11 NOTE — ED NOTES
"Around 2050. MD and the Resident went to patient room and spoke with the patient. A few minutes later the patient came out of his room and said \"open the door.\" MD said he will put the patient on hold. Patient heard the MD, started to walk fast. When this writer and security officers tried to stop patient , he ran towards the exit. Code [place] was paged. While waiting for code team, 2 security officers held the patient down since patient refused to cooperate. He was asked if he will take oral medication but patient did not answer. He was placed on a backboard and cart then transferred to ED14. Patient was placed in seclusion around 2108.  "

## 2022-08-11 NOTE — ED NOTES
[facility] Crisis Reassessment      Kamini Neal was reassessed at the request of patient for the following reasons: patient is stating they are no longer suicidal and would like to go home. Pt was first seen on 8/10 by Yeimy Simms; see the initial assessment note for details.      Patient Presentation    Initial ED presentation details: Patient presented to the ED for the following concerns: pt drank a chemical as a possible suicide attempt.    Current patient presentation: Patient is no longer endorsing SI with plan or intent. Patient able to safety plan and commit to safety with both writer and group home staff.     Changes observed since initial assessment: Patient appears to have cleared and is able to commit to safety with both writer and group home staff. Patient stated to this writer that they are no longer suicidal.       Risk of Harm    Is the patient experiencing current suicidal ideation: No    Does the patient have thoughts of harming others? No      Mental Status Exam   Affect: Constricted   Appearance: Appropriate    Attention Span/Concentration: Attentive?    Eye Contact: Avoidant   Fund of Knowledge: Appropriate    Language /Speech Content: Fluent   Language /Speech Volume: Soft    Language /Speech Rate/Productions: Normal    Recent Memory: Intact   Remote Memory: Intact   Mood: Anxious and Other: Better    Orientation to Person: Yes    Orientation to Place: Yes   Orientation to Time of Day: Yes    Orientation to Date: Yes    Situation (Do they understand why they are here?): Yes    Psychomotor Behavior: Normal    Thought Content: Clear   Thought Form: Intact       Additional Collateral Information   Writer contacted group home staff, Neela, to ensure patients needs were being meet and better understand expectations of group home staff for patient's transition back to group home. Neela relayed to writer that patient lied to  last evening and stated that they were suicidal and would hurt themselves  upon discharge. Neela asked if they could speak with patient before discharging, to which writer agreed. Patient and Neela were able to connect and Neela feels patient is in a good place and able to come back to group home at this time.       Therapeutic Intervention  The following therapeutic methodologies were employed when working with the patient: Establishing rapport, Assess dimensions of crisis, Apply solution-focused therapy to address current crisis, Identify additional supports and alternative coping skills and Establish a discharge plan. Patient response to intervention: receptive.      Disposition  Recommended disposition: Individual Therapy and Group Home     Reviewed case and recommendations with attending provider. Attending Name: Dr. Reyes     Attending concurs with disposition: No: See Clincial Substantiation Section for Details      Patient concurs with disposition: Yes      Final disposition: Inpatient Mental Health      Clinical Substantiation of Recommendations  Patient appears to be in better place mentally compared to that of initial assessment. It is questionable if patient has the cognitive ability to nature of self-destructive acts due to patient's pattern of expressing conflicting information to care team. Patient's behaviors, including acts of self-harm, may be patient's way of trying to communicate with care team and not sign of patient being in crisis. It is recommended that patient follow up with care team and therapist to better establish expectations and coping strategies for when patient is feeling dysregulated.      Update: At 2:35p writer contacted patient's provider to touch base about discharge planning. Patient's provider was hesitant to discharge patient due to impulsive behavior in emergency room (patient coded 8/10) and patient actually acting on thoughts of SI when historically, they have never acted on these thoughts. Writer supports this and will let group home staff know  patient will still be admitted.       Assessment Details  Total duration spent on the patient case in minutes: 1.25 hrs     CPT code(s) utilized: 60704 - Psychotherapy (with patient) - 30 (16-37*) min       Xena Ramirez MS   Licensed Mental Health Professional (LMHP), [facility]  [phone]        Aftercare Plan  If I am feeling unsafe or I am in a crisis, I will:   Contact my established care providers   Call the [place] Suicide Prevention Lifeline: 988  Go to the nearest emergency room   Call 911     Warning signs that I or other people might notice when a crisis is developing for me: I will experience increased depression, suicidal thoughts, voices, negative thoughts about self.    Things I am able to do on my own to cope or help me feel better: I can take a hot shower or warm bath. I can go for a walk or get in some exercise. I can practice my coping skills such as playing video games, listening to music, or watching funny movies and shows. I can  a new hobby such as reading, writing, or putting puzzles together.      Things that I am able to do with others to cope or help me better: I can talk to my mom or my care team. I can go to therapy. I can spend time with friends and other members of my group home. I can play video games with others. I can volunteer or take evening classes to  a new hobby.     Things I can use or do for distraction: Try grounding techniques to help you stay in the moment when you are feeling distressed:     The technique uses the five senses. A person should:    search for five things they can see  search for four things they can touch  search for three things they can hear  search for two things they can smell  search for one thing they can taste     To follow this grounding technique, a person should:    Place the feet firmly on the ground.  State the date and time.  Take slow, deep breaths.  State what they can observe in their present environment.  Remind  "themselves that they are in a safe place right now.  Observe their immediate surroundings and describe items in the room or environment.      Changes I can make to support my mental health and wellness: Be open with your group home staff. If you are struggling, they are there to help and support you. It is also highly recommended to stop drinking or at the very least, minimize drinking if you feel like it is negatively impacting you. Continue to meet with care team and take prescriptions as prescribed. Eat a healthy and balanced diet and practice good sleep hygiene by going to bed when it gets dark and getting up when it gets light out.     People in my life that I can ask for help: My mom, and my care team at my group home. Also other support staff such as therapist, , etc.      Your [facility] has a mental health crisis team you can call 24/7: [facility] Mobile Crisis  [phone]     Other things that are important when I m in crisis: Please support me in an empathic and nonjudgmental way. I know I have the resources and skills needed to get through a crisis, but in the moment it can be challenging for me to see that.     Additional resources and information: APPOINTMENT 8/15 (virtual) at [facility] at 10:00am with Kirk Cristobal MS. Please call ahead to fill out paperwork.     Crisis Lines  Crisis Text Line  Text 337206  You will be connected with a trained live crisis counselor to provide support.    National Hope Line  1.800.SUICIDE [2636171]      Community Resources  Fast Tracker  Linking people to mental health and substance use disorder resources  fasttrackermn.org     [place] Mental Health Warm Line  Peer to peer support  Monday thru Saturday, 12 pm to 10 pm  485.206.6799 or 0.828.150.8882  Text \"Support\" to 00106    [employer] Mental [employer]ness (EMA)  305.800.1804 or 1.888.EMA.HELPS        Mental Health Apps  My3  " https://my3app.org/    VirtualHopeBox  [email or web address]-[email or web address]
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
## 2022-08-11 NOTE — ED NOTES
"[facility] ED Mental Health Handoff Note:       Brief HPI:  This is a 32 year old male signed out to me by Dr. Cope.  See initial ED Provider note for full details of the presentation.  The patient is a 32-year-old male who presents to the emergency department with suicidal ideation and an attempt to harm himself by drinking a cleaning solution.  Patient is awaiting inpatient mental health admission.  He is on a 72-hour hold.    Home meds reviewed and ordered/administered: No    Medically stable for inpatient mental health admission: Yes.    Evaluated by mental health: Yes. The recommendation is for inpatient mental health treatment. Bed search in process    Safety concerns: At the time I received sign out, there were no safety concerns.    Hold Status:  Active Orders   Legal    Emergency Hospitalization Hold (72 Hr Hold)     Frequency: Effective Now     Start Date/Time: 08/10/22 2055      Number of Occurrences: Until Specified            Exam:   Patient Vitals for the past 24 hrs:   BP Temp Temp src Pulse Resp SpO2 Height Weight   08/10/22 1842 125/88 98.6  F (37  C) Oral 99 18 100 % -- --   08/10/22 1837 -- -- -- -- -- -- 1.778 m (5' 10\") 113.4 kg (250 lb)            ED Course:    Medications   OLANZapine (zyPREXA) injection 10 mg (10 mg Intramuscular Given 8/10/22 2106)            There were no significant events during my shift.    Patient to be signed out to the oncoming provider      Impression:    ICD-10-CM    1. Suicide ideation  R45.851        Plan:    1. Awaiting inpatient mental health admission/transfer.      RESULTS:   Results for orders placed or performed during the hospital encounter of 08/10/22 (from the past 24 hour(s))   Urine Drugs of Abuse Screen     Status: Normal    Collection Time: 08/10/22  6:56 PM    Narrative    The following orders were created for panel order Urine Drugs of Abuse Screen.  Procedure                               Abnormality         Status                   "   ---------                               -----------         ------                     Drug abuse screen 1 urin...[[id number]]  Normal              Final result                 Please view results for these tests on the individual orders.   Drug abuse screen 1 urine (ED)     Status: Normal    Collection Time: 08/10/22  6:56 PM   Result Value Ref Range    Amphetamines Urine Screen Negative Screen Negative    Barbiturates Urine Screen Negative Screen Negative    Benzodiazepines Urine Screen Negative Screen Negative    Cannabinoids Urine Screen Negative Screen Negative    Cocaine Urine Screen Negative Screen Negative    Opiates Urine Screen Negative Screen Negative   Asymptomatic COVID-19 Virus (Coronavirus) by PCR Nasopharyngeal     Status: Normal    Collection Time: 08/11/22 12:12 AM    Specimen: Nasopharyngeal; Swab   Result Value Ref Range    SARS CoV2 PCR Negative Negative    Narrative    Testing was performed using the alesxander  SARS-CoV-2 & Influenza A/B Assay on the alexsander  Ya  System.  This test should be ordered for the detection of SARS-COV-2 in individuals who meet SARS-CoV-2 clinical and/or epidemiological criteria. Test performance is unknown in asymptomatic patients.  This test is for in vitro diagnostic use under the FDA EUA for laboratories certified under CLIA to perform moderate and/or high complexity testing. This test has not been FDA cleared or approved.  A negative test does not rule out the presence of PCR inhibitors in the specimen or target RNA in concentration below the limit of detection for the assay. The possibility of a false negative should be considered if the patient's recent exposure or clinical presentation suggests COVID-19.  [facility] are certified under the Clinical Laboratory Improvement Amendments of 1988 (CLIA-88) as qualified to perform moderate and/or high complexity laboratory testing.   CBC with platelets differential     Status: Abnormal    Collection  Time: 08/11/22 12:21 AM    Narrative    The following orders were created for panel order CBC with platelets differential.  Procedure                               Abnormality         Status                     ---------                               -----------         ------                     CBC with platelets and d...[[id number]]  Abnormal            Final result                 Please view results for these tests on the individual orders.   Comprehensive metabolic panel     Status: Abnormal    Collection Time: 08/11/22 12:21 AM   Result Value Ref Range    Sodium 144 133 - 144 mmol/L    Potassium 3.7 3.4 - 5.3 mmol/L    Chloride 112 (H) 94 - 109 mmol/L    Carbon Dioxide (CO2) 27 20 - 32 mmol/L    Anion Gap 5 3 - 14 mmol/L    Urea Nitrogen 7 7 - 30 mg/dL    Creatinine 0.72 0.66 - 1.25 mg/dL    Calcium 9.0 8.5 - 10.1 mg/dL    Glucose 93 70 - 99 mg/dL    Alkaline Phosphatase 100 40 - 150 U/L    AST 18 0 - 45 U/L    ALT 21 0 - 70 U/L    Protein Total 7.0 6.8 - 8.8 g/dL    Albumin 3.9 3.4 - 5.0 g/dL    Bilirubin Total 0.4 0.2 - 1.3 mg/dL    GFR Estimate >90 >60 mL/min/1.73m2   CBC with platelets and differential     Status: Abnormal    Collection Time: 08/11/22 12:21 AM   Result Value Ref Range    WBC Count 8.5 4.0 - 11.0 10e3/uL    RBC Count 4.25 (L) 4.40 - 5.90 10e6/uL    Hemoglobin 13.6 13.3 - 17.7 g/dL    Hematocrit 38.9 (L) 40.0 - 53.0 %    MCV 92 78 - 100 fL    MCH 32.0 26.5 - 33.0 pg    MCHC 35.0 31.5 - 36.5 g/dL    RDW 12.5 10.0 - 15.0 %    Platelet Count 217 150 - 450 10e3/uL    % Neutrophils 60 %    % Lymphocytes 29 %    % Monocytes 9 %    % Eosinophils 0 %    % Basophils 1 %    % Immature Granulocytes 1 %    NRBCs per 100 WBC 0 <1 /100    Absolute Neutrophils 5.2 1.6 - 8.3 10e3/uL    Absolute Lymphocytes 2.4 0.8 - 5.3 10e3/uL    Absolute Monocytes 0.8 0.0 - 1.3 10e3/uL    Absolute Eosinophils 0.0 0.0 - 0.7 10e3/uL    Absolute Basophils 0.0 0.0 - 0.2 10e3/uL    Absolute Immature Granulocytes 0.1 <=0.4  10e3/uL    Absolute NRBCs 0.0 10e3/uL             MD Eric Barbosa Michelle C, MD  08/11/22 0731

## 2022-08-11 NOTE — DISCHARGE INSTRUCTIONS
"Follow up with your Psychiatrist and work to find a Therapist to work with coping strategies. If you have any thoughts of wanting to hurt yourself or others, please return to the ED.     Aftercare Plan  If I am feeling unsafe or I am in a crisis, I will:   Contact my established care providers   Call the National Suicide Prevention Lifeline: 988  Go to the nearest emergency room   Call 911     Warning signs that I or other people might notice when a crisis is developing for me: Increased depression, suicidal thoughts, voices, negative thoughts about self.    Things I am able to do on my own to cope or help me feel better: Play video games, read books, relaxation techniques, breathing, grounding, positive self-talk    Things that I am able to do with others to cope or help me better: Talk to staff at group home     Things I can use or do for distraction: Play video games, listen to music     Changes I can make to support my mental health and wellness: Take medication as prescribed, do not drink alcohol, meet regularly with mental health providers     People in my life that I can ask for help: Mother, Neela, Group home staff, established providers     Your [facility] has a mental health crisis team you can call 24/7: [facility] Mobile Crisis  [phone]     Crisis Lines  Crisis Text Line  Text 281549  You will be connected with a trained live crisis counselor to provide support.    Por espanol, texto  CORINE a 764507 o texto a 442-AYUDAME en WhatsApp    The [employer] Project (LGBTQ Youth Crisis Line)  4.627.366.0197  text START to 977-887      Community Resources  Fast Tracker  Linking people to mental health and substance use disorder resources  fasttrackermn.org     [place] Mental Health Warm Line  Peer to peer support  Monday thru Saturday, 12 pm to 10 pm  166.187.2649 or 5.876.154.9109  Text \"Support\" to 63073    National [place] on Mental Illness (EMA)  337.867.4467 or 1.888.EMA.HELPS      Mental " LM reminding pt to have her labs drawn or to call me and let me know if she had these drawn elsewhere   Health Apps  My3  https://Wedia.org/    VirtualHopeBox  https://SpreadShout/apps/virtual-hope-box/      Additional Information  Today you were seen by a licensed mental health professional through Triage and Transition services, [facility] ([facility])  for a crisis assessment in the Emergency Department at [facility].  It is recommended that you follow up with your established providers (psychiatrist, mental health therapist, and/or primary care doctor - as relevant) as soon as possible. Coordinators from [facility] will be calling you in the next 24-48 hours to ensure that you have the resources you need.  You can also contact [facility] coordinators directly at [phone]. You may have been scheduled for or offered an appointment with a mental health provider. [facility] maintains an extensive network of licensed behavioral health providers to connect patients with the services they need.  We do not charge providers a fee to participate in our referral network.  We match patients with providers based on a patient's specific needs, insurance coverage, and location.  Our first effort will be to refer you to a provider within your care system, and will utilize providers outside your care system as needed.

## 2022-08-11 NOTE — ED NOTES
Poison control called for and update about the pt. They stated that at this time their main concern is for GI upset. MD notified. Pt currently denying stomach upset, N/V/D.

## 2022-08-11 NOTE — PHARMACY-ADMISSION MEDICATION HISTORY
Admission Medication History Completed by Pharmacy    See [facility] Admission Navigator for allergy information, preferred outpatient pharmacy, prior to admission medications and immunization status.     Medication history sources:  Patient via iPad in ED; Surescripts dispense report     Pertinent changes made to PTA medication list:  Added: N/A  Deleted:   - Duplicate clozapine entries   - Glycopyrrolate 1 mg tablets (patient no longer taking)   Changed:   - Olanzapine 10 mg three times daily as needed --> 20 mg daily as needed (per pharmacy fill records; per patient, he does not remember the last time he has needed a dose)     Additional medication history information:   - Invega Sustenna is administered to patient every 21 days. Next dose is due 8/26/22   - Patient denies taking any additional Rx/OTC medications other than the ones listed below.    Prior to Admission medications    Medication Sig Last Dose Taking? Auth Provider Long Term End Date   benztropine (COGENTIN) 1 MG tablet Take 1 mg by mouth 2 times daily  8/9/2022 Yes Reported, Patient No    cholecalciferol (VITAMIN D3) 125 mcg (5000 units) capsule Take 125 mcg by mouth daily  8/9/2022 Yes Reported, Patient No    docusate sodium (COLACE) 100 MG capsule Take 100 mg by mouth 2 times daily  8/9/2022 Yes Reported, Patient No    gabapentin (NEURONTIN) 400 MG capsule Take 1 capsule (400 mg) by mouth 3 times daily 8/10/2022 Yes Caio Camacho MD Yes    levothyroxine (SYNTHROID/LEVOTHROID) 112 MCG tablet Take 1 tablet (112 mcg) by mouth daily 8/10/2022 Yes Doyle Cervantes, APRN CNP Yes    OLANZapine (ZYPREXA) 20 MG tablet Take 20 mg by mouth daily as needed More than a month Yes Reported, Patient No    omeprazole (PRILOSEC) 20 MG DR capsule Take 20 mg by mouth 2 times daily 8/10/2022 Yes Reported, Patient No    polyethylene glycol (MIRALAX) 17 g packet Take 17 g by mouth daily as needed for constipation More than a month Yes Chandler Solorzano MD      prazosin (MINIPRESS) 2 MG capsule Take 4 mg by mouth At Bedtime  8/9/2022 Yes Reported, Patient Yes    venlafaxine (EFFEXOR-XR) 75 MG 24 hr capsule Take 225 mg by mouth daily  8/10/2022 Yes Reported, Patient Yes    cloZAPine (CLOZARIL) 200 MG tablet Take 2 tablets (400 mg) by mouth At Bedtime for 28 days 8/9/2022 Yes Caio Camacho MD Yes    paliperidone (INVEGA SUSTENNA) 234 MG/1.5ML ANTHONY Inject 1.5 mLs (234 mg) into the muscle every 21 days 8/5/2022 Yes Marianna Leslie, NP Yes           Date completed: 08/11/22    Medication history completed by:   oL Weston, PharmD   *[phone] or [phone]
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
## 2022-08-12 VITALS
RESPIRATION RATE: 16 BRPM | OXYGEN SATURATION: 100 % | SYSTOLIC BLOOD PRESSURE: 116 MMHG | WEIGHT: 250 LBS | HEIGHT: 70 IN | BODY MASS INDEX: 35.79 KG/M2 | DIASTOLIC BLOOD PRESSURE: 82 MMHG | HEART RATE: 76 BPM | TEMPERATURE: 97.6 F

## 2022-08-12 PROCEDURE — 250N000013 HC RX MED GY IP 250 OP 250 PS 637: Performed by: INTERNAL MEDICINE

## 2022-08-12 PROCEDURE — 250N000013 HC RX MED GY IP 250 OP 250 PS 637: Performed by: EMERGENCY MEDICINE

## 2022-08-12 RX ADMIN — VENLAFAXINE HYDROCHLORIDE 225 MG: 150 CAPSULE, EXTENDED RELEASE ORAL at 09:10

## 2022-08-12 RX ADMIN — NICOTINE POLACRILEX 2 MG: 2 GUM, CHEWING BUCCAL at 12:31

## 2022-08-12 RX ADMIN — NICOTINE POLACRILEX 2 MG: 2 GUM, CHEWING BUCCAL at 14:13

## 2022-08-12 RX ADMIN — NICOTINE POLACRILEX 2 MG: 2 GUM, CHEWING BUCCAL at 11:01

## 2022-08-12 RX ADMIN — Medication 125 MCG: at 09:06

## 2022-08-12 RX ADMIN — GABAPENTIN 400 MG: 300 CAPSULE ORAL at 13:28

## 2022-08-12 RX ADMIN — DOCUSATE SODIUM 100 MG: 100 CAPSULE, LIQUID FILLED ORAL at 09:06

## 2022-08-12 RX ADMIN — BENZTROPINE MESYLATE 1 MG: 1 TABLET ORAL at 09:06

## 2022-08-12 RX ADMIN — LEVOTHYROXINE SODIUM 112 MCG: 112 TABLET ORAL at 09:06

## 2022-08-12 RX ADMIN — NICOTINE POLACRILEX 2 MG: 2 GUM, CHEWING BUCCAL at 09:06

## 2022-08-12 RX ADMIN — GABAPENTIN 400 MG: 300 CAPSULE ORAL at 09:06

## 2022-08-12 RX ADMIN — OMEPRAZOLE 20 MG: 20 CAPSULE, DELAYED RELEASE ORAL at 09:06

## 2022-08-12 ASSESSMENT — ACTIVITIES OF DAILY LIVING (ADL)
ADLS_ACUITY_SCORE: 37

## 2022-08-12 NOTE — TELEPHONE ENCOUNTER
R:  8:45 AM  Call from [facility]-pt accepted by Dr. Shultz-ED RN to call [phone] when patient just leaving ED with ETA info.  Called and updated [facility] ED

## 2022-08-12 NOTE — TELEPHONE ENCOUNTER
R:      No appropriate beds currently available within the  system. Bed search update @  02:18:     [facility]: @ cap per website      [facility]:@ cap per website      [facility]: @ cap per website      [facility]: @ cap per website      [facility]: @ cap per website      [facility]: @ cap per website      [facility]: @ cap per website      [facility]: @ cap per website      [facility]: Posting 1 bed. Per Maria A @ 02:19 they do not have any beds available but recommends a callback after 9AM.      [facility]: @ cap per website      [facility]: @ cap per website      [facility]: @ cap per website  [facility]: Posting 2 beds. Per Cheri @ 02:20 [place] is closed d/t staffing      [facility]: @ cap per website      [facility]: @ cap per website      [facility]: @ cap per website      [facility]: Posting 6 beds. Must have the cognitive ability to do programming. No aggressive or violent behavior or recent HX in the last 2 yrs. MH must be primary. Per Eufemia @ 02:28, based on screening questions, pt too acute for their facility d/t requiring seclusion on 8/10      [facility]: @ cap per website      [facility]: @ cap per website   [facility]: @ cap per website     [facility]: Posting 10 beds. Called @ 02:30 and Marianna is able to review. Faxed clinical @ 02:54    Awaiting callback from [facility]

## 2022-08-12 NOTE — ED NOTES
Pt has a bed available and [facility], the accepting provider is Dr. Shultz.  Ok to arrange transportation.  Pt will be on 4E.

## 2022-08-12 NOTE — PHARMACY
Pharmacy Clozapine Continuation Note    Patient's Name: Kamini Neal  Patient's : 1990    Current cloZAPine regimen: 400 mg by mouth at bedtime  Has there been a known interruption in therapy for greater than/equal to 48 hours which would warrant retitration No    Recent ANC Value(s) for last 30 days:  2022: Absolute Neutrophils 5.2 10e3/uL (Ref range: 1.6 - 8.3 10e3/uL)      A REMS Dispense Authorization was obtained from the clozapine REMS prgram? Yes   If no, why was the REMS Dispense Authorization not successful:  A Dispense Rationale was needed to obtain the REMS Dispense Authorization? Yes   Is the ANC (if available) within recommended limits? Yes  Does the patient have any signs or symptoms of infection, including fever? No    Plan:  1. Continue clozapine therapy at 400 mg PO at bedtime.  2. A WBC with differential will be ordered at least weekly, NEXT DUE 2022. ANC values will be entered into the REMS program.    Lo Weston, PharmD  Phone / Pager: *[phone]

## 2022-08-12 NOTE — CONSULTS
Triage and Transition - Extended Care     Kamini Neal  August 12, 2022    Psychiatry consult acknowledged. Per upper management this order has been discontinued as patient has been accepted to [facility].     No further needs at this time.     CRYSTAL MCLEOD MSW, [facility]  Triage and Transition - Consult and Liaison   [phone]

## 2022-09-10 ENCOUNTER — HEALTH MAINTENANCE LETTER (OUTPATIENT)
Age: 32
End: 2022-09-10

## 2022-11-27 ENCOUNTER — HOSPITAL ENCOUNTER (EMERGENCY)
Facility: CLINIC | Age: 32
Discharge: NURSING, REHAB OR LONG-TERM CARE | End: 2022-11-28
Attending: EMERGENCY MEDICINE | Admitting: EMERGENCY MEDICINE
Payer: COMMERCIAL

## 2022-11-27 DIAGNOSIS — R45.851 SUICIDAL IDEATION: ICD-10-CM

## 2022-11-27 PROCEDURE — 99284 EMERGENCY DEPT VISIT MOD MDM: CPT | Performed by: PSYCHIATRY & NEUROLOGY

## 2022-11-27 PROCEDURE — 99285 EMERGENCY DEPT VISIT HI MDM: CPT | Mod: 25 | Performed by: PSYCHIATRY & NEUROLOGY

## 2022-11-27 PROCEDURE — C9803 HOPD COVID-19 SPEC COLLECT: HCPCS | Performed by: PSYCHIATRY & NEUROLOGY

## 2022-11-27 PROCEDURE — 80307 DRUG TEST PRSMV CHEM ANLYZR: CPT | Performed by: EMERGENCY MEDICINE

## 2022-11-27 RX ORDER — OLANZAPINE 10 MG/1
10 TABLET, ORALLY DISINTEGRATING ORAL ONCE
Status: COMPLETED | OUTPATIENT
Start: 2022-11-27 | End: 2022-11-28

## 2022-11-27 ASSESSMENT — ACTIVITIES OF DAILY LIVING (ADL)
ADLS_ACUITY_SCORE: 37
ADLS_ACUITY_SCORE: 37

## 2022-11-28 VITALS
OXYGEN SATURATION: 99 % | HEART RATE: 85 BPM | TEMPERATURE: 98.1 F | DIASTOLIC BLOOD PRESSURE: 86 MMHG | SYSTOLIC BLOOD PRESSURE: 119 MMHG | RESPIRATION RATE: 16 BRPM

## 2022-11-28 LAB — SARS-COV-2 RNA RESP QL NAA+PROBE: NEGATIVE

## 2022-11-28 PROCEDURE — 90791 PSYCH DIAGNOSTIC EVALUATION: CPT

## 2022-11-28 PROCEDURE — 250N000013 HC RX MED GY IP 250 OP 250 PS 637: Performed by: FAMILY MEDICINE

## 2022-11-28 PROCEDURE — 250N000013 HC RX MED GY IP 250 OP 250 PS 637: Performed by: EMERGENCY MEDICINE

## 2022-11-28 PROCEDURE — U0005 INFEC AGEN DETEC AMPLI PROBE: HCPCS | Performed by: EMERGENCY MEDICINE

## 2022-11-28 RX ORDER — SODIUM CHLORIDE 9 MG/ML
INJECTION, SOLUTION INTRAVENOUS
Status: DISCONTINUED
Start: 2022-11-28 | End: 2022-11-28 | Stop reason: HOSPADM

## 2022-11-28 RX ADMIN — OLANZAPINE 10 MG: 10 TABLET, ORALLY DISINTEGRATING ORAL at 08:59

## 2022-11-28 RX ADMIN — NICOTINE POLACRILEX 2 MG: 2 GUM, CHEWING BUCCAL at 15:41

## 2022-11-28 ASSESSMENT — COLUMBIA-SUICIDE SEVERITY RATING SCALE - C-SSRS
ATTEMPT LIFETIME: YES
TOTAL  NUMBER OF INTERRUPTED ATTEMPTS LIFETIME: NO
5. HAVE YOU STARTED TO WORK OUT OR WORKED OUT THE DETAILS OF HOW TO KILL YOURSELF? DO YOU INTEND TO CARRY OUT THIS PLAN?: YES
LETHALITY/MEDICAL DAMAGE CODE MOST LETHAL POTENTIAL ATTEMPT: BEHAVIOR LIKELY TO RESULT IN DEATH DESPITE AVAILABLE MEDICAL CARE
LETHALITY/MEDICAL DAMAGE CODE FIRST ACTUAL ATTEMPT: MODERATE PHYSICAL DAMAGE, MEDICAL ATTENTION NEEDED
2. HAVE YOU ACTUALLY HAD ANY THOUGHTS OF KILLING YOURSELF?: YES
LETHALITY/MEDICAL DAMAGE CODE MOST LETHAL ACTUAL ATTEMPT: MODERATE PHYSICAL DAMAGE, MEDICAL ATTENTION NEEDED
LETHALITY/MEDICAL DAMAGE CODE MOST LETHAL ACTUAL ATTEMPT: MODERATE PHYSICAL DAMAGE, MEDICAL ATTENTION NEEDED
LETHALITY/MEDICAL DAMAGE CODE MOST RECENT ACTUAL ATTEMPT: MODERATE PHYSICAL DAMAGE, MEDICAL ATTENTION NEEDED
LETHALITY/MEDICAL DAMAGE CODE MOST RECENT POTENTIAL ATTEMPT: BEHAVIOR LIKELY TO RESULT IN DEATH DESPITE AVAILABLE MEDICAL CARE
5. HAVE YOU STARTED TO WORK OUT OR WORKED OUT THE DETAILS OF HOW TO KILL YOURSELF? DO YOU INTEND TO CARRY OUT THIS PLAN?: YES
TOTAL  NUMBER OF ABORTED OR SELF INTERRUPTED ATTEMPTS LIFETIME: NO
LETHALITY/MEDICAL DAMAGE CODE FIRST POTENTIAL ATTEMPT: BEHAVIOR LIKELY TO RESULT IN DEATH DESPITE AVAILABLE MEDICAL CARE
REASONS FOR IDEATION LIFETIME: MOSTLY TO END OR STOP THE PAIN (YOU COULDN'T GO ON LIVING WITH THE PAIN OR HOW YOU WERE FEELING)
ATTEMPT SINCE LAST CONTACT: NO
2. HAVE YOU ACTUALLY HAD ANY THOUGHTS OF KILLING YOURSELF?: YES
SUICIDE, SINCE LAST CONTACT: NO
4. HAVE YOU HAD THESE THOUGHTS AND HAD SOME INTENTION OF ACTING ON THEM?: YES
2. HAVE YOU ACTUALLY HAD ANY THOUGHTS OF KILLING YOURSELF?: NO
ATTEMPT PAST THREE MONTHS: NO
3. HAVE YOU BEEN THINKING ABOUT HOW YOU MIGHT KILL YOURSELF?: YES
TOTAL  NUMBER OF ABORTED OR SELF INTERRUPTED ATTEMPTS SINCE LAST CONTACT: NO
6. HAVE YOU EVER DONE ANYTHING, STARTED TO DO ANYTHING, OR PREPARED TO DO ANYTHING TO END YOUR LIFE?: NO
LETHALITY/MEDICAL DAMAGE CODE MOST LETHAL POTENTIAL ATTEMPT: BEHAVIOR LIKELY TO RESULT IN DEATH DESPITE AVAILABLE MEDICAL CARE
4. HAVE YOU HAD THESE THOUGHTS AND HAD SOME INTENTION OF ACTING ON THEM?: YES
TOTAL  NUMBER OF INTERRUPTED ATTEMPTS SINCE LAST CONTACT: NO
1. HAVE YOU WISHED YOU WERE DEAD OR WISHED YOU COULD GO TO SLEEP AND NOT WAKE UP?: YES
6. HAVE YOU EVER DONE ANYTHING, STARTED TO DO ANYTHING, OR PREPARED TO DO ANYTHING TO END YOUR LIFE?: NO
REASONS FOR IDEATION PAST MONTH: COMPLETELY TO END OR STOP THE PAIN (YOU COULDN'T GO ON LIVING WITH THE PAIN OR HOW YOU WERE FEELING)
1. IN THE PAST MONTH, HAVE YOU WISHED YOU WERE DEAD OR WISHED YOU COULD GO TO SLEEP AND NOT WAKE UP?: YES
TOTAL  NUMBER OF ACTUAL ATTEMPTS LIFETIME: 1
1. SINCE LAST CONTACT, HAVE YOU WISHED YOU WERE DEAD OR WISHED YOU COULD GO TO SLEEP AND NOT WAKE UP?: NO

## 2022-11-28 ASSESSMENT — ACTIVITIES OF DAILY LIVING (ADL)
ADLS_ACUITY_SCORE: 37

## 2022-11-28 NOTE — ED NOTES
Bed: HW02  Expected date: 11/27/22  Expected time: 8:26 PM  Means of arrival:   Comments:  H432  32 M  SI/Hallucinations/Schizo

## 2022-11-28 NOTE — ED NOTES
ADRIANNA discussed with patient. Patient declined his copy. Patient denied questions. Patient calm, sitting in chair

## 2022-11-28 NOTE — ED NOTES
Writer attempted to contact  at [phone] and they stated they had just talked to Chloe (RN) and someone should be arriving at ED in 15 minutes.      MAYELA Montelongo

## 2022-11-28 NOTE — ED NOTES
Emergency Department Patient Sign-out       Brief HPI:  This is a 32 year old male signed out to me by Dr. Soni .  See initial ED Provider note for details of the presentation.            Significant Events prior to my assuming care: Patient with hx of schizophrenia and SI. Plan to have assessed prior to disposition. Patient has tried to elope in past.     Patient seen by [facility] with increased command hallucinations stating to kill self with hanging. States he also has command hallucinations to hurt others.      Exam:   Patient Vitals for the past 24 hrs:   BP Temp Temp src Pulse Resp SpO2   11/27/22 2036 117/83 98.9  F (37.2  C) Oral 111 16 99 %           ED RESULTS:   Results for orders placed or performed during the hospital encounter of 11/27/22 (from the past 24 hour(s))   Urine Drugs of Abuse Screen     Status: Normal    Collection Time: 11/27/22 10:26 PM    Narrative    The following orders were created for panel order Urine Drugs of Abuse Screen.  Procedure                               Abnormality         Status                     ---------                               -----------         ------                     Drug abuse screen 1 urin...[[id number]]  Normal              Final result                 Please view results for these tests on the individual orders.   Drug abuse screen 1 urine (ED)     Status: Normal    Collection Time: 11/27/22 10:26 PM   Result Value Ref Range    Amphetamines Urine Screen Negative Screen Negative    Barbiturates Urine Screen Negative Screen Negative    Benzodiazepines Urine Screen Negative Screen Negative    Cannabinoids Urine Screen Negative Screen Negative    Cocaine Urine Screen Negative Screen Negative    Opiates Urine Screen Negative Screen Negative       ED MEDICATIONS:   Medications   OLANZapine zydis (zyPREXA) ODT tab 10 mg (has no administration in time range)         Impression:  No diagnosis found.    Plan:    Psych admit for SI.        Jose Spring MD         Jose Spring MD  11/28/22 0115

## 2022-11-28 NOTE — ED NOTES
2:46 PM the patient was signed out by the overnight provider to myself pending DEC evaluation which was done and they have made arrangements for the patient to be discharged home.  Please see their documentation and discharge instructions.     Gautam Ortiz MD  11/28/22 [id number]

## 2022-11-28 NOTE — ED NOTES
Pt stood up and stated he wants to leave. Patient reminded he needed to be seen by  and encouraged to sit down. Patient offered room and prn medication which he declined. Patient was redirected to chair without difficulty.

## 2022-11-28 NOTE — PLAN OF CARE
Kamini Neal  November 28, 2022  Plan of Care Hand-off Note     Patient Care Path: Inpatient Mental Health    Plan for Care:     Pt is actively suicidal with a plan to hang himself with a rope in his group home. Pt endorses psychotic symptoms including commanding auditory hallucinations informing him to hurt himself or others. Pt will require in patient hospitalization at this time to manage mental health concerns in a safe environment. Pt reports he doesn't feel that his medications are working correctly. They should be adjusted as needed. Pt is voluntary. Diagnosis based on history.     Critical Safety Issues: Pt has hx of attempting to elope from ED after being placed on a 72 hour hold. Has thoughts of harming others. No history of violence or aggression found in chart.     Overview:  This patient is a child/adolescent: No    This patient has additional special visitor precautions: No    Legal Status: Voluntary     Contacts:   Group home - [phone]       Psychiatry Consult:  Was not ordered     Updated Attending Provider regarding plan of care.    LINDA Bales
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
## 2022-11-28 NOTE — DISCHARGE INSTRUCTIONS
Follow Up Appointments:    Date: 12/5/2022-Monday  Time: 10:00 AM  Type: Mental Health Eval for Day Treatment  Location: [facility] [address] [address]  Provider: Cortney Rangel    If you need to cancel or reschedule please do so prior to 24 hours before you scheduled appointment, please call the assessment center to do this [phone].      Aftercare Plan  If I am feeling unsafe or I am in a crisis, I will:   Contact my established care providers   Call the National Suicide Prevention Lifeline: 988  Go to the nearest emergency room   Call 911     Warning signs that I or other people might notice when a crisis is developing for me: Isolating, avoiding others and looking down.      Things I am able to do on my own to cope or help me feel better: earphones, reality check, deep breathing, and 5 Senses grounding strategy.       Things that I am able to do with others to cope or help me better: talk to  Manager.       Things I can use or do for distraction: video games.       Changes I can make to support my mental health and wellness: attend day treatment or PHP     People in my life that I can ask for help: GH Manager     [facility] has a mental health crisis team you can call 24/7: [facility] Mobile Crisis  [phone]         Additional resources and information:     The following DBT skills can assist me when: I want to act on your emotions and acting on them will only make things worse, I am overwhelmed by my emotions, I want to try to be skillful and not act on self destructive behavior.     Reduce Extreme Emotion  QUICKLY:  Changing Your Body Chemistry       T:  Change your body Temperature to change your autonomic nervous system    Use Ice pack to calm yourself down FAST. Place ice pack underneath your eyes for a count of 30 seconds to initiate the divers reflex which will naturally calm down your heart rate and breathing.      I:  Intensely exercise to calm  down a body revved up by emotion    Examples: running, walking fast, jumping, playing basketball, weight lifting, swimming, calisthenics, etc.      Engage in exercises that DO NOT include violent behaviors. Exercises that utilize violent behaviors tend to function as  behavioral rehearsal,  and rather than calming the person down, may actually  rev  the person up more, increasing the likelihood of violence, and lessening the likelihood that they will  burn off  energy     P:  Progressively relax your muscles    Starting with your hands, moving to your forearms, upper arms, shoulders, neck, forehead, eyes, cheeks and lips, tongue and teeth, chest, upper back, stomach, buttocks, thighs, calves, ankles, feet      Tense (10 seconds,   of the way), then relax each muscle (all the way)    Notice the tension    Notice the difference when relaxed (by tensing first, and then relaxing, you are able to get a more thorough relaxation than by simply relaxing)      P: Paced breathing to relax    The standard technique is to begin with counting the number of steps one takes for a typical inhale, then counting the steps one takes for a typical exhale, and then lengthening the amount of steps for the exhalation by one or two steps.  OR repeat this pattern for 1-2 minutes:   Inhale for four (4) seconds    Exhale for six (6) to eight (8) seconds        After using Distress Tolerance TIPP, TRY TO STOP!     S- Stop    Do not just react on your emotion urge. Stop! Freeze! Do not move a muscle! Your emotions may try to make you act without thinking. Stay in control! Take a step back Take a step back from the situation.    T- Take a break    Let go. Take a deep breath. Do not let your feelings make you act impulsively.    O- Observe    Notice what is going on inside and outside you. What is the situation? What are your thoughts and feelings? What are others saying or doing? Does my emotion make sense, is it justified? What is it that my  emotions want me to do? Would that be effective?    P- Proceed mindfully    Act with awareness. In deciding what to do, consider your thoughts and feelings, the situation, and other people s thoughts and feelings. Think about your goals. Ask Wise Mind: Which actions will make it better or worse?        If my emotion action urge would not be effective or helpful, practice acting OPPOSITE to the EMOTION ACTION URGE can help reduce the intensity or even change the emotion.   Consider these examples: with FEAR we have the urge to run away/avoid. OPPOSITE would be to approach it with caution. ANGER we have the urge to attack. OPPOSITE would be to gently avoid or to demonstrate kindness towards it. SADNESS we have the urge to withdraw/isolate. OPPOSITE would be to get self to move and be active physically or socially.      These additional skills may help with self-soothing and distracting you:      Activities   Focus attention on a task you need to get done. Rent movies; watch TV. Clean a room in your house. Find an event to go to. Play computer games. Go walking. Exercise. Surf the Internet. Write e-mails. Play sports. Go out for a meal or eat a favorite food. Call or go out with a friend. Listen to your iPod; download music. Build something. Spend time with your children. Play cards. Read magazines, books, comics. Do crossword puzzles or Sudoku.     Emotions   Read emotional books or stories, old letters. Watch emotional TV shows; go to emotional movies. Listen to emotional music. (Be sure the event creates different emotions.) Ideas: Scary movies, joke books, comedies, funny records, Yarsanism music, soothing music or music that fires you up, going to a store and reading funny greeting cards.     Thoughts   Count to 10; count colors in a painting or poster or out the window; count anything. Repeat words to a song in your mind. Work puzzles. Watch TV or read.     Sensations   Squeeze a rubber ball very hard. Listen to  "very loud music. Hold ice in your hand or mouth. Go out in the rain or snow. Take a hot or cold shower.   Remember that you can use your 5 senses as helpful self-soothing tools!       I can help my own emotions by practicing the following to keep my emotional mind healthy and bring positive emotions:     The ABC PLEASE skill is about taking good care of ourselves so that we can take care of others. Also, an important component of DBT is to reduce our vulnerability. When we take good care of ourselves, we are less likely to be vulnerable to disease and emotional crisis.     ABC   A- Accumulate positive emotions by doing things that are pleasant.   B- Build mastery by doing things we enjoy. Whether it is reading, cooking, cleaning, fixing a car, working a cross word puzzle, or playing a musical instrument. Practice these things to  and in time we feel competent.   C- [place] by rehearsing a plan ahead of time so that we can be prepared to cope skillfully. (Think of what makes situations difficult, and what helps in those situations)      PLEASE   Treat Physical Illness and take medications as prescribed.   Balance eating in order to avoid mood swings.   Avoid mood-Altering substances and have mood control.   Maintain good sleep so you can enjoy your life.   Get exercise to maintain high spirits.       Crisis Lines  Crisis Text Line  Text 038598  You will be connected with a trained live crisis counselor to provide support.    Por espanol, texto  CORINE a 482918 o texto a 442-AYUDAME en WhatsApp    [employer] (LGBTQ Youth Crisis Line)  9.321.733.6425  text START to 801-935      Community Resources  Fast Tracker  Linking people to mental health and substance use disorder resources  fasttrackermn.org     [place] Mental Health Warm Line  Peer to peer support  Monday thru Saturday, 12 pm to 10 pm  533.942.1241 or 1.359.524.8269  Text \"Support\" to 24453    National [place] on Mental " Illness (EMA)  978.012.6120 or 1.888.EMA.HELPS      Mental Health Apps  My3  https://mySustainable Industrial Solutionspp.org/    VirtualHopeBox  https://Hiberna/apps/virtual-hope-box/      Additional Information  Today you were seen by a licensed mental health professional through Triage and Transition services, [facility] (P)  for a crisis assessment in the Emergency Department at [facility].  It is recommended that you follow up with your established providers (psychiatrist, mental health therapist, and/or primary care doctor - as relevant) as soon as possible. Coordinators from [facility] will be calling you in the next 24-48 hours to ensure that you have the resources you need.  You can also contact [facility] coordinators directly at [phone]. You may have been scheduled for or offered an appointment with a mental health provider. [facility] maintains an extensive network of licensed behavioral health providers to connect patients with the services they need.  We do not charge providers a fee to participate in our referral network.  We match patients with providers based on a patient's specific needs, insurance coverage, and location.  Our first effort will be to refer you to a provider within your care system, and will utilize providers outside your care system as needed.

## 2022-11-28 NOTE — ED TRIAGE NOTES
From a group home, endorsing SI with plan and homicidal thoughts but cannot say towards who      Triage Assessment     Row Name 11/27/22 2037       Triage Assessment (Adult)    Airway WDL WDL       Respiratory WDL    Respiratory WDL WDL       Skin Circulation/Temperature WDL    Skin Circulation/Temperature WDL WDL       Cardiac WDL    Cardiac WDL WDL       Peripheral/Neurovascular WDL    Peripheral Neurovascular WDL WDL       Cognitive/Neuro/Behavioral WDL    Cognitive/Neuro/Behavioral WDL WDL

## 2022-11-28 NOTE — ED PROVIDER NOTES
"ED Provider Note  [facility]      History     Chief Complaint   Patient presents with     Suicidal     Suicidal thoughts and auditory hallucinations. From .     HPI  Kamini Neal is a 32 year old male with a PMH of alcohol abuse, cannabis abuse, latent TB, GERD, anxiety, depression, PTSD, schizoaffective disorder, hallucinations, borderline personality disorder and suicidal ideation who presents to the ED today reporting suicidal ideation and auditory hallucinations.  He reports that he always has auditory hallucinations, but they are getting \"more real\".  They are telling him to kill himself and he states he has been thinking about killing himself and has a plan to hang himself.  He states he wants to hurt himself right now.  He arrived via EMS as he asked staff at his group home to call because he was going to hurt himself.        Past Medical History  Past Medical History:   Diagnosis Date     Anxiety      Depressive disorder      Schizo affective schizophrenia (H)      Substance abuse (H)      Past Surgical History:   Procedure Laterality Date     TONSILLECTOMY       benztropine (COGENTIN) 1 MG tablet  cholecalciferol (VITAMIN D3) 125 mcg (5000 units) capsule  cloZAPine (CLOZARIL) 200 MG tablet  docusate sodium (COLACE) 100 MG capsule  gabapentin (NEURONTIN) 400 MG capsule  levothyroxine (SYNTHROID/LEVOTHROID) 112 MCG tablet  OLANZapine (ZYPREXA) 20 MG tablet  omeprazole (PRILOSEC) 20 MG DR capsule  paliperidone (INVEGA SUSTENNA) 234 MG/1.5ML ANTHONY  polyethylene glycol (MIRALAX) 17 g packet  prazosin (MINIPRESS) 2 MG capsule  venlafaxine (EFFEXOR-XR) 75 MG 24 hr capsule      Allergies   Allergen Reactions     Haloperidol Other (See Comments)     Other reaction(s): Tardive Dyskinesia  Torticollis  Torticollis  Stiff Neck  Torticollis; reports \"stiff neck.\"       Family History  Family History   Problem Relation Age of Onset     Mental Illness Maternal Uncle      Mental Illness " Maternal Aunt      Glaucoma No family hx of      Macular Degeneration No family hx of      Social History   Social History     Tobacco Use     Smoking status: Every Day     Packs/day: 0.50     Types: Vaping Device, Cigarettes     Smokeless tobacco: Never   Substance Use Topics     Alcohol use: No     Drug use: No      Past medical history, past surgical history, medications, allergies, family history, and social history were reviewed with the patient. No additional pertinent items.       Review of Systems  A complete review of systems was performed with pertinent positives and negatives noted in the HPI, and all other systems negative.    Physical Exam   BP: 117/83  Pulse: 111  Temp: 98.9  F (37.2  C)  Resp: 16  SpO2: 99 %  Physical Exam  Vitals and nursing note reviewed.   Constitutional:       General: He is not in acute distress.     Appearance: He is well-developed and well-nourished. He is not ill-appearing or toxic-appearing.   HENT:      Head: Normocephalic and atraumatic.   Eyes:      General: No scleral icterus.     Extraocular Movements: EOM normal.      Pupils: Pupils are equal, round, and reactive to light.   Cardiovascular:      Rate and Rhythm: Normal rate.   Pulmonary:      Effort: Pulmonary effort is normal. No respiratory distress.   Musculoskeletal:      Cervical back: Normal range of motion and neck supple.   Skin:     General: Skin is warm and dry.      Coloration: Skin is not pale.      Findings: No rash.   Neurological:      Mental Status: He is alert and oriented to person, place, and time.      Sensory: No sensory deficit.      Motor: Motor strength is normal.   Psychiatric:         Attention and Perception: He perceives auditory hallucinations.         Mood and Affect: Mood is anxious. Affect is labile.         Speech: Speech is tangential.         Behavior: Behavior is agitated (mildly).         Thought Content: Thought content includes suicidal ideation. Thought content does not include  homicidal ideation. Thought content includes suicidal plan.         ED Course      Procedures           Labs Ordered and Resulted from Time of ED Arrival to Time of ED Departure   DRUG ABUSE SCREEN 1 URINE (ED) - Normal       Result Value    Amphetamines Urine Screen Negative      Barbiturates Urine Screen Negative      Benzodiazepines Urine Screen Negative      Cannabinoids Urine Screen Negative      Cocaine Urine Screen Negative      Opiates Urine Screen Negative     COVID-19 VIRUS (CORONAVIRUS) BY PCR - Normal    SARS CoV2 PCR Negative         Assessments & Plan (with Medical Decision Making)     This patient presented to the emergency department complaining of auditory hallucinations and suicidal ideation.  This is similar to past presentations.  Patient will be evaluated by the mental health .  Patient was signed out to the oncoming physician with plan to disposition after assessment.    I have reviewed the nursing notes. I have reviewed the findings, diagnosis, plan and need for follow up with the patient.    Discharge Medication List as of 11/28/2022  3:04 PM          Final diagnoses:   Suicidal ideation       --  Kai Soni MD    [facility] EMERGENCY DEPARTMENT  11/27/2022     Kai Soni MD  12/02/22 1012
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
## 2022-12-05 ENCOUNTER — HOSPITAL ENCOUNTER (OUTPATIENT)
Dept: BEHAVIORAL HEALTH | Facility: CLINIC | Age: 32
Discharge: HOME | End: 2022-12-05
Attending: FAMILY MEDICINE | Admitting: FAMILY MEDICINE
Payer: COMMERCIAL

## 2022-12-05 PROCEDURE — 90791 PSYCH DIAGNOSTIC EVALUATION: CPT | Mod: GT,95 | Performed by: COUNSELOR

## 2022-12-05 ASSESSMENT — PATIENT HEALTH QUESTIONNAIRE - PHQ9
SUM OF ALL RESPONSES TO PHQ QUESTIONS 1-9: 16
SUM OF ALL RESPONSES TO PHQ QUESTIONS 1-9: 16
10. IF YOU CHECKED OFF ANY PROBLEMS, HOW DIFFICULT HAVE THESE PROBLEMS MADE IT FOR YOU TO DO YOUR WORK, TAKE CARE OF THINGS AT HOME, OR GET ALONG WITH OTHER PEOPLE: SOMEWHAT DIFFICULT

## 2022-12-05 ASSESSMENT — ANXIETY QUESTIONNAIRES
4. TROUBLE RELAXING: NOT AT ALL
7. FEELING AFRAID AS IF SOMETHING AWFUL MIGHT HAPPEN: NEARLY EVERY DAY
6. BECOMING EASILY ANNOYED OR IRRITABLE: SEVERAL DAYS
1. FEELING NERVOUS, ANXIOUS, OR ON EDGE: SEVERAL DAYS
8. IF YOU CHECKED OFF ANY PROBLEMS, HOW DIFFICULT HAVE THESE MADE IT FOR YOU TO DO YOUR WORK, TAKE CARE OF THINGS AT HOME, OR GET ALONG WITH OTHER PEOPLE?: SOMEWHAT DIFFICULT
GAD7 TOTAL SCORE: 10
5. BEING SO RESTLESS THAT IT IS HARD TO SIT STILL: SEVERAL DAYS
GAD7 TOTAL SCORE: 10
2. NOT BEING ABLE TO STOP OR CONTROL WORRYING: NEARLY EVERY DAY
7. FEELING AFRAID AS IF SOMETHING AWFUL MIGHT HAPPEN: NEARLY EVERY DAY
IF YOU CHECKED OFF ANY PROBLEMS ON THIS QUESTIONNAIRE, HOW DIFFICULT HAVE THESE PROBLEMS MADE IT FOR YOU TO DO YOUR WORK, TAKE CARE OF THINGS AT HOME, OR GET ALONG WITH OTHER PEOPLE: SOMEWHAT DIFFICULT
3. WORRYING TOO MUCH ABOUT DIFFERENT THINGS: SEVERAL DAYS
GAD7 TOTAL SCORE: 10

## 2022-12-05 ASSESSMENT — COLUMBIA-SUICIDE SEVERITY RATING SCALE - C-SSRS
3. HAVE YOU BEEN THINKING ABOUT HOW YOU MIGHT KILL YOURSELF?: NO
5. HAVE YOU STARTED TO WORK OUT OR WORKED OUT THE DETAILS OF HOW TO KILL YOURSELF? DO YOU INTEND TO CARRY OUT THIS PLAN?: NO
4. HAVE YOU HAD THESE THOUGHTS AND HAD SOME INTENTION OF ACTING ON THEM?: NO
6. HAVE YOU EVER DONE ANYTHING, STARTED TO DO ANYTHING, OR PREPARED TO DO ANYTHING TO END YOUR LIFE?: YES
1. IN THE PAST MONTH, HAVE YOU WISHED YOU WERE DEAD OR WISHED YOU COULD GO TO SLEEP AND NOT WAKE UP?: YES
2. HAVE YOU ACTUALLY HAD ANY THOUGHTS OF KILLING YOURSELF IN THE PAST MONTH?: YES
BASED ON RESPONSES TO C-SSRS QS 1-6, WHAT IS THE PATIENT'S OVERALL RISK RATING FOR SUICIDE: MODERATE RISK

## 2022-12-05 ASSESSMENT — PAIN SCALES - GENERAL: PAINLEVEL: NO PAIN (0)
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
## 2023-01-22 ENCOUNTER — HEALTH MAINTENANCE LETTER (OUTPATIENT)
Age: 33
End: 2023-01-22

## 2023-02-28 ENCOUNTER — HOSPITAL ENCOUNTER (EMERGENCY)
Facility: CLINIC | Age: 33
End: 2023-02-28

## 2023-02-28 VITALS
SYSTOLIC BLOOD PRESSURE: 120 MMHG | WEIGHT: 240 LBS | BODY MASS INDEX: 34.36 KG/M2 | RESPIRATION RATE: 18 BRPM | HEART RATE: 98 BPM | TEMPERATURE: 97.5 F | HEIGHT: 70 IN | OXYGEN SATURATION: 100 % | DIASTOLIC BLOOD PRESSURE: 90 MMHG

## 2023-02-28 LAB — SARS-COV-2 RNA RESP QL NAA+PROBE: NEGATIVE

## 2023-02-28 PROCEDURE — C9803 HOPD COVID-19 SPEC COLLECT: HCPCS

## 2023-02-28 PROCEDURE — U0003 INFECTIOUS AGENT DETECTION BY NUCLEIC ACID (DNA OR RNA); SEVERE ACUTE RESPIRATORY SYNDROME CORONAVIRUS 2 (SARS-COV-2) (CORONAVIRUS DISEASE [COVID-19]), AMPLIFIED PROBE TECHNIQUE, MAKING USE OF HIGH THROUGHPUT TECHNOLOGIES AS DESCRIBED BY CMS-2020-01-R: HCPCS | Performed by: EMERGENCY MEDICINE

## 2023-02-28 PROCEDURE — 250N000013 HC RX MED GY IP 250 OP 250 PS 637: Performed by: EMERGENCY MEDICINE

## 2023-02-28 PROCEDURE — 99285 EMERGENCY DEPT VISIT HI MDM: CPT | Mod: 25

## 2023-02-28 PROCEDURE — 90791 PSYCH DIAGNOSTIC EVALUATION: CPT

## 2023-02-28 RX ORDER — OLANZAPINE 5 MG/1
20 TABLET ORAL DAILY PRN
Status: DISCONTINUED | OUTPATIENT
Start: 2023-02-28 | End: 2023-03-01 | Stop reason: HOSPADM

## 2023-02-28 RX ADMIN — OLANZAPINE 20 MG: 5 TABLET, FILM COATED ORAL at 21:50

## 2023-03-01 ENCOUNTER — HOSPITAL ENCOUNTER (EMERGENCY)
Facility: CLINIC | Age: 33
Discharge: HOME | End: 2023-03-01
Attending: EMERGENCY MEDICINE | Admitting: EMERGENCY MEDICINE
Payer: COMMERCIAL

## 2023-03-01 DIAGNOSIS — R45.851 SUICIDAL IDEATION: ICD-10-CM

## 2023-03-01 PROCEDURE — 90791 PSYCH DIAGNOSTIC EVALUATION: CPT

## 2023-03-01 ASSESSMENT — COLUMBIA-SUICIDE SEVERITY RATING SCALE - C-SSRS
1. SINCE LAST CONTACT, HAVE YOU WISHED YOU WERE DEAD OR WISHED YOU COULD GO TO SLEEP AND NOT WAKE UP?: YES
2. HAVE YOU ACTUALLY HAD ANY THOUGHTS OF KILLING YOURSELF?: YES
SUICIDE, SINCE LAST CONTACT: NO
TOTAL  NUMBER OF INTERRUPTED ATTEMPTS SINCE LAST CONTACT: NO
REASONS FOR IDEATION SINCE LAST CONTACT: MOSTLY TO END OR STOP THE PAIN (YOU COULDN'T GO ON LIVING WITH THE PAIN OR HOW YOU WERE FEELING)
TOTAL  NUMBER OF ABORTED OR SELF INTERRUPTED ATTEMPTS SINCE LAST CONTACT: NO
5. HAVE YOU STARTED TO WORK OUT OR WORKED OUT THE DETAILS OF HOW TO KILL YOURSELF? DO YOU INTEND TO CARRY OUT THIS PLAN?: NO
ATTEMPT SINCE LAST CONTACT: NO
6. HAVE YOU EVER DONE ANYTHING, STARTED TO DO ANYTHING, OR PREPARED TO DO ANYTHING TO END YOUR LIFE?: NO

## 2023-03-01 ASSESSMENT — ENCOUNTER SYMPTOMS: HALLUCINATIONS: 1

## 2023-03-01 ASSESSMENT — ACTIVITIES OF DAILY LIVING (ADL)
ADLS_ACUITY_SCORE: 37
ADLS_ACUITY_SCORE: 35

## 2023-03-01 NOTE — ED TRIAGE NOTES
Pt comes from a  where pt reports hearing voices since 1000 and is telling him to hang himself.     Staff Kateryna [phone]

## 2023-03-01 NOTE — ED TRIAGE NOTES
Pt reports chronic suicidal thoughts that are worse tonight. Pt reports hearing voices and they are telling him to hang himself. Pt denies suicide attempt for several years. Pt states he takes zyprexa PRN but has not had a dose today.       Triage Assessment     Row Name 02/28/23 213       Triage Assessment (Adult)    Airway WDL WDL       Respiratory WDL    Respiratory WDL WDL       Skin Circulation/Temperature WDL    Skin Circulation/Temperature WDL WDL       Cardiac WDL    Cardiac WDL WDL       Peripheral/Neurovascular WDL    Peripheral Neurovascular WDL WDL       Cognitive/Neuro/Behavioral WDL    Cognitive/Neuro/Behavioral WDL WDL

## 2023-03-01 NOTE — CONSULTS
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 difficult, and what helps in those situations)       PLEASE   Treat Physical Illness and take medications as prescribed.   Balance eating in order to avoid mood swings.   Avoid mood-Altering substances and have mood control.   Maintain good sleep so you can enjoy your life.   Get exercise to maintain high spirits.         Crisis Lines  Crisis Text Line  Text 136881  You will be connected with a trained live crisis counselor to provide support.     Por espanol, texto  CORINE a 219063 o texto a 442-AYUDAME en WhatsApp     [employer] (LGBTQ Youth Crisis Line)  5.483.462.8594  text START to 483-012        Community Resources  Fast Tracker  Linking people to mental health and substance use disorder resources  fasttrackermn.org      [place] Mental Health Warm Line  Peer to peer support  Monday thru Saturday, 12 pm to 10 pm  112.579.9239 or 2.238.124.7814  Text \"Support\" to 20040     National [place] on Mental Illness (EMA)  633.462.0915 or 1.888.EMA.HELPS        Mental Health Apps  My3  https://myJaegerpp.org/     VirtualHopeBox  https://[employer].org/apps/virtual-hope-box/        Additional " Information  Today you were seen by a licensed mental health professional through Triage and Transition services, Behavioral Healthcare Providers ([facility])  for a crisis assessment in the Emergency Department at [facility].  It is recommended that you follow up with your established providers (psychiatrist, mental health therapist, and/or primary care doctor - as relevant) as soon as possible. Coordinators from [facility] will be calling you in the next 24-48 hours to ensure that you have the resources you need.  You can also contact [facility] coordinators directly at [phone]. You may have been scheduled for or offered an appointment with a mental health provider. [facility] maintains an extensive network of licensed behavioral health providers to connect patients with the services they need.  We do not charge providers a fee to participate in our referral network.  We match patients with providers based on a patient's specific needs, insurance coverage, and location.  Our first effort will be to refer you to a provider within your care system, and will utilize providers outside your care system as needed.

## 2023-03-01 NOTE — DISCHARGE INSTRUCTIONS
Aftercare Plan  If I am feeling unsafe or I am in a crisis, I will:   Contact my established care providers   Call the National Suicide Prevention Lifeline: 988  Go to the nearest emergency room   Call 911      Warning signs that I or other people might notice when a crisis is developing for me: Isolating, avoiding others and looking down.       Things I am able to do on my own to cope or help me feel better: earphones, reality check, deep breathing, and 5 Senses grounding strategy.        Things that I am able to do with others to cope or help me better: talk to  Manager.        Things I can use or do for distraction: video games.        Changes I can make to support my mental health and wellness: continue to take medications as prescribed, individual therapy      People in my life that I can ask for help:  Manager      [facility] has a mental health crisis team you can call 24/7: [facility] Mobile Crisis  [phone]            Additional resources and information:      The following DBT skills can assist me when: I want to act on your emotions and acting on them will only make things worse, I am overwhelmed by my emotions, I want to try to be skillful and not act on self destructive behavior.      Reduce Extreme Emotion  QUICKLY:  Changing Your Body Chemistry        T:  Change your body Temperature to change your autonomic nervous system    Use Ice pack to calm yourself down FAST. Place ice pack underneath your eyes for a count of 30 seconds to initiate the divers reflex which will naturally calm down your heart rate and breathing.       I:  Intensely exercise to calm down a body revved up by emotion    Examples: running, walking fast, jumping, playing basketball, weight lifting, swimming, calisthenics, etc.       Engage in exercises that DO NOT include violent behaviors. Exercises that utilize violent behaviors tend to function as  behavioral rehearsal,  and rather than calming the person down, may  actually  rev  the person up more, increasing the likelihood of violence, and lessening the likelihood that they will  burn off  energy      P:  Progressively relax your muscles    Starting with your hands, moving to your forearms, upper arms, shoulders, neck, forehead, eyes, cheeks and lips, tongue and teeth, chest, upper back, stomach, buttocks, thighs, calves, ankles, feet       Tense (10 seconds,   of the way), then relax each muscle (all the way)    Notice the tension    Notice the difference when relaxed (by tensing first, and then relaxing, you are able to get a more thorough relaxation than by simply relaxing)       P: Paced breathing to relax    The standard technique is to begin with counting the number of steps one takes for a typical inhale, then counting the steps one takes for a typical exhale, and then lengthening the amount of steps for the exhalation by one or two steps.  OR repeat this pattern for 1-2 minutes:   Inhale for four (4) seconds    Exhale for six (6) to eight (8) seconds          After using Distress Tolerance TIPP, TRY TO STOP!      S- Stop    Do not just react on your emotion urge. Stop! Freeze! Do not move a muscle! Your emotions may try to make you act without thinking. Stay in control! Take a step back Take a step back from the situation.    T- Take a break    Let go. Take a deep breath. Do not let your feelings make you act impulsively.    O- Observe    Notice what is going on inside and outside you. What is the situation? What are your thoughts and feelings? What are others saying or doing? Does my emotion make sense, is it justified? What is it that my emotions want me to do? Would that be effective?    P- Proceed mindfully    Act with awareness. In deciding what to do, consider your thoughts and feelings, the situation, and other people s thoughts and feelings. Think about your goals. Ask Wise Mind: Which actions will make it better or worse?         If my emotion action urge  would not be effective or helpful, practice acting OPPOSITE to the EMOTION ACTION URGE can help reduce the intensity or even change the emotion.   Consider these examples: with FEAR we have the urge to run away/avoid. OPPOSITE would be to approach it with caution. ANGER we have the urge to attack. OPPOSITE would be to gently avoid or to demonstrate kindness towards it. SADNESS we have the urge to withdraw/isolate. OPPOSITE would be to get self to move and be active physically or socially.       These additional skills may help with self-soothing and distracting you:       Activities   Focus attention on a task you need to get done. Rent movies; watch TV. Clean a room in your house. Find an event to go to. Play computer games. Go walking. Exercise. Surf the Internet. Write e-mails. Play sports. Go out for a meal or eat a favorite food. Call or go out with a friend. Listen to your iPod; download music. Build something. Spend time with your children. Play cards. Read magazines, books, comics. Do crossword puzzles or Sudoku.      Emotions   Read emotional books or stories, old letters. Watch emotional TV shows; go to emotional movies. Listen to emotional music. (Be sure the event creates different emotions.) Ideas: Scary movies, joke books, comedies, funny records, Congregation music, soothing music or music that fires you up, going to a store and reading funny greeting cards.      Thoughts   Count to 10; count colors in a painting or poster or out the window; count anything. Repeat words to a song in your mind. Work puzzles. Watch TV or read.      Sensations   Squeeze a rubber ball very hard. Listen to very loud music. Hold ice in your hand or mouth. Go out in the rain or snow. Take a hot or cold shower.   Remember that you can use your 5 senses as helpful self-soothing tools!         I can help my own emotions by practicing the following to keep my emotional mind healthy and bring positive emotions:      The ABC PLEASE  "skill is about taking good care of ourselves so that we can take care of others. Also, an important component of DBT is to reduce our vulnerability. When we take good care of ourselves, we are less likely to be vulnerable to disease and emotional crisis.      ABC   A- Accumulate positive emotions by doing things that are pleasant.   B- Build mastery by doing things we enjoy. Whether it is reading, cooking, cleaning, fixing a car, working a cross word puzzle, or playing a musical instrument. Practice these things to  and in time we feel competent.   C- [place] Ahead by rehearsing a plan ahead of time so that we can be prepared to cope skillfully. (Think of what makes situations difficult, and what helps in those situations)       PLEASE   Treat Physical Illness and take medications as prescribed.   Balance eating in order to avoid mood swings.   Avoid mood-Altering substances and have mood control.   Maintain good sleep so you can enjoy your life.   Get exercise to maintain high spirits.         Crisis Lines  Crisis Text Line  Text 102863  You will be connected with a trained live crisis counselor to provide support.     Por espanol, texto  CORINE a 341175 o texto a 442-AYUDAME en WhatsApp     [facility] [employer] (LGBTQ Youth Crisis Line)  5.116.520.4939  text START to 877-202        Community Resources  Fast Tracker  Linking people to mental health and substance use disorder resources  [employer] PowertrackQifangn.org      [place] Mental Health Warm Line  Peer to peer support  Monday thru Saturday, 12 pm to 10 pm  111.880.4190 or 7.658.760.5803  Text \"Support\" to 89729     National [place] on Mental Illness (EMA)  668.720.8366 or 1.888.EMA.HELPS        Mental Health Apps  My3  https://my3app.org/     VirtualHopeBox  https://[employer].org/apps/virtual-hope-box/        Additional Information  Today you were seen by a licensed mental health professional through Triage and Transition services, Behavioral " Healthcare Providers ([facility])  for a crisis assessment in the Emergency Department at [facility].  It is recommended that you follow up with your established providers (psychiatrist, mental health therapist, and/or primary care doctor - as relevant) as soon as possible. Coordinators from [facility] will be calling you in the next 24-48 hours to ensure that you have the resources you need.  You can also contact [facility] coordinators directly at [phone]. You may have been scheduled for or offered an appointment with a mental health provider. [facility] maintains an extensive network of licensed behavioral health providers to connect patients with the services they need.  We do not charge providers a fee to participate in our referral network.  We match patients with providers based on a patient's specific needs, insurance coverage, and location.  Our first effort will be to refer you to a provider within your care system, and will utilize providers outside your care system as needed.

## 2023-03-01 NOTE — ED PROVIDER NOTES
"  History     Chief Complaint:  Suicidal       HPI   Kamini Neal is a 32 year old male with a history of PTSD, depression, bipolar, and schizoaffective disorder who presents with suicidal ideations. The patient states that he struggles with suicidal thoughts and had a bad episode today. He hears voices in his head telling him to hang himself. He did not do anything today to hurt himself. He states he is feeling better after getting Zyprexa here and no longer has suicidal ideations.      Independent Historian:   None - Patient Only    Review of External Notes:   [facility] Outside Information  Office Visit 2/13/2023 Mental Health Clinic Encounter Summary      Kamini Neal \"Tiffanie\" - 32 y.o. Male; born [birth year]Encounter Summary, generated on Feb. 28, 2023  Reason for Visit    Reason for Visit -   Reason Comments   Meds Only       Encounter Details    Encounter Details  Date Type Department Care Team Description   02/13/2023 Office Visit Mental Health Clinic    [address] [place] [id number]    [phone]     Schizoaffective disorder, bipolar type (**) (Primary Dx)     Social History  - documented as of this encounter  Social History  Tobacco Use Types Packs/Day Years Used Date   Smoking Tobacco: Every Day Cigarettes 1       Smokeless Tobacco: Never             Social History  Comments: not ready at this time      Social History  Alcohol Use Standard Drinks/Week Comments   No 0 (1 standard drink = 0.6 oz pure alcohol)       Social History  Sex Assigned at Birth Date Recorded   Not on file       Social History  COVID-19 Exposure Response Date Recorded   In the last 10 days, have you been in contact with someone who was confirmed or suspected to have Coronavirus/COVID-19? No / Unsure 2/13/2023 10:49 AM CST     Last Filed Vital Signs  - documented in this encounter  Last Filed Vital Signs  Vital Sign Reading Time Taken Comments   Blood Pressure 114/81 " "02/13/2023 5:23 PM CST     Pulse 97 02/13/2023 5:23 PM CST     Temperature 36.9  C (98.4  F) 02/13/2023 5:23 PM CST     Respiratory Rate - -     Oxygen Saturation - -     Inhaled Oxygen Concentration - -     Weight 114.7 kg (252 lb 12.8 oz) 02/13/2023 5:23 PM CST     Height - -     Body Mass Index 37.44 09/27/2018 9:57 AM CDT       Progress Notes  - documented in this encounter  Krystyna Meyer LPN - 02/13/2023 11:00 AM CST  Formatting of this note might be different from the original.  Nursing Injection Visit Note    Subjective:  \"No changes. No problems with the last injection. All is the same.\" Depression and anxiety symptoms remain unchanged, manageable with medication. Sleep is \"OK,\" reports 8-9hrs/sleep/night, wakes feeling rested. Appetite is good, reports having enough food where he lives. Energy level is OK, reports being out in the community ~2x/week. Denies AH. Denies VH. No HI/SI reported at this time.     Objective:  /81 (Cuff Location: Left Arm, Patient Position: Sitting, Cuff Size: Adult - regular)  Pulse 97  Temp 36.9  C (98.4  F) (Temporal)  Wt 114.7 kg (252 lb 12.8 oz)  BMI 37.44 kg/m      Medication: Invega Sustenna  Dose: 234 mg   Route: IM  Site: Right deltoid  Comments: Patient tolerated medication.  Dr. Rincon was on site at time of administration.    Mental Status Exam:   Appearance: No apparent distress, Casually groomed, Dressed appropriately for weather, and Appears stated age  Behavior/relationship to examiner/demeanor: Cooperative, Engaged, guarded   Mood (subjective report): Neutral  Affect (objective appearance): Appropriate/mood-congruent, flat  Thought Process (Associations): Logical/goal-directed  Thought content: Normal  Abnormal Perception: None  Insight: Adequate  Judgment: Adequate    Assessment:  Client arrived alone and on time for scheduled injection. Dressed casually, groomed casually. Few word answers as is the norm. Pleasant and cooperative.    Plan: " "  Return 4 weeks   He will call nurse line with questions/concerns prior to next clinic appt.   The patient indicates understanding of and agrees to the plan.     Krystyna Meyer LPN, 2/15/2023 5:24 PM           ROS:  Review of Systems   Psychiatric/Behavioral: Positive for hallucinations and suicidal ideas.   All other systems reviewed and are negative.      Allergies:  Haloperidol     Medications:    benztropine  cloZAPine   gabapentin   levothyroxine   OLANZapine   omeprazole   prazosin   venlafaxine     Past Medical History:    Depression  Schizoaffective disorder  Bipolar disorder  Substance abuse  Cannabis use  Hypothyroidism  GERD  TB lung  PTSD    Past Surgical History:    Tonsillectomy     Family History:    Mother-mental illness, blind    Social History:  The patient presents to the ED alone.  Patient is from a group home.    Physical Exam     Patient Vitals for the past 24 hrs:   BP Temp Temp src Pulse Resp SpO2 Height Weight   02/28/23 2133 (!) 120/90 97.5  F (36.4  C) Temporal 98 18 100 % 1.778 m (5' 10\") 108.9 kg (240 lb)   02/28/23 2114 117/76 97.4  F (36.3  C) Temporal 88 22 99 % 1.778 m (5' 10\") 113.4 kg (250 lb)        Physical Exam  General: Alert, No distress. Nontoxic appearance  Head: No signs of trauma.   Mouth/Throat: Oropharynx moist.   Eyes: Conjunctivae are normal. Pupils are equal..   Neck: Normal range of motion.    CV: Appears well perfused.  Resp:No respiratory distress.   MSK: Normal range of motion. No obvious deformity.   Neuro: The patient is alert and interactive. RATLIFF. Speech normal. GCS 15  Skin: No lesion or sign of trauma noted.   Psych: normal mood and affect. behavior is normal. No suicidal ideations.    Emergency Department Course     Laboratory:  Labs Ordered and Resulted from Time of ED Arrival to Time of ED Departure   COVID-19 VIRUS (CORONAVIRUS) BY PCR - Normal       Result Value    SARS CoV2 PCR Negative         Emergency Department Course & Assessments:    PSS-3  "   Date and Time Over the past 2 weeks have you felt down, depressed, or hopeless? Over the past 2 weeks have you had thoughts of killing yourself? Have you ever attempted to kill yourself? When did this last happen? User   02/28/23 2138 yes yes yes more than 6 months ago ROBLEDO      C-SSRS ([place])    Date and Time Q1 Wished to be Dead (Past Month) Q2 Suicidal Thoughts (Past Month) Q3 Suicidal Thought Method Q4 Suicidal Intent without Specific Plan Q5 Suicide Intent with Specific Plan Q6 Suicide Behavior (Lifetime) Within the Past 3 Months? RETIRED: Level of Risk per Screen Screening Not Complete User   02/28/23 2138 yes yes yes no no yes -- -- -- ROBLEDO              Interventions:  Medications   OLANZapine (zyPREXA) tablet 20 mg (20 mg Oral $Given 2/28/23 2150)      Consultations/Discussion of Management or Tests:  ED Course as of 03/01/23 0209   Wed Mar 01, 2023   0208 Spoke with DEC who said the patient is safe to go home       Social Determinants of Health affecting care:   Healthcare Access/Compliance, Education/Literacy, Financial Insecurity, Stress/Adjustment Disorders and Social Connections/Isolation    Assessments:  0050 Obtained the patients history and performed initial exam    Disposition:  The patient was discharged to home.     Impression & Plan      Medical Decision Making:  This patient is presenting to the ER with acute suicidal ideation.  He is struggled with these issues in the past.  He denies current toxic ingestion or use of street drugs/alcohol.  He did not take his Zyprexa medication when back at the group home.  He has taken it now on arrival to the ED.  He was evaluated and denied being suicidal and felt like he was safe to go home.  I had him evaluated by the DEC .  See their notes for details.  DEC is in agreement with the patient's disposition toward the group home.  We contacted group home staff and confirmed that they are comfortable with the patient coming back to the group  home given his current state.    Diagnosis:    ICD-10-CM    1. Suicidal ideation  R45.851            Scribe Disclosure:  I, Raymond Michele, am serving as a scribe at 12:39 AM on 3/1/2023 to document services personally performed by Pedro Tong MD based on my observations and the provider's statements to me.     3/1/2023   Pedro Tong MD Joing, Todd Roger, MD  03/01/23 1541

## 2023-03-26 ENCOUNTER — HOSPITAL ENCOUNTER (EMERGENCY)
Facility: CLINIC | Age: 33
Discharge: HOME | End: 2023-03-26
Attending: FAMILY MEDICINE | Admitting: FAMILY MEDICINE
Payer: COMMERCIAL

## 2023-03-26 VITALS
OXYGEN SATURATION: 100 % | DIASTOLIC BLOOD PRESSURE: 82 MMHG | RESPIRATION RATE: 16 BRPM | TEMPERATURE: 98 F | SYSTOLIC BLOOD PRESSURE: 122 MMHG | HEART RATE: 84 BPM

## 2023-03-26 DIAGNOSIS — Z11.52 ENCOUNTER FOR SCREENING LABORATORY TESTING FOR SEVERE ACUTE RESPIRATORY SYNDROME CORONAVIRUS 2 (SARS-COV-2): ICD-10-CM

## 2023-03-26 DIAGNOSIS — F25.0 SCHIZOAFFECTIVE DISORDER, BIPOLAR TYPE (H): ICD-10-CM

## 2023-03-26 DIAGNOSIS — F33.2 SEVERE RECURRENT MAJOR DEPRESSION WITHOUT PSYCHOTIC FEATURES (H): ICD-10-CM

## 2023-03-26 DIAGNOSIS — R45.851 SUICIDAL IDEATION: ICD-10-CM

## 2023-03-26 LAB — SARS-COV-2 RNA RESP QL NAA+PROBE: NEGATIVE

## 2023-03-26 PROCEDURE — U0003 INFECTIOUS AGENT DETECTION BY NUCLEIC ACID (DNA OR RNA); SEVERE ACUTE RESPIRATORY SYNDROME CORONAVIRUS 2 (SARS-COV-2) (CORONAVIRUS DISEASE [COVID-19]), AMPLIFIED PROBE TECHNIQUE, MAKING USE OF HIGH THROUGHPUT TECHNOLOGIES AS DESCRIBED BY CMS-2020-01-R: HCPCS | Performed by: FAMILY MEDICINE

## 2023-03-26 PROCEDURE — 90791 PSYCH DIAGNOSTIC EVALUATION: CPT

## 2023-03-26 PROCEDURE — 99285 EMERGENCY DEPT VISIT HI MDM: CPT | Mod: 25

## 2023-03-26 PROCEDURE — 99284 EMERGENCY DEPT VISIT MOD MDM: CPT | Performed by: FAMILY MEDICINE

## 2023-03-26 PROCEDURE — 250N000013 HC RX MED GY IP 250 OP 250 PS 637: Performed by: FAMILY MEDICINE

## 2023-03-26 PROCEDURE — C9803 HOPD COVID-19 SPEC COLLECT: HCPCS

## 2023-03-26 RX ORDER — OLANZAPINE 10 MG/1
20 TABLET, ORALLY DISINTEGRATING ORAL ONCE
Status: COMPLETED | OUTPATIENT
Start: 2023-03-26 | End: 2023-03-26

## 2023-03-26 RX ADMIN — OLANZAPINE 20 MG: 10 TABLET, ORALLY DISINTEGRATING ORAL at 19:24

## 2023-03-26 ASSESSMENT — COLUMBIA-SUICIDE SEVERITY RATING SCALE - C-SSRS
REASONS FOR IDEATION SINCE LAST CONTACT: MOSTLY TO END OR STOP THE PAIN (YOU COULDN'T GO ON LIVING WITH THE PAIN OR HOW YOU WERE FEELING)
SUICIDE, SINCE LAST CONTACT: NO
TOTAL  NUMBER OF ACTUAL ATTEMPTS SINCE LAST CONTACT: 1
TOTAL  NUMBER OF ABORTED OR SELF INTERRUPTED ATTEMPTS SINCE LAST CONTACT: NO
TOTAL  NUMBER OF INTERRUPTED ATTEMPTS SINCE LAST CONTACT: NO
6. HAVE YOU EVER DONE ANYTHING, STARTED TO DO ANYTHING, OR PREPARED TO DO ANYTHING TO END YOUR LIFE?: NO
1. SINCE LAST CONTACT, HAVE YOU WISHED YOU WERE DEAD OR WISHED YOU COULD GO TO SLEEP AND NOT WAKE UP?: YES
ATTEMPT SINCE LAST CONTACT: YES
2. HAVE YOU ACTUALLY HAD ANY THOUGHTS OF KILLING YOURSELF?: YES
5. HAVE YOU STARTED TO WORK OUT OR WORKED OUT THE DETAILS OF HOW TO KILL YOURSELF? DO YOU INTEND TO CARRY OUT THIS PLAN?: NO

## 2023-03-26 ASSESSMENT — ACTIVITIES OF DAILY LIVING (ADL): ADLS_ACUITY_SCORE: 37

## 2023-03-26 NOTE — DISCHARGE INSTRUCTIONS
Aftercare Plan  If I am feeling unsafe or I am in a crisis, I will:   Contact my established care providers   Call the National Suicide Prevention Lifeline: 988  Go to the nearest emergency room   Call 911      Warning signs that I or other people might notice when a crisis is developing for me: Isolating, avoiding others and looking down.       Things I am able to do on my own to cope or help me feel better: earphones, reality check, deep breathing, and 5 Senses grounding strategy.        Things that I am able to do with others to cope or help me better: talk to  Manager.        Things I can use or do for distraction: video games.        Changes I can make to support my mental health and wellness: continue to take medications as prescribed, individual therapy      People in my life that I can ask for help:  Manager      [facility] has a mental health crisis team you can call 24/7: [facility]  [phone]            Additional resources and information:      The following DBT skills can assist me when: I want to act on your emotions and acting on them will only make things worse, I am overwhelmed by my emotions, I want to try to be skillful and not act on self destructive behavior.      Reduce Extreme Emotion  QUICKLY:  Changing Your Body Chemistry        T:  Change your body Temperature to change your autonomic nervous system    Use Ice pack to calm yourself down FAST. Place ice pack underneath your eyes for a count of 30 seconds to initiate the divers reflex which will naturally calm down your heart rate and breathing.       I:  Intensely exercise to calm down a body revved up by emotion    Examples: running, walking fast, jumping, playing basketball, weight lifting, swimming, calisthenics, etc.       Engage in exercises that DO NOT include violent behaviors. Exercises that utilize violent behaviors tend to function as  behavioral rehearsal,  and rather than calming the person down, may  actually  rev  the person up more, increasing the likelihood of violence, and lessening the likelihood that they will  burn off  energy      P:  Progressively relax your muscles    Starting with your hands, moving to your forearms, upper arms, shoulders, neck, forehead, eyes, cheeks and lips, tongue and teeth, chest, upper back, stomach, buttocks, thighs, calves, ankles, feet       Tense (10 seconds,   of the way), then relax each muscle (all the way)    Notice the tension    Notice the difference when relaxed (by tensing first, and then relaxing, you are able to get a more thorough relaxation than by simply relaxing)       P: Paced breathing to relax    The standard technique is to begin with counting the number of steps one takes for a typical inhale, then counting the steps one takes for a typical exhale, and then lengthening the amount of steps for the exhalation by one or two steps.  OR repeat this pattern for 1-2 minutes:   Inhale for four (4) seconds    Exhale for six (6) to eight (8) seconds          After using Distress Tolerance TIPP, TRY TO STOP!      S- Stop    Do not just react on your emotion urge. Stop! Freeze! Do not move a muscle! Your emotions may try to make you act without thinking. Stay in control! Take a step back Take a step back from the situation.    T- Take a break    Let go. Take a deep breath. Do not let your feelings make you act impulsively.    O- Observe    Notice what is going on inside and outside you. What is the situation? What are your thoughts and feelings? What are others saying or doing? Does my emotion make sense, is it justified? What is it that my emotions want me to do? Would that be effective?    P- Proceed mindfully    Act with awareness. In deciding what to do, consider your thoughts and feelings, the situation, and other people s thoughts and feelings. Think about your goals. Ask Wise Mind: Which actions will make it better or worse?         If my emotion action urge  would not be effective or helpful, practice acting OPPOSITE to the EMOTION ACTION URGE can help reduce the intensity or even change the emotion.   Consider these examples: with FEAR we have the urge to run away/avoid. OPPOSITE would be to approach it with caution. ANGER we have the urge to attack. OPPOSITE would be to gently avoid or to demonstrate kindness towards it. SADNESS we have the urge to withdraw/isolate. OPPOSITE would be to get self to move and be active physically or socially.       These additional skills may help with self-soothing and distracting you:       Activities   Focus attention on a task you need to get done. Rent movies; watch TV. Clean a room in your house. Find an event to go to. Play computer games. Go walking. Exercise. Surf the Internet. Write e-mails. Play sports. Go out for a meal or eat a favorite food. Call or go out with a friend. Listen to your iPod; download music. Build something. Spend time with your children. Play cards. Read magazines, books, comics. Do crossword puzzles or Sudoku.      Emotions   Read emotional books or stories, old letters. Watch emotional TV shows; go to emotional movies. Listen to emotional music. (Be sure the event creates different emotions.) Ideas: Scary movies, joke books, comedies, funny records, Congregation music, soothing music or music that fires you up, going to a store and reading funny greeting cards.      Thoughts   Count to 10; count colors in a painting or poster or out the window; count anything. Repeat words to a song in your mind. Work puzzles. Watch TV or read.      Sensations   Squeeze a rubber ball very hard. Listen to very loud music. Hold ice in your hand or mouth. Go out in the rain or snow. Take a hot or cold shower.   Remember that you can use your 5 senses as helpful self-soothing tools!         I can help my own emotions by practicing the following to keep my emotional mind healthy and bring positive emotions:      The ABC PLEASE  "skill is about taking good care of ourselves so that we can take care of others. Also, an important component of DBT is to reduce our vulnerability. When we take good care of ourselves, we are less likely to be vulnerable to disease and emotional crisis.      ABC   A- Accumulate positive emotions by doing things that are pleasant.   B- Build mastery by doing things we enjoy. Whether it is reading, cooking, cleaning, fixing a car, working a cross word puzzle, or playing a musical instrument. Practice these things to  and in time we feel competent.   C- [place] Ahead by rehearsing a plan ahead of time so that we can be prepared to cope skillfully. (Think of what makes situations difficult, and what helps in those situations)       PLEASE   Treat Physical Illness and take medications as prescribed.   Balance eating in order to avoid mood swings.   Avoid mood-Altering substances and have mood control.   Maintain good sleep so you can enjoy your life.   Get exercise to maintain high spirits.         Crisis Lines  Crisis Text Line  Text 632010  You will be connected with a trained live crisis counselor to provide support.     Por espanol, texto  CORINE a 890121 o texto a 442-AYUDAME en WhatsApp     [employer] (LGBTQ Youth Crisis Line)  5.776.164.2749  text START to 860-060        Community Resources  Fast Tracker  Linking people to mental health and substance use disorder resources  [employer] DevelopmenttrackPlay4testn.org      [place] Mental Health Warm Line  Peer to peer support  Monday thru Saturday, 12 pm to 10 pm  110.461.8736 or 8.928.753.0968  Text \"Support\" to 89480     National [place] on Mental Illness (EMA)  318.914.3689 or 1.888.EMA.HELPS        Mental Health Apps  My3  https://my3app.org/     VirtualHopeBox  https://[employer].org/apps/virtual-hope-box/        Additional Information  Today you were seen by a licensed mental health professional through Triage and Transition services, Behavioral " Healthcare Providers ([facility])  for a crisis assessment in the Emergency Department at [facility].  It is recommended that you follow up with your established providers (psychiatrist, mental health therapist, and/or primary care doctor - as relevant) as soon as possible. Coordinators from [facility] will be calling you in the next 24-48 hours to ensure that you have the resources you need.  You can also contact [facility] coordinators directly at [phone]. You may have been scheduled for or offered an appointment with a mental health provider. [facility] maintains an extensive network of licensed behavioral health providers to connect patients with the services they need.  We do not charge providers a fee to participate in our referral network.  We match patients with providers based on a patient's specific needs, insurance coverage, and location.  Our first effort will be to refer you to a provider within your care system, and will utilize providers outside your care system as needed.

## 2023-03-26 NOTE — ED PROVIDER NOTES
"    [facility] EMERGENCY DEPARTMENT ([facility])     March 26, 2023  ED Provider Note  [facility]      History     Chief Complaint   Patient presents with     Suicidal     BIBA picked up at the Group home, having suicidal ideations denies plans     HPI  Kamini Neal is a 32 year old male with a past medical history significant for schizoaffective disorder, PTSD, depression who presents to the Emergency Department for evaluation of Suicidal ideation.     Per DEC , Patient was brought by EMS from group [place]. He has some psychiatrist history and he is medication complaint. Patient report suicidal ideations without plans. He states he has thoughts of hurting himself but don't want to kill himself. Patient has difficulty answering questions. Denies any hallucinations. His group home states are okay with him returning and they have a one to one staffing available for him.     Past Medical History  Past Medical History:   Diagnosis Date     Anxiety      Depressive disorder      Schizo affective schizophrenia (H)      Substance abuse (H)      Past Surgical History:   Procedure Laterality Date     TONSILLECTOMY       benztropine (COGENTIN) 1 MG tablet  cholecalciferol (VITAMIN D3) 125 mcg (5000 units) capsule  cloZAPine (CLOZARIL) 200 MG tablet  docusate sodium (COLACE) 100 MG capsule  gabapentin (NEURONTIN) 400 MG capsule  levothyroxine (SYNTHROID/LEVOTHROID) 112 MCG tablet  OLANZapine (ZYPREXA) 20 MG tablet  omeprazole (PRILOSEC) 20 MG DR capsule  paliperidone (INVEGA SUSTENNA) 234 MG/1.5ML ANTHONY  polyethylene glycol (MIRALAX) 17 g packet  prazosin (MINIPRESS) 2 MG capsule  venlafaxine (EFFEXOR-XR) 75 MG 24 hr capsule      Allergies   Allergen Reactions     Haloperidol Other (See Comments)     Other reaction(s): Tardive Dyskinesia  Torticollis  Torticollis  Stiff Neck  Torticollis; reports \"stiff neck.\"       Family History  Family History   Problem Relation Age of Onset     " Mental Illness Mother      Mental Illness Maternal Aunt      Mental Illness Maternal Uncle      Glaucoma No family hx of      Macular Degeneration No family hx of      Social History   Social History     Tobacco Use     Smoking status: Every Day     Packs/day: 0.50     Types: Vaping Device, Cigarettes     Smokeless tobacco: Never   Substance Use Topics     Alcohol use: No     Drug use: No      Past medical history, past surgical history, medications, allergies, family history, and social history were reviewed with the patient. No additional pertinent items.      A medically appropriate review of systems was performed with pertinent positives and negatives noted in the HPI, and all other systems negative.    Physical Exam   BP: 122/84  Pulse: 96  Temp: 97.2  F (36.2  C)  Resp: 16  SpO2: 100 %  Physical Exam  Constitutional:       General: He is not in acute distress.     Appearance: Normal appearance. He is not toxic-appearing.   HENT:      Head: Atraumatic.      Nose: No congestion.   Eyes:      General: No scleral icterus.     Conjunctiva/sclera: Conjunctivae normal.   Cardiovascular:      Rate and Rhythm: Normal rate.   Pulmonary:      Effort: Pulmonary effort is normal. No respiratory distress.   Abdominal:      General: Abdomen is flat.      Palpations: Abdomen is soft.   Musculoskeletal:         General: No swelling or tenderness.      Cervical back: Neck supple. No tenderness.   Lymphadenopathy:      Cervical: No cervical adenopathy.   Skin:     General: Skin is warm.      Capillary Refill: Capillary refill takes less than 2 seconds.      Findings: No rash.   Neurological:      General: No focal deficit present.      Mental Status: He is alert and oriented to person, place, and time.      Sensory: No sensory deficit.      Motor: No weakness.      Coordination: Coordination normal.   Psychiatric:         Mood and Affect: Mood is anxious.         Behavior: Behavior is cooperative.         Thought Content:  Thought content does not include homicidal or suicidal ideation.           ED Course, Procedures, & Data      Procedures         Suicide assessment completed by mental health (D.E.C., LCSW, etc.)       Results for orders placed or performed during the hospital encounter of 03/26/23   Asymptomatic COVID-19 Virus (Coronavirus) by PCR Nasopharyngeal     Status: Normal    Specimen: Nasopharyngeal; Swab   Result Value Ref Range    SARS CoV2 PCR Negative Negative    Narrative    Testing was performed using the Xpert Xpress SARS-CoV-2 Assay on the Cepheid Gene-Xpert Instrument Systems. Additional information about this Emergency Use Authorization (EUA) assay can be found via the Lab Guide. This test should be ordered for the detection of SARS-CoV-2 in individuals who meet SARS-CoV-2 clinical and/or epidemiological criteria as well as from individuals without symptoms or other reasons to suspect COVID-19. Test performance for asymptomatic patients has only been established in anterior nasal swab specimens. This test is for in vitro diagnostic use under the FDA EUA for laboratories certified under CLIA to perform high complexity testing. This test has not been FDA cleared or approved. A negative result does not rule out the presence of PCR inhibitors in the specimen or target RNA concentration below the limit of detection for the assay. The possibility of a false negative should be considered if the patient's recent exposure or clinical presentation suggests COVID-19. This test was validated by the [facility]. This laboratory is certified under the Clinical Laboratory Improvement Amendments (CLIA) as qualified to perform high complexity laboratory testing.       Medications   OLANZapine zydis (zyPREXA) ODT tab 20 mg (20 mg Oral $Given 3/26/23 1924)     Labs Ordered and Resulted from Time of ED Arrival to Time of ED Departure   COVID-19 VIRUS (CORONAVIRUS) BY PCR - Normal       Result  Value    SARS CoV2 PCR Negative       No orders to display          Critical care was not performed.     Medical Decision Making  The patient's presentation was of moderate complexity (a chronic illness mild to moderate exacerbation, progression, or side effect of treatment).    The patient's evaluation involved:  ordering and/or review of 1 test(s) in this encounter (see separate area of note for details)    The patient's management necessitated high risk (a decision regarding hospitalization).      Assessment & Plan        I have reviewed the nursing notes. I have reviewed the findings, diagnosis, plan and need for follow up with the patient.        Final diagnoses:   Schizoaffective disorder, bipolar type (H)   I, Elena Manjarrez, am serving as a trained medical scribe to document services personally performed by George Dia MD, based on the provider's statements to me.      I, George Dia MD, was physically present and have reviewed and verified the accuracy of this note documented by Elena Dia MD  [facility] EMERGENCY DEPARTMENT  3/26/2023     George Dia MD  03/28/23 1129
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
## 2023-03-27 NOTE — ED NOTES
"Patient notified RN he was having \"bad thoughts\". Told RN that he wanted to hit his head on the wall and requested zyprexa. MD notified  "

## 2023-03-28 ENCOUNTER — HOSPITAL ENCOUNTER (OUTPATIENT)
Facility: CLINIC | Age: 33
Setting detail: OBSERVATION
Discharge: NURSING, REHAB OR LONG-TERM CARE | End: 2023-03-29
Attending: EMERGENCY MEDICINE | Admitting: EMERGENCY MEDICINE
Payer: COMMERCIAL

## 2023-03-28 DIAGNOSIS — F25.0 SCHIZOAFFECTIVE DISORDER, BIPOLAR TYPE (H): ICD-10-CM

## 2023-03-28 DIAGNOSIS — R45.851 SUICIDAL IDEATION: ICD-10-CM

## 2023-03-28 LAB
AMPHETAMINES UR QL SCN: NORMAL
BARBITURATES UR QL SCN: NORMAL
BENZODIAZ UR QL SCN: NORMAL
BZE UR QL SCN: NORMAL
CANNABINOIDS UR QL SCN: NORMAL
OPIATES UR QL SCN: NORMAL

## 2023-03-28 PROCEDURE — 99285 EMERGENCY DEPT VISIT HI MDM: CPT | Mod: 25 | Performed by: EMERGENCY MEDICINE

## 2023-03-28 PROCEDURE — 250N000013 HC RX MED GY IP 250 OP 250 PS 637: Performed by: EMERGENCY MEDICINE

## 2023-03-28 PROCEDURE — G0378 HOSPITAL OBSERVATION PER HR: HCPCS

## 2023-03-28 PROCEDURE — 99221 1ST HOSP IP/OBS SF/LOW 40: CPT | Performed by: EMERGENCY MEDICINE

## 2023-03-28 PROCEDURE — 90791 PSYCH DIAGNOSTIC EVALUATION: CPT

## 2023-03-28 PROCEDURE — 80307 DRUG TEST PRSMV CHEM ANLYZR: CPT | Performed by: EMERGENCY MEDICINE

## 2023-03-28 RX ORDER — OLANZAPINE 10 MG/1
10 TABLET, ORALLY DISINTEGRATING ORAL ONCE
Status: COMPLETED | OUTPATIENT
Start: 2023-03-28 | End: 2023-03-28

## 2023-03-28 RX ORDER — OLANZAPINE 5 MG/1
20 TABLET ORAL DAILY PRN
Status: DISCONTINUED | OUTPATIENT
Start: 2023-03-29 | End: 2023-03-29 | Stop reason: HOSPADM

## 2023-03-28 RX ORDER — LEVOTHYROXINE SODIUM 112 UG/1
112 TABLET ORAL DAILY
Status: DISCONTINUED | OUTPATIENT
Start: 2023-03-29 | End: 2023-03-29 | Stop reason: HOSPADM

## 2023-03-28 RX ORDER — PSEUDOEPHED/ACETAMINOPH/DIPHEN 30MG-500MG
TABLET ORAL
COMMUNITY
Start: 2023-03-05 | End: 2023-10-10

## 2023-03-28 RX ORDER — DOCUSATE SODIUM 100 MG/1
100 CAPSULE, LIQUID FILLED ORAL 2 TIMES DAILY
Status: DISCONTINUED | OUTPATIENT
Start: 2023-03-28 | End: 2023-03-29 | Stop reason: HOSPADM

## 2023-03-28 RX ORDER — TRAZODONE HYDROCHLORIDE 50 MG/1
TABLET, FILM COATED ORAL
COMMUNITY
Start: 2022-08-17 | End: 2023-10-10

## 2023-03-28 RX ORDER — BENZTROPINE MESYLATE 1 MG/1
1 TABLET ORAL 2 TIMES DAILY
Status: DISCONTINUED | OUTPATIENT
Start: 2023-03-28 | End: 2023-03-29 | Stop reason: HOSPADM

## 2023-03-28 RX ORDER — PRAZOSIN HYDROCHLORIDE 2 MG/1
4 CAPSULE ORAL AT BEDTIME
Status: DISCONTINUED | OUTPATIENT
Start: 2023-03-28 | End: 2023-03-29 | Stop reason: HOSPADM

## 2023-03-28 RX ORDER — TRAZODONE HYDROCHLORIDE 50 MG/1
50 TABLET, FILM COATED ORAL AT BEDTIME
Status: DISCONTINUED | OUTPATIENT
Start: 2023-03-28 | End: 2023-03-29 | Stop reason: HOSPADM

## 2023-03-28 RX ORDER — CLOZAPINE 200 MG/1
400 TABLET ORAL AT BEDTIME
Status: DISCONTINUED | OUTPATIENT
Start: 2023-03-28 | End: 2023-03-29 | Stop reason: HOSPADM

## 2023-03-28 RX ADMIN — OLANZAPINE 10 MG: 10 TABLET, ORALLY DISINTEGRATING ORAL at 20:38

## 2023-03-28 RX ADMIN — PRAZOSIN HYDROCHLORIDE 4 MG: 2 CAPSULE ORAL at 23:59

## 2023-03-28 RX ADMIN — NICOTINE POLACRILEX 4 MG: 4 GUM, CHEWING BUCCAL at 21:35

## 2023-03-28 RX ADMIN — OLANZAPINE 10 MG: 10 TABLET, ORALLY DISINTEGRATING ORAL at 22:41

## 2023-03-28 ASSESSMENT — COLUMBIA-SUICIDE SEVERITY RATING SCALE - C-SSRS
SUICIDE, SINCE LAST CONTACT: NO
2. HAVE YOU ACTUALLY HAD ANY THOUGHTS OF KILLING YOURSELF?: YES
TOTAL  NUMBER OF ABORTED OR SELF INTERRUPTED ATTEMPTS SINCE LAST CONTACT: NO
5. HAVE YOU STARTED TO WORK OUT OR WORKED OUT THE DETAILS OF HOW TO KILL YOURSELF? DO YOU INTEND TO CARRY OUT THIS PLAN?: YES
TOTAL  NUMBER OF INTERRUPTED ATTEMPTS SINCE LAST CONTACT: NO
6. HAVE YOU EVER DONE ANYTHING, STARTED TO DO ANYTHING, OR PREPARED TO DO ANYTHING TO END YOUR LIFE?: NO
ATTEMPT SINCE LAST CONTACT: NO
REASONS FOR IDEATION SINCE LAST CONTACT: MOSTLY TO END OR STOP THE PAIN (YOU COULDN'T GO ON LIVING WITH THE PAIN OR HOW YOU WERE FEELING)
1. SINCE LAST CONTACT, HAVE YOU WISHED YOU WERE DEAD OR WISHED YOU COULD GO TO SLEEP AND NOT WAKE UP?: YES

## 2023-03-28 ASSESSMENT — ACTIVITIES OF DAILY LIVING (ADL)
ADLS_ACUITY_SCORE: 37
ADLS_ACUITY_SCORE: 37

## 2023-03-29 VITALS
OXYGEN SATURATION: 100 % | RESPIRATION RATE: 16 BRPM | WEIGHT: 245 LBS | TEMPERATURE: 98.2 F | DIASTOLIC BLOOD PRESSURE: 82 MMHG | SYSTOLIC BLOOD PRESSURE: 154 MMHG | HEIGHT: 70 IN | BODY MASS INDEX: 35.07 KG/M2 | HEART RATE: 93 BPM

## 2023-03-29 PROCEDURE — 99238 HOSP IP/OBS DSCHRG MGMT 30/<: CPT | Performed by: EMERGENCY MEDICINE

## 2023-03-29 PROCEDURE — 250N000013 HC RX MED GY IP 250 OP 250 PS 637: Performed by: EMERGENCY MEDICINE

## 2023-03-29 PROCEDURE — G0378 HOSPITAL OBSERVATION PER HR: HCPCS

## 2023-03-29 RX ADMIN — BENZTROPINE MESYLATE 1 MG: 1 TABLET ORAL at 00:00

## 2023-03-29 RX ADMIN — VENLAFAXINE HYDROCHLORIDE 225 MG: 75 CAPSULE, EXTENDED RELEASE ORAL at 00:01

## 2023-03-29 RX ADMIN — TRAZODONE HYDROCHLORIDE 50 MG: 50 TABLET ORAL at 00:00

## 2023-03-29 RX ADMIN — NICOTINE POLACRILEX 4 MG: 4 GUM, CHEWING BUCCAL at 00:02

## 2023-03-29 RX ADMIN — DOCUSATE SODIUM 100 MG: 100 CAPSULE, LIQUID FILLED ORAL at 00:00

## 2023-03-29 RX ADMIN — CLOZAPINE 400 MG: 200 TABLET ORAL at 00:01

## 2023-03-29 ASSESSMENT — ACTIVITIES OF DAILY LIVING (ADL)
ADLS_ACUITY_SCORE: 37
ADLS_ACUITY_SCORE: 37

## 2023-03-29 NOTE — DISCHARGE SUMMARY
"ED Observation Discharge Summary  [facility]  Discharge Date: 3/29/2023    Kamini Neal MRN: [id number]   Age: 32 year old YOB: 1990     Brief HPI & Initial ED Course     Chief Complaint   Patient presents with     Suicidal     HPI  Kamini Neal is a 32 year old male with PMH notable for schizoaffective disorder, bipolar type, PTSD, borderline PD who presented to the ED with a psychiatric concern.  Patient came from group home expressing suicidal ideation.    The patient was evaluated in the emergency department by a physician and DEC behavioral health .  Patient was given olanzapine.  The DEC  recommended observation admission with return to group home when patient is able to contract for safety. See separate DEC note from this encounter for details on the assessment. The patient's psychiatric state was such that he would benefit from ongoing monitoring. Observation care was initiated with the plan including serial assessments of psychiatric condition, potential administration of medications if indicated, and further disposition pending the patient's psychiatric course during the monitoring period.     See ED Observation H&P for further details on the patient's presenting history and initial evaluation.     Physical Exam   BP: 123/87  Pulse: 104  Temp: 97.2  F (36.2  C)  Resp: 18  Height: 177.8 cm (5' 10\")  Weight: 111.1 kg (245 lb)  SpO2: 98 %    Physical Exam  General: Calm. Appears stated age.   HENT: MMM, no oropharyngeal lesions  Eyes: PERRL, normal sclerae   Cardio: Regular rate, extremities well perfused  Resp: Normal work of breathing, normal respiratory rate  Neuro: alert and fully oriented. CN II-XII grossly intact. Grossly normal strength and sensation in all extremities.   MSK: no deformities.   Integumentary/Skin: no rash visualized, normal color  Psych: Normal affect, calm behavior.  Endorses recent but not current SI.  No HI.  No " hallucinations. Thought process linear.     Results      Procedures              Labs Ordered and Resulted from Time of ED Arrival to Time of ED Departure   DRUG ABUSE SCREEN 1 URINE (ED) - Normal       Result Value    Amphetamines Urine Screen Negative      Barbituates Urine Screen Negative      Benzodiazepine Urine Screen Negative      Cannabinoids Urine Screen Negative      Cocaine Urine Screen Negative      Opiates Urine Screen Negative              Observation Course   The patient was found to have a psychiatric condition that would benefit from an observation stay in the emergency department for further psychiatric stabilization and/or coordination of a safe disposition. The plan upon observation admission included serial assessments of psychiatric condition, potential administration of medications if indicated, further disposition pending the patient's psychiatric course during the monitoring period.     Serial assessments of the patient's psychiatric condition were performed. Nursing notes were reviewed. During this provider's care, the patient did not require medications for agitation, and did not require restraints/seclusion for patient and/or provider safety.  After olanzapine and monitoring, the patient reported feeling better.  He no longer had suicidal thoughts and desire to go back to his group home.  Group home was contacted and able to accept patient back.    After the period in observation care, the patient's circumstances and mental state were safe for outpatient management. After counseling on the diagnosis, work-up, and treatment plan, the patient was discharged to his group home. Close follow-up with a psychiatrist and/or therapist was recommended and community psychiatric resources were provided. Patient is to return to the ED if any urgent or potentially life-threatening concerns.      Less than 30 minutes spent on discharge.    Discharge Diagnoses:   Final diagnoses:   Schizoaffective  disorder, bipolar type (H)   Suicidal ideation       --  Xander Foley MD  [facility] EMERGENCY DEPARTMENT  3/29/2023
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
## 2023-03-29 NOTE — ED NOTES
Pt. stood up and began hitting head on wall.   Redirected back to chair. Pt. requesting meds.  MD notified and pt. given zyprexa 10 mg.

## 2023-03-29 NOTE — ED TRIAGE NOTES
Pt. brought in by police today with suicidal ideations and plan to slit throat.     Triage Assessment     Row Name 03/28/23 1944       Triage Assessment (Adult)    Airway WDL WDL       Respiratory WDL    Respiratory WDL WDL       Skin Circulation/Temperature WDL    Skin Circulation/Temperature WDL WDL       Cardiac WDL    Cardiac WDL WDL       Peripheral/Neurovascular WDL    Peripheral Neurovascular WDL WDL       Cognitive/Neuro/Behavioral WDL    Cognitive/Neuro/Behavioral WDL WDL

## 2023-03-29 NOTE — ED NOTES
Pt. discharged back to .   called and updated them on pt. status.  Per  [facility] to send pt. back to them.

## 2023-03-29 NOTE — ED PROVIDER NOTES
"    [facility] EMERGENCY DEPARTMENT ([facility])    3/28/23      ED PROVIDER NOTE      History     Chief Complaint   Patient presents with     Suicidal     HPI  Kamini Neal is a 32 year old male with a past medical history of significant for schizoaffective disorder, PTSD, depression who presents to the Emergency Department for evaluation of Suicidal ideation.  The patient lives at a group home with 1:1 staffing.  He presents with si and has presented to ed for similar in past.  He has gotten zyprexa 20 mg prn in past and then says he is ready to go back to his group. he presents with si but cannot say why.  He has been at the same group home for 10 years.  Tonight when he was feeling suicidal and wanted to go to the hospital the  challenged him asking him if he needed to actually go.  He said he wanted to go to the hospital to clear his mind. He has been struggling with his sexuality.      Past Medical History  Past Medical History:   Diagnosis Date     Anxiety      Depressive disorder      Schizo affective schizophrenia (H)      Substance abuse (H)      Past Surgical History:   Procedure Laterality Date     TONSILLECTOMY       ACETAMINOPHEN EXTRA STRENGTH 500 MG tablet  benztropine (COGENTIN) 1 MG tablet  cholecalciferol (VITAMIN D3) 125 mcg (5000 units) capsule  cloZAPine (CLOZARIL) 200 MG tablet  docusate sodium (COLACE) 100 MG capsule  gabapentin (NEURONTIN) 400 MG capsule  levothyroxine (SYNTHROID/LEVOTHROID) 112 MCG tablet  OLANZapine (ZYPREXA) 20 MG tablet  omeprazole (PRILOSEC) 20 MG DR capsule  prazosin (MINIPRESS) 2 MG capsule  traZODone (DESYREL) 50 MG tablet  venlafaxine (EFFEXOR-XR) 75 MG 24 hr capsule  paliperidone (INVEGA SUSTENNA) 234 MG/1.5ML ANTHONY      Allergies   Allergen Reactions     Haloperidol Other (See Comments)     Other reaction(s): Tardive Dyskinesia  Torticollis  Torticollis  Stiff Neck  Torticollis; reports \"stiff neck.\"       Family History  Family History " "  Problem Relation Age of Onset     Mental Illness Mother      Mental Illness Maternal Aunt      Mental Illness Maternal Uncle      Glaucoma No family hx of      Macular Degeneration No family hx of      Social History   Social History     Tobacco Use     Smoking status: Every Day     Packs/day: 0.50     Types: Vaping Device, Cigarettes     Smokeless tobacco: Never   Substance Use Topics     Alcohol use: No     Drug use: No      Past medical history, past surgical history, medications, allergies, family history, and social history were reviewed with the patient. No additional pertinent items.      A complete review of systems was performed with pertinent positives and negatives noted in the HPI, and all other systems negative.    Physical Exam   BP: 123/87  Pulse: 104  Temp: 97.2  F (36.2  C)  Resp: 18  Height: 177.8 cm (5' 10\")  Weight: 111.1 kg (245 lb)  SpO2: 98 %  Physical Exam  Vitals and nursing note reviewed.   HENT:      Head: Normocephalic and atraumatic.      Nose: No congestion or rhinorrhea.   Eyes:      Extraocular Movements: Extraocular movements intact.   Cardiovascular:      Rate and Rhythm: Normal rate.   Pulmonary:      Effort: Pulmonary effort is normal.   Musculoskeletal:         General: No deformity or signs of injury.      Cervical back: Normal range of motion.   Skin:     Coloration: Skin is not jaundiced or pale.   Neurological:      General: No focal deficit present.      Mental Status: He is alert and oriented to person, place, and time.   Psychiatric:         Attention and Perception: Attention and perception normal.         Mood and Affect: Affect is blunt and flat.         Speech: Speech normal.         Behavior: Behavior is cooperative.         Thought Content: Thought content includes suicidal ideation.         Judgment: Judgment is impulsive and inappropriate.           ED Course, Procedures, & Data      Procedures       ED Course Selections: -----  Observation Addendum  With this " Addendum, this ED Provider Note may also serve as an Observation H&P    Observation Initiation Date: Mar 28, 2023    Patient presenting with wanting to self harm/si.  From group home and usually better after zyprexa 20 mg. Given still having some thoughts will observe overnight and hopefully discharge back to group home in the am.    A DEC assessment was completed, and the case was discussed with the . The  recommended observation in the ED. See separate DEC note from today's date for details on the assessment.    During the initial care period, the patient did require medications for agitation, and did not require restraints/seclusion for patient and/or provider safety.     The patient's outpatient medications were reconciled and ordered.     The patient was found to have a psychiatric condition that would benefit from an observation stay in the emergency department for further psychiatric stabilization and/or coordination of a safe disposition. The observation plan includes serial assessments of psychiatric condition, potential administration of medications if indicated, further disposition pending the patient's psychiatric course during the monitoring period.   -----   Mental Health Risk Assessment      PSS-3    Date and Time Over the past 2 weeks have you felt down, depressed, or hopeless? Over the past 2 weeks have you had thoughts of killing yourself? Have you ever attempted to kill yourself? When did this last happen? User   03/28/23 1945 yes yes yes more than 6 months ago [facility]      C-SSRS ([place])    Date and Time Q1 Wished to be Dead (Past Month) Q2 Suicidal Thoughts (Past Month) Q3 Suicidal Thought Method Q4 Suicidal Intent without Specific Plan Q5 Suicide Intent with Specific Plan Q6 Suicide Behavior (Lifetime) Within the Past 3 Months? RETIRED: Level of Risk per Screen Screening Not Complete User   03/28/23 1945 yes yes yes no yes yes -- -- -- [facility]              Suicide assessment  completed by mental health (D.E.C., LCSW, etc.)       Results for orders placed or performed during the hospital encounter of 03/28/23   Drug abuse screen 1 urine (ED)     Status: Normal   Result Value Ref Range    Amphetamines Urine Screen Negative Screen Negative    Barbituates Urine Screen Negative Screen Negative    Benzodiazepine Urine Screen Negative Screen Negative    Cannabinoids Urine Screen Negative Screen Negative    Cocaine Urine Screen Negative Screen Negative    Opiates Urine Screen Negative Screen Negative   Urine Drugs of Abuse Screen     Status: Normal    Narrative    The following orders were created for panel order Urine Drugs of Abuse Screen.  Procedure                               Abnormality         Status                     ---------                               -----------         ------                     Drug abuse screen 1 urin...[[id number]]  Normal              Final result                 Please view results for these tests on the individual orders.     Medications   benztropine (COGENTIN) tablet 1 mg (has no administration in time range)   cloZAPine (CLOZARIL) tablet 400 mg (has no administration in time range)   docusate sodium (COLACE) capsule 100 mg (has no administration in time range)   gabapentin (NEURONTIN) capsule 400 mg (has no administration in time range)   levothyroxine (SYNTHROID/LEVOTHROID) tablet 112 mcg (has no administration in time range)   OLANZapine (zyPREXA) tablet 20 mg (has no administration in time range)   prazosin (MINIPRESS) capsule 4 mg (has no administration in time range)   traZODone (DESYREL) tablet 50 mg (has no administration in time range)   venlafaxine (EFFEXOR XR) 24 hr capsule 225 mg (has no administration in time range)   OLANZapine zydis (zyPREXA) ODT tab 10 mg (10 mg Oral $Given 3/28/23 2038)   nicotine polacrilex (NICORETTE) gum 4 mg (4 mg Buccal $Given 3/28/23 2135)   OLANZapine zydis (zyPREXA) ODT tab 10 mg (10 mg Oral $Given 3/28/23 2241)      Labs Ordered and Resulted from Time of ED Arrival to Time of ED Departure   DRUG ABUSE SCREEN 1 URINE (ED) - Normal       Result Value    Amphetamines Urine Screen Negative      Barbituates Urine Screen Negative      Benzodiazepine Urine Screen Negative      Cannabinoids Urine Screen Negative      Cocaine Urine Screen Negative      Opiates Urine Screen Negative       No orders to display          Critical care was not performed.     Medical Decision Making  The patient's presentation was of moderate complexity (a chronic illness mild to moderate exacerbation, progression, or side effect of treatment).     The patient's evaluation involved:  an assessment requiring an independent historian (see separate area of note for details)  discussion of management or test interpretation with another health professional (see separate area of note for details)    The patient's management necessitated further care after sign-out to Dr. Foley (see their note for further management).      Assessment & Plan    Kamini Neal is a 32 year old male with a past medical history of significant for schizoaffective disorder, PTSD, depression who presents to the Emergency Department for evaluation of Suicidal ideation.  Upon arrival nursing requested zyprexa due to wanting to self harm. He was given zyprexa 10 mg po and was calm for awhile. He then wanted to self harm again and so zyprexa 10 mg po was given.  Dec  is recommending observation overnight to make sure he remains calm and not wanting to self harm further before returning to his group home.     I have reviewed the nursing notes. I have reviewed the findings, diagnosis, plan and need for follow up with the patient.    New Prescriptions    No medications on file       Final diagnoses:   Schizoaffective disorder, bipolar type (H)       Jena Balderas MD  [facility] EMERGENCY DEPARTMENT  3/28/2023     Jena Balderas MD  03/28/23 9593

## 2023-03-29 NOTE — ED NOTES
Writer sitting across from pt. Pt stated wanting to hurt himself. Distraction techniques offered, pt declined. Pt then stood up and began to bang head on wall. Pillow placed. Pt sat down, again distraction techniques offered. Pt continued to decline.

## 2023-03-29 NOTE — ED NOTES
Pt. again saying he feels like hurting himself and is asking for more medication.  Pt. is currently on 1:1.  MD notified,  zyprexa 10 mg ordered and given.

## 2023-03-29 NOTE — PLAN OF CARE
Margotcherylshoshana APRYL Val  March 28, 2023  Plan of Care Hand-off Note     Patient Care Path: Observation    Plan for Care:     A lower level of care has been unsuccessful in treating and stabilizing patient s mental health symptoms. However, with brief observation, monitored therapeutic treatment, and intervention of mental health symptoms in the ED, symptoms may be mitigated with potential for disposition to a less restrictive level of care than an inpatient setting. Patient is not currently on the inpatient worklist. Extended Care will follow, working to address safety planning and exhibiting safe behaviors. Pt denies HI, hallucinations, delusions or substance use. Pt is suicidal at baseline, and his presentation is consistent with previous visits. Pt appears to stabilize within 24-48 hours per previous visits and requests to discharge on his own back to his group home once he is able to engage in safety planning. He has 24/7 staffing with a  1:1. His group home welcomes him back any time if he is feeling better. It is recommended pt discharge to his group home and follow up with individual therapy after a period of observation if he is able to engage in safety planning. If he continues to endorse SI and is not able to engage in safety planning, reassessment for IP MH may be necessary.     Critical Safety Issues: SI, NSSI (headbanging)    Overview:  This patient is a child/adolescent: No    This patient has additional special visitor precautions: No    Legal Status: Voluntary    Contacts:     Neela Pollack () [phone]    Psychiatry Consult:  Psychiatry Consult not requested because pt declined    Updated RN and Attending Provider regarding plan of care.    LINDA Johnston     Patient : Burt Ferrer Age: 82 year old Sex: male   MRN: [id number] Encounter Date: 11/7/2020      History     Chief Complaint   Patient presents with   • Shortness of Breath     HPI     The patient is an 82-year-old male presenting for evaluation of hypoxemia.  He was tested 4 days ago for COVID and came back positive.  Reports that he has had a cough lack of appetite and fever.  Has been checking his pulse ox at home and notes that it was 85%.  Reports that he has lost weight and has had increased shortness of breath.    Allergies   Allergen Reactions   • Diovan [Valsartan] SHORTNESS OF BREATH     Fatigue     • Penicillins      rash       Current Discharge Medication List      Prior to Admission Medications    Details   predniSONE (DELTASONE) 10 MG tablet TAKE 1 TABLET BY MOUTH  DAILY  Qty: 90 tablet, Refills: 1      amLODIPine (NORVASC) 5 MG tablet TAKE 1 TABLET BY MOUTH  DAILY  Qty: 90 tablet, Refills: 3      losartan (COZAAR) 100 MG tablet TAKE 1 TABLET BY MOUTH  DAILY  Qty: 90 tablet, Refills: 3             Past Medical History:   Diagnosis Date   • Arthritis    • Bilateral sensorineural hearing loss 4/30/2017   • Cataract    • 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 Vilchis   • CATARACT EXTRACTION W/ INTRAOCULAR LENS IMPLANT Right 4/1/2015    Dr. Vilchis   • JOINT REPLACEMENT      left hip replacement   • JOINT REPLACEMENT      rt shoulder replacement   • JOINT REPLACEMENT  05/2016    right THR   • REMOVAL OF TONSILS,<13 Y/O     • REMV CATARACT EXTRACAP INSERT LENS Left 01/09/2017   •  SHOULDER SURGERY  1998    right shoulder replacement   • SKIN BIOPSY  11/1/2013    melanoma left arm   • TONSILLECTOMY AND ADENOIDECTOMY     • TOTAL HIP REPLACEMENT  2003    left       Family History   Problem Relation Age of Onset   • Arthritis Mother         RA   • Cancer Father    • Alcohol Abuse Father        Social History     Tobacco Use   • Smoking status: Never Smoker   • Smokeless tobacco: Never Used   Substance Use Topics   • Alcohol use: Yes     Alcohol/week: 8.3 standard drinks     Types: 10 Standard drinks or equivalent per week     Frequency: 4 or more times a week     Drinks per session: 3 or 4     Binge frequency: Never     Comment: 5-7 daily   • Drug use: No       E-cigarette/Vaping     E-Cigarette/Vaping Substances & Devices       Review of Systems   Constitutional: Positive for fever and unexpected weight change.   Respiratory: Positive for cough and shortness of breath.    All other systems reviewed and are negative.      Physical Exam     ED Triage Vitals [11/07/20 1614]   ED Triage Vitals Group      Temp 99.5 °F (37.5 °C)      Heart Rate 85      Resp (!) 28      BP (!) 155/75      SpO2 94 %      EtCO2 mmHg       Height 6' 1\" (1.854 m)      Weight 171 lb 1.2 oz (77.6 kg)      Weight Scale Used       BMI (Calculated) 22.57      IBW/kg (Calculated) 79.9       Physical Exam   Constitutional: He is oriented to person, place, and time. He appears well-developed and well-nourished. No distress.   HENT:   Head: Normocephalic and atraumatic.   Mouth/Throat: Oropharynx is clear and moist.   Eyes: Conjunctivae are normal. Right eye exhibits no discharge. Left eye exhibits no discharge.   Neck: Normal range of motion. No JVD present.   Cardiovascular: Normal rate.   Pulmonary/Chest:   Tachypnea, speaks in full sentences, coarse breath sounds   Musculoskeletal: Normal range of motion.   Neurological: He is alert and oriented to person, place, and time. No cranial nerve deficit. He exhibits normal muscle  tone.   Skin: Skin is warm and dry. No rash noted.   Psychiatric: He has a normal mood and affect.   Nursing note and vitals reviewed.      ED Course     Procedures    Lab Results     Results for orders placed or performed during the hospital encounter of 11/07/20   C Reactive Protein   Result Value Ref Range    C-Reactive Protein 23.8 (H) <=1.0 mg/dL   Prothrombin Time   Result Value Ref Range    Prothrombin Time 10.7 9.7 - 11.8 sec    INR 1.0 <=5.0 sec   Comprehensive Metabolic Panel   Result Value Ref Range    Fasting Status      Sodium 132 (L) 135 - 145 mmol/L    Potassium 4.1 3.4 - 5.1 mmol/L    Chloride 97 (L) 98 - 107 mmol/L    Carbon Dioxide 20 (L) 21 - 32 mmol/L    Anion Gap 19 10 - 20 mmol/L    Glucose 135 (H) 65 - 99 mg/dL    BUN 32 (H) 6 - 20 mg/dL    Creatinine 1.43 (H) 0.67 - 1.17 mg/dL    Glomerular Filtration Rate 46 (L) >90 mL/min/1.73m2    BUN/ Creatinine Ratio 22 7 - 25    Calcium 8.0 (L) 8.4 - 10.2 mg/dL    Bilirubin, Total 1.2 (H) 0.2 - 1.0 mg/dL    GOT/AST 54 (H) <=37 Units/L    GPT/ALT 35 <64 Units/L    Alkaline Phosphatase 120 (H) 45 - 117 Units/L    Albumin 2.7 (L) 3.6 - 5.1 g/dL    Protein, Total 6.7 6.4 - 8.2 g/dL    Globulin 4.0 2.0 - 4.0 g/dL    A/G Ratio 0.7 (L) 1.0 - 2.4   Creatine Kinase   Result Value Ref Range    CK 76 39 - 308 Units/L   Procalcitonin   Result Value Ref Range    Procalcitonin 0.21 (H) <=0.09 ng/mL   Lactate Dehydrogenase   Result Value Ref Range    LD, Total 578 (H) 86 - 234 Units/L   Ferritin   Result Value Ref Range    Ferritin 2,903 (H) 26 - 388 ng/mL   Troponin I Ultra Sensitive   Result Value Ref Range    Troponin I, Ultra Sensitive <0.02 <=0.04 ng/mL   D Dimer, Quantitative   Result Value Ref Range    D Dimer, Quantitative 1.14 (H) <0.57 mg/L (FEU)   NT proBNP   Result Value Ref Range    NT-proBNP 166 <=450 pg/mL   CBC with Automated Differential (performable only)   Result Value Ref Range    WBC 12.4 (H) 4.2 - 11.0 K/mcL    RBC 4.70 4.50 - 5.90 mil/mcL    HGB  14.9 13.0 - 17.0 g/dL    HCT 42.9 39.0 - 51.0 %    MCV 91.3 78.0 - 100.0 fl    MCH 31.7 26.0 - 34.0 pg    MCHC 34.7 32.0 - 36.5 g/dL    RDW-CV 13.2 11.0 - 15.0 %     140 - 450 K/mcL    NRBC 0 <=0 /100 WBC    Neutrophil, Percent 78 %    Lymphocytes, Percent 8 %    Mono, Percent 14 %    Eosinophils, Percent 0 %    Basophils, Percent 0 %    Immature Granulocytes 0 %    Absolute Neutrophils 9.6 (H) 1.8 - 7.7 K/mcL    Absolute Lymphocytes 1.0 1.0 - 4.0 K/mcL    Absolute Monocytes 1.8 (H) 0.3 - 0.9 K/mcL    Absolute Eosinophils  0.0 (L) 0.1 - 0.5 K/mcL    Absolute Basophils 0.0 0.0 - 0.3 K/mcL    Absolute Immmature Granulocytes 0.1 0.0 - 0.2 K/mcL    RDW-SD 44.5 39.0 - 50.0 fL       EKG Results     EKG Interpretation  Rate: 87  Rhythm: normal sinus rhythm   Abnormality: no    EKG tracing interpreted by ED physician    Radiology Results     Imaging Results          XR Chest AP or PA (Final result)  Result time 11/07/20 17:28:32    Final result                 Impression:    IMPRESSION:    1. Multifocal pneumonia               Narrative:    EXAMINATION:  XR CHEST AP OR PA    TECHNIQUE: XR CHEST AP OR PA     CLINICAL HISTORY:  82 years old Male presents with  shortness of breath ,  positive COVID.     COMPARISON: 2016    FINDINGS:    There are diffuse bilateral opacities, RIGHT upper lobe, LEFT upper lobe  and mid and lower LEFT lung suspicious for pneumonia, lesser extent RIGHT  lower lung           There is no enlargement of the cardiac/pericardial silhouette.    No evidence for mass effect on the trachea.                                ED Medication Orders (From admission, onward)    Ordered Start     Status Ordering Provider    11/07/20 1746 11/07/20 1947  remdesivir 200 mg in sodium chloride 0.9 % 250 mL total volume infusion  (Remdesivir Adults 200 mg IV for 1 dose followed by 100 mg IV daily on days 2-5)  ONCE      Ordered TARUN BRENNAN    11/07/20 1615 11/07/20 1616  dexamethasone (DECADRON) injection 6 mg   ONCE      Last MAR action: Given FELIZ BRENNAN               [facility]     The patient is an 82-year-old male who recently tested positive for COVID-19 presenting for evaluation of shortness of breath and hypoxemia.  84% at time of arrival.  Placed on oxygen with good improvement.  IV placed, labs were obtained.  Decadron ordered.    1720- pulse ox 88 on 2 L. Increased to 4 L with improvement to 91%.  However, his respiratory rate remains around 40.  He is still speaking in full sentences and does not appear overtly distressed but this does make me concerned.  Will try high-flow nasal cannula to see if this helps his work of breathing.  At the same time I did place a call to [facility] to discuss transfer given my concern for worsening clinical status and lack of resources here at the hospital. I also did discuss the case with Dr Bell who agrees that the patient should be transferred so long as a bed is available.    1734- I spoke with the house supervisor at [facility] who will have the hospitalist get in touch with me.     1745- I did confirm with the patient that he is full code and would want everything done.     1810- I spoke with Dr Khan, at [facility], who thinks the patient may be better served in the ICU. Given this, I will wait for [facility] to call back with bed availability.     -1845- patient was accepted to [facility] but reports are that the room will be ready in several hours.  I did start the patient on remdesivir and Decadron while in the ER.  Will continue to monitor for any change in condition until transfer.    Critical care time of 40 minutes separate from all other billable procedures.        Clinical Impression     ED Diagnosis   1. COVID-19 virus infection     2. Hypoxemia         Disposition        Transfer to Another Facility 11/7/2020  6:44 PM  Burt SOUZA Cler should be transferred out to [facility].                     Feliz Brennan MD  11/07/20 1842

## 2023-03-29 NOTE — DISCHARGE INSTRUCTIONS
Scheduled Appointment  Type: Teletherapy - Virtual  Date: Monday, 4/3/2023    Time: 1:00 pm - 1:45 pm  Provider: Rosalia Estrada MA  [facility]  Location: Clinical [facility] Developmental [facility], [address] [place] [address]  Phone: [phone]  Patient Instructions  Telehealth/Virtual services only. No in-person services. New patients are contacted via phone/email by CDS office managers (to confirm information), before 1st appointment. Electronic device w/video capabilities required for services. New patients have e-paperwork to complete prior to 1st appointment (access to e-docs given by CDS  during intake). Call CDS w/questions [phone] or email [email or web address]    Aftercare Plan  If I am feeling unsafe or I am in a crisis, I will:   Contact my established care providers   Call the National Suicide Prevention Lifeline: 988  Go to the nearest emergency room   Call 911     Warning signs that I or other people might notice when a crisis is developing for me: increased thoughts, plans or actions to harm myself or others, feeling unable to keep myself safe    Things I am able to do on my own or with others to cope or help me feel better: play video games, talk to loved ones and group home staff, listen to music, watch favorite tv show or movie    Changes I can make to support my mental health and wellness: adhere to treatment recommendations, follow up with current outpatient providers, follow up with all scheduled appointments, take medications as prescribed    People in my life that I can ask for help: family, friends, providers, group home staff and manager    [facility] has a mental health crisis team you can call 24/7: [facility] Mobile Crisis  [phone]     Crisis Lines  Crisis Text Line  Text 125509  You will be connected with a trained live crisis counselor to provide support.    Por espanol, texto  CORINE a 549945 o texto a 442-AYUDAME en  "WhatsApp    [employer] (LGBTQ Youth Crisis Line)  6.830.039.9573  text START to 740-066      Community Resources  Fast Tracker  Linking people to mental health and substance use disorder resources  [employer]     [place] Mental Health Warm Line  Peer to peer support  Monday thru Saturday, 12 pm to 10 pm  232.719.4576 or 3.447.253.4481  Text \"Support\" to 96769    National [place] on Mental Illness (EMA)  197.966.1560 or 1.888.EMA.HELPS      Mental Health Apps  My3  https://my[employer] Corporationpp.org/    VirtualHopeBox  https://[employer]/apps/virtual-hope-box/      Additional Information  Today you were seen by a licensed mental health professional through Triage and Transition services, Behavioral Healthcare Providers ([facility])  for a crisis assessment in the Emergency Department at [facility].  It is recommended that you follow up with your established providers (psychiatrist, mental health therapist, and/or primary care doctor - as relevant) as soon as possible. Coordinators from [facility] will be calling you in the next 24-48 hours to ensure that you have the resources you need.  You can also contact [facility] coordinators directly at [phone]. You may have been scheduled for or offered an appointment with a mental health provider. [facility] maintains an extensive network of licensed behavioral health providers to connect patients with the services they need.  We do not charge providers a fee to participate in our referral network.  We match patients with providers based on a patient's specific needs, insurance coverage, and location.  Our first effort will be to refer you to a provider within your care system, and will utilize providers outside your care system as needed.      Grounding Techniques:  Try to notice where you are, your surroundings including the people, the sounds like the TV or radio.  Concentrate on your breathing. Take a deep cleansing breath from your diaphragm. Count the breaths as " you exhale. Make sure you breath slowly.  Hold something that you find comforting, for some it may be a stuffed animal or a blanket. Notice how it feels in your hands. Is it hard or soft?  During a non-crisis time make a list of positive affirmations. Print them out and keep them handy for times of intense anxiety. At those times, read them aloud.  Try the [employer] game:  Name 5 things you can see in the room with you  Name 4 things you can feel ( chair on my back  or  feet on floor )   Name 3 things you can hear right now ( people talking  or  tv )   Name 2 things you can smell right now (or, 2 things you like the smell of)   Name 1 good thing about yourself  Create A Safe Place  Image a safe place -- it can be a real or imaginary place:   What do you see -- especially colors?   What sounds do you hear?   What sensations do you feel?   What smells do you smell?   What people or animals would you want in your safe place?   Imagine a protective bubble, wall or boundary around your safe place.   Imagine a door or gate with a guard at your safe place.   Image a lock and key to your safe place and only you can unlock it.  You can draw or make a collage that represents your safe place.   Choose a souvenir of your safe place -- a color, an object, a song.   Keep your image of your safe place so you can come back to it when you need to.   Reduce Extreme Emotion  QUICKLY:  Changing Your Body Chemistry      T:  Change your body Temperature to change your autonomic nervous system   Use Ice Water to calm yourself down FAST   Put your face in a bowl of ice water (this is the best way; have the person keep his/her face in ice water for 30-45 seconds - initial research is showing that the longer s/he can hold her/his face in the water, the better the response), or   Splash ice water on your face, or hold an ice pack on your face      I:  Intensely exercise to calm down a body revved up by emotion   Examples: running, walking fast,  jumping, playing basketball, weight lifting, swimming, calisthenics, etc.   Engage in exercises that DO NOT include violent behaviors. Exercises that utilize violent behaviors tend to function as  behavioral rehearsal,  and rather than calming the person down, may actually  rev  the person up more, increasing the likelihood of violence, and lessening the likelihood that they will  burn off  energy     P:  Progressively relax your muscles   Starting with your hands, moving to your forearms, upper arms, shoulders, neck, forehead, eyes, cheeks and lips, tongue and teeth, chest, upper back, stomach, buttocks, thighs, calves, ankles, feet   Tense (10 seconds,   of the way), then relax each muscle (all the way)   Notice the tension   Notice the difference when relaxed (by tensing first, and then relaxing, you are able to get a more thorough relaxation than by simply relaxing)     P: Paced breathing to relax   The standard technique is to begin with counting the number of steps one takes for a typical inhale, then counting the steps one takes for a typical exhale, and then lengthening the amount of steps for the exhalation by one or two steps.  OR  Repeat this pattern for 1-2 minutes  Inhale for four (4) seconds   Exhale for six (6) to eight (8) seconds   Research demonstrated that one can change one's overall level of anxiety by doing this exercise for even a few minutes per day

## 2023-05-09 NOTE — PROGRESS NOTES
DA Completed.  Adult Day Treatment recommended and patient agreed.   Groups meet Monday, Tuesday, Thursday from 9am-12pm. Start date is Tuesday, August 17.  Welcome letter sent via email to [facility].   Patient provided his e-mail address. ELIZABETH will be sent via [employer] for Emergency contact.       TIM Ochoa, [facility]  [facility]  Phone: [phone]                Wean off citalopram  Start zoloft

## 2023-05-15 NOTE — PROVIDER NOTIFICATION
"Patient speaks to pharmacist on phone, meets with provider, eats lunch and then meets with writer and signs voluntary consents for treatment; patient did need frequent reminders to stay in room until test result back.  Writer offers coffee, nicotine gum and tylenol; pt accepts coffee(decaf) and gum, but now denies any head pain or headache declining tylenol. Patient now states he wants to bang his head in the wall after writer asks him to remain in room until result is back. Patient reassured it will be very soon-pt agrees to listen to headphone music; staff to monitor closely.       12/06/20 6985   Behavioral Health   1. Wish to be Dead (Recent) Yes   Wish to be Dead Description (Recent) feel like banging my head against the wall   2. Non-Specific Active Suicidal Thoughts (Recent) Yes   Non-Specific Active Suicidal Thought Description (Recent)   (feel like banging my head against the wall)   3. Active Sucidal Ideation with any Methods (Not Plan) Without Intent to Act (Recent) No   Active Suicidal Ideation with any Methods (Not Plan) Description (Recent)   (feels like banging head against the wall)   4. Active Suicidal Ideation with Some Intent to Act, Without Specific Plan (Recent) Yes   Active Suicidal Ideation with Some Intent to Act, Without Specific Plan Description (Recent)   (feels like banging head against the wall)   5. Active Suicidal Ideation with Specific Plan and Intent (Recent) No   Active Suicidal Ideation with Specific Plan and Intent Description (Recent) banging head against the wall, but agrees to try listening to music   Duration (Lifetime) 1   Change in Protective Factors? No   Enviromental Risk Factors None   Self Injury thoughts only;urges;plan  (\"feels like banging my head against the wall\")     " ----- Message from Claudio Guerra MD sent at 5/11/2023 12:29 PM CDT -----  Regarding: US results  Hi Joanne,    Please let the family know that I reviewed Araceli's recent US results, and there are no substantial abnormalities.     Did they implement the bowel regimen I recommended?    If they would like a follow-up in 6 months, please go ahead and make that for them. Telehealth is probably fine.    Thanks,  MF    ----- Message -----  From: Purnima Kraus Incoming Radiant Results And Orders  Sent: 5/8/2023   8:59 AM CDT  To: Claudio Guerra MD

## 2023-07-18 NOTE — PROGRESS NOTES
12/28/21 1423   Engagement   Intervention Group   Topic Detail OT: [employer] day 2 for creative expression, coping with symptoms, concentration, and emotional and occupational wellness   Attendance Did not attend      [facility] OCCUPATIONAL THERAPY EVALUATION - ACUTE     NAME: Michelle Braxton  : 1943 (48 y.o.)  MRN: [id number]  CODE STATUS: Full Code  [facility]    Date of Service: 2023    Patient Diagnosis(es): Primary osteoarthritis of right hip [M16.11]  Status post total hip replacement, right [Z96.641]   Patient Active Problem List    Diagnosis Date Noted    Impaired mobility and activities of daily living dt  Right total hip arthroplasty.  (Right: Hip) 2023    Gluteal tendinitis of right buttock 2023    Status post total hip replacement, right 2023    Morbidly obese ([address]) 2020    Osteoarthritis of both hips     Perforation of right tympanic membrane     Benign essential hypertension 2017    Trigger thumb, left thumb     Hypogonadism in male     Chronic otitis externa 2016    CAD (coronary artery disease)     Seborrheic keratoses     Sensorineural hearing loss, bilateral     Chronic suppurative otitis media 2015    ED (erectile dysfunction) 2014    Hyperlipidemia 10/10/2013    Hypertension     Benign prostatic hyperplasia with incomplete bladder emptying     Cerebrovascular disease         Past Medical History:   Diagnosis Date    BPH (benign prostatic hypertrophy)     CAD (coronary artery disease)     Cerebrovascular disease     Chronic otitis externa 2016    Chronic suppurative otitis media 2015    Ear injury 2006    [address]    ED (erectile dysfunction) 2014    Hearing loss     History of nephrolithiasis     History of pneumonia 2004    History of prediabetes     Hyperlipidemia 10/10/2013    Hypertension     Hypogonadism in male     Mass of chest wall, left     Optic neuropathy, ischemic     Osteoarthritis of both hips     Paresthesias     Perforation of right tympanic membrane     chronic; Dr. Demetria Malik PSA (prostate specific antigen) - 1.12    Seborrheic keratosis 10/10/2013    Sensorineural hearing loss, Assessment/Discharge Disposition:  Assessment: Pt is a 69 y/o male, recently admitted to [facility] [place] s/p [address] completed 7/17/23. Pt lives alone in a single story home with steps to enter. Pt reports PTA he was IND with ADLS, IADLs, and funcitonal transfers. Pt currently presents with the above deficits, agitation, decreased safety awareness and poor carryover of strategies and precautions.   Pt may benefit from skilled OT intervention for increased independence with ADLs, IADLs, and functional transfers for increased safety and indepenence upon d/c.  Performance deficits / Impairments: Decreased functional mobility , Decreased ADL status, Decreased safe awareness, Decreased balance, Decreased coordination, Decreased posture, Decreased cognition, Decreased endurance, Decreased high-level IADLs, Decreased sensation, Decreased fine motor control  Prognosis: Fair  Discharge Recommendations: Continue to assess pending progress  Decision Making: Medium Complexity  History: multi comorbidities  Exam: 11 deficits  Assistance / Modification: MOD A    AMPAC (Six Click) Self care Score   How much help is needed for putting on and taking off regular lower body clothing?: A Lot  How much help is needed for bathing (which includes washing, rinsing, drying)?: A Lot  How much help is needed for toileting (which includes using toilet, bedpan, or urinal)?: A Little  How much help is needed for putting on and taking off regular upper body clothing?: A Little  How much help is needed for taking care of personal grooming?: A Little  How much help for eating meals?: A Little  AM-[facility] Inpatient Daily Activity Raw Score: 16  AM-[facility] Inpatient ADL T-Scale Score : 35.96  ADL Inpatient CMS 0-100% Score: 53.32    Therapy key for assistance levels -   Independent/Mod I = Pt. is able to perform task with no assistance but may require a device   Stand by assistance = Pt. does not perform task at an independent level but does not need physical

## 2023-08-15 NOTE — DISCHARGE SUMMARY
PSYCHIATRY  DISCHARGE SUMMARY     DATE OF DISCHARGE   05/02/2022       DISCHARGE DIAGNOSIS   Schizoaffective disorder, depressive type (H)    Patient Active Problem List   Diagnosis     Depression     Schizoaffective disorder, bipolar type (H)     Schizoaffective disorder, depressive type (H)     Schizoaffective disorder (H)     Suicidal behavior     PTSD (post-traumatic stress disorder)     History of schizophrenia     Chronic post-traumatic stress disorder (PTSD)     Hallucinations     Alcohol abuse, episodic     Borderline personality disorder (H)     Cannabis dependence in remission (H)     Cannabis use disorder, moderate, in sustained remission (H)     GERD (gastroesophageal reflux disease)     High risk medication use     Hypothyroidism     Noncompliance     PPD positive, treated     TB lung, latent     Major depression     Mood change     Nicotine use disorder        REASON FOR ADMISSION   This is a 31 year old male with history of schizoaffective disorder, PTSD and prior hospitalizations to include past hospitalization at this facility in December 2021 and more recent hospitalization at [facility] from 3/15 through 3/18/2022 of similar presentation.  Now, presenting with signs and symptoms of decompensation of command out of hallucination, chronic with statements to kill self.  Occurring in the setting of self discontinuation of medications of at least 4 days time as well as behaviors of cheeking.  Admit is voluntary.     Care coordination performed in detail.  Includes, detailed review of Care Everywhere and epic to review electronic chart information regarding mental health history related to presentation.  Reviewed ED presentation to [facility] dated 4/24.  Patient brought in by EMS from [facility], where he has resided since 2012.  Presenting with symptoms of psychosis of decompensation to include voices commanding him to kill self.  Statements of suicide ideation without plan.  Reportedly,  not taking medications of at least 4 days time.  Patient is on clozapine 500 mg at bedtime.  Not taking medication due to issues of sedation.  Further stressors include quitting job 1 week prior as a .  DEC assessment performed supporting admission for psychiatric placement.     Further care coordination performed.  Reviewed most recent hospitalizations.  Including, hospitalization at [facility] and at this facility.  Patient hospitalized at [facility] from 3/15 through 3/18/2022 regarding increasing voices of command nature.  Associated suicidal ideation due to nature of voices.  Detailed review of medications performed by provider, given familiarity with patient.  Patient demonstrating benefit from Invega, with recommendation to increase frequency scheduled from 28 days to every 21 days.  Otherwise, continued on PTA medications.  Reviewed hospitalization occurring at this facility from 12/18 through 1/3/2022.  Medication management involved continuation of PTA medications with the exception of lithium discontinuation: Venlafaxine titrated through 300 mg daily; remained on Invega sustained at 234 mg q. 28 days and gabapentin 200 mg 3 times daily; trial of Rexulti performed; clozapine titrated to 500 mg at bedtime.  Demonstrated symptom improvement.     Upon patient interview, patient review performed on unit 4500 directly.  Patient isolating to room and engages minimally.  Patient aware of voluntary admission.  Informed patient of information as reviewed in detail of events into ED presentation with further review of collateral information.  Evaluation review events leading to presentation and to clarify information.     Patient admits to voices, command in nature.  Associated with statements of hurting self.  Addressed issues of medication nonadherence.  Patient admits not taking clozapine as prescribed, secondary to side effects as reported to include sedation.  Most recent dose titration performed  at this facility in December of last year.  Since last hospitalization at this facility, discussed further significant medication changes to include increased frequency of Invega Sustenna from every 28 days to every 21 days.  Patient historically has beneficial response to therapy.  Additional medication discrepancies reviewed to include patient remaining on low-dose Prozac and dose of venlafaxine is titrated at last hospitalization now reflected on PTA regimen.  Patient consents to treatment plan as discussed in detail.     Patient does not identify specific stressors, aside from voluntary admission from work of the week prior.  Does not go into details for self termination.  Now patient is not working, does not find purpose with each day.  Leading to further worsening of mood.     On psychiatric review of symptoms, mood is depressed.  Mixed features of irritability.  Sleep and energy dysregulation.  Does not have TBI related history.  Demonstrates anhedonia.  Decreased appetite and weight.  Poor concentration.  Negative thoughts of self.  Anxiety with irritability.  Does not have suicide attempt history.  Suicidal ideation, without plan due to voices.  No gun access.     Consents to voluntary admission.  Further consent to medication changes to include the indication to be titrate Clozapine toward 500 mg at bedtime.  Monitor for indication to titrate to lower dose, secondary to medication changes performed since last hospitalization at this facility.  May need a lower dose of clozapine, given increased frequency of Invega Sustenna.  For acute stabilization and for induction of clozapine, time-limited dosing of oral neuroleptic to be provided of STEVENSON.     No further questions and no concerns at this time.    Please refer to history and physical dated 4/26/2022 for details of presentation.       HOSPITAL COURSE   Admitted due to aforementioned presentation.  Education regarding diagnostic and treatment options with  risks, benefits and alternatives and adequate verbalization of understanding.  Discussed reviewed in further detail, stressors and events leading to presentation.    Placed on routine precautions at time of admission with precautions discontinued at time of disposition.    Admitted on a voluntary basis.  Agreed to stay voluntarily to coordinate cares, medication management and disposition.  Cares coordinated for anticipated discharge on today's date.  Did not demonstrate minimal risk to self and others.  Did not meet criteria for 72-hour hold placement.    Psychiatric medication management performed in detail.  Medication management performed to target signs and symptoms principal diagnosis.  Further medication management performed to target comorbid illnesses.  Reviewed most recent medication changes to assist with guidance of medication management and address side effects of concerns leading to admission.  Most recent medication change included increased frequency of Invega Sustenna administration.  Psychiatric medication management included:  -Medications: 4/25/2022: PTA medications reviewed.  Clozapine: 4/26: Restarted PTA medication through pharmacy consult toward PTA dose of 500 mg at bedtime with indication of psychosis.  Needing reach titration due to noncompliance of at least 4 days time.  May require lower maintenance dose secondary to more recent medication change of increased frequency of STEVENSON.  4/27: Given increase frequency of STEVENSON, consider a lower target dose and PTA dose of 500 mg at bedtime in an effort to minimize side effect of daytime sedation.  4/29: Modified scheduled clozapine goal dose from  mg at bedtime to 400 mg at bedtime, secondary to increased frequency of STEVENSON.  Cogentin: Continued PTA medication for side effect management.  Invega Sustenna: Continued PTA long-acting injectable every 21 days or mood, psychosis and treatment nonadherence.  Therapy frequency increased at last  "hospitalization in March 2022.  Gabapentin: Continued PTA medication with dose titration for augmentation.  Effexor XR: Continued PTA medication for depression and anxiety.  As last hospitalization at this facility in December 2021, dose titrated to 300 mg at bedtime.    5/1: Demonstrating benefit at current dosage of 225 mg daily.  Prazosin: Continued PTA medication for trauma related insomnia, with parameters.  Fluoxetine: Discontinued PTA medication for AD monotherapy.  -Medications:  Hospital  Invega: 4/26: Performed time-limited addition of oral neuroleptic to STEVENSON during titration of clozapine for acute symptom management.  4/28: Date of every 21-day maintenance injection.    Medication adherent.  Consented to medication change to decrease target dose of clozapine from 500 mg at bedtime to 400 mg at bedtime.  Dose of clozapine increased at hospitalization prior to decrease frequency of Invega Sustenna for intent to target command out of hallucination.  Dose increase of clozapine associated with daytime somnolence.  Patient reports benefit from increased frequency of STEVENSON, leading to indication of dose reduction of clozapine to minimize side effect.    Progress with mood and anxiety.  No behaviors.  Improved psychosis.  Emphasized efforts to improve treatment adherence.  Denied suicide and homicide ideation.  Crisis relapse plan reviewed in detail.    Denied physical issues that were new or worsening.  Maintained and/or improved ADLs of sleep, energy and appetite.    Pharmacist assisted with monitoring of medication management and recommendations for disposition.     followed to collect and review collateral information, coordination of cares and disposition.  Patient accepted back to group home and follow-up appointments coordinated.       MENTAL STATUS EXAM   Vitals: /68   Pulse 99   Temp 98.2  F (36.8  C) (Oral)   Resp 16   SpO2 97%     Appearance:  Clean/neat  Mood:  \"Better\"  Affect: " mood congruent, stable  Suicidal Ideation: absent  Homicidal Ideation: absent  Thought process: normal  Thought content: Normal  Fund of Knowledge: Sufficient  Attention/Concentration: intact  Language ability: intact  Memory: recent and remote memory intact  Insight and Judgement: age appropriate  Orientation: person, place, time and situation  Psychomotor Behavior: Normal  Muscle Strength and Tone: normal  Gait and Station: normal gait and station       DISCHARGE MEDICATIONS   Discharge Medication Options:   Discharge Medication List as of 5/2/2022 12:16 PM      CONTINUE these medications which have CHANGED    Details   !! cloZAPine (CLOZARIL) 100 MG tablet Take 3 tablets (300 mg) by mouth At Bedtime for 1 day, Disp-3 tablet, R-0, E-Prescribe      !! cloZAPine (CLOZARIL) 200 MG tablet Take 2 tablets (400 mg) by mouth At Bedtime for 28 days, Disp-56 tablet, R-0, E-Prescribe      !! cloZAPine (CLOZARIL) 50 MG tablet Take 7 tablets (350 mg) by mouth At Bedtime for 1 day, Disp-7 tablet, R-0, E-Prescribe      gabapentin (NEURONTIN) 400 MG capsule Take 1 capsule (400 mg) by mouth 3 times daily, Disp-90 capsule, R-0, E-Prescribe       !! - Potential duplicate medications found. Please discuss with provider.      CONTINUE these medications which have NOT CHANGED    Details   benztropine (COGENTIN) 1 MG tablet Take 1 mg by mouth 2 times daily , Historical      cholecalciferol (VITAMIN D3) 125 mcg (5000 units) capsule Take 125 mcg by mouth daily , Historical      docusate sodium (COLACE) 100 MG capsule Take 100 mg by mouth 2 times daily , Historical      glycopyrrolate (ROBINUL) 1 MG tablet Take 1 tablet (1 mg) by mouth 3 times daily, Disp-90 tablet, R-0, E-Prescribe      levothyroxine (SYNTHROID/LEVOTHROID) 112 MCG tablet Take 1 tablet (112 mcg) by mouth daily, Disp-30 tablet, R-0, E-Prescribe      OLANZapine (ZYPREXA) 20 MG tablet Take 10 mg by mouth 3 times daily as needed (agitation/psychosis/self harm) , Historical       omeprazole (PRILOSEC) 20 MG DR capsule Take 20 mg by mouth daily , Historical      paliperidone (INVEGA SUSTENNA) 234 MG/1.5ML ANTHONY Inject 1.5 mLs (234 mg) into the muscle every 21 days, Disp-1.5 mL, R-1, E-PrescribeLast dose received inpatient on 3/15. Next due 4/5      polyethylene glycol (MIRALAX) 17 g packet Take 17 g by mouth daily as needed for constipation, Disp-30 packet, R-1, E-Prescribe      prazosin (MINIPRESS) 2 MG capsule Take 4 mg by mouth At Bedtime , Historical      venlafaxine (EFFEXOR-XR) 75 MG 24 hr capsule Take 225 mg by mouth daily , Historical         STOP taking these medications       FLUoxetine (PROZAC) 10 MG capsule Comments:   Reason for Stopping:           Medication adherence issues: MS Med Adherence Y/N: No  Medication side effects: MEDICATION SIDE EFFECTS: no side effects reported       DISCHARGE PLAN   1.  Education given regarding diagnostic and treatment options with risks, benefits and alternatives with adequate verbalization of understanding.  2.  Discharge to Group Home.  Upon detailed review of risk factors, patient amenable for release.   3.  Continue aforementioned medications and associated medication changes with follow-up by outpatient mental health provider.  4.  Crisis management planning in place.    5.  Continue efforts for sobriety.  6.  Nursing and  to review further discharge recommendations.     TOTAL TIME:  Greater than 30 minutes for discharge planning.    This note was created with help of Dragon dictation system. Grammatical / typing errors are not intentional.    Caio Camacho MD                              Wound Care: Petrolatum no

## 2023-09-27 ENCOUNTER — HOSPITAL ENCOUNTER (EMERGENCY)
Facility: CLINIC | Age: 33
Discharge: HOME | End: 2023-09-27
Attending: PSYCHIATRY & NEUROLOGY | Admitting: PSYCHIATRY & NEUROLOGY
Payer: COMMERCIAL

## 2023-09-27 VITALS
OXYGEN SATURATION: 100 % | HEART RATE: 66 BPM | SYSTOLIC BLOOD PRESSURE: 133 MMHG | TEMPERATURE: 98.7 F | RESPIRATION RATE: 16 BRPM | DIASTOLIC BLOOD PRESSURE: 94 MMHG

## 2023-09-27 DIAGNOSIS — F19.10 POLYSUBSTANCE ABUSE (H): ICD-10-CM

## 2023-09-27 DIAGNOSIS — F25.0 SCHIZOAFFECTIVE DISORDER, BIPOLAR TYPE (H): ICD-10-CM

## 2023-09-27 DIAGNOSIS — R45.851 SUICIDAL IDEATION: Primary | ICD-10-CM

## 2023-09-27 LAB — ALCOHOL BREATH TEST: 0 (ref 0–0.01)

## 2023-09-27 PROCEDURE — 99285 EMERGENCY DEPT VISIT HI MDM: CPT

## 2023-09-27 PROCEDURE — 99284 EMERGENCY DEPT VISIT MOD MDM: CPT | Performed by: PSYCHIATRY & NEUROLOGY

## 2023-09-27 PROCEDURE — 82075 ASSAY OF BREATH ETHANOL: CPT

## 2023-09-27 RX ORDER — OLANZAPINE 10 MG/2ML
INJECTION, POWDER, FOR SOLUTION INTRAMUSCULAR
Status: DISCONTINUED
Start: 2023-09-27 | End: 2023-09-27 | Stop reason: WASHOUT

## 2023-09-27 RX ORDER — OLANZAPINE 10 MG/2ML
10 INJECTION, POWDER, FOR SOLUTION INTRAMUSCULAR ONCE
Status: DISCONTINUED | OUTPATIENT
Start: 2023-09-27 | End: 2023-09-28 | Stop reason: HOSPADM

## 2023-09-27 ASSESSMENT — ACTIVITIES OF DAILY LIVING (ADL): ADLS_ACUITY_SCORE: 37

## 2023-09-28 NOTE — ED NOTES
Bed: ED16  Expected date: 9/27/23  Expected time: 6:53 PM  Means of arrival:   Comments:  For RM 14

## 2023-09-28 NOTE — DISCHARGE INSTRUCTIONS
Stop using drugs and drinking. It will only make you feel worse. Follow-up established care and services    Aftercare Plan    Attend appointment tomorrow for Invega shot  If I am feeling unsafe or I am in a crisis, I will:   Contact my established care providers   Call the National Suicide Prevention Lifeline: 988  Go to the nearest emergency room   Call 911     Warning signs that I or other people might notice when a crisis is developing for me: Increased suicidal ideation, substance use, low energy    Things that I am able to do with others to cope or help me better: Go for a walk together     Things I can use or do for distraction: Watch a movie, listen to music     Changes I can make to support my mental health and wellness: Do not use substances alone     People in my life that I can ask for help: [facility]     Your [facility] has a mental health crisis team you can call 24/7: [facility] Mobile Crisis  [phone]     Additional resources and information: Substance Use Disorder Direct Access Resources    It is recommended that you abstain from all mood altering chemicals. Please contact the sober support hotline (340-741-9203) as needed; phones are answered 24 hours a day, 7 days a week.    To access substance use treatment you must have a comprehensive assessment completed to begin any treatment program.     If uninsured, please contact your county of residence for eligibility screen to substance use disorder evaluation and treatment:    [place] - [phone]   [facility] [phone]   [facility] [phone]   [place] - [phone]   [facility] [phone]   [facility] [phone]   [facility] [phone]   [place] - [phone]     If you have private insurance, call the customer service number on the back of your insurance card to find an in-network substance abuse use disorder assessment. The ideal provider will be a treatment facility, licensed in the [facility].     [facility] TRISHA  Evaluations: Clients may call their county for a full list of providers - Availability and services listed belo are subject to change, please call the provider to confirm    [facility]  [phone]  [facility] [place], [place], [address]  *Please call the above number to schedule a comprehensive assessment for determination of level of care needs. In person and virtual appointments available Mon-Fri.    [facility], [address] [place] [address], First Floor, [address], [place] [address] (next to the outpatient lab)    Phone: [phone]   Provides bridging services to people with Opiate Use Disorders (OUD) seeking care. This is a front door to Medication Assisted Treatments (MAT), ages 16+  Walk In hours: Monday-Friday 9:00am-3:00pm    [facility]  [phone]  Walk in Assessments: Mon-Friday 7a-1:45p  [address], [place], [address]    [facility]  Central Access [phone]  [facility] [place], [place], [address]  *by appointment only    [facility]  [phone] (phone consultation available )  Locations in: [place], [place], [facility], and [place], [place]  [place] virtual IOP [phone] or visit Adama.[email or web address]/[email or web address]   Also offers LGBTQ programming     [facility]  [phone]  [address]  [place], [place], [address]  *Currently only offered via telehealth - call to set up an appointment    [facility]  [address] [place], [place], [address]  [facility]  For more information to to make a referral call:  [phone]  Walk-in on   9-11 a.m.    [facility]  [phone]  [address] [place], [place], [address]  *available by appointments only    [facility] [place] [facility]  [phone]  [address] [place], [place], [address]  *available by appointment only    [facility]  [phone]  [address] [place], [place], [address]  *walk in assessments available M-F starting at 7 am.    [facility]  [phone]  Locations: [facility], [facility], and [place]  *Walk in assessments availble M-F starting at 8 am -virtual only    [facility]  [phone]  [address] [place] [address]    [facility]  Virtual + Locations: [place], [place], [place], [place], [facility], [facility], [place], [place]   [phone]  *available by appointment only    [facility]  [phone]  [address]  [place], [place], [address]    [facility]  [phone]  [address] [place] [place], [place], [address]  *available by appointment    [facility]  [phone]  [address] [place], [place], [address]  *walk ins available M-TH from 9-3    [facility] program: [phone]  [address] [place], [place], [address]    [place]  [phone]  Same day substance use disorder assessments are available Monday - Friday, via walk-in or by appointment at the [place] location.  [address] [place] [address], [address], [place] [address]     [facility] - adolescent and adult SUDs services  [phone]  Offer services Monday through Friday, as well as evening hours Monday through Thursday. Normally, a first appointment will be scheduled within one week  [email or web address]  Locations all over [place]    If you are intoxicated, you may be required to detox at a detox facility before starting treatment. The following are detox facilities that you can self present to. All detox facilities are able to help you complete an assessment prior to discharge if you choose:    [facility]: Arrive at a [facility] for immediate medical evaluation    [facility]: [address] [place] [address], [address].         [phone]    [place]: [address] [place], [address]  [phone]     [facility]): [address] [place], [place], [address]  [phone]     [facility]: [address], [place], [place], [address], [phone]         Ways to help cope with sobriety:    -- Take prescribed medicines as scheduled  -- Keep follow-up appointments  -- Talk to others about your concerns  -- Get regular exercise  -- Practice deep breathing skills  -- Eat a healthy diet  -- Use community resources, including hotline numbers, [facility] crisis and support meetings  -- Stay sober and avoid places/people/things associated with substance use  --Maintain a daily schedule/routine  --Get at least 7-8 hours of sleep per night  --Create a list 10--20 healthy activities that you can do that are enjoyable and do not involve substance use  --Create daily goals (approx. 1-4 goals) per day and work to achieve them throughout the day.       Free Resources:    [facility] ([facility])  [facility] connects people seeking recovery to resources that help foster and sustain long-term recovery. Whether you are seeking resources for treatment, transportation, housing, job training, education, health care or other pathways to recovery, [facility] is a great place to start.  Phone: [phone]. www.minnesotaStorPool.SquareHub (Great listing of all types of recovery and non-recovery related resources)    Alcoholics Anonymous  Phone: 5-097-ALCOHOL  Website: HTTP://WWW.AA.ORG/  [facility] [place] (847-003-0474 or http://a[place].org)  [facility] [place] (014-696-4662 or www.aastpaul.org)     Narcotics Anonymous  Phone: 797.720.5409  Website: www.Sr.Pago.No.1 Traveller.    [facility] [employer] [employer] [address], [place], [place],  Phone: [phone]  Drop-in Hours: Monday-Friday 9-11:30 am. By appointment at other times.  Provides: [facility] is a drop-in center on the [place] side of [place] that provides a safe space for  "individuals who are homeless and have a history of chemical use. Sobriety is not a requirement but drugs and alcohol are not allowed on the property.  Services: Non-clients can access drop-in services such as Recovery and Harm Reduction Groups, referrals to case management, community activities, shower facilities, and a pool table. Individuals who are homeless and have chemical health needs may be eligible for enrollment into [facility]'s case management program. Clients and  work together to access benefits, treatment, health care, shelter, and external housing resources.              Crisis Lines  Crisis Text Line  Text 658267  You will be connected with a trained live crisis counselor to provide support.    Por espanol, texto  CORINE a 699719 o texto a 442-AYUDAME en WhatsApp    The [facility] (LGBTQ Youth Crisis Line)  1.536.929.5506  text START to 379-080      Community Resources  Fast Tracker  Linking people to mental health and substance use disorder resources  fastScutumn.OncoHealth     [place] Mental Health Warm Line  Peer to peer support  Monday thru Saturday, 12 pm to 10 pm  762.448.6470 or 5.426.672.6258  Text \"Support\" to 84173    National [place] on Mental Illness (EMA)  118.366.9417 or 1.888.EMA.HELPS      Mental Health Apps  My3  https://myPili Poppp.org/    VirtualHopeBox  https://GlassBox.org/apps/virtual-hope-box/      Additional Information  Today you were seen by a licensed mental health professional through Triage and Transition services, [facility] (P)  for a crisis assessment in the Emergency Department at [facility].  It is recommended that you follow up with your established providers (psychiatrist, mental health therapist, and/or primary care doctor - as relevant) as soon as possible. Coordinators from [facility] will be calling you in the next 24-48 hours to ensure that you have the resources you need.  You can also contact P " coordinators directly at [phone]. You may have been scheduled for or offered an appointment with a mental health provider. [facility] maintains an extensive network of licensed behavioral health providers to connect patients with the services they need.  We do not charge providers a fee to participate in our referral network.  We match patients with providers based on a patient's specific needs, insurance coverage, and location.  Our first effort will be to refer you to a provider within your care system, and will utilize providers outside your care system as needed.

## 2023-09-28 NOTE — ED NOTES
"Dr Uribe notified of patient's complaint of \"a little dizziness\"; patient given cold pack for head  "

## 2023-09-28 NOTE — ED PROVIDER NOTES
ED Provider Note  [facility]      History     Chief Complaint   Patient presents with    Suicidal     Patient was with BCR team and ran away, patient decided to come back. Patient was originally picked up at their group home. SI with plan but will not say what plan is.      HPI  Kamini Neal is a 33 year old male who is here via EMS form his group home as he tried to run from his group home. He did return voluntarily to the group home where he was then brought here. Patient has history of schizoaffective disorder and substance abuse.  He gets Im Invega and is due for a shot in the morning. Patient had been working but recently quit his job. He decided to go to a bar today to drink, telling staff that he was going to work. He appears embarrassed about his lie and behavior and hence ran. Here he also tried to leave before being evaluated and a behavioral code was called. He was able to calm himself down and did not require a Zyprexa injection nor be in restraints. He was cooperative with the . He wants to return to his group home. Staff is here who can bring him back.    Patient had banged his head during the restraint. There was no evident bruising or pain. Patient has no discomfort concern.    Past Medical History  Past Medical History:   Diagnosis Date    Anxiety     Depressive disorder     Schizo affective schizophrenia (H)     Substance abuse (H)      Past Surgical History:   Procedure Laterality Date    TONSILLECTOMY       ACETAMINOPHEN EXTRA STRENGTH 500 MG tablet  benztropine (COGENTIN) 1 MG tablet  cholecalciferol (VITAMIN D3) 125 mcg (5000 units) capsule  cloZAPine (CLOZARIL) 200 MG tablet  docusate sodium (COLACE) 100 MG capsule  gabapentin (NEURONTIN) 400 MG capsule  levothyroxine (SYNTHROID/LEVOTHROID) 112 MCG tablet  OLANZapine (ZYPREXA) 20 MG tablet  omeprazole (PRILOSEC) 20 MG DR capsule  paliperidone (INVEGA SUSTENNA) 234 MG/1.5ML ANTHONY  prazosin (MINIPRESS) 2  "MG capsule  traZODone (DESYREL) 50 MG tablet  venlafaxine (EFFEXOR-XR) 75 MG 24 hr capsule      Allergies   Allergen Reactions    Haloperidol Other (See Comments)     Other reaction(s): Tardive Dyskinesia  Torticollis  Torticollis  Stiff Neck  Torticollis; reports \"stiff neck.\"       Family History  Family History   Problem Relation Age of Onset    Mental Illness Mother     Mental Illness Maternal Aunt     Mental Illness Maternal Uncle     Glaucoma No family hx of     Macular Degeneration No family hx of      Social History   Social History     Tobacco Use    Smoking status: Every Day     Packs/day: 0.50     Types: Vaping Device, Cigarettes    Smokeless tobacco: Never   Substance Use Topics    Alcohol use: No    Drug use: No         A medically appropriate review of systems was performed with pertinent positives and negatives noted in the HPI, and all other systems negative.    Physical Exam   BP: 122/86  Pulse: 79  Temp: 98.6  F (37  C)  Resp: 18  SpO2: 100 %  Physical Exam  Vitals and nursing note reviewed.   HENT:      Head: Normocephalic.   Eyes:      Pupils: Pupils are equal, round, and reactive to light.   Pulmonary:      Effort: Pulmonary effort is normal.   Musculoskeletal:         General: Normal range of motion.      Cervical back: Normal range of motion.   Neurological:      General: No focal deficit present.      Mental Status: He is alert.   Psychiatric:         Attention and Perception: Attention and perception normal.         Mood and Affect: Mood normal. Affect is blunt.         Speech: Speech normal.         Behavior: Behavior normal. Behavior is not agitated, aggressive, hyperactive or combative. Behavior is cooperative.         Thought Content: Thought content normal. Thought content is not paranoid or delusional. Thought content does not include homicidal or suicidal ideation.         Cognition and Memory: Cognition and memory normal.         Judgment: Judgment is impulsive.           ED Course, " Procedures, & Data      Procedures       A consult was attained from the [facility] service. The case was discussed with the  from that service. The consulting service's recommendations were provided at 9:30 PM 10 minutes spent discussing case, care and disposition. 10 minutes spent reviewing prior records and interventions.   Mental Health Risk Assessment        PSS-3      Date and Time Over the past 2 weeks have you felt down, depressed, or hopeless? Over the past 2 weeks have you had thoughts of killing yourself? Have you ever attempted to kill yourself? When did this last happen? User   09/27/23 2046 yes yes no -- SLM          C-SSRS ([place])      Date and Time Q1 Wished to be Dead (Past Month) Q2 Suicidal Thoughts (Past Month) Q3 Suicidal Thought Method Q4 Suicidal Intent without Specific Plan Q5 Suicide Intent with Specific Plan Q6 Suicide Behavior (Lifetime) Within the Past 3 Months? RETIRED: Level of Risk per Screen Screening Not Complete User   09/27/23 2046 yes yes yes no yes no -- -- -- SLM                Suicide assessment completed by mental health (D.E.C., LCSW, etc.)       Results for orders placed or performed during the hospital encounter of 09/27/23   Alcohol breath test POCT     Status: Normal   Result Value Ref Range    Alcohol Breath Test 0 0.00 - 0.01     Medications   OLANZapine (zyPREXA) injection 10 mg (has no administration in time range)   OLANZapine (zyPREXA) 10 MG injection (has no administration in time range)     Labs Ordered and Resulted from Time of ED Arrival to Time of ED Departure   ALCOHOL BREATH TEST POCT - Normal       Result Value    Alcohol Breath Test 0       No orders to display          Critical care was not performed.     Medical Decision Making  The patient's presentation was of moderate complexity (a chronic illness mild to moderate exacerbation, progression, or side effect of treatment).    The patient's evaluation involved:  an assessment requiring an  independent historian (see separate area of note for details)  review of external note(s) from 2 sources (see separate area of note for details)    The patient's management necessitated high risk (a decision regarding hospitalization).    Assessment & Plan    Patient is here brought in by EMS from his group home where he tried to elope. He had returned to his group home after spending the day at a bar rather than at work where he had quit his job. He admits to drinking and using drugs.  staff suspected that he was drinking and using but reports he had not tried to elope before. He did return of his own volition and needed to be seen here for safety and medical clearance. Patient waited to leave as he wanted to go home and ended up being held as he was not assessed yet.     He has calmed down after he was detained and agreed to the assessment. He denies any thoughts of suicide and is eager to return home. Group Home staff is here and can transport him home. He is due for his injection in the morning.    Patient can be discharged. I do not feel he is holdable. He does not appear at elevated safety risk.    I have reviewed the nursing notes. I have reviewed the findings, diagnosis, plan and need for follow up with the patient.    New Prescriptions    No medications on file       Final diagnoses:   Schizoaffective disorder, bipolar type (H)   Polysubstance abuse (H)       Gamaliel Uribe MD  [facility] EMERGENCY DEPARTMENT  9/27/2023     Gamaliel Uribe MD  09/27/23 8512

## 2023-09-28 NOTE — ED NOTES
Pt wanted to leave and went towards door. PA and security was seen holding pt down on ground. Code 21 called. Pt was heard banging head and has a bump on left side of head. Pt is alert and oriented. MD aware and saw pt. Pt was placed on back board and brought to room 16c. Pt was not medicated. Pt agreed to be safe.  Pt spoke with  and plan is to discharge back to .

## 2023-09-28 NOTE — ED TRIAGE NOTES
Patient was with BCR team and ran away, patient decided to come back. Patient was originally picked up at their group home. SI with plan but will not say what plan is.      Triage Assessment       Row Name 09/27/23 2045       Triage Assessment (Adult)    Airway WDL WDL       Respiratory WDL    Respiratory WDL WDL       Skin Circulation/Temperature WDL    Skin Circulation/Temperature WDL WDL       Cardiac WDL    Cardiac WDL WDL       Peripheral/Neurovascular WDL    Peripheral Neurovascular WDL WDL       Cognitive/Neuro/Behavioral WDL    Cognitive/Neuro/Behavioral WDL WDL

## 2023-09-28 NOTE — CONSULTS
"Diagnostic Evaluation Consultation  Crisis Assessment    Patient Name: Kamini Neal  Age:  33 year old  Legal Sex: male  Gender Identity: male  Pronouns:   Race: Black or   Ethnicity: Not  or   Language: English      Patient was assessed: In person      Patient location: [facility] EMERGENCY DEPARTMENT                                 Referral Data and Chief Complaint  Kamini Neal presents to the ED via EMS. Patient is presenting to the ED for the following concerns: Suicidal ideation.   Factors that make the mental health crisis life threatening or complex are:  Tiffanie is expressing suicidal ideation with a plan to run in front of a car. His group home called crisis team and Tiffanie ran away from them. He eventually returned and was brought to the emergency department. While in the ED, Tiffanie tried to leave to return to his group home and a Code 21 had to be called. After he returned to his room, Tiffanie was clam and cooperative for assessment..      Informed Consent and Assessment Methods  Explained the crisis assessment process, including applicable information disclosures and limits to confidentiality, assessed understanding of the process, and obtained consent to proceed with the assessment.  Assessment methods included conducting a formal interview with patient, review of medical records, collaboration with medical staff, and obtaining relevant collateral information from family and community providers when available.  : done     Patient response to interventions: acceptance expressed  Coping skills were attempted to reduce the crisis:  Talking with group home staff and manager about feelings, emotions, and SI     History of the Crisis   Tiffanie has baseline passive suicidal ideation. Today he called his , Neela and expressed that his suicidal thoughts were worse and that he \"wanted to kill himself\". He told writer that his thoughts are worse " "because he is more depressed and this is due to starting to drink over the weekend and using \"HHT\". Tiffanie is still endorsing suicidal thoughts but he stated that he feels safer at his group home and is able to keep himself safe there if he were to return. It seems that when Tiffanie starts to use substances that he becomes more depressed which make his suicidal thoughts worse. Today was the first day he tried to run from his group home per his .    Brief Psychosocial History  Family:  Single, Children no  Support System:  Facility resident(s)/Staff  Employment Status:  employed part-time  Source of Income:  unknown  Financial Environmental Concerns:  No concerns identified  Current Hobbies:  group/social activities, television/movies/videos  Barriers in Personal Life:  mental health concerns    Significant Clinical History  Current Anxiety Symptoms:  anxious  Current Depression/Trauma:  hopelessness, thoughts of death/suicide  Current Somatic Symptoms:  anxious  Current Psychosis/Thought Disturbance:  impulsive, agitation  Current Eating Symptoms:   (None)  Chemical Use History:  Alcohol: Social  Last Use:: 09/26/23  Benzodiazepines: None  Opiates: None  Cocaine: None  Marijuana: None  Other Use: None   Past diagnosis:   (Schizoaffective, bipolar type)  Family history:  No known history of mental health or chemical health concerns  Past treatment:  Individual therapy, Psychiatric Medication Management, Supportive Living Environment (group home, assisted house, etc), Inpatient Hospitalization, Partial Hospitalization, Case management  Details of most recent treatment:  Tiffanie lives in a group home: [facility]. He has lived here for the last 11 years and finds the group home staff to be supportive. He has a psychiatrist, , but does not have a current therapist.  Other relevant history:  Tiffanie was last inpatient in April 2022 due to command hallucinations " "telling him to kill himself. Tiffanie has a history of commitment in 2012.       Collateral Information  Is there collateral information: Yes     Collateral information name, relationship, phone number:  Neela Pollack, , [phone]    What happened today: Tiffanie called Neela and said that he \"wasn't feeling well\". He expressed to her that he was going to kill himself. Neela said that when this happens they talked through Tiffanie feelings to see if her is above baseline. Tiffanie told her that he was drinking alcohol today. When they called 911, Tiffanie ran from the group home, which is the first time this happened. Group staff members were out looking for him.     What is different about patient's functioning: Tiffanie has seemed more down lately. He has also started to use substances again. Neela believes that Tiffanie has been doing to drink instead of attending work. Neela believes Tiffanie quit his job yesterday.     Concern about alcohol/drug use:  Yes    Has patient made comments about wanting to kill themselves/others: yes    If d/c is recommended, can they take part in safety/aftercare planning:  yes    Additional collateral information:  Neela shared that Tiffanie is scheduled to get is Invega shot tomorrow morning and believes that will help him. She said that staff will be on all night to help keep Tiffanie safe.     Risk Assessment  [place] Suicide Severity Rating Scale Full Clinical Version: 9/27/2023  Suicidal Ideation  Q1 Wish to be Dead (Lifetime): Yes  Q2 Non-Specific Active Suicidal Thoughts (Lifetime): Yes  3. Active Suicidal Ideation with any Methods (Not Plan) Without Intent to Act (Lifetime): Yes  Q4 Active Suicidal Ideation with Some Intent to Act, Without Specific Plan (Lifetime): Yes  Q5 Active Suicidal Ideation with Specific Plan and Intent (Lifetime): Yes  Q6 Suicide Behavior (Lifetime): yes     Suicidal Behavior (Lifetime)  Actual Attempt (Lifetime): Yes  Total Number of Actual Attempts (Lifetime): " 1  Has subject engaged in non-suicidal self-injurious behavior? (Lifetime): Yes  Interrupted Attempts (Lifetime): No  Aborted or Self-Interrupted Attempt (Lifetime): No  Preparatory Acts or Behavior (Lifetime): No    [place] Suicide Severity Rating Scale Recent: 9/27/2023  Suicidal Ideation (Recent)  Q1 Wished to be Dead (Past Month): yes  Q2 Suicidal Thoughts (Past Month): yes  Q3 Suicidal Thought Method: yes  Q4 Suicidal Intent without Specific Plan: no  Q5 Suicide Intent with Specific Plan: no  Level of Risk per Screen: moderate risk  Intensity of Ideation (Recent)  Most Severe Ideation Rating (Past 1 Month): 3  Frequency (Past 1 Month): Many times each day  Duration (Past 1 Month): 1-4 hours/a lot of time  Controllability (Past 1 Month): Can control thoughts with some difficulty  Deterrents (Past 1 Month): Deterrents probably stopped you  Reasons for Ideation (Past 1 Month): Completely to end or stop the pain (You couldn't go on living with the pain or how you were feeling)  Suicidal Behavior (Recent)  Actual Attempt (Past 3 Months): No  Has subject engaged in non-suicidal self-injurious behavior? (Past 3 Months): No  Interrupted Attempts (Past 3 Months): No  Aborted or Self-Interrupted Attempt (Past 3 Months): No  Preparatory Acts or Behavior (Past 3 Months): No    Environmental or Psychosocial Events: helplessness/hopelessness, impulsivity/recklessness, ongoing abuse of substances  Protective Factors: Protective Factors: lives in a responsibly safe and stable environment, supportive ongoing medical and mental health care relationships, good treatment engagement, help seeking, cultural, spiritual , or Zoroastrianism beliefs associated with meaning and value in life    Does the patient have thoughts of harming others? Feels Like Hurting Others: no  Previous Attempt to Hurt Others: no  Is the patient engaging in sexually inappropriate behavior?: no    Is the patient engaging in sexually inappropriate behavior?  no         Mental Status Exam   Affect: Blunted  Appearance: Appropriate  Attention Span/Concentration: Attentive  Eye Contact: Engaged    Fund of Knowledge: Appropriate   Language /Speech Content: Fluent  Language /Speech Volume: Soft  Language /Speech Rate/Productions: Normal  Recent Memory: Intact  Remote Memory: Intact  Mood: Anxious  Orientation to Person: Yes   Orientation to Place: Yes  Orientation to Time of Day: Yes  Orientation to Date: Yes     Situation (Do they understand why they are here?): Yes  Psychomotor Behavior: Normal  Thought Content: Suicidal  Thought Form: Intact    Medication  Psychotropic medications:   Medication Orders - Psychiatric (From admission, onward)      Start     Dose/Rate Route Frequency Ordered Stop    09/27/23 2110  OLANZapine (zyPREXA) injection 10 mg        Note to Pharmacy: DO NOT USE THIS FIELD FOR ADMIN INSTRUCTIONS; INFORMATION DOES NOT SHOW ON MAR. USE THE FIELD ABOVE MARKED ADMIN INSTRUCTIONS    10 mg Intramuscular ONCE 09/27/23 2108               Current Care Team  Patient Care Team:  [place] as PCP - Marco Eid MD (Psychiatry)    Diagnosis  Patient Active Problem List   Diagnosis Code    Depression F32.A    Schizoaffective disorder, bipolar type (H) F25.0    Schizoaffective disorder, depressive type (H) F25.1    Schizoaffective disorder (H) F25.9    Suicidal behavior R45.89    PTSD (post-traumatic stress disorder) F43.10    History of schizophrenia Z86.59    Chronic post-traumatic stress disorder (PTSD) F43.12    Hallucinations R44.3    Alcohol abuse, episodic F10.10    Borderline personality disorder (H) F60.3    Cannabis dependence in remission (H) F12.21    Cannabis use disorder, moderate, in sustained remission (H) F12.21    GERD (gastroesophageal reflux disease) K21.9    High risk medication use Z79.899    Hypothyroidism E03.9    Noncompliance Z91.199    PPD positive, treated R76.11    TB lung, latent Z22.7    Major depression F32.9    Mood  change R45.86    Nicotine use disorder F17.200       Primary Problem This Admission  Active Hospital Problems    *Schizoaffective disorder, bipolar type (H)        Clinical Summary and Substantiation of Recommendations   Tiffanie was brought to the emergency department due to making suicidal comments to his . When EMS arrived, Tiffanie ran and eventually returned and was brought to the emergency department. While in the ED, Tiffanie tried to leave to return to his group home. He told writer that he is still having suicidal thoughts, but that he has them everyday of his life. He would like to return to his group home and shared that he will be able to keep himself safe there. The group home stated that they have staff overnight to help keep Tiffanie safe. In the morning he has an appointment for an Invega shot, which the group home staff manager stated should help his mental health.      Patient coping skills attempted to reduce the crisis:  Talking with group home staff and manager about feelings, emotions, and SI    Disposition  Recommended disposition: Medication Management, Group Home        Reviewed case and recommendations with attending provider. Attending Name: Dr. Uribe       Attending concurs with disposition: yes       Patient and/or validated legal guardian concurs with disposition:   yes       Final disposition:  discharge    Legal status on admission:      Assessment Details   Total duration spent on the patient case in minutes: 10 min     CPT code(s) utilized: Non-Billable    Fawn Stone Psychotherapist  [facility] - Triage & Transition Services  Callback: [phone]

## 2023-10-09 ENCOUNTER — TELEPHONE (OUTPATIENT)
Dept: BEHAVIORAL HEALTH | Facility: CLINIC | Age: 33
End: 2023-10-09
Payer: COMMERCIAL

## 2023-10-09 ENCOUNTER — HOSPITAL ENCOUNTER (INPATIENT)
Facility: CLINIC | Age: 33
LOS: 5 days | Discharge: NURSING, REHAB OR LONG-TERM CARE | End: 2023-10-16
Attending: FAMILY MEDICINE | Admitting: PSYCHIATRY & NEUROLOGY
Payer: COMMERCIAL

## 2023-10-09 DIAGNOSIS — K59.00 CONSTIPATION, UNSPECIFIED CONSTIPATION TYPE: Primary | ICD-10-CM

## 2023-10-09 DIAGNOSIS — R45.851 SUICIDAL IDEATION: ICD-10-CM

## 2023-10-09 DIAGNOSIS — F25.9 SCHIZOAFFECTIVE DISORDER, UNSPECIFIED TYPE (H): ICD-10-CM

## 2023-10-09 PROCEDURE — 99285 EMERGENCY DEPT VISIT HI MDM: CPT | Performed by: FAMILY MEDICINE

## 2023-10-09 PROCEDURE — 99285 EMERGENCY DEPT VISIT HI MDM: CPT | Mod: 25 | Performed by: FAMILY MEDICINE

## 2023-10-09 RX ORDER — VENLAFAXINE HYDROCHLORIDE 75 MG/1
225 CAPSULE, EXTENDED RELEASE ORAL DAILY
Status: DISCONTINUED | OUTPATIENT
Start: 2023-10-10 | End: 2023-10-10

## 2023-10-09 RX ORDER — PRAZOSIN HYDROCHLORIDE 1 MG/1
4 CAPSULE ORAL AT BEDTIME
Status: DISCONTINUED | OUTPATIENT
Start: 2023-10-10 | End: 2023-10-16 | Stop reason: HOSPADM

## 2023-10-09 RX ORDER — VITAMIN B COMPLEX
125 TABLET ORAL DAILY
Status: DISCONTINUED | OUTPATIENT
Start: 2023-10-10 | End: 2023-10-16 | Stop reason: HOSPADM

## 2023-10-09 RX ORDER — BENZTROPINE MESYLATE 1 MG/1
1 TABLET ORAL 2 TIMES DAILY
Status: DISCONTINUED | OUTPATIENT
Start: 2023-10-10 | End: 2023-10-10

## 2023-10-09 RX ORDER — OLANZAPINE 20 MG/1
20 TABLET ORAL DAILY PRN
Status: DISCONTINUED | OUTPATIENT
Start: 2023-10-09 | End: 2023-10-10

## 2023-10-09 RX ORDER — PANTOPRAZOLE SODIUM 40 MG/1
40 TABLET, DELAYED RELEASE ORAL
Status: DISCONTINUED | OUTPATIENT
Start: 2023-10-10 | End: 2023-10-16 | Stop reason: HOSPADM

## 2023-10-09 RX ORDER — DOCUSATE SODIUM 100 MG/1
100 CAPSULE, LIQUID FILLED ORAL 2 TIMES DAILY
Status: DISCONTINUED | OUTPATIENT
Start: 2023-10-10 | End: 2023-10-12

## 2023-10-09 RX ORDER — TRAZODONE HYDROCHLORIDE 50 MG/1
100 TABLET, FILM COATED ORAL AT BEDTIME
Status: DISCONTINUED | OUTPATIENT
Start: 2023-10-10 | End: 2023-10-10

## 2023-10-09 ASSESSMENT — ACTIVITIES OF DAILY LIVING (ADL)
ADLS_ACUITY_SCORE: 37
ADLS_ACUITY_SCORE: 37

## 2023-10-10 LAB
ALBUMIN SERPL BCG-MCNC: 4.5 G/DL (ref 3.5–5.2)
ALP SERPL-CCNC: 107 U/L (ref 40–129)
ALT SERPL W P-5'-P-CCNC: 17 U/L (ref 0–70)
AMPHETAMINES UR QL SCN: NORMAL
ANION GAP SERPL CALCULATED.3IONS-SCNC: 11 MMOL/L (ref 7–15)
AST SERPL W P-5'-P-CCNC: 20 U/L (ref 0–45)
BARBITURATES UR QL SCN: NORMAL
BASO+EOS+MONOS # BLD AUTO: NORMAL 10*3/UL
BASO+EOS+MONOS NFR BLD AUTO: NORMAL %
BASOPHILS # BLD AUTO: 0.1 10E3/UL (ref 0–0.2)
BASOPHILS NFR BLD AUTO: 1 %
BENZODIAZ UR QL SCN: NORMAL
BILIRUB SERPL-MCNC: 0.5 MG/DL
BUN SERPL-MCNC: 7.5 MG/DL (ref 6–20)
BZE UR QL SCN: NORMAL
CALCIUM SERPL-MCNC: 9.5 MG/DL (ref 8.6–10)
CANNABINOIDS UR QL SCN: NORMAL
CHLORIDE SERPL-SCNC: 104 MMOL/L (ref 98–107)
CREAT SERPL-MCNC: 0.76 MG/DL (ref 0.67–1.17)
DEPRECATED HCO3 PLAS-SCNC: 21 MMOL/L (ref 22–29)
EGFRCR SERPLBLD CKD-EPI 2021: >90 ML/MIN/1.73M2
EOSINOPHIL # BLD AUTO: 0 10E3/UL (ref 0–0.7)
EOSINOPHIL NFR BLD AUTO: 0 %
ERYTHROCYTE [DISTWIDTH] IN BLOOD BY AUTOMATED COUNT: 12.3 % (ref 10–15)
FENTANYL UR QL: NORMAL
GLUCOSE SERPL-MCNC: 112 MG/DL (ref 70–99)
HCT VFR BLD AUTO: 42 % (ref 40–53)
HGB BLD-MCNC: 14.7 G/DL (ref 13.3–17.7)
HOLD SPECIMEN: NORMAL
IMM GRANULOCYTES # BLD: 0.1 10E3/UL
IMM GRANULOCYTES NFR BLD: 1 %
LYMPHOCYTES # BLD AUTO: 2.1 10E3/UL (ref 0.8–5.3)
LYMPHOCYTES NFR BLD AUTO: 23 %
MAGNESIUM SERPL-MCNC: 2.1 MG/DL (ref 1.7–2.3)
MCH RBC QN AUTO: 32.5 PG (ref 26.5–33)
MCHC RBC AUTO-ENTMCNC: 35 G/DL (ref 31.5–36.5)
MCV RBC AUTO: 93 FL (ref 78–100)
MONOCYTES # BLD AUTO: 0.7 10E3/UL (ref 0–1.3)
MONOCYTES NFR BLD AUTO: 8 %
NEUTROPHILS # BLD AUTO: 6.3 10E3/UL (ref 1.6–8.3)
NEUTROPHILS NFR BLD AUTO: 67 %
NRBC # BLD AUTO: 0 10E3/UL
NRBC BLD AUTO-RTO: 0 /100
OPIATES UR QL SCN: NORMAL
PCP QUAL URINE (ROCHE): NORMAL
PLATELET # BLD AUTO: 197 10E3/UL (ref 150–450)
POTASSIUM SERPL-SCNC: 3.9 MMOL/L (ref 3.4–5.3)
PROT SERPL-MCNC: 6.8 G/DL (ref 6.4–8.3)
RBC # BLD AUTO: 4.52 10E6/UL (ref 4.4–5.9)
SODIUM SERPL-SCNC: 136 MMOL/L (ref 135–145)
TSH SERPL DL<=0.005 MIU/L-ACNC: 3.17 UIU/ML (ref 0.3–4.2)
WBC # BLD AUTO: 9.3 10E3/UL (ref 4–11)

## 2023-10-10 PROCEDURE — 99255 IP/OBS CONSLTJ NEW/EST HI 80: CPT

## 2023-10-10 PROCEDURE — 83735 ASSAY OF MAGNESIUM: CPT

## 2023-10-10 PROCEDURE — 85025 COMPLETE CBC W/AUTO DIFF WBC: CPT | Performed by: FAMILY MEDICINE

## 2023-10-10 PROCEDURE — 80053 COMPREHEN METABOLIC PANEL: CPT

## 2023-10-10 PROCEDURE — 250N000013 HC RX MED GY IP 250 OP 250 PS 637: Performed by: FAMILY MEDICINE

## 2023-10-10 PROCEDURE — 250N000013 HC RX MED GY IP 250 OP 250 PS 637: Performed by: EMERGENCY MEDICINE

## 2023-10-10 PROCEDURE — 36415 COLL VENOUS BLD VENIPUNCTURE: CPT

## 2023-10-10 PROCEDURE — 80307 DRUG TEST PRSMV CHEM ANLYZR: CPT | Performed by: EMERGENCY MEDICINE

## 2023-10-10 PROCEDURE — 83036 HEMOGLOBIN GLYCOSYLATED A1C: CPT | Performed by: PSYCHIATRY & NEUROLOGY

## 2023-10-10 PROCEDURE — 250N000013 HC RX MED GY IP 250 OP 250 PS 637: Performed by: PSYCHIATRY & NEUROLOGY

## 2023-10-10 PROCEDURE — 36415 COLL VENOUS BLD VENIPUNCTURE: CPT | Performed by: FAMILY MEDICINE

## 2023-10-10 PROCEDURE — 250N000013 HC RX MED GY IP 250 OP 250 PS 637

## 2023-10-10 PROCEDURE — 84443 ASSAY THYROID STIM HORMONE: CPT

## 2023-10-10 RX ORDER — HYDROXYZINE HYDROCHLORIDE 50 MG/1
50 TABLET, FILM COATED ORAL EVERY 6 HOURS PRN
Status: DISCONTINUED | OUTPATIENT
Start: 2023-10-10 | End: 2023-10-11

## 2023-10-10 RX ORDER — GABAPENTIN 400 MG/1
400 CAPSULE ORAL 3 TIMES DAILY
Status: ON HOLD | COMMUNITY
End: 2024-07-02

## 2023-10-10 RX ORDER — CLOZAPINE 100 MG/1
300 TABLET ORAL AT BEDTIME
Status: ON HOLD | COMMUNITY
End: 2024-07-02

## 2023-10-10 RX ORDER — PROPRANOLOL HYDROCHLORIDE 10 MG/1
10 TABLET ORAL 2 TIMES DAILY
Status: ON HOLD | COMMUNITY
End: 2024-07-02

## 2023-10-10 RX ORDER — OLANZAPINE 5 MG/1
5-10 TABLET ORAL 2 TIMES DAILY PRN
Status: DISCONTINUED | OUTPATIENT
Start: 2023-10-10 | End: 2023-10-11

## 2023-10-10 RX ORDER — LEVOTHYROXINE SODIUM 112 UG/1
112 TABLET ORAL DAILY
Status: DISCONTINUED | OUTPATIENT
Start: 2023-10-10 | End: 2023-10-16 | Stop reason: HOSPADM

## 2023-10-10 RX ORDER — CLOZAPINE 50 MG/1
50 TABLET ORAL AT BEDTIME
COMMUNITY
End: 2024-06-25

## 2023-10-10 RX ORDER — SODIUM FLUORIDE 5 MG/G
GEL, DENTIFRICE DENTAL 2 TIMES DAILY
COMMUNITY
End: 2024-06-25

## 2023-10-10 RX ORDER — HYDROXYZINE HYDROCHLORIDE 25 MG/1
25 TABLET, FILM COATED ORAL EVERY 6 HOURS PRN
Status: DISCONTINUED | OUTPATIENT
Start: 2023-10-10 | End: 2023-10-11

## 2023-10-10 RX ADMIN — NICOTINE POLACRILEX 4 MG: 4 GUM, CHEWING BUCCAL at 10:31

## 2023-10-10 RX ADMIN — DOCUSATE SODIUM 100 MG: 100 CAPSULE, LIQUID FILLED ORAL at 07:49

## 2023-10-10 RX ADMIN — Medication 125 MCG: at 07:49

## 2023-10-10 RX ADMIN — BENZTROPINE MESYLATE 1 MG: 1 TABLET ORAL at 07:49

## 2023-10-10 RX ADMIN — PRAZOSIN HYDROCHLORIDE 4 MG: 2 CAPSULE ORAL at 02:58

## 2023-10-10 RX ADMIN — CLOZAPINE 350 MG: 200 TABLET ORAL at 19:33

## 2023-10-10 RX ADMIN — BENZTROPINE MESYLATE 1 MG: 1 TABLET ORAL at 02:36

## 2023-10-10 RX ADMIN — HYDROXYZINE HYDROCHLORIDE 25 MG: 25 TABLET, FILM COATED ORAL at 12:28

## 2023-10-10 RX ADMIN — TRAZODONE HYDROCHLORIDE 100 MG: 50 TABLET ORAL at 02:36

## 2023-10-10 RX ADMIN — PANTOPRAZOLE SODIUM 40 MG: 40 TABLET, DELAYED RELEASE ORAL at 02:36

## 2023-10-10 RX ADMIN — NICOTINE POLACRILEX 4 MG: 4 GUM, CHEWING BUCCAL at 21:08

## 2023-10-10 RX ADMIN — PRAZOSIN HYDROCHLORIDE 4 MG: 2 CAPSULE ORAL at 22:47

## 2023-10-10 RX ADMIN — NICOTINE POLACRILEX 4 MG: 4 GUM, CHEWING BUCCAL at 11:53

## 2023-10-10 RX ADMIN — NICOTINE POLACRILEX 4 MG: 4 GUM, CHEWING BUCCAL at 13:31

## 2023-10-10 RX ADMIN — NICOTINE POLACRILEX 4 MG: 4 GUM, CHEWING BUCCAL at 19:35

## 2023-10-10 RX ADMIN — DOCUSATE SODIUM 100 MG: 100 CAPSULE, LIQUID FILLED ORAL at 19:35

## 2023-10-10 RX ADMIN — NICOTINE POLACRILEX 4 MG: 4 GUM, CHEWING BUCCAL at 22:50

## 2023-10-10 RX ADMIN — NICOTINE POLACRILEX 4 MG: 4 GUM, CHEWING BUCCAL at 14:46

## 2023-10-10 RX ADMIN — VENLAFAXINE HYDROCHLORIDE 225 MG: 75 CAPSULE, EXTENDED RELEASE ORAL at 07:52

## 2023-10-10 RX ADMIN — PANTOPRAZOLE SODIUM 40 MG: 40 TABLET, DELAYED RELEASE ORAL at 07:50

## 2023-10-10 RX ADMIN — HYDROXYZINE HYDROCHLORIDE 25 MG: 25 TABLET, FILM COATED ORAL at 18:25

## 2023-10-10 RX ADMIN — LEVOTHYROXINE SODIUM 112 MCG: 112 TABLET ORAL at 18:34

## 2023-10-10 RX ADMIN — DOCUSATE SODIUM 100 MG: 100 CAPSULE, LIQUID FILLED ORAL at 02:36

## 2023-10-10 ASSESSMENT — ACTIVITIES OF DAILY LIVING (ADL)
ADLS_ACUITY_SCORE: 37

## 2023-10-10 NOTE — ED NOTES
Informed he was placed on a 72 hour hold and shown copy. Pt declined keeping copy of 72 hour hold. Pt accepted news well.
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
## 2023-10-10 NOTE — PROGRESS NOTES
Triage & Transition Services, Extended Care    Client Name: Kamini Neal    Date: October 10, 2023  Service Type:  Group Therapy    Intervention:    Group process: support, challenge, affirm, psycho-education.     Response:  Patient did not participate in group.        Lauren Pollack

## 2023-10-10 NOTE — ED NOTES
IP MH Referral Acuity Rating Score (RARS)    LMHP complete at referral to IP MH, with DEC; and, daily while awaiting IP MH placement. Call score to PPS.  CRITERIA SCORING   New 72 HH and Involuntary for IP MH (not adolescent) 0/1   Boarding over 24 hours 1/1   Vulnerable adult at least 55+ with multiple co morbidities; or, Patient age 11 or under 0/1   Suicide ideation without relief of precipitating factors 1/1   Current plan for suicide 1/1   Current plan for homicide 0/1   Imminent risk or actual attempt to seriously harm another without relief of factors precipitating the attempt 0/1   Severe dysfunction in daily living (ex: complete neglect for self care, extreme disruption in vegetative function, extreme deterioration in social interactions) 1/1   Recent (last 2 weeks) or current physical aggression in the ED 0/1   Restraints or seclusion episode in ED 0/1   Verbal aggression, agitation, yelling, etc., while in the ED 0/1   Active psychosis with psychomotor agitation or catatonia 1/1   Need for constant or near constant redirection (from leaving, from others, etc).  0/1   Intrusive or disruptive behaviors 0/1   TOTAL Acuity Total Score: 5

## 2023-10-10 NOTE — PHARMACY-ADMISSION MEDICATION HISTORY
Pharmacist Admission Medication History    Admission medication history is complete. The information provided in this note is only as accurate as the sources available at the time of the update.    Medication reconciliation/reorder completed by provider prior to medication history? No    Information Source(s): Patient's pharmacy and Facility (TCU/NH/) medication list/MAR via  MAR    Pertinent Information: Received MAR from patient's group home and called to clarify last dose of Invega Sustenna    Changes made to PTA medication list:  Added: None  Deleted: APAP, trazodone, venlafaxine   Changed: None      Allergies reviewed with patient and updates made in EHR: no    Medication History Completed By: POLLO CHERRY RPH 10/10/2023 6:18 PM    Prior to Admission medications    Medication Sig Last Dose Taking? Auth Provider Long Term End Date   benztropine (COGENTIN) 1 MG tablet Take 1 mg by mouth 2 times daily  10/9/2023 Yes Reported, Patient No    cholecalciferol (VITAMIN D3) 125 mcg (5000 units) capsule Take 125 mcg by mouth daily  10/9/2023 Yes Reported, Patient No    cloZAPine (CLOZARIL) 100 MG tablet Take 300 mg by mouth at bedtime Take with 50mg for a total dose of 350mg Past Week Yes Unknown, Entered By History No    cloZAPine (CLOZARIL) 50 MG tablet Take 50 mg by mouth at bedtime Take with 300mg for a total dose of 350mg Past Week Yes Unknown, Entered By History No    docusate sodium (COLACE) 100 MG capsule Take 100 mg by mouth 2 times daily  10/9/2023 Yes Reported, Patient No    gabapentin (NEURONTIN) 400 MG capsule Take 400 mg by mouth 3 times daily 10/9/2023 Yes Unknown, Entered By History Yes    levothyroxine (SYNTHROID/LEVOTHROID) 112 MCG tablet Take 1 tablet (112 mcg) by mouth daily 10/9/2023 Yes Helen, [place], APRN CNP Yes    OLANZapine (ZYPREXA) 20 MG tablet Take 5-10 mg by mouth as needed (agitation, psychosis) Unknown Yes Reported, Patient No    omeprazole (PRILOSEC) 20 MG DR capsule Take 20 mg by  mouth daily 10/9/2023 Yes Reported, Patient No    paliperidone (INVEGA SUSTENNA) 234 MG/1.5ML ANTHONY Inject 1.5 mLs (234 mg) into the muscle every 21 days  Patient taking differently: Inject 234 mg into the muscle every 21 days Last administered on 9/28/2023 Past Month Yes Marianna Leslie, NP Yes    prazosin (MINIPRESS) 2 MG capsule Take 4 mg by mouth At Bedtime  Past Week Yes Reported, Patient Yes    propranolol (INDERAL) 10 MG tablet Take 10 mg by mouth 2 times daily 10/9/2023 Yes Unknown, Entered By History Yes    sodium fluoride dental gel (PREVIDENT) 1.1 % GEL topical gel Apply to affected area 2 times daily 10/9/2023 Yes Unknown, Entered By History No
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
## 2023-10-10 NOTE — CONSULTS
"  Kamini Neal MRN# [id number]   Age: 33 year old YOB: 1990   Date of Admission to ED: 10/9/2023    In person visit Details:     Patient was assessed and interviewed face-to-face in person with this writer mitali. Patient was observed to be able to participate in the assessment as evidenced by verbal consent. Assessment methods included conducting a formal interview with patient, review of medical records, collaboration with medical staff, and obtaining relevant collateral information from family and community providers when available.        Reason for Consult:   This note is being entered to supplement the psychiatry consultation note that was completed on October 9, 2023 by the licensed mental health professional Bakari Rosario LGSW  have reviewed the pertinent clinical details related to their encounter. I am being consulted to offer additional guidance on psychiatric pharmacological interventions  Writer met patient in consult room face-to-face by himself.  Patient is alert and oriented x4 pleasant and cooperative during assessment and interview.  Currently patient denies suicidal ideation homicidal ideation or self injury behavior although he reports auditory hallucination with the command voices to commit suicide.  Currently patient has been taking Clozaril 350 mg at bedtime as he said \" I really hate my taking Clozaril but I know I have to take it.\"  72-hour hold was dropped this morning by ED provider patient continued to be voluntary for inpatient mental health unit, if patient attempt to leave AGAINST MEDICAL ADVICE need to be reassessed.  Patient reported his suicidal ideation was triggered due to substance use such as \"Kaht\" within the recent week, patient is willing to stay voluntary for inpatient mental health unit.  Patient willing to stay sober'  Due to high risk for medication noncompliance and checking precaution while staying in the emergency room.  Patient having family history " "of schizophrenia he reported his father was schizophrenic, as he said \" my dad almost killed one of [place] lady in [place], he is in mental health hospital for long time.\"  Currently patient lives in a group home for the last 12 years, he said he like where he is staying he would like to be discharged to his group home when he is stable.    I have reviewed the nursing notes. I have reviewed the findings, diagnosis, plan and need for follow up with the patient.         HPI:   Kamini Neal is a 33 year old male with PMH of schizoaffective/bipolar disorder, PTSD, polysubstance abuse, depression with SI and anxiety who presents to the ED today BIBA from  for increasing SI for the past week. Earlier today at the  he was snapping video game discs in half with the intention to cut his throat with them. He repeatedly states he has accomplished nothing in life and no longer wants to live. He has a 1 to 1 sitter at the  after 6:30pm, but staff are concerned about his wellbeing. He experiences hallucinations at baseline.         Pt has*not required locked seclusion or restraints in the past 24 hours to maintain safety, please refer to RN documentation for further details.  Substance use does not appear to be playing a contributing role in the patient's presentation.  Brief Therapeutic Intervention(s): *  Provided active listening, unconditional positive regard, and validation. Engaged in cognitive restructuring/ reframing, looked at common cognitive distortions and challenged negative thoughts. *Engaged in guided discovery, explored patient's perspectives and helped expand them through socratic dialogue. Provided positive reinforcement for progress towards goals, gains in knowledge, and application of skills previously taught.  Engaged in social skills training. Explored and identified early warning signs to anger        Past Psychiatric History:     [facility]  note        Substance Use and History:   See " "[facility]  note        Past Medical History:   PAST MEDICAL HISTORY:   Past Medical History:   Diagnosis Date    Anxiety     Depressive disorder     Schizo affective schizophrenia (H)     Substance abuse (H)        PAST SURGICAL HISTORY:   Past Surgical History:   Procedure Laterality Date    TONSILLECTOMY                 Allergies:     Allergies   Allergen Reactions    Haloperidol Other (See Comments)     Other reaction(s): Tardive Dyskinesia  Torticollis  Torticollis  Stiff Neck  Torticollis; reports \"stiff neck.\"               Medications:   I have reviewed this patient's current medications  Current Facility-Administered Medications   Medication    cloZAPine (CLOZARIL) tablet 350 mg    docusate sodium (COLACE) capsule 100 mg    hydrOXYzine (ATARAX) tablet 25 mg    Or    hydrOXYzine (ATARAX) tablet 50 mg    levothyroxine (SYNTHROID/LEVOTHROID) tablet 112 mcg    nicotine polacrilex (NICORETTE) gum 4 mg    OLANZapine (zyPREXA) tablet 20 mg    pantoprazole (PROTONIX) EC tablet 40 mg    prazosin (MINIPRESS) capsule 4 mg    traZODone (DESYREL) tablet 100 mg    venlafaxine (EFFEXOR XR) 24 hr capsule 225 mg    Vitamin D3 (CHOLECALCIFEROL) tablet 125 mcg     Current Outpatient Medications   Medication Sig    ACETAMINOPHEN EXTRA STRENGTH 500 MG tablet     benztropine (COGENTIN) 1 MG tablet Take 1 mg by mouth 2 times daily     cholecalciferol (VITAMIN D3) 125 mcg (5000 units) capsule Take 125 mcg by mouth daily     cloZAPine (CLOZARIL) 200 MG tablet Take 2 tablets (400 mg) by mouth At Bedtime for 28 days    docusate sodium (COLACE) 100 MG capsule Take 100 mg by mouth 2 times daily     gabapentin (NEURONTIN) 400 MG capsule Take 1 capsule (400 mg) by mouth 3 times daily    levothyroxine (SYNTHROID/LEVOTHROID) 112 MCG tablet Take 1 tablet (112 mcg) by mouth daily    OLANZapine (ZYPREXA) 20 MG tablet Take 20 mg by mouth daily as needed    omeprazole (PRILOSEC) 20 MG DR capsule Take 20 mg by mouth 2 times daily    " "paliperidone (INVEGA SUSTENNA) 234 MG/1.5ML ANTHONY Inject 1.5 mLs (234 mg) into the muscle every 21 days    prazosin (MINIPRESS) 2 MG capsule Take 4 mg by mouth At Bedtime     traZODone (DESYREL) 50 MG tablet     venlafaxine (EFFEXOR-XR) 75 MG 24 hr capsule Take 225 mg by mouth daily               Family History:   FAMILY HISTORY:   Family History   Problem Relation Age of Onset    Mental Illness Mother     Mental Illness Maternal Aunt     Mental Illness Maternal Uncle     Glaucoma No family hx of     Macular Degeneration No family hx of               Social History:   Upbringing: born and raised   Educational History:   Relationships:  Children: **   Current Living Situation:  Occupational History:   Financial Support: limited  Legal History: *  Abuse/Trauma History:        - Collateral information from the famly/friend: none         PTA Medications:   (Not in a hospital admission)         Allergies:     Allergies   Allergen Reactions    Haloperidol Other (See Comments)     Other reaction(s): Tardive Dyskinesia  Torticollis  Torticollis  Stiff Neck  Torticollis; reports \"stiff neck.\"            Labs:     Recent Results (from the past 48 hour(s))   CBC with platelets and differential    Collection Time: 10/10/23 11:00 AM   Result Value Ref Range    WBC Count 9.3 4.0 - 11.0 10e3/uL    RBC Count 4.52 4.40 - 5.90 10e6/uL    Hemoglobin 14.7 13.3 - 17.7 g/dL    Hematocrit 42.0 40.0 - 53.0 %    MCV 93 78 - 100 fL    MCH 32.5 26.5 - 33.0 pg    MCHC 35.0 31.5 - 36.5 g/dL    RDW 12.3 10.0 - 15.0 %    Platelet Count 197 150 - 450 10e3/uL    % Neutrophils 67 %    % Lymphocytes 23 %    % Monocytes 8 %    Mids % (Monos, Eos, Basos)      % Eosinophils 0 %    % Basophils 1 %    % Immature Granulocytes 1 %    NRBCs per 100 WBC 0 <1 /100    Absolute Neutrophils 6.3 1.6 - 8.3 10e3/uL    Absolute Lymphocytes 2.1 0.8 - 5.3 10e3/uL    Absolute Monocytes 0.7 0.0 - 1.3 10e3/uL    Mids Abs (Monos, Eos, Basos)      Absolute Eosinophils 0.0 " 0.0 - 0.7 10e3/uL    Absolute Basophils 0.1 0.0 - 0.2 10e3/uL    Absolute Immature Granulocytes 0.1 <=0.4 10e3/uL    Absolute NRBCs 0.0 10e3/uL          Physical and Psychiatric Examination:     /87   Pulse 90   Temp 97.9  F (36.6  C)   Resp 16   SpO2 98%   Weight is 0 lbs 0 oz  There is no height or weight on file to calculate BMI.    Mental Status Exam:  Appearance: awake, alert  Attitude:  cooperative  Eye Contact:  good  Mood:  angry and anxious  Affect:  appropriate and in normal range  Speech:  clear, coherent  Language: fluent and intact in English  Psychomotor, Gait, Musculoskeletal:  no evidence of tardive dyskinesia, dystonia, or tics  Thought Process:  logical, linear, and goal oriented  Associations:  no loose associations  Thought Content:  active suicidal ideation present, passive suicidal ideation present, auditory hallucinations present, visual hallucinations present, patient appears to be responding to internal stimuli, and obsessions present  Insight:  limited  Judgement:  limited  Oriented to:  time, person, and place  Attention Span and Concentration:  limited  Recent and Remote Memory:  limited  Fund of Knowledge:  low-normal         Diagnoses:      Schizoaffective disorder, unspecified type (H)  Suicidal ideation         Recommendations:     1. Pt displays the following risk factors that support IP admission: SI, due to voice command, unable to contract for safety. Pt is unable to engage in safety planning to mitigate risk level in a non-secure setting. Lower levels of care have not been successful in mitigating risk. Due to this IP is the least restrictive option of care for pt. Pt should remain in IP until deemed safe to return to the community and engage in OP MH supports    - Continue to recommend inpatient psychiatric hospitalizations for further stabilization   2.  Continue his current Clozaril 350 mg daily, continue Invega Sustenna patient received every 21 days, discontinue  Cogentin patient does not have any symptoms tardive dyskinesia or movement disorder.  3.  Currently patient is voluntary for inpatient mental health unit if he attempt to leave AGAINST MEDICAL ADVICE need to be reassessed    4.  Consult psychiatry as needed  5.   Refer to psychiatric provider for medication management.    treatment per ED team    - Consulted with Linette Gallo [facility], ED physician Dr. Chris asked if they would like this writer to enter orders in the EHR, spoke with emergency room pharmacist,  patient's ED RN Sabiha regarding this case.    Please call [facility] at [phone] if you have follow-up questions or wish to place another consult.  Trinity Pan, Psychiatric Nurse practitioner    Attestation:  Time with:  Patient: 25 minutes  Treatment Team: 30 Minutes  Chart Review: 30 minutes    Total time spent was 85 minutes. Over 50% of times was spent counseling and coordination of care.    I thank  primary care team very much for letting me participate in the care of this patient.    I, Trinity Pan, CNP, APRN, Psychiatric Nurse Practitioner have personally performed an examination of this patient.  I have edited the note to reflect all relevant changes.  I have discussed this patient with the care team October 10, 2023.  I have reviewed all vitals and laboratory findings.    Disclaimer: This note consists of symbols derived from keyboarding,

## 2023-10-10 NOTE — ED NOTES
Per pharmacy, cannot verify levothyroxine at this time as need to receive a med list from  before verifying
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
## 2023-10-10 NOTE — ED TRIAGE NOTES
Patient presents via EMS. Patient has been having increased suicidal ideations; denies plan for 1 week. Patient wants to be evaluated. VS stable.     Triage Assessment       Row Name 10/09/23 2010       Triage Assessment (Adult)    Airway WDL WDL       Respiratory WDL    Respiratory WDL WDL       Skin Circulation/Temperature WDL    Skin Circulation/Temperature WDL WDL       Cardiac WDL    Cardiac WDL WDL       Peripheral/Neurovascular WDL    Peripheral Neurovascular WDL WDL       Cognitive/Neuro/Behavioral WDL    Cognitive/Neuro/Behavioral WDL WDL

## 2023-10-10 NOTE — PROGRESS NOTES
"Triage & Transition Services, Extended Care     Therapy Progress Note    Patient: Tiffanie goes by \"Tiffanie,\" uses he/him pronouns  Date of Service: October 10, 2023  Site of Service: [facility]  Patient was seen in-person.     Presenting problem:   Tiffanie is followed related to  Current 72HH and long wait time for admission . Please see initial DEC/[facility] Crisis Assessment completed by Bakari Rosario on 10/9/23 for complete assessment information. Notable concerns include suicidal ideation.     Individuals Present: Tiffanie & Yeimy Simms LP    Session start: 8:50 am  Session end: 9:06 am  Session duration in minutes: 16  Session number: 1  Anticipated number of sessions or this episode of care: 1-3  CPT utilized: 39352 - Psychotherapy (with patient) - 30 (16-37*) min    Current Presentation:   Pt reported that he is having suicidal thoughts.  He stated that he is hearing voices telling him to kill himself by slitting his throat.  He stated that this has been going on for about a week.  He stated that he sometimes hears voices to harm others, but currently there is no specific plan or intent.  Pt stated that he has been medication compliant.  He reported his current mood as \"a little agitated.\"  He reported current depression at a 6 or 7.  Pt reported that he has not been sleeping well.  Pt stated that he is willing to stay in the hospital voluntary.  He stated that he has not had an MI commitment for many years.  He reported his goals for hospitalization as eliminating suicidal thoughts and voices.     Mental Status Exam:   Appearance: awake, alert  Attitude: cooperative  Eye Contact: good  Mood: sad  and depressed  Affect: appropriate and in normal range  Speech: clear, coherent  Psychomotor Behavior: no evidence of tardive dyskinesia, dystonia, or tics  Thought Process:  logical and goal oriented  Associations: no loose associations  Thought Content: active suicidal ideation present and auditory hallucinations " present  Insight: limited  Judgement: limited  Oriented to: time, person, and place  Attention Span and Concentration: fair  Recent and Remote Memory: limited    Diagnosis:   Schizoaffective disorder (H) F25    Therapeutic Intervention(s):   Provided active listening, unconditional positive regard, and validation. Identified and practiced coping skills.    Treatment Objective(s) Addressed:   The focus of this session was on rapport building, assessing safety, and identifying treatment goals .     Progress Towards Goals:   Patient reports stable symptoms. Patient is not making progress towards treatment goals as evidenced by suicidal ideation, auditory hallucinations and depression.     Case Management:   Spoke with pt's , Helen @ [phone].  She stated that pt is not currently under commitment.  She had no additional information to provider.  ELIZABETH has been signed.    General Recommendations:   Continue to monitor for harm. Consider: Use a positive, direct and calm approach. Pt's tend to match the energy/mood of the staff. Keep focus positive and upbeat, Listen in a neutral, non-judgmental way. Offer reassurance, and Be mindful of your nonverbal cues (body language, facial expressions)    Plan:   Inpatient Mental Health: Pt reported current suicidal thoughts.  He reported voices telling him to slit his throat.  Pt engaged in suicidal behavior yesterday, breaking a disc with intent of using it to cut himself.  Pt is depressed.  Thus, pt requires inpatient care for safety and stabilization.    Plan for Care reviewed with Assigned Medical Provider? Yes. Provider, Dr. Chris, response: Agreement.   Discontinued 72HH and status has been updated to voluntary.     Yeimy Simms LP   Licensed Mental Health Professional (LMHP), [place] [phone]

## 2023-10-10 NOTE — ED NOTES
[facility]  Expected date: 10/9/23  Expected time: 8:05 PM  Means of arrival: Ambulance  Comments:  Isabelle Taylor4
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
## 2023-10-10 NOTE — ED NOTES
Patient reports active SI with plan of slitting his throat.   Patient reports command voices telling him to cut throat.  Patient reports he feels safer here.  He agreed to contract for safety.  Patient requested scrubs and he was provided.   Patient orientated to the unit.   Patient joined the adult patients on the extended milieu and is playing cards with staff.      After dinner patient reports anxiety and requested prn which was administered.  (See MAR)    Patient watching TV and movie with other patients.  Patient reports less anxious since PRN.   Patient calm and cooperative with requests.

## 2023-10-10 NOTE — CONSULTS
Mental Health Transition and Triage-Extended Care  Civil Commitment Status Plan of Care    Current commitment status is: voluntary    Writer did not meet with Kamini Neal due to: [facility] met with patient already. Please see their notes for more information.    A chart review was completed.     Collateral was not obtained in addition to the DEC consult completed on 10/09/2023.      Kamini Neal does not currently meet criteria for civil commitment as evidence of : Voluntary status, removed 72HH hold.    Writer consulted with [facility] Yeimy Simms regarding this patient and they are in agreement that at this time Kamini Neal does not meet criteria for civil commitment.     Recommendations/Plan:  Discontinuation of 72 hour hold.  Extended Care LMHP to continue to follow for support for disposition.  Request review of commitment needs if matters change regarding this case by calling Extended Care at [phone].    TIM Phillip, LICSW, Psychotherapist  Extended Care- Triage & Transition Services  Callback: [phone]

## 2023-10-10 NOTE — ED PROVIDER NOTES
"ED Provider Note  [facility]      History     Chief Complaint   Patient presents with    Suicidal     Patient presents via EMS. Patient has been having increased suicidal ideations; denies plan for 1 week. Patient wants to be evaluated. VS stable.     HPI  Kamini Neal is a 33 year old male with PMH of schizoaffective/bipolar disorder, PTSD, polysubstance abuse, depression with SI and anxiety who presents to the ED today BIBA from  for increasing SI for the past week. Earlier today at the  he was snapping video game discs in half with the intention to cut his throat with them. He repeatedly states he has accomplished nothing in life and no longer wants to live. He has a 1 to 1 sitter at the  after 6:30pm, but staff are concerned about his wellbeing. He experiences hallucinations at baseline.    Past Medical History  Past Medical History:   Diagnosis Date    Anxiety     Depressive disorder     Schizo affective schizophrenia (H)     Substance abuse (H)      Past Surgical History:   Procedure Laterality Date    TONSILLECTOMY       ACETAMINOPHEN EXTRA STRENGTH 500 MG tablet  benztropine (COGENTIN) 1 MG tablet  cholecalciferol (VITAMIN D3) 125 mcg (5000 units) capsule  cloZAPine (CLOZARIL) 200 MG tablet  docusate sodium (COLACE) 100 MG capsule  gabapentin (NEURONTIN) 400 MG capsule  levothyroxine (SYNTHROID/LEVOTHROID) 112 MCG tablet  OLANZapine (ZYPREXA) 20 MG tablet  omeprazole (PRILOSEC) 20 MG DR capsule  paliperidone (INVEGA SUSTENNA) 234 MG/1.5ML ANTHONY  prazosin (MINIPRESS) 2 MG capsule  traZODone (DESYREL) 50 MG tablet  venlafaxine (EFFEXOR-XR) 75 MG 24 hr capsule      Allergies   Allergen Reactions    Haloperidol Other (See Comments)     Other reaction(s): Tardive Dyskinesia  Torticollis  Torticollis  Stiff Neck  Torticollis; reports \"stiff neck.\"       Family History  Family History   Problem Relation Age of Onset    Mental Illness Mother     Mental Illness Maternal Aunt     " Mental Illness Maternal Uncle     Glaucoma No family hx of     Macular Degeneration No family hx of      Social History   Social History     Tobacco Use    Smoking status: Every Day     Packs/day: 0.50     Types: Vaping Device, Cigarettes    Smokeless tobacco: Never   Substance Use Topics    Alcohol use: Yes     Comment: 2 weeks ago    Drug use: Yes     Comment: COD      Past medical history, past surgical history, medications, allergies, family history, and social history were reviewed with the patient. No additional pertinent items.      A complete review of systems was performed with pertinent positives and negatives noted in the HPI, and all other systems negative.    Physical Exam   BP: 110/86  Pulse: 85  Temp: 98.7  F (37.1  C)  Resp: 18  SpO2: 98 %  Physical Exam  Constitutional:       General: He is not in acute distress.     Appearance: Normal appearance. He is not toxic-appearing.   HENT:      Head: Atraumatic.   Eyes:      General: No scleral icterus.     Conjunctiva/sclera: Conjunctivae normal.   Cardiovascular:      Rate and Rhythm: Normal rate.      Heart sounds: Normal heart sounds.   Pulmonary:      Effort: Pulmonary effort is normal. No respiratory distress.      Breath sounds: Normal breath sounds.   Abdominal:      Palpations: Abdomen is soft.      Tenderness: There is no abdominal tenderness.   Musculoskeletal:         General: No deformity.      Cervical back: Neck supple.   Skin:     General: Skin is warm.   Neurological:      General: No focal deficit present.      Mental Status: He is alert and oriented to person, place, and time.      Sensory: No sensory deficit.      Motor: No weakness.      Coordination: Coordination normal.   Psychiatric:         Mood and Affect: Mood is anxious. Affect is flat.         Speech: Speech is delayed.         Behavior: Behavior is cooperative.         Thought Content: Thought content is paranoid. Thought content includes suicidal ideation.           ED Course,  Procedures, & Data      Procedures       Suicide assessment completed by mental health (D.E.C., LCSW, etc.)       No results found for any visits on 10/09/23.  Medications - No data to display  Labs Ordered and Resulted from Time of ED Arrival to Time of ED Departure - No data to display  No orders to display          Critical care was not performed.     Medical Decision Making  The patient's presentation was of high complexity (a chronic illness severe exacerbation, progression, or side effect of treatment).    The patient's evaluation involved:  independent interpretation of testing performed by another health professional (patient was seen and evaluated by independent provider DEC  with discussion about hospitalization patient will be placed on 72-hour hold and brought in)    The patient's management necessitated high risk (a decision regarding hospitalization).    Assessment & Plan        I have reviewed the nursing notes. I have reviewed the findings, diagnosis, plan and need for follow up with the patient.    Patient with schizoaffective disorder now with marked increase in suicidal ideation will be placed on 70-hour hold for admitted for inpatient stabilization.    Final diagnoses:   Schizoaffective disorder, unspecified type (H)   Suicidal ideation   I, Suzy Larson, am serving as a trained medical scribe to document services personally performed by George Dia MD based on the provider's statements to me on October 9, 2023.  This document has been checked and approved by the attending provider.    I, George Dia MD, was physically present and have reviewed and verified the accuracy of this note documented by Suzy Larson medical scribe.      George Dia MD  [facility] EMERGENCY DEPARTMENT  10/9/2023     George Dia MD  10/09/23 [id number]

## 2023-10-10 NOTE — PHARMACY-CONSULT NOTE
Pharmacy Clozapine Continuation Note    Patient's Name: Kamini Neal  Patient's : 1990    Current cloZAPine regimen: 350mg at bedtime  Has there been a known interruption in therapy for greater than/equal to 48 hours which would warrant retitration No    Recent ANC Value(s) for last 30 days:  10/10/2023: Absolute Neutrophils 6.3 10e3/uL (Ref range: 1.6 - 8.3 10e3/uL)      A REMS Dispense Authorization was obtained from the clozapine REMS prgram? Yes   If no, why was the REMS Dispense Authorization not successful:  A Dispense Rationale was needed to obtain the REMS Dispense Authorization? Yes   Is the ANC (if available) within recommended limits? Yes  Does the patient have any signs or symptoms of infection, including fever? No    Plan:  Continue clozapine therapy at 350 mg PO at bedtime.  A WBC with differential will be ordered at least weekly, NEXT DUE 10/17/2023. ANC values will be entered into the REMS program.    POLLO CHERRY RPH  Phone / Pager: *[phone]

## 2023-10-10 NOTE — PROGRESS NOTES
IP MH Referral Acuity Rating Score (RARS)    LMHP complete at referral to IP MH, with DEC; and, daily while awaiting IP MH placement. Call score to PPS.  CRITERIA SCORING   New 72 HH and Involuntary for IP MH (not adolescent) 1/1   Boarding over 24 hours 0/1   Vulnerable adult at least 55+ with multiple co morbidities; or, Patient age 11 or under 0/1   Suicide ideation without relief of precipitating factors 1/1   Current plan for suicide 1/1   Current plan for homicide 0/1   Imminent risk or actual attempt to seriously harm another without relief of factors precipitating the attempt 0/1   Severe dysfunction in daily living (ex: complete neglect for self care, extreme disruption in vegetative function, extreme deterioration in social interactions) 1/1   Recent (last 2 weeks) or current physical aggression in the ED 0/1   Restraints or seclusion episode in ED 0/1   Verbal aggression, agitation, yelling, etc., while in the ED 0/1   Active psychosis with psychomotor agitation or catatonia 0/1   Need for constant or near constant redirection (from leaving, from others, etc).  0/1   Intrusive or disruptive behaviors 0/1   TOTAL Acuity Total Score: 3

## 2023-10-10 NOTE — PLAN OF CARE
Kamini Neal  October 9, 2023  Plan of Care Hand-off Note     Patient Care Path: inpatient mental health    Plan for Care:   Pt presents to ED for concerns of increased suicidal ideation. Pt stated to his group home staff that he was suicidal today, then went to his room and broke video game discs into sharp pieces with intentions of cutting his throat. Pt appears very depressed and low mood. He states he would like to leave the hospital so he can end his life because he has nothing left to live for. Pt unwilling to come into hospital voluntarily. He requires hospitilization for safety. Pt placed on 72 hour hold. Pt welcomed back to group [place] upon completion of hospitilization.    Identified Goals and Safety Issues:      Overview:     Group Home Director: Neela Brown [phone]           Legal Status: Legal Status at Admission: 72 Hour Hold  72 Hour Hold - Date/Time Initiated: 10/9/23 @ 10:45 PM  72 Hour Hold - Date/Time Ends: 10/12/23 @ 10:45 PM    Psychiatry Consult: Ordered       Updated  rn, attending regarding plan of care.           LINDA Bales

## 2023-10-11 ENCOUNTER — TELEPHONE (OUTPATIENT)
Dept: BEHAVIORAL HEALTH | Facility: CLINIC | Age: 33
End: 2023-10-11
Payer: COMMERCIAL

## 2023-10-11 PROBLEM — F25.9 SCHIZOAFFECTIVE DISORDER, UNSPECIFIED TYPE (H): Status: ACTIVE | Noted: 2023-10-11

## 2023-10-11 PROBLEM — F25.1 SCHIZOAFFECTIVE DISORDER, DEPRESSIVE TYPE (H): Status: RESOLVED | Noted: 2017-10-09 | Resolved: 2023-10-11

## 2023-10-11 PROCEDURE — 250N000013 HC RX MED GY IP 250 OP 250 PS 637: Performed by: FAMILY MEDICINE

## 2023-10-11 PROCEDURE — 250N000013 HC RX MED GY IP 250 OP 250 PS 637: Performed by: EMERGENCY MEDICINE

## 2023-10-11 PROCEDURE — 99233 SBSQ HOSP IP/OBS HIGH 50: CPT

## 2023-10-11 PROCEDURE — 124N000002 HC R&B MH UMMC

## 2023-10-11 PROCEDURE — 250N000013 HC RX MED GY IP 250 OP 250 PS 637

## 2023-10-11 PROCEDURE — 250N000013 HC RX MED GY IP 250 OP 250 PS 637: Performed by: PSYCHIATRY & NEUROLOGY

## 2023-10-11 PROCEDURE — 99223 1ST HOSP IP/OBS HIGH 75: CPT | Mod: AI | Performed by: PSYCHIATRY & NEUROLOGY

## 2023-10-11 RX ORDER — OLANZAPINE 10 MG/2ML
10 INJECTION, POWDER, FOR SOLUTION INTRAMUSCULAR 3 TIMES DAILY PRN
Status: CANCELLED | OUTPATIENT
Start: 2023-10-11

## 2023-10-11 RX ORDER — HYDROXYZINE HYDROCHLORIDE 25 MG/1
25 TABLET, FILM COATED ORAL EVERY 4 HOURS PRN
Status: CANCELLED | OUTPATIENT
Start: 2023-10-11

## 2023-10-11 RX ORDER — POLYETHYLENE GLYCOL 3350 17 G/17G
17 POWDER, FOR SOLUTION ORAL DAILY PRN
Status: CANCELLED | OUTPATIENT
Start: 2023-10-11

## 2023-10-11 RX ORDER — DOCUSATE SODIUM 100 MG/1
100 CAPSULE, LIQUID FILLED ORAL 2 TIMES DAILY
Status: CANCELLED | OUTPATIENT
Start: 2023-10-11

## 2023-10-11 RX ORDER — PANTOPRAZOLE SODIUM 40 MG/1
40 TABLET, DELAYED RELEASE ORAL
Status: CANCELLED | OUTPATIENT
Start: 2023-10-11

## 2023-10-11 RX ORDER — OLANZAPINE 10 MG/1
10 TABLET ORAL 3 TIMES DAILY PRN
Status: CANCELLED | OUTPATIENT
Start: 2023-10-11

## 2023-10-11 RX ORDER — POLYETHYLENE GLYCOL 3350 17 G/17G
17 POWDER, FOR SOLUTION ORAL DAILY PRN
Status: DISCONTINUED | OUTPATIENT
Start: 2023-10-11 | End: 2023-10-16 | Stop reason: HOSPADM

## 2023-10-11 RX ORDER — ACETAMINOPHEN 325 MG/1
650 TABLET ORAL EVERY 4 HOURS PRN
Status: CANCELLED | OUTPATIENT
Start: 2023-10-11

## 2023-10-11 RX ORDER — MAGNESIUM HYDROXIDE/ALUMINUM HYDROXICE/SIMETHICONE 120; 1200; 1200 MG/30ML; MG/30ML; MG/30ML
30 SUSPENSION ORAL EVERY 4 HOURS PRN
Status: DISCONTINUED | OUTPATIENT
Start: 2023-10-11 | End: 2023-10-16 | Stop reason: HOSPADM

## 2023-10-11 RX ORDER — HYDROXYZINE HYDROCHLORIDE 25 MG/1
25 TABLET, FILM COATED ORAL EVERY 4 HOURS PRN
Status: DISCONTINUED | OUTPATIENT
Start: 2023-10-11 | End: 2023-10-16 | Stop reason: HOSPADM

## 2023-10-11 RX ORDER — PRAZOSIN HYDROCHLORIDE 2 MG/1
4 CAPSULE ORAL AT BEDTIME
Status: CANCELLED | OUTPATIENT
Start: 2023-10-11

## 2023-10-11 RX ORDER — OLANZAPINE 10 MG/2ML
10 INJECTION, POWDER, FOR SOLUTION INTRAMUSCULAR 3 TIMES DAILY PRN
Status: DISCONTINUED | OUTPATIENT
Start: 2023-10-11 | End: 2023-10-16 | Stop reason: HOSPADM

## 2023-10-11 RX ORDER — MAGNESIUM HYDROXIDE/ALUMINUM HYDROXICE/SIMETHICONE 120; 1200; 1200 MG/30ML; MG/30ML; MG/30ML
30 SUSPENSION ORAL EVERY 4 HOURS PRN
Status: CANCELLED | OUTPATIENT
Start: 2023-10-11

## 2023-10-11 RX ORDER — LANOLIN ALCOHOL/MO/W.PET/CERES
3 CREAM (GRAM) TOPICAL
Status: DISCONTINUED | OUTPATIENT
Start: 2023-10-11 | End: 2023-10-16 | Stop reason: HOSPADM

## 2023-10-11 RX ORDER — ACETAMINOPHEN 325 MG/1
650 TABLET ORAL EVERY 4 HOURS PRN
Status: DISCONTINUED | OUTPATIENT
Start: 2023-10-11 | End: 2023-10-16 | Stop reason: HOSPADM

## 2023-10-11 RX ORDER — OLANZAPINE 10 MG/1
10 TABLET ORAL 3 TIMES DAILY PRN
Status: DISCONTINUED | OUTPATIENT
Start: 2023-10-11 | End: 2023-10-16 | Stop reason: HOSPADM

## 2023-10-11 RX ORDER — TRAZODONE HYDROCHLORIDE 50 MG/1
50 TABLET, FILM COATED ORAL
Status: CANCELLED | OUTPATIENT
Start: 2023-10-11

## 2023-10-11 RX ADMIN — PANTOPRAZOLE SODIUM 40 MG: 40 TABLET, DELAYED RELEASE ORAL at 08:57

## 2023-10-11 RX ADMIN — NICOTINE POLACRILEX 4 MG: 4 GUM, CHEWING BUCCAL at 09:03

## 2023-10-11 RX ADMIN — NICOTINE POLACRILEX 4 MG: 4 GUM, CHEWING BUCCAL at 19:31

## 2023-10-11 RX ADMIN — NICOTINE POLACRILEX 4 MG: 4 GUM, CHEWING BUCCAL at 17:17

## 2023-10-11 RX ADMIN — NICOTINE POLACRILEX 4 MG: 4 GUM, CHEWING BUCCAL at 16:17

## 2023-10-11 RX ADMIN — DOCUSATE SODIUM 100 MG: 100 CAPSULE, LIQUID FILLED ORAL at 08:56

## 2023-10-11 RX ADMIN — Medication 125 MCG: at 08:56

## 2023-10-11 RX ADMIN — PRAZOSIN HYDROCHLORIDE 4 MG: 2 CAPSULE ORAL at 21:02

## 2023-10-11 RX ADMIN — NICOTINE POLACRILEX 4 MG: 4 GUM, CHEWING BUCCAL at 12:26

## 2023-10-11 RX ADMIN — NICOTINE POLACRILEX 4 MG: 4 GUM, CHEWING BUCCAL at 13:58

## 2023-10-11 RX ADMIN — LEVOTHYROXINE SODIUM 112 MCG: 112 TABLET ORAL at 09:10

## 2023-10-11 RX ADMIN — NICOTINE POLACRILEX 4 MG: 4 GUM, CHEWING BUCCAL at 15:16

## 2023-10-11 RX ADMIN — CLOZAPINE 350 MG: 200 TABLET ORAL at 21:02

## 2023-10-11 RX ADMIN — DOCUSATE SODIUM 100 MG: 100 CAPSULE, LIQUID FILLED ORAL at 20:20

## 2023-10-11 RX ADMIN — NICOTINE POLACRILEX 4 MG: 4 GUM, CHEWING BUCCAL at 11:15

## 2023-10-11 RX ADMIN — NICOTINE POLACRILEX 4 MG: 4 GUM, CHEWING BUCCAL at 18:29

## 2023-10-11 RX ADMIN — NICOTINE POLACRILEX 4 MG: 4 GUM, CHEWING BUCCAL at 20:50

## 2023-10-11 ASSESSMENT — ACTIVITIES OF DAILY LIVING (ADL)
ADLS_ACUITY_SCORE: 37
ADLS_ACUITY_SCORE: 57
ADLS_ACUITY_SCORE: 37
ADLS_ACUITY_SCORE: 42
ADLS_ACUITY_SCORE: 37
ADLS_ACUITY_SCORE: 37
ADLS_ACUITY_SCORE: 42
DRESS: INDEPENDENT
ORAL_HYGIENE: INDEPENDENT
ADLS_ACUITY_SCORE: 42
ADLS_ACUITY_SCORE: 37
HYGIENE/GROOMING: INDEPENDENT
ADLS_ACUITY_SCORE: 42
ADLS_ACUITY_SCORE: 57
ADLS_ACUITY_SCORE: 42

## 2023-10-11 ASSESSMENT — LIFESTYLE VARIABLES: SKIP TO QUESTIONS 9-10: 0
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
## 2023-10-11 NOTE — CARE PLAN
10/11/23 1639   Patient Belongings   Did you bring any home meds/supplements to the hospital?  No   Patient Belongings locker   Patient Belongings Put in Hospital Secure Location (Security or Locker, etc.) cell phone/electronics;shoes;clothing;wallet;cash/credit card   Belongings Search Yes   Clothing Search Yes   Second Staff David     ..A             1 pair of airpods in case  1 pair of shoes  1 pair of socks  1 leather jacket  1 pair of pants  1 shirt  1 pair of underwear  1 black wallet container 4 debit cards ([employer] Mastercard (9569), [employer] Visa (1717), [employer] LE card (3723), [facility] Mastercard(5713))   -cards sent to security per protocol  Admission:  I am responsible for any personal items that are not sent to the safe or pharmacy.  [facility] is not responsible for loss, theft or damage of any property in my possession.    Signature:  _________________________________ Date: _______  Time: _____                                              Staff Signature:  ____________________________ Date: ________  Time: _____      2nd Staff person, if patient is unable/unwilling to sign:    Signature: ________________________________ Date: ________  Time: _____     Discharge:  [facility] has returned all of my personal belongings:    Signature: _________________________________ Date: ________  Time: _____                                          Staff Signature:  ____________________________ Date: ________  Time: _____
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
## 2023-10-11 NOTE — CONSULTS
Kamini Neal MRN# [id number]   Age: 33 year old YOB: 1990   Date of Admission to ED: 10/9/2023    In person visit Details:     Patient was assessed and interviewed face-to-face in person with this writer mitali. Patient was observed to be able to participate in the assessment as evidenced by verbal consent. Assessment methods included conducting a formal interview with patient, review of medical records, collaboration with medical staff, and obtaining relevant collateral information from family and community providers when available.        Reason for Consult:   This note is being entered to supplement the psychiatry consultation note that was completed on October 9, 2023 by the licensed mental health professional Bakari Rosario LGSW  have reviewed the pertinent clinical details related to their encounter. I am being consulted to offer additional guidance on psychiatric pharmacological interventions.    I have reviewed the nursing notes. I have reviewed the findings, diagnosis, plan and need for follow up with the patient.         HPI:   Kamini Neal is a 33 year old male with PMH of schizoaffective/bipolar disorder, PTSD, polysubstance abuse, depression with SI and anxiety who presents to the ED today BIBA from  for increasing SI for the past week. Earlier today at the  he was snapping video game discs in half with the intention to cut his throat with them. He repeatedly states he has accomplished nothing in life and no longer wants to live. He has a 1 to 1 sitter at the  after 6:30pm, but staff are concerned about his wellbeing. He experiences hallucinations at baseline.     Writer met patient in a consult room face-to-face with Kaur SINGLETON.  Patient was alert and oriented x4 pleasant and cooperative during assessment and interview.  Patient reported having suicidal ideation due to voice, endorsed visual and auditory hallucination.  Patient has been compliant with his  "medication Clozaril and Invega Sustenna.  Also patient was has been abusing substance such as \"kaht\" willing to stay sober.  Currently patient is very futuristic saying he would like to go to college and have a job sick and tired of staying in a group home.  Patient had family back in [facility].  His mother whom he said he communicate with her every night through [employer] which continue to motivate him to be a better person.  But patient reports that he does not have any motivation to do things currently.  Currently patient denied any side effects from his neuroleptic medication.    Pt has*not required locked seclusion or restraints in the past 24 hours to maintain safety, please refer to RN documentation for further details.  Substance use does not appear to be playing a contributing role in the patient's presentation.  Brief Therapeutic Intervention(s): *  Provided active listening, unconditional positive regard, and validation. Engaged in cognitive restructuring/ reframing, looked at common cognitive distortions and challenged negative thoughts. *Engaged in guided discovery, explored patient's perspectives and helped expand them through socratic dialogue. Provided positive reinforcement for progress towards goals, gains in knowledge, and application of skills previously taught.  Engaged in social skills training. Explored and identified early warning signs to anger        Past Psychiatric History:     The [facility]  note        Substance Use and History:   See DEC  note        Past Medical History:   PAST MEDICAL HISTORY:   Past Medical History:   Diagnosis Date    Anxiety     Depressive disorder     Schizo affective schizophrenia (H)     Substance abuse (H)        PAST SURGICAL HISTORY:   Past Surgical History:   Procedure Laterality Date    TONSILLECTOMY                 Allergies:     Allergies   Allergen Reactions    Haloperidol Other (See Comments)     Other reaction(s): Tardive " "Dyskinesia  Torticollis  Torticollis  Stiff Neck  Torticollis; reports \"stiff neck.\"               Medications:   I have reviewed this patient's current medications  Current Facility-Administered Medications   Medication    cloZAPine (CLOZARIL) tablet 350 mg    docusate sodium (COLACE) capsule 100 mg    hydrOXYzine (ATARAX) tablet 25 mg    Or    hydrOXYzine (ATARAX) tablet 50 mg    levothyroxine (SYNTHROID/LEVOTHROID) tablet 112 mcg    nicotine polacrilex (NICORETTE) gum 4 mg    OLANZapine (zyPREXA) tablet 5-10 mg    pantoprazole (PROTONIX) EC tablet 40 mg    prazosin (MINIPRESS) capsule 4 mg    Vitamin D3 (CHOLECALCIFEROL) tablet 125 mcg     Current Outpatient Medications   Medication Sig    benztropine (COGENTIN) 1 MG tablet Take 1 mg by mouth 2 times daily     cholecalciferol (VITAMIN D3) 125 mcg (5000 units) capsule Take 125 mcg by mouth daily     cloZAPine (CLOZARIL) 100 MG tablet Take 300 mg by mouth at bedtime Take with 50mg for a total dose of 350mg    cloZAPine (CLOZARIL) 50 MG tablet Take 50 mg by mouth at bedtime Take with 300mg for a total dose of 350mg    docusate sodium (COLACE) 100 MG capsule Take 100 mg by mouth 2 times daily     gabapentin (NEURONTIN) 400 MG capsule Take 400 mg by mouth 3 times daily    levothyroxine (SYNTHROID/LEVOTHROID) 112 MCG tablet Take 1 tablet (112 mcg) by mouth daily    OLANZapine (ZYPREXA) 20 MG tablet Take 5-10 mg by mouth as needed (agitation, psychosis)    omeprazole (PRILOSEC) 20 MG DR capsule Take 20 mg by mouth daily    paliperidone (INVEGA SUSTENNA) 234 MG/1.5ML ANTHONY Inject 1.5 mLs (234 mg) into the muscle every 21 days (Patient taking differently: Inject 234 mg into the muscle every 21 days Last administered on 9/28/2023)    prazosin (MINIPRESS) 2 MG capsule Take 4 mg by mouth At Bedtime     propranolol (INDERAL) 10 MG tablet Take 10 mg by mouth 2 times daily    sodium fluoride dental gel (PREVIDENT) 1.1 % GEL topical gel Apply to affected area 2 times daily      " "        Family History:   FAMILY HISTORY:   Family History   Problem Relation Age of Onset    Mental Illness Mother     Mental Illness Maternal Aunt     Mental Illness Maternal Uncle     Glaucoma No family hx of     Macular Degeneration No family hx of               Social History:   Upbringing: born and raised   Educational History:   Relationships:  Children: **   Current Living Situation:  Occupational History:   Financial Support: limited  Legal History: *  Abuse/Trauma History:        - Collateral information from the famly/friend: none         PTA Medications:   (Not in a hospital admission)         Allergies:     Allergies   Allergen Reactions    Haloperidol Other (See Comments)     Other reaction(s): Tardive Dyskinesia  Torticollis  Torticollis  Stiff Neck  Torticollis; reports \"stiff neck.\"            Labs:     Recent Results (from the past 48 hour(s))   CBC with platelets and differential    Collection Time: 10/10/23 11:00 AM   Result Value Ref Range    WBC Count 9.3 4.0 - 11.0 10e3/uL    RBC Count 4.52 4.40 - 5.90 10e6/uL    Hemoglobin 14.7 13.3 - 17.7 g/dL    Hematocrit 42.0 40.0 - 53.0 %    MCV 93 78 - 100 fL    MCH 32.5 26.5 - 33.0 pg    MCHC 35.0 31.5 - 36.5 g/dL    RDW 12.3 10.0 - 15.0 %    Platelet Count 197 150 - 450 10e3/uL    % Neutrophils 67 %    % Lymphocytes 23 %    % Monocytes 8 %    Mids % (Monos, Eos, Basos)      % Eosinophils 0 %    % Basophils 1 %    % Immature Granulocytes 1 %    NRBCs per 100 WBC 0 <1 /100    Absolute Neutrophils 6.3 1.6 - 8.3 10e3/uL    Absolute Lymphocytes 2.1 0.8 - 5.3 10e3/uL    Absolute Monocytes 0.7 0.0 - 1.3 10e3/uL    Mids Abs (Monos, Eos, Basos)      Absolute Eosinophils 0.0 0.0 - 0.7 10e3/uL    Absolute Basophils 0.1 0.0 - 0.2 10e3/uL    Absolute Immature Granulocytes 0.1 <=0.4 10e3/uL    Absolute NRBCs 0.0 10e3/uL   TSH with free T4 reflex    Collection Time: 10/10/23 11:45 AM   Result Value Ref Range    TSH 3.17 0.30 - 4.20 uIU/mL   Comprehensive metabolic " panel    Collection Time: 10/10/23 11:45 AM   Result Value Ref Range    Sodium 136 135 - 145 mmol/L    Potassium 3.9 3.4 - 5.3 mmol/L    Carbon Dioxide (CO2) 21 (L) 22 - 29 mmol/L    Anion Gap 11 7 - 15 mmol/L    Urea Nitrogen 7.5 6.0 - 20.0 mg/dL    Creatinine 0.76 0.67 - 1.17 mg/dL    GFR Estimate >90 >60 mL/min/1.73m2    Calcium 9.5 8.6 - 10.0 mg/dL    Chloride 104 98 - 107 mmol/L    Glucose 112 (H) 70 - 99 mg/dL    Alkaline Phosphatase 107 40 - 129 U/L    AST 20 0 - 45 U/L    ALT 17 0 - 70 U/L    Protein Total 6.8 6.4 - 8.3 g/dL    Albumin 4.5 3.5 - 5.2 g/dL    Bilirubin Total 0.5 <=1.2 mg/dL   Magnesium    Collection Time: 10/10/23 11:45 AM   Result Value Ref Range    Magnesium 2.1 1.7 - 2.3 mg/dL   Extra Purple Top Tube    Collection Time: 10/10/23 11:45 AM   Result Value Ref Range    Hold Specimen [facility]    Drug Abuse Screen Qual Urine    Collection Time: 10/10/23  3:13 PM   Result Value Ref Range    Amphetamines Urine Screen Negative Screen Negative    Barbituates Urine Screen Negative Screen Negative    Benzodiazepine Urine Screen Negative Screen Negative    Cannabinoids Urine Screen Negative Screen Negative    Cocaine Urine Screen Negative Screen Negative    Fentanyl Qual Urine Screen Negative Screen Negative    Opiates Urine Screen Negative Screen Negative    PCP Urine Screen Negative Screen Negative          Physical and Psychiatric Examination:     BP 99/70   Pulse 92   Temp 98.1  F (36.7  C) (Oral)   Resp 16   SpO2 100%   Weight is 0 lbs 0 oz  There is no height or weight on file to calculate BMI.    Mental Status Exam:  Appearance: awake, alert  Attitude:  cooperative  Eye Contact:  good  Mood:  angry and anxious  Affect:  appropriate and in normal range  Speech:  clear, coherent  Language: fluent and intact in English  Psychomotor, Gait, Musculoskeletal:  no evidence of tardive dyskinesia, dystonia, or tics  Thought Process:  logical, linear, and goal oriented  Associations:  no loose  associations  Thought Content:  active suicidal ideation present, passive suicidal ideation present, auditory hallucinations present, visual hallucinations present, patient appears to be responding to internal stimuli, and obsessions present  Insight:  limited  Judgement:  limited  Oriented to:  time, person, and place  Attention Span and Concentration:  limited  Recent and Remote Memory:  limited  Fund of Knowledge:  low-normal         Diagnoses:      Schizoaffective disorder, unspecified type (H)  Suicidal ideation         Recommendations:     1. Pt displays the following risk factors that support IP admission: SI, due to voice command, unable to contract for safety. Pt is unable to engage in safety planning to mitigate risk level in a non-secure setting. Lower levels of care have not been successful in mitigating risk. Due to this IP is the least restrictive option of care for pt. Pt should remain in IP until deemed safe to return to the community and engage in OP  supports    - Continue to recommend inpatient psychiatric hospitalizations for further stabilization   2.  Continue his current Clozaril 350 mg daily, continue Invega Sustenna patient received every 21 days, discontinue Cogentin patient does not have any symptoms tardive dyskinesia or movement disorder.  3.  Currently patient is voluntary for inpatient mental health unit if he attempt to leave AGAINST MEDICAL ADVICE need to be reassessed    4.  Consult psychiatry as needed  5.   Refer to psychiatric provider for medication management.    treatment per ED team    - Consulted with Linette Gallo [facility], ED physician Dr. Chris asked if they would like this writer to enter orders in the EHR, spoke with emergency room pharmacist,  patient's ED RN Sabiha regarding this case.    Please call [facility]/DEC at [phone] if you have follow-up questions or wish to place another consult.  Trinity Pan, Psychiatric Nurse practitioner    Attestation:  Time  with:  Patient: 10 minutes  Treatment Team: 20 Minutes  Chart Review: 20 minutes    Total time spent was 50 minutes. Over 50% of times was spent counseling and coordination of care.    I thank  primary care team very much for letting me participate in the care of this patient.    I, Trinity Pan, CNP, APRN, Psychiatric Nurse Practitioner have personally performed an examination of this patient.  I have edited the note to reflect all relevant changes.  I have discussed this patient with the care team October 11, 2023.  I have reviewed all vitals and laboratory findings.    Disclaimer: This note consists of symbols derived from keyboarding,

## 2023-10-11 NOTE — TELEPHONE ENCOUNTER
[place]: [place]  Access Inpatient Bed Call Log  10/11/2023 12:11 AM  Intake has called facilities that have not updated their bed status within the last 12 hours.??      *[facility] -- [facility]: @ cap per website.  [place] -- [facility]:  @ cap per website.  [place] -- [facility]: Posting 2 beds. 12:28am Per Emelia Low acuity.  [place] -- [facility]: @ cap per website.  [place] -- [facility]: Posting 2 beds. 12:28am Per Emelia Low acuity.  [facility] -- [facility]: @ cap per website.  [place] [place] -- [facility] Posting 1 Bed.  [place] -- [facility]: @ cap per website.  [place] -- [facility]: @ cap per website.  [place] -- [facility]:  @ cap per website.    Pt remains on waitlist pending appropriate placement availability

## 2023-10-11 NOTE — PLAN OF CARE
"-Attending Provider: Karime Jean DO  -Voicemail Code: [phone]#  -Team Note Due: Thursday    Assessment/Intervention/Current Symtoms and Care Coordination:  Chart review and met with team, discussed pt progress, symptomology, and response to treatment.  Discussed the discharge plan and any potential impediments to discharge.    CTC met with Tiffanie to complete initial assessment interview. Tiffanie was agreeable to meet with CTC, and distant and guarded at first. As discussion went on he began to shift body positioning to be more engaged and shared more. Key takeaways not noted in the Initial Assessment: Recognizes that before he got sick in 2011 he was more motivated/driven/able to take initiative. Stated this may be part of why he was/is having suicidal ideation. Mentioned he also feels very sedated in the morning. Mentioned wanting lighter medications, specifically lower dose of Clozaril, and replacing PRN Zyprexa with Hydroxyzine. When discussing his substance use, he reported he \"smuggles a 6 pack of beer into the group home 1-2x a month because its not allowed.\" He also reported when he uses Kaht, he becomes more talkative and euphoric, which he enjoys. CTC provided brief education around the balance of using medications to manage psychotic symptoms and the side effect of low energy. CTC encouraged him to talk about this with provider.          Discharge Plan or Goal:  Pending further stabilization, continued compliance with medications, continued activities of daily living, and development of a safe discharge plan.   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    Barriers to Discharge:  Impact of mental health symptoms on well being   Impact of mental health symptoms on activities of daily living  Need for medication monitoring and medication management     Referral Status:  None     Legal Status:  Voluntary   County:   File Number:   Start and expiration date of " commitment:     Contacts:  Psychiatrist: Marco Campo at [facility] [place] [phone]  Group Home Director: Neela Brown [phone]   AMBER CM: Tenisha at [facility]   Job Coordinator ([employer]): Gabby [phone]    [facility] intermediate: [phone]     Upcoming Meetings and Dates/Important Information and next steps:      -Still Needed on AVS:   Psychiatry and Primary Care Provider    CTC Coverage Notes   Team Meetin:30am in OT or conference room  Attending Provider: Karime Jean DO  Voicemail Code: See desk phone  Team Note Due: Tuesday  Next Steps: ROIs

## 2023-10-11 NOTE — TELEPHONE ENCOUNTER
5:20am - Paged [facility] for possible placement to Station 10/32    5:59am - Dr. Dacosta accepts pt for unit    6:17am - Notified unit and RN of pt in the queue    6:22am - Notified [facility] about pt placement. RN on day shift will call for report.    Libby Bowers S.R    R: Patient placement to [facility] Station 10/32 with eTd

## 2023-10-11 NOTE — ED NOTES
Pt has been accepted to station 32. Informed pt and he is agreeable with the transfer. Nurse to Nurse report done. Requested transport.

## 2023-10-11 NOTE — PLAN OF CARE
Problem: Psychotic Signs/Symptoms  Goal: Decreased Sensory Symptoms (Psychotic Signs/Symptoms)  Outcome: Progressing   Goal Outcome Evaluation:    RN Assessment:  SI/Self harm:  pt currently denies SI  Aggression/agitation/HI:  none reported or observed  AVH:  pt denies, does not currently appear to be responding to internal stimuli  Sleep: adequate per pt report  PRN Med: Nicotine gum  Medication AE: none reported or observed  Physical Complaints/Issues: pt denies at this time  I & O: eating and drinking well  LBM: uncertain, denies concerns  ADLs: independent  Visits: none this shift  Vitals:  WNL  COVID 19 Assessment:  no symptoms present  Milieu Participation: minimal, pt spent the shift resting in his room  Behavior: overall calm, pleasant and cooperative. Pt declines admission interview at this time, will reapproach after lunch.    Pt admitted to station 32 N from [facility] due to increased suicidal ideation with attempt to cut his throat. Pt broke a CD intending to cut self with it. Pt brought in by  staff. Pt also reporting auditory hallucinations to kill himself. Pt has a history of schizoaffective DO, reports he has been taking his scheduled medications as prescribed.   Pt currently denies having any thoughts/plans/intent to harm self, denies wish to die, states he is here for help and safety. Pt is voluntary. Cooperative with safety search. Pt denies having any allergies other than haldol.   No other concerns at this time. Nursing will continue to monitor and assess.

## 2023-10-11 NOTE — PROGRESS NOTES
Triage & Transition Services, Extended Care     Kamini Neal  October 11, 2023    Tiffanie has been accepted on Station 12.  I met with him briefly to inform him of this.  Pt was accepting of information.  He denied additional mental health needs and will likely transfer to the unit later today.    Plan:  Inpatient Mental Health: Pt has suicidal thoughts and hallucinations.  His mood is depressed.  Inpatient is needed for safety and stabilization.      Extended Care will follow and meet with patient/family/care team as able or requested.     Yeimy Simms LP  [facility], Extended Care   [phone]

## 2023-10-11 NOTE — PLAN OF CARE
Individual Therapy note:    Therapist introduced self to patient and discussed psychotherapy service available to patient.     Pt response: Pt not interested currently in meeting 1:1; therapist will continue remaining available for pt     Plan: Pt was encouraged to attend groups and therapist will remain available for 1:1 sessions    Duration: Met with patient a for a total of 5 minutes.    Time started: 1410   Time ended: 1415
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
## 2023-10-11 NOTE — ED NOTES
Pt is alert and oriented x 4. He is pleasant and cooperative with cares. Denies pain SI HI and hallucinations. Endorses depression and anxiety 4/10. Offered/declined PRN. Pt refused breakfast reporting he not hungry. Pt is currently  coloring to keep self busy. No behavior concerns. Staff will continue to monitor.

## 2023-10-12 LAB — HBA1C MFR BLD: 5.1 %

## 2023-10-12 PROCEDURE — 250N000013 HC RX MED GY IP 250 OP 250 PS 637: Performed by: FAMILY MEDICINE

## 2023-10-12 PROCEDURE — 124N000002 HC R&B MH UMMC

## 2023-10-12 PROCEDURE — 250N000013 HC RX MED GY IP 250 OP 250 PS 637: Performed by: PSYCHIATRY & NEUROLOGY

## 2023-10-12 PROCEDURE — 250N000013 HC RX MED GY IP 250 OP 250 PS 637

## 2023-10-12 PROCEDURE — 250N000013 HC RX MED GY IP 250 OP 250 PS 637: Performed by: EMERGENCY MEDICINE

## 2023-10-12 PROCEDURE — 99233 SBSQ HOSP IP/OBS HIGH 50: CPT | Performed by: PSYCHIATRY & NEUROLOGY

## 2023-10-12 RX ORDER — AMOXICILLIN 250 MG
1 CAPSULE ORAL 2 TIMES DAILY
Status: DISCONTINUED | OUTPATIENT
Start: 2023-10-12 | End: 2023-10-16 | Stop reason: HOSPADM

## 2023-10-12 RX ORDER — AMOXICILLIN 250 MG
1 CAPSULE ORAL 2 TIMES DAILY
Status: DISCONTINUED | OUTPATIENT
Start: 2023-10-12 | End: 2023-10-12

## 2023-10-12 RX ADMIN — HYDROXYZINE HYDROCHLORIDE 25 MG: 25 TABLET, FILM COATED ORAL at 18:42

## 2023-10-12 RX ADMIN — NICOTINE POLACRILEX 4 MG: 4 GUM, CHEWING BUCCAL at 18:00

## 2023-10-12 RX ADMIN — NICOTINE POLACRILEX 4 MG: 4 GUM, CHEWING BUCCAL at 19:03

## 2023-10-12 RX ADMIN — Medication 125 MCG: at 10:24

## 2023-10-12 RX ADMIN — SENNOSIDES AND DOCUSATE SODIUM 1 TABLET: 50; 8.6 TABLET ORAL at 10:24

## 2023-10-12 RX ADMIN — POLYETHYLENE GLYCOL 3350 17 G: 17 POWDER, FOR SOLUTION ORAL at 19:03

## 2023-10-12 RX ADMIN — NICOTINE POLACRILEX 4 MG: 4 GUM, CHEWING BUCCAL at 15:52

## 2023-10-12 RX ADMIN — NICOTINE POLACRILEX 4 MG: 4 GUM, CHEWING BUCCAL at 12:56

## 2023-10-12 RX ADMIN — CLOZAPINE 350 MG: 200 TABLET ORAL at 21:08

## 2023-10-12 RX ADMIN — NICOTINE POLACRILEX 4 MG: 4 GUM, CHEWING BUCCAL at 16:53

## 2023-10-12 RX ADMIN — NICOTINE POLACRILEX 4 MG: 4 GUM, CHEWING BUCCAL at 14:13

## 2023-10-12 RX ADMIN — SENNOSIDES AND DOCUSATE SODIUM 1 TABLET: 50; 8.6 TABLET ORAL at 20:25

## 2023-10-12 RX ADMIN — LEVOTHYROXINE SODIUM 112 MCG: 112 TABLET ORAL at 10:24

## 2023-10-12 RX ADMIN — NICOTINE POLACRILEX 4 MG: 4 GUM, CHEWING BUCCAL at 20:26

## 2023-10-12 RX ADMIN — PANTOPRAZOLE SODIUM 40 MG: 40 TABLET, DELAYED RELEASE ORAL at 10:24

## 2023-10-12 RX ADMIN — PRAZOSIN HYDROCHLORIDE 4 MG: 2 CAPSULE ORAL at 21:07

## 2023-10-12 ASSESSMENT — ACTIVITIES OF DAILY LIVING (ADL)
ADLS_ACUITY_SCORE: 42
DRESS: INDEPENDENT
LAUNDRY: UNABLE TO COMPLETE
ADLS_ACUITY_SCORE: 42
ORAL_HYGIENE: INDEPENDENT
ADLS_ACUITY_SCORE: 42
DRESS: INDEPENDENT
HYGIENE/GROOMING: INDEPENDENT
ADLS_ACUITY_SCORE: 42
ADLS_ACUITY_SCORE: 42
HYGIENE/GROOMING: INDEPENDENT
ADLS_ACUITY_SCORE: 42
ORAL_HYGIENE: INDEPENDENT
ADLS_ACUITY_SCORE: 42

## 2023-10-12 NOTE — PLAN OF CARE
"  Problem: Psychotic Signs/Symptoms  Goal: Improved Mood Symptoms (Psychotic Signs/Symptoms)  Outcome: Progressing     Problem: Suicide Risk  Goal: Absence of Self-Harm  Outcome: Progressing   Goal Outcome Evaluation:    RN Assessment:  SI/Self harm:  pt denies  Aggression/agitation/HI:  none reported or observed  AVH:  pt denies, not observed responding to internal stimuli  Sleep: adequate per pt, resting in bed throughout the entire shift  PRN Med: Nicotine gum  Medication AE: none reported or observed  Physical Complaints/Issues: pt reports constipation, states it's been 2-3 days since last BM. Provider ordered senna-colace for pt, which he started today.  I & O: pt declined breakfast, ate well at lunch. Encourage fluids.  LBM: 2-3 days ago  ADLs: independent  Visits: none this shift  Vitals:  WNL  COVID 19 Assessment:  no symptoms present  Milieu Participation: none, as pt spent the entire shift in his room resting. Pt only came out for lunch and to receive nicotine gum per request  Behavior: overall calm, pleasant and cooperative. Pt is isolative to his room this shift, denies having any symptoms of physical illness, states his mood is \"good\".   Pt came out and took a shower this afternoon.  Pt is medication adherent.   No other concerns at this time. Nursing will continue to monitor and assess.   "

## 2023-10-12 NOTE — PLAN OF CARE
Problem: Plan of Care - These are the overarching goals to be used throughout the patient stay.    Goal: Optimal Comfort and Wellbeing  Outcome: Progressing  Intervention: Provide Person-Centered Care    Plan of Care Reviewed With: patient        Patient was visible in the milieu most of the shift. Patient did not attend group, remains alert and oriented X 4. Affect is flat, patient complained of anxiety 4/10 at the start of the shift. Prn Hydroxyzine was requested and given to patient. Patient continues to be withdrawn to self. At one time, patient was observed in the dining area reading the Quran with a peer (H.R). On assessment, patient denied pain, depression, SI/SIB/HI, A/V hallucination. No indication of patient responding to internal stimuli was observed. V.S are stable. Patient was compliant with his medication with no sign of cheeking. Prn Nicotine gum was given every hour throughout the shift. No report of any medication side effects. Patient has lipid panel lab tomorrow at 0630. Patient was reminded to be NPO from midnight. Patient verbalized understanding. Patient reported he has not had BM from Senna earlier administered from day shift. Prn Miralax was given. Still monitoring result. Will continue plan of care.

## 2023-10-12 NOTE — PLAN OF CARE
-Attending Provider: Karime Jean DO  -Voicemail Code: [phone]  -Team Note Due: Thursday    Assessment/Intervention/Current Symtoms and Care Coordination:  Chart review and met with team, discussed pt progress, symptomology, and response to treatment.  Discussed the discharge plan and any potential impediments to discharge.    [facility] contact Neela at the group home Tiffanie has been living at. Neela confirmed that Tiffanie could return home when he is cleared by provider. [facility] provided brief update on pt status. [facility] contracted plan for [facility] to call and schedule/reschedule next psych appointment.     [facility] called [facility] and requested Tiffanie's psych appointment be moved up from Dec 11th to Oct. 30th.     CTC called [facility] and left a voicemail requesting that Tiffanie's CADI CM call [facility] back. [facility] was calling to inquire about supported employment options available to Tiffanie.        Discharge Plan or Goal:  Returning to group home. Possibly 10/13, if not following week.     Pending further stabilization, continued compliance with medications, continued activities of daily living, and development of a safe discharge plan.     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    Barriers to Discharge:  Impact of mental health symptoms on well being   Impact of mental health symptoms on activities of daily living  Need for medication monitoring and medication management     Referral Status:  None     Legal Status:  Voluntary   County:   File Number:   Start and expiration date of commitment:     Contacts:  Psychiatrist: Current: Dr. Reid [facility]   Previous: Marco Campo at [facility] [phone]  Group Home Director: Neela Brown [phone]   CADI CM: Tenisha at [facility] [phone]  Job Coordinator ([employer]): Gabby [phone]    [facility] detention: [phone]     Upcoming Meetings and  Dates/Important Information and next steps:  Psych:  at 10:30am. At [facility]    -Still Needed on AVS:       CTC Coverage Notes   Team Meetin:30am in OT or conference room  Attending Provider: Karime Jean DO  Voicemail Code: See desk phone  Team Note Due: Tuesday  Next Steps: ROIs

## 2023-10-12 NOTE — PROVIDER NOTIFICATION
10/12/23 1404   Individualization/Patient Specific Goals   Patient Personal Strengths community support;expressive of emotions;independent living skills;insight into illness/situation;self-awareness;medication/treatment adherence;expressive of needs;stable living environment;positive attitude   Patient Vulnerabilities adverse childhood experience(s);family/relationship conflict;history of unsuccessful treatment;substance abuse/addiction;occupational insecurity;traumatic event   Anxieties, Fears or Concerns Concern around low drive, anxiety around where he is at in life   Interprofessional Rounds   Summary Pt is taking medications, eating, and sleeping. He is advocating for his needs. Pt reports lessing of SI   Participants nursing;CTC;psychiatrist   Behavioral Team Discussion   Participants Chaitanya Taylor, Nurse RN   Progress Stabalizing.   Anticipated length of stay 2-7 days   Continued Stay Criteria/Rationale Continued stay for medication adjustment and monitoring, symptom management and monitoring, structured environment to ensure personal safety.   Medical/Physical see below   Precautions see below   Plan Continue to support with medication management, monitor mental health symptoms, continue to coordinate with Neela (Group Home Director).   Rationale for change in precautions or plan see below   Safety Plan see below   Anticipated Discharge Disposition group home     PRECAUTIONS AND SAFETY    Behavioral Orders   Procedures    Assault precautions    Cheeking Precautions (behavioral units)     Patient Observed swallowing PO medications; Patient asked to drink water after swallowing medication; Patient in Staff line of sight for 15 minutes after medication given; Mouth checks after PO administration (patient asked to open mouth and stick out their tongue).    Cheeking Precautions (behavioral units)    Code 1 - Restrict to Unit    Routine Programming     As clinically indicated    Status 15     Every 15  minutes.    Status 15     Every 15 minutes.    Suicide precautions     Patients on Suicide Precautions should have a Combination Diet ordered that includes a Diet selection(s) AND a Behavioral Tray selection for Safe Tray - with utensils, or Safe Tray - NO utensils      Suicide precautions     Patients on Suicide Precautions should have a Combination Diet ordered that includes a Diet selection(s) AND a Behavioral Tray selection for Safe Tray - with utensils, or Safe Tray - NO utensils         Safety  Safety WDL: WDL  Patient Location: patient room, own  Observed Behavior: calm, sleeping  Safety Measures: suicide check-in completed, suicide assessment completed, safety rounds completed, environmental rounds completed  Diversional Activity: television  Suicidality: Status 15  Assault: status 15, private room

## 2023-10-12 NOTE — DISCHARGE INSTRUCTIONS
Behavioral Discharge Planning and Instructions    Summary: You were admitted to Station 10 on 10/9/2023 due to Depression, Suicidal Ideations, and Self Injurious Behaviors. You were treated by Karime Jean DO and discharged on 10/16/2023 to Group Home.    Discharge transportation set up through [employer]. Ride will be at [facility] at 3pm and will call Station 32. [phone].    Continue to follow with your CADI Waiver  -CADI CM: Current Helen Avina [phone]    Main Diagnosis:   Suicidal ideation  Schizoaffective disorder, bipolar type, current mood episode depressed with active hallucinations    Health Care Follow-up:   Psychiatrist/Medication Management: Dr. Reid  Monday October 30th at 10:30am      Address: [address] [place] [address]   Phone Number: [phone]           Attend all scheduled appointments with your outpatient providers. Call at least 24 hours in advance if you need to reschedule an appointment to ensure continued access to your outpatient providers.     Major Treatments, Procedures and Findings:  You were provided with: a psychiatric assessment, assessed for medical stability, medication evaluation and/or management, group therapy, and milieu management    Symptoms to Report: Feeling more aggressive, increased confusion, losing more sleep, mood getting worse, or thoughts of suicide.    Early warning signs can include: Increased depression or anxiety sleep disturbances increased thoughts or behaviors of suicide or self-harm  increased unusual thinking, such as paranoia or hearing voices.    Safety and Wellness: Take all medicines as directed. Make no changes unless your doctor suggests them. Follow treatment recommendations. Refrain from alcohol and non-prescribed drugs. Ask your support system to help you reduce your access to items that could harm yourself or others. If there is a concern for safety, call 911.    Resources:   General Mental Health  "Resources:   [place] on Mental Illness (EMA) [place]: Connect for help, to navigate the mental health system, and for support and for resources. Call: 3-809-VVNW-Helps / 1-790.513.9498  Crisis Text Line: The 24/7 emergency service is available if you or someone you know is experiencing a psychiatric or mental health crisis. Text: \"[place]\" to 777958  [place] Warmline: Are you an adult needing support? Talk to a specialist who has firsthand experience living with a mental health condition. Call: 925.238.1189  Text: \"Support\" to 12062  [facility].org/support/[place]-warmline/  National Suicide Prevention Lifeline: The 24/7 lifeline provides support when in distress, has prevention and crisis resources for you or your loved ones, and resources for professionals. Call 1-222-017-TALK (3862)  Peer Support Connection Warmlines: Opkt-ue-ykvf telephone support that s safe and supportive. Open 5 p.m. to 9 a.m. Call or text: 1-237.370.3358   COVID Cares Stress Phone Support Service. Any [place] experiencing stress can call 729-HRGH4TN (980-931-8300) for free telephone support from 9am to 9pm every day. The service is a collaboration with volunteers from the [place] Psychiatric Society, the [place] Psychological Association, the [place] Black Psychologists, and Mental Health [place]. The free service is also accessible at [employer].org where searchers can also find psychiatric and mental health services availability and real-time Substance Use Disorder Treatment program openings.  Adult Rehabilitative Mental Health Services (ARMHS): https://[place].gov/dhs/partners-and-providers/policies-procedures/adult-mental-health/adult-rehabilitative-mental-health-services/armhs-certified-providers/      Outpatient Psychiatry/Therapy Resources:   [facility] - (phone: 600.774.3474) https://www.Clue App/care/specialty/mental-behavioral-health/  " https://www.[employer]on.com/care/find/doctors/psychologists/  [facility] - (phone: 5-884-EIZOXYZH) https://www.Best Apps Marketirview.org/treatments/Counseling-Adult  [place] Mental [facility] - (phone: 346.235.6239) https://mnLewisGale Hospital MontgomeryKiyon.FieldView Solutions/  [facility] - (phone: 307.216.4044)  https://[facility]ingle - Share Your Passion!mnLogan/  [facility] - (phone: 1-922.268.8128) https://www.AboutOne/  [facility] - (phone: 660.220.9550) https://www.Fresh Coast Lithotripsyetherapy.[employer]View Solutions/  [facility] - (phone: 192.213.9559) https://[employer]/  [facility] - (phone: 841.391.6574) https://walkin.MarketBridge/    General Medication Instructions:   See your medication sheet(s) for instructions.   Take all medicines as directed.  Make no changes unless your doctor suggests them.   Go to all your doctor visits.  Be sure to have all your required lab tests. This way, your medicines can be refilled on time.  Do not use any drugs not prescribed by your doctor.  Avoid alcohol.    Advance Directives:   Scanned document on file with [employer]? No scanned doc  Is document scanned? No. Copy Requested.  Honoring Choices Your Rights Handout: Informed and given  Was more information offered? Pt declined    The Treatment team has appreciated the opportunity to work with you. If you have any questions or concerns about your recent admission, you can contact the unit which can receive your call 24 hours a day, 7 days a week. They will be able to get in touch with a Provider if needed. The unit number is [phone].

## 2023-10-12 NOTE — PROGRESS NOTES
"[facility], [place]   Psychiatric Progress Note  Hospital Day: 1        Interim History:   The patient's care was discussed with the treatment team during the daily team meeting and/or staff's chart notes were reviewed.  Staff report patient has been visible in milieu at times to watch TV, no group attendance noted, isolated to self, eating well, taking medications as prescribed, denying SI and AVH since arrival to unit, slept 7 hours.     Upon interview, the patient was sleeping, awoke to voice, writer introduced self and explain role. He reports his evening went well, sleep was good, denies feeling tired despite still being asleep this morning. Mood is \"good\", denies having any SI or HI, denies AVH. States his last AH were yesterday. He is tolerating medications, does have some constipation, last BM was 2-3 days ago, is not feeling any discomfort, able to eat, no abdominal pain, discussed plan to change to senna/colace and monitor further. He was in agreement, did not want additional of Miralax powder, prefers pills. He states he has been on clozapine \"a long time\", reviewed writer added on clozapine level to check as it appears one had not been done recently. He denies any other physical health concerns outside of constipation. In agreement with continuing to monitor for improvement in psychosis and SI and then returned to  when stabilized.          Medications:      cloZAPine  350 mg Oral At Bedtime    docusate sodium  100 mg Oral BID    levothyroxine  112 mcg Oral Daily    [START ON 10/19/2023] paliperidone  234 mg Intramuscular Q21 Days    pantoprazole  40 mg Oral QAM AC    prazosin  4 mg Oral At Bedtime    Vitamin D3  125 mcg Oral Daily          Allergies:     Allergies   Allergen Reactions    Haloperidol Other (See Comments)     Other reaction(s): Tardive Dyskinesia  Torticollis  Torticollis  Stiff Neck  Torticollis; reports \"stiff neck.\"            Labs:     Recent Results " (from the past 48 hour(s))   CBC with platelets and differential    Collection Time: 10/10/23 11:00 AM   Result Value Ref Range    WBC Count 9.3 4.0 - 11.0 10e3/uL    RBC Count 4.52 4.40 - 5.90 10e6/uL    Hemoglobin 14.7 13.3 - 17.7 g/dL    Hematocrit 42.0 40.0 - 53.0 %    MCV 93 78 - 100 fL    MCH 32.5 26.5 - 33.0 pg    MCHC 35.0 31.5 - 36.5 g/dL    RDW 12.3 10.0 - 15.0 %    Platelet Count 197 150 - 450 10e3/uL    % Neutrophils 67 %    % Lymphocytes 23 %    % Monocytes 8 %    Mids % (Monos, Eos, Basos)      % Eosinophils 0 %    % Basophils 1 %    % Immature Granulocytes 1 %    NRBCs per 100 WBC 0 <1 /100    Absolute Neutrophils 6.3 1.6 - 8.3 10e3/uL    Absolute Lymphocytes 2.1 0.8 - 5.3 10e3/uL    Absolute Monocytes 0.7 0.0 - 1.3 10e3/uL    Mids Abs (Monos, Eos, Basos)      Absolute Eosinophils 0.0 0.0 - 0.7 10e3/uL    Absolute Basophils 0.1 0.0 - 0.2 10e3/uL    Absolute Immature Granulocytes 0.1 <=0.4 10e3/uL    Absolute NRBCs 0.0 10e3/uL   TSH with free T4 reflex    Collection Time: 10/10/23 11:45 AM   Result Value Ref Range    TSH 3.17 0.30 - 4.20 uIU/mL   Comprehensive metabolic panel    Collection Time: 10/10/23 11:45 AM   Result Value Ref Range    Sodium 136 135 - 145 mmol/L    Potassium 3.9 3.4 - 5.3 mmol/L    Carbon Dioxide (CO2) 21 (L) 22 - 29 mmol/L    Anion Gap 11 7 - 15 mmol/L    Urea Nitrogen 7.5 6.0 - 20.0 mg/dL    Creatinine 0.76 0.67 - 1.17 mg/dL    GFR Estimate >90 >60 mL/min/1.73m2    Calcium 9.5 8.6 - 10.0 mg/dL    Chloride 104 98 - 107 mmol/L    Glucose 112 (H) 70 - 99 mg/dL    Alkaline Phosphatase 107 40 - 129 U/L    AST 20 0 - 45 U/L    ALT 17 0 - 70 U/L    Protein Total 6.8 6.4 - 8.3 g/dL    Albumin 4.5 3.5 - 5.2 g/dL    Bilirubin Total 0.5 <=1.2 mg/dL   Magnesium    Collection Time: 10/10/23 11:45 AM   Result Value Ref Range    Magnesium 2.1 1.7 - 2.3 mg/dL   Extra Purple Top Tube    Collection Time: 10/10/23 11:45 AM   Result Value Ref Range    Hold Specimen [facility]    Drug Abuse Screen Qual  "Urine    Collection Time: 10/10/23  3:13 PM   Result Value Ref Range    Amphetamines Urine Screen Negative Screen Negative    Barbituates Urine Screen Negative Screen Negative    Benzodiazepine Urine Screen Negative Screen Negative    Cannabinoids Urine Screen Negative Screen Negative    Cocaine Urine Screen Negative Screen Negative    Fentanyl Qual Urine Screen Negative Screen Negative    Opiates Urine Screen Negative Screen Negative    PCP Urine Screen Negative Screen Negative          Psychiatric Examination:     /88   Pulse 89   Temp 98.8  F (37.1  C) (Oral)   Resp 16   Ht 1.778 m (5' 10\")   SpO2 97%   BMI 35.15 kg/m    Weight is 0 lbs 0 oz  Body mass index is 35.15 kg/m .    Orthostatic Vitals       None          Appearance: awake, alert, dressed in hospital scrubs, and untidy  Attitude:  somewhat cooperative  Eye Contact:  fair  Mood:   \"good\"  Affect:  mood incongruent and intensity is flat  Speech:  clear, coherent and normal prosody  Language: fluent and intact in English  Psychomotor, Gait, Musculoskeletal:  no evidence of tardive dyskinesia, dystonia, or tics  Thought Process:  goal oriented  Associations:  no loose associations  Thought Content:  no evidence of suicidal ideation or homicidal ideation and no evidence of psychotic thought  Insight:  partial  Judgement:  fair  Oriented to:  time, person, and place  Attention Span and Concentration:  fair  Recent and Remote Memory:  fair  Fund of Knowledge:  appropriate           Precautions:     Behavioral Orders   Procedures    Assault precautions    Cheeking Precautions (behavioral units)     Patient Observed swallowing PO medications; Patient asked to drink water after swallowing medication; Patient in Staff line of sight for 15 minutes after medication given; Mouth checks after PO administration (patient asked to open mouth and stick out their tongue).    Cheeking Precautions (behavioral units)    Code 1 - Restrict to Unit    Routine " Programming     As clinically indicated    Status 15     Every 15 minutes.    Status 15     Every 15 minutes.    Suicide precautions     Patients on Suicide Precautions should have a Combination Diet ordered that includes a Diet selection(s) AND a Behavioral Tray selection for Safe Tray - with utensils, or Safe Tray - NO utensils      Suicide precautions     Patients on Suicide Precautions should have a Combination Diet ordered that includes a Diet selection(s) AND a Behavioral Tray selection for Safe Tray - with utensils, or Safe Tray - NO utensils            Diagnoses:      Suicidal ideation  Schizoaffective disorder, bipolar type, current mood episode depressed with active hallucinations  PTSD per chart  BPD per chart  Cannabis use disorder in remission   Alcohol use disorder in remission   Constipation, likely medication induced          Assessment & Plan:   Assessment and hospital summary:  32 yo M with history of schizoaffective disorder bipolar type, PTSD, BPD and polysubstance use in remission who presented to ED from  with SI in context of CAH telling him to harm self. Medically cleared in ED, admitted to [facility] as voluntary patient. PTA medications continued including clozapine. Admission labs ordered, added clozapine level as no recent level noted in chart, pt reports adherence. UDS negative, denies recent substance use. Pt reports goal for admission are to have improvement in SI and CAH and then discharge back to .     Psychiatric treatment/interventions:  Medications:   -continue current medications as above, no changes with exception of adding hold parameters to PTA prazosin   -ordered Clozapine level as below       The risks, benefits, alternatives and side effects have been discussed and are understood by the patient.     Laboratory/Imaging: lipid panel pending, added HgbA1c and clozapine level to labs, also placed weekly WBC and diff for clozapine monitoring, next due 10/17, others reviewed;  10/10/23-CMP with carbon dioxide 21 (L), glucose 112 (H) otherwise WNL; CBC WNL; UDS negative; TSH WNL     Patient will be treated in therapeutic milieu with appropriate individual and group therapies as described.     Medical treatment/interventions:  Medical concerns: Pt reports constipation, last BM 2-3 days ago, changed PTA Colace to Senna-Colace BID and will continue to monitor. Denies other acute medical concerns.       Disposition Plan   Reason for ongoing admission: poses an imminent risk to self  Discharge location: group home  Discharge Medications: not ordered  Follow-up Appointments: not scheduled  Legal Status: voluntary    >50 min total time that was spent in counseling and coordination of care with staff, reviewing medical record, educating patient about treatment options, side effects and benefits and alternative treatments for medications, providing supportive therapy and redirection regarding above symptoms.     This document is created with the help of Dragon dictation system.  All grammatical/typing errors or context distortion are unintentional and inherent to software.    Patient has been seen and evaluated by Karime sesay DO.

## 2023-10-12 NOTE — PLAN OF CARE
Problem: Suicide Risk  Goal: Absence of Self-Harm  Outcome: Progressing       Patient was present in the milieu for the most part of the shift, attended groups, watched television with peers, socially isolated and withdrawn to self. Patient is casually groomed in hospital scrub, alert and oriented to self, place, time and situation, presents with affect congruent with situation. Patient exhibits speech that is normal in rate, volume, articulation and is coherent and spontaneous. Language skills are intact. Mood is normal with no signs of depression. Patient denied pain, anxiety, depression, SI/SIB/HI, A/V hallucination. There are no signs of delusions or other indicators of psychotic process or patient responding to internal stimuli. Associations are intact, thinking appear logical, thought content is appropriate. Patient ate 100% of his dinner, reported adequate fluid intake. V.S are stable. Patient was compliant with his medication with no sign of cheeking. Patient requested and received prn Nicotine every hour throughout the shift. Patient reported he is adjusting well and feels safe on the unit. No report of any medication side effects or any bowel/bladder concerns. Plan is maintain patient's safety, continue to encourage group participation and provide therapeutic milieu.

## 2023-10-13 LAB
CHOLEST SERPL-MCNC: 168 MG/DL
HDLC SERPL-MCNC: 35 MG/DL
LDLC SERPL CALC-MCNC: 107 MG/DL
NONHDLC SERPL-MCNC: 133 MG/DL
TRIGL SERPL-MCNC: 129 MG/DL

## 2023-10-13 PROCEDURE — 250N000013 HC RX MED GY IP 250 OP 250 PS 637: Performed by: FAMILY MEDICINE

## 2023-10-13 PROCEDURE — 250N000013 HC RX MED GY IP 250 OP 250 PS 637: Performed by: PSYCHIATRY & NEUROLOGY

## 2023-10-13 PROCEDURE — 250N000013 HC RX MED GY IP 250 OP 250 PS 637

## 2023-10-13 PROCEDURE — 80342 ANTIPSYCHOTICS NOS 1-3: CPT | Performed by: PSYCHIATRY & NEUROLOGY

## 2023-10-13 PROCEDURE — 250N000013 HC RX MED GY IP 250 OP 250 PS 637: Performed by: EMERGENCY MEDICINE

## 2023-10-13 PROCEDURE — 99232 SBSQ HOSP IP/OBS MODERATE 35: CPT | Performed by: PSYCHIATRY & NEUROLOGY

## 2023-10-13 PROCEDURE — 124N000002 HC R&B MH UMMC

## 2023-10-13 PROCEDURE — 84999 UNLISTED CHEMISTRY PROCEDURE: CPT | Performed by: PSYCHIATRY & NEUROLOGY

## 2023-10-13 PROCEDURE — 80061 LIPID PANEL: CPT | Performed by: PSYCHIATRY & NEUROLOGY

## 2023-10-13 PROCEDURE — 36415 COLL VENOUS BLD VENIPUNCTURE: CPT | Performed by: PSYCHIATRY & NEUROLOGY

## 2023-10-13 RX ADMIN — PRAZOSIN HYDROCHLORIDE 4 MG: 2 CAPSULE ORAL at 20:53

## 2023-10-13 RX ADMIN — NICOTINE POLACRILEX 4 MG: 4 GUM, CHEWING BUCCAL at 13:28

## 2023-10-13 RX ADMIN — NICOTINE POLACRILEX 4 MG: 4 GUM, CHEWING BUCCAL at 19:11

## 2023-10-13 RX ADMIN — NICOTINE POLACRILEX 4 MG: 4 GUM, CHEWING BUCCAL at 15:54

## 2023-10-13 RX ADMIN — NICOTINE POLACRILEX 4 MG: 4 GUM, CHEWING BUCCAL at 17:03

## 2023-10-13 RX ADMIN — LEVOTHYROXINE SODIUM 112 MCG: 112 TABLET ORAL at 09:28

## 2023-10-13 RX ADMIN — NICOTINE POLACRILEX 4 MG: 4 GUM, CHEWING BUCCAL at 12:19

## 2023-10-13 RX ADMIN — Medication 125 MCG: at 09:28

## 2023-10-13 RX ADMIN — NICOTINE POLACRILEX 4 MG: 4 GUM, CHEWING BUCCAL at 14:26

## 2023-10-13 RX ADMIN — SENNOSIDES AND DOCUSATE SODIUM 1 TABLET: 50; 8.6 TABLET ORAL at 20:53

## 2023-10-13 RX ADMIN — PANTOPRAZOLE SODIUM 40 MG: 40 TABLET, DELAYED RELEASE ORAL at 09:28

## 2023-10-13 RX ADMIN — NICOTINE POLACRILEX 4 MG: 4 GUM, CHEWING BUCCAL at 20:52

## 2023-10-13 RX ADMIN — CLOZAPINE 350 MG: 200 TABLET ORAL at 20:52

## 2023-10-13 RX ADMIN — NICOTINE POLACRILEX 4 MG: 4 GUM, CHEWING BUCCAL at 18:03

## 2023-10-13 RX ADMIN — SENNOSIDES AND DOCUSATE SODIUM 1 TABLET: 50; 8.6 TABLET ORAL at 09:28

## 2023-10-13 ASSESSMENT — ACTIVITIES OF DAILY LIVING (ADL)
ADLS_ACUITY_SCORE: 42
LAUNDRY: UNABLE TO COMPLETE
ADLS_ACUITY_SCORE: 42
HYGIENE/GROOMING: INDEPENDENT
DRESS: INDEPENDENT
ADLS_ACUITY_SCORE: 42
ADLS_ACUITY_SCORE: 42
ORAL_HYGIENE: INDEPENDENT
HYGIENE/GROOMING: INDEPENDENT
ADLS_ACUITY_SCORE: 42
LAUNDRY: UNABLE TO COMPLETE
ADLS_ACUITY_SCORE: 42
ORAL_HYGIENE: INDEPENDENT
DRESS: INDEPENDENT
ADLS_ACUITY_SCORE: 42
ADLS_ACUITY_SCORE: 42

## 2023-10-13 NOTE — PLAN OF CARE
"-Attending Provider: Karime Jean DO  -Voicemail Code: [id number]#  -Team Note Due: Thursday    Assessment/Intervention/Current Symtoms and Care Coordination:  Chart review and met with team, discussed pt progress, symptomology, and response to treatment.  Discussed the discharge plan and any potential impediments to discharge.    [facility] received a voicemail from Helen (CADI CM through [facility]) stating she was Tiffanie's new CADI CM.   [facility] called Helen back and spoke with her on phone. [facility] provided update on Tiffanie, and inquired about additional services he may be able to get from CAD. Specifically job related services. Helen stated job programs often have a long wait list and recommended \"day support programs.\" [facility] contracted plan with Helen to call Tiffanie at 1pm on this day to discuss additional services she could set up through [facility].   [facility] followed up with Tiffanie after phone meeting. Tiffanie stated the phone call went well. He added that Helen will put a referral in for job training and then set him up with day programing. Tiffanie stated he thinks he wants to discharge on Monday.   [facility] called Neela and provided update on referral status and potential discharge Monday.        Discharge Plan or Goal:  Possible discharge Monday 10/16 back to group home.     Pending further stabilization, continued compliance with medications, continued activities of daily living, and development of a safe discharge plan.     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    Barriers to Discharge:  Impact of mental health symptoms on well being   Impact of mental health symptoms on activities of daily living  Need for medication monitoring and medication management     Referral Status:  None     Legal Status:  Voluntary   County:   File Number:   Start and expiration date of commitment:     Contacts:  Psychiatrist: Current: Dr. Franklin Sanchez [facility]   " Previous: Marco Campo at [facility] [place], [facility] [place] [phone]  Group Home Director: Neela Brown [phone]   CADI CM: Current Helen Avina [phone]  Previous Tenisha at [facility] [phone]  Job Coordinator ([employer]): Gabby [phone]    [facility] alf: [phone]     Upcoming Meetings and Dates/Important Information and next steps:  Psych:  at 10:30am. At [facility]    -Still Needed on AVS:       CTC Coverage Notes   Team Meetin:30am in OT or conference room  Attending Provider: Karime Jean,   Voicemail Code: See desk phone  Team Note Due: Tuesday  Next Steps: ROIs

## 2023-10-13 NOTE — PROGRESS NOTES
"[facility], [place]   Psychiatric Progress Note  Hospital Day: 2        Interim History:   The patient's care was discussed with the treatment team during the daily team meeting and/or staff's chart notes were reviewed.  Staff report patient has been visible in the milieu later in day, mostly keeps to self, not attending groups, did read Quran with peer, still not having BM, received PRN miralax, taking medications as prescribed, slept 7 hours.     Upon interview, patient was in bed sleeping, he woke to writer's voice, reports that he tends to sleep in the morning he is not much of a morning person, not interested in breakfast, he tends to \"around lunchtime.  Reports that he sleeps well.  Has not had any return of his auditory hallucinations, has not had any return of suicidal thoughts.  Denies any other psychosis symptoms.  Denies HI.  Tolerating medications, continues to have some constipation, has not yet had a bowel movement, writer will order additional medications, denies having any abdominal discomfort at this time, no signs of acute abdomen.  Will continue to monitor.  He did allow labs morning, reviewed that a clozapine level was not able to be added onto his previous drawn so we will include that this morning and watch results over the weekend.  Discussed plan to ensure that his symptoms continue to be improved over the weekend and possibly discharge back to his group home early next week.  He is in agreement with this plan.  No additional concerns.         Medications:      cloZAPine  350 mg Oral At Bedtime    levothyroxine  112 mcg Oral Daily    [START ON 10/19/2023] paliperidone  234 mg Intramuscular Q21 Days    pantoprazole  40 mg Oral QAM AC    prazosin  4 mg Oral At Bedtime    senna-docusate  1 tablet Oral BID    Vitamin D3  125 mcg Oral Daily          Allergies:     Allergies   Allergen Reactions    Haloperidol Other (See Comments)     Other reaction(s): Tardive " "Dyskinesia  Torticollis  Torticollis  Stiff Neck  Torticollis; reports \"stiff neck.\"            Labs:     No results found for this or any previous visit (from the past 48 hour(s)).         Psychiatric Examination:     /89   Pulse 90   Temp 98.9  F (37.2  C) (Oral)   Resp 16   Ht 1.778 m (5' 10\")   SpO2 100%   BMI 35.15 kg/m    Weight is 0 lbs 0 oz  Body mass index is 35.15 kg/m .    Orthostatic Vitals         Most Recent      Sitting Orthostatic /71 10/13 0927    Sitting Orthostatic Pulse (bpm) 95 10/13 0927          Appearance: awake, alert, dressed in hospital scrubs, and untidy  Attitude:  somewhat cooperative  Eye Contact:  fair  Mood:   \"good\"  Affect:  mood incongruent and intensity is flat  Speech:  clear, coherent and normal prosody  Language: fluent and intact in English  Psychomotor, Gait, Musculoskeletal:  no evidence of tardive dyskinesia, dystonia, or tics  Thought Process:  goal oriented  Associations:  no loose associations  Thought Content:  no evidence of suicidal ideation or homicidal ideation and no evidence of psychotic thought, denies AVH including CAH  Insight:  partial  Judgement:  fair  Oriented to:  time, person, and place  Attention Span and Concentration:  fair  Recent and Remote Memory:  fair  Fund of Knowledge:  appropriate           Precautions:     Behavioral Orders   Procedures    Assault precautions    Cheeking Precautions (behavioral units)     Patient Observed swallowing PO medications; Patient asked to drink water after swallowing medication; Patient in Staff line of sight for 15 minutes after medication given; Mouth checks after PO administration (patient asked to open mouth and stick out their tongue).    Cheeking Precautions (behavioral units)    Code 1 - Restrict to Unit    Routine Programming     As clinically indicated    Status 15     Every 15 minutes.    Status 15     Every 15 minutes.    Suicide precautions     Patients on Suicide Precautions should " have a Combination Diet ordered that includes a Diet selection(s) AND a Behavioral Tray selection for Safe Tray - with utensils, or Safe Tray - NO utensils      Suicide precautions     Patients on Suicide Precautions should have a Combination Diet ordered that includes a Diet selection(s) AND a Behavioral Tray selection for Safe Tray - with utensils, or Safe Tray - NO utensils            Diagnoses:      Suicidal ideation  Schizoaffective disorder, bipolar type, current mood episode depressed with active hallucinations  PTSD per chart  BPD per chart  Cannabis use disorder in remission   Alcohol use disorder in remission   Constipation, likely medication induced   Dyslipidemia          Assessment & Plan:   Assessment and hospital summary:  32 yo M with history of schizoaffective disorder bipolar type, PTSD, BPD and polysubstance use in remission who presented to ED from  with SI in context of CAH telling him to harm self. Medically cleared in ED, admitted to [facility] as voluntary patient. PTA medications continued including clozapine. Admission labs ordered, added clozapine level as no recent level noted in chart, pt reports adherence. UDS negative, denies recent substance use. Pt reports goal for admission are to have improvement in SI and CAH and then discharge back to .     Psychiatric treatment/interventions:  Medications:   -continue current psychiatric medications as above, no changes   -ordered Clozapine level as below       The risks, benefits, alternatives and side effects have been discussed and are understood by the patient.     Laboratory/Imaging: lipid panel reviewed: HDL 35(L), (H), non (H) otherwise WNL; HgbA1c WNL 5.1;  placed weekly WBC and diff for clozapine monitoring, next due 10/17; clozapine level not able to be added on, ordered new collection today , 10/13-in process      Patient will be treated in therapeutic milieu with appropriate individual and group therapies as described.      Medical treatment/interventions:  Medical concerns: Pt reports constipation, last BM 2-3 days ago on 10/12, changed PTA Colace to Senna-Colace BID and will continue to monitor. Denies other acute medical concerns. No BM as of 10/13, received PRN miralax, also ordered daily PRN MOM. Lipid panel with dyslipidemia, recommend lifestyle modifications such as diet/exercise and follow up with PCP in future for ongoing monitoring.       Disposition Plan   Reason for ongoing admission: poses an imminent risk to self  Discharge location: group home, likely  next week  Discharge Medications: not ordered  Follow-up Appointments: not scheduled  Legal Status: voluntary       This document is created with the help of Dragon dictation system.  All grammatical/typing errors or context distortion are unintentional and inherent to software.    Patient has been seen and evaluated by Karime sesay DO.

## 2023-10-13 NOTE — PLAN OF CARE
"  Problem: Psychotic Signs/Symptoms  Goal: Improved Behavioral Control (Psychotic Signs/Symptoms)  Outcome: Progressing     Problem: Psychotic Signs/Symptoms  Goal: Optimal Cognitive Function (Psychotic Signs/Symptoms)  Outcome: Progressing     Problem: Psychotic Signs/Symptoms  Goal: Increased Participation and Engagement (Psychotic Signs/Symptoms)  Outcome: Progressing   Goal Outcome Evaluation:    RN Assessment:  SI/Self harm:  Pt denies  Aggression/agitation/HI:  none reported or observed  AVH:  pt denies. Pt is not observed responding to internal stimuli  Sleep: adequate per pt report, tends to sleep in late, wakes around lunch time  PRN Med: Nicotine gum  Medication AE: none reported or observed  Physical Complaints/Issues: pt denies  I & O: eating and drinking well  LBM: this morning, pt reported it was \"loose\". Writer educated pt to increase water intake and alert RN to any more loose stools. Pt expressed understanding. Will continue to monitor.  ADLs: independent  Visits: cousin dropped off two books for pt, but did not stay to visit  Vitals:  WNL  COVID 19 Assessment:  no symptoms present  Milieu Participation: visible, selectively social, easily engaged  Behavior: pt is calm, pleasant and cooperative. Pt denies all mental health concerns, states his mood is \"good\". Pt is medication adherent.   No other concerns at this time. Nursing will continue to monitor and assess.      "

## 2023-10-14 PROCEDURE — 250N000013 HC RX MED GY IP 250 OP 250 PS 637: Performed by: PSYCHIATRY & NEUROLOGY

## 2023-10-14 PROCEDURE — 124N000002 HC R&B MH UMMC

## 2023-10-14 PROCEDURE — 250N000013 HC RX MED GY IP 250 OP 250 PS 637: Performed by: FAMILY MEDICINE

## 2023-10-14 PROCEDURE — 250N000013 HC RX MED GY IP 250 OP 250 PS 637: Performed by: EMERGENCY MEDICINE

## 2023-10-14 PROCEDURE — 250N000013 HC RX MED GY IP 250 OP 250 PS 637

## 2023-10-14 RX ADMIN — LEVOTHYROXINE SODIUM 112 MCG: 112 TABLET ORAL at 09:54

## 2023-10-14 RX ADMIN — Medication 3 MG: at 20:50

## 2023-10-14 RX ADMIN — NICOTINE POLACRILEX 4 MG: 4 GUM, CHEWING BUCCAL at 15:13

## 2023-10-14 RX ADMIN — NICOTINE POLACRILEX 4 MG: 4 GUM, CHEWING BUCCAL at 20:54

## 2023-10-14 RX ADMIN — SENNOSIDES AND DOCUSATE SODIUM 1 TABLET: 50; 8.6 TABLET ORAL at 20:50

## 2023-10-14 RX ADMIN — NICOTINE POLACRILEX 4 MG: 4 GUM, CHEWING BUCCAL at 18:29

## 2023-10-14 RX ADMIN — NICOTINE POLACRILEX 4 MG: 4 GUM, CHEWING BUCCAL at 17:22

## 2023-10-14 RX ADMIN — PANTOPRAZOLE SODIUM 40 MG: 40 TABLET, DELAYED RELEASE ORAL at 09:54

## 2023-10-14 RX ADMIN — PRAZOSIN HYDROCHLORIDE 4 MG: 2 CAPSULE ORAL at 20:50

## 2023-10-14 RX ADMIN — CLOZAPINE 350 MG: 200 TABLET ORAL at 20:50

## 2023-10-14 RX ADMIN — NICOTINE POLACRILEX 4 MG: 4 GUM, CHEWING BUCCAL at 12:40

## 2023-10-14 RX ADMIN — NICOTINE POLACRILEX 4 MG: 4 GUM, CHEWING BUCCAL at 11:43

## 2023-10-14 RX ADMIN — HYDROXYZINE HYDROCHLORIDE 25 MG: 25 TABLET, FILM COATED ORAL at 20:50

## 2023-10-14 RX ADMIN — Medication 125 MCG: at 09:54

## 2023-10-14 RX ADMIN — SENNOSIDES AND DOCUSATE SODIUM 1 TABLET: 50; 8.6 TABLET ORAL at 09:54

## 2023-10-14 RX ADMIN — NICOTINE POLACRILEX 4 MG: 4 GUM, CHEWING BUCCAL at 13:36

## 2023-10-14 ASSESSMENT — ACTIVITIES OF DAILY LIVING (ADL)
ADLS_ACUITY_SCORE: 42

## 2023-10-14 NOTE — PLAN OF CARE
Goal Outcome Evaluation:    Plan of Care Reviewed With: patient        Patient was isolated to his room, sleeping at the beginning of this shift. He came out and requested for Nicotine gum and went back to his room. Writer encouraged pt to attend groups when groups started and pt agreed to. He presented with a blunted, flat affect but his mood was calm. He was calm and cooperative with assessment, denied feeling anxious or depressed, denied SI/SIB/HI and AVH. He reported doing okay, denied having any pain. Pt did not report any loose stool this shift. He had his dinner in the dining and stayed in the lounge watching TV after dinner. He was compliant with his scheduled medication. No medication side effects reported or noted at this time, VS stable, staff continues to monitor.

## 2023-10-14 NOTE — PLAN OF CARE
"RN Shift Summary     Patient presented with a flat affect. He denies SI/SIB/HI at this time. He reported difficulty sleeping, stating that he only sleeps \"for five minutes\" before waking up throughout the night. Writer provided education about available PRNs at bedtime, and patient verbalized understanding. He isolated to his room for most of the day but was present in the milieu for meals, to make phone calls, and to watch TV in the afternoon.    Vitals: B/P: 119/85, T: 99.1, P: 101, R: 16        "

## 2023-10-15 PROCEDURE — 250N000013 HC RX MED GY IP 250 OP 250 PS 637: Performed by: EMERGENCY MEDICINE

## 2023-10-15 PROCEDURE — 124N000002 HC R&B MH UMMC

## 2023-10-15 PROCEDURE — 250N000013 HC RX MED GY IP 250 OP 250 PS 637

## 2023-10-15 PROCEDURE — 250N000013 HC RX MED GY IP 250 OP 250 PS 637: Performed by: PSYCHIATRY & NEUROLOGY

## 2023-10-15 PROCEDURE — 250N000013 HC RX MED GY IP 250 OP 250 PS 637: Performed by: FAMILY MEDICINE

## 2023-10-15 RX ORDER — TRAZODONE HYDROCHLORIDE 50 MG/1
50 TABLET, FILM COATED ORAL
Status: DISCONTINUED | OUTPATIENT
Start: 2023-10-15 | End: 2023-10-16 | Stop reason: HOSPADM

## 2023-10-15 RX ADMIN — TRAZODONE HYDROCHLORIDE 50 MG: 50 TABLET ORAL at 20:25

## 2023-10-15 RX ADMIN — LEVOTHYROXINE SODIUM 112 MCG: 112 TABLET ORAL at 09:45

## 2023-10-15 RX ADMIN — PRAZOSIN HYDROCHLORIDE 4 MG: 2 CAPSULE ORAL at 19:04

## 2023-10-15 RX ADMIN — TRAZODONE HYDROCHLORIDE 50 MG: 50 TABLET ORAL at 19:05

## 2023-10-15 RX ADMIN — Medication 125 MCG: at 09:45

## 2023-10-15 RX ADMIN — HYDROXYZINE HYDROCHLORIDE 25 MG: 25 TABLET, FILM COATED ORAL at 11:43

## 2023-10-15 RX ADMIN — NICOTINE POLACRILEX 4 MG: 4 GUM, CHEWING BUCCAL at 15:59

## 2023-10-15 RX ADMIN — SENNOSIDES AND DOCUSATE SODIUM 1 TABLET: 50; 8.6 TABLET ORAL at 09:45

## 2023-10-15 RX ADMIN — CLOZAPINE 350 MG: 200 TABLET ORAL at 19:04

## 2023-10-15 RX ADMIN — NICOTINE POLACRILEX 4 MG: 4 GUM, CHEWING BUCCAL at 12:41

## 2023-10-15 RX ADMIN — HYDROXYZINE HYDROCHLORIDE 25 MG: 25 TABLET, FILM COATED ORAL at 15:59

## 2023-10-15 RX ADMIN — OLANZAPINE 10 MG: 10 TABLET, FILM COATED ORAL at 13:29

## 2023-10-15 RX ADMIN — NICOTINE POLACRILEX 4 MG: 4 GUM, CHEWING BUCCAL at 11:25

## 2023-10-15 RX ADMIN — NICOTINE POLACRILEX 4 MG: 4 GUM, CHEWING BUCCAL at 14:39

## 2023-10-15 RX ADMIN — NICOTINE POLACRILEX 4 MG: 4 GUM, CHEWING BUCCAL at 17:19

## 2023-10-15 RX ADMIN — SENNOSIDES AND DOCUSATE SODIUM 1 TABLET: 50; 8.6 TABLET ORAL at 19:04

## 2023-10-15 RX ADMIN — NICOTINE POLACRILEX 4 MG: 4 GUM, CHEWING BUCCAL at 18:32

## 2023-10-15 RX ADMIN — PANTOPRAZOLE SODIUM 40 MG: 40 TABLET, DELAYED RELEASE ORAL at 09:45

## 2023-10-15 RX ADMIN — NICOTINE POLACRILEX 4 MG: 4 GUM, CHEWING BUCCAL at 09:49

## 2023-10-15 ASSESSMENT — ACTIVITIES OF DAILY LIVING (ADL)
ADLS_ACUITY_SCORE: 42
DRESS: INDEPENDENT
ADLS_ACUITY_SCORE: 42
ORAL_HYGIENE: INDEPENDENT
ADLS_ACUITY_SCORE: 42
HYGIENE/GROOMING: INDEPENDENT
LAUNDRY: UNABLE TO COMPLETE
ADLS_ACUITY_SCORE: 42
ADLS_ACUITY_SCORE: 42

## 2023-10-15 NOTE — PLAN OF CARE
"RN Shift Summary     Patient presented with an anxious affect. He did not eat much breakfast, and he reported feeling nauseous in the morning. Snacks and lemon-lime soda provided. Patient was able to eat 100% of lunch. Patient reported high anxiety today and requested PRNs x2; hydroxyzine provided in the morning and Zyprexa provided in the afternoon. He reported feeling \"not good\" and said he was having thoughts of self-harm in the form of headbanging. He reported frustration about lack of sleep and said he takes trazodone at home. He is eager to go back to his group home where he is more comfortable and sleeps better. He said that when he is anxious, he calms down by spending time in his room by himself, but this is not an option for him in the hospital because he has a roommate. Writer offered some alternative locations to reduce stimulation, but patient declined and said he'd prefer to watch football in the lounge. Patient also displayed paranoid thinking, stating that he believes a fellow patient is talking about him; he said if he hears someone talking about him again, he will \"punch them in the jaw.\" Patient was not able to be reoriented, but he did agree to refrain from violence while in the hospital and to involve staff if he feels people are targeting him. Patient was able to contract for safety and agreed to tell writer if his anxiety or SIB thoughts worsened.    Provider approved trazodone PRN. Patient notified.    Vitals: B/P: 126/89, T: 96.8, P: 62, R: 16      "

## 2023-10-15 NOTE — PLAN OF CARE
Goal Outcome Evaluation:    Plan of Care Reviewed With: patient             Patient reported doing fine, he was present in the milieu, he is social and interactive with peers. He stayed in the lounge watching TV with peers and also attended groups which he reported that he enjoyed drawing. He presented with a flat affect and his mood was calm. He brightened up during check in, denied having any pain, denied SI/SIB/HI and AVH. Patient requested to take Hydroxyzine and Melatonin at bed time to help him sleep.He is compliant with his medication, his appetite is adequate, no B & B issues. Patient's VS stable, staff continues to monitor.

## 2023-10-15 NOTE — PLAN OF CARE
Goal Outcome Evaluation:    Plan of Care Reviewed With: patient          Patient reported to writer feeling anxious and requested for Hydroxyzine which he reported to be helpful. He refused to attend groups stating that he is tired and he wants to stay in his room. He isolated himself to his room when groups were on, came out during meals and snacks. Writer asked patient how he was feeling, patient said he is feeling better as compared to how he was feeling in the morning. He did not complain of nausea this shift. He denied SI/SIB/HI and AVH. He denied having pain or discomfort. He requested for his med's at 7pm reporting to be tired. He took PRN Trazodone with the scheduled med. Patient requested for another Trazodone and went back to his room.

## 2023-10-16 ENCOUNTER — APPOINTMENT (OUTPATIENT)
Dept: GENERAL RADIOLOGY | Facility: CLINIC | Age: 33
End: 2023-10-16
Attending: PHYSICIAN ASSISTANT
Payer: COMMERCIAL

## 2023-10-16 VITALS
RESPIRATION RATE: 18 BRPM | HEART RATE: 105 BPM | OXYGEN SATURATION: 99 % | BODY MASS INDEX: 35.15 KG/M2 | TEMPERATURE: 98.8 F | SYSTOLIC BLOOD PRESSURE: 110 MMHG | HEIGHT: 70 IN | DIASTOLIC BLOOD PRESSURE: 82 MMHG

## 2023-10-16 PROCEDURE — 90832 PSYTX W PT 30 MINUTES: CPT

## 2023-10-16 PROCEDURE — 250N000011 HC RX IP 250 OP 636: Performed by: PSYCHIATRY & NEUROLOGY

## 2023-10-16 PROCEDURE — 99231 SBSQ HOSP IP/OBS SF/LOW 25: CPT | Performed by: PHYSICIAN ASSISTANT

## 2023-10-16 PROCEDURE — 250N000013 HC RX MED GY IP 250 OP 250 PS 637

## 2023-10-16 PROCEDURE — 250N000013 HC RX MED GY IP 250 OP 250 PS 637: Performed by: EMERGENCY MEDICINE

## 2023-10-16 PROCEDURE — 250N000013 HC RX MED GY IP 250 OP 250 PS 637: Performed by: FAMILY MEDICINE

## 2023-10-16 PROCEDURE — 74018 RADEX ABDOMEN 1 VIEW: CPT

## 2023-10-16 PROCEDURE — 250N000013 HC RX MED GY IP 250 OP 250 PS 637: Performed by: PSYCHIATRY & NEUROLOGY

## 2023-10-16 PROCEDURE — 74018 RADEX ABDOMEN 1 VIEW: CPT | Mod: 26 | Performed by: RADIOLOGY

## 2023-10-16 PROCEDURE — 99239 HOSP IP/OBS DSCHRG MGMT >30: CPT | Performed by: PSYCHIATRY & NEUROLOGY

## 2023-10-16 RX ORDER — BISACODYL 10 MG
10 SUPPOSITORY, RECTAL RECTAL DAILY PRN
Status: DISCONTINUED | OUTPATIENT
Start: 2023-10-16 | End: 2023-10-16 | Stop reason: HOSPADM

## 2023-10-16 RX ORDER — BISACODYL 10 MG
10 SUPPOSITORY, RECTAL RECTAL DAILY PRN
Qty: 15 SUPPOSITORY | Refills: 0 | Status: ON HOLD | OUTPATIENT
Start: 2023-10-16 | End: 2024-07-02

## 2023-10-16 RX ORDER — ONDANSETRON 4 MG/1
4 TABLET, ORALLY DISINTEGRATING ORAL EVERY 6 HOURS PRN
Status: DISCONTINUED | OUTPATIENT
Start: 2023-10-16 | End: 2023-10-16 | Stop reason: HOSPADM

## 2023-10-16 RX ADMIN — PANTOPRAZOLE SODIUM 40 MG: 40 TABLET, DELAYED RELEASE ORAL at 09:01

## 2023-10-16 RX ADMIN — NICOTINE POLACRILEX 4 MG: 4 GUM, CHEWING BUCCAL at 11:24

## 2023-10-16 RX ADMIN — ONDANSETRON 4 MG: 4 TABLET, ORALLY DISINTEGRATING ORAL at 11:24

## 2023-10-16 RX ADMIN — LEVOTHYROXINE SODIUM 112 MCG: 112 TABLET ORAL at 09:01

## 2023-10-16 RX ADMIN — NICOTINE POLACRILEX 4 MG: 4 GUM, CHEWING BUCCAL at 14:01

## 2023-10-16 RX ADMIN — NICOTINE POLACRILEX 4 MG: 4 GUM, CHEWING BUCCAL at 09:26

## 2023-10-16 RX ADMIN — Medication 125 MCG: at 09:00

## 2023-10-16 RX ADMIN — SENNOSIDES AND DOCUSATE SODIUM 1 TABLET: 50; 8.6 TABLET ORAL at 09:00

## 2023-10-16 RX ADMIN — NICOTINE POLACRILEX 4 MG: 4 GUM, CHEWING BUCCAL at 12:48

## 2023-10-16 ASSESSMENT — ACTIVITIES OF DAILY LIVING (ADL)
ADLS_ACUITY_SCORE: 42

## 2023-10-16 NOTE — PROGRESS NOTES
"Brief Medicine Note    Contacted by psychiatry regarding acute nausea and large emesis.  Patient was due to discharge today and has history of constipation in the setting of psychiatric medications including Clozaril.  Patient has been having intermittent bowel movements and had a recent BM after 3 days of no BM.    On discussing with patient he states that he had a loose watery BM on Friday and otherwise has not had any BMs in the interim.  He generally has very hard stools and has about 2-3 BMs per week.  He has not noticed any blood in his stools.  Patient endorses bilateral lower abdominal pain that has been ongoing.    Patient's nausea started yesterday.  Patient had x1 emesis of water after drinking water today.    Patient endorses a sensation of incomplete bladder emptying    Patient denies having any chest pain, shortness of breath, fevers, chills, rashes, bleeding, other complaints    Today's vital signs, medications, and nursing notes were reviewed. Labs reviewed laboratories from 10/10/2023.     /82 (BP Location: Right arm, Patient Position: Sitting, Cuff Size: Adult Regular)   Pulse 105   Temp 98.8  F (37.1  C) (Oral)   Resp 18   Ht 1.778 m (5' 10\")   SpO2 99%   BMI 35.15 kg/m    /82 (BP Location: Right arm, Patient Position: Sitting, Cuff Size: Adult Regular)   Pulse 105   Temp 98.8  F (37.1  C) (Oral)   Resp 18   Ht 1.778 m (5' 10\")   SpO2 99%   BMI 35.15 kg/m    Generalized appearance: A&O, NAD, ambulatory independently  HEENT: NC, AT, pupils equal and round, sclera anicteric  CV: RRR, no m/r/g  PULM: CTAB, non-labored breathing  GI: NABS, soft, NT, ND  Extremities: no edema, + PT and radial pulses  MSK: moves all extremities, no gross deformities  SKIN: CDI, noncyanotic, anicteric  Psych: Mood and affect appropriate        A/P:  Acute nausea and vomiting  Bilateral low abdominal pain  Constipation  Occurs in the context of cholesterol and psychiatric medications.  Reassuring " that patient has had recent BM.  -Cannot rule out overflow around impacted stool  -Check abdominal x-ray  -Pending x-ray results will have low threshold to check CMP and assess abdominal exam  - bladder scan for PVR  - Follow-up closely with primary care provider      Addendum:  Follow-up of abdominal x-ray shows a moderate stool burden throughout which the colon including the ascending, transverse and descending colon.  Suspect that patient's nausea and vomiting is due to excessive stool burden.  No obvious obstructive pattern on abdominal x-ray.  Per discussion with nurse patient continues to tolerate p.o. and appears comfortable.    Send Rx for Dulcolax to patient's [place] pharmacy and can give a dose here if patient and psychiatry prefers.  Patient already scheduled for close polyp follow-up with PCP, encourage keeping follow-up appointment      [facility]  Contact information available via [facility] Paging/Directory

## 2023-10-16 NOTE — PLAN OF CARE
"Shift Summary ([id number])  Mental Health Status   Pt is alert and oriented x 4 (self, place,date and situation). Able to communicate needs. Speech is clear and coherent with good eye contact. Mood is calm with flat affect. Cooperative and pleasant.  Denies SI, SIB, AH,VH. Endorses both anxiety and depression. Pt is in touch with reality. Feels safe in hospital. Insight and judgement are fair.   Visible in lounge. Isolative and socially withdrawn to self. No interaction with other patient noted. Pt participated in therapeutic group activity.   Took scheduled medication with no difficulties. Tolerating medications ok. No side effect reported by the pt or observed by this writer.   Prn given: none    Physical Health Status   Moves around independently with no difficulties.   Hygiene is appropriate.   Appetite adequate. Ate breakfast but has large emesis after eating. Pt said he has been feeling nauseated since Friday and after vomit feels \" good \". Prn Zofran given. See order for abdominal X-ray. Pt said his last BM was yesterday. Also pt said sometimes he has difficulties to void. PVR done for 29 ml.   Slept 7.0 hours last night per report but pt is c/o not sleeping enough.   Denies pain.   Today's Lab orders: X-ray  Sitting /82 & pulse 105  Standing BP & pulse-declined  Prn given: Zofran    Pt is discharging to group home today. All discharge paper were reviewed with pt by an RN.                          "

## 2023-10-16 NOTE — PLAN OF CARE
"Adult Inpatient Safety Plan:     [facility]           [address]                                Kamini Neal     SAFETY PLAN:  Step 1: Warning signs / cues (Thoughts, images, mood, situation, behavior) that a crisis may be developing:  Thoughts: \"I don't matter\", \"People would be better off without me\", \"I'm a burden\", \"I can't do this anymore\", and \"I just want this to end\"  Thinking Processes: intrusive thoughts (bothersome, unwanted thoughts that come out of nowhere): thinking people can read my mind, highly critical and negative thoughts: , disorganized thinking, paranoia.  Mood: worsening depression and intense sadness  Behaviors: isolating/withdrawing , using drugs, using alcohol, can't stop crying, not taking care of myself, not taking care of my responsibilities, and sleeping too much  Situations:  isolation      Step 2: Coping strategies - Things I can do to take my mind off of my problems without contacting another person (relaxation technique, physical activity):  Distress Tolerance Strategies:  listen to positive and upbeat music, watch a funny movies or shows, read a book, and change body temperature (ice pack/cold water)   Physical Activities:  Call mom, call friend, video games, rest, skateboard   Focus on helpful thoughts:  \"This is temporary\", \"It always passes\", and self-compassion statements: I am worthy, I'm loved, I'm smart, I'm resilient.     Step 3: Remind myself of people and things that are important to me and worth living for:    Mother  Cousins  Friend    Step 4: When I am in crisis, I can ask these people to help me use my safety plan:   Name: Mom  Phone:   Name: Beck Phone:    Name: Christina  Phone:    Step 5: Making the environment safe:   I will remove alcohol and drugs, secure my medications, dispose of old medications, remove access to firearms, remove things I could use to hurt myself, and identify supportive people  Other ways to make " "my environment safe: Removing video discs when feeling like I want to self-harm, keep room clean    Step 6: Professionals or agencies I can contact during a crisis:  Crisis Intervention: 291.851.8434 or 086-197-2067 (TTY: 203.182.1410).  Call anytime for help.  National [place] on Mental Illness (www.[place].jorge a.org): 404.248.6371 or 287-733-9380.  Suicide Awareness Voices of Education (SAVE) (www.save.org): 106-345-BMLL (1574)  National Suicide Prevention Line (www.mentalhealthmn.org): 440-336-KEQG (4400)  [facility] Crisis (COPE) Response - Adult 350 463-6389  Text 4 Life: txt \"LIFE\" to 91270 for immediate support and crisis intervention  Crisis text line: Text \"[place]\" to 319990. Free, confidential, 24/7.      If unable to maintain safety despite working your plan, call 911 or go to my nearest emergency department.         Patient helped develop this safety plan and agreed to use it when needed.  Pt has been given a copy of this plan.          Today s date:  October 16, 2023  Completed by Clinician Name/ Credentials:  LINDA Ashby        Adapted from Safety Plan Template 2008 Marcia Cody and Kai Lipscomb is reprinted with the express permission of the authors.  No portion of the Safety Plan Template may be reproduced without the express, written permission.  You can contact the authors at [email or web address] or [email or web address].    "
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
## 2023-10-16 NOTE — PLAN OF CARE
He verbalized understanding of discharge instructions and states he has all of his belongings with him before leaving the unit.  He was picked [facility] by [facility] back to group home at 1502.

## 2023-10-16 NOTE — PLAN OF CARE
Duration: Met with patient a for a total of 30 minutes.    Time start: 1245  Time end 1315    Modality Used:Person Centered, Motivational Interviewing, and Solution Focused    Goals: Safety Plan, Discharge discussion    Pt progress: Met pt in interview room. Discussed safety plan. Pt is nervous about leaving, but is feeling better. Discussed all aspects of safety plan.    Treatment Objective(s) Addressed:   The focus of this session was on safety planning     Progress Towards Goals:   Patient reports stable symptoms. Patient is making progress towards treatment goals as evidenced by improved mood.     Therapeutic Intervention(s):   Provided active listening, unconditional positive regard, and validation. Engaged in safety planning.  Engaged in guided discovery, explored patient's perspectives and helped expand them through socratic dialogue.    Plan/next step: Discharge today, consider ongoing therapy

## 2023-10-16 NOTE — CARE PLAN
Occupational Therapy Group Note:     10/16/23 1440   Group Therapy Session   Group Attendance attended group session   Time Session Began 1015   Time Session Ended 1105   Total Time (minutes) 10 (no charge)   Total # Attendees 6   Group Type expressive therapy;task skill   Group Topic Covered cognitive activities;coping skills/lifestyle management;emotions/expression;leisure exploration/use of leisure time;relaxation techniques;structured socialization   Group Session Detail OT Modified Clinic   Patient Response/Contribution confronted peers appropriately;refused to participate   Patient Participation Detail Patient entered group room. Patient was pleasant and cooperative in interactions with peer. Patient willingly agreed to move sits when peer verbalized desire to sit where he was currently sitting due to increased sunshine. Patient left group early prior to initiating a task activity. No return. No charge.

## 2023-10-19 LAB — SCANNED LAB RESULT: ABNORMAL
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
## 2024-02-18 ENCOUNTER — HEALTH MAINTENANCE LETTER (OUTPATIENT)
Age: 34
End: 2024-02-18

## 2024-04-28 ENCOUNTER — HOSPITAL ENCOUNTER (OUTPATIENT)
Facility: CLINIC | Age: 34
Setting detail: OBSERVATION
Discharge: HOME | End: 2024-04-29
Attending: FAMILY MEDICINE | Admitting: FAMILY MEDICINE
Payer: COMMERCIAL

## 2024-04-28 DIAGNOSIS — F25.9 SCHIZOPHRENIA, SCHIZOAFFECTIVE, CHRONIC WITH ACUTE EXACERBATION (H): ICD-10-CM

## 2024-04-28 DIAGNOSIS — F12.10 MARIJUANA ABUSE: ICD-10-CM

## 2024-04-28 PROCEDURE — 250N000011 HC RX IP 250 OP 636: Performed by: FAMILY MEDICINE

## 2024-04-28 PROCEDURE — 96372 THER/PROPH/DIAG INJ SC/IM: CPT | Performed by: FAMILY MEDICINE

## 2024-04-28 PROCEDURE — 99223 1ST HOSP IP/OBS HIGH 75: CPT | Performed by: FAMILY MEDICINE

## 2024-04-28 PROCEDURE — 99285 EMERGENCY DEPT VISIT HI MDM: CPT | Mod: 25 | Performed by: FAMILY MEDICINE

## 2024-04-28 RX ORDER — OLANZAPINE 10 MG/2ML
10 INJECTION, POWDER, FOR SOLUTION INTRAMUSCULAR ONCE
Status: COMPLETED | OUTPATIENT
Start: 2024-04-28 | End: 2024-04-28

## 2024-04-28 RX ORDER — OLANZAPINE 10 MG/1
10 TABLET, ORALLY DISINTEGRATING ORAL ONCE
Status: DISCONTINUED | OUTPATIENT
Start: 2024-04-28 | End: 2024-04-28

## 2024-04-28 RX ORDER — DIPHENHYDRAMINE HYDROCHLORIDE 50 MG/ML
50 INJECTION INTRAMUSCULAR; INTRAVENOUS ONCE
Status: COMPLETED | OUTPATIENT
Start: 2024-04-28 | End: 2024-04-28

## 2024-04-28 RX ADMIN — OLANZAPINE 10 MG: 10 INJECTION, POWDER, FOR SOLUTION INTRAMUSCULAR at 21:56

## 2024-04-28 RX ADMIN — DIPHENHYDRAMINE HYDROCHLORIDE 50 MG: 50 INJECTION, SOLUTION INTRAMUSCULAR; INTRAVENOUS at 21:56

## 2024-04-28 ASSESSMENT — ACTIVITIES OF DAILY LIVING (ADL)
ADLS_ACUITY_SCORE: 39
ADLS_ACUITY_SCORE: 39

## 2024-04-29 VITALS
DIASTOLIC BLOOD PRESSURE: 82 MMHG | RESPIRATION RATE: 16 BRPM | SYSTOLIC BLOOD PRESSURE: 116 MMHG | OXYGEN SATURATION: 96 % | HEART RATE: 84 BPM | TEMPERATURE: 98.2 F

## 2024-04-29 PROCEDURE — 99238 HOSP IP/OBS DSCHRG MGMT 30/<: CPT | Performed by: EMERGENCY MEDICINE

## 2024-04-29 PROCEDURE — G0378 HOSPITAL OBSERVATION PER HR: HCPCS

## 2024-04-29 ASSESSMENT — ACTIVITIES OF DAILY LIVING (ADL)
ADLS_ACUITY_SCORE: 39

## 2024-04-29 NOTE — PROGRESS NOTES
[facility] Collateral Note:    [facility] attempted to contact pts emergency contact, Neela Pollack ([phone]) at pts group home: [facility]. Voice mail was left to return call to obtain collateral information.     Paloma Law, [facility], [facility] on 4/29/2024 at 12:42 AM

## 2024-04-29 NOTE — ED NOTES
Within an hour after restraint an in person face to face assessment was completed at 2220, including an evaluation of the patient's immediate reaction to the intervention, behavioral assessment and review/assessment of history, drugs and medications, recent labs, etc., and behavioral condition.  The patient experienced: No adverse physical outcome from seclusion/restraint initiation.  The intervention of restraint or seclusion needs to continue.     George Dia MD  04/28/24 [id number]

## 2024-04-29 NOTE — ED NOTES
"This writer took over patient's care around 0330. Patient was sleeping at that time. He was on continuous 1:1 monitoring. He  woke up around 0545. He has been up in room since then. He was calm in room. He was informed that he will be discharged back to . Patient replied \"okay\".  "
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
## 2024-04-29 NOTE — ED TRIAGE NOTES
Patient arrives by EMS from [facility] home. They called 911 for patient hallucinating, patient unable to provide details.    Smoked marijuana tonight, unknown other drugs. Paranoid.    Denies SI/HI.      Of note: given PRN zyprexa prior to EMS arrival.    Edit to add: After EMS arrival to waiting room, patient refusing to triage. Does not have ride home. Requests time to think what he wants to do. Continues to deny SI/DI

## 2024-04-29 NOTE — ED NOTES
Group Home:  [facility]    Contact number [phone]  Neela Brown    Please call for collateral info or before discharge.    will pick pt. up if discharged,  do not send back by EMS.      Per Group Home staff pt. is using substances and is suicidal and having auditory and visual hallucinations.
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
## 2024-04-29 NOTE — ED PROVIDER NOTES
ED Observation Discharge Summary  [facility]  Discharge Date: 4/29/2024    Kamini Neal MRN: [id number]   Age: 33 year old YOB: 1990     Brief HPI & Initial ED Course     Chief Complaint   Patient presents with    Mental Health Problem     HPI  Kamini Neal is a 33 year old male with PMH notable for PTSD, bipolar, cannabis use who presented to the ED with a psychiatric concern.  Patient presented with hallucinations and paranoia.      The patient was evaluated in the emergency department by a physician and DEC behavioral health . The DEC  recommended discharge. See separate DEC note from this encounter for details on the assessment. The patient's psychiatric state was such that he would benefit from ongoing monitoring. Observation care was initiated with the plan including serial assessments of psychiatric condition, potential administration of medications if indicated, and further disposition pending the patient's psychiatric course during the monitoring period.     See ED Observation H&P for further details on the patient's presenting history and initial evaluation.     Physical Exam   BP: 114/78  Pulse: 70  Resp: (!) 33  SpO2: 99 %    Physical Exam  General: . Appears stated age.   HENT: MMM, no oropharyngeal lesions  Eyes: PERRL, normal sclerae   Cardio: Regular rate, extremities well perfused  Resp: Normal work of breathing, normal respiratory rate  Neuro: alert and fully oriented. CN II-XII grossly intact. Grossly normal strength and sensation in all extremities.   MSK: no deformities.   Integumentary/Skin: no rash visualized, normal color  Psych: Normal affect, normal behavior. no SI. no HI. no hallucinations. Insight normal.     Results      Procedures              Labs Ordered and Resulted from Time of ED Arrival to Time of ED Departure - No data to display         Observation Course   The patient was found to have a psychiatric condition  that would benefit from an observation stay in the emergency department for further psychiatric stabilization and/or coordination of a safe disposition. The plan upon observation admission included serial assessments of psychiatric condition, potential administration of medications if indicated, further disposition pending the patient's psychiatric course during the monitoring period.     Serial assessments of the patient's psychiatric condition were performed. Nursing notes were reviewed. During the observation period, the patient did not require medications for agitation, and did not require restraints/seclusion for patient and/or provider safety.        After the period in observation care, the patient's circumstances and mental state were safe for outpatient management. After counseling on the diagnosis, work-up, and treatment plan, the patient was discharged. Close follow-up with a psychiatrist and/or therapist was recommended and community psychiatric resources were provided. Patient is to return to the ED if any urgent or potentially life-threatening concerns.      Discharge Diagnoses:   Final diagnoses:   Schizophrenia, schizoaffective, chronic with acute exacerbation (H)   Marijuana abuse       --  Jose Spring MD  [facility] EMERGENCY DEPARTMENT  4/29/2024      Jose Spring MD  04/29/24 0552

## 2024-04-30 NOTE — ED NOTES
Bed: BH3  Expected date:   Expected time:   Means of arrival: Ambulance  Comments:  446 25 m/hearing voices   Pt should see heme

## 2024-06-24 ENCOUNTER — HOSPITAL ENCOUNTER (INPATIENT)
Facility: CLINIC | Age: 34
LOS: 7 days | Discharge: NURSING, REHAB OR LONG-TERM CARE | End: 2024-07-02
Attending: FAMILY MEDICINE | Admitting: PSYCHIATRY & NEUROLOGY
Payer: COMMERCIAL

## 2024-06-24 ENCOUNTER — TELEPHONE (OUTPATIENT)
Dept: BEHAVIORAL HEALTH | Facility: CLINIC | Age: 34
End: 2024-06-24

## 2024-06-24 DIAGNOSIS — K21.9 GASTROESOPHAGEAL REFLUX DISEASE WITHOUT ESOPHAGITIS: ICD-10-CM

## 2024-06-24 DIAGNOSIS — F22 DELUSIONS (H): Primary | ICD-10-CM

## 2024-06-24 DIAGNOSIS — F43.10 PTSD (POST-TRAUMATIC STRESS DISORDER): ICD-10-CM

## 2024-06-24 DIAGNOSIS — F17.200 NICOTINE USE DISORDER: Chronic | ICD-10-CM

## 2024-06-24 DIAGNOSIS — E55.9 VITAMIN D DEFICIENCY: ICD-10-CM

## 2024-06-24 DIAGNOSIS — K59.00 CONSTIPATION, UNSPECIFIED CONSTIPATION TYPE: ICD-10-CM

## 2024-06-24 DIAGNOSIS — F25.0 SCHIZOAFFECTIVE DISORDER, BIPOLAR TYPE (H): ICD-10-CM

## 2024-06-24 DIAGNOSIS — K11.7 SIALORRHEA: ICD-10-CM

## 2024-06-24 DIAGNOSIS — F25.9 SCHIZOPHRENIA, SCHIZOAFFECTIVE, CHRONIC WITH ACUTE EXACERBATION (H): ICD-10-CM

## 2024-06-24 DIAGNOSIS — F12.90 MARIJUANA USE: ICD-10-CM

## 2024-06-24 DIAGNOSIS — F41.9 ANXIETY: ICD-10-CM

## 2024-06-24 DIAGNOSIS — E53.8 VITAMIN B12 DEFICIENCY (NON ANEMIC): ICD-10-CM

## 2024-06-24 DIAGNOSIS — Z87.898 HISTORY OF EXTRAPYRAMIDAL SYMPTOMS: ICD-10-CM

## 2024-06-24 DIAGNOSIS — R45.851 SUICIDAL IDEATION: ICD-10-CM

## 2024-06-24 DIAGNOSIS — E03.9 HYPOTHYROIDISM, UNSPECIFIED TYPE: ICD-10-CM

## 2024-06-24 LAB
ALBUMIN SERPL BCG-MCNC: 4.2 G/DL (ref 3.5–5.2)
ALP SERPL-CCNC: 98 U/L (ref 40–150)
ALT SERPL W P-5'-P-CCNC: 13 U/L (ref 0–70)
AMPHETAMINES UR QL SCN: ABNORMAL
ANION GAP SERPL CALCULATED.3IONS-SCNC: 10 MMOL/L (ref 7–15)
AST SERPL W P-5'-P-CCNC: 16 U/L (ref 0–45)
BARBITURATES UR QL SCN: ABNORMAL
BASOPHILS # BLD AUTO: 0.1 10E3/UL (ref 0–0.2)
BASOPHILS NFR BLD AUTO: 1 %
BENZODIAZ UR QL SCN: ABNORMAL
BILIRUB SERPL-MCNC: 0.2 MG/DL
BUN SERPL-MCNC: 6.7 MG/DL (ref 6–20)
BZE UR QL SCN: ABNORMAL
CALCIUM SERPL-MCNC: 9.5 MG/DL (ref 8.6–10)
CANNABINOIDS UR QL SCN: ABNORMAL
CHLORIDE SERPL-SCNC: 104 MMOL/L (ref 98–107)
CREAT SERPL-MCNC: 0.85 MG/DL (ref 0.67–1.17)
CREAT UR-MCNC: 18 MG/DL
DEPRECATED HCO3 PLAS-SCNC: 27 MMOL/L (ref 22–29)
EGFRCR SERPLBLD CKD-EPI 2021: >90 ML/MIN/1.73M2
EOSINOPHIL # BLD AUTO: 0 10E3/UL (ref 0–0.7)
EOSINOPHIL NFR BLD AUTO: 0 %
ERYTHROCYTE [DISTWIDTH] IN BLOOD BY AUTOMATED COUNT: 12.3 % (ref 10–15)
FENTANYL UR QL: ABNORMAL
GLUCOSE SERPL-MCNC: 111 MG/DL (ref 70–99)
HCT VFR BLD AUTO: 42 % (ref 40–53)
HGB BLD-MCNC: 14.5 G/DL (ref 13.3–17.7)
IMM GRANULOCYTES # BLD: 0 10E3/UL
IMM GRANULOCYTES NFR BLD: 1 %
LYMPHOCYTES # BLD AUTO: 2 10E3/UL (ref 0.8–5.3)
LYMPHOCYTES NFR BLD AUTO: 30 %
MCH RBC QN AUTO: 32.2 PG (ref 26.5–33)
MCHC RBC AUTO-ENTMCNC: 34.5 G/DL (ref 31.5–36.5)
MCV RBC AUTO: 93 FL (ref 78–100)
MONOCYTES # BLD AUTO: 0.6 10E3/UL (ref 0–1.3)
MONOCYTES NFR BLD AUTO: 9 %
NEUTROPHILS # BLD AUTO: 4 10E3/UL (ref 1.6–8.3)
NEUTROPHILS NFR BLD AUTO: 59 %
NRBC # BLD AUTO: 0 10E3/UL
NRBC BLD AUTO-RTO: 0 /100
OPIATES UR QL SCN: ABNORMAL
PCP QUAL URINE (ROCHE): ABNORMAL
PLATELET # BLD AUTO: 187 10E3/UL (ref 150–450)
POTASSIUM SERPL-SCNC: 3.9 MMOL/L (ref 3.4–5.3)
PROT SERPL-MCNC: 6.4 G/DL (ref 6.4–8.3)
RBC # BLD AUTO: 4.51 10E6/UL (ref 4.4–5.9)
SODIUM SERPL-SCNC: 141 MMOL/L (ref 135–145)
TSH SERPL DL<=0.005 MIU/L-ACNC: 3.02 UIU/ML (ref 0.3–4.2)
WBC # BLD AUTO: 6.7 10E3/UL (ref 4–11)

## 2024-06-24 PROCEDURE — 84443 ASSAY THYROID STIM HORMONE: CPT | Performed by: FAMILY MEDICINE

## 2024-06-24 PROCEDURE — 82306 VITAMIN D 25 HYDROXY: CPT | Performed by: REGISTERED NURSE

## 2024-06-24 PROCEDURE — 80307 DRUG TEST PRSMV CHEM ANLYZR: CPT | Performed by: FAMILY MEDICINE

## 2024-06-24 PROCEDURE — 80349 CANNABINOIDS NATURAL: CPT | Performed by: FAMILY MEDICINE

## 2024-06-24 PROCEDURE — 99285 EMERGENCY DEPT VISIT HI MDM: CPT | Performed by: FAMILY MEDICINE

## 2024-06-24 PROCEDURE — 250N000013 HC RX MED GY IP 250 OP 250 PS 637: Performed by: FAMILY MEDICINE

## 2024-06-24 PROCEDURE — 250N000013 HC RX MED GY IP 250 OP 250 PS 637: Performed by: EMERGENCY MEDICINE

## 2024-06-24 PROCEDURE — 80159 DRUG ASSAY CLOZAPINE: CPT | Performed by: FAMILY MEDICINE

## 2024-06-24 PROCEDURE — 80053 COMPREHEN METABOLIC PANEL: CPT | Performed by: FAMILY MEDICINE

## 2024-06-24 PROCEDURE — 85025 COMPLETE CBC W/AUTO DIFF WBC: CPT | Performed by: FAMILY MEDICINE

## 2024-06-24 PROCEDURE — 82607 VITAMIN B-12: CPT | Performed by: REGISTERED NURSE

## 2024-06-24 PROCEDURE — 36415 COLL VENOUS BLD VENIPUNCTURE: CPT | Performed by: FAMILY MEDICINE

## 2024-06-24 RX ORDER — CLOZAPINE 50 MG/1
50 TABLET ORAL AT BEDTIME
Status: DISCONTINUED | OUTPATIENT
Start: 2024-06-24 | End: 2024-06-24

## 2024-06-24 RX ORDER — GABAPENTIN 400 MG/1
400 CAPSULE ORAL 3 TIMES DAILY
Status: DISCONTINUED | OUTPATIENT
Start: 2024-06-24 | End: 2024-07-02 | Stop reason: HOSPADM

## 2024-06-24 RX ORDER — OLANZAPINE 10 MG/1
10 TABLET ORAL 3 TIMES DAILY PRN
Status: DISCONTINUED | OUTPATIENT
Start: 2024-06-24 | End: 2024-06-25

## 2024-06-24 RX ORDER — PRAZOSIN HYDROCHLORIDE 1 MG/1
4 CAPSULE ORAL AT BEDTIME
Status: DISCONTINUED | OUTPATIENT
Start: 2024-06-24 | End: 2024-07-02 | Stop reason: HOSPADM

## 2024-06-24 RX ORDER — PANTOPRAZOLE SODIUM 40 MG/1
40 TABLET, DELAYED RELEASE ORAL
Status: DISCONTINUED | OUTPATIENT
Start: 2024-06-25 | End: 2024-07-02 | Stop reason: HOSPADM

## 2024-06-24 RX ORDER — BENZTROPINE MESYLATE 1 MG/1
1 TABLET ORAL 2 TIMES DAILY
Status: DISCONTINUED | OUTPATIENT
Start: 2024-06-24 | End: 2024-07-02 | Stop reason: HOSPADM

## 2024-06-24 RX ORDER — PROPRANOLOL HYDROCHLORIDE 10 MG/1
10 TABLET ORAL 2 TIMES DAILY
Status: DISCONTINUED | OUTPATIENT
Start: 2024-06-24 | End: 2024-07-02 | Stop reason: HOSPADM

## 2024-06-24 RX ORDER — BISACODYL 10 MG
10 SUPPOSITORY, RECTAL RECTAL DAILY PRN
Status: DISCONTINUED | OUTPATIENT
Start: 2024-06-24 | End: 2024-07-02 | Stop reason: HOSPADM

## 2024-06-24 RX ORDER — DOCUSATE SODIUM 100 MG/1
100 CAPSULE, LIQUID FILLED ORAL 2 TIMES DAILY
Status: DISCONTINUED | OUTPATIENT
Start: 2024-06-24 | End: 2024-06-29

## 2024-06-24 RX ORDER — VENLAFAXINE HYDROCHLORIDE 75 MG/1
225 CAPSULE, EXTENDED RELEASE ORAL
Status: DISCONTINUED | OUTPATIENT
Start: 2024-06-25 | End: 2024-07-02 | Stop reason: HOSPADM

## 2024-06-24 RX ORDER — LEVOTHYROXINE SODIUM 112 UG/1
112 TABLET ORAL DAILY
Status: DISCONTINUED | OUTPATIENT
Start: 2024-06-24 | End: 2024-07-02 | Stop reason: HOSPADM

## 2024-06-24 RX ADMIN — LEVOTHYROXINE SODIUM 112 MCG: 112 TABLET ORAL at 21:55

## 2024-06-24 RX ADMIN — NICOTINE POLACRILEX 2 MG: 2 GUM, CHEWING BUCCAL at 22:06

## 2024-06-24 RX ADMIN — NICOTINE POLACRILEX 4 MG: 4 GUM, CHEWING BUCCAL at 20:03

## 2024-06-24 RX ADMIN — Medication 125 MCG: at 21:58

## 2024-06-24 RX ADMIN — PRAZOSIN HYDROCHLORIDE 4 MG: 2 CAPSULE ORAL at 21:56

## 2024-06-24 RX ADMIN — DOCUSATE SODIUM 100 MG: 100 CAPSULE, LIQUID FILLED ORAL at 21:57

## 2024-06-24 RX ADMIN — BENZTROPINE MESYLATE 1 MG: 1 TABLET ORAL at 21:57

## 2024-06-24 RX ADMIN — PROPRANOLOL HYDROCHLORIDE 10 MG: 10 TABLET ORAL at 21:58

## 2024-06-24 RX ADMIN — NICOTINE POLACRILEX 4 MG: 4 GUM, CHEWING BUCCAL at 17:34

## 2024-06-24 RX ADMIN — GABAPENTIN 400 MG: 400 CAPSULE ORAL at 21:56

## 2024-06-24 ASSESSMENT — COLUMBIA-SUICIDE SEVERITY RATING SCALE - C-SSRS
4. HAVE YOU HAD THESE THOUGHTS AND HAD SOME INTENTION OF ACTING ON THEM?: NO
1. IN THE PAST MONTH, HAVE YOU WISHED YOU WERE DEAD OR WISHED YOU COULD GO TO SLEEP AND NOT WAKE UP?: YES
5. HAVE YOU STARTED TO WORK OUT OR WORKED OUT THE DETAILS OF HOW TO KILL YOURSELF? DO YOU INTEND TO CARRY OUT THIS PLAN?: YES
3. HAVE YOU BEEN THINKING ABOUT HOW YOU MIGHT KILL YOURSELF?: YES
2. HAVE YOU ACTUALLY HAD ANY THOUGHTS OF KILLING YOURSELF IN THE PAST MONTH?: YES
6. HAVE YOU EVER DONE ANYTHING, STARTED TO DO ANYTHING, OR PREPARED TO DO ANYTHING TO END YOUR LIFE?: YES

## 2024-06-24 ASSESSMENT — ACTIVITIES OF DAILY LIVING (ADL)
ADLS_ACUITY_SCORE: 39

## 2024-06-24 NOTE — ED NOTES
Bed: UREDH-I  Expected date: 6/24/24  Expected time: 3:58 PM  Means of arrival:   Comments:  WILBER  SI
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
## 2024-06-24 NOTE — ED TRIAGE NOTES
Pt brought in by Behavioral Crisis Response from [facility], suicidal Ideation and Homicidal Ideation. Pt states he has been feeling this way for some time. Pt stated he does not feel safe but does not identify anyone specific he is homicidal toward.      Triage Assessment (Adult)       Row Name 06/24/24 8450          Triage Assessment    Additional Documentation Breath Sounds (Group)        Respiratory WDL    Respiratory WDL WDL        Breath Sounds    Breath Sounds All Fields        Skin Circulation/Temperature WDL    Skin Circulation/Temperature WDL WDL        Cardiac WDL    Cardiac WDL WDL        Peripheral/Neurovascular WDL    Peripheral Neurovascular WDL WDL        Cognitive/Neuro/Behavioral WDL    Cognitive/Neuro/Behavioral WDL WDL        [place] Coma Scale    Best Eye Response 4-->(E4) spontaneous     Best Motor Response 6-->(M6) obeys commands     Best Verbal Response 5-->(V5) oriented     Bert Coma Scale Score 15

## 2024-06-25 ENCOUNTER — TELEPHONE (OUTPATIENT)
Dept: BEHAVIORAL HEALTH | Facility: CLINIC | Age: 34
End: 2024-06-25
Payer: COMMERCIAL

## 2024-06-25 PROBLEM — R45.851 SUICIDAL IDEATION: Status: ACTIVE | Noted: 2023-10-11

## 2024-06-25 PROBLEM — F41.9 ANXIETY: Status: ACTIVE | Noted: 2024-06-25

## 2024-06-25 LAB
HBA1C MFR BLD: 5.2 %
VIT B12 SERPL-MCNC: 225 PG/ML (ref 232–1245)
VIT D+METAB SERPL-MCNC: 53 NG/ML (ref 20–50)

## 2024-06-25 PROCEDURE — 36415 COLL VENOUS BLD VENIPUNCTURE: CPT | Performed by: REGISTERED NURSE

## 2024-06-25 PROCEDURE — 99255 IP/OBS CONSLTJ NEW/EST HI 80: CPT

## 2024-06-25 PROCEDURE — 250N000013 HC RX MED GY IP 250 OP 250 PS 637: Performed by: FAMILY MEDICINE

## 2024-06-25 PROCEDURE — 83036 HEMOGLOBIN GLYCOSYLATED A1C: CPT | Performed by: REGISTERED NURSE

## 2024-06-25 PROCEDURE — 93005 ELECTROCARDIOGRAM TRACING: CPT

## 2024-06-25 PROCEDURE — 124N000002 HC R&B MH UMMC

## 2024-06-25 PROCEDURE — 93010 ELECTROCARDIOGRAM REPORT: CPT | Performed by: INTERNAL MEDICINE

## 2024-06-25 PROCEDURE — 250N000013 HC RX MED GY IP 250 OP 250 PS 637: Performed by: EMERGENCY MEDICINE

## 2024-06-25 PROCEDURE — 250N000013 HC RX MED GY IP 250 OP 250 PS 637: Performed by: REGISTERED NURSE

## 2024-06-25 RX ORDER — OLANZAPINE 10 MG/1
10 TABLET ORAL 3 TIMES DAILY PRN
Status: ON HOLD | COMMUNITY
End: 2024-07-02

## 2024-06-25 RX ORDER — ACETAMINOPHEN 325 MG/1
650 TABLET ORAL EVERY 4 HOURS PRN
Status: DISCONTINUED | OUTPATIENT
Start: 2024-06-25 | End: 2024-07-02 | Stop reason: HOSPADM

## 2024-06-25 RX ORDER — OLANZAPINE 10 MG/1
10 TABLET ORAL 3 TIMES DAILY PRN
Status: DISCONTINUED | OUTPATIENT
Start: 2024-06-25 | End: 2024-07-02 | Stop reason: HOSPADM

## 2024-06-25 RX ORDER — HYDROXYZINE HYDROCHLORIDE 25 MG/1
25 TABLET, FILM COATED ORAL 2 TIMES DAILY PRN
Status: ON HOLD | COMMUNITY
End: 2024-07-02

## 2024-06-25 RX ORDER — HYDROXYZINE HYDROCHLORIDE 25 MG/1
25 TABLET, FILM COATED ORAL EVERY 4 HOURS PRN
Status: DISCONTINUED | OUTPATIENT
Start: 2024-06-25 | End: 2024-07-02 | Stop reason: HOSPADM

## 2024-06-25 RX ORDER — OLANZAPINE 10 MG/2ML
10 INJECTION, POWDER, FOR SOLUTION INTRAMUSCULAR 3 TIMES DAILY PRN
Status: DISCONTINUED | OUTPATIENT
Start: 2024-06-25 | End: 2024-07-02 | Stop reason: HOSPADM

## 2024-06-25 RX ORDER — POLYETHYLENE GLYCOL 3350 17 G/17G
17 POWDER, FOR SOLUTION ORAL DAILY
Status: ON HOLD | COMMUNITY
End: 2024-07-02

## 2024-06-25 RX ORDER — VITAMIN B COMPLEX
25 TABLET ORAL DAILY
Status: DISCONTINUED | OUTPATIENT
Start: 2024-06-26 | End: 2024-07-02 | Stop reason: HOSPADM

## 2024-06-25 RX ORDER — MAGNESIUM HYDROXIDE/ALUMINUM HYDROXICE/SIMETHICONE 120; 1200; 1200 MG/30ML; MG/30ML; MG/30ML
30 SUSPENSION ORAL EVERY 4 HOURS PRN
Status: DISCONTINUED | OUTPATIENT
Start: 2024-06-25 | End: 2024-07-02 | Stop reason: HOSPADM

## 2024-06-25 RX ORDER — AMOXICILLIN 250 MG
1 CAPSULE ORAL 2 TIMES DAILY PRN
Status: DISCONTINUED | OUTPATIENT
Start: 2024-06-25 | End: 2024-07-02 | Stop reason: HOSPADM

## 2024-06-25 RX ORDER — VENLAFAXINE HYDROCHLORIDE 75 MG/1
225 CAPSULE, EXTENDED RELEASE ORAL DAILY
Status: ON HOLD | COMMUNITY
End: 2024-07-02

## 2024-06-25 RX ORDER — TRAZODONE HYDROCHLORIDE 50 MG/1
50 TABLET, FILM COATED ORAL
Status: DISCONTINUED | OUTPATIENT
Start: 2024-06-25 | End: 2024-07-02 | Stop reason: HOSPADM

## 2024-06-25 RX ORDER — VITAMIN B COMPLEX
25 TABLET ORAL DAILY
Status: ON HOLD | COMMUNITY
End: 2024-07-02

## 2024-06-25 RX ADMIN — NICOTINE POLACRILEX 2 MG: 2 GUM, CHEWING ORAL at 11:47

## 2024-06-25 RX ADMIN — NICOTINE POLACRILEX 2 MG: 2 GUM, CHEWING ORAL at 08:26

## 2024-06-25 RX ADMIN — NICOTINE POLACRILEX 2 MG: 2 GUM, CHEWING ORAL at 20:55

## 2024-06-25 RX ADMIN — VENLAFAXINE HYDROCHLORIDE 150 MG: 150 CAPSULE, EXTENDED RELEASE ORAL at 08:12

## 2024-06-25 RX ADMIN — NICOTINE POLACRILEX 2 MG: 2 GUM, CHEWING ORAL at 13:11

## 2024-06-25 RX ADMIN — NICOTINE POLACRILEX 2 MG: 2 GUM, CHEWING ORAL at 15:20

## 2024-06-25 RX ADMIN — NICOTINE POLACRILEX 2 MG: 2 GUM, CHEWING ORAL at 17:42

## 2024-06-25 RX ADMIN — GABAPENTIN 400 MG: 400 CAPSULE ORAL at 08:13

## 2024-06-25 RX ADMIN — PROPRANOLOL HYDROCHLORIDE 10 MG: 10 TABLET ORAL at 20:55

## 2024-06-25 RX ADMIN — NICOTINE POLACRILEX 2 MG: 2 GUM, CHEWING ORAL at 18:44

## 2024-06-25 RX ADMIN — DOCUSATE SODIUM 100 MG: 100 CAPSULE, LIQUID FILLED ORAL at 20:55

## 2024-06-25 RX ADMIN — Medication 125 MCG: at 08:11

## 2024-06-25 RX ADMIN — NICOTINE POLACRILEX 2 MG: 2 GUM, CHEWING ORAL at 16:23

## 2024-06-25 RX ADMIN — PRAZOSIN HYDROCHLORIDE 4 MG: 2 CAPSULE ORAL at 21:00

## 2024-06-25 RX ADMIN — BENZTROPINE MESYLATE 1 MG: 1 TABLET ORAL at 20:55

## 2024-06-25 RX ADMIN — GABAPENTIN 400 MG: 400 CAPSULE ORAL at 15:19

## 2024-06-25 RX ADMIN — LEVOTHYROXINE SODIUM 112 MCG: 112 TABLET ORAL at 08:13

## 2024-06-25 RX ADMIN — CLOZAPINE 300 MG: 200 TABLET ORAL at 21:00

## 2024-06-25 RX ADMIN — PANTOPRAZOLE SODIUM 40 MG: 40 TABLET, DELAYED RELEASE ORAL at 08:13

## 2024-06-25 RX ADMIN — GABAPENTIN 400 MG: 400 CAPSULE ORAL at 20:55

## 2024-06-25 RX ADMIN — BENZTROPINE MESYLATE 1 MG: 1 TABLET ORAL at 08:13

## 2024-06-25 RX ADMIN — NICOTINE POLACRILEX 2 MG: 2 GUM, CHEWING ORAL at 10:15

## 2024-06-25 RX ADMIN — PROPRANOLOL HYDROCHLORIDE 10 MG: 10 TABLET ORAL at 08:12

## 2024-06-25 RX ADMIN — DOCUSATE SODIUM 100 MG: 100 CAPSULE, LIQUID FILLED ORAL at 08:13

## 2024-06-25 ASSESSMENT — ACTIVITIES OF DAILY LIVING (ADL)
ADLS_ACUITY_SCORE: 39
ADLS_ACUITY_SCORE: 69
ADLS_ACUITY_SCORE: 69
ADLS_ACUITY_SCORE: 39
DRESS: SCRUBS (BEHAVIORAL HEALTH);INDEPENDENT
ADLS_ACUITY_SCORE: 39
ADLS_ACUITY_SCORE: 69
ADLS_ACUITY_SCORE: 39
ADLS_ACUITY_SCORE: 39
ADLS_ACUITY_SCORE: 69
ADLS_ACUITY_SCORE: 69
LAUNDRY: UNABLE TO COMPLETE
ORAL_HYGIENE: INDEPENDENT
ADLS_ACUITY_SCORE: 39
ADLS_ACUITY_SCORE: 39
ADLS_ACUITY_SCORE: 69
ADLS_ACUITY_SCORE: 39
HYGIENE/GROOMING: INDEPENDENT
ADLS_ACUITY_SCORE: 69
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
## 2024-06-25 NOTE — PLAN OF CARE
06/25/24 1435   Patient Belongings   Patient Belongings sent to security per site process;locker   Patient Belongings Put in Hospital Secure Location (Security or Locker, etc.) shoes;cell phone/electronics;wallet;clothing   Belongings Search Yes   Clothing Search Yes   Second Staff Constantino Marie 32     Goal Outcome Evaluation:        The following belongings are in locker # 2:  athletic shoes; black wallet; lighter; cell phone; jeans; vikings jersey; underwear; socks; belt.    The following items were sent to security:  Visa; [employer] card; [employer] card.    A               Admission:  I am responsible for any personal items that are not sent to the safe or pharmacy.  [place] is not responsible for loss, theft or damage of any property in my possession.    Signature:  _________________________________ Date: _______  Time: _____                                              Staff Signature:  ____________________________ Date: ________  Time: _____      2nd Staff person, if patient is unable/unwilling to sign:    Signature: ________________________________ Date: ________  Time: _____     Discharge:  [place] has returned all of my personal belongings:    Signature: _________________________________ Date: ________  Time: _____                                          Staff Signature:  ____________________________ Date: ________  Time: _____

## 2024-06-25 NOTE — ED NOTES
IP MH Referral Acuity Rating Score (RARS)    LMHP complete at referral to IP MH, with DEC; and, daily while awaiting IP MH placement. Call score to PPS.  CRITERIA SCORING   New 72 HH and Involuntary for IP MH (not adolescent) 1/1   Boarding over 24 hours 0/1   Vulnerable adult at least 55+ with multiple co morbidities; or, Patient age 11 or under 0/1   Suicide ideation without relief of precipitating factors 1/1   Current plan for suicide 1/1   Current plan for homicide 1/1   Imminent risk or actual attempt to seriously harm another without relief of factors precipitating the attempt 0/1   Severe dysfunction in daily living (ex: complete neglect for self care, extreme disruption in vegetative function, extreme deterioration in social interactions) 1/1   Recent (last 2 weeks) or current physical aggression in the ED 0/1   Restraints or seclusion episode in ED 0/1   Verbal aggression, agitation, yelling, etc., while in the ED 0/1   Active psychosis with psychomotor agitation or catatonia 0/1   Need for constant or near constant redirection (from leaving, from others, etc).  0/1   Intrusive or disruptive behaviors 0/1   TOTAL Acuity Total Score: 5

## 2024-06-25 NOTE — PHARMACY-CONSULT NOTE
Pharmacy Clozapine Continuation Note    Patient's Name: Kamini Neal  Patient's : 1990    Current cloZAPine regimen: 300 mg HS  Has there been a known interruption in therapy for greater than/equal to 48 hours which would warrant retitration No    Recent ANC Value(s) for last 30 days:  ANC  =4.0      A REMS Dispense Authorization was obtained from the clozapine REMS prgram? Yes   If no, why was the REMS Dispense Authorization not successful:  A Dispense Rationale was needed to obtain the REMS Dispense Authorization? No  Is the ANC (if available) within recommended limits? Yes  Does the patient have any signs or symptoms of infection, including fever? No    Plan:  Continue clozapine therapy at 300 mg PO HS.  A WBC with differential will be ordered at least weekly, NEXT DUE 2024. ANC values will be entered into the REMS program.    Miok Pierson [facility]

## 2024-06-25 NOTE — PROGRESS NOTES
"Triage & Transition Services, Extended Care     Therapy Progress Note    Patient: Tiffanie goes by \"Tiffanie,\" uses he/him pronouns  Date of Service: June 25, 2024  Site of Service: [facility] EMERGENCY DEPARTMENT                             ED15  Patient was seen yes  Mode of Assessment: Virtual: iPad    Presentation Summary: Greeted patient. Introduced self and role. Attempted to discussed what brought pt into the ED. Pt asked when he would be moving inpatient. Discussed current placement search. Pt reported poor appetite and suicidal ideation with plans to \"cut my throat open\". Pt endorses homicidal ideation but not plans or target. Pt stated, \"I just want to hurt people\". Pt reported lack of purpose in life and feels that everyone else is superior to him. Safety planning for a lower level of care is inapporpriate at this time as pt endorses suicidal ideation with plans and intent and homicidal ideations.    Therapeutic Intervention(s) Provided: Engaged in safety planning, Engaged in activity scheduling and behavioral activation, looking at and reviewing the prior week's goals, problem solving any barriers and acknowledging successes, as well as setting new goals., Taught the link between thoughts, feelings, and behaviors.    Current Symptoms: anxious helplessness, hoplessness, thoughts of death/suicide, withdrawl/isolation, crying or feels like crying, low self esteem, impaired decision making, apathy anxious impulsive, high risk behavior, displaces blame, distractability      Mental Status Exam   Affect: Blunted  Appearance: Appropriate  Attention Span/Concentration: Attentive  Eye Contact: Intense    Fund of Knowledge: Appropriate   Language /Speech Content: Fluent  Language /Speech Volume: Normal  Language /Speech Rate/Productions: Normal  Recent Memory: Intact  Remote Memory: Intact  Mood: Apathetic  Orientation to Person: Yes   Orientation to Place: Yes  Orientation to Time of Day: Yes  Orientation " to Date: Yes     Situation (Do they understand why they are here?): Yes  Psychomotor Behavior: Underactive  Thought Content: Hallucinations, Suicidal  Thought Form: Goal Directed    Treatment Objective(s) Addressed: rapport building, orienting the patient to therapy, identifying and practicing coping strategies, assessing safety, identifying additional supports, exploring obstacles to safety in the community, processing feelings, identifying treatment goals    Patient Response to Interventions: needs reinforcement    Progress Towards Goals: Patient Reports Symptoms Are: ongoing  Patient Progress Toward Goals: is making progress  Comment: Pt is able to identify his emotions and mental health needs/concerns.  Next Step to Work Toward Discharge: patient ability to engage in safety planning  Ability to Engage Comment: Safety plan for a lower level of care is inappropriate at this time.    Case Management: Case Management Included: collaborating with patient's support system  Details on Collaborating with Patient's Support System: Consulted with C&[facility] psych provider: Trinity Pan, spoke with RN: Mitch Fallon  Summary of Interaction: Per Trinity: Continues to endorse suicidal ideation, 72-hour hold due to severity of suicidal and homicidal ideation will continue to reassess if patient continues to be voluntarily will drop on June 26, 2024, if patient asked to leave AGAINST MEDICAL ADVICE please reassess. Per RN: Constantly asking when he will be placed.    Plan: inpatient mental health  yes provider Dr. Taylor and Trinity Pan  yes    Clinical Substantiation: Recommendation for inpatient mental health does not change at this time. Pt continues to endorse in suicidal ideation with plans and intent and homicidal ideation. Pt unable to safety plan for a lower level of care. Pt reported he is voluntary but psych would like to continue 72-hour hold due to severity of suicidal and homicidal ideation. If patient continues to be  voluntarily will drop on June 26, 2024, if patient asked to leave AGAINST MEDICAL ADVICE please reassess.    Legal Status: Legal Status at Admission: 72 Hour Hold  72 Hour Hold - Date/Time Initiated: 06/24/24 1903  72 Hour Hold - Date/Time Ends: 06/27/24 1903    Session Status: Time session started: 1105  Time session ended: 1121  Session Duration (minutes): 16 minutes  Session Number: 1  Anticipated number of sessions or this episode of care: 4    Time Spent: 16 minutes    CPT Code: CPT Codes: 86826 - Psychotherapy (with patient) - 30 (16-37*) min    Diagnosis:   Patient Active Problem List   Diagnosis Code    Depression F32.A    Schizoaffective disorder, bipolar type (H) F25.0    Suicidal behavior R45.89    PTSD (post-traumatic stress disorder) F43.10    History of schizophrenia Z86.59    Chronic post-traumatic stress disorder (PTSD) F43.12    Hallucinations R44.3    Alcohol abuse, episodic F10.10    Borderline personality disorder (H) F60.3    Cannabis dependence in remission (H) F12.21    Cannabis use disorder, moderate, in sustained remission (H) F12.21    GERD (gastroesophageal reflux disease) K21.9    High risk medication use Z79.899    Hypothyroidism E03.9    Noncompliance Z91.199    PPD positive, treated R76.11    TB lung, latent Z22.7    Major depression F32.9    Mood change R45.86    Nicotine use disorder F17.200    Suicidal ideation R45.851    Schizophrenia, schizoaffective, chronic with acute exacerbation (H) F25.9    Marijuana abuse F12.10       Primary Problem This Admission: Active Hospital Problems    Schizoaffective disorder, bipolar type (H)        SUZANNE Phillip   Licensed Mental Health Professional (LMHP), Extended Care  [phone]

## 2024-06-25 NOTE — TELEPHONE ENCOUNTER
S: [facility] Yvonne , DEC  Lalo  calling at 8:28pm about a 33 year old/Male presenting with SI & HI w/ plans.      B: Pt arrived via EMS. Presenting problem, stressors: He lives in a group ho me.  He reports endorsing SI and HI.  He has SI plan to slit his throat and will not share his HI plan.    He got rejected by a school.  This trigger loneliness.     Pt affect in ED: Blunted and Flat  Pt Dx: Schizoaffective Disorder  Previous [facility] hx? Yes: Many,  Most recent in 2023 (8 months ago for a week).    Pt endorses SI with a plan to slit his throat.    Hx of suicide attempt? Yes: 1x in 2017 by burning himself.   Pt denies SIB  Pt endorses HI towards - he won't talk about plan. with plans and intent   Pt endorses auditory hallucinations .  Voices commanding him to cut his throat.  Gomez snot appear responding to internal stimuli.   Pt RARS Score: 5    Hx of aggression/violence, sexual offenses, legal concerns, Epic care plan? describe: No  Current concerns for aggression this visit? No  Does pt have a history of Civil Commitment? Yes, most recent commitment 2012  Is Pt their own guardian? Yes    Pt is prescribed medication. Is patient medication compliant? Yes  Pt endorses OP services: group home   CD concerns: Actively using/consuming marijuana.  Reports he hasn't used in 2 weeks.   Acute or chronic medical concerns: No  Does Pt present with specific needs, assistive devices, or exclusionary criteria? None      Pt is ambulatory  Pt is able to perform ADLs independently      A: Pt to be reviewed for [facility] admission. Pt is on a 72HH, initiated 6/24/24 at 7:03pm.   He wants to leave to go kill himself.   Preferred placement: Statewide    COVID Symptoms: No  If yes, COVID test required   Utox: Ordered, not yet collected   CMP: Ordered, not yet collected   CBC: Ordered, not yet collected   HCG: N/A    R: Patient cleared and ready for behavioral bed placement: Yes  Pt placed on [facility] worklist? Yes    Does Patient need a  Transfer Center request created? No, Pt is located within [facility] ED, [facility] ED, or [facility]    [facility] Access Inpatient Bed Call Log 6/24/2024  @8:30 PM:   Intake has called facilities that have not updated their bed status within the last 12 hours.    [facility] is posting 0 beds.              [facility] is posting 3 beds. [phone] 8:06A PER [facility], BEDS AVAILABLE. CONTACT APS: [phone].  [facility] is posting 0 beds. [phone]              [facility] is posting 0 beds. [phone] 8:08A PER [facility], NO HIGH ACUITY, POSSIBLE LOW ACUITY BEDS.  [facility] is posting 2 beds. [phone]   [facility] (Ages 18-27) is posting 1 beds. Negative COVID test required, no recent or significant aggression, violence, or sexual assault. [phone] 8:02A PER [facility], 5 CHILD, HANDFUL OF ADOL BUT NO IVET BEDS, AND NO YA BEDS.  [facility] is posting 0 beds. [phone]            [facility] is posting 0 beds. [phone]     [facility] through [facility] is posting 0 beds. [phone]         [facility] is posting 5 beds. Mixed unit 12+. Low acuity only.  DIRECT: [phone]      [facility] is positing 0 beds. No aggression.  [phone]         [facility] is posting 0 beds. [phone]   [facility] is posting 2 beds. Low acuity only. [phone] 8:09A PER [facility] UNKNOWN UNTIL 10:30A, [facility] AND [place] HAVE AVAILABILITY.     [facility] is posting 1 beds. Low acuity. No current aggression. [phone]   [facility] is posting 0 beds. Low acuity. [phone] 8:09A PER [facility] UNKNOWN UNTIL 10:30A, [facility] AND [place] HAVE AVAILABILITY.     [facility] is posting 1 beds. 72 HH preferred. Low acuity. [phone] 8:12A LEFT VM REQUESTING CB.  [facility] is posting 3 beds. Low acuity. [phone] 8:09A PER [facility] UNKNOWN UNTIL 10:30A, [facility] AND [place] HAVE AVAILABILITY.         [facility] is posting 5 beds. Vol only, No Hx of aggression, violence, or assault. No sexual offenders. No 72 hr holds.      [facility] is posting 2 beds. (Must have the cognitive ability to do programming. No aggressive or violent behavior or recent HX in the last 2 yrs. MH must be primary.) [phone]  [facility] is posting 0 beds. Low acuity only. Violence and aggression capped. [phone]     [facility] is posting 2 beds. Low acuity, Neg Covid. [phone] 8:13A PER [facility], WAITING LIST.    [facility] [place] posting 0 beds. Negative covid.       [facility] is posting 2 beds. [phone] no wounds, lines, drains, C-paps, tubes and must be able to care for themselves; no heavy hx of aggression. Pt also needs a confirmed ride from facility upon d/c. 8:15A 2 BEDS AVAILABLE.  [facility] is posting 5 beds. Mixed unit.  [phone] 8:05A BEDS AVAILABLE ON GEN UNIT. NO HIGH ACUITY.     Pt remains on work list pending appropriate bed availability.

## 2024-06-25 NOTE — PLAN OF CARE
Problem: Suicide Risk  Goal: Absence of Self-Harm  Outcome: Progressing   Goal Outcome Evaluation:    Plan of Care Reviewed With: patient      Pt was isolative  and withdrawn in his room.  Flat affect with paucity of words. He denied pain, anxiety, depression, HI, SIB. He endorses SI with no plan and  contracted for safety. He stated he is voluntary and is here to stabilize then go home. He was medication compliant, prn nicotine gum X3. EKG done, results normal. Pt has fasting labs in the morning (0630). Ate adequately for dinner. Plan of care on going, no other concerns will continue to moitor.

## 2024-06-25 NOTE — PHARMACY-ADMISSION MEDICATION HISTORY
Pharmacist Admission Medication History    Admission medication history is complete. The information provided in this note is only as accurate as the sources available at the time of the update.    Information Source(s): Patient's pharmacy, Facility (U/NH/) medication list/MAR, and CareEverywhere/SureScripts via phone    Pertinent Information:   -Contacted patient's  Neela: [phone]. She directed me to call [facility] in [place] to find out the patient's medications. She said there was a med list in the ED, but I was unable to find it when I was down there. She said they had no other copies or information she could fax me since the ED has the original list. Of note, PTA meds were ordered correctly last night, so that list must be down there somewhere & the MD saw it at some point to order meds?  -Called [facility] [place]. Was able to confirm recently filled medications. They matched with [facility] Care Everywhere. Also called [facility] who handles the clozapine. They confirmed the dose is 300mg HS, last filled 6/14 for 28 days supply.    Changes made to PTA medication list:  Added: venlafaxine, Miralax, hydroxyzine PRN, atropine drops PRN (per [facility] & [facility])  Deleted: Prevident toothpaste (not filled recently)  Changed: clozapine dose from 350mg HS to 300mg HS, vitamin D dose from 5000 units daily to 1000 units daily (per [facility]), olanzapine 5-10mg PRN to 10mg TID PRN (per [facility])    Allergies reviewed with patient and updates made in EHR: no    Medication History Completed By: Alessia Mclean RPH 6/25/2024 10:30 AM    PTA Med List   Medication Sig Last Dose    atropine 1 % SOLN Place 3 drops under the tongue nightly as needed for secretions prn at prn    benztropine (COGENTIN) 1 MG tablet Take 1 mg by mouth 2 times daily      bisacodyl (DULCOLAX) 10 MG suppository Place 1 suppository (10 mg) rectally daily as needed for constipation prn at prn    cloZAPine (CLOZARIL) 100 MG  tablet Take 300 mg by mouth at bedtime Take with 50mg for a total dose of 350mg 6/23/2024    docusate sodium (COLACE) 100 MG capsule Take 100 mg by mouth 2 times daily      gabapentin (NEURONTIN) 400 MG capsule Take 400 mg by mouth 3 times daily     hydrOXYzine HCl (ATARAX) 25 MG tablet Take 25 mg by mouth 2 times daily as needed for anxiety prn at prn    levothyroxine (SYNTHROID/LEVOTHROID) 112 MCG tablet Take 1 tablet (112 mcg) by mouth daily     OLANZapine (ZYPREXA) 10 MG tablet Take 10 mg by mouth 3 times daily as needed (agitation/psychosis/self harm) PRN at PRN    omeprazole (PRILOSEC) 20 MG DR capsule Take 20 mg by mouth daily     paliperidone (INVEGA SUSTENNA) 234 MG/1.5ML ANTHONY Inject 1.5 mLs (234 mg) into the muscle every 21 days (Patient taking differently: Inject 234 mg into the muscle every 21 days Last dose given: 6/10/24) 6/10/2024    polyethylene glycol (MIRALAX) 17 GM/Dose powder Take 17 g by mouth daily     prazosin (MINIPRESS) 2 MG capsule Take 4 mg by mouth At Bedtime      propranolol (INDERAL) 10 MG tablet Take 10 mg by mouth 2 times daily     venlafaxine (EFFEXOR XR) 75 MG 24 hr capsule Take 225 mg by mouth daily     Vitamin D3 (VITAMIN D, CHOLECALCIFEROL,) 25 mcg (1000 units) tablet Take 25 mcg by mouth daily

## 2024-06-25 NOTE — TELEPHONE ENCOUNTER
R:  [place]  Access Inpatient Bed Call Log 6/25/24 7:33 AM   Intake has called facilities that have not updated their bed status within the last 12 hours.        [facility] is posting 0 beds.               [facility] is posting 0 beds. [phone]    [facility] is posting 0 beds. [phone]               [facility] is posting 0 beds. [phone]    [facility] is posting 2 beds. [phone]  Per call to [place] @ 8:51 AM, no beds.     [facility] (Ages 18-35) is posting 1 beds. Negative COVID test required, no recent or significant aggression, violence, or sexual assault. [phone]. Per call at 7:56am to Ellen no pastora beds, however, YA, Adol and child beds are avail.   [facility] is posting 0 beds. [phone]             [facility] is posting 0 beds. [phone]      [facility] is posting 0 beds. [phone]         [facility] is posting 5 beds. Mixed unit 12+. Low acuity only.  DIRECT: [phone]       [facility] is positing 0 beds. No aggression.  [phone]          [facility] is posting 0 beds. [phone] Per call at 8:01am to [facility] beds are available, no adult beds.   [facility] is posting 1 beds. Low acuity only. [phone]   [facility] is posting 0 beds. Low acuity. No current aggression. [phone]    [facility] is posting 2 beds. Low acuity. [phone]   [facility] is posting 1 beds. 72 HH preferred. Low acuity. [phone] Per call at 7:57am to [facility] beds are avail for review.   [facility] is posting 4 beds. Low acuity. [phone]    [facility] is posting 6 beds. Vol only, No Hx of aggression, violence, or assault. No sexual offenders. No 72 hr holds. [phone]      [facility] is posting 3 beds. (Must have the cognitive ability to do programming. No aggressive or violent behavior or recent HX in the last 2 yrs. MH must be primary.)  [phone]   [facility] is posting 3 beds. Low acuity only. Violence and aggression capped. [phone]    [facility] is posting 2 beds. Low acuity, Neg Covid. [phone] Per call at 7:58am to 7:58am to [place] potentially depending on specific patient.   [facility] [place] posting 4 beds. Negative covid.       [facility] is posting 2 beds. [phone] no wounds, lines, drains, C-paps, tubes and must be able to care for themselves; no heavy hx of aggression. Pt also needs a confirmed ride from facility upon d/c.   [facility] is posting 5 beds. Mixed unit.  [phone]        Pt remains on work list pending appropriate bed availability.      8:36 PM Per call to [facility], they can review for admission.   8:47 AM Clinical faxed to  for review.   9:08 AM Paged Nancy to review pt for admission to [facility].   9:59 AM 2nd page to Nancy   9:59 AM Kaur from  called seeking information on pts alcohol use: last drink, how often and how much, what kind?  10:08 AM Nancy called and states Cmfloresita will be taking a look at the admission and will intake.  11:04 AM Sam accepts pt for admission to [facility].  11:15 AM Called [facility] to inform of pt in queue. She wcb once she knows accepting RN, report time.   11:34 PM Called ED and provided placement   12:01 PM Received a call from [facility] who reports they will call ED for report at 1:30 PM.   12:03 PM updated ED

## 2024-06-25 NOTE — CONSULTS
"Diagnostic Evaluation Consultation  Crisis Assessment    Patient Name: Kamini Neal  Age:  33 year old  Legal Sex: male  Gender Identity: male  Race: Black or   Ethnicity: Not  or   Language: English      Patient was assessed: In person   Crisis Assessment Start Date: 06/24/24  Crisis Assessment Start Time: 1830  Crisis Assessment Stop Time: 1900  Patient location: [facility] EMERGENCY DEPARTMENT                             [facility]    Referral Data and Chief Complaint  Kamini Neal presents to the ED via EMS. Patient is presenting to the ED for the following concerns:  .   Factors that make the mental health crisis life threatening or complex are:  Patient has extensive history of mental health concerns including group home placement, civil commitment, and multiple inpatient mental health admissions.  Patient presents following increase in suicidal and homicidal thought 3 days ago.  Patient called EMS and voluntarily requested transportation to our emergency department..      Informed Consent and Assessment Methods  Explained the crisis assessment process, including applicable information disclosures and limits to confidentiality, assessed understanding of the process, and obtained consent to proceed with the assessment.  Assessment methods included conducting a formal interview with patient, review of medical records, collaboration with medical staff, and obtaining relevant collateral information from family and community providers when available.  : done     Patient response to interventions: needs reinforcement  Coping skills were attempted to reduce the crisis:  Patient called EMS due to thoughts of suicide and homicide.     History of the Crisis   Patient reports onset of suicidal and homicidal thought this past Friday.  Patient denies knowledge of the trigger.  Patient reports he has been feeling \"super lonely, isolated, and with no support\".  Patient reports " "he was looking forward to beginning school at [employer], wanted to become a CNA and had taken an entrance exam.  Patient reports he recently got denied and sent them an email asking why, without response.  Patient has since experienced increase in suicidal and homicidal thought.  Patient endorses suicidal ideation \"constantly\" to slit his throat with a knife.  Patient endorses intent, and skills the intensity of his thoughts at 4 out of 5.  Patient reports feeling there is \"no use for me\" and \"nothing going on\".  Patient also endorses homicidal ideation but refuses to disclose any specific plan or victim.  Patient reports auditory hallucinations calling him a \"retard\", telling him that he has \"wasted his life\", and that he should slit his throat the past few days.  Patient denies any current auditory stimuli.  Patient reports he was at his group home and called EMS requesting evaluation in our emergency department.  Patient reports if discharged, \"something bad might happen\".  Patient reports his \"patience is running out\" waiting in our hallway, then request discharge in order to \"probably kill myself\".    Brief Psychosocial History  Family:  Single, Children no  Support System:  Facility resident(s)/Staff (2 cousins)  Employment Status:  unemployed  Source of Income:  none  Financial Environmental Concerns:  none  Current Hobbies:  social media/computer activities, television/movies/videos  Barriers in Personal Life:  mental health concerns    Significant Clinical History  Current Anxiety Symptoms:     Current Depression/Trauma:  hopelessness, helplessness, sadness, thoughts of death/suicide, impaired decision making  Current Somatic Symptoms:     Current Psychosis/Thought Disturbance:  high risk behavior  Current Eating Symptoms:     Chemical Use History:  Alcohol: None  Benzodiazepines: None  Opiates: None  Cocaine: None  Marijuana: Occasional  Last Use:: 06/10/24   Past diagnosis:  Personality Disorder, PTSD, " Depression  Family history:  No known history of mental health or chemical health concerns  Past treatment:     Details of most recent treatment:  Patient hashistory of multiple inpatient mental health admissions, most recently 10/9/2023 - 10/16/2023 (7 days) at [facility]. Patient reports he lives at [facility]. Patient reports living there for the past 12 years. Patient lives with 5 total residents and has 24/7 staff.  Other relevant history:          Collateral Information  Is there collateral information: Yes     Collateral information name, relationship, phone number:  CASTILLO WISDOM ([facility]) [phone] -- [phone]    What happened today: Both calls went to [employer].     Risk Assessment  [place] Suicide Severity Rating Scale Full Clinical Version:  Suicidal Ideation  Q1 Wish to be Dead (Lifetime): Yes  Q2 Non-Specific Active Suicidal Thoughts (Lifetime): Yes  3. Active Suicidal Ideation with any Methods (Not Plan) Without Intent to Act (Lifetime): Yes  Q4 Active Suicidal Ideation with Some Intent to Act, Without Specific Plan (Lifetime): Yes  Q5 Active Suicidal Ideation with Specific Plan and Intent (Lifetime): Yes  Q6 Suicide Behavior (Lifetime): yes     Suicidal Behavior (Lifetime)  Actual Attempt (Lifetime): Yes  Has subject engaged in non-suicidal self-injurious behavior? (Lifetime): Yes  Interrupted Attempts (Lifetime): No  Aborted or Self-Interrupted Attempt (Lifetime): No  Preparatory Acts or Behavior (Lifetime): Yes    [place] Suicide Severity Rating Scale Recent:   Suicidal Ideation (Recent)  Q1 Wished to be Dead (Past Month): yes  Q2 Suicidal Thoughts (Past Month): yes  Q3 Suicidal Thought Method: yes  Q4 Suicidal Intent without Specific Plan: yes  Q5 Suicide Intent with Specific Plan: yes  If yes to Q6, within past 3 months?: yes  Level of Risk per Screen: high risk  Intensity of Ideation (Recent)  Most Severe Ideation Rating  "(Past 1 Month): 4  Frequency (Past 1 Month): 2-5 times in week  Duration (Past 1 Month): More than 8 hours/persistent or continuous  Controllability (Past 1 Month): Unable to control thoughts  Deterrents (Past 1 Month): Uncertain that deterrents stopped you  Reasons for Ideation (Past 1 Month): Equally to get attention, revenge, or a reaction from others and to end/stop the pain  Suicidal Behavior (Recent)  Actual Attempt (Past 3 Months): No  Has subject engaged in non-suicidal self-injurious behavior? (Past 3 Months): No  Interrupted Attempts (Past 3 Months): No  Aborted or Self-Interrupted Attempt (Past 3 Months): No  Preparatory Acts or Behavior (Past 3 Months): Yes    Environmental or Psychosocial Events: helplessness/hopelessness  Protective Factors: Protective Factors: help seeking, supportive ongoing medical and mental health care relationships, lives in a responsibly safe and stable environment    Does the patient have thoughts of harming others? Feels Like Hurting Others: yes  Previous Attempt to Hurt Others: no  Violence Threats in Past 6 Months: Patient endorses homicidal ideation but \"doesn't want to talk about that\".  Current Violence Plan or Thoughts: Patient endorses but refuses to disclose details on any intended target or specific plan.  Is the patient engaging in sexually inappropriate behavior?: no  Duty to warn initiated: no    Is the patient engaging in sexually inappropriate behavior?  no        Mental Status Exam   Affect: Blunted  Appearance: Appropriate  Attention Span/Concentration: Attentive  Eye Contact: Intense    Fund of Knowledge: Appropriate   Language /Speech Content: Fluent  Language /Speech Volume: Normal  Language /Speech Rate/Productions: Minimally Responsive  Recent Memory: Intact  Remote Memory: Intact  Mood: Depressed  Orientation to Person: Yes   Orientation to Place: Yes  Orientation to Time of Day: Yes  Orientation to Date: Yes     Situation (Do they understand why they are " here?): Yes  Psychomotor Behavior: Normal  Thought Content: Suicidal  Thought Form: Goal Directed     Medication  Psychotropic medications:   Medication Orders - Psychiatric (From admission, onward)      Start     Dose/Rate Route Frequency Ordered Stop    06/25/24 0800  venlafaxine (EFFEXOR XR) 24 hr capsule 225 mg         225 mg Oral DAILY WITH BREAKFAST 06/24/24 1900      06/24/24 2200  cloZAPine (CLOZARIL) tablet 300 mg         300 mg Oral AT BEDTIME 06/24/24 1900 06/24/24 1858  OLANZapine (zyPREXA) tablet 10 mg         10 mg Oral 3 TIMES DAILY PRN 06/24/24 1900               Current Care Team  Patient Care Team:  [place] as PCP - General  Marco Campo MD (Psychiatry)    Diagnosis  Patient Active Problem List   Diagnosis Code    Depression F32.A    Suicidal behavior R45.89    PTSD (post-traumatic stress disorder) F43.10    History of schizophrenia Z86.59    Chronic post-traumatic stress disorder (PTSD) F43.12    Hallucinations R44.3    Alcohol abuse, episodic F10.10    Borderline personality disorder (H) F60.3    Cannabis dependence in remission (H) F12.21    Cannabis use disorder, moderate, in sustained remission (H) F12.21    GERD (gastroesophageal reflux disease) K21.9    High risk medication use Z79.899    Hypothyroidism E03.9    Noncompliance Z91.199    PPD positive, treated R76.11    TB lung, latent Z22.7    Major depression F32.9    Mood change R45.86    Nicotine use disorder F17.200    Suicidal ideation R45.851    Schizophrenia, schizoaffective, chronic with acute exacerbation (H) F25.9    Marijuana abuse F12.10       Primary Problem This Admission  There are no active hospital problems to display for this patient.      Clinical Summary and Substantiation of Recommendations   Patient endorses suicidal ideation for the past 3 days constantly to slit his throat with a knife.  Patient endorses intent due to feeling negatively about himself, helpless and hopeless about the future.   "Patient endorses homicidal ideation which she refuses to provide details about.  Patient endorses command auditory hallucinations telling him that he is \"retarded\", that he has \"wasted his life\", and also telling him to slit his throat with a knife.  Patient requested discharge in order to \"probably kill myself\", due to his \"patience running out\" waiting in our emergency department.  Patient was placed on a 72-hour hold.  Patient will be referred for admission.       Imminent risk of harm: Suicidal Behavior  Severe psychiatric, behavioral or other comorbid conditions are appropriate for management at inpatient mental health as indicated by at least one of the following: Psychiatric Symptoms  Severe dysfunction in daily living is present as indicated by at least one of the following: Complete inability to maintain any appropriate aspect of personal responsibility in any adult roles  Situation and expectations are appropriate for inpatient care: Biopsychosocial stresses potentially contributing to clinical presentation (co morbidities) have been assessed and are absent or manageable at proposed level of care  Inpatient mental health services are necessary to meet patient needs and at least one of the following: Specific condition related to admission diagnosis is present and judged likely to deteriorate in absence of treatment at proposed level of care      Patient coping skills attempted to reduce the crisis:  Patient called EMS due to thoughts of suicide and homicide.    Disposition  Recommended disposition: Inpatient Mental Health        Reviewed case and recommendations with attending provider. Attending Name: Dr. Bull Hameed       Attending concurs with disposition: yes       Patient and/or validated legal guardian concurs with disposition:   yes       Final disposition:  inpatient mental health    Legal status on admission: 72 Hour Hold    Assessment Details   Total duration spent with the patient: 30 min   "   CPT code(s) utilized: 30105 - Psychotherapy for Crisis - 60 (30-74*) min    SUZANNE Dunn, Psychotherapist  [facility] - Triage & Transition Services  Callback: [phone]   Statement Selected

## 2024-06-25 NOTE — ED NOTES
"[facility]   ED Nurse to Floor Handoff     Kamini Neal is a 33 year old male who speaks English and lives unknown,  in a home  They arrived in the ED by unknown from home    ED Chief Complaint: Suicidal (Suicidal Ideation, Homicidal ideation.)    ED Dx;   Final diagnoses:   Schizoaffective disorder, bipolar type (H)   Anxiety   Suicidal ideation         Needed?: No    Allergies:   Allergies   Allergen Reactions    Haloperidol Other (See Comments)     Other reaction(s): Tardive Dyskinesia  Torticollis  Torticollis  Stiff Neck  Torticollis; reports \"stiff neck.\"     .  Past Medical Hx:   Past Medical History:   Diagnosis Date    Anxiety     Depressive disorder     Schizo affective schizophrenia (H)     Substance abuse (H)       Baseline Mental status: WDL  Current Mental Status changes: at basesline    Infection present or suspected this encounter: no  Sepsis suspected: No  Isolation type: No active isolations  Patient tested for COVID 19 prior to admission: NO     Activity level - Baseline/Home:  Independent  Activity Level - Current:   Independent    Bariatric equipment needed?: No    In the ED these meds were given:   Medications   benztropine (COGENTIN) tablet 1 mg (1 mg Oral $Given 6/25/24 0813)   bisacodyl (DULCOLAX) suppository 10 mg (has no administration in time range)   cloZAPine (CLOZARIL) tablet 300 mg (300 mg Oral Not Given 6/24/24 2347)   docusate sodium (COLACE) capsule 100 mg (100 mg Oral $Given 6/25/24 0813)   gabapentin (NEURONTIN) capsule 400 mg (400 mg Oral $Given 6/25/24 0813)   levothyroxine (SYNTHROID/LEVOTHROID) tablet 112 mcg (112 mcg Oral $Given 6/25/24 0813)   OLANZapine (zyPREXA) tablet 10 mg (has no administration in time range)   prazosin (MINIPRESS) capsule 4 mg (4 mg Oral $Given 6/24/24 2156)   propranolol (INDERAL) tablet 10 mg (10 mg Oral $Given 6/25/24 0812)   venlafaxine (EFFEXOR XR) 24 hr capsule 225 mg (150 mg Oral " $Given 6/25/24 0812)   pantoprazole (PROTONIX) EC tablet 40 mg (40 mg Oral $Given 6/25/24 0813)   nicotine (NICORETTE) gum 2 mg (2 mg Buccal $Given 6/25/24 1147)   Vitamin D3 (CHOLECALCIFEROL) tablet 25 mcg (has no administration in time range)   paliperidone (INVEGA SUSTENNA) injection ANTHONY 234 mg (has no administration in time range)   nicotine polacrilex (NICORETTE) gum 4 mg (4 mg Buccal $Given 6/24/24 1734)   nicotine polacrilex (NICORETTE) gum 4 mg (4 mg Buccal $Given 6/24/24 2003)   nicotine (NICORETTE) gum 2 mg (2 mg Buccal $Given 6/24/24 2206)       Drips running?  No    Home pump  No    Current LDAs       Labs results:   Labs Ordered and Resulted from Time of ED Arrival to Time of ED Departure   URINE DRUG SCREEN PANEL - Abnormal       Result Value    Amphetamines Urine Screen Negative      Barbituates Urine Screen Negative      Benzodiazepine Urine Screen Negative      Cannabinoids Urine Screen Positive (*)     Cocaine Urine Screen Negative      Fentanyl Qual Urine Screen Negative      Opiates Urine Screen Negative      PCP Urine Screen Negative     COMPREHENSIVE METABOLIC PANEL - Abnormal    Sodium 141      Potassium 3.9      Carbon Dioxide (CO2) 27      Anion Gap 10      Urea Nitrogen 6.7      Creatinine 0.85      GFR Estimate >90      Calcium 9.5      Chloride 104      Glucose 111 (*)     Alkaline Phosphatase 98      AST 16      ALT 13      Protein Total 6.4      Albumin 4.2      Bilirubin Total 0.2     TSH WITH FREE T4 REFLEX - Normal    TSH 3.02     URINE CREATININE FOR DRUG SCREEN PANEL    Creatinine Urine for Drug Screen 18     CBC WITH PLATELETS AND DIFFERENTIAL    WBC Count 6.7      RBC Count 4.51      Hemoglobin 14.5      Hematocrit 42.0      MCV 93      MCH 32.2      MCHC 34.5      RDW 12.3      Platelet Count 187      % Neutrophils 59      % Lymphocytes 30      % Monocytes 9      % Eosinophils 0      % Basophils 1      % Immature Granulocytes 1      NRBCs per 100 WBC 0      Absolute Neutrophils  4.0      Absolute Lymphocytes 2.0      Absolute Monocytes 0.6      Absolute Eosinophils 0.0      Absolute Basophils 0.1      Absolute Immature Granulocytes 0.0      Absolute NRBCs 0.0     THC CONFIRMATION QUANTITATIVE URINE   CLOZAPINE AND METABOLITES QUANT       Imaging Studies: No results found for this or any previous visit (from the past 24 hour(s)).    Recent vital signs:   /68   Pulse 70   Temp 99.1  F (37.3  C) (Oral)   Resp 15   SpO2 97%     [place] Coma Scale Score: 15 (06/25/24 0829)       Cardiac Rhythm: Normal Sinus  Pt needs tele? No  Skin/wound Issues: None    Code Status: Full Code    Pain control: fair    Nausea control: fair    Abnormal labs/tests/findings requiring intervention:     Family present during ED course? No   Family Comments/Social Situation comments:     Tasks needing completion: None    Mitch Fallon, RN  [phone] [facility]

## 2024-06-25 NOTE — PHARMACY-ADMISSION MEDICATION HISTORY
Pharmacist Admission Medication History    Admission medication history is complete. The information provided in this note is only as accurate as the sources available at the time of the update.    Information Source(s): Patient via in-person    Pertinent Information:   - Patient endorsed to all PTA medications; last administration of Invega Sustena was on 6/10/24    Changes made to PTA medication list:  Added: None  Deleted: None  Changed: None    Allergies reviewed with patient and updates made in EHR: yes    Medication History Completed By: Miko Pierson [facility] 6/24/2024 9:16 PM    PTA Med List   Medication Sig Last Dose    benztropine (COGENTIN) 1 MG tablet Take 1 mg by mouth 2 times daily      bisacodyl (DULCOLAX) 10 MG suppository Place 1 suppository (10 mg) rectally daily as needed for constipation     cholecalciferol (VITAMIN D3) 125 mcg (5000 units) capsule Take 125 mcg by mouth daily      cloZAPine (CLOZARIL) 100 MG tablet Take 300 mg by mouth at bedtime Take with 50mg for a total dose of 350mg 6/23/2024    cloZAPine (CLOZARIL) 50 MG tablet Take 50 mg by mouth at bedtime Take with 300mg for a total dose of 350mg 6/23/2024    docusate sodium (COLACE) 100 MG capsule Take 100 mg by mouth 2 times daily      gabapentin (NEURONTIN) 400 MG capsule Take 400 mg by mouth 3 times daily     levothyroxine (SYNTHROID/LEVOTHROID) 112 MCG tablet Take 1 tablet (112 mcg) by mouth daily     OLANZapine (ZYPREXA) 20 MG tablet Take 5-10 mg by mouth as needed (agitation, psychosis)     omeprazole (PRILOSEC) 20 MG DR capsule Take 20 mg by mouth daily     paliperidone (INVEGA SUSTENNA) 234 MG/1.5ML ANTHONY Inject 1.5 mLs (234 mg) into the muscle every 21 days (Patient taking differently: Inject 234 mg into the muscle every 21 days Last administered on 9/28/2023) 6/10/2024    prazosin (MINIPRESS) 2 MG capsule Take 4 mg by mouth At Bedtime      propranolol (INDERAL) 10 MG tablet Take 10 mg by mouth 2 times daily     sodium  fluoride dental gel (PREVIDENT) 1.1 % GEL topical gel Apply to affected area 2 times daily

## 2024-06-25 NOTE — TELEPHONE ENCOUNTER
R:  [place]  Access Inpatient Bed Call Log 6/24/2024  @4:30 PM:   Intake has called facilities that have not updated their bed status within the last 12 hours.                [facility] is posting 0 beds.                        [facility] is posting 3 beds. [phone] 8:06A PER [place], BEDS AVAILABLE. CONTACT APS: [phone].            [facility] is posting 0 beds. [phone]                        [facility] is posting 0 beds. [phone] 8:08A PER [facility] [place], NO HIGH ACUITY, POSSIBLE LOW ACUITY BEDS.            [facility] is posting 2 beds. [phone]             [facility] (Ages 18-27) is posting 1 beds. Negative COVID test required, no recent or significant aggression, violence, or sexual assault. [phone] 8:02A PER [place], 5 CHILD, HANDFUL OF ADOL BUT NO IVET BEDS, AND NO YA BEDS.            [facility] is posting 0 beds. [phone]                      [facility] is posting 0 beds. [phone]               [facility] is posting 0 beds. [phone]                   [facility] is posting 5 beds. Mixed unit 12+. Low acuity only.  DIRECT: [phone]                [facility] is positing 0 beds. No aggression.  [phone]                   [facility] is posting 0 beds. [phone]             [facility] is posting 2 beds. Low acuity only. [phone] 8:09A PER [facility] [place] UNKNOWN UNTIL 10:30A, [facility] AND [place] HAVE AVAILABILITY.               [facility] is posting 1 beds. Low acuity. No current aggression. [phone]             [facility] is posting 0 beds. Low acuity. [phone] 8:09A PER [facility] [place] UNKNOWN UNTIL 10:30A, [facility] AND [place] HAVE AVAILABILITY.               [facility] is posting 1 beds. 72 HH preferred. Low acuity. [phone] 8:12A LEFT VM REQUESTING CB.            [place] is posting 3 beds. Low acuity. [phone] 8:09A PER  [facility], [place] UNKNOWN UNTIL 10:30A, [facility]IYA ZHENG AND [place] HAVE AVAILABILITY.                  [facility] is posting 5 beds. Vol only, No Hx of aggression, violence, or assault. No sexual offenders. No 72 hr holds.                [facility] is posting 2 beds. (Must have the cognitive ability to do programming. No aggressive or violent behavior or recent HX in the last 2 yrs. MH must be primary.) [phone]            [facility] is posting 0 beds. Low acuity only. Violence and aggression capped. [phone]               [facility] is posting 2 beds. Low acuity, Neg Covid. [phone] 8:13A PER [place], WAITING LIST.              [facility] posting 0 beds. Negative covid.                 [facility] is posting 2 beds. [phone] no wounds, lines, drains, C-paps, tubes and must be able to care for themselves; no heavy hx of aggression. Pt also needs a confirmed ride from facility upon d/c. 8:15A 2 BEDS AVAILABLE.            [facility] is posting 12 beds. Call for details. [phone] OUT OF STATE 8:00A PER [place], 13 KIDS BEDS, AND 10 ADULT BEDS.            [facility] is posting 5 beds. Mixed unit.  [phone] 8:05A BEDS AVAILABLE ON GEN UNIT. NO HIGH ACUITY.         Pt remains on work list pending appropriate bed availability.

## 2024-06-25 NOTE — PLAN OF CARE
"Kamini Neal  June 24, 2024  Plan of Care Hand-off Note     Patient Care Path: inpatient mental health    Plan for Care:   Patient endorses suicidal ideation for the past 3 days constantly to slit his throat with a knife.  Patient endorses intent due to feeling negatively about himself, helpless and hopeless about the future.  Patient endorses homicidal ideation which she refuses to provide details about.  Patient endorses command auditory hallucinations telling him that he is \"retarded\", that he has \"wasted his life\", and also telling him to slit his throat with a knife.  Patient requested discharge in order to \"probably kill myself\", due to his \"patience running out\" waiting in our emergency department.  Patient was placed on a 72-hour hold.  Patient will be referred for admission.    Identified Goals and Safety Issues: medication management and crisis stabilization    Overview:  CASTILLO WISDOM (Other) [phone] -- [phone]      Legal Status: Legal Status at Admission: 72 Hour Hold  72 Hour Hold - Date/Time Initiated: 06/24/24 1903  72 Hour Hold - Date/Time Ends: 06/27/24 1903    Psychiatry Consult: ordered       Updated MD regarding plan of care.           SUZANNE Dunn  "
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
## 2024-06-26 PROBLEM — F25.9 SCHIZOAFFECTIVE DISORDER, UNSPECIFIED TYPE (H): Chronic | Status: ACTIVE | Noted: 2023-10-11

## 2024-06-26 LAB
ATRIAL RATE - MUSE: 62 BPM
BASOPHILS # BLD AUTO: 0.1 10E3/UL (ref 0–0.2)
BASOPHILS NFR BLD AUTO: 1 %
CHOLEST SERPL-MCNC: 155 MG/DL
DIASTOLIC BLOOD PRESSURE - MUSE: NORMAL MMHG
EOSINOPHIL # BLD AUTO: 0 10E3/UL (ref 0–0.7)
EOSINOPHIL NFR BLD AUTO: 0 %
ERYTHROCYTE [DISTWIDTH] IN BLOOD BY AUTOMATED COUNT: 12.5 % (ref 10–15)
FOLATE SERPL-MCNC: 5.1 NG/ML (ref 4.6–34.8)
HCT VFR BLD AUTO: 41.1 % (ref 40–53)
HDLC SERPL-MCNC: 28 MG/DL
HGB BLD-MCNC: 14.4 G/DL (ref 13.3–17.7)
IMM GRANULOCYTES # BLD: 0.1 10E3/UL
IMM GRANULOCYTES NFR BLD: 1 %
INTERPRETATION ECG - MUSE: NORMAL
LDLC SERPL CALC-MCNC: 92 MG/DL
LYMPHOCYTES # BLD AUTO: 1.7 10E3/UL (ref 0.8–5.3)
LYMPHOCYTES NFR BLD AUTO: 29 %
MCH RBC QN AUTO: 32.3 PG (ref 26.5–33)
MCHC RBC AUTO-ENTMCNC: 35 G/DL (ref 31.5–36.5)
MCV RBC AUTO: 92 FL (ref 78–100)
MONOCYTES # BLD AUTO: 0.7 10E3/UL (ref 0–1.3)
MONOCYTES NFR BLD AUTO: 11 %
NEUTROPHILS # BLD AUTO: 3.4 10E3/UL (ref 1.6–8.3)
NEUTROPHILS NFR BLD AUTO: 58 %
NONHDLC SERPL-MCNC: 127 MG/DL
NRBC # BLD AUTO: 0 10E3/UL
NRBC BLD AUTO-RTO: 0 /100
P AXIS - MUSE: 21 DEGREES
PLATELET # BLD AUTO: 184 10E3/UL (ref 150–450)
PR INTERVAL - MUSE: 152 MS
QRS DURATION - MUSE: 94 MS
QT - MUSE: 426 MS
QTC - MUSE: 432 MS
R AXIS - MUSE: 0 DEGREES
RBC # BLD AUTO: 4.46 10E6/UL (ref 4.4–5.9)
SYSTOLIC BLOOD PRESSURE - MUSE: NORMAL MMHG
T AXIS - MUSE: 28 DEGREES
TRIGL SERPL-MCNC: 175 MG/DL
TSH SERPL DL<=0.005 MIU/L-ACNC: 1.08 UIU/ML (ref 0.3–4.2)
VENTRICULAR RATE- MUSE: 62 BPM
WBC # BLD AUTO: 5.9 10E3/UL (ref 4–11)

## 2024-06-26 PROCEDURE — 250N000009 HC RX 250: Performed by: REGISTERED NURSE

## 2024-06-26 PROCEDURE — 80061 LIPID PANEL: CPT | Performed by: REGISTERED NURSE

## 2024-06-26 PROCEDURE — 250N000013 HC RX MED GY IP 250 OP 250 PS 637: Performed by: FAMILY MEDICINE

## 2024-06-26 PROCEDURE — 250N000013 HC RX MED GY IP 250 OP 250 PS 637: Performed by: REGISTERED NURSE

## 2024-06-26 PROCEDURE — 82746 ASSAY OF FOLIC ACID SERUM: CPT | Performed by: REGISTERED NURSE

## 2024-06-26 PROCEDURE — 85025 COMPLETE CBC W/AUTO DIFF WBC: CPT | Performed by: REGISTERED NURSE

## 2024-06-26 PROCEDURE — 124N000002 HC R&B MH UMMC

## 2024-06-26 PROCEDURE — 99418 PROLNG IP/OBS E/M EA 15 MIN: CPT | Performed by: REGISTERED NURSE

## 2024-06-26 PROCEDURE — 36415 COLL VENOUS BLD VENIPUNCTURE: CPT | Performed by: REGISTERED NURSE

## 2024-06-26 PROCEDURE — 99233 SBSQ HOSP IP/OBS HIGH 50: CPT | Performed by: REGISTERED NURSE

## 2024-06-26 PROCEDURE — 84443 ASSAY THYROID STIM HORMONE: CPT | Performed by: REGISTERED NURSE

## 2024-06-26 RX ORDER — ATROPINE SULFATE 10 MG/ML
3 SOLUTION/ DROPS OPHTHALMIC AT BEDTIME
Status: DISCONTINUED | OUTPATIENT
Start: 2024-06-26 | End: 2024-06-27

## 2024-06-26 RX ORDER — POLYETHYLENE GLYCOL 3350 17 G/17G
17 POWDER, FOR SOLUTION ORAL DAILY
Status: DISCONTINUED | OUTPATIENT
Start: 2024-06-26 | End: 2024-06-27

## 2024-06-26 RX ORDER — ATROPINE SULFATE 10 MG/ML
3 SOLUTION/ DROPS OPHTHALMIC
Status: DISCONTINUED | OUTPATIENT
Start: 2024-06-26 | End: 2024-06-26

## 2024-06-26 RX ADMIN — DOCUSATE SODIUM 100 MG: 100 CAPSULE, LIQUID FILLED ORAL at 21:24

## 2024-06-26 RX ADMIN — LEVOTHYROXINE SODIUM 112 MCG: 112 TABLET ORAL at 12:37

## 2024-06-26 RX ADMIN — ATROPINE SULFATE 3 DROP: 10 SOLUTION/ DROPS OPHTHALMIC at 21:25

## 2024-06-26 RX ADMIN — PROPRANOLOL HYDROCHLORIDE 10 MG: 10 TABLET ORAL at 10:09

## 2024-06-26 RX ADMIN — NICOTINE POLACRILEX 2 MG: 2 GUM, CHEWING ORAL at 21:37

## 2024-06-26 RX ADMIN — GABAPENTIN 400 MG: 400 CAPSULE ORAL at 21:25

## 2024-06-26 RX ADMIN — Medication 25 MCG: at 10:09

## 2024-06-26 RX ADMIN — NICOTINE POLACRILEX 2 MG: 2 GUM, CHEWING ORAL at 10:15

## 2024-06-26 RX ADMIN — NICOTINE POLACRILEX 2 MG: 2 GUM, CHEWING ORAL at 15:19

## 2024-06-26 RX ADMIN — POLYETHYLENE GLYCOL 3350 17 G: 17 POWDER, FOR SOLUTION ORAL at 10:09

## 2024-06-26 RX ADMIN — NICOTINE POLACRILEX 2 MG: 2 GUM, CHEWING ORAL at 16:19

## 2024-06-26 RX ADMIN — VENLAFAXINE HYDROCHLORIDE 225 MG: 150 CAPSULE, EXTENDED RELEASE ORAL at 10:09

## 2024-06-26 RX ADMIN — PRAZOSIN HYDROCHLORIDE 4 MG: 2 CAPSULE ORAL at 21:23

## 2024-06-26 RX ADMIN — NICOTINE POLACRILEX 2 MG: 2 GUM, CHEWING ORAL at 13:53

## 2024-06-26 RX ADMIN — CLOZAPINE 325 MG: 200 TABLET ORAL at 21:23

## 2024-06-26 RX ADMIN — NICOTINE POLACRILEX 2 MG: 2 GUM, CHEWING ORAL at 19:26

## 2024-06-26 RX ADMIN — NICOTINE POLACRILEX 2 MG: 2 GUM, CHEWING ORAL at 20:31

## 2024-06-26 RX ADMIN — PROPRANOLOL HYDROCHLORIDE 10 MG: 10 TABLET ORAL at 20:30

## 2024-06-26 RX ADMIN — DOCUSATE SODIUM 100 MG: 100 CAPSULE, LIQUID FILLED ORAL at 10:09

## 2024-06-26 RX ADMIN — NICOTINE POLACRILEX 2 MG: 2 GUM, CHEWING ORAL at 12:52

## 2024-06-26 RX ADMIN — HYDROXYZINE HYDROCHLORIDE 25 MG: 25 TABLET, FILM COATED ORAL at 16:24

## 2024-06-26 RX ADMIN — NICOTINE POLACRILEX 2 MG: 2 GUM, CHEWING ORAL at 17:23

## 2024-06-26 RX ADMIN — GABAPENTIN 400 MG: 400 CAPSULE ORAL at 10:09

## 2024-06-26 RX ADMIN — NICOTINE POLACRILEX 2 MG: 2 GUM, CHEWING ORAL at 18:24

## 2024-06-26 RX ADMIN — Medication 500 MCG: at 13:43

## 2024-06-26 RX ADMIN — BENZTROPINE MESYLATE 1 MG: 1 TABLET ORAL at 10:09

## 2024-06-26 RX ADMIN — NICOTINE POLACRILEX 2 MG: 2 GUM, CHEWING ORAL at 11:17

## 2024-06-26 RX ADMIN — PANTOPRAZOLE SODIUM 40 MG: 40 TABLET, DELAYED RELEASE ORAL at 10:09

## 2024-06-26 RX ADMIN — BENZTROPINE MESYLATE 1 MG: 1 TABLET ORAL at 21:25

## 2024-06-26 RX ADMIN — GABAPENTIN 400 MG: 400 CAPSULE ORAL at 13:43

## 2024-06-26 ASSESSMENT — ACTIVITIES OF DAILY LIVING (ADL)
ADLS_ACUITY_SCORE: 69
DRESS: SCRUBS (BEHAVIORAL HEALTH)
ADLS_ACUITY_SCORE: 69
ADLS_ACUITY_SCORE: 69
ORAL_HYGIENE: INDEPENDENT
HYGIENE/GROOMING: INDEPENDENT
ORAL_HYGIENE: INDEPENDENT
ADLS_ACUITY_SCORE: 69
ADLS_ACUITY_SCORE: 69
LAUNDRY: UNABLE TO COMPLETE
ADLS_ACUITY_SCORE: 69
ADLS_ACUITY_SCORE: 69
DRESS: SCRUBS (BEHAVIORAL HEALTH);INDEPENDENT
ADLS_ACUITY_SCORE: 69
HYGIENE/GROOMING: INDEPENDENT
ADLS_ACUITY_SCORE: 69
LAUNDRY: UNABLE TO COMPLETE

## 2024-06-26 NOTE — PLAN OF CARE
BEH IP Unit Acuity Rating Score (UARS)  Patient is given one point for every criteria they meet.    CRITERIA SCORING   On a 72 hour hold, court hold, committed, stay of commitment, or revocation. 0    Patient LOS on BEH unit exceeds 20 days. 0  LOS: 1   Patient under guardianship, 55+, otherwise medically complex, or under age 11. 0   Suicide ideation without relief of precipitating factors. 1   Current plan for suicide. 1   Current plan for homicide. 0   Imminent risk or actual attempt to seriously harm another without relief of factors precipitating the attempt. 0   Severe dysfunction in daily living (ex: complete neglect for self care, extreme disruption in vegetative function, extreme deterioration in social interactions). 0   Recent (last 7 days) or current physical aggression in the ED or on unit. 1   Restraints or seclusion episode in past 72 hours. 0   Recent (last 7 days) or current verbal aggression, agitation, yelling, etc., while in the ED or unit. 0   Active psychosis. 0   Need for constant or near constant redirection (from leaving, from others, etc).  0   Intrusive or disruptive behaviors. 0   Patient requires 3 or more hours of individualized nursing care per 8-hour shift (i.e. for ADLs, meds, therapeutic interventions). 0   TOTAL 3
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
## 2024-06-26 NOTE — PLAN OF CARE
Pt appeared to sleep 6.75 hours. No concerns reported. Safety checks done at least every 15 minutes.

## 2024-06-26 NOTE — PLAN OF CARE
Upcoming Meetings and Appointments:   Team note:   Wednesday     Tasks Complete:  Chart review and met with team, discussed pt progress, symptomology, and response to treatment.  Discussed the discharge plan and any potential impediments to discharge    Left vm for group home staff - [facility] ([facility]) [phone] -- [phone]     I attempted to meet with Jana to discuss social service plan and goals . I was not successful in meeting with him as he was sleeping in his room. I will plan to meet with him tomorrow morning to discuss this further.     Discharge Plan or Goal   Plan  Discharge home to group home    Care Team   [place], [facility] as PCP - Marco Eid MD (Psychiatry)  [facility] ([facility]) [phone] -- [phone]      Barriers to Discharge   Requires ongoing stabilization      Referral Status  Tiffanie will benefit from therapy/DBT     Legal Status  Voluntary     Next Steps:  Discuss social service goals and referrals   Contact  for collateral

## 2024-06-26 NOTE — PLAN OF CARE
"Goal Outcome Evaluation:    Plan of Care Reviewed With: patient        Patient was sleeping at the beginning of the shift and did not want his labs drawn.  It took several explanations from the provider before he allowed his fasting labs to be drawn.  6.75 hours of sleep documented on night shift but he also slept at least 3 hours on day shift.  He ate all of his breakfast and lunch in the lounge.  Medication compliant.  He was given PRN nicotine gum 5 times this shift, no other PRN's given.  Eye contact is poor and he presents as depressed.  Flat affect and he does not interact with peers or staff except to make his needs known.  He sat in the lounge and watched TV - he enjoys soccer and was disappointed the game was shut off during group. His limited interactions with peers and staff are appropriate.  Contracts for safety on the unit and agrees to inform staff if he is experiencing any discomforts.  /75 (BP Location: Left arm, Patient Position: Sitting, Cuff Size: Adult Large)   Pulse 67   Temp 97.3  F (36.3  C) (Temporal)   Resp 16   Ht 1.778 m (5' 10\")   Wt 107.5 kg (237 lb)   SpO2 99%   BMI 34.01 kg/m        "

## 2024-06-26 NOTE — PLAN OF CARE
"Problem: Adult Behavioral Health Plan of Care  Goal: Adheres to Safety Considerations for Self and Others  Outcome: Progressing     Problem: Suicide Risk  Goal: Absence of Self-Harm  Outcome: Not Progressing  \"I need anxiety med\". Pt reports feeling anxious 5/10, depression 4. He endorses passive suicidal ideations. He has no plans and intent. He feels safe in the unit. Pt denies auditory and visual hallucinations. He denies homicidal ideations. He reports racing thoughts and poor concentration. He says he is sleeping more and drooling due to medication side effects. Pt is on scheduled atropine for drooling. He reports stiff neck from watching TV rating pain 3/10. Declines intervention. Pt is calm and pleasant during interaction. He is visible in the milieu watching tv. Pleasant and cooperative with cares.  He is minimally engage with peers. He spends majority of the shift in the lounge watching TV.   PRN Hydroxyzine 25 mg given at 1624 per request. He verbalized relief.   VSS. Ate 100% dinner. Pt is medication compliant.   Nicotine gum given hourly per request.    "

## 2024-06-27 LAB
CANNABINOIDS UR CFM-MCNC: 15 NG/ML
CARBOXYTHC/CREAT UR: 83 NG/MG CREAT
CLOZAPINE SERPL-MCNC: 296 NG/ML
CLOZAPINE+NOR SERPL-MCNC: 604 NG/ML
CNO SERPL-MCNC: <100 NG/ML
NORCLOZAPINE SERPL-MCNC: 308 NG/ML

## 2024-06-27 PROCEDURE — 124N000002 HC R&B MH UMMC

## 2024-06-27 PROCEDURE — 99232 SBSQ HOSP IP/OBS MODERATE 35: CPT | Performed by: REGISTERED NURSE

## 2024-06-27 PROCEDURE — 250N000013 HC RX MED GY IP 250 OP 250 PS 637: Performed by: REGISTERED NURSE

## 2024-06-27 PROCEDURE — 250N000013 HC RX MED GY IP 250 OP 250 PS 637: Performed by: FAMILY MEDICINE

## 2024-06-27 RX ORDER — SENNOSIDES 8.6 MG
8.6 TABLET ORAL 2 TIMES DAILY
Status: DISCONTINUED | OUTPATIENT
Start: 2024-06-27 | End: 2024-06-29

## 2024-06-27 RX ORDER — ATROPINE SULFATE 10 MG/ML
3 SOLUTION/ DROPS OPHTHALMIC 2 TIMES DAILY
Status: DISCONTINUED | OUTPATIENT
Start: 2024-06-27 | End: 2024-06-28

## 2024-06-27 RX ORDER — POLYETHYLENE GLYCOL 3350 17 G/17G
17 POWDER, FOR SOLUTION ORAL 2 TIMES DAILY
Status: DISCONTINUED | OUTPATIENT
Start: 2024-06-27 | End: 2024-06-29

## 2024-06-27 RX ADMIN — NICOTINE POLACRILEX 2 MG: 2 GUM, CHEWING ORAL at 17:30

## 2024-06-27 RX ADMIN — Medication 500 MCG: at 13:14

## 2024-06-27 RX ADMIN — GABAPENTIN 400 MG: 400 CAPSULE ORAL at 20:00

## 2024-06-27 RX ADMIN — NICOTINE POLACRILEX 2 MG: 2 GUM, CHEWING ORAL at 11:33

## 2024-06-27 RX ADMIN — PROPRANOLOL HYDROCHLORIDE 10 MG: 10 TABLET ORAL at 12:05

## 2024-06-27 RX ADMIN — NICOTINE POLACRILEX 2 MG: 2 GUM, CHEWING ORAL at 20:01

## 2024-06-27 RX ADMIN — NICOTINE POLACRILEX 2 MG: 2 GUM, CHEWING ORAL at 15:24

## 2024-06-27 RX ADMIN — NICOTINE POLACRILEX 2 MG: 2 GUM, CHEWING ORAL at 16:26

## 2024-06-27 RX ADMIN — NICOTINE POLACRILEX 2 MG: 2 GUM, CHEWING ORAL at 18:30

## 2024-06-27 RX ADMIN — PROPRANOLOL HYDROCHLORIDE 10 MG: 10 TABLET ORAL at 20:01

## 2024-06-27 RX ADMIN — NICOTINE POLACRILEX 2 MG: 2 GUM, CHEWING ORAL at 14:21

## 2024-06-27 RX ADMIN — VENLAFAXINE HYDROCHLORIDE 225 MG: 150 CAPSULE, EXTENDED RELEASE ORAL at 12:05

## 2024-06-27 RX ADMIN — NICOTINE POLACRILEX 2 MG: 2 GUM, CHEWING ORAL at 12:46

## 2024-06-27 RX ADMIN — LEVOTHYROXINE SODIUM 112 MCG: 112 TABLET ORAL at 12:06

## 2024-06-27 RX ADMIN — NICOTINE POLACRILEX 2 MG: 2 GUM, CHEWING ORAL at 21:16

## 2024-06-27 RX ADMIN — ATROPINE SULFATE 3 DROP: 10 SOLUTION/ DROPS OPHTHALMIC at 20:07

## 2024-06-27 RX ADMIN — DOCUSATE SODIUM 100 MG: 100 CAPSULE, LIQUID FILLED ORAL at 12:05

## 2024-06-27 RX ADMIN — CLOZAPINE 325 MG: 200 TABLET ORAL at 20:10

## 2024-06-27 RX ADMIN — ATROPINE SULFATE 3 DROP: 10 SOLUTION/ DROPS OPHTHALMIC at 13:14

## 2024-06-27 RX ADMIN — SENNOSIDES 8.6 MG: 8.6 TABLET, FILM COATED ORAL at 13:52

## 2024-06-27 RX ADMIN — BENZTROPINE MESYLATE 1 MG: 1 TABLET ORAL at 12:05

## 2024-06-27 RX ADMIN — POLYETHYLENE GLYCOL 3350 17 G: 17 POWDER, FOR SOLUTION ORAL at 12:05

## 2024-06-27 RX ADMIN — PRAZOSIN HYDROCHLORIDE 4 MG: 2 CAPSULE ORAL at 21:16

## 2024-06-27 RX ADMIN — Medication 25 MCG: at 12:05

## 2024-06-27 RX ADMIN — POLYETHYLENE GLYCOL 3350 17 G: 17 POWDER, FOR SOLUTION ORAL at 20:00

## 2024-06-27 RX ADMIN — GABAPENTIN 400 MG: 400 CAPSULE ORAL at 13:14

## 2024-06-27 RX ADMIN — GABAPENTIN 400 MG: 400 CAPSULE ORAL at 12:05

## 2024-06-27 RX ADMIN — SENNOSIDES 8.6 MG: 8.6 TABLET, FILM COATED ORAL at 20:00

## 2024-06-27 RX ADMIN — DOCUSATE SODIUM 100 MG: 100 CAPSULE, LIQUID FILLED ORAL at 20:01

## 2024-06-27 RX ADMIN — PANTOPRAZOLE SODIUM 40 MG: 40 TABLET, DELAYED RELEASE ORAL at 13:14

## 2024-06-27 RX ADMIN — BENZTROPINE MESYLATE 1 MG: 1 TABLET ORAL at 20:01

## 2024-06-27 ASSESSMENT — ACTIVITIES OF DAILY LIVING (ADL)
HYGIENE/GROOMING: INDEPENDENT
ADLS_ACUITY_SCORE: 69
ADLS_ACUITY_SCORE: 54
ADLS_ACUITY_SCORE: 69
ADLS_ACUITY_SCORE: 54
LAUNDRY: UNABLE TO COMPLETE
ADLS_ACUITY_SCORE: 69
ADLS_ACUITY_SCORE: 69
DRESS: SCRUBS (BEHAVIORAL HEALTH);INDEPENDENT
ADLS_ACUITY_SCORE: 69
ORAL_HYGIENE: INDEPENDENT
ADLS_ACUITY_SCORE: 54
ORAL_HYGIENE: INDEPENDENT
HYGIENE/GROOMING: INDEPENDENT
LAUNDRY: UNABLE TO COMPLETE
ADLS_ACUITY_SCORE: 69
DRESS: SCRUBS (BEHAVIORAL HEALTH)
ADLS_ACUITY_SCORE: 54

## 2024-06-27 NOTE — PLAN OF CARE
"Goal Outcome Evaluation:    Plan of Care Reviewed With: patient      Patient was sleeping at the beginning of the shift and refused vital signs.  He slept until around 11:30 am and does not like to be bothered with he is sleeping.  7 hours of sleep documented on night shift but he also slept at least 4.5 hours on day shift.  He ate all of his breakfast and lunch in the lounge.  Medication compliant.  He was given PRN nicotine gum 4 times this shift, no other PRN's given.  Eye contact is poor and he presents as depressed.  Flat affect and he does not interact with peers or staff except to make his needs known.  He sat in the lounge and watched TV - he enjoys soccer and was disappointed the game was shut off during group. His limited interactions with peers and staff are appropriate.  Contracts for safety on the unit and agrees to inform staff if he is experiencing any discomforts.  /84   Pulse 77   Temp 97.1  F (36.2  C) (Temporal)   Resp 16   Ht 1.778 m (5' 10\")   Wt 107.5 kg (237 lb)   SpO2 100%   BMI 34.01 kg/m      "

## 2024-06-27 NOTE — DISCHARGE INSTRUCTIONS
Behavioral Discharge Planning and Instructions    Summary: You were admitted on 6/24/2024  due to suicidal ideations with a plan.  You were treated by Lori Vargas NP and discharged on 07/01/2024 from [place] [address] to Group Home       Primary Diagnosis:  Schizoaffective disorder, bipolar type, current mood episode depressed     Secondary Diagnoses:   Borderline personality disorder per chart  PTSD per chart  Cannabis use, r/o disorder  Unspecified anxiety per chart  Alcohol use disorder, in remission  Constipation, likely medication induced per chart    Health Care Follow-up:     Psychiatry   Date/Time:  07/03/2024@1:30PM with Dr. Franklin Sanchez [facility]   Phone: [phone] ( Option 3)   Address: [address] [place] [address] ( located on the 5th Floor)  Fax Discharge summary to provider -  [phone]    Therapy - You will receive intake paperwork to complete prior to your appointment. They will also emailed this to Neela ( your group home contact)    Date/Time 07/11/2024 @2PM [facility]  Phone [phone]  Address: [address] [place] [address]  Fax Discharge Summary to provider: [phone]    Recommendations- We recommend that you work with a mental health  to assist you with community goals and resources. In addition, we recommend that you engage in therapy service to  manage low distress tolerance, improve emotional dysregulations,  and reduce negative thoughts that lead to suicidal ideations.       Attend all scheduled appointments with your outpatient providers. Call at least 24 hours in advance if you need to reschedule an appointment to ensure continued access to your outpatient providers.     Major Treatments, Procedures and Findings:  You were provided with: a psychiatric assessment, assessed for medical stability, medication evaluation and/or management, group therapy, individual therapy, milieu  "management, and medical interventions    Symptoms to Report: feeling more aggressive, increased confusion, losing more sleep, mood getting worse, or thoughts of suicide    Early warning signs can include: increased depression or anxiety sleep disturbances increased thoughts or behaviors of suicide or self-harm  increased unusual thinking, such as paranoia or hearing voices    Safety and Wellness:  Take all medicines as directed.  Make no changes unless your doctor suggests them.      Follow treatment recommendations.  Refrain from alcohol and non-prescribed drugs.  If there is a concern for safety, call 911.    Resources:   Crisis Intervention: 208.847.7229 or 789-035-9062 (TTY: 345.340.9224).  Call anytime for help.  National [place] on Mental Illness (www.[place].jorge a.org): 540.908.4944 or 985-751-3603.  [place] Association for Children's Mental Health (www.macmh.org): 430.345.6649.  Alcoholics Anonymous (www.alcoholics-anonymous.org): Check your phone book for your local chapter.  Suicide Awareness Voices of Education (SAVE) (www.save.org): 032-615-ZBVK (0421)  National Suicide Prevention Line (www.mentalhealthmn.org): 399-036-ISJM (3333)  Mental Health Consumer/Survivor Network of [place] (www.mhcsn.net): 919.250.6769 or 190-018-2520  Mental Health Association of [place] (www.mentalhealth.org): 124.222.9058 or 697-320-5312  [employer] [place] Crisis (COPE) Response - Adult 258 457-3440  Text 4 Life: txt \"LIFE\" to 27173 for immediate support and crisis intervention  Crisis text line: Text \"[place]\" to 942860. Free, confidential, 24/7.  Crisis Intervention: 121.951.5349 or 900-870-6701. Call anytime for help.   [facility] Mental Health Crisis Team - Child: [phone]    General Medication Instructions:   See your medication sheet(s) for instructions.   Take all medicines as directed.  Make no changes unless your doctor suggests them.   Go to all your doctor visits.  Be sure to have all your required lab tests. This way, your " medicines can be refilled on time.  Do not use any drugs not prescribed by your doctor.  Avoid alcohol.    Advance Directives:   Scanned document on file with [facility]? No scanned doc  Is document scanned?    Honoring Choices Your Rights Handout:    Was more information offered?      The Treatment team has appreciated the opportunity to work with you. If you have any questions or concerns about your recent admission, you can contact the unit which can receive your call 24 hours a day, 7 days a week. They will be able to get in touch with a Provider if needed. The unit number is [phone] .

## 2024-06-27 NOTE — PLAN OF CARE
BEH IP Unit Acuity Rating Score (UARS)  Patient is given one point for every criteria they meet.    CRITERIA SCORING   On a 72 hour hold, court hold, committed, stay of commitment, or revocation. 0    Patient LOS on BEH unit exceeds 20 days. 0  LOS: 2   Patient under guardianship, 55+, otherwise medically complex, or under age 11. 0   Suicide ideation without relief of precipitating factors. 1   Current plan for suicide. 1   Current plan for homicide. 0   Imminent risk or actual attempt to seriously harm another without relief of factors precipitating the attempt. 0   Severe dysfunction in daily living (ex: complete neglect for self care, extreme disruption in vegetative function, extreme deterioration in social interactions). 0   Recent (last 7 days) or current physical aggression in the ED or on unit. 1   Restraints or seclusion episode in past 72 hours. 0   Recent (last 7 days) or current verbal aggression, agitation, yelling, etc., while in the ED or unit. 0   Active psychosis. 0   Need for constant or near constant redirection (from leaving, from others, etc).  0   Intrusive or disruptive behaviors. 0   Patient requires 3 or more hours of individualized nursing care per 8-hour shift (i.e. for ADLs, meds, therapeutic interventions). 0   TOTAL 3

## 2024-06-27 NOTE — PROGRESS NOTES
"[facility] [place]   Psychiatric Progress Note    Hospital Day: 2  Interim History:   From H&P:  This patient is a 33 year old male with a history of schizoaffective disorder, borderline personality disorder, anxiety, PTSD and polysubstance abuse (cannabis, alcohol) who presented to [facility] ED on 6/24/2024 with suicidal ideation and homicidal ideation in the context of substance use and psychosocial stressors.     Team meeting report:  The patient's care was discussed with the treatment team during the daily team meeting and staff's chart notes were reviewed.  Staff report patient rated anxiety as 5/10 and depression as 4/10 (10=worst).  He took PRN hydroxyzine for anxiety.  He endorsed passive suicidal ideation.  He reported neck pain from watching TV, but declined pain medications.  He ate 100% of dinner last night.  He attended OT group.  He was superficial and guarded.  He declined to participate in OT assessment.  Per staff documentation, patient slept 7 hours last night.     Met with patient.  He reports his mood is improving.  He states he has difficulty going to groups due to social anxiety.  Discussed taking a PRN hydroxyzine prior to groups to help decrease anxiety.  Patient is agreeable to trying this.  Patient denies suicidal ideation or homicidal ideation.  He denies anxiety currently.  He states he feels \"exhausted\" this morning, which may be related to clozapine dose increase.  Patient is willing to continue increased dose for now.  Will continue to assess for tolerance.  Patient denies pain or discomfort.  He endorses constipation.  He states it has been several days since his last bowel movement.  He agrees to scheduled senna and increasing daily Miralax to BID.  He would like to add an additional daily dose or atropine for sialorrhea.      Reviewed labs:  Clozapine level 296, Norclozapine 308, Clozapine and Metabolites Total  604      Medications:     Current " Facility-Administered Medications   Medication Dose Route Frequency Provider Last Rate Last Admin    atropine 1 % sublingual (ophthalmic drops for sublingual use) 3 drop  3 drop Sublingual At Bedtime Lori Vargas APRN CNP   3 drop at 06/26/24 2125    benztropine (COGENTIN) tablet 1 mg  1 mg Oral BID Bull Hameed MD   1 mg at 06/26/24 2125    cloZAPine (CLOZARIL) tablet 325 mg  325 mg Oral At Bedtime Lori Vargas APRN CNP   325 mg at 06/26/24 2123    cyanocobalamin (VITAMIN B-12) sublingual tablet 500 mcg  500 mcg Sublingual Daily Lori Vargas APRN CNP   500 mcg at 06/26/24 1343    docusate sodium (COLACE) capsule 100 mg  100 mg Oral BID Bull Hameed MD   100 mg at 06/26/24 2124    gabapentin (NEURONTIN) capsule 400 mg  400 mg Oral TID Bull Hameed MD   400 mg at 06/26/24 2125    levothyroxine (SYNTHROID/LEVOTHROID) tablet 112 mcg  112 mcg Oral Daily Bull Hameed MD   112 mcg at 06/26/24 1237    [START ON 7/1/2024] paliperidone (INVEGA SUSTENNA) injection ANTHONY 234 mg  234 mg Intramuscular Q21 Days Lori Vargas APRN CNP        pantoprazole (PROTONIX) EC tablet 40 mg  40 mg Oral QAM AC Lori Vargas APRN CNP   40 mg at 06/26/24 1009    polyethylene glycol (MIRALAX) Packet 17 g  17 g Oral Daily Lori Vargas APRN CNP   17 g at 06/26/24 1009    prazosin (MINIPRESS) capsule 4 mg  4 mg Oral At Bedtime Bull Hameed MD   4 mg at 06/26/24 2123    propranolol (INDERAL) tablet 10 mg  10 mg Oral BID Bull Hameed MD   10 mg at 06/26/24 2030    venlafaxine (EFFEXOR XR) 24 hr capsule 225 mg  225 mg Oral Daily with breakfast Bull Hameed MD   225 mg at 06/26/24 1009    Vitamin D3 (CHOLECALCIFEROL) tablet 25 mcg  25 mcg Oral Daily Lori Vargas APRN CNP   25 mcg at 06/26/24 1009       Allergies:     Allergies   Allergen Reactions    Haloperidol Other (See Comments)     Other reaction(s): Tardive Dyskinesia  Torticollis  Torticollis  Stiff  "Neck  Torticollis; reports \"stiff neck.\"         Labs:     Recent Results (from the past 672 hour(s))   Urine Drug Screen Panel    Collection Time: 06/24/24  4:18 PM   Result Value Ref Range    Amphetamines Urine Screen Negative Screen Negative    Barbituates Urine Screen Negative Screen Negative    Benzodiazepine Urine Screen Negative Screen Negative    Cannabinoids Urine Screen Positive (A) Screen Negative    Cocaine Urine Screen Negative Screen Negative    Fentanyl Qual Urine Screen Negative Screen Negative    Opiates Urine Screen Negative Screen Negative    PCP Urine Screen Negative Screen Negative   Urine Creatinine for Drug Screen Panel    Collection Time: 06/24/24  4:18 PM   Result Value Ref Range    Creatinine Urine for Drug Screen 18 mg/dL   CBC with platelets and differential    Collection Time: 06/24/24 10:08 PM   Result Value Ref Range    WBC Count 6.7 4.0 - 11.0 10e3/uL    RBC Count 4.51 4.40 - 5.90 10e6/uL    Hemoglobin 14.5 13.3 - 17.7 g/dL    Hematocrit 42.0 40.0 - 53.0 %    MCV 93 78 - 100 fL    MCH 32.2 26.5 - 33.0 pg    MCHC 34.5 31.5 - 36.5 g/dL    RDW 12.3 10.0 - 15.0 %    Platelet Count 187 150 - 450 10e3/uL    % Neutrophils 59 %    % Lymphocytes 30 %    % Monocytes 9 %    % Eosinophils 0 %    % Basophils 1 %    % Immature Granulocytes 1 %    NRBCs per 100 WBC 0 <1 /100    Absolute Neutrophils 4.0 1.6 - 8.3 10e3/uL    Absolute Lymphocytes 2.0 0.8 - 5.3 10e3/uL    Absolute Monocytes 0.6 0.0 - 1.3 10e3/uL    Absolute Eosinophils 0.0 0.0 - 0.7 10e3/uL    Absolute Basophils 0.1 0.0 - 0.2 10e3/uL    Absolute Immature Granulocytes 0.0 <=0.4 10e3/uL    Absolute NRBCs 0.0 10e3/uL   Clozapine and Metabolites Quant    Collection Time: 06/24/24 10:09 PM   Result Value Ref Range    Clozapine Quant 296 ng/mL    Norclozapine Quant 308 ng/mL    Clozapine-N-Oxide Quant <100 ng/mL    Clozapine and Metabolites Total 604 <=1500 ng/mL   Comprehensive metabolic panel    Collection Time: 06/24/24 10:09 PM   Result " Value Ref Range    Sodium 141 135 - 145 mmol/L    Potassium 3.9 3.4 - 5.3 mmol/L    Carbon Dioxide (CO2) 27 22 - 29 mmol/L    Anion Gap 10 7 - 15 mmol/L    Urea Nitrogen 6.7 6.0 - 20.0 mg/dL    Creatinine 0.85 0.67 - 1.17 mg/dL    GFR Estimate >90 >60 mL/min/1.73m2    Calcium 9.5 8.6 - 10.0 mg/dL    Chloride 104 98 - 107 mmol/L    Glucose 111 (H) 70 - 99 mg/dL    Alkaline Phosphatase 98 40 - 150 U/L    AST 16 0 - 45 U/L    ALT 13 0 - 70 U/L    Protein Total 6.4 6.4 - 8.3 g/dL    Albumin 4.2 3.5 - 5.2 g/dL    Bilirubin Total 0.2 <=1.2 mg/dL   TSH with free T4 reflex    Collection Time: 06/24/24 10:09 PM   Result Value Ref Range    TSH 3.02 0.30 - 4.20 uIU/mL   Vitamin D Deficiency    Collection Time: 06/24/24 10:09 PM   Result Value Ref Range    Vitamin D, Total (25-Hydroxy) 53 (H) 20 - 50 ng/mL   Vitamin B12    Collection Time: 06/24/24 10:09 PM   Result Value Ref Range    Vitamin B12 225 (L) 232 - 1,245 pg/mL   EKG 12-lead, tracing only    Collection Time: 06/25/24  5:19 PM   Result Value Ref Range    Systolic Blood Pressure  mmHg    Diastolic Blood Pressure  mmHg    Ventricular Rate 62 BPM    Atrial Rate 62 BPM    KY Interval 152 ms    QRS Duration 94 ms     ms    QTc 432 ms    P Axis 21 degrees    R AXIS 0 degrees    T Axis 28 degrees    Interpretation ECG       Sinus rhythm  Minimal voltage criteria for LVH, may be normal variant  Borderline ECG  When compared with ECG of 20-DEC-2019 21:37,  No significant change was found  Confirmed by MD LAVELL, MANDY ([id number]) on 6/26/2024 10:11:33 AM     Hemoglobin A1c    Collection Time: 06/25/24  6:24 PM   Result Value Ref Range    Hemoglobin A1C 5.2 <5.7 %   Lipid panel reflex to direct LDL    Collection Time: 06/26/24  8:05 AM   Result Value Ref Range    Cholesterol 155 <200 mg/dL    Triglycerides 175 (H) <150 mg/dL    Direct Measure HDL 28 (L) >=40 mg/dL    LDL Cholesterol Calculated 92 <=100 mg/dL    Non HDL Cholesterol 127 <130 mg/dL   TSH with free T4 reflex     Collection Time: 06/26/24  8:05 AM   Result Value Ref Range    TSH 1.08 0.30 - 4.20 uIU/mL   Folate    Collection Time: 06/26/24  8:05 AM   Result Value Ref Range    Folic Acid 5.1 4.6 - 34.8 ng/mL   CBC with platelets and differential    Collection Time: 06/26/24  8:05 AM   Result Value Ref Range    WBC Count 5.9 4.0 - 11.0 10e3/uL    RBC Count 4.46 4.40 - 5.90 10e6/uL    Hemoglobin 14.4 13.3 - 17.7 g/dL    Hematocrit 41.1 40.0 - 53.0 %    MCV 92 78 - 100 fL    MCH 32.3 26.5 - 33.0 pg    MCHC 35.0 31.5 - 36.5 g/dL    RDW 12.5 10.0 - 15.0 %    Platelet Count 184 150 - 450 10e3/uL    % Neutrophils 58 %    % Lymphocytes 29 %    % Monocytes 11 %    % Eosinophils 0 %    % Basophils 1 %    % Immature Granulocytes 1 %    NRBCs per 100 WBC 0 <1 /100    Absolute Neutrophils 3.4 1.6 - 8.3 10e3/uL    Absolute Lymphocytes 1.7 0.8 - 5.3 10e3/uL    Absolute Monocytes 0.7 0.0 - 1.3 10e3/uL    Absolute Eosinophils 0.0 0.0 - 0.7 10e3/uL    Absolute Basophils 0.1 0.0 - 0.2 10e3/uL    Absolute Immature Granulocytes 0.1 <=0.4 10e3/uL    Absolute NRBCs 0.0 10e3/uL       Psychiatric Examination:   Temp: 97.1  F (36.2  C) Temp src: Temporal BP: 117/84 Pulse: 77   Resp: 16 SpO2: 100 % O2 Device: None (Room air)    Weight is 237 lbs 0 oz  Body mass index is 34.01 kg/m .    Appearance: awake, alert, adequately groomed, dressed in hospital scrubs, appeared older than stated age  Attitude:  cooperative  Eye Contact:  good  Mood:  good  Affect:  intensity is flat  Speech:  clear, coherent and paucity of speech  Psychomotor Behavior:  no evidence of tardive dyskinesia, dystonia, or tics and intact station, gait and muscle tone  Thought Process:  linear  Associations:  no loose associations  Thought Content:  no evidence of suicidal ideation or homicidal ideation and no evidence of psychotic thought  Insight:  fair  Judgement:  fair  Oriented to:  time, person, and place  Attention Span and Concentration:  fair  Recent and Remote Memory:   "fair    Precautions:     Behavioral Orders   Procedures    Code 1 - Restrict to Unit    Discontinue 1:1 attendant for suicide risk     Order Specific Question:   I have performed an in person assessment of the patient     Answer:   Based on this assessment the patient no longer requires a one on one attendant at this point in time.     Order Specific Question:   Rationale     Answer:   Patient States able to remain safe in hospital    Routine Programming     As clinically indicated    Status 15     Every 15 minutes.    Suicide precautions: Suicide Risk: HIGH     Patients on Suicide Precautions should have a Combination Diet ordered that includes a Diet selection(s) AND a Behavioral Tray selection for Safe Tray - with utensils, or Safe Tray - NO utensils       Order Specific Question:   Suicide Risk     Answer:   HIGH       Diagnoses:     Primary Diagnoses:  Schizoaffective disorder, bipolar type, current mood episode depressed   Suicidal / homicidal ideation     Secondary Diagnoses:   Borderline personality disorder per chart  PTSD per chart  Cannabis use, r/o disorder  Unspecified anxiety per chart  Alcohol use disorder, in remission  Constipation, likely medication induced per chart    Clinically Significant Risk Factors                          # Obesity: Estimated body mass index is 34.01 kg/m  as calculated from the following:    Height as of this encounter: 1.778 m (5' 10\").    Weight as of this encounter: 107.5 kg (237 lb)., PRESENT ON ADMISSION     # Financial/Environmental Concerns: none         Plan:   Today's Changes:  - Schedule senna BID and add second daily dose of Miralax due to patient reports of constipation  - Add second daily dose of atropine for sialorrhea    Medications:  Target psychiatric symptoms and interventions:  # Psychosis   - Continue Clozaril 325mg HS  - 7/1:  Invega Sustenna 234mg   - Labs:  Clozapine Quant: 296, Norclozapine Quant 308, Clozapine and Metabolites Total: 604     # " Mood  - Effexor 225mg      #EPSE  # Akathisia  # Sialorrhea  - Cogentin 1mg BID  - Propranolol 10mg BID  - Atropine 3 drops BID     # Anxiety   - Hydroxyzine 25mg q4h PRN for acute anxiety  - Gabapentin 400mg TID     # Insomnia  - Continue Trazodone 50mg at bedtime PRN for sleep disturbances  - Prazosin 4mg HS     # Agitation  - Continue Zyprexa 10mg TID PRN for severe agitation     Risks, benefits, and alternatives discussed at length with patient.      Medical Problems and Treatments:  - No acute medical concerns     # Vitamin B12 deficiency   - Start B12 500mcg daily     # Clozaril-induced constipation  - Colace 100mg BID  - Miralax 17g BID  - Senna BID  - Dulcolax every day PRN     Risks, benefits, and alternatives discussed at length with patient.     Medical:  --Internal medicine to follow up for medical problems     Labs reviewed:   - TG elevated at 175  - Vitamin B12 low at 225  - HDL low at 28  - Vitamin D high at 53  - Glucose high at 111  - ANC 3.4     Latest Reference Range & Units 06/24/24 22:09   Clozapine Quant ng/mL 296   Norclozapine Quant ng/mL 308   Clozapine-N-Oxide Quant ng/mL <100   Clozapine and Metabolites Total <=1500 ng/mL 604       Consults:   --Care was coordinated with the treatment team.   --The patient was consulted on nature of illness and treatment options.      Disposition Plan   - poses an imminent risk to self and poses an imminent risk to others  Discharge location:   - group home  Discharge Medications:   - not ordered  - Will be ordered PTA  Follow-up Appointments:   - not scheduled  - Patient will need follow-up appointments with outpatient psychiatry  Estimated Length of Stay:   - 5-7 days     Remain on inpatient unit until psychiatric symptoms have stabilized and patient is safe for discharge.     Lori Vargas, APRN, CNP     This note was created with the help of Dragon dictation system. All grammatical/typing errors or context distortion are unintentional and  inherent to software.    Attestation:  Patient has been seen and evaluated by me, Lori Vargas, APRN, CNP.  I spent >35 min on the date of the encounter in chart review, patient visit, review of tests, documentation, care coordination, and/or discussion with other providers about the issues documented above.

## 2024-06-27 NOTE — PLAN OF CARE
"Problem: Adult Inpatient Plan of Care  Goal: Optimal Comfort and Wellbeing  Outcome: Progressing  Intervention: Provide Person-Centered Care  Recent Flowsheet Documentation  Taken 6/27/2024 1701 by Dacia Fontanez  Trust Relationship/Rapport: thoughts/feelings acknowledged   Goal Outcome Evaluation:    Patient has been seen in lounge watching TV, and in room.Pt presents with a calm mood and blunted affect. On approach pt states he is \"good\". Pt denies any depression or anxiety at this time. He also denies any SI, SIB, AH, VH. Pt stated during check in. \"I am leaving on Monday because I feel stabilized\". Pt has not had a BM since admission. Pt encouraged to ambulate as much as possible and take laxatives that are prescribed. Pt said that his appetite and sleep are good. Pt took all scheduled medications and requested PRN nicotine gum throughout the shift.     Plan of Care Reviewed With: patient      /84   Pulse 78   Temp 97.2  F (36.2  C) (Temporal)   Resp 16   Ht 1.778 m (5' 10\")   Wt 107.5 kg (237 lb)   SpO2 100%   BMI 34.01 kg/m            "

## 2024-06-27 NOTE — PROGRESS NOTES
NOC Shift Report    Pt in bed at beginning of shift, breathing quiet and unlabored. Pt slept 7 hours. Pt is on status 15 min checks. Will continue to monitor.

## 2024-06-27 NOTE — PLAN OF CARE
OT PROGRESS NOTE:     06/26/24 2037   Group Therapy Session   Group Attendance attended group session   Time Session Began 1015   Time Session Ended 1045   Total Time (minutes) 10   Total # Attendees 6   Group Type expressive therapy;task skill   Group Topic Covered balanced lifestyle;coping skills/lifestyle management;leisure exploration/use of leisure time;structured socialization   Group Session Detail [facility] group to facilitate coping skill exploration, use of cognitive skills and problem solving, creative expression, clinical observation and facilitation of social, cognitive, and kinesthetic performance skills.     Patient Response/Contribution did not discuss personal experience;did not share thoughts verbally   Patient Participation Detail Arrive to group late. Refused to complete Self Assessment Form. Requested simple coloring task and to be allowed to just sit by the window. Superficial and guarded regarding RFA. Will continue to support and encourage group attendance and participation for improved self management and health and wellness.

## 2024-06-27 NOTE — PLAN OF CARE
Upcoming Meetings and Appointments:   Team note:   Wednesday     Tasks Complete:  Chart review and met with team, discussed pt progress, symptomology, and response to treatment.  Discussed the discharge plan and any potential impediments to discharge    Group home owner, Neela Brown notes that Fransico symptoms is due personality traits and symptoms. She notes that Tiffanie struggles with low self esteem and has a low distress tolerance since he was a child. 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 She states that he then become suicidal. She is requesting for medication to be prescribed to [facility] in [place] if med changes are made. I discussed therapy and mental health case management services for Tiffanie. Neela notes that historically, Tiffanie refuses to see a therapist or engage in mental health services.      Discharge Plan or Goal   Plan  Discharge home to group [place]  Medications: [facility] in [facility]  Transportation: TBD    Care Team   Psychiatrist: Current: Dr. Reid [facility] Previous: Marco Campo at [facility], [facility] [phone]  Group Home Director: Neela Brown [phone]  [email or web address]  CADI CM: Current Helen Avina [phone]     Barriers to Discharge   Requires ongoing stabilization . He will benefit from case management and therapy.      Referral Status  Tiffanie will benefit from therapy/DBT     Legal Status  Voluntary     Next Steps:  Discharge home to group [place].

## 2024-06-28 PROCEDURE — 250N000013 HC RX MED GY IP 250 OP 250 PS 637: Performed by: REGISTERED NURSE

## 2024-06-28 PROCEDURE — 124N000002 HC R&B MH UMMC

## 2024-06-28 PROCEDURE — 99232 SBSQ HOSP IP/OBS MODERATE 35: CPT | Performed by: REGISTERED NURSE

## 2024-06-28 PROCEDURE — 250N000013 HC RX MED GY IP 250 OP 250 PS 637: Performed by: FAMILY MEDICINE

## 2024-06-28 PROCEDURE — 90853 GROUP PSYCHOTHERAPY: CPT

## 2024-06-28 RX ORDER — ATROPINE SULFATE 10 MG/ML
3 SOLUTION/ DROPS OPHTHALMIC 3 TIMES DAILY
Status: DISCONTINUED | OUTPATIENT
Start: 2024-06-28 | End: 2024-07-02 | Stop reason: HOSPADM

## 2024-06-28 RX ADMIN — Medication 25 MCG: at 10:50

## 2024-06-28 RX ADMIN — NICOTINE POLACRILEX 2 MG: 2 GUM, CHEWING ORAL at 11:51

## 2024-06-28 RX ADMIN — NICOTINE POLACRILEX 2 MG: 2 GUM, CHEWING ORAL at 17:18

## 2024-06-28 RX ADMIN — PANTOPRAZOLE SODIUM 40 MG: 40 TABLET, DELAYED RELEASE ORAL at 10:50

## 2024-06-28 RX ADMIN — GABAPENTIN 400 MG: 400 CAPSULE ORAL at 13:24

## 2024-06-28 RX ADMIN — BENZTROPINE MESYLATE 1 MG: 1 TABLET ORAL at 20:25

## 2024-06-28 RX ADMIN — ATROPINE SULFATE 3 DROP: 10 SOLUTION/ DROPS OPHTHALMIC at 13:23

## 2024-06-28 RX ADMIN — POLYETHYLENE GLYCOL 3350 17 G: 17 POWDER, FOR SOLUTION ORAL at 20:25

## 2024-06-28 RX ADMIN — PROPRANOLOL HYDROCHLORIDE 10 MG: 10 TABLET ORAL at 10:50

## 2024-06-28 RX ADMIN — Medication 500 MCG: at 10:50

## 2024-06-28 RX ADMIN — NICOTINE POLACRILEX 2 MG: 2 GUM, CHEWING ORAL at 15:07

## 2024-06-28 RX ADMIN — CLOZAPINE 325 MG: 200 TABLET ORAL at 20:24

## 2024-06-28 RX ADMIN — ATROPINE SULFATE 3 DROP: 10 SOLUTION/ DROPS OPHTHALMIC at 10:51

## 2024-06-28 RX ADMIN — NICOTINE POLACRILEX 2 MG: 2 GUM, CHEWING ORAL at 12:53

## 2024-06-28 RX ADMIN — NICOTINE POLACRILEX 2 MG: 2 GUM, CHEWING ORAL at 16:12

## 2024-06-28 RX ADMIN — DOCUSATE SODIUM 100 MG: 100 CAPSULE, LIQUID FILLED ORAL at 10:50

## 2024-06-28 RX ADMIN — PRAZOSIN HYDROCHLORIDE 4 MG: 2 CAPSULE ORAL at 21:03

## 2024-06-28 RX ADMIN — SENNOSIDES 8.6 MG: 8.6 TABLET, FILM COATED ORAL at 10:50

## 2024-06-28 RX ADMIN — NICOTINE POLACRILEX 2 MG: 2 GUM, CHEWING ORAL at 14:06

## 2024-06-28 RX ADMIN — ATROPINE SULFATE 3 DROP: 10 SOLUTION/ DROPS OPHTHALMIC at 20:25

## 2024-06-28 RX ADMIN — VENLAFAXINE HYDROCHLORIDE 225 MG: 150 CAPSULE, EXTENDED RELEASE ORAL at 10:50

## 2024-06-28 RX ADMIN — POLYETHYLENE GLYCOL 3350 17 G: 17 POWDER, FOR SOLUTION ORAL at 10:51

## 2024-06-28 RX ADMIN — DOCUSATE SODIUM 100 MG: 100 CAPSULE, LIQUID FILLED ORAL at 20:24

## 2024-06-28 RX ADMIN — BENZTROPINE MESYLATE 1 MG: 1 TABLET ORAL at 10:50

## 2024-06-28 RX ADMIN — NICOTINE POLACRILEX 2 MG: 2 GUM, CHEWING ORAL at 18:41

## 2024-06-28 RX ADMIN — NICOTINE POLACRILEX 2 MG: 2 GUM, CHEWING ORAL at 19:51

## 2024-06-28 RX ADMIN — LEVOTHYROXINE SODIUM 112 MCG: 112 TABLET ORAL at 10:50

## 2024-06-28 RX ADMIN — NICOTINE POLACRILEX 2 MG: 2 GUM, CHEWING ORAL at 21:03

## 2024-06-28 RX ADMIN — PROPRANOLOL HYDROCHLORIDE 10 MG: 10 TABLET ORAL at 20:24

## 2024-06-28 RX ADMIN — SENNOSIDES 8.6 MG: 8.6 TABLET, FILM COATED ORAL at 20:24

## 2024-06-28 RX ADMIN — GABAPENTIN 400 MG: 400 CAPSULE ORAL at 10:50

## 2024-06-28 RX ADMIN — NICOTINE POLACRILEX 2 MG: 2 GUM, CHEWING ORAL at 10:50

## 2024-06-28 RX ADMIN — GABAPENTIN 400 MG: 400 CAPSULE ORAL at 20:25

## 2024-06-28 ASSESSMENT — ACTIVITIES OF DAILY LIVING (ADL)
ADLS_ACUITY_SCORE: 54
DRESS: INDEPENDENT
LAUNDRY: UNABLE TO COMPLETE
ADLS_ACUITY_SCORE: 54
ORAL_HYGIENE: INDEPENDENT
ADLS_ACUITY_SCORE: 54
HYGIENE/GROOMING: INDEPENDENT
ADLS_ACUITY_SCORE: 54

## 2024-06-28 NOTE — PLAN OF CARE
BEH IP Unit Acuity Rating Score (UARS)  Patient is given one point for every criteria they meet.    CRITERIA SCORING   On a 72 hour hold, court hold, committed, stay of commitment, or revocation. 0    Patient LOS on BEH unit exceeds 20 days. 0  LOS: 3   Patient under guardianship, 55+, otherwise medically complex, or under age 11. 0   Suicide ideation without relief of precipitating factors. 1   Current plan for suicide. 1   Current plan for homicide. 0   Imminent risk or actual attempt to seriously harm another without relief of factors precipitating the attempt. 0   Severe dysfunction in daily living (ex: complete neglect for self care, extreme disruption in vegetative function, extreme deterioration in social interactions). 0   Recent (last 7 days) or current physical aggression in the ED or on unit. 1   Restraints or seclusion episode in past 72 hours. 0   Recent (last 7 days) or current verbal aggression, agitation, yelling, etc., while in the ED or unit. 0   Active psychosis. 0   Need for constant or near constant redirection (from leaving, from others, etc).  0   Intrusive or disruptive behaviors. 0   Patient requires 3 or more hours of individualized nursing care per 8-hour shift (i.e. for ADLs, meds, therapeutic interventions). 0   TOTAL 3

## 2024-06-28 NOTE — PLAN OF CARE
"Goal Outcome Evaluation:    Plan of Care Reviewed With: patient      Patient was sleeping at the beginning of the shift and refused vital signs.  He slept until around 10:30 am and does not like to be bothered when he is sleeping.  6.5 hours of sleep documented on night shift but he also slept at least 3 hours on day shift.  He also napped for about an hour after lunch.  He ate all of his breakfast and lunch in the lounge.  Medication compliant.  He was given PRN nicotine gum 5 times this shift, no other PRN's given.  Eye contact is poor and he presents as depressed but denies depression and anxiety.  Flat affect and he does not interact with peers or staff except to make his needs known.  He sat in the lounge and watched TV occasionally.  He denies SI, HI and all other psych symptoms. His limited interactions with peers and staff are appropriate.  Contracts for safety on the unit and agrees to inform staff if he is experiencing any discomforts.  /75   Pulse 69   Temp 97.2  F (36.2  C) (Temporal)   Resp 16   Ht 1.778 m (5' 10\")   Wt 107.5 kg (237 lb)   SpO2 100%   BMI 34.01 kg/m      "

## 2024-06-28 NOTE — PLAN OF CARE
Upcoming Meetings and Appointments:   Team note:   Wednesday     Tasks Complete:  Chart review and met with team, discussed pt progress, symptomology, and response to treatment.  Discussed the discharge plan and any potential impediments to discharge    I called Neela, the owner of [facility]. She plans to transport him at 11AM on Tuesday. She is requesting for labs to be faxed to [facility] in [place] and all medications sent to [facility] in [place].       Discharge Plan or Goal   Plan  Discharge home to group home on 7/2 @11AM  Medications: General medications sent to [facility] in [facility] and all labs [facility] [place]   Transportation:Group home staff     Care Team   Psychiatrist: Current: [facility] Previous: Marco Campo at [facility] [place] [facility] [place] [phone]  Group Home Director: Neela Brown [phone]  [email or web address]  CADI CM: Current Helen Avina [phone]     Barriers to Discharge   Requires ongoing stabilization . He will benefit from case management and therapy.      Referral Status  [facility] will benefit from therapy/DBT     Legal Status  Voluntary     Next Steps:  Discharge home to group home Tues- email discharge summary and labs  to Neela at [email or web address].     He is interested in therapy- seeking a male therapist

## 2024-06-28 NOTE — PROGRESS NOTES
NOC Shift Report    Pt in bed at beginning of shift, breathing quiet and unlabored.  Pt slept 6.5 hours. Pt is on status 15 min checks. Will continue to monitor.

## 2024-06-28 NOTE — PLAN OF CARE
"Problem: Adult Behavioral Health Plan of Care  Goal: Adheres to Safety Considerations for Self and Others  Outcome: Progressing     Problem: Suicide Risk  Goal: Absence of Self-Harm  Outcome: Progressing    \"I'm depressed only\". Pt reports mood is depressed due to missing his mom and family in [place]. He states he has no family in the [place]. He does not have a good relationship with his father who lives in another State and he did not see for 12 years. Pt made this comment when another pt had a visit with his family.  He denies feeling anxious, suicidal and homicidal. He denies visual and auditory hallucinations. He denies pain/physical discomfort. Pt's thought process is linear. Affect is blunted. Makes his needs known appropriately. He spends most of the shift watching tv in the lounge. He is minimally engaged.   VSS. He ate 100% dinner. He took all medication without issues. He received nicotine gum every hour until he went to bed. He did not attend group saying groups are boring.  "

## 2024-06-28 NOTE — PROGRESS NOTES
[facility], [place]   Psychiatric Progress Note    Hospital Day: 3  Interim History:   From H&P:  This patient is a 33 year old male with a history of schizoaffective disorder, borderline personality disorder, anxiety, PTSD and polysubstance abuse (cannabis, alcohol) who presented to [facility] ED on 6/24/2024 with suicidal ideation and homicidal ideation in the context of substance use and psychosocial stressors.     Team meeting report:  The patient's care was discussed with the treatment team during the daily team meeting and staff's chart notes were reviewed.  Patient was calm with a blunted affect.  He denied depression, anxiety, suicidal ideations, or hallucinations.  He spent time watching television.  He stated he would like to discharge early next week.  Per CTC note, patient has previously been reluctant to participate in therapy or engage in community mental health services.  Per group home director, patient has a history of low frustration tolerance.  Per staff documentation, patient slept 6.5 hours last night.     Met with patient.  Patient states he is doing well.  He reports feeling a little sad, but overall feels his mood is much improved.  He denies suicidal ideations or homicidal ideations.  No hallucinations or delusions.  No concerns for responding to internal stimuli on exam.  Patient reports he is eating and sleeping well.  Patient continues to report excessive sialorrhea, which is likely secondary to clozapine.  He agrees to an increase in atropine to 3 times daily.  Patient denies excessive sedation today.  Provider discusses engaging in outpatient therapy.  Patient states he is not interested in group therapy but is willing to consider individual therapy.  Patient continues to report constipation.  He would like to wait one more day with scheduled bowel regimen before escalating to suppository or enema.  Patient will receive his Invega Sustenna STEVENSON on Monday.  Plan is  for tentative discharge back to  on Tuesday.       Medications:     Current Facility-Administered Medications   Medication Dose Route Frequency Provider Last Rate Last Admin    atropine 1 % sublingual (ophthalmic drops for sublingual use) 3 drop  3 drop Sublingual BID Lori Vargas APRN CNP   3 drop at 06/27/24 2007    benztropine (COGENTIN) tablet 1 mg  1 mg Oral BID Bull Hameed MD   1 mg at 06/27/24 2001    cloZAPine (CLOZARIL) tablet 325 mg  325 mg Oral At Bedtime Lori Vargas APRN CNP   325 mg at 06/27/24 2010    cyanocobalamin (VITAMIN B-12) sublingual tablet 500 mcg  500 mcg Sublingual Daily Lori Vargas APRN CNP   500 mcg at 06/27/24 1314    docusate sodium (COLACE) capsule 100 mg  100 mg Oral BID Bull Hameed MD   100 mg at 06/27/24 2001    gabapentin (NEURONTIN) capsule 400 mg  400 mg Oral TID Bull Hameed MD   400 mg at 06/27/24 2000    levothyroxine (SYNTHROID/LEVOTHROID) tablet 112 mcg  112 mcg Oral Daily Bull Hameed MD   112 mcg at 06/27/24 1206    [START ON 7/1/2024] paliperidone (INVEGA SUSTENNA) injection ANTHONY 234 mg  234 mg Intramuscular Q21 Days Lori Vargas APRN CNP        pantoprazole (PROTONIX) EC tablet 40 mg  40 mg Oral QAM AC Lori Vargas APRN CNP   40 mg at 06/27/24 1314    polyethylene glycol (MIRALAX) Packet 17 g  17 g Oral BID Lori Vargas APRN CNP   17 g at 06/27/24 2000    prazosin (MINIPRESS) capsule 4 mg  4 mg Oral At Bedtime Bull Hameed MD   4 mg at 06/27/24 2116    propranolol (INDERAL) tablet 10 mg  10 mg Oral BID Bull Hameed MD   10 mg at 06/27/24 2001    sennosides (SENOKOT) tablet 8.6 mg  8.6 mg Oral BID Lori Vargas APRN CNP   8.6 mg at 06/27/24 2000    venlafaxine (EFFEXOR XR) 24 hr capsule 225 mg  225 mg Oral Daily with breakfast Bull Hameed MD   225 mg at 06/27/24 1205    Vitamin D3 (CHOLECALCIFEROL) tablet 25 mcg  25 mcg Oral Daily Lori Vargas APRN CNP   25 mcg at  "06/27/24 1205       Allergies:     Allergies   Allergen Reactions    Haloperidol Other (See Comments)     Other reaction(s): Tardive Dyskinesia  Torticollis  Torticollis  Stiff Neck  Torticollis; reports \"stiff neck.\"         Labs:     Recent Results (from the past 672 hour(s))   Urine Drug Screen Panel    Collection Time: 06/24/24  4:18 PM   Result Value Ref Range    Amphetamines Urine Screen Negative Screen Negative    Barbituates Urine Screen Negative Screen Negative    Benzodiazepine Urine Screen Negative Screen Negative    Cannabinoids Urine Screen Positive (A) Screen Negative    Cocaine Urine Screen Negative Screen Negative    Fentanyl Qual Urine Screen Negative Screen Negative    Opiates Urine Screen Negative Screen Negative    PCP Urine Screen Negative Screen Negative   THC Confirmation Quantitative Urine    Collection Time: 06/24/24  4:18 PM   Result Value Ref Range    THC Metabolite 15 ng/mL    THC/Creatinine Ratio 83 ng/mg Creat   Urine Creatinine for Drug Screen Panel    Collection Time: 06/24/24  4:18 PM   Result Value Ref Range    Creatinine Urine for Drug Screen 18 mg/dL   CBC with platelets and differential    Collection Time: 06/24/24 10:08 PM   Result Value Ref Range    WBC Count 6.7 4.0 - 11.0 10e3/uL    RBC Count 4.51 4.40 - 5.90 10e6/uL    Hemoglobin 14.5 13.3 - 17.7 g/dL    Hematocrit 42.0 40.0 - 53.0 %    MCV 93 78 - 100 fL    MCH 32.2 26.5 - 33.0 pg    MCHC 34.5 31.5 - 36.5 g/dL    RDW 12.3 10.0 - 15.0 %    Platelet Count 187 150 - 450 10e3/uL    % Neutrophils 59 %    % Lymphocytes 30 %    % Monocytes 9 %    % Eosinophils 0 %    % Basophils 1 %    % Immature Granulocytes 1 %    NRBCs per 100 WBC 0 <1 /100    Absolute Neutrophils 4.0 1.6 - 8.3 10e3/uL    Absolute Lymphocytes 2.0 0.8 - 5.3 10e3/uL    Absolute Monocytes 0.6 0.0 - 1.3 10e3/uL    Absolute Eosinophils 0.0 0.0 - 0.7 10e3/uL    Absolute Basophils 0.1 0.0 - 0.2 10e3/uL    Absolute Immature Granulocytes 0.0 <=0.4 10e3/uL    Absolute " NRBCs 0.0 10e3/uL   Clozapine and Metabolites Quant    Collection Time: 06/24/24 10:09 PM   Result Value Ref Range    Clozapine Quant 296 ng/mL    Norclozapine Quant 308 ng/mL    Clozapine-N-Oxide Quant <100 ng/mL    Clozapine and Metabolites Total 604 <=1500 ng/mL   Comprehensive metabolic panel    Collection Time: 06/24/24 10:09 PM   Result Value Ref Range    Sodium 141 135 - 145 mmol/L    Potassium 3.9 3.4 - 5.3 mmol/L    Carbon Dioxide (CO2) 27 22 - 29 mmol/L    Anion Gap 10 7 - 15 mmol/L    Urea Nitrogen 6.7 6.0 - 20.0 mg/dL    Creatinine 0.85 0.67 - 1.17 mg/dL    GFR Estimate >90 >60 mL/min/1.73m2    Calcium 9.5 8.6 - 10.0 mg/dL    Chloride 104 98 - 107 mmol/L    Glucose 111 (H) 70 - 99 mg/dL    Alkaline Phosphatase 98 40 - 150 U/L    AST 16 0 - 45 U/L    ALT 13 0 - 70 U/L    Protein Total 6.4 6.4 - 8.3 g/dL    Albumin 4.2 3.5 - 5.2 g/dL    Bilirubin Total 0.2 <=1.2 mg/dL   TSH with free T4 reflex    Collection Time: 06/24/24 10:09 PM   Result Value Ref Range    TSH 3.02 0.30 - 4.20 uIU/mL   Vitamin D Deficiency    Collection Time: 06/24/24 10:09 PM   Result Value Ref Range    Vitamin D, Total (25-Hydroxy) 53 (H) 20 - 50 ng/mL   Vitamin B12    Collection Time: 06/24/24 10:09 PM   Result Value Ref Range    Vitamin B12 225 (L) 232 - 1,245 pg/mL   EKG 12-lead, tracing only    Collection Time: 06/25/24  5:19 PM   Result Value Ref Range    Systolic Blood Pressure  mmHg    Diastolic Blood Pressure  mmHg    Ventricular Rate 62 BPM    Atrial Rate 62 BPM    GA Interval 152 ms    QRS Duration 94 ms     ms    QTc 432 ms    P Axis 21 degrees    R AXIS 0 degrees    T Axis 28 degrees    Interpretation ECG       Sinus rhythm  Minimal voltage criteria for LVH, may be normal variant  Borderline ECG  When compared with ECG of 20-DEC-2019 21:37,  No significant change was found  Confirmed by MD LAVELL, MANDY ([id number]) on 6/26/2024 10:11:33 AM     Hemoglobin A1c    Collection Time: 06/25/24  6:24 PM   Result Value Ref Range     Hemoglobin A1C 5.2 <5.7 %   Lipid panel reflex to direct LDL    Collection Time: 06/26/24  8:05 AM   Result Value Ref Range    Cholesterol 155 <200 mg/dL    Triglycerides 175 (H) <150 mg/dL    Direct Measure HDL 28 (L) >=40 mg/dL    LDL Cholesterol Calculated 92 <=100 mg/dL    Non HDL Cholesterol 127 <130 mg/dL   TSH with free T4 reflex    Collection Time: 06/26/24  8:05 AM   Result Value Ref Range    TSH 1.08 0.30 - 4.20 uIU/mL   Folate    Collection Time: 06/26/24  8:05 AM   Result Value Ref Range    Folic Acid 5.1 4.6 - 34.8 ng/mL   CBC with platelets and differential    Collection Time: 06/26/24  8:05 AM   Result Value Ref Range    WBC Count 5.9 4.0 - 11.0 10e3/uL    RBC Count 4.46 4.40 - 5.90 10e6/uL    Hemoglobin 14.4 13.3 - 17.7 g/dL    Hematocrit 41.1 40.0 - 53.0 %    MCV 92 78 - 100 fL    MCH 32.3 26.5 - 33.0 pg    MCHC 35.0 31.5 - 36.5 g/dL    RDW 12.5 10.0 - 15.0 %    Platelet Count 184 150 - 450 10e3/uL    % Neutrophils 58 %    % Lymphocytes 29 %    % Monocytes 11 %    % Eosinophils 0 %    % Basophils 1 %    % Immature Granulocytes 1 %    NRBCs per 100 WBC 0 <1 /100    Absolute Neutrophils 3.4 1.6 - 8.3 10e3/uL    Absolute Lymphocytes 1.7 0.8 - 5.3 10e3/uL    Absolute Monocytes 0.7 0.0 - 1.3 10e3/uL    Absolute Eosinophils 0.0 0.0 - 0.7 10e3/uL    Absolute Basophils 0.1 0.0 - 0.2 10e3/uL    Absolute Immature Granulocytes 0.1 <=0.4 10e3/uL    Absolute NRBCs 0.0 10e3/uL       Psychiatric Examination:   Temp: 97.2  F (36.2  C) Temp src: Temporal BP: 109/75 Pulse: 69     SpO2: 100 % O2 Device: None (Room air)    Weight is 237 lbs 0 oz  Body mass index is 34.01 kg/m .    Appearance: awake, alert, adequately groomed, dressed in hospital scrubs, appeared older than stated age  Attitude:  cooperative  Eye Contact:  good  Mood:  good  Affect:  intensity is blunted  Speech:  clear, coherent and paucity of speech  Psychomotor Behavior:  no evidence of tardive dyskinesia, dystonia, or tics; intact station, gait  "and muscle tone  Thought Process:  linear  Associations:  no loose associations  Thought Content:  no evidence of suicidal ideation or homicidal ideation and no evidence of psychotic thought  Insight:  fair  Judgement:  fair  Oriented to:  time, person, and place  Attention Span and Concentration:  fair  Recent and Remote Memory:  fair    Precautions:     Behavioral Orders   Procedures    Code 1 - Restrict to Unit    Discontinue 1:1 attendant for suicide risk     Order Specific Question:   I have performed an in person assessment of the patient     Answer:   Based on this assessment the patient no longer requires a one on one attendant at this point in time.     Order Specific Question:   Rationale     Answer:   Patient States able to remain safe in hospital    Routine Programming     As clinically indicated    Status 15     Every 15 minutes.    Suicide precautions: Suicide Risk: HIGH     Patients on Suicide Precautions should have a Combination Diet ordered that includes a Diet selection(s) AND a Behavioral Tray selection for Safe Tray - with utensils, or Safe Tray - NO utensils       Order Specific Question:   Suicide Risk     Answer:   HIGH       Diagnoses:     Primary Diagnoses:  Schizoaffective disorder, bipolar type, current mood episode depressed   Suicidal / homicidal ideation     Secondary Diagnoses:   Borderline personality disorder per chart  PTSD per chart  Cannabis use, r/o disorder  Unspecified anxiety per chart  Alcohol use disorder, in remission  Constipation, likely medication induced per chart    Clinically Significant Risk Factors                          # Obesity: Estimated body mass index is 34.01 kg/m  as calculated from the following:    Height as of this encounter: 1.778 m (5' 10\").    Weight as of this encounter: 107.5 kg (237 lb)., PRESENT ON ADMISSION       # Financial/Environmental Concerns: none         Plan:   Today's Changes:  - Add third daily dose of atropine for sialorrhea  - " Clozaril level ordered for 6/29   - 7/1:  Invega Sustenna 234mg due    Medications:  Target psychiatric symptoms and interventions:  # Psychosis   - Clozaril 325mg HS  - 7/1:  Invega Sustenna 234mg   - 6/29:  Follow-up clozapine level  - 6/26: clozapine Quant: 296, Norclozapine Quant 308, Clozapine and Metabolites Total: 604     # Mood  - Effexor 225mg      #EPSE  # Akathisia  # Sialorrhea  - Cogentin 1mg BID  - Propranolol 10mg BID  - Atropine 3 drops TID     # Anxiety   - Hydroxyzine 25mg q4h PRN for acute anxiety  - Gabapentin 400mg TID     # Insomnia  - Continue Trazodone 50mg at bedtime PRN for sleep disturbances  - Prazosin 4mg HS     # Agitation  - Continue Zyprexa 10mg TID PRN for severe agitation     Risks, benefits, and alternatives discussed at length with patient.      Medical Problems and Treatments:  - No acute medical concerns     # Vitamin B12 deficiency   -  B12 500mcg daily     # Clozaril-induced constipation  - Colace 100mg BID  - Miralax 17g BID  - Senna BID  - Dulcolax every day PRN     Risks, benefits, and alternatives discussed at length with patient.     Medical:  --Internal medicine to follow up for medical problems     Labs reviewed:   - TG elevated at 175  - Vitamin B12 low at 225  - HDL low at 28  - Vitamin D high at 53  - Glucose high at 111  - ANC 3.4  - Clozapine Quant 296 ng/mL  - Norclozapine Quant 308 ng/mL  - Clozapine-N-Oxide Quant <100 ng/mL  - Clozapine and Metabolites 604 ng/mL      Consults:   --Care was coordinated with the treatment team.   --The patient was consulted on nature of illness and treatment options.      Disposition Plan   - poses an imminent risk to self and poses an imminent risk to others  Discharge location:   - group home  Discharge Medications:   - not ordered  - Will be ordered PTA  Follow-up Appointments:   - not scheduled  - Patient will need follow-up appointments with outpatient psychiatry  Estimated Length of Stay:   - 5-7 days     Remain on inpatient  unit until psychiatric symptoms have stabilized and patient is safe for discharge.     AFSANEH Daugherty, CNP     This note was created with the help of Dragon dictation system. All grammatical/typing errors or context distortion are unintentional and inherent to software.    Attestation:  Patient has been seen and evaluated by me, AFSANEH Daugherty, CNP.  I spent >35 min on the date of the encounter in chart review, patient visit, review of tests, documentation, care coordination, and/or discussion with other providers about the issues documented above.

## 2024-06-28 NOTE — PLAN OF CARE
Goal Outcome Evaluation:      Group Attendance:  attended partial group    Time session began: 2:00 pm  Time session ended: 2:45 pm  Patient's total time in group: 45    Total # Attendees   7   Group Type psychotherapeutic     Group Topic Covered healthy coping skills        Group Session Detail inspirational music for difficult times / cope and processi     Patient's response to the group topic/interactions:  cooperative with task, listened actively, and left group on several occasions     Patient Details: Mood good, pt was in group for part of time, pleased to be able to select music that inspires him           [id number] - Psychotherapy (with patient) - 45 (38-52*) min    Patient Active Problem List   Diagnosis    Depression    Schizoaffective disorder, bipolar type (H)    Suicidal behavior    PTSD (post-traumatic stress disorder)    History of schizophrenia    Chronic post-traumatic stress disorder (PTSD)    Hallucinations    Alcohol abuse, episodic    Borderline personality disorder (H)    Cannabis dependence in remission (H)    Cannabis use disorder, moderate, in sustained remission (H)    GERD (gastroesophageal reflux disease)    High risk medication use    Hypothyroidism    Noncompliance    PPD positive, treated    TB lung, latent    Major depression    Mood change    Nicotine use disorder    Suicidal ideation    Schizoaffective disorder, unspecified type (H)    Schizophrenia, schizoaffective, chronic with acute exacerbation (H)    Marijuana abuse    Anxiety

## 2024-06-28 NOTE — PLAN OF CARE
Echo    Result Date: 5/28/2024    Left Ventricle: The left ventricle is normal in size. Moderately   increased wall thickness. Regional wall motion abnormalities present.   Inferior wall hypokinesis There is low normal systolic function with a   visually estimated ejection fraction of 50 - 55%. Ejection fraction by   visual approximation is 50%.    Right Ventricle: Mild right ventricular enlargement.    Aortic Valve: The aortic valve is a trileaflet valve. There is mild   aortic valve sclerosis. Mildly calcified cusps.    Mitral Valve: There is mild bileaflet sclerosis. Mildly thickened   leaflets. There is severe regurgitation with a centrally directed jet.    IVC/SVC: Normal venous pressure at 3 mmHg.    Pericardium: There is a trivial effusion.           Cardiology followed.  Diuresed initially but held due to DENYS.  Now with intermittent Lasix dosing.   Continue carvedilol      Goal Outcome Evaluation:    Initial meeting note:    Therapist introduced self to patient and discussed psychotherapy service available to patient.     Pt response: Pt expressed interest in meeting with therapist    Plan: Made plan to meet with pt again; began identifying goals

## 2024-06-29 LAB
CLOZAPINE SERPL-MCNC: 558 NG/ML
CLOZAPINE+NOR SERPL-MCNC: 990 NG/ML
NORCLOZAPINE SERPL-MCNC: 432 NG/ML

## 2024-06-29 PROCEDURE — 99253 IP/OBS CNSLTJ NEW/EST LOW 45: CPT | Performed by: PHYSICIAN ASSISTANT

## 2024-06-29 PROCEDURE — 80342 ANTIPSYCHOTICS NOS 1-3: CPT | Performed by: REGISTERED NURSE

## 2024-06-29 PROCEDURE — 36415 COLL VENOUS BLD VENIPUNCTURE: CPT | Performed by: REGISTERED NURSE

## 2024-06-29 PROCEDURE — 250N000013 HC RX MED GY IP 250 OP 250 PS 637: Performed by: FAMILY MEDICINE

## 2024-06-29 PROCEDURE — 124N000002 HC R&B MH UMMC

## 2024-06-29 PROCEDURE — 250N000013 HC RX MED GY IP 250 OP 250 PS 637: Performed by: PHYSICIAN ASSISTANT

## 2024-06-29 PROCEDURE — 250N000013 HC RX MED GY IP 250 OP 250 PS 637: Performed by: REGISTERED NURSE

## 2024-06-29 RX ORDER — POLYETHYLENE GLYCOL 3350 17 G/17G
17 POWDER, FOR SOLUTION ORAL 3 TIMES DAILY
Status: DISCONTINUED | OUTPATIENT
Start: 2024-06-29 | End: 2024-07-02 | Stop reason: HOSPADM

## 2024-06-29 RX ORDER — AMOXICILLIN 250 MG
3 CAPSULE ORAL 2 TIMES DAILY
Status: DISCONTINUED | OUTPATIENT
Start: 2024-06-29 | End: 2024-07-02 | Stop reason: HOSPADM

## 2024-06-29 RX ORDER — BISACODYL 5 MG
10 TABLET, DELAYED RELEASE (ENTERIC COATED) ORAL ONCE
Status: COMPLETED | OUTPATIENT
Start: 2024-06-29 | End: 2024-06-29

## 2024-06-29 RX ADMIN — ACETAMINOPHEN 650 MG: 325 TABLET, FILM COATED ORAL at 18:18

## 2024-06-29 RX ADMIN — LEVOTHYROXINE SODIUM 112 MCG: 112 TABLET ORAL at 08:37

## 2024-06-29 RX ADMIN — NICOTINE POLACRILEX 2 MG: 2 GUM, CHEWING ORAL at 09:37

## 2024-06-29 RX ADMIN — ATROPINE SULFATE 3 DROP: 10 SOLUTION/ DROPS OPHTHALMIC at 21:28

## 2024-06-29 RX ADMIN — POLYETHYLENE GLYCOL 3350 17 G: 17 POWDER, FOR SOLUTION ORAL at 13:18

## 2024-06-29 RX ADMIN — PROPRANOLOL HYDROCHLORIDE 10 MG: 10 TABLET ORAL at 21:33

## 2024-06-29 RX ADMIN — SENNOSIDES AND DOCUSATE SODIUM 3 TABLET: 8.6; 5 TABLET ORAL at 21:28

## 2024-06-29 RX ADMIN — CLOZAPINE 325 MG: 200 TABLET ORAL at 21:28

## 2024-06-29 RX ADMIN — NICOTINE POLACRILEX 2 MG: 2 GUM, CHEWING ORAL at 20:28

## 2024-06-29 RX ADMIN — DOCUSATE SODIUM 100 MG: 100 CAPSULE, LIQUID FILLED ORAL at 08:37

## 2024-06-29 RX ADMIN — Medication 500 MCG: at 08:37

## 2024-06-29 RX ADMIN — PRAZOSIN HYDROCHLORIDE 4 MG: 2 CAPSULE ORAL at 21:28

## 2024-06-29 RX ADMIN — VENLAFAXINE HYDROCHLORIDE 225 MG: 150 CAPSULE, EXTENDED RELEASE ORAL at 08:37

## 2024-06-29 RX ADMIN — NICOTINE POLACRILEX 2 MG: 2 GUM, CHEWING ORAL at 21:37

## 2024-06-29 RX ADMIN — NICOTINE POLACRILEX 2 MG: 2 GUM, CHEWING ORAL at 14:07

## 2024-06-29 RX ADMIN — ATROPINE SULFATE 3 DROP: 10 SOLUTION/ DROPS OPHTHALMIC at 13:18

## 2024-06-29 RX ADMIN — POLYETHYLENE GLYCOL 3350 17 G: 17 POWDER, FOR SOLUTION ORAL at 21:27

## 2024-06-29 RX ADMIN — BISACODYL 10 MG: 5 TABLET, COATED ORAL at 12:37

## 2024-06-29 RX ADMIN — POLYETHYLENE GLYCOL 3350 17 G: 17 POWDER, FOR SOLUTION ORAL at 08:37

## 2024-06-29 RX ADMIN — BENZTROPINE MESYLATE 1 MG: 1 TABLET ORAL at 21:28

## 2024-06-29 RX ADMIN — PANTOPRAZOLE SODIUM 40 MG: 40 TABLET, DELAYED RELEASE ORAL at 08:37

## 2024-06-29 RX ADMIN — NICOTINE POLACRILEX 2 MG: 2 GUM, CHEWING ORAL at 11:01

## 2024-06-29 RX ADMIN — SENNOSIDES 8.6 MG: 8.6 TABLET, FILM COATED ORAL at 08:37

## 2024-06-29 RX ADMIN — GABAPENTIN 400 MG: 400 CAPSULE ORAL at 08:37

## 2024-06-29 RX ADMIN — NICOTINE POLACRILEX 2 MG: 2 GUM, CHEWING ORAL at 13:04

## 2024-06-29 RX ADMIN — PROPRANOLOL HYDROCHLORIDE 10 MG: 10 TABLET ORAL at 08:37

## 2024-06-29 RX ADMIN — Medication 25 MCG: at 08:37

## 2024-06-29 RX ADMIN — NICOTINE POLACRILEX 2 MG: 2 GUM, CHEWING ORAL at 08:37

## 2024-06-29 RX ADMIN — GABAPENTIN 400 MG: 400 CAPSULE ORAL at 21:27

## 2024-06-29 RX ADMIN — NICOTINE POLACRILEX 2 MG: 2 GUM, CHEWING ORAL at 19:20

## 2024-06-29 RX ADMIN — BENZTROPINE MESYLATE 1 MG: 1 TABLET ORAL at 08:37

## 2024-06-29 RX ADMIN — GABAPENTIN 400 MG: 400 CAPSULE ORAL at 13:18

## 2024-06-29 RX ADMIN — ATROPINE SULFATE 3 DROP: 10 SOLUTION/ DROPS OPHTHALMIC at 08:48

## 2024-06-29 RX ADMIN — SODIUM PHOSPHATE 1 ENEMA: 7; 19 ENEMA RECTAL at 14:07

## 2024-06-29 RX ADMIN — NICOTINE POLACRILEX 2 MG: 2 GUM, CHEWING ORAL at 18:04

## 2024-06-29 ASSESSMENT — ACTIVITIES OF DAILY LIVING (ADL)
ADLS_ACUITY_SCORE: 54
LAUNDRY: UNABLE TO COMPLETE
ADLS_ACUITY_SCORE: 54
HYGIENE/GROOMING: INDEPENDENT
ADLS_ACUITY_SCORE: 54
ADLS_ACUITY_SCORE: 54
DRESS: INDEPENDENT
ORAL_HYGIENE: INDEPENDENT
LAUNDRY: UNABLE TO COMPLETE
ADLS_ACUITY_SCORE: 54
HYGIENE/GROOMING: INDEPENDENT
ADLS_ACUITY_SCORE: 54
ORAL_HYGIENE: INDEPENDENT
ADLS_ACUITY_SCORE: 54
DRESS: INDEPENDENT;SCRUBS (BEHAVIORAL HEALTH)

## 2024-06-29 NOTE — PROGRESS NOTES
NOC Shift Report    Pt in bed at beginning of shift, breathing quiet and unlabored. Pt slept through shift. Pt slept 7 hours. Pt is on status 15 min checks. Will continue to monitor.

## 2024-06-29 NOTE — PLAN OF CARE
"Goal Outcome Evaluation:    Plan of Care Reviewed With: patient      Patient was up at breakfast time - 7 hours of sleep documented on night shift but he says he did not sleep well and feels exhausted.  Encouraged patient to ask for Trazadone before bed this evening.  He ate all of his breakfast and lunch in the lounge.  Medication compliant.  He was given PRN nicotine gum 5 times this shift.  Eye contact is poor and he presents as depressed but denies depression and anxiety.  Flat affect and he does not interact with peers or staff except to make his needs known.  He sat in the lounge and watched TV occasionally.  Patient reported not having a bowel movement sice Monday, 6/24 and feels like his abdomen is distended and painful.  He is already getting senna, colace and miralax scheduled.  Contacted on call provider and discussed the situation - order placed for internal medicine consult.  Patient informed of plan and offered bisacodyl suppository which he did not want to try yet.  IM evaluated patient, ordered Dulcolax and Fleet's enema - given but patient did not have the tip inserted correctly and it was wasted - additional Fleet's enema ordered from pharmacy, given and patient reports \"It was like a miracle!\"  Says he had a large bowel movement and feels much better.  Patient denies SI, HI and all other psych symptoms. His limited interactions with peers and staff are appropriate.  Contracts for safety on the unit and agrees to inform staff if he is experiencing any additional discomforts.  /72   Pulse 85   Temp 97.9  F (36.6  C) (Oral)   Resp 18   Ht 1.778 m (5' 10\")   Wt 107.5 kg (237 lb)   SpO2 100%   BMI 34.01 kg/m        "

## 2024-06-29 NOTE — CONSULTS
"[facility]  Consult Note - Hospitalist Service  Date of Admission:  6/24/2024  Consult Requested by: Dr. Navarro  Reason for Consult: Constipation    Assessment & Plan   Mr. Tiffanie Neal is a 34 yo male with hx of schizoaffective disorder admitted to  psychiatric unit after presenting to ED with psychiatric decompensation. Medicine consulted 6/29 for constipation.    # Constipation: LBM 6/24 despite being on Miralax and senna. Feels abd getting distended, but denies abd pain and nausea. Passing \"a lot\" of gas. Denies fever, chills, chest pain and other assoc sxs. Abd exam benign. Likely due to psychiatric meds including Cogentin.  - Give one time enema and po Dulcolax  - Increase Miralax to TID and change senna to Senokot-S 2 tabs TID.       The patient's care was discussed with the Patient. Medicine signing off. Please re-consult if pt continues to struggle with constipation.     Jose Ulrich PA-C  Hospitalist Service  Securely message with [employer] (more info)  Text page via [facility] Paging/Directory   ______________________________________________________________________    Chief Complaint   Constipation    History is obtained from the patient and electronic health record    History of Present Illness   Please refer to psychiatric H&P dated 6/26 by Ms. Sam CNP, for full details. In brief, Mr. Tiffanie Neal is a 34 yo male with hx of schizoaffective disorder admitted to IP psychiatric unit after presenting to ED with psychiatric decompensation. Medicine consulted 6/29 for constipation.    Currently admits to constipation with LBM 6/24, but is passing gas and denies abd pain, nausea and other acute physical concerns at this time.       Past Medical History    Past Medical History:   Diagnosis Date    Anxiety     Depressive disorder     Schizo affective schizophrenia (H)     Substance abuse (H)        Past Surgical History   Past Surgical History: "   Procedure Laterality Date    TONSILLECTOMY         Medications   Medications Prior to Admission   Medication Sig Dispense Refill Last Dose    atropine 1 % SOLN Place 3 drops under the tongue nightly as needed for secretions   prn at prn    benztropine (COGENTIN) 1 MG tablet Take 1 mg by mouth 2 times daily        bisacodyl (DULCOLAX) 10 MG suppository Place 1 suppository (10 mg) rectally daily as needed for constipation 15 suppository 0 prn at prn    cloZAPine (CLOZARIL) 100 MG tablet Take 300 mg by mouth at bedtime   6/23/2024    docusate sodium (COLACE) 100 MG capsule Take 100 mg by mouth 2 times daily        gabapentin (NEURONTIN) 400 MG capsule Take 400 mg by mouth 3 times daily       hydrOXYzine HCl (ATARAX) 25 MG tablet Take 25 mg by mouth 2 times daily as needed for anxiety   prn at prn    levothyroxine (SYNTHROID/LEVOTHROID) 112 MCG tablet Take 1 tablet (112 mcg) by mouth daily 30 tablet 0     OLANZapine (ZYPREXA) 10 MG tablet Take 10 mg by mouth 3 times daily as needed (agitation/psychosis/self harm)   PRN at PRN    omeprazole (PRILOSEC) 20 MG DR capsule Take 20 mg by mouth daily       paliperidone (INVEGA SUSTENNA) 234 MG/1.5ML ANTHONY Inject 1.5 mLs (234 mg) into the muscle every 21 days (Patient taking differently: Inject 234 mg into the muscle every 21 days Last dose given: 6/10/24) 1.5 mL 1 6/10/2024    polyethylene glycol (MIRALAX) 17 GM/Dose powder Take 17 g by mouth daily       prazosin (MINIPRESS) 2 MG capsule Take 4 mg by mouth At Bedtime        propranolol (INDERAL) 10 MG tablet Take 10 mg by mouth 2 times daily       venlafaxine (EFFEXOR XR) 75 MG 24 hr capsule Take 225 mg by mouth daily       Vitamin D3 (VITAMIN D, CHOLECALCIFEROL,) 25 mcg (1000 units) tablet Take 25 mcg by mouth daily             Review of Systems    The 10 point Review of Systems is negative other than noted in the HPI or here.     Social History   I have reviewed this patient's social history and updated it with pertinent  "information if needed.  Social History     Tobacco Use    Smoking status: Every Day     Current packs/day: 0.50     Types: Vaping Device, Cigarettes    Smokeless tobacco: Never   Substance Use Topics    Alcohol use: Yes     Comment: 2 weeks ago    Drug use: Yes     Comment: COD         Family History   I have reviewed this patient's family history and updated it with pertinent information if needed.  Family History   Problem Relation Age of Onset    Mental Illness Mother     Mental Illness Maternal Aunt     Mental Illness Maternal Uncle     Glaucoma No family hx of     Macular Degeneration No family hx of          Allergies   Allergies   Allergen Reactions    Haloperidol Other (See Comments)     Other reaction(s): Tardive Dyskinesia  Torticollis  Torticollis  Stiff Neck  Torticollis; reports \"stiff neck.\"          Physical Exam   Vital Signs: Temp: 97.9  F (36.6  C) Temp src: Oral BP: 116/72 Pulse: 85   Resp: 18 SpO2: 100 % O2 Device: None (Room air)    Weight: 237 lbs 0 oz  GEN: In NAD  ABD: +BSs; SNTND      Medical Decision Making       35 MINUTES SPENT BY ME on the date of service doing chart review, history, exam, documentation & further activities per the note.      Data     "

## 2024-06-29 NOTE — PLAN OF CARE
"Problem: Adult Behavioral Health Plan of Care  Goal: Adheres to Safety Considerations for Self and Others  Outcome: Progressing    \"I was feeling depressed at first but I feel better after taking nap and going to bathroom\".  Pt reports passing good amount of BM. He reports having a headache 4/10. He denies anxiety, depression, suicidal and  homicidal ideations. Pt's mood is calm and affect is blunted. He is quiet and socially withdrawn. Not social with peers. He received PRN tylenol for headache at 1818 with relief. He ate 100% dinner. VSS. He is medication compliant. Received nicotine gum every hour. No behavioral concerns noted    "

## 2024-06-30 PROCEDURE — 124N000002 HC R&B MH UMMC

## 2024-06-30 PROCEDURE — 250N000013 HC RX MED GY IP 250 OP 250 PS 637: Performed by: FAMILY MEDICINE

## 2024-06-30 PROCEDURE — 250N000013 HC RX MED GY IP 250 OP 250 PS 637: Performed by: REGISTERED NURSE

## 2024-06-30 PROCEDURE — 250N000009 HC RX 250: Performed by: REGISTERED NURSE

## 2024-06-30 PROCEDURE — 250N000013 HC RX MED GY IP 250 OP 250 PS 637: Performed by: PHYSICIAN ASSISTANT

## 2024-06-30 RX ADMIN — POLYETHYLENE GLYCOL 3350 17 G: 17 POWDER, FOR SOLUTION ORAL at 20:35

## 2024-06-30 RX ADMIN — NICOTINE POLACRILEX 2 MG: 2 GUM, CHEWING ORAL at 10:59

## 2024-06-30 RX ADMIN — PRAZOSIN HYDROCHLORIDE 4 MG: 2 CAPSULE ORAL at 20:36

## 2024-06-30 RX ADMIN — SENNOSIDES AND DOCUSATE SODIUM 3 TABLET: 8.6; 5 TABLET ORAL at 20:35

## 2024-06-30 RX ADMIN — NICOTINE POLACRILEX 2 MG: 2 GUM, CHEWING ORAL at 16:01

## 2024-06-30 RX ADMIN — NICOTINE POLACRILEX 2 MG: 2 GUM, CHEWING ORAL at 08:21

## 2024-06-30 RX ADMIN — TRAZODONE HYDROCHLORIDE 50 MG: 50 TABLET ORAL at 20:36

## 2024-06-30 RX ADMIN — POLYETHYLENE GLYCOL 3350 17 G: 17 POWDER, FOR SOLUTION ORAL at 08:21

## 2024-06-30 RX ADMIN — PANTOPRAZOLE SODIUM 40 MG: 40 TABLET, DELAYED RELEASE ORAL at 08:21

## 2024-06-30 RX ADMIN — Medication 25 MCG: at 08:21

## 2024-06-30 RX ADMIN — ATROPINE SULFATE 3 DROP: 10 SOLUTION/ DROPS OPHTHALMIC at 08:30

## 2024-06-30 RX ADMIN — ATROPINE SULFATE 3 DROP: 10 SOLUTION/ DROPS OPHTHALMIC at 20:36

## 2024-06-30 RX ADMIN — POLYETHYLENE GLYCOL 3350 17 G: 17 POWDER, FOR SOLUTION ORAL at 13:58

## 2024-06-30 RX ADMIN — CLOZAPINE 325 MG: 200 TABLET ORAL at 20:36

## 2024-06-30 RX ADMIN — NICOTINE POLACRILEX 2 MG: 2 GUM, CHEWING ORAL at 12:15

## 2024-06-30 RX ADMIN — ATROPINE SULFATE 3 DROP: 10 SOLUTION/ DROPS OPHTHALMIC at 14:28

## 2024-06-30 RX ADMIN — PROPRANOLOL HYDROCHLORIDE 10 MG: 10 TABLET ORAL at 20:35

## 2024-06-30 RX ADMIN — VENLAFAXINE HYDROCHLORIDE 225 MG: 150 CAPSULE, EXTENDED RELEASE ORAL at 08:21

## 2024-06-30 RX ADMIN — NICOTINE POLACRILEX 2 MG: 2 GUM, CHEWING ORAL at 17:06

## 2024-06-30 RX ADMIN — LEVOTHYROXINE SODIUM 112 MCG: 112 TABLET ORAL at 08:21

## 2024-06-30 RX ADMIN — NICOTINE POLACRILEX 2 MG: 2 GUM, CHEWING ORAL at 15:00

## 2024-06-30 RX ADMIN — NICOTINE POLACRILEX 2 MG: 2 GUM, CHEWING ORAL at 21:06

## 2024-06-30 RX ADMIN — SENNOSIDES AND DOCUSATE SODIUM 3 TABLET: 8.6; 5 TABLET ORAL at 08:20

## 2024-06-30 RX ADMIN — NICOTINE POLACRILEX 2 MG: 2 GUM, CHEWING ORAL at 13:28

## 2024-06-30 RX ADMIN — NICOTINE POLACRILEX 2 MG: 2 GUM, CHEWING ORAL at 20:09

## 2024-06-30 RX ADMIN — Medication 500 MCG: at 08:21

## 2024-06-30 RX ADMIN — GABAPENTIN 400 MG: 400 CAPSULE ORAL at 20:35

## 2024-06-30 RX ADMIN — PROPRANOLOL HYDROCHLORIDE 10 MG: 10 TABLET ORAL at 08:21

## 2024-06-30 RX ADMIN — BENZTROPINE MESYLATE 1 MG: 1 TABLET ORAL at 08:21

## 2024-06-30 RX ADMIN — NICOTINE POLACRILEX 2 MG: 2 GUM, CHEWING ORAL at 18:14

## 2024-06-30 RX ADMIN — GABAPENTIN 400 MG: 400 CAPSULE ORAL at 13:58

## 2024-06-30 RX ADMIN — BENZTROPINE MESYLATE 1 MG: 1 TABLET ORAL at 20:36

## 2024-06-30 RX ADMIN — GABAPENTIN 400 MG: 400 CAPSULE ORAL at 08:21

## 2024-06-30 ASSESSMENT — ACTIVITIES OF DAILY LIVING (ADL)
LAUNDRY: WITH SUPERVISION
ADLS_ACUITY_SCORE: 54
ADLS_ACUITY_SCORE: 54
ORAL_HYGIENE: INDEPENDENT
ADLS_ACUITY_SCORE: 54
DRESS: INDEPENDENT
ADLS_ACUITY_SCORE: 54
DRESS: INDEPENDENT
HYGIENE/GROOMING: INDEPENDENT
ORAL_HYGIENE: INDEPENDENT
HYGIENE/GROOMING: INDEPENDENT
ADLS_ACUITY_SCORE: 54
ADLS_ACUITY_SCORE: 54
LAUNDRY: UNABLE TO COMPLETE
ADLS_ACUITY_SCORE: 54

## 2024-06-30 NOTE — PLAN OF CARE
Problem: Adult Behavioral Health Plan of Care  Goal: Adheres to Safety Considerations for Self and Others  Outcome: Progressing     Problem: Suicide Risk  Goal: Absence of Self-Harm  Outcome: Progressing    Plan of Care Reviewed With: patient    Pt took a nap at the start of the shift. He reports having difficulty staying asleep last night. Pt is reminded that he has PRN trazodone to help with sleep. He says he will try to take it tonight. Pt denies all mental health symptoms including SI, HI, A/V hallucination. Denies paranoia and disorganized thoughts. Pt appears restless requesting gum every hour. He denies pain. Has been passing BM. Pt looks forward to discharging on Tuesday. Medication compliant. Received trazodone per request.   Adequate intake noted.

## 2024-06-30 NOTE — PLAN OF CARE
"Goal Outcome Evaluation:    Plan of Care Reviewed With: patient      Patient was up at breakfast time - 7 hours of sleep documented on night shift.  He also napped for a couple hours this afternoon.  He ate all of his breakfast and lunch in the lounge.  Medication compliant.  He was given PRN nicotine gum 5 times this shift, no other PRN's given.  Reports having good bowel movements and no constipation.  Eye contact is poor and he presents as depressed but denies depression and anxiety.  Flat affect and he does not interact with peers or staff except to make his needs known.  He sat in the lounge and watched TV occasionally.  He denies SI, HI and all other psych symptoms. His limited interactions with peers and staff are appropriate.  Contracts for safety on the unit and agrees to inform staff if he is experiencing any discomforts.  /79 (BP Location: Left arm)   Pulse 86   Temp 98  F (36.7  C) (Temporal)   Resp 16   Ht 1.778 m (5' 10\")   Wt 107.5 kg (237 lb)   SpO2 99%   BMI 34.01 kg/m        "

## 2024-07-01 LAB
BASOPHILS # BLD AUTO: 0.1 10E3/UL (ref 0–0.2)
BASOPHILS NFR BLD AUTO: 1 %
EOSINOPHIL # BLD AUTO: 0 10E3/UL (ref 0–0.7)
EOSINOPHIL NFR BLD AUTO: 0 %
HOLD SPECIMEN: NORMAL
IMM GRANULOCYTES # BLD: 0 10E3/UL
IMM GRANULOCYTES NFR BLD: 1 %
LYMPHOCYTES # BLD AUTO: 2.3 10E3/UL (ref 0.8–5.3)
LYMPHOCYTES NFR BLD AUTO: 28 %
MONOCYTES # BLD AUTO: 0.6 10E3/UL (ref 0–1.3)
MONOCYTES NFR BLD AUTO: 7 %
NEUTROPHILS # BLD AUTO: 5.3 10E3/UL (ref 1.6–8.3)
NEUTROPHILS NFR BLD AUTO: 63 %
NRBC # BLD AUTO: 0 10E3/UL
NRBC BLD AUTO-RTO: 0 /100
WBC # BLD AUTO: 8.2 10E3/UL (ref 4–11)

## 2024-07-01 PROCEDURE — 90832 PSYTX W PT 30 MINUTES: CPT | Performed by: COUNSELOR

## 2024-07-01 PROCEDURE — 250N000013 HC RX MED GY IP 250 OP 250 PS 637: Performed by: REGISTERED NURSE

## 2024-07-01 PROCEDURE — 250N000011 HC RX IP 250 OP 636

## 2024-07-01 PROCEDURE — 36415 COLL VENOUS BLD VENIPUNCTURE: CPT | Performed by: PSYCHIATRY & NEUROLOGY

## 2024-07-01 PROCEDURE — 250N000013 HC RX MED GY IP 250 OP 250 PS 637: Performed by: PHYSICIAN ASSISTANT

## 2024-07-01 PROCEDURE — 250N000013 HC RX MED GY IP 250 OP 250 PS 637: Performed by: FAMILY MEDICINE

## 2024-07-01 PROCEDURE — 85048 AUTOMATED LEUKOCYTE COUNT: CPT | Performed by: PSYCHIATRY & NEUROLOGY

## 2024-07-01 PROCEDURE — 124N000002 HC R&B MH UMMC

## 2024-07-01 PROCEDURE — G0177 OPPS/PHP; TRAIN & EDUC SERV: HCPCS

## 2024-07-01 RX ADMIN — ATROPINE SULFATE 3 DROP: 10 SOLUTION/ DROPS OPHTHALMIC at 13:10

## 2024-07-01 RX ADMIN — SENNOSIDES AND DOCUSATE SODIUM 3 TABLET: 8.6; 5 TABLET ORAL at 08:56

## 2024-07-01 RX ADMIN — GABAPENTIN 400 MG: 400 CAPSULE ORAL at 20:21

## 2024-07-01 RX ADMIN — POLYETHYLENE GLYCOL 3350 17 G: 17 POWDER, FOR SOLUTION ORAL at 20:20

## 2024-07-01 RX ADMIN — NICOTINE POLACRILEX 2 MG: 2 GUM, CHEWING ORAL at 18:26

## 2024-07-01 RX ADMIN — PANTOPRAZOLE SODIUM 40 MG: 40 TABLET, DELAYED RELEASE ORAL at 08:57

## 2024-07-01 RX ADMIN — NICOTINE POLACRILEX 2 MG: 2 GUM, CHEWING ORAL at 21:29

## 2024-07-01 RX ADMIN — GABAPENTIN 400 MG: 400 CAPSULE ORAL at 13:10

## 2024-07-01 RX ADMIN — NICOTINE POLACRILEX 2 MG: 2 GUM, CHEWING ORAL at 09:15

## 2024-07-01 RX ADMIN — NICOTINE POLACRILEX 2 MG: 2 GUM, CHEWING ORAL at 15:24

## 2024-07-01 RX ADMIN — SENNOSIDES AND DOCUSATE SODIUM 3 TABLET: 8.6; 5 TABLET ORAL at 20:20

## 2024-07-01 RX ADMIN — POLYETHYLENE GLYCOL 3350 17 G: 17 POWDER, FOR SOLUTION ORAL at 08:56

## 2024-07-01 RX ADMIN — ATROPINE SULFATE 3 DROP: 10 SOLUTION/ DROPS OPHTHALMIC at 08:50

## 2024-07-01 RX ADMIN — NICOTINE POLACRILEX 2 MG: 2 GUM, CHEWING ORAL at 12:00

## 2024-07-01 RX ADMIN — PRAZOSIN HYDROCHLORIDE 4 MG: 2 CAPSULE ORAL at 20:21

## 2024-07-01 RX ADMIN — LEVOTHYROXINE SODIUM 112 MCG: 112 TABLET ORAL at 08:57

## 2024-07-01 RX ADMIN — PROPRANOLOL HYDROCHLORIDE 10 MG: 10 TABLET ORAL at 20:21

## 2024-07-01 RX ADMIN — ATROPINE SULFATE 3 DROP: 10 SOLUTION/ DROPS OPHTHALMIC at 20:22

## 2024-07-01 RX ADMIN — VENLAFAXINE HYDROCHLORIDE 225 MG: 150 CAPSULE, EXTENDED RELEASE ORAL at 08:57

## 2024-07-01 RX ADMIN — BENZTROPINE MESYLATE 1 MG: 1 TABLET ORAL at 08:57

## 2024-07-01 RX ADMIN — CLOZAPINE 325 MG: 200 TABLET ORAL at 20:21

## 2024-07-01 RX ADMIN — NICOTINE POLACRILEX 2 MG: 2 GUM, CHEWING ORAL at 16:25

## 2024-07-01 RX ADMIN — Medication 500 MCG: at 08:56

## 2024-07-01 RX ADMIN — NICOTINE POLACRILEX 2 MG: 2 GUM, CHEWING ORAL at 14:12

## 2024-07-01 RX ADMIN — Medication 25 MCG: at 08:57

## 2024-07-01 RX ADMIN — NICOTINE POLACRILEX 2 MG: 2 GUM, CHEWING ORAL at 17:17

## 2024-07-01 RX ADMIN — BENZTROPINE MESYLATE 1 MG: 1 TABLET ORAL at 20:20

## 2024-07-01 RX ADMIN — NICOTINE POLACRILEX 2 MG: 2 GUM, CHEWING ORAL at 13:02

## 2024-07-01 RX ADMIN — NICOTINE POLACRILEX 2 MG: 2 GUM, CHEWING ORAL at 20:25

## 2024-07-01 RX ADMIN — POLYETHYLENE GLYCOL 3350 17 G: 17 POWDER, FOR SOLUTION ORAL at 13:10

## 2024-07-01 RX ADMIN — NICOTINE POLACRILEX 2 MG: 2 GUM, CHEWING ORAL at 08:13

## 2024-07-01 RX ADMIN — NICOTINE POLACRILEX 2 MG: 2 GUM, CHEWING ORAL at 10:59

## 2024-07-01 RX ADMIN — GABAPENTIN 400 MG: 400 CAPSULE ORAL at 08:56

## 2024-07-01 RX ADMIN — PROPRANOLOL HYDROCHLORIDE 10 MG: 10 TABLET ORAL at 08:57

## 2024-07-01 RX ADMIN — NICOTINE POLACRILEX 2 MG: 2 GUM, CHEWING ORAL at 19:25

## 2024-07-01 ASSESSMENT — ACTIVITIES OF DAILY LIVING (ADL)
LAUNDRY: WITH SUPERVISION
LAUNDRY: WITH SUPERVISION
ADLS_ACUITY_SCORE: 54
ORAL_HYGIENE: INDEPENDENT
ADLS_ACUITY_SCORE: 54
ADLS_ACUITY_SCORE: 54
HYGIENE/GROOMING: INDEPENDENT
ADLS_ACUITY_SCORE: 54
HYGIENE/GROOMING: INDEPENDENT
DRESS: INDEPENDENT
ADLS_ACUITY_SCORE: 54
ORAL_HYGIENE: INDEPENDENT
ADLS_ACUITY_SCORE: 54
DRESS: INDEPENDENT
ADLS_ACUITY_SCORE: 54

## 2024-07-01 NOTE — PLAN OF CARE
Upcoming Meetings and Appointments:   Team note:   Wednesday     Tasks Complete:  Chart review and met with team, discussed pt progress, symptomology, and response to treatment.  Discussed the discharge plan and any potential impediments to discharge    I spoke with Neela, group home owner, to confirm transportation. She states that her staff is no longer able to transport him home. I informed her that he is agreeable to seeing a therapist virtually. Neela notes that there is concerns about his follow through and notes that in the past he does not attend online appointments. She is recommending in person. I informed her that I will speak with him about this. She notes that she has spoke with Tiffanie about seeing a therapist in person and that this may be a requirement of his current living environment.   I spoke with Tiffanie about discharge planning and recommendation for in person therapy per Neela. He is agreeable.       Discharge Plan or Goal   Plan  Discharge home to group [place] on 7/2 @11AM  Medications: General medications sent to [facility] and all labs [facility]   Transportation: [facility]   Psychiatrist: Current: Dr. Reid [facility] [place] [facility] [place] [facility] [place] [phone]  Group Home Director: Neela Brown [phone]  [email or web address]  CADI CM: Current Helen Avina [phone]     Barriers to Discharge   Requires ongoing stabilization . He will benefit from case management and therapy.      Referral Status  Tiffanie will benefit from therapy/DBT     Legal Status  Voluntary     Next Steps:  Note to Neela any med changes via phone or email tomorrow   Discharge home to group [place] Tues- email discharge summary and labs  to Neela at [email or web address].     He is interested in therapy- seeking a male therapist

## 2024-07-01 NOTE — CARE PLAN
"  Rehab Group    Start time: 1015  End time: 1100  Patient time total: 45 minutes    attended full group     #3 attended   Group Type: occupational therapy   Group Topic Covered: coping skills, problem solving, and social skills, relapse prevention       Group Session Detail:   Education and group discussion provided on change and hands on practice making an individualized Change Plan for concentration, motivation, to encourage change talk and hope for a sustainable recovery, building insight, organization/planning, future oriented thinking and an opportunity to share thoughts/feelings openly with the group.        Patient Response/Contribution:  attentive and actively engaged       Patient Detail:  Pt was pleasant and invested in the group discussion and activity.  His goal for change was, \"To change my thought pattern.\"  Pt was somewhat insightful when talking about this, stating some of his reasons were, \"to be happy and positive, to have a safe space in my mind, and to feel better.\"  Pt relayed that, \"thinking too much about the past\" could derail him.  Pt Id'd taking his meds, talking to his mother and doing positive affirmations as things that could help him.  Pt rated the need for this change as a 9 out of 10.            No Charge    Patient Active Problem List   Diagnosis    Depression    Schizoaffective disorder, bipolar type (H)    Suicidal behavior    PTSD (post-traumatic stress disorder)    History of schizophrenia    Chronic post-traumatic stress disorder (PTSD)    Hallucinations    Alcohol abuse, episodic    Borderline personality disorder (H)    Cannabis dependence in remission (H)    Cannabis use disorder, moderate, in sustained remission (H)    GERD (gastroesophageal reflux disease)    High risk medication use    Hypothyroidism    Noncompliance    PPD positive, treated    TB lung, latent    Major depression    Mood change    Nicotine use disorder    Suicidal ideation    Schizoaffective disorder, " unspecified type (H)    Schizophrenia, schizoaffective, chronic with acute exacerbation (H)    Marijuana abuse    Anxiety

## 2024-07-01 NOTE — PLAN OF CARE
"Goal Outcome Evaluation:    Individual Therapy Note      Date of Service: July 1, 2024    Patient: Kamini goes by \"Kamini,\" uses he/him pronouns    Individuals Present: Kamini MEHREEN Sutton    Session start: 3:00 pm  Session end: 3:20 pm  Session duration in minutes: 20      Modality Used: Person Centered and Rapport Building    Goals: doing good, ready to discharge reported, engaged in safety planning revision with writer    Patient Description of current symptoms: was able to note symptoms when mental health breaks down, also noting he likes to work, but hard to hold a job     Mental Status Exam:   Attitude: cooperative  Eye Contact: fair  Mood: better  Affect: intensity is normal  Speech: clear, coherent  Psychomotor Behavior: no evidence of tardive dyskinesia, dystonia, or tics  Thought Process:  linear  Associations: no loose associations  Thought Content: no evidence of suicidal ideation or homicidal ideation  Insight: fair  Judgement: fair  Attention Span and Concentration: fair    Pt progress: Pt is better and has a a lot of community resources he is comfortable with    Treatment Objective(s) Addressed:   The focus of this session was on rapport building, orienting the patient to therapy, and safety planning     Progress Towards Goals and Assessment of Patient:   Patient is making progress towards treatment goals as evidenced by willingness to complete safety plan and plan for discharge tomorrow.       Therapeutic Intervention(s):   Provided active listening, unconditional positive regard, and validation.   Engaged in safety planning identifying coping skills, warning signs, health support and resources and Identified stress relief practices      Plan/next step: planned discharge    51503 - Psychotherapy (with patient) - 30 (16-37*) min    Patient Active Problem List   Diagnosis    Depression    Schizoaffective disorder, bipolar type (H)    Suicidal behavior    PTSD (post-traumatic stress " disorder)    History of schizophrenia    Chronic post-traumatic stress disorder (PTSD)    Hallucinations    Alcohol abuse, episodic    Borderline personality disorder (H)    Cannabis dependence in remission (H)    Cannabis use disorder, moderate, in sustained remission (H)    GERD (gastroesophageal reflux disease)    High risk medication use    Hypothyroidism    Noncompliance    PPD positive, treated    TB lung, latent    Major depression    Mood change    Nicotine use disorder    Suicidal ideation    Schizoaffective disorder, unspecified type (H)    Schizophrenia, schizoaffective, chronic with acute exacerbation (H)    Marijuana abuse    Anxiety

## 2024-07-01 NOTE — PLAN OF CARE
BEH IP Unit Acuity Rating Score (UARS)  Patient is given one point for every criteria they meet.    CRITERIA SCORING   On a 72 hour hold, court hold, committed, stay of commitment, or revocation. 0    Patient LOS on BEH unit exceeds 20 days. 0  LOS: 6   Patient under guardianship, 55+, otherwise medically complex, or under age 11. 0   Suicide ideation without relief of precipitating factors. 1   Current plan for suicide. 1   Current plan for homicide. 0   Imminent risk or actual attempt to seriously harm another without relief of factors precipitating the attempt. 0   Severe dysfunction in daily living (ex: complete neglect for self care, extreme disruption in vegetative function, extreme deterioration in social interactions). 0   Recent (last 7 days) or current physical aggression in the ED or on unit. 1   Restraints or seclusion episode in past 72 hours. 0   Recent (last 7 days) or current verbal aggression, agitation, yelling, etc., while in the ED or unit. 0   Active psychosis. 0   Need for constant or near constant redirection (from leaving, from others, etc).  0   Intrusive or disruptive behaviors. 0   Patient requires 3 or more hours of individualized nursing care per 8-hour shift (i.e. for ADLs, meds, therapeutic interventions). 0   TOTAL 3

## 2024-07-01 NOTE — CARE PLAN
Rehab Group    Start time: 1110  End time: 1200  Patient time total: 50 minutes    attended full group    #6 attended   Group Type: occupational therapy   Group Topic Covered: coping skills, self-care, and social skills       Group Session Detail:  Education and group discussion provided on the mental health benefits of routine utilization of positive self affirmations and gratitude with hands on activity of completing an Affirmation or Gratitude Calendar for concentration, organization, building self esteem, coping, mood stabilization, reality based activity, fostering hope for a sustainable recovery, self-care, and resiliency.     Patient Response/Contribution:  socially appropriate and actively engaged       Patient Detail:  Pt was pleasant and engaged in the task.  He shared during the check in question that he does use affirmations when he is having a difficult time.  Pt numbered his calendar accurately and decorated it in a fairly detailed manner.  Pt added some affirmations and gratitudes, filling about 1/4 of the dates.  Pt was social with therapist and peers.  Pt requested an additional calendar to track appointments on at the end of the group session and was given one.                Patient Active Problem List   Diagnosis    Depression    Schizoaffective disorder, bipolar type (H)    Suicidal behavior    PTSD (post-traumatic stress disorder)    History of schizophrenia    Chronic post-traumatic stress disorder (PTSD)    Hallucinations    Alcohol abuse, episodic    Borderline personality disorder (H)    Cannabis dependence in remission (H)    Cannabis use disorder, moderate, in sustained remission (H)    GERD (gastroesophageal reflux disease)    High risk medication use    Hypothyroidism    Noncompliance    PPD positive, treated    TB lung, latent    Major depression    Mood change    Nicotine use disorder    Suicidal ideation    Schizoaffective disorder, unspecified type (H)    Schizophrenia,  schizoaffective, chronic with acute exacerbation (H)    Marijuana abuse    Anxiety

## 2024-07-01 NOTE — PLAN OF CARE
"Goal Outcome Evaluation:    Plan of Care Reviewed With: patient      Patient was up at breakfast time - 6.75 hours of sleep documented on night shift.  He also napped for a couple hours this afternoon.  He ate all of his breakfast and lunch in the lounge.  Medication compliant.  He was given PRN nicotine gum 7 times this shift, no other PRN's given.  Reports having good bowel movements and no constipation.  Eye contact is poor and he presents as somewhat depressed but improving and he denies depression and anxiety.  Flat affect abut he interacted with peers and staff today.  Invega Sustenna injection given without issues.  He sat in the lounge and watched TV most of the shift.  Attended groups today for the first time and said it was really fun.  He denies SI, HI and all other psych symptoms. His limited interactions with peers and staff are appropriate.  Contracts for safety on the unit and agrees to inform staff if he is experiencing any discomforts.  /77   Pulse 92   Temp 98  F (36.7  C) (Oral)   Resp 16   Ht 1.778 m (5' 10\")   Wt 107.5 kg (237 lb)   SpO2 98%   BMI 34.01 kg/m        "

## 2024-07-01 NOTE — CARE PLAN
Rehab Group    Start time: 1315  End time: 1410  Patient time total: 55 minutes    attended full group    #6 attended   Group Type: occupational therapy   Group Topic Covered: balanced lifestyle, cognitive activities, coping skills, healthy leisure time, problem solving, substance use/recovery , self-esteem, and social skills       Group Session Detail:  Education and group discussion provided on mental health and chemical addiction recovery through the lens of a metaphor relating recovery to a jigsaw puzzle (having a plan, having many moving pieces, needing help from others, needing a fresh perspective to notice something that is actually right in front of your face, having to follow through, even when things seem impossible, swearing there is no solution, when one is there, it just has to be looked at with a different lens, etc.) with hands on healthy leisure activity of assembling a small Jigsaw Puzzle for concentration, follow through, coping with mild frustration, being willing to challenge oneself, frustration tolerance, mood stabilization, reality-based activity, delayed gratification, building self-esteem, expanding self awareness, working as a team, and socialization.     Patient Response/Contribution:  attentive and required prompts or assistance to participate       Patient Detail:  Pt verbalized doubt of his ability to do a jigsaw puzzle in an effective manner.  Therapist worked with him to formulate a strategy and organize the pieces by frame and color.  Pt needed continued encouragement for follow through, hesitant to make an error.  Pt was reassured that putting a puzzle together is very much about trial and error, so it is perfectly alright to try things that may or may not work.  Pt was social and attentive, though easily gives up when he feels challenged.             Patient Active Problem List   Diagnosis    Depression    Schizoaffective disorder, bipolar type (H)    Suicidal behavior    PTSD  (post-traumatic stress disorder)    History of schizophrenia    Chronic post-traumatic stress disorder (PTSD)    Hallucinations    Alcohol abuse, episodic    Borderline personality disorder (H)    Cannabis dependence in remission (H)    Cannabis use disorder, moderate, in sustained remission (H)    GERD (gastroesophageal reflux disease)    High risk medication use    Hypothyroidism    Noncompliance    PPD positive, treated    TB lung, latent    Major depression    Mood change    Nicotine use disorder    Suicidal ideation    Schizoaffective disorder, unspecified type (H)    Schizophrenia, schizoaffective, chronic with acute exacerbation (H)    Marijuana abuse    Anxiety

## 2024-07-01 NOTE — PROGRESS NOTES
NOC Shift Report    Pt in bed at beginning of shift, breathing quiet and unlabored. Pt slept 6.75 hours. Pt is on status 15 min checks. Will continue to monitor.

## 2024-07-01 NOTE — PLAN OF CARE
Goal Outcome Evaluation:    IP Treatment Plan    Client's Name: Tiffanie Neal  YOB: 1990      Treatment Plan Date: July 1, 2024      Anticipated number of sessions for this episode of care: 1    Current Concerns/Problem Areas:    Goal 1 :OTHER      Intervention(s)     Safety planning and reviewing coping and community supports, identified some things ARMHARLEEN can help him with             Goal 2 : Establish and develop coping skills for depression to improve mood and overall well-being    Reviewed healthy living that supports positive mental health, including looking at sleep hygiene, regular movement, nutrition, and regular socialization  Intervention(s)  Active listening and safety planning         Pt centered considerations  Cultural considerations [place] language speaker, group home living arrangment

## 2024-07-02 VITALS
WEIGHT: 237 LBS | TEMPERATURE: 97.3 F | RESPIRATION RATE: 16 BRPM | HEIGHT: 70 IN | SYSTOLIC BLOOD PRESSURE: 110 MMHG | DIASTOLIC BLOOD PRESSURE: 78 MMHG | OXYGEN SATURATION: 100 % | BODY MASS INDEX: 33.93 KG/M2 | HEART RATE: 91 BPM

## 2024-07-02 PROCEDURE — 250N000013 HC RX MED GY IP 250 OP 250 PS 637: Performed by: REGISTERED NURSE

## 2024-07-02 PROCEDURE — 99239 HOSP IP/OBS DSCHRG MGMT >30: CPT | Performed by: REGISTERED NURSE

## 2024-07-02 PROCEDURE — 250N000013 HC RX MED GY IP 250 OP 250 PS 637: Performed by: FAMILY MEDICINE

## 2024-07-02 PROCEDURE — 250N000013 HC RX MED GY IP 250 OP 250 PS 637: Performed by: PHYSICIAN ASSISTANT

## 2024-07-02 RX ORDER — PRAZOSIN HYDROCHLORIDE 2 MG/1
4 CAPSULE ORAL AT BEDTIME
Qty: 60 CAPSULE | Refills: 0 | Status: SHIPPED | OUTPATIENT
Start: 2024-07-02

## 2024-07-02 RX ORDER — VITAMIN B COMPLEX
25 TABLET ORAL DAILY
Qty: 30 TABLET | Refills: 0 | Status: SHIPPED | OUTPATIENT
Start: 2024-07-02

## 2024-07-02 RX ORDER — CLOZAPINE 100 MG/1
300 TABLET ORAL AT BEDTIME
Qty: 90 TABLET | Refills: 0 | Status: SHIPPED | OUTPATIENT
Start: 2024-07-02

## 2024-07-02 RX ORDER — DOCUSATE SODIUM 100 MG/1
100 CAPSULE, LIQUID FILLED ORAL 2 TIMES DAILY
Qty: 60 CAPSULE | Refills: 0 | Status: SHIPPED | OUTPATIENT
Start: 2024-07-02

## 2024-07-02 RX ORDER — LEVOTHYROXINE SODIUM 112 UG/1
112 TABLET ORAL DAILY
Qty: 30 TABLET | Refills: 0 | Status: SHIPPED | OUTPATIENT
Start: 2024-07-02

## 2024-07-02 RX ORDER — BISACODYL 10 MG
10 SUPPOSITORY, RECTAL RECTAL DAILY PRN
Qty: 15 SUPPOSITORY | Refills: 0 | Status: SHIPPED | OUTPATIENT
Start: 2024-07-02

## 2024-07-02 RX ORDER — OLANZAPINE 10 MG/1
10 TABLET ORAL 3 TIMES DAILY PRN
Qty: 90 TABLET | Refills: 0 | Status: SHIPPED | OUTPATIENT
Start: 2024-07-02

## 2024-07-02 RX ORDER — CLOZAPINE 25 MG/1
25 TABLET ORAL AT BEDTIME
Qty: 30 TABLET | Refills: 0 | Status: SHIPPED | OUTPATIENT
Start: 2024-07-02

## 2024-07-02 RX ORDER — PROPRANOLOL HYDROCHLORIDE 10 MG/1
10 TABLET ORAL 2 TIMES DAILY
Qty: 60 TABLET | Refills: 0 | Status: SHIPPED | OUTPATIENT
Start: 2024-07-02

## 2024-07-02 RX ORDER — BENZTROPINE MESYLATE 1 MG/1
1 TABLET ORAL 2 TIMES DAILY
Qty: 60 TABLET | Refills: 0 | Status: SHIPPED | OUTPATIENT
Start: 2024-07-02

## 2024-07-02 RX ORDER — HYDROXYZINE HYDROCHLORIDE 25 MG/1
25 TABLET, FILM COATED ORAL 3 TIMES DAILY PRN
Qty: 90 TABLET | Refills: 0 | Status: SHIPPED | OUTPATIENT
Start: 2024-07-02

## 2024-07-02 RX ORDER — VENLAFAXINE HYDROCHLORIDE 75 MG/1
225 CAPSULE, EXTENDED RELEASE ORAL
Qty: 90 CAPSULE | Refills: 0 | Status: SHIPPED | OUTPATIENT
Start: 2024-07-02

## 2024-07-02 RX ORDER — POLYETHYLENE GLYCOL 3350 17 G/17G
17 POWDER, FOR SOLUTION ORAL 2 TIMES DAILY
Qty: 850 G | Refills: 0 | Status: SHIPPED | OUTPATIENT
Start: 2024-07-02

## 2024-07-02 RX ORDER — GABAPENTIN 400 MG/1
400 CAPSULE ORAL 3 TIMES DAILY
Qty: 90 CAPSULE | Refills: 0 | Status: SHIPPED | OUTPATIENT
Start: 2024-07-02

## 2024-07-02 RX ADMIN — GABAPENTIN 400 MG: 400 CAPSULE ORAL at 09:23

## 2024-07-02 RX ADMIN — Medication 25 MCG: at 09:23

## 2024-07-02 RX ADMIN — LEVOTHYROXINE SODIUM 112 MCG: 112 TABLET ORAL at 09:23

## 2024-07-02 RX ADMIN — SENNOSIDES AND DOCUSATE SODIUM 3 TABLET: 8.6; 5 TABLET ORAL at 09:23

## 2024-07-02 RX ADMIN — PANTOPRAZOLE SODIUM 40 MG: 40 TABLET, DELAYED RELEASE ORAL at 09:23

## 2024-07-02 RX ADMIN — VENLAFAXINE HYDROCHLORIDE 225 MG: 150 CAPSULE, EXTENDED RELEASE ORAL at 09:23

## 2024-07-02 RX ADMIN — NICOTINE POLACRILEX 2 MG: 2 GUM, CHEWING ORAL at 10:29

## 2024-07-02 RX ADMIN — Medication 500 MCG: at 09:23

## 2024-07-02 RX ADMIN — NICOTINE POLACRILEX 2 MG: 2 GUM, CHEWING ORAL at 09:23

## 2024-07-02 RX ADMIN — PROPRANOLOL HYDROCHLORIDE 10 MG: 10 TABLET ORAL at 09:23

## 2024-07-02 RX ADMIN — BENZTROPINE MESYLATE 1 MG: 1 TABLET ORAL at 09:23

## 2024-07-02 ASSESSMENT — ACTIVITIES OF DAILY LIVING (ADL)
ADLS_ACUITY_SCORE: 54

## 2024-07-02 NOTE — PLAN OF CARE
Pt appeared to be resting/sleeping in bed most of the night.  No PRN medications requested or given.  Normal non-labored breathing note.  Remains calm and resting without difficulty.  Approximately 7 hours of sleep is recorded.   Possible discharge today at 11 am.    He has valuables envelope (3 credit cards) in security to be picked up if discharging today.

## 2024-07-02 NOTE — PLAN OF CARE
Upcoming Meetings and Appointments:   Team note:   Wednesday     Tasks Complete:  Chart review and met with team, discussed pt progress, symptomology, and response to treatment.  Discussed the discharge plan and any potential impediments to discharge    Therapy appointment changed per Tiffanie's request.          Discharge Plan or Goal   Plan  Discharge home to group home on 7/2 @11AM  Medications: General medications sent to [facility] in [facility] and all labs [facility]   Transportation: [facility]   Psychiatrist: Current: Dr. Reid [facility] Previous: Marco Campo [place] [facility] [phone]  Group Home Director: Neela Brown [phone]  [email or web address]  CADI CM: Current Helen Avina [phone]     Barriers to Discharge   Requires ongoing stabilization . He will benefit from case management and therapy.      Referral Status  Tiffanie will benefit from therapy/DBT     Legal Status  Voluntary     Next Steps:  Note to Neela any med changes via phone or email tomorrow   Discharge home to group home Tues- email discharge summary and labs  to Neela at [email or web address].     He is interested in therapy- seeking a male therapist

## 2024-07-02 NOTE — PLAN OF CARE
"Goal Outcome Evaluation:    Plan of Care Reviewed With: patient      Tiffanie has been present in the television lounge for the majority of the shift. He has been conversing with peers. He did not participate in evening programing. He request nicotine gum almost hourly. He was pleasant, calm and cooperative. He denied any SI/SIB/AH/VH. Tiffanie reports that \"I am discharging tomorrow.\" He plans on discharging back to his group home. He was medication compliant with all scheduled prescribed medications. Tiffanie continues on Suicide precautions. Nursing to continue to support current plan of care.                 "

## 2024-07-02 NOTE — DISCHARGE SUMMARY
"Psychiatric Discharge Summary    Tiffanie Neal MRN# [id number]   Age: 33 year old YOB: 1990     Date of Admission:  6/24/2024  Date of Discharge:  7/2/2024  Admitting Physician:  Ulices Richmond MD  Discharge Physician:  AFSANEH Daugherty CNP            Event Leading to Hospitalization:   Per ED:  Kamini Neal is a 33 year old male with a history of schizoaffective disorder bipolar type, borderline personality disorder, depression, anxiety, and polysubstance abuse who presents to the emergency department via EMS with suicidal and homicidal ideation.  Patient states he lives at a group home.  To the staff he reported increasing hallucinations and paranoia since he was \"rejected\" by a school on Friday.  He denies that the voices in his head are command hallucinations.  He states he started thinking about slitting his own throat.  When asked about his homicidal thoughts he said \"only a little bit\".  Reports he is compliant with his medications.  He thinks last smoked marijuana around 2 weeks ago.  Denies any physical complaints.     Per DEC :  Patient endorses suicidal ideation for the past 3 days constantly to slit his throat with a knife.  Patient endorses intent due to feeling negatively about himself, helpless and hopeless about the future.  Patient endorses homicidal ideation which she refuses to provide details about.  Patient endorses command auditory hallucinations telling him that he is \"retarded\", that he has \"wasted his life\", and also telling him to slit his throat with a knife.  Patient requested discharge in order to \"probably kill myself\", due to his \"patience running out\" waiting in our emergency department.  Patient was placed on a 72-hour hold.  Patient will be referred for admission.     See Admission note by AFSANEH Daugherty, KRISTIN, for additional details.          Diagnoses:   Primary Diagnosis:  Schizoaffective disorder  Suicidal / homicidal " "ideation     Secondary Diagnoses:   Borderline personality disorder per chart  PTSD per chart  Cannabis use, r/o disorder  Unspecified anxiety per chart  Alcohol use disorder, in remission  Constipation, likely medication induced per chart    Clinically Significant Risk Factors        # Obesity: Estimated body mass index is 34.01 kg/m  as calculated from the following:    Height as of this encounter: 1.778 m (5' 10\").    Weight as of this encounter: 107.5 kg (237 lb).      # Financial/Environmental Concerns: none              Labs:        Most Recent 3 CBC's:  Recent Labs   Lab Test 07/01/24  0836 06/26/24  0805 06/24/24  2208 10/10/23  1100   WBC 8.2 5.9 6.7 9.3   HGB  --  14.4 14.5 14.7   MCV  --  92 93 93   PLT  --  184 187 197      Most Recent 3 BMP's:  Recent Labs   Lab Test 06/24/24  2209 10/10/23  1145 08/11/22  0021    136 144   POTASSIUM 3.9 3.9 3.7   CHLORIDE 104 104 112*   CO2 27 21* 27   BUN 6.7 7.5 7   CR 0.85 0.76 0.72   ANIONGAP 10 11 5   RICKY 9.5 9.5 9.0   * 112* 93     Most Recent 2 LFT's:  Recent Labs   Lab Test 06/24/24  2209 10/10/23  1145   AST 16 20   ALT 13 17   ALKPHOS 98 107   BILITOTAL 0.2 0.5     Most Recent INR's and Anticoagulation Dosing History:  Anticoagulation Dose History           No data to display              Most Recent 3 Troponin's:  Recent Labs   Lab Test 12/20/19  2150   TROPONIN 0.01     Most Recent Cholesterol Panel:  Recent Labs   Lab Test 06/26/24  0805   CHOL 155   LDL 92   HDL 28*   TRIG 175*     Most Recent 6 Bacteria Isolates From Any Culture (See EPIC Reports for Culture Details):No lab results found.  Most Recent TSH, T4 and A1c Labs:  Recent Labs   Lab Test 06/26/24 0805 06/25/24  1824   TSH 1.08  --    A1C  --  5.2         Latest Reference Range & Units 06/26/24 08:05 07/01/24 08:36   Absolute Neutrophils 1.6 - 8.3 10e3/uL 3.4 5.3        Latest Reference Range & Units 06/29/24 09:52   Clozapine 350 - 600 ng/mL 558   Norclozapine ng/mL 432   Total " Clozapine and Norclozapine >450 ng/mL 990       Component      Latest Ref Rng 6/25/2024  5:19 PM   Interpretation ECG Sinus rhythm     Ventricular Rate      BPM 62    Atrial Rate      BPM 62    SC Interval      ms 152    QRS Duration      ms 94    QT      ms 426    QTc      ms 432    P Axis      degrees 21    R AXIS      degrees 0    T Axis      degrees 28             Consults:   No consultations were requested during this admission.            Hospital Course:   This patient is a 33 year old male with a history of schizoaffective disorder, borderline personality disorder, anxiety, PTSD and polysubstance abuse (cannabis, alcohol) who presented to [facility] ED on 6/24/2024 with suicidal ideation and homicidal ideation in the context of substance use and psychosocial stressors.     Tiffanie Neal was admitted on a 72HH to Station [address] [place] [address] with attending Ulices Richmond MD, under the direct care of AFSANEH Daugherty, CNP.  On admission to [address], patient signed in as a voluntary patient. The patient was placed under status 15 (15 minute checks) and suicide precautions to ensure patient safety.  CBC, BMP, UDS, TSH, lipids, HgbA1c, B12/folate, vitamin D were obtained.  ANC and clozapine levels were monitored per Clozapine REMS protocol.  Tiffanie Neal did participate in groups and was visible in the milieu.      Kamini Neal is a 33 year old male previously diagnosed with schizoaffective disorder, borderline personality disorder, and PTSD who presented from [facility] ED to [facility] on 6/25/2024 with command auditory hallucinations, suicidal ideation, and homicidal ideation in the context of cannabis use and psychosocial stressors. Most recent psychiatric hospitalization was from 10/9/2024 - 10/16/2023 at [facility] for a similar presentation. Significant symptoms on admission included suicidal ideation and depression. The MSE on admission was pertinent for unkempt and disheveled appearance, depressed  mood, depressed and flattened affect, passive suicidal ideation, fair insight and poor judgement.  Symptoms and presentation time of admission were most consistent with schizoaffective disorder, complicated by borderline personality disorder, PTSD, anxiety, and cannabis use. I discussed the risks, benefits, and alternatives of PTA Clozaril.  Patient was agreeable with continuing this medication.  Patient will likely benefit from therapy upon discharge.  Inpatient psychiatric hospitalization was warranted for safety, stabilization, and possible adjustment in medications.    Upon admission to [address], PTA Clozaril, Invega Sustenna, venlafaxine, Cogentin, gabapentin, propranolol, docusate, Dulcolax, Synthroid, Protonix, prazosin, hydroxyzine, Zyprexa, Miralax, Atropine, and Vitamin D3 were continued. New medications started at time of admission included Tylenol, Maalox, trazodone, and nicotine gum. During inpatient hospitalization, clozapine was titrated to 325mg and atropine was titrated to 3 times daily.  Vitamin B12 supplement was initiated due to low serum vitamin B12 levels.  Patient received Invega Sustenna 234mg STEVENSON on 7/1.       During inpatient hospitalization, the patient's suicidal ideation remitted and his depression and anxiety improved. On day of discharge, he denied suicidal ideations, plans, or intent.  Patient reported anxiety and depression symptoms were mild and manageable with current medication regimen.  On day of discharge, he denied auditory hallucinations, visual hallucinations and delusions. No overt psychosis or delusional content elicited on exam. On day of discharge, thoughts were organized and goal directed. Sleep and appetite improved during inpatient hospitalization. On day of discharge, patient was future-oriented and expressed intention to begin working with an outpatient therapist. He agreed to follow-up with outpatient psychiatry for medication management.  At the time of  discharge, patient denied suicidal ideations, plans, or intent, and homicidal ideations, plans, or intent.      Tiffanie Neal was released to group home. At the time of discharge, Tiffanie Neal was determined to not be a danger to himself or others. At the time of discharge, the patient did not meet criteria for involuntary hospitalization. On the day of discharge, the patient reported they did not have suicidal or homicidal ideation and would never hurt themselves or others. Steps taken to minimize risk include: assessing patient s behavior and thought process daily during hospital stay, discharging patient with adequate plan for follow up for mental and physical health and discussing safety plan of returning to the hospital should the patient ever have thoughts of harming themselves or others. Therefore, based on all available evidence including the factors cited above, the patient does not appear to be at imminent risk for self-harm, and is appropriate for outpatient level of care.             Discharge Medications:      Details   cyanocobalamin (VITAMIN B-12) 500 MCG SUBL sublingual tablet Place 1 tablet (500 mcg) under the tongue daily  Qty: 30 tablet, Refills: 0    Associated Diagnoses: Vitamin B12 deficiency (non anemic)      nicotine (NICORETTE) 2 MG gum Place 1 each (2 mg) inside cheek every hour as needed for nicotine withdrawal symptoms  Qty: 150 each, Refills: 0    Associated Diagnoses: Nicotine use disorder      Details   atropine 1 % SOLN Place 2-3 drops under the tongue 3 times daily  Qty: 15 mL, Refills: 0    Associated Diagnoses: Sialorrhea      benztropine (COGENTIN) 1 MG tablet Take 1 tablet (1 mg) by mouth 2 times daily  Qty: 60 tablet, Refills: 0    Associated Diagnoses: History of extrapyramidal symptoms      bisacodyl (DULCOLAX) 10 MG suppository Place 1 suppository (10 mg) rectally daily as needed for constipation  Qty: 15 suppository, Refills: 0    Associated Diagnoses: Constipation,  unspecified constipation type      !! cloZAPine (CLOZARIL) 100 MG tablet Take 3 tablets (300 mg) by mouth at bedtime along with 25 mg for a total of 325 mg by mouth at bedtime.  Qty: 90 tablet, Refills: 0    Associated Diagnoses: Suicidal ideation; Schizophrenia, schizoaffective, chronic with acute exacerbation (H)      !! cloZAPine (CLOZARIL) 25 MG tablet Take 1 tablet (25 mg) by mouth at bedtime along with 300 mg for a total of 325 mg at bedtime.  Qty: 30 tablet, Refills: 0    Associated Diagnoses: Suicidal ideation; Schizophrenia, schizoaffective, chronic with acute exacerbation (H)      docusate sodium (COLACE) 100 MG capsule Take 1 capsule (100 mg) by mouth 2 times daily  Qty: 60 capsule, Refills: 0    Associated Diagnoses: Constipation, unspecified constipation type      gabapentin (NEURONTIN) 400 MG capsule Take 1 capsule (400 mg) by mouth 3 times daily  Qty: 90 capsule, Refills: 0    Associated Diagnoses: Anxiety      hydrOXYzine HCl (ATARAX) 25 MG tablet Take 1 tablet (25 mg) by mouth 3 times daily as needed for anxiety  Qty: 90 tablet, Refills: 0    Associated Diagnoses: Anxiety      levothyroxine (SYNTHROID/LEVOTHROID) 112 MCG tablet Take 1 tablet (112 mcg) by mouth daily  Qty: 30 tablet, Refills: 0    Associated Diagnoses: Hypothyroidism, unspecified type      OLANZapine (ZYPREXA) 10 MG tablet Take 1 tablet (10 mg) by mouth 3 times daily as needed (agitation/psychosis/self harm)  Qty: 90 tablet, Refills: 0    Associated Diagnoses: Schizophrenia, schizoaffective, chronic with acute exacerbation (H)      omeprazole (PRILOSEC) 20 MG DR capsule Take 1 capsule (20 mg) by mouth daily  Qty: 30 capsule, Refills: 0    Associated Diagnoses: Gastroesophageal reflux disease without esophagitis      polyethylene glycol (MIRALAX) 17 GM/Dose powder Take 17 g by mouth 2 times daily  Qty: 850 g, Refills: 0    Associated Diagnoses: Constipation, unspecified constipation type      prazosin (MINIPRESS) 2 MG capsule Take 2  capsules (4 mg) by mouth at bedtime  Qty: 60 capsule, Refills: 0    Associated Diagnoses: PTSD (post-traumatic stress disorder)      propranolol (INDERAL) 10 MG tablet Take 1 tablet (10 mg) by mouth 2 times daily  Qty: 60 tablet, Refills: 0    Associated Diagnoses: Anxiety, akathisia      venlafaxine (EFFEXOR XR) 75 MG 24 hr capsule Take 3 capsules (225 mg) by mouth daily (with breakfast)  Qty: 90 capsule, Refills: 0    Associated Diagnoses: PTSD (post-traumatic stress disorder)      Vitamin D3 (VITAMIN D, CHOLECALCIFEROL,) 25 mcg (1000 units) tablet Take 1 tablet (25 mcg) by mouth daily  Qty: 30 tablet, Refills: 0    Associated Diagnoses: Vitamin D deficiency       !! - Potential duplicate medications found. Please discuss with provider.        CONTINUE these medications which have NOT CHANGED    Details   paliperidone (INVEGA SUSTENNA) 234 MG/1.5ML ANTHONY Inject 1.5 mLs (234 mg) into the muscle every 21 days  Qty: 1.5 mL, Refills: 1    Comments: Last dose received inpatient on 3/15. Next due 4/5  Associated Diagnoses: Schizoaffective disorder, bipolar type (H)                Psychiatric Examination:   Appearance:  awake, alert, adequately groomed, dressed in hospital scrubs, and appeared as age stated  Attitude:  cooperative  Eye Contact:  good  Mood:  better  Affect:  appropriate and in normal range  Speech:  clear, coherent  Psychomotor Behavior:  no evidence of tardive dyskinesia, dystonia, or tics  Thought Process:  linear and goal oriented  Associations:  no loose associations  Thought Content:  no evidence of suicidal ideation or homicidal ideation and no evidence of psychotic thought  Insight:  fair  Judgment:  fair  Oriented to:  time, person, and place  Attention Span and Concentration:  intact  Recent and Remote Memory:  intact  Language:  fluent in English   Fund of Knowledge: appropriate  Muscle Strength and Tone: normal  Gait and Station: Normal and Casual: normal           Discharge Plan:     Continue  medications as above.     Per AVS:     Summary: You were admitted on 6/24/2024  due to suicidal ideations with a plan.  You were treated by Lori Vargas NP and discharged on 07/01/2024 from [place] [address] to Group Home     Primary Diagnosis:  Schizoaffective disorder, bipolar type, current mood episode depressed      Secondary Diagnoses:   Borderline personality disorder per chart  PTSD per chart  Cannabis use, r/o disorder  Unspecified anxiety per chart  Alcohol use disorder, in remission  Constipation, likely medication induced per chart     Health Care Follow-up:      Psychiatry   Date/Time:  07/03/2024@1:30PM [facility]   Phone: [phone] ( Option 3)   Address: [address] [place] [address] ( located on the 5th Floor)  Fax Discharge summary to provider -  [phone]     Therapy - You will receive intake paperwork to complete prior to your appointment. They will also emailed this to Neela ( your group home contact)    Date/Time 07/11/2024 @2PM with Ramiro [facility]  Phone [phone]  Address: [address] [place] [address]  Fax Discharge Summary to provider: [phone]     Recommendations- We recommend that you work with a mental health  to assist you with community goals and resources. In addition, we recommend that you engage in therapy service to  manage low distress tolerance, improve emotional dysregulations,  and reduce negative thoughts that lead to suicidal ideations.         Attend all scheduled appointments with your outpatient providers. Call at least 24 hours in advance if you need to reschedule an appointment to ensure continued access to your outpatient providers.      Major Treatments, Procedures and Findings:  You were provided with: a psychiatric assessment, assessed for medical stability, medication evaluation and/or management, group therapy, individual therapy, milieu  "management, and medical interventions     Symptoms to Report: feeling more aggressive, increased confusion, losing more sleep, mood getting worse, or thoughts of suicide     Early warning signs can include: increased depression or anxiety sleep disturbances increased thoughts or behaviors of suicide or self-harm  increased unusual thinking, such as paranoia or hearing voices     Safety and Wellness:  Take all medicines as directed.  Make no changes unless your doctor suggests them.      Follow treatment recommendations.  Refrain from alcohol and non-prescribed drugs.  If there is a concern for safety, call 911.     Resources:   Crisis Intervention: 773.266.2270 or 351-357-7061 (TTY: 639.236.7726).  Call anytime for help.  National [place] on Mental Illness (www.[place].jorge a.org): 107.600.3446 or 206-715-8002.  [place] Association for Children's Mental Health (www.macmh.org): 209.986.1235.  Alcoholics Anonymous (www.alcoholics-anonymous.org): Check your phone book for your local chapter.  Suicide Awareness Voices of Education (SAVE) (www.save.org): 451-369-XRPY (3440)  National Suicide Prevention Line (www.mentalhealthmn.org): 104-799-GQWA (3189)  Mental Health Consumer/Survivor Network of [place] (www.mhcsn.net): 103.402.7968 or 444-728-4357  Mental Health Association of [place] (www.mentalhealth.org): 618.763.7041 or 065-864-5251  [facility] Crisis (COPE) Response - Adult 920 167-2923  Text 4 Life: txt \"LIFE\" to 08631 for immediate support and crisis intervention  Crisis text line: Text \"[place]\" to 408334. Free, confidential, 24/7.  Crisis Intervention: 908.340.6194 or 380-346-5273. Call anytime for help.   [facility] Health Crisis Team - Child: 395.966.5639     General Medication Instructions:   See your medication sheet(s) for instructions.   Take all medicines as directed.  Make no changes unless your doctor suggests them.   Go to all your doctor visits.  Be sure to have all your required lab tests. This way, " your medicines can be refilled on time.  Do not use any drugs not prescribed by your doctor.  Avoid alcohol.     Advance Directives:   Scanned document on file with [facility]? No scanned doc  Is document scanned?    Honoring Choices Your Rights Handout:    Was more information offered?       The Treatment team has appreciated the opportunity to work with you. If you have any questions or concerns about your recent admission, you can contact the unit which can receive your call 24 hours a day, 7 days a week. They will be able to get in touch with a Provider if needed. The unit number is [phone] .      Certain aspects of this note may be copied or templated. Content is updated and changed where needed to reflect the most recent assessment and plan of care. This document is created with the help of Dragon dictation system.  All grammatical/typing errors or context distortion are unintentional and inherent to software.     Attestation:  The patient was seen and evaluated by me. I spent >35 min on the date of the encounter in chart review, patient visit, review of tests, documentation, care coordination, and/or discussion with other providers about the issues documented above.   Lori Vargas, APRN, CNP

## 2024-07-02 NOTE — PLAN OF CARE
"Goal Outcome Evaluation:    Problem: Adult Inpatient Plan of Care  Goal: Plan of Care Review  Outcome: Adequate for Care Transition    Ready for Discharge today @ 1100    Plan of Care Reviewed With: Patient     Overall Patient Progress: Improving and adequate for care transition     Patient denies any suicidal ideation, plans, and/or intent at this time. Patient denies any thoughts of harming self or others.    Patient given copy of their AVS with discharge instructions and medication administration instructions. All discharge plans were discussed with patient. Patient reports no questions at this time regarding discharge plans or medications.   Home medications returned to patient (Pharmacy and on-Unit use): Not Applicable   Patient plan is to return to Group Home.   Pt denies anxiety, depression, and hallucinations and reports feeling \"fine\". Pt reports feeling ready to discharge and has no concerns regarding discharge.  VSS, medication compliant, behaviorally in control.     Discharge: AVS/medications reviewed with: Patient                     Patient will be picking up/receiving medications from: [facility]                     Patient discharged via: Taxi                     Patient discharged with: All belongings                     Discharging to: Group Home                      Patient belongings returned; Belongings Form signed                     All questions and concerns discussed regarding discharge and home care -- Patient had no additional questions                          -Resources outlined in AVS if patient or family have additional questions or concerns          "

## 2024-07-02 NOTE — PLAN OF CARE
BEH IP Unit Acuity Rating Score (UARS)  Patient is given one point for every criteria they meet.    CRITERIA SCORING   On a 72 hour hold, court hold, committed, stay of commitment, or revocation. 0    Patient LOS on BEH unit exceeds 20 days. 0  LOS: 7   Patient under guardianship, 55+, otherwise medically complex, or under age 11. 0   Suicide ideation without relief of precipitating factors. 1   Current plan for suicide. 1   Current plan for homicide. 0   Imminent risk or actual attempt to seriously harm another without relief of factors precipitating the attempt. 0   Severe dysfunction in daily living (ex: complete neglect for self care, extreme disruption in vegetative function, extreme deterioration in social interactions). 0   Recent (last 7 days) or current physical aggression in the ED or on unit. 1   Restraints or seclusion episode in past 72 hours. 0   Recent (last 7 days) or current verbal aggression, agitation, yelling, etc., while in the ED or unit. 0   Active psychosis. 0   Need for constant or near constant redirection (from leaving, from others, etc).  0   Intrusive or disruptive behaviors. 0   Patient requires 3 or more hours of individualized nursing care per 8-hour shift (i.e. for ADLs, meds, therapeutic interventions). 0   TOTAL 3           Esau Spivey is requesting we call the pharmacy to approve early refill (10/13/23), script is dated for 10/16/23    Per pharmacy Esau Spivey has had Osmin Callas filled early a lot (the pharmacy is closed on Sun)    Last fill was:  09/15/23    Written Date: 09/07/23    Earliest Fill Date: 09/17/23 08/18/23  start date was 08/19/23    PLEASE ADVISE

## 2024-07-02 NOTE — PLAN OF CARE
Upcoming Meetings and Appointments:   Team note:   Wednesday     Tasks Complete:  Chart review and met with team, discussed pt progress, symptomology, and response to treatment.  Discussed the discharge plan and any potential impediments to discharge        Discharge Plan or Goal   Plan  Discharge home to group home on 7/2 @11AM  Medications: General medications sent to [facility] in [facility] and all labs [facility]   Transportation: [facility] Team   Psychiatrist: Current: Dr. Reid [facility] Previous: Marco Camp[place] [facility] [place] [place] [phone]  Group Home Director: Neela Brown [phone]  [email or web address]  CADI CM: Current Helen Avina [phone]     Barriers to Discharge   Requires ongoing stabilization . He will benefit from case management and therapy.      Referral Status  Tiffanie will benefit from therapy/DBT     Legal Status  Voluntary     Next Steps:  Note to Neela any med changes via phone or email tomorrow   Discharge home to group home Tues- email discharge summary and labs  to Neela at [email or web address].     He is interested in therapy- seeking a male therapist

## 2024-09-23 ENCOUNTER — TELEPHONE (OUTPATIENT)
Dept: BEHAVIORAL HEALTH | Facility: CLINIC | Age: 34
End: 2024-09-23

## 2024-09-23 ENCOUNTER — HOSPITAL ENCOUNTER (EMERGENCY)
Facility: CLINIC | Age: 34
Discharge: PSYCHIATRIC OR SUBSTANCE USE FACILITY | End: 2024-09-24
Attending: FAMILY MEDICINE | Admitting: FAMILY MEDICINE
Payer: MEDICARE

## 2024-09-23 DIAGNOSIS — R45.850 HOMICIDAL IDEATION: ICD-10-CM

## 2024-09-23 DIAGNOSIS — F25.0 SCHIZOAFFECTIVE DISORDER, BIPOLAR TYPE (H): ICD-10-CM

## 2024-09-23 DIAGNOSIS — R45.851 SUICIDAL IDEATION: ICD-10-CM

## 2024-09-23 PROBLEM — F40.10 SOCIAL ANXIETY DISORDER: Status: ACTIVE | Noted: 2024-09-23

## 2024-09-23 LAB
ALBUMIN SERPL BCG-MCNC: 4.5 G/DL (ref 3.5–5.2)
ALP SERPL-CCNC: 96 U/L (ref 40–150)
ALT SERPL W P-5'-P-CCNC: 14 U/L (ref 0–70)
AMPHETAMINES UR QL SCN: NORMAL
ANION GAP SERPL CALCULATED.3IONS-SCNC: 11 MMOL/L (ref 7–15)
AST SERPL W P-5'-P-CCNC: 16 U/L (ref 0–45)
BARBITURATES UR QL SCN: NORMAL
BASOPHILS # BLD AUTO: 0.1 10E3/UL (ref 0–0.2)
BASOPHILS NFR BLD AUTO: 1 %
BENZODIAZ UR QL SCN: NORMAL
BILIRUB SERPL-MCNC: 0.4 MG/DL
BUN SERPL-MCNC: 11.1 MG/DL (ref 6–20)
BZE UR QL SCN: NORMAL
CALCIUM SERPL-MCNC: 9.6 MG/DL (ref 8.8–10.4)
CANNABINOIDS UR QL SCN: NORMAL
CHLORIDE SERPL-SCNC: 104 MMOL/L (ref 98–107)
CREAT SERPL-MCNC: 0.85 MG/DL (ref 0.67–1.17)
EGFRCR SERPLBLD CKD-EPI 2021: >90 ML/MIN/1.73M2
EOSINOPHIL # BLD AUTO: 0 10E3/UL (ref 0–0.7)
EOSINOPHIL NFR BLD AUTO: 0 %
ERYTHROCYTE [DISTWIDTH] IN BLOOD BY AUTOMATED COUNT: 12.7 % (ref 10–15)
FENTANYL UR QL: NORMAL
GLUCOSE SERPL-MCNC: 113 MG/DL (ref 70–99)
HCO3 SERPL-SCNC: 25 MMOL/L (ref 22–29)
HCT VFR BLD AUTO: 42.2 % (ref 40–53)
HGB BLD-MCNC: 14.7 G/DL (ref 13.3–17.7)
IMM GRANULOCYTES # BLD: 0.1 10E3/UL
IMM GRANULOCYTES NFR BLD: 1 %
LYMPHOCYTES # BLD AUTO: 2.6 10E3/UL (ref 0.8–5.3)
LYMPHOCYTES NFR BLD AUTO: 25 %
MAGNESIUM SERPL-MCNC: 2.1 MG/DL (ref 1.7–2.3)
MCH RBC QN AUTO: 32.3 PG (ref 26.5–33)
MCHC RBC AUTO-ENTMCNC: 34.8 G/DL (ref 31.5–36.5)
MCV RBC AUTO: 93 FL (ref 78–100)
MONOCYTES # BLD AUTO: 0.7 10E3/UL (ref 0–1.3)
MONOCYTES NFR BLD AUTO: 7 %
NEUTROPHILS # BLD AUTO: 7 10E3/UL (ref 1.6–8.3)
NEUTROPHILS NFR BLD AUTO: 67 %
NRBC # BLD AUTO: 0 10E3/UL
NRBC BLD AUTO-RTO: 0 /100
OPIATES UR QL SCN: NORMAL
PCP QUAL URINE (ROCHE): NORMAL
PLATELET # BLD AUTO: 211 10E3/UL (ref 150–450)
POTASSIUM SERPL-SCNC: 3.8 MMOL/L (ref 3.4–5.3)
PROT SERPL-MCNC: 6.8 G/DL (ref 6.4–8.3)
RBC # BLD AUTO: 4.55 10E6/UL (ref 4.4–5.9)
SARS-COV-2 RNA RESP QL NAA+PROBE: NEGATIVE
SODIUM SERPL-SCNC: 140 MMOL/L (ref 135–145)
TSH SERPL DL<=0.005 MIU/L-ACNC: 2.9 UIU/ML (ref 0.3–4.2)
WBC # BLD AUTO: 10.4 10E3/UL (ref 4–11)

## 2024-09-23 PROCEDURE — 80307 DRUG TEST PRSMV CHEM ANLYZR: CPT | Performed by: FAMILY MEDICINE

## 2024-09-23 PROCEDURE — 85004 AUTOMATED DIFF WBC COUNT: CPT | Performed by: FAMILY MEDICINE

## 2024-09-23 PROCEDURE — 99285 EMERGENCY DEPT VISIT HI MDM: CPT | Performed by: FAMILY MEDICINE

## 2024-09-23 PROCEDURE — 84443 ASSAY THYROID STIM HORMONE: CPT | Performed by: FAMILY MEDICINE

## 2024-09-23 PROCEDURE — 80053 COMPREHEN METABOLIC PANEL: CPT | Performed by: FAMILY MEDICINE

## 2024-09-23 PROCEDURE — 87635 SARS-COV-2 COVID-19 AMP PRB: CPT | Performed by: FAMILY MEDICINE

## 2024-09-23 PROCEDURE — 83735 ASSAY OF MAGNESIUM: CPT | Performed by: FAMILY MEDICINE

## 2024-09-23 PROCEDURE — 36415 COLL VENOUS BLD VENIPUNCTURE: CPT | Performed by: FAMILY MEDICINE

## 2024-09-23 ASSESSMENT — ACTIVITIES OF DAILY LIVING (ADL)
ADLS_ACUITY_SCORE: 39

## 2024-09-23 ASSESSMENT — COLUMBIA-SUICIDE SEVERITY RATING SCALE - C-SSRS
6. HAVE YOU EVER DONE ANYTHING, STARTED TO DO ANYTHING, OR PREPARED TO DO ANYTHING TO END YOUR LIFE?: YES
4. HAVE YOU HAD THESE THOUGHTS AND HAD SOME INTENTION OF ACTING ON THEM?: NO
1. IN THE PAST MONTH, HAVE YOU WISHED YOU WERE DEAD OR WISHED YOU COULD GO TO SLEEP AND NOT WAKE UP?: YES
5. HAVE YOU STARTED TO WORK OUT OR WORKED OUT THE DETAILS OF HOW TO KILL YOURSELF? DO YOU INTEND TO CARRY OUT THIS PLAN?: YES
3. HAVE YOU BEEN THINKING ABOUT HOW YOU MIGHT KILL YOURSELF?: YES
2. HAVE YOU ACTUALLY HAD ANY THOUGHTS OF KILLING YOURSELF IN THE PAST MONTH?: YES

## 2024-09-23 NOTE — ED TRIAGE NOTES
Triage Assessment (Adult)       Row Name 09/23/24 1831          Triage Assessment    Airway WDL WDL        Respiratory WDL    Respiratory WDL WDL        Skin Circulation/Temperature WDL    Skin Circulation/Temperature WDL WDL        Cardiac WDL    Cardiac WDL WDL        Cognitive/Neuro/Behavioral WDL    Cognitive/Neuro/Behavioral WDL WDL

## 2024-09-23 NOTE — ED PROVIDER NOTES
[facility] EMERGENCY DEPARTMENT ([facility])    9/23/24      ED PROVIDER NOTE       History     Chief Complaint   Patient presents with    Suicidal    Homicidal     HPI  Tiffanie Neal is a 34 year old male with a PMH of PTSD, Schizoaffective disorder, borderline personality disorder bipolar type, cannabis use disorder in sustained remission, hypothyroidism and suicidal ideation who presents to the emergency department for suicidal and homicidal ideation. The patient lives in a group home where he feels safe. He states that he has been having suicidal ideation and homicidal ideation for awhile but cannot give a specific amount of time. When asked about wanting to harm himself he states he would like to slit his throat. He adds he has not been hearing voices. He does not have a specific person he would like the harm. He denies any ingestion or other physical complaints. He does feel safe in the emergency department.   No other physical complaints.     Past Medical History  Past Medical History:   Diagnosis Date    Anxiety     Depressive disorder     Schizo affective schizophrenia (H)     Substance abuse (H)      Past Surgical History:   Procedure Laterality Date    TONSILLECTOMY       atropine 1 % SOLN  benztropine (COGENTIN) 1 MG tablet  bisacodyl (DULCOLAX) 10 MG suppository  cloZAPine (CLOZARIL) 100 MG tablet  cloZAPine (CLOZARIL) 25 MG tablet  cyanocobalamin (VITAMIN B-12) 500 MCG SUBL sublingual tablet  docusate sodium (COLACE) 100 MG capsule  gabapentin (NEURONTIN) 400 MG capsule  hydrOXYzine HCl (ATARAX) 25 MG tablet  levothyroxine (SYNTHROID/LEVOTHROID) 112 MCG tablet  nicotine (NICORETTE) 2 MG gum  OLANZapine (ZYPREXA) 10 MG tablet  omeprazole (PRILOSEC) 20 MG DR capsule  paliperidone (INVEGA SUSTENNA) 234 MG/1.5ML ANTHONY  polyethylene glycol (MIRALAX) 17 GM/Dose powder  prazosin (MINIPRESS) 2 MG capsule  propranolol (INDERAL) 10 MG tablet  venlafaxine (EFFEXOR XR) 75 MG 24 hr capsule  Vitamin D3  "(VITAMIN D, CHOLECALCIFEROL,) 25 mcg (1000 units) tablet      Allergies   Allergen Reactions    Haloperidol Other (See Comments)     Other reaction(s): Tardive Dyskinesia  Torticollis  Torticollis  Stiff Neck  Torticollis; reports \"stiff neck.\"       Family History  Family History   Problem Relation Age of Onset    Mental Illness Mother     Mental Illness Maternal Aunt     Mental Illness Maternal Uncle     Glaucoma No family hx of     Macular Degeneration No family hx of      Social History   Social History     Tobacco Use    Smoking status: Every Day     Current packs/day: 0.50     Types: Vaping Device, Cigarettes    Smokeless tobacco: Never   Substance Use Topics    Alcohol use: Yes     Comment: 2 weeks ago    Drug use: Yes     Comment: COD      A medically appropriate review of systems was performed with pertinent positives and negatives noted in the HPI, and all other systems negative.    Physical Exam   BP: 115/82  Pulse: 99  Temp: 99.4  F (37.4  C)  Resp: 18  SpO2: 100 %  Physical Exam  Vitals and nursing note reviewed.   Constitutional:       General: He is in acute distress.      Appearance: Normal appearance. He is not toxic-appearing.      Comments: Soft-spoken withdrawn decreased eye contact admits to suicidal homicidal ideations   HENT:      Head: Atraumatic.      Nose: Nose normal.      Mouth/Throat:      Mouth: Mucous membranes are moist.      Pharynx: Oropharynx is clear.   Eyes:      General: No scleral icterus.     Conjunctiva/sclera: Conjunctivae normal.   Cardiovascular:      Rate and Rhythm: Normal rate and regular rhythm.      Heart sounds: Normal heart sounds.   Pulmonary:      Effort: Pulmonary effort is normal. No respiratory distress.      Breath sounds: Normal breath sounds. No stridor.   Abdominal:      General: There is distension.      Palpations: Abdomen is soft.      Tenderness: There is no abdominal tenderness. There is no guarding.   Musculoskeletal:         General: No swelling, " tenderness, deformity or signs of injury.      Cervical back: Neck supple.   Skin:     General: Skin is warm.      Capillary Refill: Capillary refill takes less than 2 seconds.      Coloration: Skin is not jaundiced.   Neurological:      General: No focal deficit present.      Mental Status: He is alert.   Psychiatric:      Comments: Flat affect not acutely delusional at this point.  Patient describes homicidal nonspecific person along with suicidal ideations plan to cut his wrist or throat           ED Course, Procedures, & Data        Records are in epic.  Patient history of schizoaffective disorder seen in the ER on the 16th as noted.  Patient also evaluated emergency department in July 22 of this year also for schizoaffective disorder.  Allergies and medications reviewed.    In the ER we did order laboratory testing I did place a DEC assessment as to get further evaluation patient this point states he is safe and does not need a one-to-one.    Patient valuated also by DEC assessment at this point recommendations for admission after collateral information also obtained.    Patient this point otherwise stable.    After evaluation here in the ER.  COVID was negative.  TSH and T4 are normal.  Drug screen negative white count 10.4 hemoglobin is 14.7.  Sodium 140 potassium is 3.8.  Bicarb 25 gap is 11      As noted plan to admit patient to mental health for concerns of ongoing homicidal and suicidal ideations.  Patient currently voluntary.    Procedures                 Results for orders placed or performed during the hospital encounter of 09/23/24   Comprehensive metabolic panel     Status: Abnormal   Result Value Ref Range    Sodium 140 135 - 145 mmol/L    Potassium 3.8 3.4 - 5.3 mmol/L    Carbon Dioxide (CO2) 25 22 - 29 mmol/L    Anion Gap 11 7 - 15 mmol/L    Urea Nitrogen 11.1 6.0 - 20.0 mg/dL    Creatinine 0.85 0.67 - 1.17 mg/dL    GFR Estimate >90 >60 mL/min/1.73m2    Calcium 9.6 8.8 - 10.4 mg/dL    Chloride 104  98 - 107 mmol/L    Glucose 113 (H) 70 - 99 mg/dL    Alkaline Phosphatase 96 40 - 150 U/L    AST 16 0 - 45 U/L    ALT 14 0 - 70 U/L    Protein Total 6.8 6.4 - 8.3 g/dL    Albumin 4.5 3.5 - 5.2 g/dL    Bilirubin Total 0.4 <=1.2 mg/dL   Magnesium     Status: Normal   Result Value Ref Range    Magnesium 2.1 1.7 - 2.3 mg/dL   TSH with free T4 reflex     Status: Normal   Result Value Ref Range    TSH 2.90 0.30 - 4.20 uIU/mL   CBC with platelets and differential     Status: None   Result Value Ref Range    WBC Count 10.4 4.0 - 11.0 10e3/uL    RBC Count 4.55 4.40 - 5.90 10e6/uL    Hemoglobin 14.7 13.3 - 17.7 g/dL    Hematocrit 42.2 40.0 - 53.0 %    MCV 93 78 - 100 fL    MCH 32.3 26.5 - 33.0 pg    MCHC 34.8 31.5 - 36.5 g/dL    RDW 12.7 10.0 - 15.0 %    Platelet Count 211 150 - 450 10e3/uL    % Neutrophils 67 %    % Lymphocytes 25 %    % Monocytes 7 %    % Eosinophils 0 %    % Basophils 1 %    % Immature Granulocytes 1 %    NRBCs per 100 WBC 0 <1 /100    Absolute Neutrophils 7.0 1.6 - 8.3 10e3/uL    Absolute Lymphocytes 2.6 0.8 - 5.3 10e3/uL    Absolute Monocytes 0.7 0.0 - 1.3 10e3/uL    Absolute Eosinophils 0.0 0.0 - 0.7 10e3/uL    Absolute Basophils 0.1 0.0 - 0.2 10e3/uL    Absolute Immature Granulocytes 0.1 <=0.4 10e3/uL    Absolute NRBCs 0.0 10e3/uL   Urine Drug Screen Panel     Status: Normal   Result Value Ref Range    Amphetamines Urine Screen Negative Screen Negative    Barbituates Urine Screen Negative Screen Negative    Benzodiazepine Urine Screen Negative Screen Negative    Cannabinoids Urine Screen Negative Screen Negative    Cocaine Urine Screen Negative Screen Negative    Fentanyl Qual Urine Screen Negative Screen Negative    Opiates Urine Screen Negative Screen Negative    PCP Urine Screen Negative Screen Negative   Asymptomatic COVID-19 Virus (Coronavirus) by PCR Nasopharyngeal     Status: Normal    Specimen: Nasopharyngeal; Swab   Result Value Ref Range    SARS CoV2 PCR Negative Negative    Narrative     Testing was performed using the Xpert Xpress SARS-CoV-2 Assay on the Cepheid Gene-Xpert Instrument Systems. Additional information about this Emergency Use Authorization (EUA) assay can be found via the Lab Guide. This test should be ordered for the detection of SARS-CoV-2 in individuals who meet SARS-CoV-2 clinical and/or epidemiological criteria as well as from individuals without symptoms or other reasons to suspect COVID-19. Test performance for asymptomatic patients has only been established in anterior nasal swab specimens. This test is for in vitro diagnostic use under the FDA EUA for laboratories certified under CLIA to perform high complexity testing. This test has not been FDA cleared or approved. A negative result does not rule out the presence of PCR inhibitors in the specimen or target RNA concentration below the limit of detection for the assay. The possibility of a false negative should be considered if the patient's recent exposure or clinical presentation suggests COVID-19. This test was validated by the [facility]. This laboratory is certified under the Clinical Laboratory Improvement Amendments (CLIA) as qualified to perform high complexity laboratory testing.     Urine Drug Screen     Status: Normal    Narrative    The following orders were created for panel order Urine Drug Screen.  Procedure                               Abnormality         Status                     ---------                               -----------         ------                     Urine Drug Screen Panel[[id number]]      Normal              Final result                 Please view results for these tests on the individual orders.   CBC with platelets differential     Status: None    Narrative    The following orders were created for panel order CBC with platelets differential.  Procedure                               Abnormality         Status                     ---------                                -----------         ------                     CBC with platelets and d...[[id number]]                      Final result                 Please view results for these tests on the individual orders.     Medications   nicotine polacrilex (NICORETTE) gum 4 mg (has no administration in time range)     Labs Ordered and Resulted from Time of ED Arrival to Time of ED Departure   COMPREHENSIVE METABOLIC PANEL - Abnormal       Result Value    Sodium 140      Potassium 3.8      Carbon Dioxide (CO2) 25      Anion Gap 11      Urea Nitrogen 11.1      Creatinine 0.85      GFR Estimate >90      Calcium 9.6      Chloride 104      Glucose 113 (*)     Alkaline Phosphatase 96      AST 16      ALT 14      Protein Total 6.8      Albumin 4.5      Bilirubin Total 0.4     MAGNESIUM - Normal    Magnesium 2.1     TSH WITH FREE T4 REFLEX - Normal    TSH 2.90     URINE DRUG SCREEN PANEL - Normal    Amphetamines Urine Screen Negative      Barbituates Urine Screen Negative      Benzodiazepine Urine Screen Negative      Cannabinoids Urine Screen Negative      Cocaine Urine Screen Negative      Fentanyl Qual Urine Screen Negative      Opiates Urine Screen Negative      PCP Urine Screen Negative     COVID-19 VIRUS (CORONAVIRUS) BY PCR - Normal    SARS CoV2 PCR Negative     CBC WITH PLATELETS AND DIFFERENTIAL    WBC Count 10.4      RBC Count 4.55      Hemoglobin 14.7      Hematocrit 42.2      MCV 93      MCH 32.3      MCHC 34.8      RDW 12.7      Platelet Count 211      % Neutrophils 67      % Lymphocytes 25      % Monocytes 7      % Eosinophils 0      % Basophils 1      % Immature Granulocytes 1      NRBCs per 100 WBC 0      Absolute Neutrophils 7.0      Absolute Lymphocytes 2.6      Absolute Monocytes 0.7      Absolute Eosinophils 0.0      Absolute Basophils 0.1      Absolute Immature Granulocytes 0.1      Absolute NRBCs 0.0       No orders to display          Critical care was not performed.     Medical Decision Making  The  patient's presentation was of high complexity (a chronic illness severe exacerbation, progression, or side effect of treatment).    The patient's evaluation involved:  review of external note(s) from 3+ sources (see separate area of note for details)  review of 3+ test result(s) ordered prior to this encounter (see separate area of note for details)  ordering and/or review of 3+ test(s) in this encounter (see separate area of note for details)  discussion of management or test interpretation with another health professional (see separate area of note for details)    The patient's management necessitated high risk (a decision regarding hospitalization).    Assessment & Plan   34-year-old male history of PTSD along with schizoaffective disorder borderline personality who lives at a group home now increasing thoughts of suicidal ideations along with homicidal ideations.  Patient plan to cut his wrist or cut his throat has homicidal ideations but not to any particular person noted.  Patient been compliant with medication.  Present here for evaluation.  Patient otherwise cooperative seen by DEC  at this point planning to admit patient for mental health.  Labs otherwise stable.       I have reviewed the nursing notes. I have reviewed the findings, diagnosis, plan and need for follow up with the patient.    New Prescriptions    No medications on file       Final diagnoses:   Suicidal ideation   Homicidal ideation   Schizoaffective disorder, bipolar type (H)   I, Li Campo, am serving as a trained medical scribe to document services personally performed by Konrad Webster MD, based on the provider's statements to me.     IKonrad MD, was physically present and have reviewed and verified the accuracy of this note documented by Li Campo.     Konrad Webster MD  [facility] EMERGENCY DEPARTMENT  9/23/2024      This note was created at least in part by the use of dragon voice  dictation system. Inadvertent typographical errors may still exist.  Konrad Webster MD.  Patient evaluated in the emergency department during the COVID-19 pandemic period. Careful attention to patients safety was addressed throughout the evaluation. Evaluation and treatment management was initiated with disposition made efficiently and appropriate as possible to minimize any risk of potential exposure to patient during this evaluation.       Konrad Webster MD  09/24/24 0202

## 2024-09-23 NOTE — ED NOTES
Bed: [facility]-  Expected date:   Expected time:   Means of arrival:   Comments:  Hems [age] M SI/HI

## 2024-09-23 NOTE — ED TRIAGE NOTES
"      Brought in by EMS from their - staff called 911 for SI/HI.  Sounds like patient was last seen at [facility] a week ago. Denies specific plan for SI more expressing to pick fight and get into physical altercations anyone.  When asked pt admits he would \"slit my throat\" with regards to plan for SI, admits to previous attempt a long time ago.  Denies pain or other medical complaints at this time.    Pt calm voluntary.  No significant violent history.         Pt is his own legal guardian.     Staff at  that can pick the pt up : [phone]  "

## 2024-09-24 ENCOUNTER — TELEPHONE (OUTPATIENT)
Dept: BEHAVIORAL HEALTH | Facility: CLINIC | Age: 34
End: 2024-09-24
Payer: COMMERCIAL

## 2024-09-24 VITALS
DIASTOLIC BLOOD PRESSURE: 87 MMHG | SYSTOLIC BLOOD PRESSURE: 135 MMHG | TEMPERATURE: 98.4 F | HEART RATE: 88 BPM | OXYGEN SATURATION: 100 % | RESPIRATION RATE: 18 BRPM

## 2024-09-24 PROCEDURE — 99285 EMERGENCY DEPT VISIT HI MDM: CPT

## 2024-09-24 PROCEDURE — 250N000013 HC RX MED GY IP 250 OP 250 PS 637

## 2024-09-24 PROCEDURE — 250N000013 HC RX MED GY IP 250 OP 250 PS 637: Performed by: EMERGENCY MEDICINE

## 2024-09-24 RX ORDER — LEVOTHYROXINE SODIUM 112 UG/1
112 TABLET ORAL DAILY
Status: DISCONTINUED | OUTPATIENT
Start: 2024-09-24 | End: 2024-09-24 | Stop reason: HOSPADM

## 2024-09-24 RX ORDER — VITAMIN B COMPLEX
25 TABLET ORAL DAILY
Status: CANCELLED | OUTPATIENT
Start: 2024-09-24

## 2024-09-24 RX ORDER — BISACODYL 10 MG
10 SUPPOSITORY, RECTAL RECTAL DAILY PRN
Status: DISCONTINUED | OUTPATIENT
Start: 2024-09-24 | End: 2024-09-24 | Stop reason: HOSPADM

## 2024-09-24 RX ORDER — PRAZOSIN HYDROCHLORIDE 2 MG/1
4 CAPSULE ORAL AT BEDTIME
Status: DISCONTINUED | OUTPATIENT
Start: 2024-09-24 | End: 2024-09-24 | Stop reason: HOSPADM

## 2024-09-24 RX ORDER — POLYETHYLENE GLYCOL 3350 17 G/17G
17 POWDER, FOR SOLUTION ORAL 2 TIMES DAILY
Status: DISCONTINUED | OUTPATIENT
Start: 2024-09-24 | End: 2024-09-24 | Stop reason: HOSPADM

## 2024-09-24 RX ORDER — DOCUSATE SODIUM 100 MG/1
100 CAPSULE, LIQUID FILLED ORAL 2 TIMES DAILY
Status: DISCONTINUED | OUTPATIENT
Start: 2024-09-24 | End: 2024-09-24 | Stop reason: HOSPADM

## 2024-09-24 RX ORDER — ATROPINE SULFATE 10 MG/ML
3 SOLUTION/ DROPS OPHTHALMIC 3 TIMES DAILY
Status: DISCONTINUED | OUTPATIENT
Start: 2024-09-24 | End: 2024-09-24 | Stop reason: HOSPADM

## 2024-09-24 RX ORDER — OLANZAPINE 10 MG/2ML
10 INJECTION, POWDER, FOR SOLUTION INTRAMUSCULAR 3 TIMES DAILY PRN
Status: DISCONTINUED | OUTPATIENT
Start: 2024-09-24 | End: 2024-09-24 | Stop reason: HOSPADM

## 2024-09-24 RX ORDER — OLANZAPINE 10 MG/1
10 TABLET, ORALLY DISINTEGRATING ORAL 3 TIMES DAILY PRN
Status: DISCONTINUED | OUTPATIENT
Start: 2024-09-24 | End: 2024-09-24 | Stop reason: HOSPADM

## 2024-09-24 RX ORDER — CLOZAPINE 25 MG/1
25 TABLET ORAL AT BEDTIME
Status: DISCONTINUED | OUTPATIENT
Start: 2024-09-24 | End: 2024-09-24 | Stop reason: HOSPADM

## 2024-09-24 RX ORDER — HYDROXYZINE HYDROCHLORIDE 25 MG/1
25 TABLET, FILM COATED ORAL 3 TIMES DAILY PRN
Status: DISCONTINUED | OUTPATIENT
Start: 2024-09-24 | End: 2024-09-24 | Stop reason: HOSPADM

## 2024-09-24 RX ORDER — PROPRANOLOL HYDROCHLORIDE 10 MG/1
10 TABLET ORAL 2 TIMES DAILY
Status: DISCONTINUED | OUTPATIENT
Start: 2024-09-24 | End: 2024-09-24 | Stop reason: HOSPADM

## 2024-09-24 RX ADMIN — NICOTINE POLACRILEX 4 MG: 4 GUM, CHEWING BUCCAL at 11:37

## 2024-09-24 RX ADMIN — DOCUSATE SODIUM 100 MG: 100 CAPSULE, LIQUID FILLED ORAL at 11:37

## 2024-09-24 RX ADMIN — VENLAFAXINE HYDROCHLORIDE 225 MG: 150 CAPSULE, EXTENDED RELEASE ORAL at 11:38

## 2024-09-24 RX ADMIN — NICOTINE POLACRILEX 4 MG: 4 GUM, CHEWING BUCCAL at 12:39

## 2024-09-24 RX ADMIN — LEVOTHYROXINE SODIUM 112 MCG: 112 TABLET ORAL at 11:37

## 2024-09-24 RX ADMIN — PROPRANOLOL HYDROCHLORIDE 10 MG: 10 TABLET ORAL at 11:37

## 2024-09-24 RX ADMIN — NICOTINE POLACRILEX 4 MG: 4 GUM, CHEWING BUCCAL at 13:47

## 2024-09-24 RX ADMIN — Medication 500 MCG: at 11:37

## 2024-09-24 RX ADMIN — GABAPENTIN 400 MG: 300 CAPSULE ORAL at 13:47

## 2024-09-24 ASSESSMENT — ACTIVITIES OF DAILY LIVING (ADL)
ADLS_ACUITY_SCORE: 39

## 2024-09-24 NOTE — ED NOTES
IP MH Referral Acuity Rating Score (RARS)    LMHP complete at referral to IP MH, with DEC; and, daily while awaiting IP MH placement. Call score to PPS.  CRITERIA SCORING   New 74 HH and Involuntary for IP MH (not adolescent) 0/1   Boarding over 24 hours 0/1   Vulnerable adult at least 55+ with multiple co morbidities; or, Patient age 11 or under 0/1   Suicide ideation without relief of precipitating factors 1/1   Current plan for suicide 1/1   Current plan for homicide 1/1   Imminent risk or actual attempt to seriously harm another without relief of factors precipitating the attempt 1/1   Severe dysfunction in daily living (ex: complete neglect for self care, extreme disruption in vegetative function, extreme deterioration in social interactions) 0/1   Recent (last 2 weeks) or current physical aggression in the ED 0/1   Restraints or seclusion episode in ED 0/1   Verbal aggression, agitation, yelling, etc., while in the ED 0/1   Active psychosis with psychomotor agitation or catatonia 0/1   Need for constant or near constant redirection (from leaving, from others, etc).  0/1   Intrusive or disruptive behaviors 0/1   TOTAL Acuity Total Score: 4

## 2024-09-24 NOTE — ED NOTES
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      Normal              Final result                 Please view results for these tests on the individual orders.   Urine Drug Screen Panel     Status: Normal    Collection Time: 09/23/24  8:04 PM   Result Value Ref Range    Amphetamines Urine Screen Negative Screen Negative    Barbituates Urine Screen Negative Screen Negative    Benzodiazepine Urine Screen Negative Screen Negative    Cannabinoids Urine Screen Negative Screen Negative    Cocaine Urine Screen Negative Screen Negative    Fentanyl Qual Urine Screen Negative Screen Negative    Opiates Urine Screen Negative Screen Negative    PCP Urine Screen Negative Screen Negative   Asymptomatic COVID-19 Virus (Coronavirus) by PCR Nasopharyngeal     Status: Normal    Collection Time: 09/23/24 11:09 PM    Specimen: Nasopharyngeal; Swab   Result Value Ref Range    SARS CoV2 PCR Negative Negative    Narrative    Testing was performed using the Xpert Xpress SARS-CoV-2 Assay on the Cepheid Gene-Xpert Instrument Systems. Additional information about this Emergency Use Authorization (EUA) assay can be found via the Lab Guide. This test should be ordered for the detection of SARS-CoV-2 in individuals who meet SARS-CoV-2 clinical and/or epidemiological criteria  as well as from individuals without symptoms or other reasons to suspect COVID-19. Test performance for asymptomatic patients has only been established in anterior nasal swab specimens. This test is for in vitro diagnostic use under the FDA EUA for laboratories certified under CLIA to perform high complexity testing. This test has not been FDA cleared or approved. A negative result does not rule out the presence of PCR inhibitors in the specimen or target RNA concentration below the limit of detection for the assay. The possibility of a false negative should be considered if the patient's recent exposure or clinical presentation suggests COVID-19. This test was validated by the [facility]. This laboratory is certified under the Clinical Laboratory Improvement Amendments (CLIA) as qualified to perform high complexity laboratory testing.               MD Eric Barbosa Michelle C, MD  09/24/24 0543
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
## 2024-09-24 NOTE — TELEPHONE ENCOUNTER
S: [facility] URI Russell  Ernesto  calling at 10:51 PM about a 34 year old/Male presenting with HI and SI.     B: Pt arrived via EMS. Presenting problem, stressors: Pt is presenting with HI, and SI with plan and intent to slit throat or wrist. Pt declines when asked further about HI. Triggers/Stressors: Loss of contact with dad, feeling angry, abandonment, failure, and depressed. mood]     Pt affect in ED: Blunted  Pt Dx: Major Depressive Disorder, Generalized Anxiety Disorder, Schizoaffective Disorder, PTSD, Borderline Personality Disorder, and Substance Use Disorder: Cannabis, alcohol  Previous [facility] hx? Yes: June 2024, [facility]  Pt endorses SI with a plan to slit throat or wrist    Hx of suicide attempt? Yes: x1  Pt endorses SIB via banging his head on walls and cutting himself, most recent episode Banging his head: Beginning of year and cutting: Over a year ago  Pt unable to be assessed for HI due to current presentation    Pt denies hallucinations .   Pt RARS Score: 4    Hx of aggression/violence, sexual offenses, legal concerns, Epic care plan? describe: No  Current concerns for aggression this visit? No  Does pt have a history of Civil Commitment? No  Is Pt their own guardian? Yes    Pt is prescribed medication. Is patient medication compliant? Yes  Pt endorses OP services: Therapist and Group home  CD concerns: Pt is in recovery with a hx of Cannabis use   Acute or chronic medical concerns: No  Does Pt present with specific needs, assistive devices, or exclusionary criteria? None      Pt is ambulatory  Pt is able to perform ADLs independently      A: Pt to be reviewed for [facility] admission. Pt is Voluntary  Preferred placement: [facility]    COVID Symptoms: No  If yes, COVID test required   Utox: Negative   CMP: WNL  CBC: WNL  HCG: N/A    R: Patient cleared and ready for behavioral bed placement: Yes  Pt placed on [facility] worklist? Yes    Does Patient need a Transfer Center request created? No, Pt is located within [facility]  ED, [facility] ED, or [place] ED

## 2024-09-24 NOTE — TELEPHONE ENCOUNTER
9:32 AM Per Paul can review at [facility] for admission. Intake to fax clinical.  10:09 AM Fax sent for review.  12:30 PM Pt accepted to [facility] NE/8 by Dr. [phone]. Pt added to admit board.  12:40 PM [facility] notified.      [facility] Access Inpatient Bed Call Log 9/24/2024 8:04:49 AM  Intake has called facilities that have not updated the bed status within the last 12 hours.        (Adults);        [facility]:                [facility] is posting 0 beds.            [facility] is posting 0 beds. [phone] 8:11AM PER APS, NO BED AVAILABILITY.  [facility] is posting 0 beds. Negative covid required.  [facility] is posting 0 beds. Neg covid. No high school/Sarah-psych. [phone] 8:11AM CB AFTER NOON FOR BED AVAILABILITY.  [facility] is posting 0 beds. [phone]  [facility] is posting 0 bed. [phone] [place] [address] is posting 5 beds. (Ages 18-35) Negative covid. [phone]  [facility] is posting 0 beds.    [facility] is posting 0 beds [phone]     Pt remains on the work list pending appropriate bed availability.

## 2024-09-24 NOTE — TELEPHONE ENCOUNTER
[place]: [place]  Access Inpatient Bed Call Log  9/24/24 @ 1:00am   Intake has called facilities that have not updated their bed status within the last 12 hours.     *METRO:  [place] -- [facility]: @ capacity.  [facility]: @ cap per website. Reporting no reviews overnight.  [place] -- [facility]: @ cap per website. Low acuity  [place] -- [facility]: @ cap per website. Low acuity only.  [place] -- [facility]: @ cap per website.  [facility]: @ cap per website.  [facility]/ beds: POSTING 4 BEDS. Ages 18-35, Voluntary only, NO aggression/physical/sexual assault, violence hx or drug abuse, or psychosis. Negative Covid - NO REVIEWS TONIGHT  [place] -- [facility]: @ cap per website.  [place] -- [facility]: @ cap per website.  [place] -- [facility]: @ cap per website. Do not review overnight.       Pt remains on waitlist pending appropriate placement availability.

## 2024-09-24 NOTE — CONSULTS
Tiffanie Neal MRN# [id number]   Age: 34 year old YOB: 1990   Date of Admission to ED: 9/23/2024    In person visit Details:     Patient was assessed and interviewed face-to-face in person with this writer mitali. Patient was observed to be able to participate in the assessment as evidenced by verbal consent. Assessment methods included conducting a formal interview with patient, review of medical records, collaboration with medical staff, and obtaining relevant collateral information from family and community providers when available.        Reason for Consult:   This note is being entered to supplement the psychiatry consultation note that was completed on September 23, 2024 by the licensed mental health professional Laz Perez  have reviewed the pertinent clinical details related to their encounter. I am being consulted to offer additional guidance on psychiatric pharmacological interventions    I met patient in his room face-to-face by himself patient was pleasant and cooperative during assessment and interview currently endorses suicidal ideation denied any homicidal ideation.  I asked patient what make him suicidal he declined to talk about why he feeling suicidal except saying that he has been currently drinking alcohol beer daily.  Patient told me currently he lives in a group home.    Patient endorses depressive symptoms, including sleep alteration, loss of interest in pleasurable activities, feelings of guilt/worthlessness/hopelessness, problems with energy, problems with concentration, appetite disturbance. Patient denies suicidal and homicidal ideation.      Patient endorses symptoms of generalized anxiety, including excessive worrying throughout the day, associated with, restlessness, easy fatigability, concentration difficulty, irritability, muscle tension and disrupted sleep.    Patient endorses symptoms of panic attacks, ie sudden-onset periods of intense discomfort, accompanied by,  palpitations/tachycardia, diaphoresis, tremulousness, shortness of breath, chest pain/discomfort, nausea/abdominal pain, chills, hot flashes, paresthesias, depersonalization, derealization, fear of losing control, or fear of dying    There is genetic loading for none known.  Medical history does not appear to be significant.  Substance use does appear to be playing a contributing role in the patient's presentation.  Patient appears to cope with stress/frustration/emotion by withdrawing.      I called  confirm all his medication currently patient taking Clozaril 325 mg at bedtime, Invega Sustenna 234 mg every 21 days his last dose was September 4, 2024    I have reviewed the nursing notes. I have reviewed the findings, diagnosis, plan and need for follow up with the patient.         HPI:   Per ED provider Dr. Webster's note  Tiffanie Neal is a 34 year old male with a PMH of PTSD, Schizoaffective disorder, borderline personality disorder bipolar type, cannabis use disorder in sustained remission, hypothyroidism and suicidal ideation who presents to the emergency department for suicidal and homicidal ideation. The patient lives in a group home where he feels safe. He states that he has been having suicidal ideation and homicidal ideation for awhile but cannot give a specific amount of time. When asked about wanting to harm himself he states he would like to slit his throat. He adds he has not been hearing voices. He does not have a specific person he would like the harm. He denies any ingestion or other physical complaints. He does feel safe in the emergency department.   No other physical complaints.          Pt has *not required locked seclusion or restraints in the past 24 hours to maintain safety, please refer to RN documentation for further details.  Substance use  appear to be playing a contributing role in the patient's presentation.  Brief Therapeutic Intervention(s):   Provided active  "listening, unconditional positive regard, and validation. Engaged in cognitive restructuring/ reframing, looked at common cognitive distortions and challenged negative thoughts. *Engaged in guided discovery, explored patient's perspectives and helped expand them through socratic dialogue. Provided positive reinforcement for progress towards goals, gains in knowledge, and application of skills previously taught.  Engaged in social skills training. Explored and identified early warning signs to anger        Past Psychiatric History:     See DEC  note        Substance Use and History:     See DEC assessment note        Past Medical History:   PAST MEDICAL HISTORY:   Past Medical History:   Diagnosis Date    Anxiety     Depressive disorder     Schizo affective schizophrenia (H)     Substance abuse (H)        PAST SURGICAL HISTORY:   Past Surgical History:   Procedure Laterality Date    TONSILLECTOMY                 Allergies:     Allergies   Allergen Reactions    Haloperidol Other (See Comments)     Other reaction(s): Tardive Dyskinesia  Torticollis  Torticollis  Stiff Neck  Torticollis; reports \"stiff neck.\"               Medications:   I have reviewed this patient's current medications  Current Facility-Administered Medications   Medication Dose Route Frequency Provider Last Rate Last Admin    atropine solution 2-3 drop  2-3 drop Sublingual TID Trinity Pan APRN CNP        bisacodyl (DULCOLAX) suppository 10 mg  10 mg Rectal Daily PRN Trinity Pan APRN CNP        cloZAPine (CLOZARIL) tablet 25 mg  25 mg Oral At Bedtime Trinity Pan APRN CNP        cloZAPine (CLOZARIL) tablet 300 mg  300 mg Oral At Bedtime Trinity Pan APRN CNP        cyanocobalamin (VITAMIN B-12) sublingual tablet 500 mcg  500 mcg Sublingual Daily Trinity Pan APRN CNP        docusate sodium (COLACE) capsule 100 mg  100 mg Oral BID Trinity Pan APRN CNP        gabapentin (NEURONTIN) capsule 400 mg  400 mg Oral TID " Trinity Pan APRN CNP        hydrOXYzine HCl (ATARAX) tablet 25 mg  25 mg Oral TID PRN Trinity Pan APRN CNP        levothyroxine (SYNTHROID/LEVOTHROID) tablet 112 mcg  112 mcg Oral Daily Trinity Pan APRN CNP        nicotine polacrilex (NICORETTE) gum 4 mg  4 mg Buccal Q1H PRN Konrad Webster MD        OLANZapine zydis (zyPREXA) ODT tab 10 mg  10 mg Oral TID PRN Trinity Pan APRN CNP        Or    OLANZapine (zyPREXA) injection 10 mg  10 mg Intramuscular TID PRN Trinity Pan APRN CNP        [START ON 9/25/2024] paliperidone (INVEGA SUSTENNA) injection ANTHONY 234 mg  234 mg Intramuscular Q21 Days Trinity Pan APRN CNP        polyethylene glycol (MIRALAX) Packet 17 g  17 g Oral BID Trinity Pan APRN CNP        prazosin (MINIPRESS) capsule 4 mg  4 mg Oral At Bedtime Trinity Pan APRN CNP        propranolol (INDERAL) tablet 10 mg  10 mg Oral BID Trinity Pan APRN CNP        venlafaxine (EFFEXOR XR) 24 hr capsule 225 mg  225 mg Oral Daily with breakfast Trinity Pan APRN CNP         Current Outpatient Medications   Medication Sig Dispense Refill    atropine 1 % SOLN Place 2-3 drops under the tongue 3 times daily (Patient taking differently: Place 3 drops under the tongue 3 times daily.) 15 mL 0    benztropine (COGENTIN) 1 MG tablet Take 1 tablet (1 mg) by mouth 2 times daily 60 tablet 0    bisacodyl (DULCOLAX) 10 MG suppository Place 1 suppository (10 mg) rectally daily as needed for constipation 15 suppository 0    cloZAPine (CLOZARIL) 100 MG tablet Take 3 tablets (300 mg) by mouth at bedtime along with 25 mg for a total of 325 mg by mouth at bedtime. 90 tablet 0    cloZAPine (CLOZARIL) 25 MG tablet Take 1 tablet (25 mg) by mouth at bedtime along with 300 mg for a total of 325 mg at bedtime. 30 tablet 0    cyanocobalamin (VITAMIN B-12) 500 MCG SUBL sublingual tablet Place 1 tablet (500 mcg) under the tongue daily 30 tablet 0    docusate sodium (COLACE) 100 MG capsule Take 1  capsule (100 mg) by mouth 2 times daily 60 capsule 0    gabapentin (NEURONTIN) 400 MG capsule Take 1 capsule (400 mg) by mouth 3 times daily 90 capsule 0    hydrOXYzine HCl (ATARAX) 25 MG tablet Take 1 tablet (25 mg) by mouth 3 times daily as needed for anxiety 90 tablet 0    levothyroxine (SYNTHROID/LEVOTHROID) 112 MCG tablet Take 1 tablet (112 mcg) by mouth daily 30 tablet 0    nicotine (NICORETTE) 2 MG gum Place 1 each (2 mg) inside cheek every hour as needed for nicotine withdrawal symptoms 150 each 0    OLANZapine (ZYPREXA) 10 MG tablet Take 1 tablet (10 mg) by mouth 3 times daily as needed (agitation/psychosis/self harm) 90 tablet 0    omeprazole (PRILOSEC) 20 MG DR capsule Take 1 capsule (20 mg) by mouth daily 30 capsule 0    polyethylene glycol (MIRALAX) 17 GM/Dose powder Take 17 g by mouth 2 times daily 850 g 0    prazosin (MINIPRESS) 2 MG capsule Take 2 capsules (4 mg) by mouth at bedtime 60 capsule 0    propranolol (INDERAL) 10 MG tablet Take 1 tablet (10 mg) by mouth 2 times daily 60 tablet 0    venlafaxine (EFFEXOR XR) 75 MG 24 hr capsule Take 3 capsules (225 mg) by mouth daily (with breakfast) 90 capsule 0    Vitamin D3 (VITAMIN D, CHOLECALCIFEROL,) 25 mcg (1000 units) tablet Take 1 tablet (25 mcg) by mouth daily 30 tablet 0    paliperidone (INVEGA SUSTENNA) 234 MG/1.5ML ANTHONY Inject 1.5 mLs (234 mg) into the muscle every 21 days 1.5 mL 1              Family History:   FAMILY HISTORY:   Family History   Problem Relation Age of Onset    Mental Illness Mother     Mental Illness Maternal Aunt     Mental Illness Maternal Uncle     Glaucoma No family hx of     Macular Degeneration No family hx of               Social History:   Upbringing: born and raised   Educational History:   Relationships:  Children: **        - Collateral information from the famly/friend: none         PTA Medications:   (Not in a hospital admission)         Allergies:     Allergies   Allergen Reactions    Haloperidol Other (See  "Comments)     Other reaction(s): Tardive Dyskinesia  Torticollis  Torticollis  Stiff Neck  Torticollis; reports \"stiff neck.\"            Labs:     Recent Results (from the past 48 hour(s))   Comprehensive metabolic panel    Collection Time: 09/23/24  8:03 PM   Result Value Ref Range    Sodium 140 135 - 145 mmol/L    Potassium 3.8 3.4 - 5.3 mmol/L    Carbon Dioxide (CO2) 25 22 - 29 mmol/L    Anion Gap 11 7 - 15 mmol/L    Urea Nitrogen 11.1 6.0 - 20.0 mg/dL    Creatinine 0.85 0.67 - 1.17 mg/dL    GFR Estimate >90 >60 mL/min/1.73m2    Calcium 9.6 8.8 - 10.4 mg/dL    Chloride 104 98 - 107 mmol/L    Glucose 113 (H) 70 - 99 mg/dL    Alkaline Phosphatase 96 40 - 150 U/L    AST 16 0 - 45 U/L    ALT 14 0 - 70 U/L    Protein Total 6.8 6.4 - 8.3 g/dL    Albumin 4.5 3.5 - 5.2 g/dL    Bilirubin Total 0.4 <=1.2 mg/dL   Magnesium    Collection Time: 09/23/24  8:03 PM   Result Value Ref Range    Magnesium 2.1 1.7 - 2.3 mg/dL   TSH with free T4 reflex    Collection Time: 09/23/24  8:03 PM   Result Value Ref Range    TSH 2.90 0.30 - 4.20 uIU/mL   CBC with platelets and differential    Collection Time: 09/23/24  8:03 PM   Result Value Ref Range    WBC Count 10.4 4.0 - 11.0 10e3/uL    RBC Count 4.55 4.40 - 5.90 10e6/uL    Hemoglobin 14.7 13.3 - 17.7 g/dL    Hematocrit 42.2 40.0 - 53.0 %    MCV 93 78 - 100 fL    MCH 32.3 26.5 - 33.0 pg    MCHC 34.8 31.5 - 36.5 g/dL    RDW 12.7 10.0 - 15.0 %    Platelet Count 211 150 - 450 10e3/uL    % Neutrophils 67 %    % Lymphocytes 25 %    % Monocytes 7 %    % Eosinophils 0 %    % Basophils 1 %    % Immature Granulocytes 1 %    NRBCs per 100 WBC 0 <1 /100    Absolute Neutrophils 7.0 1.6 - 8.3 10e3/uL    Absolute Lymphocytes 2.6 0.8 - 5.3 10e3/uL    Absolute Monocytes 0.7 0.0 - 1.3 10e3/uL    Absolute Eosinophils 0.0 0.0 - 0.7 10e3/uL    Absolute Basophils 0.1 0.0 - 0.2 10e3/uL    Absolute Immature Granulocytes 0.1 <=0.4 10e3/uL    Absolute NRBCs 0.0 10e3/uL   Urine Drug Screen Panel    Collection " Time: 09/23/24  8:04 PM   Result Value Ref Range    Amphetamines Urine Screen Negative Screen Negative    Barbituates Urine Screen Negative Screen Negative    Benzodiazepine Urine Screen Negative Screen Negative    Cannabinoids Urine Screen Negative Screen Negative    Cocaine Urine Screen Negative Screen Negative    Fentanyl Qual Urine Screen Negative Screen Negative    Opiates Urine Screen Negative Screen Negative    PCP Urine Screen Negative Screen Negative   Asymptomatic COVID-19 Virus (Coronavirus) by PCR Nasopharyngeal    Collection Time: 09/23/24 11:09 PM    Specimen: Nasopharyngeal; Swab   Result Value Ref Range    SARS CoV2 PCR Negative Negative          Physical and Psychiatric Examination:     /82   Pulse 99   Temp 99.4  F (37.4  C) (Oral)   Resp 18   SpO2 100%   Weight is 0 lbs 0 oz  There is no height or weight on file to calculate BMI.    Mental Status Exam:  Appearance: adequately groomed  Attitude:  guarded  Eye Contact:  poor   Mood:  anxious, sad , and depressed  Affect:  appropriate and in normal range  Speech:  clear, coherent  Language: fluent and intact in English  Psychomotor, Gait, Musculoskeletal:  no evidence of tardive dyskinesia, dystonia, or tics  Thought Process:  logical, linear, and goal oriented  Associations:  no loose associations  Thought Content:  active suicidal ideation present, passive suicidal ideation present, auditory hallucinations present, and visual hallucinations present  Insight:  limited  Judgement:  limited  Oriented to:  time, person, and place  Attention Span and Concentration:  limited  Recent and Remote Memory:  limited  Fund of Knowledge:  low-normal         Diagnoses:      Suicidal ideation  Homicidal ideation  Schizoaffective disorder, bipolar type         Recommendations:         1. Pt displays the following risk factors that support IP admission: Due to feeling of hopelessness and helplessness, continue to feel suicidal ideation unable to contract  for safety. Pt is unable to engage in safety planning to mitigate risk level in a non-secure setting. Lower levels of care have not been successful in mitigating risk. Due to this IP is the least restrictive option of care for pt. Pt should remain in IP until deemed safe to return to the community and engage in OP MH supports    - Continue to recommend inpatient psychiatric hospitalizations for further stabilization   2.  Continue with medication Clozaril 325 mg at bedtime, per  patient, has been compliant with his medications, Invega Sustenna 234 mg every 21 days [facility]  Continue gabapentin 400 mg 3 times daily, propranolol, prazosin, Effexor  Atropine 3 times daily sublingually    4.  Consult psychiatry as needed  4.   Refer to psychiatric provider for medication management. *   treatment per ED team    - Consulted with [facility]  licensed mental health professional, ED physician Eric  asked if they would like this writer to enter orders in the EHR,  patient's ED RN regarding this case.    Please call [facility]/DEC at [phone] if you have follow-up questions or wish to place another consult.  Trinity Pan, Psychiatric Nurse practitioner    Attestation:  Time with:  Patient: 30 minutes  Treatment Team: 30 Minutes  Chart Review: 30 minutes    Total time spent was 90 minutes. Over 50% of times was spent counseling and coordination of care.    I thank  primary ED provider and extended care team very much for letting me participate in the care of this patient.    I, Trinity Pan, CNP, APRN, Psychiatric Nurse Practitioner have personally performed an examination of this patient.  I have edited the note to reflect all relevant changes.  I have discussed this patient with the care team September 24, 2024.  I have reviewed all vitals and laboratory findings.    Disclaimer: This note consists of symbols derived from keyboarding,

## 2024-09-24 NOTE — PLAN OF CARE
"Tiffanie Neal  September 23, 2024  Plan of Care Hand-off Note     Patient Care Path: inpatient mental health    Plan for Care:   Pt was BIBA from group home to ER, presenting with a concern of worsening SI with plan/intent and HI but declining to provide comment on if that includes plan/intent/target. Pt denied hallucinations and dissociative sx, however endorsed \"emotional pain\" (related to trauma and separation from parents), along with symptoms of anxiety and depression (including pervasive feelings of failure and regret). Pt endorsed being at risk of acting on thoughts/plans if discharged with sx persisting/worsening, and reported past [facility] treatment was effective, preferring that to observation.    Identified Goals and Safety Issues: Stabilize/treat SI/HI in IPMH, recommend resuming OP services with current therapist or provide a referral for a new one (preferrably trauma-informed), continue with Rx, increase pleasurable activities, increase coping skills as alternatives to self-harm/substance use, and apply opposite action (DBT skill).    Overview:  Neela Pollack (/director): [phone]      Neela Pollack requested contact for any medication changes made while pt is in ER/IPMH, and, if possible, for the Rx to be sent to the [facility] located at [address], [place] [address] with phone: [phone].         Legal Status: Legal Status at Admission: Voluntary/Patient has signed consent for treatment    Psychiatry Consult: n/a       Updated attending & RN regarding plan of care.      Laz Nicole, Psychotherapist Trainee       "

## 2024-11-24 ENCOUNTER — HEALTH MAINTENANCE LETTER (OUTPATIENT)
Age: 34
End: 2024-11-24

## 2025-01-13 ENCOUNTER — HOSPITAL ENCOUNTER (EMERGENCY)
Facility: CLINIC | Age: 35
Discharge: HOME | End: 2025-01-13
Attending: EMERGENCY MEDICINE
Payer: MEDICARE

## 2025-01-13 VITALS
BODY MASS INDEX: 33.07 KG/M2 | WEIGHT: 231 LBS | DIASTOLIC BLOOD PRESSURE: 78 MMHG | HEIGHT: 70 IN | SYSTOLIC BLOOD PRESSURE: 101 MMHG | HEART RATE: 109 BPM | OXYGEN SATURATION: 99 % | RESPIRATION RATE: 18 BRPM

## 2025-01-13 DIAGNOSIS — R45.851 SUICIDAL IDEATION: ICD-10-CM

## 2025-01-13 PROCEDURE — 250N000013 HC RX MED GY IP 250 OP 250 PS 637: Performed by: EMERGENCY MEDICINE

## 2025-01-13 PROCEDURE — 99285 EMERGENCY DEPT VISIT HI MDM: CPT | Performed by: EMERGENCY MEDICINE

## 2025-01-13 RX ORDER — PRAZOSIN HYDROCHLORIDE 5 MG/1
5 CAPSULE ORAL ONCE
Status: COMPLETED | OUTPATIENT
Start: 2025-01-13 | End: 2025-01-13

## 2025-01-13 RX ORDER — OLANZAPINE 10 MG/1
10 TABLET ORAL 3 TIMES DAILY PRN
Status: DISCONTINUED | OUTPATIENT
Start: 2025-01-13 | End: 2025-01-13

## 2025-01-13 RX ORDER — CLOZAPINE 25 MG/1
25 TABLET ORAL AT BEDTIME
Status: DISCONTINUED | OUTPATIENT
Start: 2025-01-13 | End: 2025-01-13

## 2025-01-13 RX ORDER — PROPRANOLOL HCL 20 MG
20 TABLET ORAL ONCE
Status: COMPLETED | OUTPATIENT
Start: 2025-01-13 | End: 2025-01-13

## 2025-01-13 RX ORDER — PRAZOSIN HYDROCHLORIDE 2 MG/1
4 CAPSULE ORAL AT BEDTIME
Status: DISCONTINUED | OUTPATIENT
Start: 2025-01-13 | End: 2025-01-13

## 2025-01-13 RX ORDER — PROPRANOLOL HYDROCHLORIDE 10 MG/1
10 TABLET ORAL 2 TIMES DAILY
Status: DISCONTINUED | OUTPATIENT
Start: 2025-01-13 | End: 2025-01-13

## 2025-01-13 RX ORDER — CLOZAPINE 100 MG/1
300 TABLET ORAL ONCE
Status: COMPLETED | OUTPATIENT
Start: 2025-01-13 | End: 2025-01-13

## 2025-01-13 RX ORDER — HYDROXYZINE HYDROCHLORIDE 25 MG/1
25 TABLET, FILM COATED ORAL 3 TIMES DAILY PRN
Status: DISCONTINUED | OUTPATIENT
Start: 2025-01-13 | End: 2025-01-13

## 2025-01-13 RX ORDER — DOCUSATE SODIUM 100 MG/1
100 CAPSULE, LIQUID FILLED ORAL ONCE
Status: COMPLETED | OUTPATIENT
Start: 2025-01-13 | End: 2025-01-13

## 2025-01-13 RX ORDER — BENZTROPINE MESYLATE 1 MG/1
1 TABLET ORAL 2 TIMES DAILY
Status: DISCONTINUED | OUTPATIENT
Start: 2025-01-13 | End: 2025-01-13

## 2025-01-13 RX ORDER — LEVOTHYROXINE SODIUM 112 UG/1
112 TABLET ORAL DAILY
Status: DISCONTINUED | OUTPATIENT
Start: 2025-01-14 | End: 2025-01-14 | Stop reason: HOSPADM

## 2025-01-13 RX ADMIN — PRAZOSIN HYDROCHLORIDE 5 MG: 5 CAPSULE ORAL at 23:25

## 2025-01-13 RX ADMIN — CLOZAPINE 300 MG: 100 TABLET ORAL at 23:25

## 2025-01-13 RX ADMIN — DOCUSATE SODIUM 100 MG: 100 CAPSULE, LIQUID FILLED ORAL at 23:26

## 2025-01-13 RX ADMIN — PROPRANOLOL HYDROCHLORIDE 20 MG: 20 TABLET ORAL at 23:26

## 2025-01-13 RX ADMIN — GABAPENTIN 400 MG: 300 CAPSULE ORAL at 23:25

## 2025-01-13 ASSESSMENT — ACTIVITIES OF DAILY LIVING (ADL)
ADLS_ACUITY_SCORE: 58

## 2025-01-14 ENCOUNTER — TELEPHONE (OUTPATIENT)
Dept: BEHAVIORAL HEALTH | Facility: CLINIC | Age: 35
End: 2025-01-14
Payer: COMMERCIAL
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
## 2025-01-14 NOTE — ED NOTES
Writer spoke with [facility] staff who administers patient's medications at the group home and verified what patient takes at night as group home staff wants patient to receive night time medications prior to discharge.
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
## 2025-01-14 NOTE — ED TRIAGE NOTES
"Pt lives in a . Pt walked down the stairs in the  and stated \"I am feeling suicidal\",  staff called EMS. Pt states he wants to light himself on fire or cut himself. Calm and cooperative en route, but stares off.        "

## 2025-01-14 NOTE — PROGRESS NOTES
Patient is currently refusing DEC assessment.  completed collateral from [facility] , Neela Pollack at [phone], please see separate note. Patient will remain in the emergency department until he is able/willing to engage. Attending physician and nurse notified.    SUZANNE Dunn
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
## 2025-02-16 ENCOUNTER — HOSPITAL ENCOUNTER (EMERGENCY)
Facility: CLINIC | Age: 35
Discharge: HOME | End: 2025-02-16
Attending: FAMILY MEDICINE | Admitting: FAMILY MEDICINE
Payer: MEDICARE

## 2025-02-16 VITALS
DIASTOLIC BLOOD PRESSURE: 83 MMHG | BODY MASS INDEX: 33.15 KG/M2 | RESPIRATION RATE: 16 BRPM | SYSTOLIC BLOOD PRESSURE: 109 MMHG | HEIGHT: 70 IN | TEMPERATURE: 98.9 F | HEART RATE: 85 BPM | OXYGEN SATURATION: 99 %

## 2025-02-16 DIAGNOSIS — F25.0 SCHIZOAFFECTIVE DISORDER, BIPOLAR TYPE (H): ICD-10-CM

## 2025-02-16 DIAGNOSIS — F60.3 BORDERLINE PERSONALITY DISORDER (H): ICD-10-CM

## 2025-02-16 LAB
AMPHETAMINES UR QL SCN: ABNORMAL
BARBITURATES UR QL SCN: ABNORMAL
BENZODIAZ UR QL SCN: ABNORMAL
BZE UR QL SCN: ABNORMAL
CANNABINOIDS UR QL SCN: ABNORMAL
FENTANYL UR QL: ABNORMAL
OPIATES UR QL SCN: ABNORMAL
PCP QUAL URINE (ROCHE): ABNORMAL

## 2025-02-16 PROCEDURE — 99283 EMERGENCY DEPT VISIT LOW MDM: CPT | Performed by: FAMILY MEDICINE

## 2025-02-16 PROCEDURE — 99284 EMERGENCY DEPT VISIT MOD MDM: CPT | Performed by: FAMILY MEDICINE

## 2025-02-16 PROCEDURE — 80307 DRUG TEST PRSMV CHEM ANLYZR: CPT | Performed by: FAMILY MEDICINE

## 2025-02-16 ASSESSMENT — COLUMBIA-SUICIDE SEVERITY RATING SCALE - C-SSRS
3. HAVE YOU BEEN THINKING ABOUT HOW YOU MIGHT KILL YOURSELF?: YES
2. HAVE YOU ACTUALLY HAD ANY THOUGHTS OF KILLING YOURSELF IN THE PAST MONTH?: YES
1. IN THE PAST MONTH, HAVE YOU WISHED YOU WERE DEAD OR WISHED YOU COULD GO TO SLEEP AND NOT WAKE UP?: YES
5. HAVE YOU STARTED TO WORK OUT OR WORKED OUT THE DETAILS OF HOW TO KILL YOURSELF? DO YOU INTEND TO CARRY OUT THIS PLAN?: YES
6. HAVE YOU EVER DONE ANYTHING, STARTED TO DO ANYTHING, OR PREPARED TO DO ANYTHING TO END YOUR LIFE?: YES

## 2025-02-16 ASSESSMENT — ACTIVITIES OF DAILY LIVING (ADL)
ADLS_ACUITY_SCORE: 58

## 2025-02-16 NOTE — ED NOTES
Bed: [facility]  Expected date: 2/16/25  Expected time: 5:43 PM  Means of arrival: Ambulance  Comments:  HEMS 415  34M  SI  voluntary

## 2025-02-16 NOTE — ED PROVIDER NOTES
ED Provider Note  [facility]      History     Chief Complaint   Patient presents with    Suicidal     Patient was brought in by ambulance from . Patient c/o of suicidal thoughts with a plan to cut his throat. Patient was calm and cooperative enroute.      HPI  Tiffanie Neal is a 34 year old male with a history of substance abuse, PTSD, schizoaffective disorder (bipolar type) and borderline personality disorder, who presents to the emergency department for suicidal ideation.  Patient was having intrusive thoughts about wanting to cut his throat but states now that he is here those thoughts seem to have settled down and is requesting transport back to the [facility].  We discussed this with group [place] staff who felt like they could maintain his safety.    Past Medical History  Past Medical History:   Diagnosis Date    Anxiety     Depressive disorder     Schizo affective schizophrenia (H)     Substance abuse (H)      Past Surgical History:   Procedure Laterality Date    TONSILLECTOMY       benztropine (COGENTIN) 1 MG tablet  cloZAPine (CLOZARIL) 100 MG tablet  cloZAPine (CLOZARIL) 25 MG tablet  gabapentin (NEURONTIN) 400 MG capsule  prazosin (MINIPRESS) 2 MG capsule  propranolol (INDERAL) 10 MG tablet  venlafaxine (EFFEXOR XR) 75 MG 24 hr capsule  atropine 1 % SOLN  bisacodyl (DULCOLAX) 10 MG suppository  cyanocobalamin (VITAMIN B-12) 500 MCG SUBL sublingual tablet  docusate sodium (COLACE) 100 MG capsule  hydrOXYzine HCl (ATARAX) 25 MG tablet  levothyroxine (SYNTHROID/LEVOTHROID) 112 MCG tablet  nicotine (NICORETTE) 2 MG gum  OLANZapine (ZYPREXA) 10 MG tablet  omeprazole (PRILOSEC) 20 MG DR capsule  paliperidone (INVEGA SUSTENNA) 234 MG/1.5ML ANTHONY  polyethylene glycol (MIRALAX) 17 GM/Dose powder  Vitamin D3 (VITAMIN D, CHOLECALCIFEROL,) 25 mcg (1000 units) tablet      Allergies   Allergen Reactions    Haloperidol Other (See Comments)     Other reaction(s): Tardive  "Dyskinesia  Torticollis  Torticollis  Stiff Neck  Torticollis; reports \"stiff neck.\"       Family History  Family History   Problem Relation Age of Onset    Mental Illness Mother     Mental Illness Maternal Aunt     Mental Illness Maternal Uncle     Glaucoma No family hx of     Macular Degeneration No family hx of      Social History   Social History     Tobacco Use    Smoking status: Every Day     Current packs/day: 0.50     Types: Vaping Device, Cigarettes    Smokeless tobacco: Never   Substance Use Topics    Alcohol use: Yes     Comment: sometimes-    Drug use: Yes     Types: Marijuana     Comment: COD last use 2-3 weeks ago      A medically appropriate review of systems was performed with pertinent positives and negatives noted in the HPI, and all other systems negative.    Physical Exam   BP: 109/83  Pulse: 85  Temp: 98.9  F (37.2  C)  Resp: 16  Height: 177.8 cm (5' 10\")  SpO2: 99 %  Physical Exam  Constitutional:       General: He is not in acute distress.     Appearance: Normal appearance. He is not toxic-appearing.   HENT:      Head: Atraumatic.   Eyes:      General: No scleral icterus.     Conjunctiva/sclera: Conjunctivae normal.   Cardiovascular:      Rate and Rhythm: Normal rate.      Heart sounds: Normal heart sounds.   Pulmonary:      Effort: Pulmonary effort is normal. No respiratory distress.      Breath sounds: Normal breath sounds.   Abdominal:      Palpations: Abdomen is soft.      Tenderness: There is no abdominal tenderness.   Musculoskeletal:         General: No deformity.      Cervical back: Neck supple.   Skin:     General: Skin is warm.   Neurological:      General: No focal deficit present.      Mental Status: He is alert and oriented to person, place, and time.      Sensory: No sensory deficit.      Motor: No weakness.      Coordination: Coordination normal.   Psychiatric:         Mood and Affect: Mood is anxious.         Speech: Speech is delayed.         Behavior: Behavior is cooperative. "         Thought Content: Thought content is paranoid. Thought content does not include homicidal or suicidal ideation.           ED Course, Procedures, & Data      Procedures     Results for orders placed or performed during the hospital encounter of 02/16/25   Urine Drug Screen Panel     Status: Abnormal   Result Value Ref Range    Amphetamines Urine Screen Negative Screen Negative    Barbituates Urine Screen Negative Screen Negative    Benzodiazepine Urine Screen Negative Screen Negative    Cannabinoids Urine Screen Positive (A) Screen Negative    Cocaine Urine Screen Negative Screen Negative    Fentanyl Qual Urine Screen Negative Screen Negative    Opiates Urine Screen Negative Screen Negative    PCP Urine Screen Negative Screen Negative   Urine Drug Screen     Status: Abnormal    Narrative    The following orders were created for panel order Urine Drug Screen.  Procedure                               Abnormality         Status                     ---------                               -----------         ------                     Urine Drug Screen Panel[[id number]]      Abnormal            Final result                 Please view results for these tests on the individual orders.     Medications - No data to display  Labs Ordered and Resulted from Time of ED Arrival to Time of ED Departure - No data to display  No orders to display          Critical care was not performed.     Medical Decision Making  The patient's presentation was of moderate complexity (a chronic illness mild to moderate exacerbation, progression, or side effect of treatment).    The patient's evaluation involved:  ordering and/or review of 1 test(s) in this encounter (see separate area of note for details)  discussion of management or test interpretation with another health professional (patient was seen with independent provider for full DEC assessment with discussion of need for hospitalization versus outpatient management it was felt as  though patient was able to maintain safety at his group home and will be discharged back to the facility.)    The patient's management necessitated high risk (a decision regarding hospitalization).    Assessment & Plan        I have reviewed the nursing notes. I have reviewed the findings, diagnosis, plan and need for follow up with the patient.        Final diagnoses:   Schizoaffective disorder, bipolar type (H)   Borderline personality disorder (H)       George Dia  [facility] EMERGENCY DEPARTMENT  2/16/2025     George Dia MD  02/17/25 2100

## 2025-02-16 NOTE — ED TRIAGE NOTES
Patient was brought in by ambulance from . Patient c/o of suicidal thoughts with a plan to cut his throat. Patient was calm and cooperative enroute.      Triage Assessment (Adult)       Row Name 02/16/25 9640          Triage Assessment    Airway WDL WDL        Respiratory WDL    Respiratory WDL WDL        Skin Circulation/Temperature WDL    Skin Circulation/Temperature WDL WDL        Cardiac WDL    Cardiac WDL WDL        Peripheral/Neurovascular WDL    Peripheral Neurovascular WDL WDL        Cognitive/Neuro/Behavioral WDL    Cognitive/Neuro/Behavioral WDL WDL
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
## 2025-02-18 ENCOUNTER — TELEPHONE (OUTPATIENT)
Dept: BEHAVIORAL HEALTH | Facility: CLINIC | Age: 35
End: 2025-02-18
Payer: COMMERCIAL